# Patient Record
Sex: MALE | Race: WHITE | NOT HISPANIC OR LATINO | Employment: OTHER | URBAN - METROPOLITAN AREA
[De-identification: names, ages, dates, MRNs, and addresses within clinical notes are randomized per-mention and may not be internally consistent; named-entity substitution may affect disease eponyms.]

---

## 2017-01-16 DIAGNOSIS — E11.9 TYPE 2 DIABETES, HBA1C GOAL < 7% (H): Primary | ICD-10-CM

## 2017-01-16 NOTE — TELEPHONE ENCOUNTER
ULTICARE MINI PEN 31G X 6MM         Last Written Prescription Date: 09/01/15  Last Fill Quantity: 100,  # refills: 12   Last Office Visit with [facility], [facility] or [facility] prescribing provider: 08/04/16    Guillaume Mcgregor Pharmacy Technician  [facility]

## 2017-01-16 NOTE — TELEPHONE ENCOUNTER
Prescription approved per [facility] Refill Protocol.    Summer Low, PharmD  [facility]  On behalf of [facility]

## 2017-02-08 DIAGNOSIS — E78.5 HYPERLIPIDEMIA LDL GOAL <70: Primary | ICD-10-CM

## 2017-02-08 NOTE — TELEPHONE ENCOUNTER
Lipitor 20mg      Last Written Prescription Date:  8/5/16  Last Fill Quantity: 90,   # refills: 1  Last Office Visit with [facility], P or [facility] prescribing provider: 8/4/16  Future Office visit:    Next 5 appointments (look out 90 days)     Apr 24, 2017 10:15 AM   Return Visit with Ruth Martins MD   [facility] ([facility])    [facility]  [address]  [facility] [address]   [phone]                   Routing refill request to provider for review/approval because:  Medication is reported/historical    Olinda Corbett CPhT  [facility]    On behalf of [facility]

## 2017-02-09 RX ORDER — ATORVASTATIN CALCIUM 20 MG/1
20 TABLET, FILM COATED ORAL DAILY
Qty: 90 TABLET | Refills: 3 | Status: SHIPPED | OUTPATIENT
Start: 2017-02-09 | End: 2018-02-21

## 2017-04-16 DIAGNOSIS — N18.30 TYPE 2 DIABETES MELLITUS WITH STAGE 3 CHRONIC KIDNEY DISEASE (H): ICD-10-CM

## 2017-04-16 DIAGNOSIS — E11.22 TYPE 2 DIABETES MELLITUS WITH STAGE 3 CHRONIC KIDNEY DISEASE (H): ICD-10-CM

## 2017-04-17 NOTE — TELEPHONE ENCOUNTER
Ericka Blanco         Last Written Prescription Date: 08/04/2016  #15ml x 1  Last filled - not provided, e-refill request  Last Office Visit with [facility], [facility] or [facility] prescribing provider:  08/04/2016 JESENIA Beaulieu   Next 5 appointments (look out 90 days)     Apr 24, 2017 10:15 AM CDT   Return Visit with Ruth Martins MD   [facility] ([facility])    [facility]  [address]  [place] [address]   [phone]                   BP Readings from Last 3 Encounters:   12/05/16 126/76   10/31/16 139/74   08/11/16 154/69     Lab Results   Component Value Date    MICROL 67 08/04/2016     Lab Results   Component Value Date    UMALCR 88.90 08/04/2016     Creatinine   Date Value Ref Range Status   10/24/2016 1.33 (H) 0.66 - 1.25 mg/dL Final   ]  GFR Estimate   Date Value Ref Range Status   10/24/2016 52 (L) >60 mL/min/1.7m2 Final     Comment:     Non  GFR Calc   08/04/2016 48 (L) >60 mL/min/1.7m2 Final     Comment:     Non  GFR Calc   04/18/2016 47 (L) >60 mL/min/1.7m2 Final     Comment:     Non  GFR Calc     GFR Estimate If Black   Date Value Ref Range Status   10/24/2016 63 >60 mL/min/1.7m2 Final     Comment:      GFR Calc   08/04/2016 58 (L) >60 mL/min/1.7m2 Final     Comment:      GFR Calc   04/18/2016 57 (L) >60 mL/min/1.7m2 Final     Comment:      GFR Calc     Lab Results   Component Value Date    CHOL 124 07/16/2015     Lab Results   Component Value Date    HDL 45 07/16/2015     Lab Results   Component Value Date     08/04/2016    LDL 61 07/16/2015     Lab Results   Component Value Date    TRIG 91 07/16/2015     Lab Results   Component Value Date    CHOLHDLRATIO 2.8 07/16/2015     Lab Results   Component Value Date    AST 15 10/24/2016     Lab Results   Component Value Date    ALT 22 10/24/2016     Lab Results   Component Value Date    A1C 7.0 08/04/2016    A1C  6.8 01/25/2016    A1C 9.7 07/16/2015    A1C 7.8 12/04/2014    A1C 10.1 04/09/2014     Potassium   Date Value Ref Range Status   10/24/2016 4.5 3.4 - 5.3 mmol/L Final

## 2017-04-18 RX ORDER — INSULIN GLARGINE 100 [IU]/ML
INJECTION, SOLUTION SUBCUTANEOUS
Qty: 15 ML | Refills: 0 | Status: SHIPPED | OUTPATIENT
Start: 2017-04-18 | End: 2017-07-27

## 2017-04-18 NOTE — TELEPHONE ENCOUNTER
Prescription approved per [facility] Refill Protocol.  Patient has upcoming appointment. Estrellita Shaikh RN

## 2017-04-19 DIAGNOSIS — C20 RECTAL CANCER (H): ICD-10-CM

## 2017-04-19 LAB
ALBUMIN SERPL-MCNC: 3.6 G/DL (ref 3.4–5)
ALP SERPL-CCNC: 122 U/L (ref 40–150)
ALT SERPL W P-5'-P-CCNC: 18 U/L (ref 0–70)
ANION GAP SERPL CALCULATED.3IONS-SCNC: 8 MMOL/L (ref 3–14)
AST SERPL W P-5'-P-CCNC: 14 U/L (ref 0–45)
BASOPHILS # BLD AUTO: 0.1 10E9/L (ref 0–0.2)
BASOPHILS NFR BLD AUTO: 0.7 %
BILIRUB SERPL-MCNC: 0.9 MG/DL (ref 0.2–1.3)
BUN SERPL-MCNC: 23 MG/DL (ref 7–30)
CALCIUM SERPL-MCNC: 8.9 MG/DL (ref 8.5–10.1)
CEA SERPL-MCNC: 0.7 UG/L (ref 0–2.5)
CHLORIDE SERPL-SCNC: 104 MMOL/L (ref 94–109)
CO2 SERPL-SCNC: 25 MMOL/L (ref 20–32)
CREAT SERPL-MCNC: 1.37 MG/DL (ref 0.66–1.25)
DIFFERENTIAL METHOD BLD: ABNORMAL
EOSINOPHIL # BLD AUTO: 0.1 10E9/L (ref 0–0.7)
EOSINOPHIL NFR BLD AUTO: 1.4 %
ERYTHROCYTE [DISTWIDTH] IN BLOOD BY AUTOMATED COUNT: 13.6 % (ref 10–15)
GFR SERPL CREATININE-BSD FRML MDRD: 50 ML/MIN/1.7M2
GLUCOSE SERPL-MCNC: 233 MG/DL (ref 70–99)
HCT VFR BLD AUTO: 39.8 % (ref 40–53)
HGB BLD-MCNC: 12.8 G/DL (ref 13.3–17.7)
LYMPHOCYTES # BLD AUTO: 1.1 10E9/L (ref 0.8–5.3)
LYMPHOCYTES NFR BLD AUTO: 15.9 %
MCH RBC QN AUTO: 29.6 PG (ref 26.5–33)
MCHC RBC AUTO-ENTMCNC: 32.2 G/DL (ref 31.5–36.5)
MCV RBC AUTO: 92 FL (ref 78–100)
MONOCYTES # BLD AUTO: 0.6 10E9/L (ref 0–1.3)
MONOCYTES NFR BLD AUTO: 9.3 %
NEUTROPHILS # BLD AUTO: 5 10E9/L (ref 1.6–8.3)
NEUTROPHILS NFR BLD AUTO: 72.7 %
PLATELET # BLD AUTO: 164 10E9/L (ref 150–450)
POTASSIUM SERPL-SCNC: 4 MMOL/L (ref 3.4–5.3)
PROT SERPL-MCNC: 7.1 G/DL (ref 6.8–8.8)
RBC # BLD AUTO: 4.32 10E12/L (ref 4.4–5.9)
SODIUM SERPL-SCNC: 137 MMOL/L (ref 133–144)
WBC # BLD AUTO: 6.9 10E9/L (ref 4–11)

## 2017-04-19 PROCEDURE — 85025 COMPLETE CBC W/AUTO DIFF WBC: CPT | Performed by: INTERNAL MEDICINE

## 2017-04-19 PROCEDURE — 80053 COMPREHEN METABOLIC PANEL: CPT | Performed by: INTERNAL MEDICINE

## 2017-04-19 PROCEDURE — 82378 CARCINOEMBRYONIC ANTIGEN: CPT | Performed by: INTERNAL MEDICINE

## 2017-04-19 PROCEDURE — 36415 COLL VENOUS BLD VENIPUNCTURE: CPT | Performed by: INTERNAL MEDICINE

## 2017-04-24 ENCOUNTER — ONCOLOGY VISIT (OUTPATIENT)
Dept: ONCOLOGY | Facility: CLINIC | Age: 77
End: 2017-04-24
Attending: INTERNAL MEDICINE
Payer: COMMERCIAL

## 2017-04-24 VITALS
HEART RATE: 62 BPM | DIASTOLIC BLOOD PRESSURE: 76 MMHG | OXYGEN SATURATION: 98 % | RESPIRATION RATE: 18 BRPM | WEIGHT: 209.8 LBS | TEMPERATURE: 96.1 F | SYSTOLIC BLOOD PRESSURE: 155 MMHG | HEIGHT: 68 IN | BODY MASS INDEX: 31.8 KG/M2

## 2017-04-24 DIAGNOSIS — C20 RECTAL CANCER (H): ICD-10-CM

## 2017-04-24 DIAGNOSIS — N28.9 RENAL INSUFFICIENCY: ICD-10-CM

## 2017-04-24 DIAGNOSIS — I26.99 PULMONARY EMBOLISM, OTHER: Primary | ICD-10-CM

## 2017-04-24 DIAGNOSIS — T14.8XXA BRUISING: ICD-10-CM

## 2017-04-24 DIAGNOSIS — Z86.0100 HISTORY OF COLONIC POLYPS: ICD-10-CM

## 2017-04-24 PROCEDURE — 99211 OFF/OP EST MAY X REQ PHY/QHP: CPT

## 2017-04-24 PROCEDURE — 99214 OFFICE O/P EST MOD 30 MIN: CPT | Performed by: INTERNAL MEDICINE

## 2017-04-24 ASSESSMENT — PAIN SCALES - GENERAL: PAINLEVEL: NO PAIN (0)

## 2017-04-24 NOTE — PROGRESS NOTES
Hematology /oncology Visit      CHIEF COMPLAINT AND REASON FOR VISIT:    1. PE post op   2. Rectal cancer 2013     HISTORY OF PRESENT ILLNESS:  He presented with SOB found to have large right pulmonary embolism in 03/2014 when he was recovering from a laparoscopic-assisted low anterior resection for rectal cancer resection.  He had colorectal end-to-end anastomosis and loop ileostomy at that time.  He was on Lovenox 70-80 mg subcu b.i.d. for 7 months. It was stopped after repeat CT chest in 9/2014 indicated no PE.     Hypercoagulable work up through us in 9/2014 are negative. He remains to be off coumadin.  Advised 325 mg enteric coated ASA.      He was diagnosed with locally-advanced rectal adenocarcinoma in 07/2013 through colonoscopy due to bloody bowel movements and anemia.  Preop evaluation showed the lesion is about mid to upper rectum 7 cm from the anal verge, there are prominent 5-10 perirectal lymph nodes. He had minimal stage III disease on presentation. He received neoadjuvant concurrent chemoradiation with oral Xeloda, completed in 10/2013, then went on to have in 1/2014 laparoscopic-assisted LAR with splenic flexure mobilization and colonic J pouch with colorectal end-to-end anastomosis, loop ileostomy.  Final pathology indicating CR with no residual tumor in the rectum and in the lymph nodes.  He  was only able to proceed with 2 cycles of XELOX and then dropped due to his general poor condition.     PMH  Type 2 diabetes, peripheral neuropathy, BPH, hypertension, hyperlipidemia, history of urinary retention.      SOCIAL HISTORY:  He lives in [place].  He never used tobacco.  Denies alcohol abuse.  He quit smoking in 2002.  He had 30-pack-year smoking history prior.      FAMILY HISTORY:  Positive for maternal grandmother who had unknown type of cancer, 1 sister was treated will breast cancer.        REVIEW OF SYSTEMS:   He is eating nl, no pain, gaining weight, no blood in stool. He is living active  "life. He barely eats vegi.       PHYSICAL EXAMINATION:   VITAL SIGNS: Blood pressure 155/76, pulse 62, temperature 96.1  F (35.6  C), temperature source Tympanic, resp. rate 18, height 1.727 m (5' 8\"), weight 95.2 kg (209 lb 12.8 oz), SpO2 98 %.  GENERAL APPEARANCE:   Elderly gentleman, looks much younger than his stated age, not in acute distress.  He looks very comfortable.   HEENT: The patient is normocephalic, atraumatic. Pupils are equal react to light.  Sclerae are anicteric.  Moist oral mucosa.  Negative pharynx.  No oral thrush.   NECK:  Supple.  No jugular venous distention.  Thyroid is not palpable.   LYMPH NODES:  Superficial lymphadenopathy is not appreciable in the bilateral cervical, supraclavicular, axillary or inguinal adenopathy.    CARDIOVASCULAR:  S1, S2 regular with no murmurs or gallops.  No carotid or abdominal bruits.   PULMONARY:  Lungs are clear to auscultation and percussion bilaterally.  There is no wheezing or rhonchi.   GASTROINTESTINAL:  Abdomen is very soft, PEG feeding tube properly in place.   He has a right abdomen ileostomy bag, brownish liquid in the bag.   MUSCULOSKELETAL/EXTREMITIES:  No edema.  No cyanotic changes.  No signs of joint deformity.  No lymphedema.   NEUROLOGIC:  Cranial nerves II-XII are grossly intact.  Sensation intact.  Muscle strength and muscle tone symmetrical all through 5/5.   BACK:  No spinal or paraspinal tenderness.  No CVA tenderness.   SKIN:  No petechiae.  No rash.  No signs of cellulitis. Bruising on dorsal hands.       Current LAB Data  Cbc diff/CEA are fine, Cr 1.37 stable.         OLD DATA REVIEW IN SUMMARY:   12/2106: multiple tubulovillous adenoma removed.     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     CT body 10/2016: no mets.   CT 10/2015  1. Negative chest. No PE  2. Interval right lower quadrant ostomy takedown.  3. Postoperative changes in " the rectal and presacral region without any appreciable change since the prior exam.  4. Enlarged prostate gland.    CT 9/2014 - No evidence for pulmonary embolism.    CT enterography in 06/2014 with no evidence of bowel obstruction, no convincing focal small bowel abnormality, possible fistula track connecting this walled-off collection colonic anastomosis site in the pelvis.       Laparoscopic-assisted LAR in 01/2014 - no residual carcinoma identified in the sigmoid or rectum, 1 lymph node was negative.      Preop MRI in 08/2013:  sessile tumor in the posterior mid to upper rectum, between 5 and 10 prominent perirectal lymph nodes which are suspicious.      Original pathology from colonoscopy, rectal mass biopsy 07/2013 - invasive moderately differentiated adenocarcinoma with ulceration, all 4 DNA mismatch repair proteins are expressed in the tumor nuclei.      ASSESSMENT AND PLAN:   1.  Postop large right-sided pulmonary embolism in 03/2014, s/p 7 months of 70 to 80 mg subcu b.i.d.  after repeat CT chest 9/2014 indicated no PE.   Most likely his 03/2014 pulmonary embolism is due to postop debilitated condition. Hypercoagulable work up was negative. He was off coumadin since fall 2014.    Advise full dose enteric coated ASA. He is tolerating this fine.      2.  Renal insufficiency .  He is encouraged on oral fluid intake.     3.  Locally advanced rectal cancer, status post concurrent chemoradiation.  Surgical resection achieved complete CR.  He did not finish adjuvant treatment due to then poor condition.  He needs ongoing oncologic followup for the disease status.  He is due 6 months f/u with labs and CT prn.   Advise ASA and vit D.      4. 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 He needs on top of colonoscopy.    5. Bruising on hands. If it gets worse, can consider cut down ASA to 160 mg po QD.

## 2017-04-24 NOTE — MR AVS SNAPSHOT
After Visit Summary   4/24/2017    Storm Dutta    MRN: [id number]           Patient Information     Date Of Birth          1940        Visit Information        Provider Department      4/24/2017 10:15 AM Ruth Martins MD [facility]        Today's Diagnoses     Pulmonary embolism, other (H)    -  1    Rectal cancer (H)        History of colonic polyps        Renal insufficiency        Bruising          Care Instructions    We would like to see you back in 6 months with labs prior.    When you are in need of a refill, please call your pharmacy and they will send us a request.  Copy of appointments, and after visit summary (AVS) given to patient.  If you have any questions please call Lacy Garrido RN, BSN, OCN Oncology Hematology  [facility] [phone]. For questions after business hours, or on holidays/weekends, please call our after hours Nurse Triage line [phone]. Thank you.           Follow-ups after your visit        Your next 10 appointments already scheduled     Oct 17, 2017 10:00 AM CDT   LAB with  LAB   [facility]    [address] [address] [phone]           Patient must bring picture ID.  Patient should be prepared to give a urine specimen  Please do not eat 10-12 hours before your appointment if you are coming in fasting for labs on lipids, cholesterol, or glucose (sugar).  Pregnant women should follow their Care Team instructions. Water with medications is okay. Do not drink coffee or other fluids.   If you have concerns about taking  your medications, please ask at office or if scheduling via [email or web address] [email or web address], send a message by clicking on Secure Messaging, Message Your Care Team.            Oct 24, 2017 10:00 AM CDT   Return Visit with Ruth Martins MD   [facility] ([facility])    [facility]  [address] [place] [address]  [address]  [facility] [address]   [phone]              Future tests that were ordered for you today     Open Future Orders        Priority Expected Expires Ordered    CBC with platelets differential Routine 10/1/2017 11/30/2017 4/24/2017    CEA Routine 10/1/2017 11/30/2017 4/24/2017    Comprehensive metabolic panel Routine 10/1/2017 11/30/2017 4/24/2017            Who to contact     If you have questions or need follow up information about today's clinic visit or your schedule please contact [facility] directly at [phone].  Normal or non-critical lab and imaging results will be communicated to you by AllClear IDhart, letter or phone within 4 business days after the clinic has received the results. If you do not hear from us within 7 days, please contact the clinic through AllClear IDhart or phone. If you have a critical or abnormal lab result, we will notify you by phone as soon as possible.  Submit refill requests through [email or web address] or call your pharmacy and they will forward the refill request to us. Please allow 3 business days for your refill to be completed.          Additional Information About Your Visit        MyChart Information     [email or web address] gives you secure access to your electronic health record. If you see a primary care provider, you can also send messages to your care team and make appointments. If you have questions, please call your primary care clinic.  If you do not have a primary care provider, please call [phone] and they will assist you.        Care EveryWhere ID     This is your Care EveryWhere ID. This could be used by other organizations to access your [facility] medical records  [id number]        Your Vitals Were     Pulse Temperature Respirations Height Pulse Oximetry BMI (Body Mass Index)    62 96.1  F (35.6  C) (Tympanic) 18 1.727 m (5' 8\") 98% 31.9 kg/m2       Blood Pressure from Last 3 Encounters:   04/24/17 155/76   12/05/16 126/76   10/31/16 139/74    Weight from Last 3 " Encounters:   04/24/17 95.2 kg (209 lb 12.8 oz)   10/31/16 94.8 kg (209 lb)   08/04/16 93.7 kg (206 lb 8 oz)               Primary Care Provider Office Phone # Fax #    Jovana Beaulieu [phone]       [place] [address]   [place] [address]        Thank you!     Thank you for choosing [facility]  for your care. Our goal is always to provide you with excellent care. Hearing back from our patients is one way we can continue to improve our services. Please take a few minutes to complete the written survey that you may receive in the mail after your visit with us. Thank you!             Your Updated Medication List - Protect others around you: Learn how to safely use, store and throw away your medicines at www.disposemymeds.org.          This list is accurate as of: 4/24/17 10:54 AM.  Always use your most recent med list.                   Brand Name Dispense Instructions for use    aspirin 325 MG tablet      Take 325 mg by mouth daily       atorvastatin 20 MG tablet    LIPITOR    90 tablet    Take 1 tablet (20 mg) by mouth daily       blood glucose monitoring meter device kit     1 kit    Check blood sugars 1-2 times daily       blood glucose monitoring test strip    ACCU-CHEK REGIS    100 each    Use to test blood sugars 2-4  times daily or as directed.       diphenoxylate-atropine 2.5-0.025 MG per tablet    LOMOTIL    60 tablet    Take 1 tablet by mouth 4 times daily as needed for diarrhea       finasteride 5 MG tablet    PROSCAR    90 tablet    Take 1 tablet (5 mg) by mouth daily       insulin aspart 100 UNIT/ML injection    NovoLOG FLEXPEN    15 mL    Use 4 units plus low sliding scale before each meal 120-149 0 units 150-199 1 unit 200-249 2 units 250-299 3 units 300-349 4 units > 350 5 units  Max dose 50 units day       insulin pen needle 31G X 6 MM    ULTICARE MINI    100 each    Use 4-5 daily or as directed for sliding scale insulin.       LANTUS SOLOSTAR 100  UNIT/ML injection   Generic drug:  insulin glargine     15 mL    INJECT 13 UNITS UNDER THE SKIN AT BEDTIME       order for DME     1 Month    Equipment being ordered: ileostomy supplies       tamsulosin 0.4 MG capsule    FLOMAX    90 capsule    Take 1 capsule (0.4 mg) by mouth daily

## 2017-04-24 NOTE — NURSING NOTE
"Storm Dutta is a 76 year old male who presents for:  Chief Complaint   Patient presents with     Oncology Clinic Visit     6 month recheck Rectal CA, review lab, colonoscopy completed  12/2016        Initial Vitals:  /76 (BP Location: Right arm, Patient Position: Chair, Cuff Size: Adult Large)  Pulse 62  Temp 96.1  F (35.6  C) (Tympanic)  Resp 18  Ht 1.727 m (5' 8\")  Wt 95.2 kg (209 lb 12.8 oz)  SpO2 98%  BMI 31.9 kg/m2 Estimated body mass index is 31.9 kg/(m^2) as calculated from the following:    Height as of this encounter: 1.727 m (5' 8\").    Weight as of this encounter: 95.2 kg (209 lb 12.8 oz).. Body surface area is 2.14 meters squared. BP completed using cuff size: large  No Pain (0) No LMP for male patient. Allergies and medications reviewed.     Medications: Medication refills not needed today.  Pharmacy name entered into Neuropure: [facility] [address]    Comments:6 month recheck Rectal CA, review lab, colonoscopy completed  12/2016.     7  minutes for nursing intake (face to face time)   Afshan Keating CMA        "

## 2017-04-24 NOTE — PATIENT INSTRUCTIONS
We would like to see you back in 6 months with labs prior.    When you are in need of a refill, please call your pharmacy and they will send us a request.  Copy of appointments, and after visit summary (AVS) given to patient.  If you have any questions please call Lacy Garrido RN, BSN, OCN Oncology Hematology  [facility] [phone]. For questions after business hours, or on holidays/weekends, please call our after hours Nurse Triage line [phone]. Thank you.

## 2017-05-30 ENCOUNTER — TRANSFERRED RECORDS (OUTPATIENT)
Dept: HEALTH INFORMATION MANAGEMENT | Facility: CLINIC | Age: 77
End: 2017-05-30

## 2017-06-22 DIAGNOSIS — E11.65 TYPE 2 DIABETES MELLITUS WITH HYPERGLYCEMIA, WITH LONG-TERM CURRENT USE OF INSULIN (H): Primary | ICD-10-CM

## 2017-06-22 DIAGNOSIS — Z79.4 TYPE 2 DIABETES MELLITUS WITH HYPERGLYCEMIA, WITH LONG-TERM CURRENT USE OF INSULIN (H): Primary | ICD-10-CM

## 2017-06-22 NOTE — TELEPHONE ENCOUNTER
novolog flexpen         Last Written Prescription Date: 10/03/16  Last Fill Quantity: 15 ml, # refills: 3  Last Office Visit with [facility] or [facility] prescribing provider:  08/04/16        BP Readings from Last 3 Encounters:   04/24/17 155/76   12/05/16 126/76   10/31/16 139/74     Lab Results   Component Value Date    MICROL 67 08/04/2016     Lab Results   Component Value Date    UMALCR 88.90 08/04/2016     Creatinine   Date Value Ref Range Status   04/19/2017 1.37 (H) 0.66 - 1.25 mg/dL Final   ]  GFR Estimate   Date Value Ref Range Status   04/19/2017 50 (L) >60 mL/min/1.7m2 Final     Comment:     Non  GFR Calc   10/24/2016 52 (L) >60 mL/min/1.7m2 Final     Comment:     Non  GFR Calc   08/04/2016 48 (L) >60 mL/min/1.7m2 Final     Comment:     Non  GFR Calc     GFR Estimate If Black   Date Value Ref Range Status   04/19/2017 61 >60 mL/min/1.7m2 Final     Comment:      GFR Calc   10/24/2016 63 >60 mL/min/1.7m2 Final     Comment:      GFR Calc   08/04/2016 58 (L) >60 mL/min/1.7m2 Final     Comment:      GFR Calc     Lab Results   Component Value Date    CHOL 124 07/16/2015     Lab Results   Component Value Date    HDL 45 07/16/2015     Lab Results   Component Value Date     08/04/2016    LDL 61 07/16/2015     Lab Results   Component Value Date    TRIG 91 07/16/2015     Lab Results   Component Value Date    CHOLHDLRATIO 2.8 07/16/2015     Lab Results   Component Value Date    AST 14 04/19/2017     Lab Results   Component Value Date    ALT 18 04/19/2017     Lab Results   Component Value Date    A1C 7.0 08/04/2016    A1C 6.8 01/25/2016    A1C 9.7 07/16/2015    A1C 7.8 12/04/2014    A1C 10.1 04/09/2014     Potassium   Date Value Ref Range Status   04/19/2017 4.0 3.4 - 5.3 mmol/L Final     Thank you,  Henrietta Galaviz [facility]  On behalf of [facility]

## 2017-06-23 NOTE — TELEPHONE ENCOUNTER
Routing refill request to provider for review/approval because:  Due for diabetic labs. Estrellita Shaikh RN

## 2017-07-11 DIAGNOSIS — N40.1 HYPERTROPHY OF PROSTATE WITH URINARY OBSTRUCTION: ICD-10-CM

## 2017-07-11 DIAGNOSIS — E78.5 HYPERLIPIDEMIA LDL GOAL <70: ICD-10-CM

## 2017-07-11 DIAGNOSIS — N13.8 HYPERTROPHY OF PROSTATE WITH URINARY OBSTRUCTION: ICD-10-CM

## 2017-07-12 RX ORDER — TAMSULOSIN HYDROCHLORIDE 0.4 MG/1
CAPSULE ORAL
Qty: 90 CAPSULE | Refills: 0 | Status: SHIPPED | OUTPATIENT
Start: 2017-07-12 | End: 2017-10-08

## 2017-07-12 NOTE — TELEPHONE ENCOUNTER
tamsulosin (FLOMAX) 0.4 MG 24 hr capsule         Last Written Prescription Date: 10/14/16  Last Fill Quantity: 90, # refills: 2    Last Office Visit with [facility], [facility] or [facility] prescribing provider:  8/4/16   Future Office Visit:    Next 5 appointments (look out 90 days)     Jul 27, 2017  8:40 AM CDT   SHORT with Jovana Beaulieu DO   [facility] ([facility])    [address] [address] [phone]                  BP Readings from Last 3 Encounters:   04/24/17 155/76   12/05/16 126/76   10/31/16 139/74

## 2017-07-27 ENCOUNTER — OFFICE VISIT (OUTPATIENT)
Dept: FAMILY MEDICINE | Facility: CLINIC | Age: 77
End: 2017-07-27
Payer: COMMERCIAL

## 2017-07-27 VITALS
TEMPERATURE: 97 F | BODY MASS INDEX: 31.5 KG/M2 | SYSTOLIC BLOOD PRESSURE: 144 MMHG | HEART RATE: 70 BPM | DIASTOLIC BLOOD PRESSURE: 78 MMHG | WEIGHT: 207.2 LBS

## 2017-07-27 DIAGNOSIS — N18.30 TYPE 2 DIABETES MELLITUS WITH STAGE 3 CHRONIC KIDNEY DISEASE, WITH LONG-TERM CURRENT USE OF INSULIN (H): ICD-10-CM

## 2017-07-27 DIAGNOSIS — E78.5 HYPERLIPIDEMIA LDL GOAL <70: ICD-10-CM

## 2017-07-27 DIAGNOSIS — Z79.4 TYPE 2 DIABETES MELLITUS WITH HYPERGLYCEMIA, WITH LONG-TERM CURRENT USE OF INSULIN (H): Primary | ICD-10-CM

## 2017-07-27 DIAGNOSIS — E11.22 TYPE 2 DIABETES MELLITUS WITH STAGE 3 CHRONIC KIDNEY DISEASE, WITH LONG-TERM CURRENT USE OF INSULIN (H): ICD-10-CM

## 2017-07-27 DIAGNOSIS — Z79.4 TYPE 2 DIABETES MELLITUS WITH STAGE 3 CHRONIC KIDNEY DISEASE, WITH LONG-TERM CURRENT USE OF INSULIN (H): ICD-10-CM

## 2017-07-27 DIAGNOSIS — E11.65 TYPE 2 DIABETES MELLITUS WITH HYPERGLYCEMIA, WITH LONG-TERM CURRENT USE OF INSULIN (H): Primary | ICD-10-CM

## 2017-07-27 DIAGNOSIS — I10 HYPERTENSION GOAL BP (BLOOD PRESSURE) < 140/90: ICD-10-CM

## 2017-07-27 LAB
CHOLEST SERPL-MCNC: 130 MG/DL
CREAT UR-MCNC: 91 MG/DL
HBA1C MFR BLD: 8.5 % (ref 4.3–6)
HDLC SERPL-MCNC: 48 MG/DL
LDLC SERPL CALC-MCNC: 62 MG/DL
MICROALBUMIN UR-MCNC: 98 MG/L
MICROALBUMIN/CREAT UR: 107.33 MG/G CR (ref 0–17)
NONHDLC SERPL-MCNC: 82 MG/DL
TRIGL SERPL-MCNC: 101 MG/DL
TSH SERPL DL<=0.005 MIU/L-ACNC: 1.89 MU/L (ref 0.4–4)

## 2017-07-27 PROCEDURE — 84443 ASSAY THYROID STIM HORMONE: CPT | Performed by: FAMILY MEDICINE

## 2017-07-27 PROCEDURE — 36415 COLL VENOUS BLD VENIPUNCTURE: CPT | Performed by: FAMILY MEDICINE

## 2017-07-27 PROCEDURE — 80061 LIPID PANEL: CPT | Performed by: FAMILY MEDICINE

## 2017-07-27 PROCEDURE — 99214 OFFICE O/P EST MOD 30 MIN: CPT | Performed by: FAMILY MEDICINE

## 2017-07-27 PROCEDURE — 83036 HEMOGLOBIN GLYCOSYLATED A1C: CPT | Performed by: FAMILY MEDICINE

## 2017-07-27 PROCEDURE — 82043 UR ALBUMIN QUANTITATIVE: CPT | Performed by: FAMILY MEDICINE

## 2017-07-27 PROCEDURE — 99207 C FOOT EXAM  NO CHARGE: CPT | Performed by: FAMILY MEDICINE

## 2017-07-27 RX ORDER — LOSARTAN POTASSIUM 25 MG/1
12.5 TABLET ORAL DAILY
Qty: 15 TABLET | Refills: 0 | Status: SHIPPED | OUTPATIENT
Start: 2017-07-27 | End: 2018-02-27

## 2017-07-27 NOTE — LETTER
[address] [address]  [address] [place] [address]        July 31, 2017          Dear ,    We are writing to inform you of your test results.      Your thyroid testing was normal.  Your cholesterol is excellent.  Your urine protein has been stable.     I'll look forward to seeing your blood pressures readings in 2 weeks.       Resulted Orders   Hemoglobin A1c   Result Value Ref Range    Hemoglobin A1C 8.5 (H) 4.3 - 6.0 %   Lipid panel reflex to direct LDL   Result Value Ref Range    Cholesterol 130 <200 mg/dL    Triglycerides 101 <150 mg/dL    HDL Cholesterol 48 >39 mg/dL    LDL Cholesterol Calculated 62 <100 mg/dL      Comment:      Desirable:       <100 mg/dl    Non HDL Cholesterol 82 <130 mg/dL   TSH with free T4 reflex   Result Value Ref Range    TSH 1.89 0.40 - 4.00 mU/L   Albumin Random Urine Quantitative   Result Value Ref Range    Creatinine Urine 91 mg/dL    Albumin Urine mg/L 98 mg/L    Albumin Urine mg/g Cr 107.33 (H) 0 - 17 mg/g Cr       If you have any questions or concerns, please call the clinic at the number listed above.       Sincerely,        Jovana Beaulieu, DO/ag

## 2017-07-27 NOTE — MR AVS SNAPSHOT
After Visit Summary   7/27/2017    Storm Dutta    MRN: [id number]           Patient Information     Date Of Birth          1940        Visit Information        Provider Department      7/27/2017 8:40 AM Jovana Beaulieu DO [facility]        Today's Diagnoses     Type 2 diabetes mellitus with stage 3 chronic kidney disease, with long-term current use of insulin (H)    -  1    Hyperlipidemia LDL goal <70        Hypertension goal BP (blood pressure) < 140/90        Type 2 diabetes mellitus with hyperglycemia, with long-term current use of insulin (H)          Care Instructions    Looks like the blood sugars have slipped up a bit again.  I think we have some room to work with the lantus.  You can increase your dose by 1 unit every 3-4 days until your fasting blood sugars are between 90 and 130.    Just continue your mealtime insulin now without any changes.  We can think about adding back in your metformin after your recheck in 2 weeks.    We did restart your blood pressure medicine today too.  This is just once daily.  Please make sure to come back to clinic for a nurse visit for blood pressure check and lab visit in 2 weeks.  No need to fast.      We should follow up on your diabetes again in 3 months          Follow-ups after your visit        Your next 10 appointments already scheduled     Oct 17, 2017 10:00 AM CDT   LAB with LL LAB   [facility] ([facility])    [address] [id number] [phone]           Patient must bring picture ID. Patient should be prepared to give a urine specimen  Please do not eat 10-12 hours before your appointment if you are coming in fasting for labs on lipids, cholesterol, or glucose (sugar). Pregnant women should follow their Care Team instructions. Water with medications is okay. Do not drink coffee or other fluids. If you have concerns about taking  your medications, please ask at  office or if scheduling via Original, send a message by clicking on Secure Messaging, Message Your Care Team.            Oct 24, 2017 10:00 AM CDT   Return Visit with Ruth Martins MD   [facility] ([facility])    [facility]  [address]  [place] [facility] [place] [address]   [phone]              Future tests that were ordered for you today     Open Future Orders        Priority Expected Expires Ordered    Basic metabolic panel Routine 8/10/2017 7/27/2018 7/27/2017            Who to contact     Normal or non-critical lab and imaging results will be communicated to you by [employer], letter or phone within 4 business days after the clinic has received the results. If you do not hear from us within 7 days, please contact the clinic through [employer] or phone. If you have a critical or abnormal lab result, we will notify you by phone as soon as possible.  Submit refill requests through Original or call your pharmacy and they will forward the refill request to us. Please allow 3 business days for your refill to be completed.          If you need to speak with a  for additional information , please call: [phone]           Additional Information About Your Visit        Original Information     Original gives you secure access to your electronic health record. If you see a primary care provider, you can also send messages to your care team and make appointments. If you have questions, please call your primary care clinic.  If you do not have a primary care provider, please call [phone] and they will assist you.        Care EveryWhere ID     This is your Care EveryWhere ID. This could be used by other organizations to access your [place] medical records  [id number]        Your Vitals Were     Pulse Temperature BMI (Body Mass Index)             70 97  F (36.1  C) (Tympanic) 31.5 kg/m2          Blood Pressure from Last 3 Encounters:   07/27/17 144/78    04/24/17 155/76   12/05/16 126/76    Weight from Last 3 Encounters:   07/27/17 207 lb 3.2 oz (94 kg)   04/24/17 209 lb 12.8 oz (95.2 kg)   10/31/16 209 lb (94.8 kg)              We Performed the Following     Albumin Random Urine Quantitative     C FOOT EXAM  NO CHARGE     EYE EXAM (SIMPLE-NONBILLABLE)     Hemoglobin A1c     Lipid panel reflex to direct LDL     TSH with free T4 reflex          Today's Medication Changes          These changes are accurate as of: 7/27/17  9:25 AM.  If you have any questions, ask your nurse or doctor.               Start taking these medicines.        Dose/Directions    losartan 25 MG tablet   Commonly known as:  COZAAR   Used for:  Hypertension goal BP (blood pressure) < 140/90   Started by:  Jovana Beaulieu DO        Dose:  12.5 mg   Take 0.5 tablets (12.5 mg) by mouth daily   Quantity:  15 tablet   Refills:  0         These medicines have changed or have updated prescriptions.        Dose/Directions    insulin glargine 100 UNIT/ML injection   Commonly known as:  LANTUS SOLOSTAR   This may have changed:  See the new instructions.   Used for:  Type 2 diabetes mellitus with stage 3 chronic kidney disease, with long-term current use of insulin (H)   Changed by:  Jovana Beaulieu DO        INJECT 16 UNITS UNDER THE SKIN AT BEDTIME   Quantity:  15 mL   Refills:  0       NovoLOG FLEXPEN 100 UNIT/ML injection   This may have changed:  additional instructions   Used for:  Type 2 diabetes mellitus with hyperglycemia, with long-term current use of insulin (H)   Generic drug:  insulin aspart   Changed by:  Jovana Beaulieu DO        Use 8 units plus low sliding scale before each meal 120-149 0 units 150-199 1 unit 200-249 2 units 250-299 3 units 300-349 4 units > 350 5 units  Max dose 50 units day   Quantity:  15 mL   Refills:  3            Where to get your medicines      These medications were sent to [facility] [address]  [address], [place] [address]     Phone:  [phone]     insulin glargine 100 UNIT/ML injection    losartan 25 MG tablet                Primary Care Provider Office Phone # Fax #    Jovana Beaulieu [phone]       [facility] [address] [place] [address]        Equal Access to Services     JOSÉ MIGUEL NUNES : Hadii aad ku hadasho Soomaali, waaxda luqadaha, qaybta kaalmada adeegyada, waxay idiin hayaan adeeg kharash la'aan ah. So [facility] [phone].    ATENCIÓN: Si habla español, tiene a kennedy disposición servicios gratuitos de asistencia lingüística. Llame al [phone].    We comply with applicable federal civil rights laws and [place] laws. We do not discriminate on the basis of race, color, national origin, age, disability sex, sexual orientation or gender identity.            Thank you!     Thank you for choosing [facility]  for your care. Our goal is always to provide you with excellent care. Hearing back from our patients is one way we can continue to improve our services. Please take a few minutes to complete the written survey that you may receive in the mail after your visit with us. Thank you!             Your Updated Medication List - Protect others around you: Learn how to safely use, store and throw away your medicines at www.disposemymeds.org.          This list is accurate as of: 7/27/17  9:25 AM.  Always use your most recent med list.                   Brand Name Dispense Instructions for use Diagnosis    aspirin 325 MG tablet      Take 325 mg by mouth daily        atorvastatin 20 MG tablet    LIPITOR    90 tablet    Take 1 tablet (20 mg) by mouth daily    Hyperlipidemia LDL goal <70       blood glucose monitoring meter device kit     1 kit    Check blood sugars 1-2 times daily    Type 2 diabetes, HbA1c goal < 7% (H)       blood glucose monitoring test strip    ACCU-CHEK REGIS    100 each    Use to test blood sugars 2-4  times daily or as directed.     Type 2 diabetes mellitus with stage 3 chronic kidney disease, with long-term current use of insulin (H)       diphenoxylate-atropine 2.5-0.025 MG per tablet    LOMOTIL    60 tablet    Take 1 tablet by mouth 4 times daily as needed for diarrhea    Diarrhea       finasteride 5 MG tablet    PROSCAR    90 tablet    Take 1 tablet (5 mg) by mouth daily    Hypertrophy of prostate with urinary obstruction       insulin glargine 100 UNIT/ML injection    LANTUS SOLOSTAR    15 mL    INJECT 16 UNITS UNDER THE SKIN AT BEDTIME    Type 2 diabetes mellitus with stage 3 chronic kidney disease, with long-term current use of insulin (H)       insulin pen needle 31G X 6 MM    ULTICARE MINI    100 each    Use 4-5 daily or as directed for sliding scale insulin.    Type 2 diabetes, HbA1c goal < 7% (H)       losartan 25 MG tablet    COZAAR    15 tablet    Take 0.5 tablets (12.5 mg) by mouth daily    Hypertension goal BP (blood pressure) < 140/90       NovoLOG FLEXPEN 100 UNIT/ML injection   Generic drug:  insulin aspart     15 mL    Use 8 units plus low sliding scale before each meal 120-149 0 units 150-199 1 unit 200-249 2 units 250-299 3 units 300-349 4 units > 350 5 units  Max dose 50 units day    Type 2 diabetes mellitus with hyperglycemia, with long-term current use of insulin (H)       order for DME     1 Month    Equipment being ordered: ileostomy supplies    Rectal cancer (H)       tamsulosin 0.4 MG capsule    FLOMAX    90 capsule    TAKE ONE CAPSULE BY MOUTH EVERY DAY    Hyperlipidemia LDL goal <70, Hypertrophy of prostate with urinary obstruction

## 2017-07-27 NOTE — PATIENT INSTRUCTIONS
Looks like the blood sugars have slipped up a bit again.  I think we have some room to work with the lantus.  You can increase your dose by 1 unit every 3-4 days until your fasting blood sugars are between 90 and 130.    Just continue your mealtime insulin now without any changes.  We can think about adding back in your metformin after your recheck in 2 weeks.    We did restart your blood pressure medicine today too.  This is just once daily.  Please make sure to come back to clinic for a nurse visit for blood pressure check and lab visit in 2 weeks.  No need to fast.      We should follow up on your diabetes again in 3 months

## 2017-07-27 NOTE — NURSING NOTE
"Initial /78  Pulse 70  Temp 97  F (36.1  C) (Tympanic)  Wt 207 lb 3.2 oz (94 kg)  BMI 31.5 kg/m2 Estimated body mass index is 31.5 kg/(m^2) as calculated from the following:    Height as of 4/24/17: 5' 8\" (1.727 m).    Weight as of this encounter: 207 lb 3.2 oz (94 kg). .      "

## 2017-07-27 NOTE — PROGRESS NOTES
SUBJECTIVE:                                                    Storm Dutta is a 76 year old male who presents to clinic today for the following health issues:    Diabetes Follow-up    Patient is checking blood sugars: once daily.  Results are as follows:       am - 130-150        Diabetic concerns: None     Symptoms of hypoglycemia (low blood sugar): none     Paresthesias (numbness or burning in feet) or sores: No     Date of last diabetic eye exam: 5/30/17 [facility]    Hs not been checking mealtime blood sugars.  Taking fixe 8 units before meals with no sliding scale.  Has been eating ice cream more frequently, not exercising.      Had been on metformin in the past.  Was stopped during one of his hospitalizations for treatment of his rectal cancer and never restarted.  He does nto recall having any issues with it    Hyperlipidemia Follow-Up      Rate your low fat/cholesterol diet?: not monitoring fat    Taking statin?  Yes, no muscle aches from statin    Other lipid medications/supplements?:  none    Hypertension Follow-up      Outpatient blood pressures are not being checked.  Low Salt Diet: no added salt    Had been on multiple meds prior to his rectal cancer diagnosis.  Had significant weight loss and was able to stop all of them.  BP now gradually increasing.      Amount of exercise or physical activity: 4-5 days/week for an average of 30 minutes    Problems taking medications regularly: No    Medication side effects: none  Diet: regular (no restrictions)    Problem list and histories reviewed & adjusted, as indicated.  Additional history: as documented    Reviewed and updated as needed this visit by clinical staffTobacco  Allergies  Meds  Problems  Med Hx  Surg Hx  Fam Hx  Soc Hx        Reviewed and updated as needed this visit by Provider  Tobacco  Allergies  Meds  Problems  Med Hx  Surg Hx  Fam Hx  Soc Hx          ROS: Remainder of Constitutional, CV,  Respiratory, GI,  negative with exception of that mentioned above    PE:  VS as above   Gen:  WN/WD/WH male in NAD   Heart:  RRR without murmur, nl S1, S2, no rubs or gallops   Lungs CTA leydi without rales/ronchi/wheezes   Ext:  No pedal edema   Foot:  Thickened toenails leydi feet with some toe deformity leydi.  No sensation to monofilament exam R, minimal sensation to monofilament exam L    A/P:      ICD-10-CM    1. Type 2 diabetes mellitus with hyperglycemia, with long-term current use of insulin (H) E11.65 insulin aspart (NOVOLOG FLEXPEN) 100 UNIT/ML injection    Z79.4    2. Type 2 diabetes mellitus with stage 3 chronic kidney disease, with long-term current use of insulin (H) E11.22 Hemoglobin A1c    N18.3 TSH with free T4 reflex    Z79.4 Albumin Random Urine Quantitative     C FOOT EXAM  NO CHARGE     EYE EXAM (SIMPLE-NONBILLABLE)     insulin glargine (LANTUS SOLOSTAR) 100 UNIT/ML injection   3. Hypertension goal BP (blood pressure) < 140/90 I10 losartan (COZAAR) 25 MG tablet     Basic metabolic panel   4. Hyperlipidemia LDL goal <70 E78.5 Lipid panel reflex to direct LDL     Patient Instructions   Looks like the blood sugars have slipped up a bit again.  I think we have some room to work with the lantus.  You can increase your dose by 1 unit every 3-4 days until your fasting blood sugars are between 90 and 130.    Just continue your mealtime insulin now without any changes.  We can think about adding back in your metformin after your recheck in 2 weeks.    We did restart your blood pressure medicine today too.  This is just once daily.  Please make sure to come back to clinic for a nurse visit for blood pressure check and lab visit in 2 weeks.  No need to fast.      We should follow up on your diabetes again in 3 months

## 2017-08-10 ENCOUNTER — ALLIED HEALTH/NURSE VISIT (OUTPATIENT)
Dept: FAMILY MEDICINE | Facility: CLINIC | Age: 77
End: 2017-08-10
Payer: COMMERCIAL

## 2017-08-10 VITALS — DIASTOLIC BLOOD PRESSURE: 50 MMHG | SYSTOLIC BLOOD PRESSURE: 132 MMHG | OXYGEN SATURATION: 98 % | HEART RATE: 62 BPM

## 2017-08-10 DIAGNOSIS — I10 HYPERTENSION GOAL BP (BLOOD PRESSURE) < 140/90: ICD-10-CM

## 2017-08-10 DIAGNOSIS — Z01.30 BLOOD PRESSURE CHECK: Primary | ICD-10-CM

## 2017-08-10 LAB
ANION GAP SERPL CALCULATED.3IONS-SCNC: 6 MMOL/L (ref 3–14)
BUN SERPL-MCNC: 19 MG/DL (ref 7–30)
CALCIUM SERPL-MCNC: 9.1 MG/DL (ref 8.5–10.1)
CHLORIDE SERPL-SCNC: 104 MMOL/L (ref 94–109)
CO2 SERPL-SCNC: 27 MMOL/L (ref 20–32)
CREAT SERPL-MCNC: 1.33 MG/DL (ref 0.66–1.25)
GFR SERPL CREATININE-BSD FRML MDRD: 52 ML/MIN/1.7M2
GLUCOSE SERPL-MCNC: 151 MG/DL (ref 70–99)
POTASSIUM SERPL-SCNC: 4.1 MMOL/L (ref 3.4–5.3)
SODIUM SERPL-SCNC: 137 MMOL/L (ref 133–144)

## 2017-08-10 PROCEDURE — 80048 BASIC METABOLIC PNL TOTAL CA: CPT | Performed by: FAMILY MEDICINE

## 2017-08-10 PROCEDURE — 99207 ZZC NO CHARGE NURSE ONLY: CPT

## 2017-08-10 PROCEDURE — 36415 COLL VENOUS BLD VENIPUNCTURE: CPT | Performed by: FAMILY MEDICINE

## 2017-08-10 NOTE — PROGRESS NOTES
Patient was seen today for a blood pressure recheck. Patient said he has been taking his blood pressure medication as prescribed. Patient said he had not taken it today yet.  First blood pressure obtained was 158/52.  Patient sat for about ten minuets and blood pressure was 132/50.  Patient was told to do a nurse only blood pressure check again when he had taken his blood pressure medication per Dr. Beaulieu. Patient voiced understanding.   Bel Martino LPN

## 2017-08-10 NOTE — MR AVS SNAPSHOT
After Visit Summary   8/10/2017    Storm Dutta    MRN: [id number]           Patient Information     Date Of Birth          1940        Visit Information        Provider Department      8/10/2017 9:00 AM Yvonne/Ivette Rock [facility]        Today's Diagnoses     Blood pressure check    -  1       Follow-ups after your visit        Your next 10 appointments already scheduled     Oct 17, 2017 10:00 AM CDT   LAB with  LAB   [facility] ([facility])    [address] [id number] [phone]           Patient must bring picture ID. Patient should be prepared to give a urine specimen  Please do not eat 10-12 hours before your appointment if you are coming in fasting for labs on lipids, cholesterol, or glucose (sugar). Pregnant women should follow their Care Team instructions. Water with medications is okay. Do not drink coffee or other fluids. If you have concerns about taking  your medications, please ask at office or if scheduling via [employer], send a message by clicking on Secure Messaging, Message Your Care Team.            Oct 24, 2017 10:00 AM CDT   Return Visit with Ruth Martins MD   [facility] ([facility])    [facility]  [address]  [place] [address] [phone]              Who to contact     Normal or non-critical lab and imaging results will be communicated to you by MyChart, letter or phone within 4 business days after the clinic has received the results. If you do not hear from us within 7 days, please contact the clinic through MyChart or phone. If you have a critical or abnormal lab result, we will notify you by phone as soon as possible.  Submit refill requests through [employer] or call your pharmacy and they will forward the refill request to us. Please allow 3 business days for your refill to be completed.          If you need to speak with a   for additional information , please call: [phone]           Additional Information About Your Visit        MyChart Information     [employer] gives you secure access to your electronic health record. If you see a primary care provider, you can also send messages to your care team and make appointments. If you have questions, please call your primary care clinic.  If you do not have a primary care provider, please call [phone] and they will assist you.        Care EveryWhere ID     This is your Care EveryWhere ID. This could be used by other organizations to access your [place] medical records  [id number]        Your Vitals Were     Pulse Pulse Oximetry                62 98%           Blood Pressure from Last 3 Encounters:   08/10/17 132/50   07/27/17 144/78   04/24/17 155/76    Weight from Last 3 Encounters:   07/27/17 207 lb 3.2 oz (94 kg)   04/24/17 209 lb 12.8 oz (95.2 kg)   10/31/16 209 lb (94.8 kg)              Today, you had the following     No orders found for display       Primary Care Provider Office Phone # Fax #    Jovana Gandara DO [facility] [phone] [address] [place] [address]        Equal Access to Services     JOSÉ MIGUEL NUNES AH: Hadii trisha ku hadasho Soomaali, waaxda luqadaha, qaybta kaalmada adeegyada, lionel meas. So [facility] [phone].    ATENCIÓN: Si habla español, tiene a kennedy disposición servicios gratuitos de asistencia lingüística. Llame al [phone].    We comply with applicable federal civil rights laws and [place] laws. We do not discriminate on the basis of race, color, national origin, age, disability sex, sexual orientation or gender identity.            Thank you!     Thank you for choosing [facility] [place]  for your care. Our goal is always to provide you with excellent care. Hearing back from our patients is one way we can continue to improve our services. Please take a few minutes to complete  the written survey that you may receive in the mail after your visit with us. Thank you!             Your Updated Medication List - Protect others around you: Learn how to safely use, store and throw away your medicines at www.disposemymeds.org.          This list is accurate as of: 8/10/17  9:13 AM.  Always use your most recent med list.                   Brand Name Dispense Instructions for use Diagnosis    aspirin 325 MG tablet      Take 325 mg by mouth daily        atorvastatin 20 MG tablet    LIPITOR    90 tablet    Take 1 tablet (20 mg) by mouth daily    Hyperlipidemia LDL goal <70       blood glucose monitoring meter device kit     1 kit    Check blood sugars 1-2 times daily    Type 2 diabetes, HbA1c goal < 7% (H)       blood glucose monitoring test strip    ACCU-CHEK REGIS    100 each    Use to test blood sugars 2-4  times daily or as directed.    Type 2 diabetes mellitus with stage 3 chronic kidney disease, with long-term current use of insulin (H)       diphenoxylate-atropine 2.5-0.025 MG per tablet    LOMOTIL    60 tablet    Take 1 tablet by mouth 4 times daily as needed for diarrhea    Diarrhea       finasteride 5 MG tablet    PROSCAR    90 tablet    Take 1 tablet (5 mg) by mouth daily    Hypertrophy of prostate with urinary obstruction       insulin glargine 100 UNIT/ML injection    LANTUS SOLOSTAR    15 mL    INJECT 16 UNITS UNDER THE SKIN AT BEDTIME    Type 2 diabetes mellitus with stage 3 chronic kidney disease, with long-term current use of insulin (H)       insulin pen needle 31G X 6 MM    ULTICARE MINI    100 each    Use 4-5 daily or as directed for sliding scale insulin.    Type 2 diabetes, HbA1c goal < 7% (H)       losartan 25 MG tablet    COZAAR    15 tablet    Take 0.5 tablets (12.5 mg) by mouth daily    Hypertension goal BP (blood pressure) < 140/90       NovoLOG FLEXPEN 100 UNIT/ML injection   Generic drug:  insulin aspart     15 mL    Use 8 units plus low sliding scale before each meal  120-149 0 units 150-199 1 unit 200-249 2 units 250-299 3 units 300-349 4 units > 350 5 units  Max dose 50 units day    Type 2 diabetes mellitus with hyperglycemia, with long-term current use of insulin (H)       order for DME     1 Month    Equipment being ordered: ileostomy supplies    Rectal cancer (H)       tamsulosin 0.4 MG capsule    FLOMAX    90 capsule    TAKE ONE CAPSULE BY MOUTH EVERY DAY    Hyperlipidemia LDL goal <70, Hypertrophy of prostate with urinary obstruction

## 2017-08-15 DIAGNOSIS — N40.1 HYPERTROPHY OF PROSTATE WITH URINARY OBSTRUCTION: ICD-10-CM

## 2017-08-15 DIAGNOSIS — N13.8 HYPERTROPHY OF PROSTATE WITH URINARY OBSTRUCTION: ICD-10-CM

## 2017-08-15 NOTE — TELEPHONE ENCOUNTER
Finasteride 5mg      Last Written Prescription Date: 08/04/2016 #90 x 3  Last filled 05/15/2017  Last Office Visit with [facility], [facility] or [facility] prescribing provider: 07/27/2017 JESENIA Beaulieu                                         Next 5 appointments (look out 90 days)     Oct 24, 2017 10:00 AM CDT   Return Visit with Ruth Martins MD   [facility] ([facility])    [facility]  [address]  [facility] [place] [address]   [phone]

## 2017-08-16 RX ORDER — FINASTERIDE 5 MG/1
TABLET, FILM COATED ORAL
Qty: 90 TABLET | Refills: 3 | Status: SHIPPED | OUTPATIENT
Start: 2017-08-16 | End: 2018-08-23

## 2017-10-08 DIAGNOSIS — N13.8 HYPERTROPHY OF PROSTATE WITH URINARY OBSTRUCTION: ICD-10-CM

## 2017-10-08 DIAGNOSIS — E78.5 HYPERLIPIDEMIA LDL GOAL <70: ICD-10-CM

## 2017-10-08 DIAGNOSIS — N40.1 HYPERTROPHY OF PROSTATE WITH URINARY OBSTRUCTION: ICD-10-CM

## 2017-10-09 NOTE — TELEPHONE ENCOUNTER
Tamsulosin 0.4mg         Last Written Prescription Date: 07/12/2017  #90 X 0  Last filled 07/12/2017  Last Office Visit with [facility], [facility] or [facility] prescribing provider:  07/27/2017 JESENIA Beaulieu   Future Office Visit:    Next 5 appointments (look out 90 days)     Oct 24, 2017 10:00 AM CDT   Return Visit with Ruth Martins MD   [facility] ([facility])    [facility]  [address]  [place] [address]   [phone]                  BP Readings from Last 3 Encounters:   08/10/17 132/50   07/27/17 144/78   04/24/17 155/76

## 2017-10-11 RX ORDER — TAMSULOSIN HYDROCHLORIDE 0.4 MG/1
CAPSULE ORAL
Qty: 90 CAPSULE | Refills: 1 | Status: SHIPPED | OUTPATIENT
Start: 2017-10-11 | End: 2018-04-02

## 2017-10-11 NOTE — TELEPHONE ENCOUNTER
Prescription approved per [facility] Refill Protocol.  Estrellita Shaikh RN

## 2017-10-17 DIAGNOSIS — C20 RECTAL CANCER (H): ICD-10-CM

## 2017-10-17 LAB
ALBUMIN SERPL-MCNC: 3.9 G/DL (ref 3.4–5)
ALP SERPL-CCNC: 114 U/L (ref 40–150)
ALT SERPL W P-5'-P-CCNC: 27 U/L (ref 0–70)
ANION GAP SERPL CALCULATED.3IONS-SCNC: 6 MMOL/L (ref 3–14)
AST SERPL W P-5'-P-CCNC: 17 U/L (ref 0–45)
BASOPHILS # BLD AUTO: 0.1 10E9/L (ref 0–0.2)
BASOPHILS NFR BLD AUTO: 0.7 %
BILIRUB SERPL-MCNC: 0.8 MG/DL (ref 0.2–1.3)
BUN SERPL-MCNC: 19 MG/DL (ref 7–30)
CALCIUM SERPL-MCNC: 9.4 MG/DL (ref 8.5–10.1)
CEA SERPL-MCNC: <0.5 UG/L (ref 0–2.5)
CHLORIDE SERPL-SCNC: 104 MMOL/L (ref 94–109)
CO2 SERPL-SCNC: 27 MMOL/L (ref 20–32)
CREAT SERPL-MCNC: 1.26 MG/DL (ref 0.66–1.25)
DIFFERENTIAL METHOD BLD: ABNORMAL
EOSINOPHIL # BLD AUTO: 0.1 10E9/L (ref 0–0.7)
EOSINOPHIL NFR BLD AUTO: 1.2 %
ERYTHROCYTE [DISTWIDTH] IN BLOOD BY AUTOMATED COUNT: 13.8 % (ref 10–15)
GFR SERPL CREATININE-BSD FRML MDRD: 55 ML/MIN/1.7M2
GLUCOSE SERPL-MCNC: 159 MG/DL (ref 70–99)
HCT VFR BLD AUTO: 39 % (ref 40–53)
HGB BLD-MCNC: 13 G/DL (ref 13.3–17.7)
LYMPHOCYTES # BLD AUTO: 1.4 10E9/L (ref 0.8–5.3)
LYMPHOCYTES NFR BLD AUTO: 19.2 %
MCH RBC QN AUTO: 30.3 PG (ref 26.5–33)
MCHC RBC AUTO-ENTMCNC: 33.3 G/DL (ref 31.5–36.5)
MCV RBC AUTO: 91 FL (ref 78–100)
MONOCYTES # BLD AUTO: 0.8 10E9/L (ref 0–1.3)
MONOCYTES NFR BLD AUTO: 10.7 %
NEUTROPHILS # BLD AUTO: 5 10E9/L (ref 1.6–8.3)
NEUTROPHILS NFR BLD AUTO: 68.2 %
PLATELET # BLD AUTO: 182 10E9/L (ref 150–450)
POTASSIUM SERPL-SCNC: 4.1 MMOL/L (ref 3.4–5.3)
PROT SERPL-MCNC: 7.5 G/DL (ref 6.8–8.8)
RBC # BLD AUTO: 4.29 10E12/L (ref 4.4–5.9)
SODIUM SERPL-SCNC: 137 MMOL/L (ref 133–144)
WBC # BLD AUTO: 7.4 10E9/L (ref 4–11)

## 2017-10-17 PROCEDURE — 36415 COLL VENOUS BLD VENIPUNCTURE: CPT | Performed by: INTERNAL MEDICINE

## 2017-10-17 PROCEDURE — 85025 COMPLETE CBC W/AUTO DIFF WBC: CPT | Performed by: INTERNAL MEDICINE

## 2017-10-17 PROCEDURE — 82378 CARCINOEMBRYONIC ANTIGEN: CPT | Performed by: INTERNAL MEDICINE

## 2017-10-17 PROCEDURE — 80053 COMPREHEN METABOLIC PANEL: CPT | Performed by: INTERNAL MEDICINE

## 2017-10-18 DIAGNOSIS — E11.22 TYPE 2 DIABETES MELLITUS WITH STAGE 3 CHRONIC KIDNEY DISEASE, WITH LONG-TERM CURRENT USE OF INSULIN (H): ICD-10-CM

## 2017-10-18 DIAGNOSIS — Z79.4 TYPE 2 DIABETES MELLITUS WITH STAGE 3 CHRONIC KIDNEY DISEASE, WITH LONG-TERM CURRENT USE OF INSULIN (H): ICD-10-CM

## 2017-10-18 DIAGNOSIS — N18.30 TYPE 2 DIABETES MELLITUS WITH STAGE 3 CHRONIC KIDNEY DISEASE, WITH LONG-TERM CURRENT USE OF INSULIN (H): ICD-10-CM

## 2017-10-19 RX ORDER — INSULIN GLARGINE 100 [IU]/ML
INJECTION, SOLUTION SUBCUTANEOUS
Qty: 15 ML | Refills: 1 | Status: SHIPPED | OUTPATIENT
Start: 2017-10-19 | End: 2018-02-27

## 2017-10-19 NOTE — TELEPHONE ENCOUNTER
Prescription approved per [facility] Refill [facility].  Estrellita Shaikh RN

## 2017-10-24 ENCOUNTER — ONCOLOGY VISIT (OUTPATIENT)
Dept: ONCOLOGY | Facility: CLINIC | Age: 77
End: 2017-10-24
Attending: INTERNAL MEDICINE
Payer: COMMERCIAL

## 2017-10-24 VITALS
TEMPERATURE: 96.1 F | SYSTOLIC BLOOD PRESSURE: 160 MMHG | WEIGHT: 209.6 LBS | BODY MASS INDEX: 31.77 KG/M2 | HEIGHT: 68 IN | OXYGEN SATURATION: 97 % | HEART RATE: 60 BPM | DIASTOLIC BLOOD PRESSURE: 58 MMHG | RESPIRATION RATE: 16 BRPM

## 2017-10-24 DIAGNOSIS — Z86.0100 HISTORY OF COLONIC POLYPS: ICD-10-CM

## 2017-10-24 DIAGNOSIS — C20 RECTAL CANCER (H): Primary | ICD-10-CM

## 2017-10-24 DIAGNOSIS — Z86.711 HISTORY OF PULMONARY EMBOLISM: ICD-10-CM

## 2017-10-24 DIAGNOSIS — N28.9 RENAL INSUFFICIENCY: ICD-10-CM

## 2017-10-24 PROCEDURE — 99214 OFFICE O/P EST MOD 30 MIN: CPT | Performed by: INTERNAL MEDICINE

## 2017-10-24 PROCEDURE — 99211 OFF/OP EST MAY X REQ PHY/QHP: CPT

## 2017-10-24 ASSESSMENT — PAIN SCALES - GENERAL: PAINLEVEL: NO PAIN (0)

## 2017-10-24 NOTE — NURSING NOTE
"Oncology Rooming Note    October 24, 2017 10:17 AM   Storm Dutta is a 77 year old male who presents for:    Chief Complaint   Patient presents with     Oncology Clinic Visit     6 month recheck Rectal CA, review labs     Initial Vitals: /58 (BP Location: Right arm, Patient Position: Sitting, Cuff Size: Adult Large)  Pulse 60  Temp 96.1  F (35.6  C) (Tympanic)  Resp 16  Ht 1.727 m (5' 7.99\")  Wt 95.1 kg (209 lb 9.6 oz)  SpO2 97%  BMI 31.88 kg/m2 Estimated body mass index is 31.88 kg/(m^2) as calculated from the following:    Height as of this encounter: 1.727 m (5' 7.99\").    Weight as of this encounter: 95.1 kg (209 lb 9.6 oz). Body surface area is 2.14 meters squared.  No Pain (0) Comment: Data Unavailable   No LMP for male patient.  Allergies reviewed: Yes  Medications reviewed: Yes    Medications: Medication refills not needed today.  Pharmacy name entered into BigString: [facility] [address] [facility]    Clinical concerns:  6 month recheck Rectal CA, review labs.      7 minutes for nursing intake (face to face time)     Afshan Keating CMA              "

## 2017-10-24 NOTE — PROGRESS NOTES
Hematology /oncology Visit      CHIEF COMPLAINT AND REASON FOR VISIT:    1. PE post op   2. Rectal cancer 2013     HISTORY OF PRESENT ILLNESS:  He presented with SOB found to have large right pulmonary embolism in 03/2014 when he was recovering from a laparoscopic-assisted low anterior resection for rectal cancer resection.  He had colorectal end-to-end anastomosis and loop ileostomy at that time.    He was on Lovenox 70-80 mg subcu b.i.d. for 7 months. It was stopped after repeat CT chest in 9/2014 indicated no PE.   Hypercoagulable work up through us in 9/2014 are negative.      He was diagnosed with locally-advanced rectal adenocarcinoma in 07/2013 through colonoscopy due to bloody bowel movements and anemia.  Preop evaluation showed the lesion is about mid to upper rectum 7 cm from the anal verge, there are prominent 5-10 perirectal lymph nodes. He had minimal stage III disease on presentation.     He received neoadjuvant concurrent chemoradiation with oral Xeloda, completed in 10/2013, then went on to have in 1/2014 laparoscopic-assisted LAR with splenic flexure mobilization and colonic J pouch with colorectal end-to-end anastomosis, loop ileostomy.  Final pathology indicating CR with no residual tumor in the rectum and in the lymph nodes.  He was only able to proceed with 2 cycles of XELOX and then dropped due to his general poor condition.     PMH  Type 2 diabetes, peripheral neuropathy, BPH, hypertension, hyperlipidemia, history of urinary retention.      SOCIAL HISTORY:  He lives in [place].  He never used tobacco.  Denies alcohol abuse.  He quit smoking in 2002.  He had 30-pack-year smoking history prior.   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     FAMILY HISTORY:  Positive for maternal grandmother who had unknown type of cancer, 1 sister was treated will breast cancer.        REVIEW OF SYSTEMS:   He is eating nl, no pain, gaining weight, no blood in stool.  He is living active life. He barely eats vegi.      "  PHYSICAL EXAMINATION:   VITAL SIGNS: Blood pressure 160/58, pulse 60, temperature 96.1  F (35.6  C), temperature source Tympanic, resp. rate 16, height 1.727 m (5' 7.99\"), weight 95.1 kg (209 lb 9.6 oz), SpO2 97 %.  GENERAL APPEARANCE:   Elderly gentleman, looks much younger than his stated age, not in acute distress.  He looks very comfortable.   HEENT: The patient is normocephalic, atraumatic. Pupils are equal react to light.  Sclerae are anicteric.  Moist oral mucosa.  Negative pharynx.  No oral thrush.   NECK:  Supple.  No jugular venous distention.  Thyroid is not palpable.   LYMPH NODES:  Superficial lymphadenopathy is not appreciable in the bilateral cervical, supraclavicular, axillary or inguinal adenopathy.    CARDIOVASCULAR:  S1, S2 regular with no murmurs or gallops.  No carotid or abdominal bruits.   PULMONARY:  Lungs are clear to auscultation and percussion bilaterally.  There is no wheezing or rhonchi.   GASTROINTESTINAL:  Abdomen is very soft, PEG feeding tube properly in place.   He has a right abdomen ileostomy bag, brownish liquid in the bag.   MUSCULOSKELETAL/EXTREMITIES:  No edema.  No cyanotic changes.  No signs of joint deformity.  No lymphedema.   NEUROLOGIC:  Cranial nerves II-XII are grossly intact.  Sensation intact.  Muscle strength and muscle tone symmetrical all through 5/5.   BACK:  No spinal or paraspinal tenderness.  No CVA tenderness.   SKIN:  No petechiae.  No rash.  No signs of cellulitis. Bruising on dorsal hands.       Current LAB Data Reviewed  Cbc diff/CEA are fine,   Cr  1.26 from 1.37 stable.     OLD DATA REVIEW IN SUMMARY:   12/2016: multiple tubulovillous adenoma removed.     5/2015: Tubulovillous adenoma excised from transverse colon.     CT body 10/2016: no mets.     CT 10/2015  1. Negative chest. No PE  2. Interval right lower quadrant ostomy takedown.  3. Postoperative changes in the rectal and presacral region without any appreciable change since the prior exam.  4. " Enlarged prostate gland.    CT 9/2014 - No evidence for pulmonary embolism.    CT enterography in 06/2014 with no evidence of bowel obstruction, no convincing focal small bowel abnormality, possible fistula track connecting this walled-off collection colonic anastomosis site in the pelvis.       Laparoscopic-assisted LAR in 01/2014 - no residual carcinoma identified in the sigmoid or rectum, 1 lymph node was negative.      Preop MRI in 08/2013:  sessile tumor in the posterior mid to upper rectum, between 5 and 10 prominent perirectal lymph nodes which are suspicious.      Original pathology from colonoscopy, rectal mass biopsy 07/2013 - invasive moderately differentiated adenocarcinoma with ulceration, all 4 DNA mismatch repair proteins are expressed in the tumor nuclei.      ASSESSMENT AND PLAN:   1.  Postop large right-sided pulmonary embolism in 03/2014, s/p 7 months of 70 to 80 mg subcu b.i.d.  after repeat CT chest 9/2014 indicated no PE.   Most likely his 03/2014 pulmonary embolism was due to postop debilitated condition. Hypercoagulable work up was negative. He was off coumadin since fall 2014.    Advise full dose enteric coated ASA. He is tolerating this fine.      2.  Renal insufficiency .  He is encouraged on oral fluid intake.     3.  Locally advanced rectal cancer, status post concurrent chemoradiation.  Surgical resection achieved complete CR.   He did not finish adjuvant treatment due to then poor condition.  He needs ongoing oncologic followup for the disease status.  He is due 6 months f/u with labs and CT prn.   Advise ASA and vit D.      4. 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 He needs on top of colonoscopy.

## 2017-10-24 NOTE — MR AVS SNAPSHOT
After Visit Summary   10/24/2017    Storm Dutta    MRN: [id number]           Patient Information     Date Of Birth          1940        Visit Information        Provider Department      10/24/2017 10:00 AM Ruth Martins MD [facility]        Today's Diagnoses     Rectal cancer (H)    -  1    Renal insufficiency        History of pulmonary embolism        History of colonic polyps          Care Instructions    6 months f/u with labs          Follow-ups after your visit        Your next 10 appointments already scheduled     Apr 17, 2018 10:00 AM CDT   LAB with  LAB   [facility] ([facility]    [address] [id number] [phone]           Patient must bring picture ID. Patient should be prepared to give a urine specimen  Please do not eat 10-12 hours before your appointment if you are coming in fasting for labs on lipids, cholesterol, or glucose (sugar). Pregnant women should follow their Care Team instructions. Water with medications is okay. Do not drink coffee or other fluids. If you have concerns about taking  your medications, please ask at office or if scheduling via Voice Assist, send a message by clicking on Secure Messaging, Message Your Care Team.            Apr 24, 2018 11:00 AM CDT   Return Visit with Ruth Martins MD   [facility] ([facility])    [facility] [address] [address] [phone]              Future tests that were ordered for you today     Open Future Orders        Priority Expected Expires Ordered    CBC with platelets differential Routine 4/1/2018 5/31/2018 10/24/2017    Comprehensive metabolic panel Routine 4/1/2018 5/31/2018 10/24/2017    CEA Routine 4/1/2018 5/31/2018 10/24/2017            Who to contact     If you have questions or need follow up information about today's clinic visit or your schedule please contact [facility] directly at [phone].  Normal or non-critical lab and imaging results will be communicated to you by MyChart, letter or phone within 4 business days after the clinic has received the results. If you do not hear from us within 7 days, please contact the clinic through Hello Agenthart or phone. If you have a critical or abnormal lab result, we will notify you by phone as soon as possible.  Submit refill requests through Phone Warrior or call your pharmacy and they will forward the refill request to us. Please allow 3 business days for your refill to be completed.          Additional Information About Your Visit        Hello AgentharSheFinds Media Information     Phone Warrior gives you secure access to your electronic health record. If you see a primary care provider, you can also send messages to your care team and make appointments. If you have questions, please call your primary care clinic.  If you do not have a primary care provider, please call [phone] and they will assist you.        Care EveryWhere ID     This is your Care EveryWhere ID. This could be used by other organizations to access your [facility] medical records  [id number]        Your Vitals Were     Pulse Temperature Respirations Height Pulse Oximetry BMI (Body Mass Index)    60 96.1  F (35.6  C) (Tympanic) 16 1.727 m (5' 7.99\") 97% 31.88 kg/m2       Blood Pressure from Last 3 Encounters:   10/24/17 160/58   08/10/17 132/50   07/27/17 144/78    Weight from Last 3 Encounters:   10/24/17 95.1 kg (209 lb 9.6 oz)   07/27/17 94 kg (207 lb 3.2 oz)   04/24/17 95.2 kg (209 lb 12.8 oz)               Primary Care Provider Office Phone # Fax #    Jovana Nichol Beaulieu [phone] [address] [place] [address]        Equal Access to Services     [facility] MANJU : García Patel, waaxda luqadaha, qaybta kaalmada radha, lionel mesa. So [facility] [phone].    ATENCIÓN: Si miguel jose, tiene a kennedy disposición servicios " rose mary de asistencia lingüística. Jake snell [phone].    We comply with applicable federal civil rights laws and [place] laws. We do not discriminate on the basis of race, color, national origin, age, disability, sex, sexual orientation, or gender identity.            Thank you!     Thank you for choosing [facility]  for your care. Our goal is always to provide you with excellent care. Hearing back from our patients is one way we can continue to improve our services. Please take a few minutes to complete the written survey that you may receive in the mail after your visit with us. Thank you!             Your Updated Medication List - Protect others around you: Learn how to safely use, store and throw away your medicines at www.disposemymeds.org.          This list is accurate as of: 10/24/17 10:47 AM.  Always use your most recent med list.                   Brand Name Dispense Instructions for use Diagnosis    aspirin 325 MG tablet      Take 325 mg by mouth daily        atorvastatin 20 MG tablet    LIPITOR    90 tablet    Take 1 tablet (20 mg) by mouth daily    Hyperlipidemia LDL goal <70       blood glucose monitoring meter device kit     1 kit    Check blood sugars 1-2 times daily    Type 2 diabetes, HbA1c goal < 7% (H)       blood glucose monitoring test strip    ACCU-CHEK REGIS    100 each    Use to test blood sugars 2-4  times daily or as directed.    Type 2 diabetes mellitus with stage 3 chronic kidney disease, with long-term current use of insulin (H)       diphenoxylate-atropine 2.5-0.025 MG per tablet    LOMOTIL    60 tablet    Take 1 tablet by mouth 4 times daily as needed for diarrhea    Diarrhea       finasteride 5 MG tablet    PROSCAR    90 tablet    TAKE ONE TABLET BY MOUTH EVERY DAY    Hypertrophy of prostate with urinary obstruction       insulin pen needle 31G X 6 MM    ULTICARE MINI    100 each    Use 4-5 daily or as directed for sliding scale insulin.    Type 2 diabetes, HbA1c  goal < 7% (H)       LANTUS SOLOSTAR 100 UNIT/ML injection   Generic drug:  insulin glargine     15 mL    INJECT 16 UNITS UNDER THE SKIN AT BEDTIME    Type 2 diabetes mellitus with stage 3 chronic kidney disease, with long-term current use of insulin (H)       losartan 25 MG tablet    COZAAR    15 tablet    Take 0.5 tablets (12.5 mg) by mouth daily    Hypertension goal BP (blood pressure) < 140/90       NovoLOG FLEXPEN 100 UNIT/ML injection   Generic drug:  insulin aspart     15 mL    Use 8 units plus low sliding scale before each meal 120-149 0 units 150-199 1 unit 200-249 2 units 250-299 3 units 300-349 4 units > 350 5 units  Max dose 50 units day    Type 2 diabetes mellitus with hyperglycemia, with long-term current use of insulin (H)       order for DME     1 Month    Equipment being ordered: ileostomy supplies    Rectal cancer (H)       tamsulosin 0.4 MG capsule    FLOMAX    90 capsule    TAKE ONE CAPSULE BY MOUTH EVERY DAY    Hyperlipidemia LDL goal <70, Hypertrophy of prostate with urinary obstruction

## 2017-10-24 NOTE — PATIENT INSTRUCTIONS
We would like to see you back in 6 months for a follow up appointment with labs prior. When you are in need of a refill, please call your pharmacy and they will send us a request.  Copy of appointments, and after visit summary (AVS) given to patient.  If you have any questions please call Khadijah Hernandez RN, BSN Oncology Hematology  [facility] [phone]. For questions after business hours, or on holidays/weekends, please call our after hours Nurse Triage line [phone]. Thank you.             6 months f/u with labs

## 2017-12-19 DIAGNOSIS — N18.30 TYPE 2 DIABETES MELLITUS WITH STAGE 3 CHRONIC KIDNEY DISEASE, WITH LONG-TERM CURRENT USE OF INSULIN (H): ICD-10-CM

## 2017-12-19 DIAGNOSIS — E11.22 TYPE 2 DIABETES MELLITUS WITH STAGE 3 CHRONIC KIDNEY DISEASE, WITH LONG-TERM CURRENT USE OF INSULIN (H): ICD-10-CM

## 2017-12-19 DIAGNOSIS — Z79.4 TYPE 2 DIABETES MELLITUS WITH STAGE 3 CHRONIC KIDNEY DISEASE, WITH LONG-TERM CURRENT USE OF INSULIN (H): ICD-10-CM

## 2017-12-21 RX ORDER — BLOOD SUGAR DIAGNOSTIC
STRIP MISCELLANEOUS
Qty: 100 EACH | Refills: 11 | Status: SHIPPED | OUTPATIENT
Start: 2017-12-21 | End: 2019-11-19

## 2017-12-21 NOTE — TELEPHONE ENCOUNTER
Prescription approved per [facility] Refill Protocol.  Etsrellita Shaikh RN

## 2018-01-17 ENCOUNTER — TRANSFERRED RECORDS (OUTPATIENT)
Dept: HEALTH INFORMATION MANAGEMENT | Facility: CLINIC | Age: 78
End: 2018-01-17

## 2018-02-02 ENCOUNTER — TELEPHONE (OUTPATIENT)
Dept: FAMILY MEDICINE | Facility: CLINIC | Age: 78
End: 2018-02-02

## 2018-02-02 DIAGNOSIS — N18.30 TYPE 2 DIABETES MELLITUS WITH STAGE 3 CHRONIC KIDNEY DISEASE, WITH LONG-TERM CURRENT USE OF INSULIN (H): Primary | ICD-10-CM

## 2018-02-02 DIAGNOSIS — Z79.4 TYPE 2 DIABETES MELLITUS WITH STAGE 3 CHRONIC KIDNEY DISEASE, WITH LONG-TERM CURRENT USE OF INSULIN (H): Primary | ICD-10-CM

## 2018-02-02 DIAGNOSIS — E11.22 TYPE 2 DIABETES MELLITUS WITH STAGE 3 CHRONIC KIDNEY DISEASE, WITH LONG-TERM CURRENT USE OF INSULIN (H): Primary | ICD-10-CM

## 2018-02-02 NOTE — TELEPHONE ENCOUNTER
We received notification from insurance that Lantus would no longer be covered under the patients plan.    As a replacement they are looking for one of three options:    Hilary Alvarez    Please send a new rx over for one of the above medications for future treatment for the patient, thanks!  Khadijah Coronado CPhT  [id number])  [address] [place] [address]  Phone: [phone]  Fax: [phone]

## 2018-02-06 RX ORDER — INSULIN GLARGINE 100 [IU]/ML
INJECTION, SOLUTION SUBCUTANEOUS
Qty: 15 ML | Refills: 1 | Status: SHIPPED | OUTPATIENT
Start: 2018-02-06 | End: 2018-10-17

## 2018-02-20 DIAGNOSIS — E11.65 TYPE 2 DIABETES MELLITUS WITH HYPERGLYCEMIA, WITH LONG-TERM CURRENT USE OF INSULIN (H): ICD-10-CM

## 2018-02-20 DIAGNOSIS — Z79.4 TYPE 2 DIABETES MELLITUS WITH HYPERGLYCEMIA, WITH LONG-TERM CURRENT USE OF INSULIN (H): ICD-10-CM

## 2018-02-20 NOTE — TELEPHONE ENCOUNTER
Routing refill request to provider for review/approval because:  Labs out of range:  See below    Rukhsana Pittman RN

## 2018-02-20 NOTE — TELEPHONE ENCOUNTER
"Requested Prescriptions   Pending Prescriptions Disp Refills     insulin aspart (NOVOLOG FLEXPEN) 100 UNIT/ML injection 15 mL 3    Last Written Prescription Date:  07/27/2017 #15ml x 3  Last filled - not provided  Last office visit: 07/27/2017 JESENIA Beaulieu   Future Office Visit:   Next 5 appointments (look out 90 days)     Apr 24, 2018 11:00 AM CDT   Return Visit with Ruth Martins MD   [facility] ([facility])    [facility]  [address]  [facility] [address]   [phone]                  Sig: Use 8 units plus low sliding scale before each meal 120-149 0 units  150-199 1 unit  200-249 2 units  250-299 3 units  300-349 4 units  > 350 5 units   Max dose 50 units day    Short Acting Insulin Protocol Failed    2/20/2018 10:40 AM       Failed - HgbA1C in past 3 or 6 months    Recent Labs   Lab Test  07/27/17   0850   A1C  8.5*            Failed - Recent visit with authorizing provider's specialty in past 6 months    Patient had office visit in the last 6 months or has a visit in the next 30 days with authorizing provider.  See \"Patient Info\" tab in inbasket, or \"Choose Columns\" in Meds & Orders section of the refill encounter.           Passed - Blood pressure less than 140/90 in past 6 months    BP Readings from Last 3 Encounters:   10/24/17 160/58   08/10/17 132/50   07/27/17 144/78                Passed - LDL on file in past 12 months    Recent Labs   Lab Test  07/27/17   0850   LDL  62            Passed - Microalbumin on file in past 12 months    Recent Labs   Lab Test  07/27/17   0937   MICROL  98   UMALCR  107.33*            Passed - Serum creatinine on file in past 12 months    Recent Labs   Lab Test  10/17/17   1011   CR  1.26*            Passed - Patient is age 18 or older          "

## 2018-02-21 DIAGNOSIS — E78.5 HYPERLIPIDEMIA LDL GOAL <70: ICD-10-CM

## 2018-02-21 NOTE — TELEPHONE ENCOUNTER
Informed pt, scheduled appt for 2/27. Script walked to  pharmacy.      Jania Traore, [place]

## 2018-02-21 NOTE — TELEPHONE ENCOUNTER
"Requested Prescriptions   Pending Prescriptions Disp Refills     atorvastatin (LIPITOR) 20 MG tablet [Pharmacy Med Name: ATORVASTATIN CALCIUM 20MG TABS] 90 tablet 3    Last Written Prescription Date:  2/9/17  Last Fill Quantity: 90,  # refills: 3   Last office visit: 8/10/2017 with prescribing provider:  7/27/17   Future Office Visit:   Next 5 appointments (look out 90 days)     Feb 27, 2018  8:20 AM CST   SHORT with Jovana Beaulieu DO   [facility] ([facility]    [address] [address]   [phone]            Apr 24, 2018 11:00 AM CDT   Return Visit with Ruth Martins MD   [facility] ([facility])    [facility]  [address]  [facility] [address]   [phone]                  Sig: TAKE ONE TABLET BY MOUTH EVERY DAY    Statins Protocol Passed    2/21/2018  9:31 AM       Passed - LDL on file in past 12 months    Recent Labs   Lab Test  07/27/17   0850   LDL  62            Passed - No abnormal creatine kinase in past 12 months    No lab results found.         Passed - Recent or future visit with authorizing provider    Patient had office visit in the last year or has a visit in the next 30 days with authorizing provider.  See \"Patient Info\" tab in inbasket, or \"Choose Columns\" in Meds & Orders section of the refill encounter.            Passed - Patient is age 18 or older          "

## 2018-02-22 RX ORDER — ATORVASTATIN CALCIUM 20 MG/1
TABLET, FILM COATED ORAL
Qty: 30 TABLET | Refills: 0 | Status: SHIPPED | OUTPATIENT
Start: 2018-02-22 | End: 2018-02-27

## 2018-02-27 ENCOUNTER — OFFICE VISIT (OUTPATIENT)
Dept: FAMILY MEDICINE | Facility: CLINIC | Age: 78
End: 2018-02-27
Payer: COMMERCIAL

## 2018-02-27 VITALS
BODY MASS INDEX: 32.07 KG/M2 | TEMPERATURE: 97.2 F | HEART RATE: 96 BPM | DIASTOLIC BLOOD PRESSURE: 74 MMHG | HEIGHT: 68 IN | WEIGHT: 211.6 LBS | SYSTOLIC BLOOD PRESSURE: 152 MMHG

## 2018-02-27 DIAGNOSIS — E11.65 TYPE 2 DIABETES MELLITUS WITH HYPERGLYCEMIA, WITH LONG-TERM CURRENT USE OF INSULIN (H): Primary | ICD-10-CM

## 2018-02-27 DIAGNOSIS — I10 HYPERTENSION GOAL BP (BLOOD PRESSURE) < 140/90: ICD-10-CM

## 2018-02-27 DIAGNOSIS — Z79.4 TYPE 2 DIABETES MELLITUS WITH HYPERGLYCEMIA, WITH LONG-TERM CURRENT USE OF INSULIN (H): Primary | ICD-10-CM

## 2018-02-27 DIAGNOSIS — E78.5 HYPERLIPIDEMIA LDL GOAL <70: ICD-10-CM

## 2018-02-27 LAB
ANION GAP SERPL CALCULATED.3IONS-SCNC: 6 MMOL/L (ref 3–14)
BUN SERPL-MCNC: 18 MG/DL (ref 7–30)
CALCIUM SERPL-MCNC: 9.4 MG/DL (ref 8.5–10.1)
CHLORIDE SERPL-SCNC: 104 MMOL/L (ref 94–109)
CO2 SERPL-SCNC: 27 MMOL/L (ref 20–32)
CREAT SERPL-MCNC: 1.34 MG/DL (ref 0.66–1.25)
GFR SERPL CREATININE-BSD FRML MDRD: 52 ML/MIN/1.7M2
GLUCOSE SERPL-MCNC: 138 MG/DL (ref 70–99)
HBA1C MFR BLD: 8.3 % (ref 4.3–6)
POTASSIUM SERPL-SCNC: 4.1 MMOL/L (ref 3.4–5.3)
SODIUM SERPL-SCNC: 137 MMOL/L (ref 133–144)

## 2018-02-27 PROCEDURE — 80048 BASIC METABOLIC PNL TOTAL CA: CPT | Performed by: FAMILY MEDICINE

## 2018-02-27 PROCEDURE — 99214 OFFICE O/P EST MOD 30 MIN: CPT | Performed by: FAMILY MEDICINE

## 2018-02-27 PROCEDURE — 83036 HEMOGLOBIN GLYCOSYLATED A1C: CPT | Performed by: FAMILY MEDICINE

## 2018-02-27 PROCEDURE — 36415 COLL VENOUS BLD VENIPUNCTURE: CPT | Performed by: FAMILY MEDICINE

## 2018-02-27 RX ORDER — ATORVASTATIN CALCIUM 20 MG/1
20 TABLET, FILM COATED ORAL DAILY
Qty: 90 TABLET | Refills: 3 | Status: SHIPPED | OUTPATIENT
Start: 2018-02-27 | End: 2019-03-27

## 2018-02-27 RX ORDER — LOSARTAN POTASSIUM 25 MG/1
25 TABLET ORAL DAILY
Qty: 30 TABLET | Refills: 0 | Status: SHIPPED | OUTPATIENT
Start: 2018-02-27 | End: 2018-03-15 | Stop reason: DRUGHIGH

## 2018-02-27 NOTE — PROGRESS NOTES
SUBJECTIVE:   Storm Dutta is a 77 year old male who presents to clinic today for the following health issues:    Diabetes Follow-up    Patient is checking blood sugars: once daily.  Results are as follows:         am - 120's    Diabetic concerns: None     Symptoms of hypoglycemia (low blood sugar): none     Paresthesias (numbness or burning in feet) or sores: No     Date of last diabetic eye exam: 5/30/17    Takes his 8 units before meals but does not check his blood sugar and does not do any sliding scale.  Not sure why, just doesn't    Worried his meter might be too old, gives very high readings some times      Hyperlipidemia Follow-Up      Rate your low fat/cholesterol diet?: not monitoring fat    Taking statin?  Yes, no muscle aches from statin    Other lipid medications/supplements?:  none    Hypertension Follow-up      Outpatient blood pressures are not being checked.    Low Salt Diet: not monitoring salt    Pt has not been taking losartan.  Took only 1 month.  No side effects, just did not follow up or continue    BP Readings from Last 2 Encounters:   02/27/18 152/74   10/24/17 160/58     Hemoglobin A1C (%)   Date Value   02/27/2018 8.3 (H)   07/27/2017 8.5 (H)     LDL Cholesterol Calculated (mg/dL)   Date Value   07/27/2017 62   07/16/2015 61     LDL Cholesterol Direct (mg/dL)   Date Value   08/04/2016 129 (H)       Amount of exercise or physical activity: 6-7 days/week for an average of greater than 60 minutes    Problems taking medications regularly: No    Medication side effects: none    Diet: regular (no restrictions)      Problem list and histories reviewed & adjusted, as indicated.  Additional history: as documented      Reviewed and updated as needed this visit by clinical staff  Tobacco  Allergies  Meds  Problems  Med Hx  Surg Hx  Fam Hx  Soc Hx        Reviewed and updated as needed this visit by Provider  Tobacco  Allergies  Meds  Problems  Med Hx  Surg Hx  Fam Hx  Soc Hx           ROS: Remainder of Constitutional, CV, Respiratory, GI,  negative with exception of that mentioned above    PE:  VS as above   Gen:  WN/WD/WH male in NAD   Heart:  RRR without murmur, nl S1, S2, no rubs or gallops   Lungs CTA leydi without rales/ronchi/wheezes   Ext:  No pedal edema    Results for orders placed or performed in visit on 02/27/18   Hemoglobin A1c   Result Value Ref Range    Hemoglobin A1C 8.3 (H) 4.3 - 6.0 %     A/p:      ICD-10-CM    1. Type 2 diabetes mellitus with hyperglycemia, with long-term current use of insulin (H) E11.65 Hemoglobin A1c    Z79.4 Basic metabolic panel     blood glucose monitoring (NO BRAND SPECIFIED) meter device kit     blood glucose monitoring (NO BRAND SPECIFIED) test strip   2. Hypertension goal BP (blood pressure) < 140/90 I10 losartan (COZAAR) 25 MG tablet   3. Hyperlipidemia LDL goal <70 E78.5 atorvastatin (LIPITOR) 20 MG tablet     Patient Instructions   Looks like things are about where they were the last time we visited.    We'll have you restart the losartan for your high blood pressure.  Please plan on coming back to clinic in 2 weeks for a blood pressure check and non fasting blood work.  This should be a medicine that is continued long term.  I only filled 1 month because we need to make sure it is working    I did send a new meter to the pharmacy for you.  The A1c remains high, we need to get that down.  Please start checking your blood sugar before dinner as well and adding sliding scale to your base 8 units.  The sliding scale is on your medicine list.  Once you are in the habit of doing that we can add a lunch time check and sliding scale as well.    We need to visit again in clinic in 3 months.

## 2018-02-27 NOTE — MR AVS SNAPSHOT
After Visit Summary   2/27/2018    Storm Dutta    MRN: [id number]           Patient Information     Date Of Birth          1940        Visit Information        Provider Department      2/27/2018 8:20 AM Jovana Beaulieu DO [facility]        Today's Diagnoses     Type 2 diabetes mellitus with hyperglycemia, with long-term current use of insulin (H)    -  1    Hypertension goal BP (blood pressure) < 140/90        Hyperlipidemia LDL goal <70          Care Instructions    Looks like things are about where they were the last time we visited.    We'll have you restart the losartan for your high blood pressure.  Please plan on coming back to clinic in 2 weeks for a blood pressure check and non fasting blood work.  This should be a medicine that is continued long term.  I only filled 1 month because we need to make sure it is working    I did send a new meter to the pharmacy for you.  The A1c remains high, we need to get that down.  Please start checking your blood sugar before dinner as well and adding sliding scale to your base 8 units.  The sliding scale is on your medicine list.  Once you are in the habit of doing that we can add a lunch time check and sliding scale as well.    We need to visit again in clinic in 3 months.          Follow-ups after your visit        Your next 10 appointments already scheduled     Apr 17, 2018 10:00 AM CDT   LAB with LL LAB   [facility]    [address] [place] [address] [phone]           Please do not eat 10-12 hours before your appointment if you are coming in fasting for labs on lipids, cholesterol, or glucose (sugar). This does not apply to pregnant women. Water, hot tea and black coffee (with nothing added) are okay. Do not drink other fluids, diet soda or chew gum.            Apr 24, 2018 11:00 AM CDT   Return Visit with Ruth Martins MD   [facility] ([facility]  "[facility])    [facility]  [address] [place]  [facility] [address]   [phone]              Who to contact     Normal or non-critical lab and imaging results will be communicated to you by MyChart, letter or phone within 4 business days after the clinic has received the results. If you do not hear from us within 7 days, please contact the clinic through MyChart or phone. If you have a critical or abnormal lab result, we will notify you by phone as soon as possible.  Submit refill requests through [employer] or call your pharmacy and they will forward the refill request to us. Please allow 3 business days for your refill to be completed.          If you need to speak with a  for additional information , please call: [phone]           Additional Information About Your Visit        [employer] Information     [employer] gives you secure access to your electronic health record. If you see a primary care provider, you can also send messages to your care team and make appointments. If you have questions, please call your primary care clinic.  If you do not have a primary care provider, please call [phone] and they will assist you.        Care EveryWhere ID     This is your Care EveryWhere ID. This could be used by other organizations to access your [place] medical records  [id number]        Your Vitals Were     Pulse Temperature Height BMI (Body Mass Index)          96 97.2  F (36.2  C) (Tympanic) 5' 8\" (1.727 m) 32.17 kg/m2         Blood Pressure from Last 3 Encounters:   02/27/18 152/74   10/24/17 160/58   08/10/17 132/50    Weight from Last 3 Encounters:   02/27/18 211 lb 9.6 oz (96 kg)   10/24/17 209 lb 9.6 oz (95.1 kg)   07/27/17 207 lb 3.2 oz (94 kg)              We Performed the Following     Basic metabolic panel     Hemoglobin A1c          Today's Medication Changes          These changes are accurate as of 2/27/18  8:55 AM.  If you have any questions, ask " your nurse or doctor.               These medicines have changed or have updated prescriptions.        Dose/Directions    * ACCU-CHEK REGIS PLUS test strip   This may have changed:  Another medication with the same name was added. Make sure you understand how and when to take each.   Used for:  Type 2 diabetes mellitus with stage 3 chronic kidney disease, with long-term current use of insulin (H)   Generic drug:  blood glucose monitoring   Changed by:  Jovana Beaulieu DO        TEST BLOOD SUGARS 2-4 TIMES DAILY OR AS DIRECTED   Quantity:  100 each   Refills:  11       * blood glucose monitoring test strip   Commonly known as:  no brand specified   This may have changed:  You were already taking a medication with the same name, and this prescription was added. Make sure you understand how and when to take each.   Used for:  Type 2 diabetes mellitus with hyperglycemia, with long-term current use of insulin (H)   Changed by:  Jovana Beaulieu DO        Use to test blood sugars 3 times daily or as directed   Quantity:  100 strip   Refills:  11       atorvastatin 20 MG tablet   Commonly known as:  LIPITOR   This may have changed:  See the new instructions.   Used for:  Hyperlipidemia LDL goal <70   Changed by:  Jovana Beaulieu DO        Dose:  20 mg   Take 1 tablet (20 mg) by mouth daily   Quantity:  90 tablet   Refills:  3       BASAGLAR 100 UNIT/ML injection   This may have changed:  Another medication with the same name was removed. Continue taking this medication, and follow the directions you see here.   Used for:  Type 2 diabetes mellitus with stage 3 chronic kidney disease, with long-term current use of insulin (H)   Changed by:  Jovana Beaulieu DO        16 units at bedtime   Quantity:  15 mL   Refills:  1       * blood glucose monitoring meter device kit   This may have changed:  Another medication with the same name was added. Make sure you understand how and when to take each.   Used for:  Type 2  diabetes, HbA1c goal < 7% (H)   Changed by:  Jovana Beaulieu DO        Check blood sugars 1-2 times daily   Quantity:  1 kit   Refills:  0       * blood glucose monitoring meter device kit   Commonly known as:  no brand specified   This may have changed:  You were already taking a medication with the same name, and this prescription was added. Make sure you understand how and when to take each.   Used for:  Type 2 diabetes mellitus with hyperglycemia, with long-term current use of insulin (H)   Changed by:  Jovana Beaulieu DO        Use to test blood sugar 3 times daily or as directed.   Quantity:  1 kit   Refills:  0       losartan 25 MG tablet   Commonly known as:  COZAAR   This may have changed:  how much to take   Used for:  Hypertension goal BP (blood pressure) < 140/90   Changed by:  Jovana Beaulieu DO        Dose:  25 mg   Take 1 tablet (25 mg) by mouth daily   Quantity:  30 tablet   Refills:  0       * Notice:  This list has 4 medication(s) that are the same as other medications prescribed for you. Read the directions carefully, and ask your doctor or other care provider to review them with you.         Where to get your medicines      These medications were sent to [facility] [address] [facility]  [address] [place] [id number]     Phone:  [phone]     atorvastatin 20 MG tablet    blood glucose monitoring meter device kit    blood glucose monitoring test strip    losartan 25 MG tablet                Primary Care Provider Office Phone # Fax #    Jovana Beaulieu [phone] [address] [facility] [address]        Equal Access to Services     JOSÉ MIGUEL NUNES AH: Hadii aad ku hadasho Soomaali, waaxda luqadaha, qaybta kaalmada adeegyada, waxay gerda mesa. So [facility] [phone].    ATENCIÓN: Si habla español, tiene a kennedy disposición servicios gratuitos de asistencia lingüística. Llame al [phone].    We comply  with applicable federal civil rights laws and [place] laws. We do not discriminate on the basis of race, color, national origin, age, disability, sex, sexual orientation, or gender identity.            Thank you!     Thank you for choosing [facility]  for your care. Our goal is always to provide you with excellent care. Hearing back from our patients is one way we can continue to improve our services. Please take a few minutes to complete the written survey that you may receive in the mail after your visit with us. Thank you!             Your Updated Medication List - Protect others around you: Learn how to safely use, store and throw away your medicines at www.disposemymeds.org.          This list is accurate as of 2/27/18  8:55 AM.  Always use your most recent med list.                   Brand Name Dispense Instructions for use Diagnosis    * ACCU-CHEK REGIS PLUS test strip   Generic drug:  blood glucose monitoring     100 each    TEST BLOOD SUGARS 2-4 TIMES DAILY OR AS DIRECTED    Type 2 diabetes mellitus with stage 3 chronic kidney disease, with long-term current use of insulin (H)       * blood glucose monitoring test strip    no brand specified    100 strip    Use to test blood sugars 3 times daily or as directed    Type 2 diabetes mellitus with hyperglycemia, with long-term current use of insulin (H)       aspirin 325 MG tablet      Take 325 mg by mouth daily        atorvastatin 20 MG tablet    LIPITOR    90 tablet    Take 1 tablet (20 mg) by mouth daily    Hyperlipidemia LDL goal <70       BASAGLAR 100 UNIT/ML injection     15 mL    16 units at bedtime    Type 2 diabetes mellitus with stage 3 chronic kidney disease, with long-term current use of insulin (H)       * blood glucose monitoring meter device kit     1 kit    Check blood sugars 1-2 times daily    Type 2 diabetes, HbA1c goal < 7% (H)       * blood glucose monitoring meter device kit    no brand specified    1 kit    Use to test  blood sugar 3 times daily or as directed.    Type 2 diabetes mellitus with hyperglycemia, with long-term current use of insulin (H)       diphenoxylate-atropine 2.5-0.025 MG per tablet    LOMOTIL    60 tablet    Take 1 tablet by mouth 4 times daily as needed for diarrhea    Diarrhea       finasteride 5 MG tablet    PROSCAR    90 tablet    TAKE ONE TABLET BY MOUTH EVERY DAY    Hypertrophy of prostate with urinary obstruction       insulin aspart 100 UNIT/ML injection    NovoLOG FLEXPEN    15 mL    Use 8 units plus low sliding scale before each meal 120-149 0 units 150-199 1 unit 200-249 2 units 250-299 3 units 300-349 4 units > 350 5 units  Max dose 50 units day    Type 2 diabetes mellitus with hyperglycemia, with long-term current use of insulin (H)       losartan 25 MG tablet    COZAAR    30 tablet    Take 1 tablet (25 mg) by mouth daily    Hypertension goal BP (blood pressure) < 140/90       tamsulosin 0.4 MG capsule    FLOMAX    90 capsule    TAKE ONE CAPSULE BY MOUTH EVERY DAY    Hyperlipidemia LDL goal <70, Hypertrophy of prostate with urinary obstruction       ULTICARE MINI 31G X 6 MM   Generic drug:  insulin pen needle     100 each    USE 4 TO 5 TIMES DAILY OR AS DIRECTED FOR SLIDING SCALE INSULIN    Type 2 diabetes, HbA1c goal < 7% (H)       * Notice:  This list has 4 medication(s) that are the same as other medications prescribed for you. Read the directions carefully, and ask your doctor or other care provider to review them with you.

## 2018-02-27 NOTE — PATIENT INSTRUCTIONS
Looks like things are about where they were the last time we visited.    We'll have you restart the losartan for your high blood pressure.  Please plan on coming back to clinic in 2 weeks for a blood pressure check and non fasting blood work.  This should be a medicine that is continued long term.  I only filled 1 month because we need to make sure it is working    I did send a new meter to the pharmacy for you.  The A1c remains high, we need to get that down.  Please start checking your blood sugar before dinner as well and adding sliding scale to your base 8 units.  The sliding scale is on your medicine list.  Once you are in the habit of doing that we can add a lunch time check and sliding scale as well.    We need to visit again in clinic in 3 months.

## 2018-02-27 NOTE — NURSING NOTE
"Initial /74  Pulse 96  Temp 97.2  F (36.2  C) (Tympanic)  Ht 5' 8\" (1.727 m)  Wt 211 lb 9.6 oz (96 kg)  BMI 32.17 kg/m2 Estimated body mass index is 32.17 kg/(m^2) as calculated from the following:    Height as of this encounter: 5' 8\" (1.727 m).    Weight as of this encounter: 211 lb 9.6 oz (96 kg). .      "

## 2018-02-28 ENCOUNTER — TELEPHONE (OUTPATIENT)
Dept: LAB | Facility: CLINIC | Age: 78
End: 2018-02-28

## 2018-02-28 DIAGNOSIS — I10 HYPERTENSION GOAL BP (BLOOD PRESSURE) < 140/90: Primary | ICD-10-CM

## 2018-03-13 ENCOUNTER — ALLIED HEALTH/NURSE VISIT (OUTPATIENT)
Dept: FAMILY MEDICINE | Facility: CLINIC | Age: 78
End: 2018-03-13
Payer: COMMERCIAL

## 2018-03-13 VITALS — HEART RATE: 72 BPM | DIASTOLIC BLOOD PRESSURE: 68 MMHG | SYSTOLIC BLOOD PRESSURE: 144 MMHG

## 2018-03-13 DIAGNOSIS — I10 HYPERTENSION GOAL BP (BLOOD PRESSURE) < 140/90: ICD-10-CM

## 2018-03-13 DIAGNOSIS — I10 HYPERTENSION GOAL BP (BLOOD PRESSURE) < 140/90: Primary | ICD-10-CM

## 2018-03-13 LAB
ANION GAP SERPL CALCULATED.3IONS-SCNC: 6 MMOL/L (ref 3–14)
BUN SERPL-MCNC: 22 MG/DL (ref 7–30)
CALCIUM SERPL-MCNC: 8.9 MG/DL (ref 8.5–10.1)
CHLORIDE SERPL-SCNC: 104 MMOL/L (ref 94–109)
CO2 SERPL-SCNC: 29 MMOL/L (ref 20–32)
CREAT SERPL-MCNC: 1.25 MG/DL (ref 0.66–1.25)
GFR SERPL CREATININE-BSD FRML MDRD: 56 ML/MIN/1.7M2
GLUCOSE SERPL-MCNC: 205 MG/DL (ref 70–99)
POTASSIUM SERPL-SCNC: 4.1 MMOL/L (ref 3.4–5.3)
SODIUM SERPL-SCNC: 139 MMOL/L (ref 133–144)

## 2018-03-13 PROCEDURE — 80048 BASIC METABOLIC PNL TOTAL CA: CPT | Performed by: FAMILY MEDICINE

## 2018-03-13 PROCEDURE — 36415 COLL VENOUS BLD VENIPUNCTURE: CPT | Performed by: FAMILY MEDICINE

## 2018-03-13 PROCEDURE — 99207 ZZC NO CHARGE NURSE ONLY: CPT

## 2018-03-13 NOTE — PROGRESS NOTES
Storm Dutta is a 77 year old male who comes in today for a Blood Pressure check because of new medication.    BP: 160/62 P: 76 @ 0854  BP: 144/68 P: 72 @ 0908    Vitals as recorded, a regular cuff was used.    Patient is taking medication as prescribed  Patient is tolerating medications well.  Patient is not monitoring Blood Pressure at home.    Current complaints: none    Disposition: Results reported to RN.  Chart routed to PCP.         Jania Null CMA(AMAA)

## 2018-03-13 NOTE — MR AVS SNAPSHOT
After Visit Summary   3/13/2018    Storm Dutta    MRN: [id number]           Patient Information     Date Of Birth          1940        Visit Information        Provider Department      3/13/2018 8:45 AM Yvonne/Ivette Rock [facility]        Today's Diagnoses     Hypertension goal BP (blood pressure) < 140/90    -  1       Follow-ups after your visit        Your next 10 appointments already scheduled     Apr 17, 2018 10:00 AM CDT   LAB with  LAB   [facility]    [address] [facility] [address]   [phone]           Please do not eat 10-12 hours before your appointment if you are coming in fasting for labs on lipids, cholesterol, or glucose (sugar). This does not apply to pregnant women. Water, hot tea and black coffee (with nothing added) are okay. Do not drink other fluids, diet soda or chew gum.            Apr 24, 2018 11:00 AM CDT   Return Visit with Ruth Martins MD   [facility] ([facility])    [facility]  [address] [address]   [phone]              Who to contact     Normal or non-critical lab and imaging results will be communicated to you by MyChart, letter or phone within 4 business days after the clinic has received the results. If you do not hear from us within 7 days, please contact the clinic through MyChart or phone. If you have a critical or abnormal lab result, we will notify you by phone as soon as possible.  Submit refill requests through [employer] or call your pharmacy and they will forward the refill request to us. Please allow 3 business days for your refill to be completed.          If you need to speak with a  for additional information , please call: [phone]           Additional Information About Your Visit        [employer] Information     [employer] gives you secure access to your electronic health  record. If you see a primary care provider, you can also send messages to your care team and make appointments. If you have questions, please call your primary care clinic.  If you do not have a primary care provider, please call [phone] and they will assist you.        Care EveryWhere ID     This is your Care EveryWhere ID. This could be used by other organizations to access your [place] medical records  [id number]        Your Vitals Were     Pulse                   72            Blood Pressure from Last 3 Encounters:   03/13/18 144/68   02/27/18 152/74   10/24/17 160/58    Weight from Last 3 Encounters:   02/27/18 211 lb 9.6 oz (96 kg)   10/24/17 209 lb 9.6 oz (95.1 kg)   07/27/17 207 lb 3.2 oz (94 kg)              Today, you had the following     No orders found for display       Primary Care Provider Office Phone # Fax #    Jovana Romonydia Beaulieu [phone] [address] [place] [address]        Equal Access to Services     [facility] : Hadii aad ku hadasho Soomaali, waaxda luqadaha, qaybta kaalmada adeegyada, lionel lorenz hayriley martinez . So [facility] [phone].    ATENCIÓN: Si habla español, tiene a kennedy disposición servicios gratuitos de asistencia lingüística. Llame al [phone].    We comply with applicable federal civil rights laws and [place] laws. We do not discriminate on the basis of race, color, national origin, age, disability, sex, sexual orientation, or gender identity.            Thank you!     Thank you for choosing [facility] [place]  for your care. Our goal is always to provide you with excellent care. Hearing back from our patients is one way we can continue to improve our services. Please take a few minutes to complete the written survey that you may receive in the mail after your visit with us. Thank you!             Your Updated Medication List - Protect others around you: Learn how to safely use, store and throw away your medicines at  www.disposemymeds.org.          This list is accurate as of 3/13/18  9:20 AM.  Always use your most recent med list.                   Brand Name Dispense Instructions for use Diagnosis    * ACCU-CHEK REGIS PLUS test strip   Generic drug:  blood glucose monitoring     100 each    TEST BLOOD SUGARS 2-4 TIMES DAILY OR AS DIRECTED    Type 2 diabetes mellitus with stage 3 chronic kidney disease, with long-term current use of insulin (H)       * blood glucose monitoring test strip    no brand specified    100 strip    Use to test blood sugars 3 times daily or as directed    Type 2 diabetes mellitus with hyperglycemia, with long-term current use of insulin (H)       aspirin 325 MG tablet      Take 325 mg by mouth daily        atorvastatin 20 MG tablet    LIPITOR    90 tablet    Take 1 tablet (20 mg) by mouth daily    Hyperlipidemia LDL goal <70       BASAGLAR 100 UNIT/ML injection     15 mL    16 units at bedtime    Type 2 diabetes mellitus with stage 3 chronic kidney disease, with long-term current use of insulin (H)       * blood glucose monitoring meter device kit     1 kit    Check blood sugars 1-2 times daily    Type 2 diabetes, HbA1c goal < 7% (H)       * blood glucose monitoring meter device kit    no brand specified    1 kit    Use to test blood sugar 3 times daily or as directed.    Type 2 diabetes mellitus with hyperglycemia, with long-term current use of insulin (H)       diphenoxylate-atropine 2.5-0.025 MG per tablet    LOMOTIL    60 tablet    Take 1 tablet by mouth 4 times daily as needed for diarrhea    Diarrhea       finasteride 5 MG tablet    PROSCAR    90 tablet    TAKE ONE TABLET BY MOUTH EVERY DAY    Hypertrophy of prostate with urinary obstruction       insulin aspart 100 UNIT/ML injection    NovoLOG FLEXPEN    15 mL    Use 8 units plus low sliding scale before each meal 120-149 0 units 150-199 1 unit 200-249 2 units 250-299 3 units 300-349 4 units > 350 5 units  Max dose 50 units day    Type 2  diabetes mellitus with hyperglycemia, with long-term current use of insulin (H)       losartan 25 MG tablet    COZAAR    30 tablet    Take 1 tablet (25 mg) by mouth daily    Hypertension goal BP (blood pressure) < 140/90       tamsulosin 0.4 MG capsule    FLOMAX    90 capsule    TAKE ONE CAPSULE BY MOUTH EVERY DAY    Hyperlipidemia LDL goal <70, Hypertrophy of prostate with urinary obstruction       ULTICARE MINI 31G X 6 MM   Generic drug:  insulin pen needle     100 each    USE 4 TO 5 TIMES DAILY OR AS DIRECTED FOR SLIDING SCALE INSULIN    Type 2 diabetes, HbA1c goal < 7% (H)       * Notice:  This list has 4 medication(s) that are the same as other medications prescribed for you. Read the directions carefully, and ask your doctor or other care provider to review them with you.

## 2018-03-15 ENCOUNTER — TELEPHONE (OUTPATIENT)
Dept: FAMILY MEDICINE | Facility: CLINIC | Age: 78
End: 2018-03-15

## 2018-03-15 DIAGNOSIS — I10 HYPERTENSION GOAL BP (BLOOD PRESSURE) < 140/90: Primary | ICD-10-CM

## 2018-03-15 RX ORDER — LOSARTAN POTASSIUM 50 MG/1
50 TABLET ORAL DAILY
Qty: 30 TABLET | Refills: 0 | Status: SHIPPED | OUTPATIENT
Start: 2018-03-15 | End: 2018-04-17

## 2018-03-15 NOTE — TELEPHONE ENCOUNTER
Please call pt.  New script fpr 50mg losartan sent to clinic pharmacy.  When he picks up new script he will only take 1 tablet daily for a total of 50mg daily.    Jovana Beaulieu

## 2018-04-02 DIAGNOSIS — E78.5 HYPERLIPIDEMIA LDL GOAL <70: ICD-10-CM

## 2018-04-02 DIAGNOSIS — N40.1 HYPERTROPHY OF PROSTATE WITH URINARY OBSTRUCTION: ICD-10-CM

## 2018-04-02 DIAGNOSIS — N13.8 HYPERTROPHY OF PROSTATE WITH URINARY OBSTRUCTION: ICD-10-CM

## 2018-04-03 RX ORDER — TAMSULOSIN HYDROCHLORIDE 0.4 MG/1
CAPSULE ORAL
Qty: 90 CAPSULE | Refills: 1 | Status: SHIPPED | OUTPATIENT
Start: 2018-04-03 | End: 2018-10-01

## 2018-04-03 NOTE — TELEPHONE ENCOUNTER
Prescription approved per [facility] Refill Protocol.  Estrellita Shaikh RN

## 2018-04-03 NOTE — TELEPHONE ENCOUNTER
"Requested Prescriptions   Pending Prescriptions Disp Refills     tamsulosin (FLOMAX) 0.4 MG capsule [Pharmacy Med Name: TAMSULOSIN HCL 0.4MG CAPS] 90 capsule 1    Last Written Prescription Date:  10/11/2017 #90 x 1  Last filled 01/03/2018  Last office visit: 02/27/2018 JESENIA Beaulieu   Future Office Visit:   Next 5 appointments (look out 90 days)     Apr 24, 2018 11:00 AM CDT   Return Visit with Ruth Martins MD   [facility] ([facility])    [facility]  [address] [facility] [address]  [facility] [address]   [phone]                  Sig: TAKE ONE CAPSULE BY MOUTH EVERY DAY    Alpha Blockers Failed    4/2/2018 10:02 PM       Failed - Blood pressure under 140/90 in past 12 months    BP Readings from Last 3 Encounters:   03/13/18 144/68   02/27/18 152/74   10/24/17 160/58                Passed - Recent (12 mo) or future (30 days) visit within the authorizing provider's specialty    Patient had office visit in the last 12 months or has a visit in the next 30 days with authorizing provider or within the authorizing provider's specialty.  See \"Patient Info\" tab in inbasket, or \"Choose Columns\" in Meds & Orders section of the refill encounter.           Passed - Patient does not have Tadalafil, Vardenafil, or Sildenafil on their medication list       Passed - Patient is 18 years of age or older          "

## 2018-04-17 DIAGNOSIS — I10 HYPERTENSION GOAL BP (BLOOD PRESSURE) < 140/90: ICD-10-CM

## 2018-04-17 DIAGNOSIS — C20 RECTAL CANCER (H): ICD-10-CM

## 2018-04-17 LAB
ALBUMIN SERPL-MCNC: 3.7 G/DL (ref 3.4–5)
ALP SERPL-CCNC: 123 U/L (ref 40–150)
ALT SERPL W P-5'-P-CCNC: 20 U/L (ref 0–70)
ANION GAP SERPL CALCULATED.3IONS-SCNC: 5 MMOL/L (ref 3–14)
AST SERPL W P-5'-P-CCNC: 18 U/L (ref 0–45)
BASOPHILS # BLD AUTO: 0.1 10E9/L (ref 0–0.2)
BASOPHILS NFR BLD AUTO: 0.6 %
BILIRUB SERPL-MCNC: 0.8 MG/DL (ref 0.2–1.3)
BUN SERPL-MCNC: 22 MG/DL (ref 7–30)
CALCIUM SERPL-MCNC: 8.8 MG/DL (ref 8.5–10.1)
CEA SERPL-MCNC: 0.6 UG/L (ref 0–2.5)
CHLORIDE SERPL-SCNC: 106 MMOL/L (ref 94–109)
CO2 SERPL-SCNC: 28 MMOL/L (ref 20–32)
CREAT SERPL-MCNC: 1.22 MG/DL (ref 0.66–1.25)
DIFFERENTIAL METHOD BLD: ABNORMAL
EOSINOPHIL # BLD AUTO: 0.1 10E9/L (ref 0–0.7)
EOSINOPHIL NFR BLD AUTO: 1.7 %
ERYTHROCYTE [DISTWIDTH] IN BLOOD BY AUTOMATED COUNT: 13.4 % (ref 10–15)
GFR SERPL CREATININE-BSD FRML MDRD: 58 ML/MIN/1.7M2
GLUCOSE SERPL-MCNC: 174 MG/DL (ref 70–99)
HCT VFR BLD AUTO: 38 % (ref 40–53)
HGB BLD-MCNC: 12.5 G/DL (ref 13.3–17.7)
LYMPHOCYTES # BLD AUTO: 1.5 10E9/L (ref 0.8–5.3)
LYMPHOCYTES NFR BLD AUTO: 18.4 %
MCH RBC QN AUTO: 30.1 PG (ref 26.5–33)
MCHC RBC AUTO-ENTMCNC: 32.9 G/DL (ref 31.5–36.5)
MCV RBC AUTO: 92 FL (ref 78–100)
MONOCYTES # BLD AUTO: 0.8 10E9/L (ref 0–1.3)
MONOCYTES NFR BLD AUTO: 9.2 %
NEUTROPHILS # BLD AUTO: 5.7 10E9/L (ref 1.6–8.3)
NEUTROPHILS NFR BLD AUTO: 70.1 %
PLATELET # BLD AUTO: 186 10E9/L (ref 150–450)
POTASSIUM SERPL-SCNC: 4.3 MMOL/L (ref 3.4–5.3)
PROT SERPL-MCNC: 7 G/DL (ref 6.8–8.8)
RBC # BLD AUTO: 4.15 10E12/L (ref 4.4–5.9)
SODIUM SERPL-SCNC: 139 MMOL/L (ref 133–144)
WBC # BLD AUTO: 8.2 10E9/L (ref 4–11)

## 2018-04-17 PROCEDURE — 85025 COMPLETE CBC W/AUTO DIFF WBC: CPT | Performed by: INTERNAL MEDICINE

## 2018-04-17 PROCEDURE — 36415 COLL VENOUS BLD VENIPUNCTURE: CPT | Performed by: INTERNAL MEDICINE

## 2018-04-17 PROCEDURE — 80053 COMPREHEN METABOLIC PANEL: CPT | Performed by: INTERNAL MEDICINE

## 2018-04-17 PROCEDURE — 82378 CARCINOEMBRYONIC ANTIGEN: CPT | Performed by: INTERNAL MEDICINE

## 2018-04-17 NOTE — TELEPHONE ENCOUNTER
Routing refill request for losartan to provider for review/approval because:  See BP. Only #30 tablets with no refills dispensed to pt 3/15/18.    BP Readings from Last 3 Encounters:   03/13/18 144/68   02/27/18 152/74   10/24/17 160/58       Cheli HERNANDEZ RN

## 2018-04-18 RX ORDER — LOSARTAN POTASSIUM 50 MG/1
TABLET ORAL
Qty: 30 TABLET | Refills: 0 | Status: SHIPPED | OUTPATIENT
Start: 2018-04-18 | End: 2018-06-12 | Stop reason: DRUGHIGH

## 2018-04-18 NOTE — TELEPHONE ENCOUNTER
Med refilled.  Please call pt.  He needs a BP check okay in pharmacy or with MA.    Jovana Beaulieu

## 2018-04-24 ENCOUNTER — ONCOLOGY VISIT (OUTPATIENT)
Dept: ONCOLOGY | Facility: CLINIC | Age: 78
End: 2018-04-24
Attending: INTERNAL MEDICINE
Payer: COMMERCIAL

## 2018-04-24 VITALS
TEMPERATURE: 97.4 F | OXYGEN SATURATION: 98 % | RESPIRATION RATE: 16 BRPM | SYSTOLIC BLOOD PRESSURE: 144 MMHG | HEIGHT: 68 IN | HEART RATE: 68 BPM | DIASTOLIC BLOOD PRESSURE: 67 MMHG | BODY MASS INDEX: 32.37 KG/M2 | WEIGHT: 213.6 LBS

## 2018-04-24 DIAGNOSIS — Z86.0100 HISTORY OF COLONIC POLYPS: ICD-10-CM

## 2018-04-24 DIAGNOSIS — Z86.711 HISTORY OF PULMONARY EMBOLISM: ICD-10-CM

## 2018-04-24 DIAGNOSIS — N28.9 RENAL INSUFFICIENCY: ICD-10-CM

## 2018-04-24 DIAGNOSIS — C20 RECTAL CANCER (H): Primary | ICD-10-CM

## 2018-04-24 PROCEDURE — 99214 OFFICE O/P EST MOD 30 MIN: CPT | Performed by: INTERNAL MEDICINE

## 2018-04-24 PROCEDURE — G0463 HOSPITAL OUTPT CLINIC VISIT: HCPCS

## 2018-04-24 RX ORDER — CHOLECALCIFEROL (VITAMIN D3) 25 MCG
1 CAPSULE ORAL DAILY
COMMUNITY

## 2018-04-24 ASSESSMENT — PAIN SCALES - GENERAL: PAINLEVEL: NO PAIN (0)

## 2018-04-24 NOTE — PROGRESS NOTES
Hematology /oncology Visit      CHIEF COMPLAINT AND REASON FOR VISIT:    1. PE post op   2. Rectal cancer 2013     HISTORY OF PRESENT ILLNESS:  He presented with SOB found to have large right pulmonary embolism in 03/2014 when he was recovering from a laparoscopic-assisted low anterior resection for rectal cancer resection.  He had colorectal end-to-end anastomosis and loop ileostomy at that time.    He was on Lovenox 70-80 mg subcu b.i.d. for 7 months. It was stopped after repeat CT chest in 9/2014 indicated no PE.   Hypercoagulable work up through us in 9/2014 are negative.      He was diagnosed with locally-advanced rectal adenocarcinoma in 07/2013 through colonoscopy due to bloody bowel movements and anemia.  Preop evaluation showed the lesion is about mid to upper rectum 7 cm from the anal verge, there are prominent 5-10 perirectal lymph nodes. He had minimal stage III disease on presentation.     He received neoadjuvant concurrent chemoradiation with oral Xeloda, completed in 10/2013, then went on to have in 1/2014 laparoscopic-assisted LAR with splenic flexure mobilization and colonic J pouch with colorectal end-to-end anastomosis, loop ileostomy.    Final pathology indicating CR with no residual tumor in the rectum and in the lymph nodes.  He was only able to proceed with 2 cycles of XELOX and then dropped due to his general poor condition.     PMH  Type 2 diabetes, peripheral neuropathy, BPH, hypertension, hyperlipidemia, history of urinary retention.      SOCIAL HISTORY:  He lives in [place].  He never used tobacco.  Denies alcohol abuse.  He quit smoking in 2002.  He had 30-pack-year smoking history prior.   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     FAMILY HISTORY:  Positive for maternal grandmother who had unknown type of cancer, 1 sister was treated will breast cancer.        REVIEW OF SYSTEMS:   He is eating nl, no pain, gaining weight, no blood in stool.  He is living active life. He barely eats vegi.      "  PHYSICAL EXAMINATION:   VITAL SIGNS: Blood pressure 144/67, pulse 68, temperature 97.4  F (36.3  C), temperature source Tympanic, resp. rate 16, height 1.727 m (5' 7.99\"), weight 96.9 kg (213 lb 9.6 oz), SpO2 98 %.  GENERAL APPEARANCE:   Elderly gentleman, looks much younger than his stated age, not in acute distress.  He looks very comfortable.   HEENT: The patient is normocephalic, atraumatic. Pupils are equal react to light.  Sclerae are anicteric.  Moist oral mucosa.  Negative pharynx.  No oral thrush.   NECK:  Supple.  No jugular venous distention.  Thyroid is not palpable.   LYMPH NODES:  Superficial lymphadenopathy is not appreciable in the bilateral cervical, supraclavicular, axillary or inguinal adenopathy.    CARDIOVASCULAR:  S1, S2 regular with no murmurs or gallops.  No carotid or abdominal bruits.   PULMONARY:  Lungs are clear to auscultation and percussion bilaterally.  There is no wheezing or rhonchi.   GASTROINTESTINAL:  Abdomen is very soft, PEG feeding tube properly in place.   He has a right abdomen ileostomy bag, brownish liquid in the bag.   MUSCULOSKELETAL/EXTREMITIES:  No edema.  No cyanotic changes.  No signs of joint deformity.  No lymphedema.   NEUROLOGIC:  Cranial nerves II-XII are grossly intact.  Sensation intact.  Muscle strength and muscle tone symmetrical all through 5/5.   BACK:  No spinal or paraspinal tenderness.  No CVA tenderness.   SKIN:  No petechiae.  No rash.  No signs of cellulitis. Bruising on dorsal hands.       Current LAB Data Reviewed  Cbc diff/CEA are fine,   Cr  1.22 from 1.26 from 1.37 stable.     OLD DATA REVIEW IN SUMMARY:   12/2016 colonoscopy: multiple tubulovillous adenoma removed.     5/2015: Tubulovillous adenoma excised from transverse colon.     CT body 10/2016: no mets.     CT 10/2015  1. Negative chest. No PE  2. Interval right lower quadrant ostomy takedown.  3. Postoperative changes in the rectal and presacral region without any appreciable change " since the prior exam.  4. Enlarged prostate gland.    CT 9/2014 - No evidence for pulmonary embolism.    CT enterography in 06/2014 with no evidence of bowel obstruction, no convincing focal small bowel abnormality, possible fistula track connecting this walled-off collection colonic anastomosis site in the pelvis.       Laparoscopic-assisted LAR in 01/2014 - no residual carcinoma identified in the sigmoid or rectum, 1 lymph node was negative.      Preop MRI in 08/2013:  sessile tumor in the posterior mid to upper rectum, between 5 and 10 prominent perirectal lymph nodes which are suspicious.      Original pathology from colonoscopy, rectal mass biopsy 07/2013 - invasive moderately differentiated adenocarcinoma with ulceration, all 4 DNA mismatch repair proteins are expressed in the tumor nuclei.      ASSESSMENT AND PLAN:   1.  Postop large right-sided pulmonary embolism in 03/2014, s/p 7 months of 70 to 80 mg subcu b.i.d.  after repeat CT chest 9/2014 indicated no PE.   Most likely his 03/2014 pulmonary embolism was due to postop debilitated condition. Hypercoagulable work up was negative. He was off coumadin since fall 2014.    Advise full dose enteric coated ASA. He is tolerating this fine.      2.  Renal insufficiency .  He is encouraged on oral fluid intake.     3.  Locally advanced rectal cancer, status post concurrent chemoradiation.  Surgical resection achieved complete CR.   He did not finish adjuvant treatment due to then poor condition.  He needs ongoing oncologic followup for the disease status.  He is due 6 months f/u with labs and CT prn.   Advise ASA and vit D.      4. 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 He needs on top of colonoscopy.

## 2018-04-24 NOTE — PATIENT INSTRUCTIONS
We would like to see you back in 6 months for a follow up appointment with labs prior.     When you are in need of a refill, please call your pharmacy and they will send us a request.      Copy of appointments, and after visit summary (AVS) given to patient.      If you have any questions please call Khadijah Hernandez RN, BSN Oncology Hematology  [facility][note 3] [phone]. For questions after business hours, or on holidays/weekends, please call our after hours Nurse Triage line [phone]. Thank you.           6 months with labs.

## 2018-04-24 NOTE — LETTER
4/24/2018         RE: Storm Dutta  [address] [place] [address]        Dear Colleague,    Thank you for referring your patient, Storm Dutta, to the [facility]. Please see a copy of my visit note below.    Hematology /oncology Visit      CHIEF COMPLAINT AND REASON FOR VISIT:    1. PE post op   2. Rectal cancer 2013     HISTORY OF PRESENT ILLNESS:  He presented with SOB found to have large right pulmonary embolism in 03/2014 when he was recovering from a laparoscopic-assisted low anterior resection for rectal cancer resection.  He had colorectal end-to-end anastomosis and loop ileostomy at that time.    He was on Lovenox 70-80 mg subcu b.i.d. for 7 months. It was stopped after repeat CT chest in 9/2014 indicated no PE.   Hypercoagulable work up through us in 9/2014 are negative.      He was diagnosed with locally-advanced rectal adenocarcinoma in 07/2013 through colonoscopy due to bloody bowel movements and anemia.  Preop evaluation showed the lesion is about mid to upper rectum 7 cm from the anal verge, there are prominent 5-10 perirectal lymph nodes. He had minimal stage III disease on presentation.     He received neoadjuvant concurrent chemoradiation with oral Xeloda, completed in 10/2013, then went on to have in 1/2014 laparoscopic-assisted LAR with splenic flexure mobilization and colonic J pouch with colorectal end-to-end anastomosis, loop ileostomy.    Final pathology indicating CR with no residual tumor in the rectum and in the lymph nodes.  He was only able to proceed with 2 cycles of XELOX and then dropped due to his general poor condition.     PMH  Type 2 diabetes, peripheral neuropathy, BPH, hypertension, hyperlipidemia, history of urinary retention.      SOCIAL HISTORY:  He lives in [place].  He never used tobacco.  Denies alcohol abuse.  He quit smoking in 2002.  He had 30-pack-year smoking history prior.   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     FAMILY HISTORY:  Positive  "for maternal grandmother who had unknown type of cancer, 1 sister was treated will breast cancer.        REVIEW OF SYSTEMS:   He is eating nl, no pain, gaining weight, no blood in stool.  He is living active life. He barely eats vegi.       PHYSICAL EXAMINATION:   VITAL SIGNS: Blood pressure 144/67, pulse 68, temperature 97.4  F (36.3  C), temperature source Tympanic, resp. rate 16, height 1.727 m (5' 7.99\"), weight 96.9 kg (213 lb 9.6 oz), SpO2 98 %.  GENERAL APPEARANCE:   Elderly gentleman, looks much younger than his stated age, not in acute distress.  He looks very comfortable.   HEENT: The patient is normocephalic, atraumatic. Pupils are equal react to light.  Sclerae are anicteric.  Moist oral mucosa.  Negative pharynx.  No oral thrush.   NECK:  Supple.  No jugular venous distention.  Thyroid is not palpable.   LYMPH NODES:  Superficial lymphadenopathy is not appreciable in the bilateral cervical, supraclavicular, axillary or inguinal adenopathy.    CARDIOVASCULAR:  S1, S2 regular with no murmurs or gallops.  No carotid or abdominal bruits.   PULMONARY:  Lungs are clear to auscultation and percussion bilaterally.  There is no wheezing or rhonchi.   GASTROINTESTINAL:  Abdomen is very soft, PEG feeding tube properly in place.   He has a right abdomen ileostomy bag, brownish liquid in the bag.   MUSCULOSKELETAL/EXTREMITIES:  No edema.  No cyanotic changes.  No signs of joint deformity.  No lymphedema.   NEUROLOGIC:  Cranial nerves II-XII are grossly intact.  Sensation intact.  Muscle strength and muscle tone symmetrical all through 5/5.   BACK:  No spinal or paraspinal tenderness.  No CVA tenderness.   SKIN:  No petechiae.  No rash.  No signs of cellulitis. Bruising on dorsal hands.       Current LAB Data Reviewed  Cbc diff/CEA are fine,   Cr  1.22 from 1.26 from 1.37 stable.     OLD DATA REVIEW IN SUMMARY:   12/2016 colonoscopy: multiple tubulovillous adenoma removed.     5/2015: Tubulovillous adenoma excised " from transverse colon.     CT body 10/2016: no mets.     CT 10/2015  1. Negative chest. No PE  2. Interval right lower quadrant ostomy takedown.  3. Postoperative changes in the rectal and presacral region without any appreciable change since the prior exam.  4. Enlarged prostate gland.    CT 9/2014 - No evidence for pulmonary embolism.    CT enterography in 06/2014 with no evidence of bowel obstruction, no convincing focal small bowel abnormality, possible fistula track connecting this walled-off collection colonic anastomosis site in the pelvis.       Laparoscopic-assisted LAR in 01/2014 - no residual carcinoma identified in the sigmoid or rectum, 1 lymph node was negative.      Preop MRI in 08/2013:  sessile tumor in the posterior mid to upper rectum, between 5 and 10 prominent perirectal lymph nodes which are suspicious.      Original pathology from colonoscopy, rectal mass biopsy 07/2013 - invasive moderately differentiated adenocarcinoma with ulceration, all 4 DNA mismatch repair proteins are expressed in the tumor nuclei.      ASSESSMENT AND PLAN:   1.  Postop large right-sided pulmonary embolism in 03/2014, s/p 7 months of 70 to 80 mg subcu b.i.d.  after repeat CT chest 9/2014 indicated no PE.   Most likely his 03/2014 pulmonary embolism was due to postop debilitated condition. Hypercoagulable work up was negative. He was off coumadin since fall 2014.    Advise full dose enteric coated ASA. He is tolerating this fine.      2.  Renal insufficiency .  He is encouraged on oral fluid intake.     3.  Locally advanced rectal cancer, status post concurrent chemoradiation.  Surgical resection achieved complete CR.   He did not finish adjuvant treatment due to then poor condition.  He needs ongoing oncologic followup for the disease status.  He is due 6 months f/u with labs and CT prn.   Advise ASA and vit D.      4. 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 He needs on top of colonoscopy.                      Again, thank you for allowing me to participate in the care of your patient.        Sincerely,        Ruth Martins MD, MD

## 2018-04-24 NOTE — NURSING NOTE
"Oncology Rooming Note    April 24, 2018 11:09 AM   Storm Dutta is a 77 year old male who presents for:    Chief Complaint   Patient presents with     Oncology Clinic Visit     6 month recheck Rectal CA , review Labs      Initial Vitals: /67 (BP Location: Right arm, Patient Position: Sitting, Cuff Size: Adult Large)  Pulse 68  Temp 97.4  F (36.3  C) (Tympanic)  Resp 16  Ht 1.727 m (5' 7.99\")  Wt 96.9 kg (213 lb 9.6 oz)  SpO2 98%  BMI 32.49 kg/m2 Estimated body mass index is 32.49 kg/(m^2) as calculated from the following:    Height as of this encounter: 1.727 m (5' 7.99\").    Weight as of this encounter: 96.9 kg (213 lb 9.6 oz). Body surface area is 2.16 meters squared.  No Pain (0) Comment: Data Unavailable   No LMP for male patient.  Allergies reviewed: Yes  Medications reviewed: Yes    Medications: Medication refills not needed today.  Pharmacy name entered into [facility]: [facility] [address] [facility]    Clinical concerns: 6 month recheck Rectal CA , review Labs     7 minutes for nursing intake (face to face time)     Afshan Keating CMA              "

## 2018-04-24 NOTE — MR AVS SNAPSHOT
After Visit Summary   4/24/2018    Storm Dutta    MRN: [id number]           Patient Information     Date Of Birth          1940        Visit Information        Provider Department      4/24/2018 11:00 AM Ruth Martins MD [facility]        Today's Diagnoses     Rectal cancer (H)    -  1    History of pulmonary embolism        History of colonic polyps        Renal insufficiency          Care Instructions    We would like to see you back in 6 months for a follow up appointment with labs prior.     When you are in need of a refill, please call your pharmacy and they will send us a request.      Copy of appointments, and after visit summary (AVS) given to patient.      If you have any questions please call Khadijah Hernandez RN, BSN Oncology Hematology  [facility] [phone]. For questions after business hours, or on holidays/weekends, please call our after hours Nurse Triage line [phone]. Thank you.           6 months with labs.           Follow-ups after your visit        Your next 10 appointments already scheduled     Oct 16, 2018  9:00 AM CDT   LAB with  LAB   [facility]    [address] [place] [address] [phone]           Please do not eat 10-12 hours before your appointment if you are coming in fasting for labs on lipids, cholesterol, or glucose (sugar). This does not apply to pregnant women. Water, hot tea and black coffee (with nothing added) are okay. Do not drink other fluids, diet soda or chew gum.            Oct 23, 2018 11:15 AM CDT   Return Visit with Ruth Martins MD   [facility] ([facility])    [facility]  [address]  [facility] [address] [phone]              Future tests that were ordered for you today     Open Future Orders        Priority Expected Expires Ordered    CBC with platelets  "differential Routine 10/1/2018 10/31/2018 4/24/2018    Comprehensive metabolic panel Routine 10/1/2018 10/31/2018 4/24/2018    CEA Routine 10/1/2018 10/31/2018 4/24/2018            Who to contact     If you have questions or need follow up information about today's clinic visit or your schedule please contact [facility] directly at [phone].  Normal or non-critical lab and imaging results will be communicated to you by Getablehart, letter or phone within 4 business days after the clinic has received the results. If you do not hear from us within 7 days, please contact the clinic through Gatheredtablet or phone. If you have a critical or abnormal lab result, we will notify you by phone as soon as possible.  Submit refill requests through [facility] or call your pharmacy and they will forward the refill request to us. Please allow 3 business days for your refill to be completed.          Additional Information About Your Visit        [employer] Information     [facility] gives you secure access to your electronic health record. If you see a primary care provider, you can also send messages to your care team and make appointments. If you have questions, please call your primary care clinic.  If you do not have a primary care provider, please call [phone] and they will assist you.        Care EveryWhere ID     This is your Care EveryWhere ID. This could be used by other organizations to access your [place] medical records  [id number]        Your Vitals Were     Pulse Temperature Respirations Height Pulse Oximetry BMI (Body Mass Index)    68 97.4  F (36.3  C) (Tympanic) 16 1.727 m (5' 7.99\") 98% 32.49 kg/m2       Blood Pressure from Last 3 Encounters:   04/24/18 144/67   03/13/18 144/68   02/27/18 152/74    Weight from Last 3 Encounters:   04/24/18 96.9 kg (213 lb 9.6 oz)   02/27/18 96 kg (211 lb 9.6 oz)   10/24/17 95.1 kg (209 lb 9.6 oz)               Primary Care Provider Office Phone # Fax #    Jovana Beaulieu,  " [phone]       [address] [place] [address]        Equal Access to Services     JOSÉ MIGUEL NUNES : Hadii aad ku hadenidkahlil Sridaisha, waaxda luqadaha, qaybta kaalmada shayleejuliet, lionel jaiin hayaaraymond junerenny donovan michelle mesa. So [facility] [phone].    ATENCIÓN: Si habla español, tiene a kennedy disposición servicios gratuitos de asistencia lingüística. Llame al [phone].    We comply with applicable federal civil rights laws and [place] laws. We do not discriminate on the basis of race, color, national origin, age, disability, sex, sexual orientation, or gender identity.            Thank you!     Thank you for choosing [facility]  for your care. Our goal is always to provide you with excellent care. Hearing back from our patients is one way we can continue to improve our services. Please take a few minutes to complete the written survey that you may receive in the mail after your visit with us. Thank you!             Your Updated Medication List - Protect others around you: Learn how to safely use, store and throw away your medicines at www.disposemymeds.org.          This list is accurate as of 4/24/18 11:47 AM.  Always use your most recent med list.                   Brand Name Dispense Instructions for use Diagnosis    * ACCU-CHEK REGIS PLUS test strip   Generic drug:  blood glucose monitoring     100 each    TEST BLOOD SUGARS 2-4 TIMES DAILY OR AS DIRECTED    Type 2 diabetes mellitus with stage 3 chronic kidney disease, with long-term current use of insulin (H)       * blood glucose monitoring test strip    no brand specified    100 strip    Use to test blood sugars 3 times daily or as directed    Type 2 diabetes mellitus with hyperglycemia, with long-term current use of insulin (H)       aspirin 325 MG tablet      Take 325 mg by mouth daily        atorvastatin 20 MG tablet    LIPITOR    90 tablet    Take 1 tablet (20 mg) by mouth daily    Hyperlipidemia LDL goal <70       BASAGLAR 100  UNIT/ML injection     15 mL    16 units at bedtime    Type 2 diabetes mellitus with stage 3 chronic kidney disease, with long-term current use of insulin (H)       * blood glucose monitoring meter device kit     1 kit    Check blood sugars 1-2 times daily    Type 2 diabetes, HbA1c goal < 7% (H)       * blood glucose monitoring meter device kit    no brand specified    1 kit    Use to test blood sugar 3 times daily or as directed.    Type 2 diabetes mellitus with hyperglycemia, with long-term current use of insulin (H)       diphenoxylate-atropine 2.5-0.025 MG per tablet    LOMOTIL    60 tablet    Take 1 tablet by mouth 4 times daily as needed for diarrhea    Diarrhea       finasteride 5 MG tablet    PROSCAR    90 tablet    TAKE ONE TABLET BY MOUTH EVERY DAY    Hypertrophy of prostate with urinary obstruction       insulin aspart 100 UNIT/ML injection    NovoLOG FLEXPEN    15 mL    Use 8 units plus low sliding scale before each meal 120-149 0 units 150-199 1 unit 200-249 2 units 250-299 3 units 300-349 4 units > 350 5 units  Max dose 50 units day    Type 2 diabetes mellitus with hyperglycemia, with long-term current use of insulin (H)       losartan 50 MG tablet    COZAAR    30 tablet    TAKE ONE TABLET BY MOUTH EVERY DAY    Hypertension goal BP (blood pressure) < 140/90       tamsulosin 0.4 MG capsule    FLOMAX    90 capsule    TAKE ONE CAPSULE BY MOUTH EVERY DAY    Hyperlipidemia LDL goal <70, Hypertrophy of prostate with urinary obstruction       ULTICARE MINI 31G X 6 MM   Generic drug:  insulin pen needle     100 each    USE 4 TO 5 TIMES DAILY OR AS DIRECTED FOR SLIDING SCALE INSULIN    Type 2 diabetes, HbA1c goal < 7% (H)       Vitamin D-3 1000 units Caps      Take by mouth daily        * Notice:  This list has 4 medication(s) that are the same as other medications prescribed for you. Read the directions carefully, and ask your doctor or other care provider to review them with you.

## 2018-05-23 DIAGNOSIS — I10 HYPERTENSION GOAL BP (BLOOD PRESSURE) < 140/90: ICD-10-CM

## 2018-05-24 RX ORDER — LOSARTAN POTASSIUM 50 MG/1
TABLET ORAL
Qty: 30 TABLET | Refills: 0 | OUTPATIENT
Start: 2018-05-24

## 2018-05-24 RX ORDER — LOSARTAN POTASSIUM 100 MG/1
100 TABLET ORAL DAILY
Qty: 30 TABLET | Refills: 0 | Status: SHIPPED | OUTPATIENT
Start: 2018-05-24 | End: 2018-06-25

## 2018-05-24 NOTE — TELEPHONE ENCOUNTER
No, his BP is still high.  I sent over a higher dose.  BP check with MA and labs in 2 weeks.  Orders placed.  Please call pt.    Jovana Beaulieu

## 2018-06-10 ENCOUNTER — TRANSFERRED RECORDS (OUTPATIENT)
Dept: HEALTH INFORMATION MANAGEMENT | Facility: CLINIC | Age: 78
End: 2018-06-10

## 2018-06-12 ENCOUNTER — OFFICE VISIT (OUTPATIENT)
Dept: FAMILY MEDICINE | Facility: CLINIC | Age: 78
End: 2018-06-12
Payer: COMMERCIAL

## 2018-06-12 VITALS
SYSTOLIC BLOOD PRESSURE: 124 MMHG | WEIGHT: 210.4 LBS | HEIGHT: 68 IN | BODY MASS INDEX: 31.89 KG/M2 | HEART RATE: 72 BPM | DIASTOLIC BLOOD PRESSURE: 68 MMHG | TEMPERATURE: 97.7 F

## 2018-06-12 DIAGNOSIS — E11.42 TYPE 2 DIABETES MELLITUS WITH DIABETIC POLYNEUROPATHY, WITH LONG-TERM CURRENT USE OF INSULIN (H): Primary | ICD-10-CM

## 2018-06-12 DIAGNOSIS — Z79.4 TYPE 2 DIABETES MELLITUS WITH DIABETIC POLYNEUROPATHY, WITH LONG-TERM CURRENT USE OF INSULIN (H): Primary | ICD-10-CM

## 2018-06-12 DIAGNOSIS — I10 HYPERTENSION GOAL BP (BLOOD PRESSURE) < 140/90: ICD-10-CM

## 2018-06-12 DIAGNOSIS — M21.962 FOOT DEFORMITY, BILATERAL: ICD-10-CM

## 2018-06-12 DIAGNOSIS — E78.5 HYPERLIPIDEMIA LDL GOAL <70: ICD-10-CM

## 2018-06-12 DIAGNOSIS — H91.93 DECREASED HEARING OF BOTH EARS: ICD-10-CM

## 2018-06-12 DIAGNOSIS — M21.961 FOOT DEFORMITY, BILATERAL: ICD-10-CM

## 2018-06-12 LAB
ANION GAP SERPL CALCULATED.3IONS-SCNC: 6 MMOL/L (ref 3–14)
BUN SERPL-MCNC: 20 MG/DL (ref 7–30)
CALCIUM SERPL-MCNC: 9.2 MG/DL (ref 8.5–10.1)
CHLORIDE SERPL-SCNC: 106 MMOL/L (ref 94–109)
CO2 SERPL-SCNC: 25 MMOL/L (ref 20–32)
CREAT SERPL-MCNC: 1.22 MG/DL (ref 0.66–1.25)
CREAT UR-MCNC: 62 MG/DL
GFR SERPL CREATININE-BSD FRML MDRD: 57 ML/MIN/1.7M2
GLUCOSE SERPL-MCNC: 128 MG/DL (ref 70–99)
HBA1C MFR BLD: 8.1 % (ref 0–5.6)
LDLC SERPL DIRECT ASSAY-MCNC: 64 MG/DL
MICROALBUMIN UR-MCNC: 55 MG/L
MICROALBUMIN/CREAT UR: 87.68 MG/G CR (ref 0–17)
POTASSIUM SERPL-SCNC: 4.2 MMOL/L (ref 3.4–5.3)
SODIUM SERPL-SCNC: 137 MMOL/L (ref 133–144)
TSH SERPL DL<=0.005 MIU/L-ACNC: 1.79 MU/L (ref 0.4–4)

## 2018-06-12 PROCEDURE — 82043 UR ALBUMIN QUANTITATIVE: CPT | Performed by: FAMILY MEDICINE

## 2018-06-12 PROCEDURE — 83721 ASSAY OF BLOOD LIPOPROTEIN: CPT | Performed by: FAMILY MEDICINE

## 2018-06-12 PROCEDURE — 99207 C FOOT EXAM  NO CHARGE: CPT | Performed by: FAMILY MEDICINE

## 2018-06-12 PROCEDURE — 99214 OFFICE O/P EST MOD 30 MIN: CPT | Performed by: FAMILY MEDICINE

## 2018-06-12 PROCEDURE — 80048 BASIC METABOLIC PNL TOTAL CA: CPT | Performed by: FAMILY MEDICINE

## 2018-06-12 PROCEDURE — 84443 ASSAY THYROID STIM HORMONE: CPT | Performed by: FAMILY MEDICINE

## 2018-06-12 PROCEDURE — 36415 COLL VENOUS BLD VENIPUNCTURE: CPT | Performed by: FAMILY MEDICINE

## 2018-06-12 PROCEDURE — 83036 HEMOGLOBIN GLYCOSYLATED A1C: CPT | Performed by: FAMILY MEDICINE

## 2018-06-12 NOTE — LETTER
June 13, 2018      Storm BOURGEOIS Tra  [address] [place] [address]        Dear ,    We are writing to inform you of your test results.    Your urine protein has been pretty stable.    The thyroid remains normal.  LDL (bad) cholesterol is well controlled.      Resulted Orders   Hemoglobin A1c   Result Value Ref Range    Hemoglobin A1C 8.1 (H) 0 - 5.6 %      Comment:      Normal <5.7% Prediabetes 5.7-6.4%  Diabetes 6.5% or higher - adopted from ADA   consensus guidelines.     LDL cholesterol direct   Result Value Ref Range    LDL Cholesterol Direct 64 <100 mg/dL      Comment:      Desirable:       <100 mg/dl       If you have any questions or concerns, please call the clinic at the number listed above.       Sincerely,        Jovana Beaulieu, DO

## 2018-06-12 NOTE — PROGRESS NOTES
SUBJECTIVE:   Storm Dutta is a 77 year old male who presents to clinic today for the following health issues:    Diabetes Follow-up    Patient is checking blood sugars: once daily.  Results are as follows:         am - 140  Only checks fasting 106-150.  Checks are usually <140    Diabetic concerns: None     Symptoms of hypoglycemia (low blood sugar): none     Paresthesias (numbness or burning in feet) or sores: No     Date of last diabetic eye exam: 6/10/18 [facility]    Does take his insulin with every meal only takes 8 units.  Does not do sliding scale with lunch or dinner because he does not check his sugars.    Hyperlipidemia Follow-Up      Rate your low fat/cholesterol diet?: not monitoring fat    Taking statin?  Yes, no muscle aches from statin    Other lipid medications/supplements?:  none    Hypertension Follow-up      Outpatient blood pressures are not being checked.    Low Salt Diet: not monitoring salt    BP Readings from Last 2 Encounters:   06/12/18 124/68   04/24/18 144/67     Hemoglobin A1C (%)   Date Value   06/12/2018 8.1 (H)   02/27/2018 8.3 (H)     LDL Cholesterol Calculated (mg/dL)   Date Value   07/27/2017 62   07/16/2015 61     LDL Cholesterol Direct (mg/dL)   Date Value   08/04/2016 129 (H)       Amount of exercise or physical activity: 6-7 days/week for an average of greater than 60 minutes    Problems taking medications regularly: No    Medication side effects: none    Diet: regular (no restrictions)    Having trouble with his hearing.  Interested in seeing audiology    Problem list and histories reviewed & adjusted, as indicated.  Additional history: as documented      Reviewed and updated as needed this visit by clinical staff  Tobacco  Allergies  Meds  Problems  Med Hx  Surg Hx  Fam Hx  Soc Hx        Reviewed and updated as needed this visit by Provider  Tobacco  Allergies  Meds  Problems  Med Hx  Surg Hx  Fam Hx  Soc Hx          ROS: Remainder of  Constitutional, CV, Respiratory, GI,  negative with exception of that mentioned above    PE:  VS as above   Gen:  WN/WD/WH male in NAD   Heart:  RRR without murmur, nl S1, S2, no rubs or gallops   Lungs CTA leydi without rales/ronchi/wheezes   Ext:  1+ pedal edema, venous stasis skin changes   Foot:  Hammer toes leydi with thickened nails.  No wounds noted.  Decreased sensation to monofilament    A/P:      ICD-10-CM    1. Type 2 diabetes mellitus with diabetic polyneuropathy, with long-term current use of insulin (H) E11.42 Hemoglobin A1c    Z79.4 C FOOT EXAM  NO CHARGE     EYE EXAM (SIMPLE-NONBILLABLE)     LDL cholesterol direct     TSH with free T4 reflex     Albumin Random Urine Quantitative with Creat Ratio     ORTHO  REFERRAL   2. Foot deformity, bilateral M21.961 ORTHO  REFERRAL    M21.962    3. Decreased hearing of both ears H91.93 AUDIOLOGY ADULT REFERRAL   4. Hypertension goal BP (blood pressure) < 140/90 I10    5. Hyperlipidemia LDL goal <70 E78.5      Patient Instructions   Please start checking the blood sugar before either lunch or dinner as well so you can use your sliding scale along with your 8 units.  I think that will get you to goal!    You should get a call in 1-2 days to schedule with podiatry.  We should have you do hat to see if special shoes would help to prevent wounds related to the toe deformities and diabetes.    You can call and schedule with audiology when it works best for you    I'll let you know the rest of the lab results when available.    We should visit again in 3 months

## 2018-06-12 NOTE — PATIENT INSTRUCTIONS
Please start checking the blood sugar before either lunch or dinner as well so you can use your sliding scale along with your 8 units.  I think that will get you to goal!    You should get a call in 1-2 days to schedule with podiatry.  We should have you do hat to see if special shoes would help to prevent wounds related to the toe deformities and diabetes.    You can call and schedule with audiology when it works best for you    I'll let you know the rest of the lab results when available.    We should visit again in 3 months

## 2018-06-12 NOTE — NURSING NOTE
"Initial /68  Pulse 72  Temp 97.7  F (36.5  C) (Tympanic)  Ht 5' 8\" (1.727 m)  Wt 210 lb 6.4 oz (95.4 kg)  BMI 31.99 kg/m2 Estimated body mass index is 31.99 kg/(m^2) as calculated from the following:    Height as of this encounter: 5' 8\" (1.727 m).    Weight as of this encounter: 210 lb 6.4 oz (95.4 kg). .      "

## 2018-06-12 NOTE — MR AVS SNAPSHOT
After Visit Summary   6/12/2018    Storm Dutta    MRN: [id number]           Patient Information     Date Of Birth          1940        Visit Information        Provider Department      6/12/2018 10:20 AM Jovana Beaulieu DO [facility]        Today's Diagnoses     Type 2 diabetes mellitus with diabetic polyneuropathy, with long-term current use of insulin (H)    -  1    Foot deformity, bilateral        Decreased hearing of both ears        Hypertension goal BP (blood pressure) < 140/90        Hyperlipidemia LDL goal <70          Care Instructions    Please start checking the blood sugar before either lunch or dinner as well so you can use your sliding scale along with your 8 units.  I think that will get you to goal!    You should get a call in 1-2 days to schedule with podiatry.  We should have you do hat to see if special shoes would help to prevent wounds related to the toe deformities and diabetes.    You can call and schedule with audiology when it works best for you    I'll let you know the rest of the lab results when available.    We should visit again in 3 months              Follow-ups after your visit        Additional Services     AUDIOLOGY ADULT REFERRAL       Your provider has referred you to: [facility] [phone]   [email or web address]    Specialty Testing:  Audiogram w/Tymps and Reflexes (Comprehensive Audiology Evaluation)            ORTHO  REFERRAL       [facility] is referring you to the Orthopedic  Services at [facility].       The  Representative will assist you in the coordination of your Orthopedic and Musculoskeletal Care as prescribed by your physician.    The  Representative will call you within 1 business day to help schedule your appointment, or you may contact the  Representative at:    All areas ~  [phone]     Type of Referral : [facility] / Foot & Ankle Surgery       Timeframe requested: Routine    Coverage of these services is subject to the terms and limitations of your health insurance plan.  Please call member services at your health plan with any benefit or coverage questions.      If X-rays, CT or MRI's have been performed, please contact the facility where they were done to arrange for , prior to your scheduled appointment.  Please bring this referral request to your appointment and present it to your specialist.                  Your next 10 appointments already scheduled     Oct 16, 2018  9:00 AM CDT   LAB with  LAB   [facility]    [address] [address] [phone]           Please do not eat 10-12 hours before your appointment if you are coming in fasting for labs on lipids, cholesterol, or glucose (sugar). This does not apply to pregnant women. Water, hot tea and black coffee (with nothing added) are okay. Do not drink other fluids, diet soda or chew gum.            Oct 23, 2018 11:15 AM CDT   Return Visit with Ruth Martins MD   [facility] ([facility])    [facility]  [address]  [place] [facility] [place] [facility] [place] [address] [phone]              Who to contact     Normal or non-critical lab and imaging results will be communicated to you by MyChart, letter or phone within 4 business days after the clinic has received the results. If you do not hear from us within 7 days, please contact the clinic through MyChart or phone. If you have a critical or abnormal lab result, we will notify you by phone as soon as possible.  Submit refill requests through [employer] or call your pharmacy and they will forward the refill request to us. Please allow 3 business days for your refill to be completed.          If you need to speak with a  for additional  "information , please call: [phone]           Additional Information About Your Visit        AR LLChart Information     [employer] gives you secure access to your electronic health record. If you see a primary care provider, you can also send messages to your care team and make appointments. If you have questions, please call your primary care clinic.  If you do not have a primary care provider, please call [phone] and they will assist you.        Care EveryWhere ID     This is your Care EveryWhere ID. This could be used by other organizations to access your [place] medical records  [id number]        Your Vitals Were     Pulse Temperature Height BMI (Body Mass Index)          72 97.7  F (36.5  C) (Tympanic) 5' 8\" (1.727 m) 31.99 kg/m2         Blood Pressure from Last 3 Encounters:   06/12/18 124/68   04/24/18 144/67   03/13/18 144/68    Weight from Last 3 Encounters:   06/12/18 210 lb 6.4 oz (95.4 kg)   04/24/18 213 lb 9.6 oz (96.9 kg)   02/27/18 211 lb 9.6 oz (96 kg)              We Performed the Following     Albumin Random Urine Quantitative with Creat Ratio     AUDIOLOGY ADULT REFERRAL     C FOOT EXAM  NO CHARGE     EYE EXAM (SIMPLE-NONBILLABLE)     Hemoglobin A1c     LDL cholesterol direct     ORTHO  REFERRAL     TSH with free T4 reflex          Today's Medication Changes          These changes are accurate as of 6/12/18 10:51 AM.  If you have any questions, ask your nurse or doctor.               These medicines have changed or have updated prescriptions.        Dose/Directions    losartan 100 MG tablet   Commonly known as:  COZAAR   This may have changed:  Another medication with the same name was removed. Continue taking this medication, and follow the directions you see here.   Used for:  Hypertension goal BP (blood pressure) < 140/90   Changed by:  Jovana Beaulieu DO        Dose:  100 mg   Take 1 tablet (100 mg) by mouth daily   Quantity:  30 tablet   Refills:  0                Primary " Care Provider Office Phone # Fax #    Jovana Beaulieu,  [phone] [address] [place] [address]        Equal Access to Services     JOSÉ MIGUEL NUNES : Hadii aad ku hadenido Sofloydali, waaxda luqadaha, qaybta kaalmada aderennyyada, lionel donovan laTraceeriley mesa. So [facility] [phone].    ATENCIÓN: Si habla español, tiene a kennedy disposición servicios gratuitos de asistencia lingüística. Llame al [phone].    We comply with applicable federal civil rights laws and [place] laws. We do not discriminate on the basis of race, color, national origin, age, disability, sex, sexual orientation, or gender identity.            Thank you!     Thank you for choosing [facility] [place]  for your care. Our goal is always to provide you with excellent care. Hearing back from our patients is one way we can continue to improve our services. Please take a few minutes to complete the written survey that you may receive in the mail after your visit with us. Thank you!             Your Updated Medication List - Protect others around you: Learn how to safely use, store and throw away your medicines at www.disposemymeds.org.          This list is accurate as of 6/12/18 10:51 AM.  Always use your most recent med list.                   Brand Name Dispense Instructions for use Diagnosis    * ACCU-CHEK REGIS PLUS test strip   Generic drug:  blood glucose monitoring     100 each    TEST BLOOD SUGARS 2-4 TIMES DAILY OR AS DIRECTED    Type 2 diabetes mellitus with stage 3 chronic kidney disease, with long-term current use of insulin (H)       * blood glucose monitoring test strip    no brand specified    100 strip    Use to test blood sugars 3 times daily or as directed    Type 2 diabetes mellitus with hyperglycemia, with long-term current use of insulin (H)       aspirin 325 MG tablet      Take 325 mg by mouth daily        atorvastatin 20 MG tablet    LIPITOR    90 tablet    Take 1 tablet (20 mg) by  mouth daily    Hyperlipidemia LDL goal <70       BASAGLAR 100 UNIT/ML injection     15 mL    16 units at bedtime    Type 2 diabetes mellitus with stage 3 chronic kidney disease, with long-term current use of insulin (H)       * blood glucose monitoring meter device kit     1 kit    Check blood sugars 1-2 times daily    Type 2 diabetes, HbA1c goal < 7% (H)       * blood glucose monitoring meter device kit    no brand specified    1 kit    Use to test blood sugar 3 times daily or as directed.    Type 2 diabetes mellitus with hyperglycemia, with long-term current use of insulin (H)       diphenoxylate-atropine 2.5-0.025 MG per tablet    LOMOTIL    60 tablet    Take 1 tablet by mouth 4 times daily as needed for diarrhea    Diarrhea       finasteride 5 MG tablet    PROSCAR    90 tablet    TAKE ONE TABLET BY MOUTH EVERY DAY    Hypertrophy of prostate with urinary obstruction       insulin aspart 100 UNIT/ML injection    NovoLOG FLEXPEN    15 mL    Use 8 units plus low sliding scale before each meal 120-149 0 units 150-199 1 unit 200-249 2 units 250-299 3 units 300-349 4 units > 350 5 units  Max dose 50 units day    Type 2 diabetes mellitus with hyperglycemia, with long-term current use of insulin (H)       losartan 100 MG tablet    COZAAR    30 tablet    Take 1 tablet (100 mg) by mouth daily    Hypertension goal BP (blood pressure) < 140/90       tamsulosin 0.4 MG capsule    FLOMAX    90 capsule    TAKE ONE CAPSULE BY MOUTH EVERY DAY    Hyperlipidemia LDL goal <70, Hypertrophy of prostate with urinary obstruction       ULTICARE MINI 31G X 6 MM   Generic drug:  insulin pen needle     100 each    USE 4 TO 5 TIMES DAILY OR AS DIRECTED FOR SLIDING SCALE INSULIN    Type 2 diabetes, HbA1c goal < 7% (H)       Vitamin D-3 1000 units Caps      Take by mouth daily        * Notice:  This list has 4 medication(s) that are the same as other medications prescribed for you. Read the directions carefully, and ask your doctor or other  care provider to review them with you.

## 2018-06-25 DIAGNOSIS — I10 HYPERTENSION GOAL BP (BLOOD PRESSURE) < 140/90: ICD-10-CM

## 2018-06-25 NOTE — TELEPHONE ENCOUNTER
"Requested Prescriptions   Pending Prescriptions Disp Refills     losartan (COZAAR) 100 MG tablet [Pharmacy Med Name: LOSARTAN POTASSIUM 100MG TABS] 30 tablet 0    Last Written Prescription Date:  05/24/2018 #30 x 0  Last filled 05/24/2018  Last office visit: 6/12/2018 JESENIA Beaulieu   Future Office Visit: None    Sig: TAKE ONE TABLET BY MOUTH EVERY DAY    Angiotensin-II Receptors Passed    6/25/2018  9:07 AM       Passed - Blood pressure under 140/90 in past 12 months    BP Readings from Last 3 Encounters:   06/12/18 124/68   04/24/18 144/67   03/13/18 144/68                Passed - Recent (12 mo) or future (30 days) visit within the authorizing provider's specialty    Patient had office visit in the last 12 months or has a visit in the next 30 days with authorizing provider or within the authorizing provider's specialty.  See \"Patient Info\" tab in inbasket, or \"Choose Columns\" in Meds & Orders section of the refill encounter.           Passed - Patient is age 18 or older       Passed - Normal serum creatinine on file in past 12 months    Recent Labs   Lab Test  06/12/18   0956   CR  1.22            Passed - Normal serum potassium on file in past 12 months    Recent Labs   Lab Test  06/12/18   0956   POTASSIUM  4.2                      "

## 2018-06-27 RX ORDER — LOSARTAN POTASSIUM 100 MG/1
TABLET ORAL
Qty: 90 TABLET | Refills: 1 | Status: SHIPPED | OUTPATIENT
Start: 2018-06-27 | End: 2018-12-26

## 2018-06-27 NOTE — TELEPHONE ENCOUNTER
Prescription approved per [facility] Refill Protocol.  Estrellita Shaikh RN

## 2018-06-29 ENCOUNTER — OFFICE VISIT (OUTPATIENT)
Dept: PODIATRY | Facility: CLINIC | Age: 78
End: 2018-06-29
Payer: COMMERCIAL

## 2018-06-29 VITALS
WEIGHT: 210 LBS | DIASTOLIC BLOOD PRESSURE: 78 MMHG | HEIGHT: 68 IN | BODY MASS INDEX: 31.83 KG/M2 | HEART RATE: 83 BPM | SYSTOLIC BLOOD PRESSURE: 161 MMHG

## 2018-06-29 DIAGNOSIS — B35.1 ONYCHOMYCOSIS OF MULTIPLE TOENAILS WITH TYPE 2 DIABETES MELLITUS (H): ICD-10-CM

## 2018-06-29 DIAGNOSIS — E11.69 ONYCHOMYCOSIS OF MULTIPLE TOENAILS WITH TYPE 2 DIABETES MELLITUS (H): ICD-10-CM

## 2018-06-29 DIAGNOSIS — M20.11 HALLUX VALGUS, ACQUIRED, BILATERAL: Primary | ICD-10-CM

## 2018-06-29 DIAGNOSIS — M20.42 ACQUIRED BILATERAL HAMMER TOES: ICD-10-CM

## 2018-06-29 DIAGNOSIS — M20.41 ACQUIRED BILATERAL HAMMER TOES: ICD-10-CM

## 2018-06-29 DIAGNOSIS — M20.12 HALLUX VALGUS, ACQUIRED, BILATERAL: Primary | ICD-10-CM

## 2018-06-29 PROCEDURE — 11721 DEBRIDE NAIL 6 OR MORE: CPT | Performed by: PODIATRIST

## 2018-06-29 PROCEDURE — 99203 OFFICE O/P NEW LOW 30 MIN: CPT | Mod: 25 | Performed by: PODIATRIST

## 2018-06-29 NOTE — PROGRESS NOTES
PATIENT HISTORY:  Storm Dutta is a 77 year old male who presents to clinic for a diabetic foot evaluation.  The patient relates pain with ambulation in shoe gear.  The patient relates that the nails are difficult to trim at home.  The patient relates blood sugars are within normal limits.  The patient denies any redness, swelling or open sores on both feet.       REVIEW OF SYSTEMS:  Constitutional, HEENT, cardiovascular, pulmonary, GI, , musculoskeletal, neuro, skin, endocrine and psych systems are negative, except as otherwise noted.     PAST MEDICAL HISTORY:   Past Medical History:   Diagnosis Date     Acute urinary retention 11/2012    ER visit   / 1200 cc post-void residual     Acute urinary retention 11/1/2012    ER      Diabetes mellitus type II      Epididymitis 3/20/2014    Started on doxycyclline for UTI/orchitis. He was discharged on 03/22/14.     Former smoker     dc'd 2006     Hypertension goal BP (blood pressure) < 140/90 8/24/2012     PE (pulmonary embolism) 3/20/2014     Rectal cancer (H)      Squamous cell carcinoma      Thrombosis of leg     +PE        PAST SURGICAL HISTORY:   Past Surgical History:   Procedure Laterality Date     COLONOSCOPY  7/29/2013    Procedure: COMBINED COLONOSCOPY, SINGLE BIOPSY/POLYPECTOMY BY BIOPSY;;  Surgeon: Bari Burnett MD;  Location: [facility]     COLONOSCOPY N/A 6/4/2015    Procedure: COMBINED COLONOSCOPY, SINGLE OR MULTIPLE BIOPSY/POLYPECTOMY BY BIOPSY;  Surgeon: Tara Mtz MD;  Location:  GI     COLONOSCOPY N/A 12/5/2016    Procedure: COLONOSCOPY;  Surgeon: Breanna Kline MD;  Location:  GI     EYE SURGERY  5/2012    Right eye procedure     HC CIRCUMCISION SURGICAL NON-DEV >28 DAYS AGE  2/97    due to phimosis     HC REMOVAL GALLBLADDER  5/01     [facility] U[facility] ENDOSCOPY W PLACEMENT GASTROSTOMY TUBE PERCUT  3/13/2014    Procedure: COMBINED ESOPHAGOSCOPY, GASTROSCOPY, DUODENOSCOPY (EGD), PLACE PERCUTANEOUS ENDOSCOPIC GASTROSTOMY TUBE;   Surgeon: Loco Casillas MD;  Location: [facility]     LAPAROSCOPIC ASSISTED COLECTOMY LEFT (DESCENDING)  1/7/2014    Procedure: LAPAROSCOPIC ASSISTED COLECTOMY LEFT (DESCENDING);  Laparoscopic hand assisted Low Anterior Resection With colonic J pouch and end to end colorectal anastamosis. Laparoscopic splenic flexure mobilization.  Loop Ileostomy.  Rigid proctoscopy;  Surgeon: Margaret Gamble MD;  [facility]     PHACOEMULSIFICATION WITH STANDARD INTRAOCULAR LENS IMPLANT  4/11/2013    Procedure: PHACOEMULSIFICATION WITH STANDARD INTRAOCULAR LENS IMPLANT;  Right Kelman Phacoemulsification with Intraocular Lens Implant;  Surgeon: Caesar Turner MD;  Location: [facility]     REMOVE GASTROSTOMY TUBE ADULT  7/25/2014    Procedure: REMOVE GASTROSTOMY TUBE ADULT;  Surgeon: Margaret Gamble MD;  [facility]     SIGMOIDOSCOPY FLEXIBLE  6/23/2014    Procedure: SIGMOIDOSCOPY FLEXIBLE;  Surgeon: Margaret Gamble MD;  [facility]     SIGMOIDOSCOPY FLEXIBLE  7/22/2014    Procedure: SIGMOIDOSCOPY FLEXIBLE;  Surgeon: Margaret Gamble MD;  [facility]     SIGMOIDOSCOPY FLEXIBLE  7/25/2014    Procedure: SIGMOIDOSCOPY FLEXIBLE;  Surgeon: Mragaret Gamble MD;  [facility]     SIGMOIDOSCOPY FLEXIBLE  7/28/2014    Procedure: SIGMOIDOSCOPY FLEXIBLE;  Surgeon: Margaret Gamble MD;  [facility]     SIGMOIDOSCOPY FLEXIBLE  7/31/2014    Procedure: SIGMOIDOSCOPY FLEXIBLE;  Surgeon: Margaret Gamble MD;  [facility]     SIGMOIDOSCOPY FLEXIBLE  8/3/2014    Procedure: SIGMOIDOSCOPY FLEXIBLE;  Surgeon: Margaret Gamble MD;  [facility]     SIGMOIDOSCOPY FLEXIBLE  8/5/2014    Procedure: SIGMOIDOSCOPY FLEXIBLE;  Surgeon: Margaret Gamble MD;  [facility]     SIGMOIDOSCOPY FLEXIBLE  8/8/2014    Procedure: SIGMOIDOSCOPY FLEXIBLE;  Surgeon: Margaret Gamble MD;  [facility]     SIGMOIDOSCOPY FLEXIBLE  8/18/2014     Procedure: SIGMOIDOSCOPY FLEXIBLE;  Surgeon: Jose Roberto Cervantes MD;  [facility]     SIGMOIDOSCOPY FLEXIBLE N/A 8/15/2014    Procedure: SIGMOIDOSCOPY FLEXIBLE;  Surgeon: Margaret Gamble MD;  [facility]     SIGMOIDOSCOPY FLEXIBLE N/A 8/22/2014    Procedure: SIGMOIDOSCOPY FLEXIBLE;  Surgeon: Jose Roberto Cervantes MD;  [facility]     SIGMOIDOSCOPY FLEXIBLE N/A 8/11/2014    Procedure: SIGMOIDOSCOPY FLEXIBLE;  Surgeon: Margaret Gamble MD;  [facility]     SIGMOIDOSCOPY FLEXIBLE N/A 8/26/2014    Procedure: SIGMOIDOSCOPY FLEXIBLE;  Surgeon: Margaret Gamble MD;  [facility]     SIGMOIDOSCOPY FLEXIBLE N/A 8/29/2014    Procedure: SIGMOIDOSCOPY FLEXIBLE;  Surgeon: Margaret Gamble MD;  [facility]     SIGMOIDOSCOPY FLEXIBLE N/A 9/8/2014    Procedure: SIGMOIDOSCOPY FLEXIBLE;  Surgeon: Margaret Gamble MD;  [facility]     SIGMOIDOSCOPY FLEXIBLE N/A 9/2/2014    Procedure: SIGMOIDOSCOPY FLEXIBLE;  Surgeon: Breanna Kline MD;  [facility]     SIGMOIDOSCOPY FLEXIBLE N/A 9/11/2014    Procedure: SIGMOIDOSCOPY FLEXIBLE;  Surgeon: Margaret Gamble MD;  [facility]     SIGMOIDOSCOPY FLEXIBLE N/A 9/19/2014    Procedure: SIGMOIDOSCOPY FLEXIBLE;  Surgeon: Margaret Gamble MD;  [facility]     SIGMOIDOSCOPY FLEXIBLE N/A 9/15/2014    Procedure: SIGMOIDOSCOPY FLEXIBLE;  Surgeon: Margaret Gamble MD;  [facility]     SIGMOIDOSCOPY FLEXIBLE N/A 9/5/2014    Procedure: SIGMOIDOSCOPY FLEXIBLE;  Surgeon: Margaret Gamble MD;  [facility]     SIGMOIDOSCOPY FLEXIBLE N/A 9/23/2014    Procedure: SIGMOIDOSCOPY FLEXIBLE;  Surgeon: Margaret Gamble MD;  [facility]     SIGMOIDOSCOPY FLEXIBLE N/A 9/26/2014    Procedure: SIGMOIDOSCOPY FLEXIBLE;  Surgeon: Margaret Gamble MD;  [facility]     SIGMOIDOSCOPY FLEXIBLE N/A 9/30/2014    Procedure: SIGMOIDOSCOPY FLEXIBLE;  Surgeon: Tye  Magraret GONZALES MD;  Location:  GI     SIGMOIDOSCOPY FLEXIBLE N/A 10/3/2014    Procedure: SIGMOIDOSCOPY FLEXIBLE;  Surgeon: Margaret Gamble MD;  [facility]     SIGMOIDOSCOPY FLEXIBLE N/A 10/30/2014    Procedure: SIGMOIDOSCOPY FLEXIBLE;  Surgeon: Margaret Gamble MD;  Location:  GI     SIGMOIDOSCOPY FLEXIBLE, PLACEMENT OF DRAINAGE SPONGE  9/30/14     TAKEDOWN ILEOSTOMY N/A 1/6/2015    Procedure: TAKEDOWN ILEOSTOMY;  Surgeon: Margaret Gamble MD;  [facility]        MEDICATIONS:   Current Outpatient Prescriptions:      ACCU-CHEK REGIS PLUS test strip, TEST BLOOD SUGARS 2-4 TIMES DAILY OR AS DIRECTED, Disp: 100 each, Rfl: 11     aspirin 325 MG tablet, Take 325 mg by mouth daily, Disp: , Rfl:      atorvastatin (LIPITOR) 20 MG tablet, Take 1 tablet (20 mg) by mouth daily, Disp: 90 tablet, Rfl: 3     BASAGLAR 100 UNIT/ML injection, 16 units at bedtime, Disp: 15 mL, Rfl: 1     blood glucose monitoring (NO BRAND SPECIFIED) meter device kit, Use to test blood sugar 3 times daily or as directed., Disp: 1 kit, Rfl: 0     blood glucose monitoring (NO BRAND SPECIFIED) test strip, Use to test blood sugars 3 times daily or as directed, Disp: 100 strip, Rfl: 11     Blood Glucose Monitoring Suppl (ACCU-CHEK REGIS PLUS) W/DEVICE KIT, Check blood sugars 1-2 times daily, Disp: 1 kit, Rfl: 0     Cholecalciferol (VITAMIN D-3) 1000 units CAPS, Take by mouth daily, Disp: , Rfl:      diphenoxylate-atropine (LOMOTIL) 2.5-0.025 MG per tablet, Take 1 tablet by mouth 4 times daily as needed for diarrhea, Disp: 60 tablet, Rfl: 2     finasteride (PROSCAR) 5 MG tablet, TAKE ONE TABLET BY MOUTH EVERY DAY, Disp: 90 tablet, Rfl: 3     insulin aspart (NOVOLOG FLEXPEN) 100 UNIT/ML injection, Use 8 units plus low sliding scale before each meal 120-149 0 units 150-199 1 unit 200-249 2 units 250-299 3 units 300-349 4 units > 350 5 units  Max dose 50 units day, Disp: 15 mL, Rfl: 3     losartan (COZAAR) 100 MG  "tablet, TAKE ONE TABLET BY MOUTH EVERY DAY, Disp: 90 tablet, Rfl: 1     tamsulosin (FLOMAX) 0.4 MG capsule, TAKE ONE CAPSULE BY MOUTH EVERY DAY, Disp: 90 capsule, Rfl: 1     ULTICARE MINI 31G X 6 MM insulin pen needle, USE 4 TO 5 TIMES DAILY OR AS DIRECTED FOR SLIDING SCALE INSULIN, Disp: 100 each, Rfl: 7     ALLERGIES:    Allergies   Allergen Reactions     Nkda [No Known Drug Allergies]         SOCIAL HISTORY:   Social History     Social History     Marital status:      Spouse name: N/A     Number of children: N/A     Years of education: N/A     Occupational History      Retired     [employer]     Social History Main Topics     Smoking status: Former Smoker     Years: 30.00     Types: Cigars     Quit date: 9/23/2002     Smokeless tobacco: Never Used     Alcohol use No     Drug use: No     Sexual activity: Not Currently     Partners: Female     Other Topics Concern     Parent/Sibling W/ Cabg, Mi Or Angioplasty Before 65f 55m? No     Social History Narrative        FAMILY HISTORY:   Family History   Problem Relation Age of Onset     Diabetes Mother      Hypertension Mother      Cancer Father      Cancer Maternal Grandmother      Alzheimer Disease Maternal Grandmother      Cardiovascular Paternal Grandmother      Breast Cancer Sister      Colon Cancer No family hx of      Colon Polyps No family hx of      Crohn Disease No family hx of      Ulcerative Colitis No family hx of      Anesthesia Reaction No family hx of         EXAM:Vitals: /78 (BP Location: Left arm, Patient Position: Sitting, Cuff Size: Adult Regular)  Pulse 83  Ht 5' 8\" (1.727 m)  Wt 210 lb (95.3 kg)  BMI 31.93 kg/m2  BMI= Body mass index is 31.93 kg/(m^2).    Weight management plan: Patient was referred to their PCP to discuss a diet and exercise plan.    General appearance: Patient is alert and fully cooperative with history & exam.  No sign of distress is noted during the visit.     Psychiatric: Affect is " pleasant & appropriate.  Patient appears motivated to improve health.     Respiratory: Breathing is regular & unlabored while sitting.     HEENT: Hearing is intact to spoken word.  Speech is clear.  No gross evidence of visual impairment that would impact ambulation.     Dermatologic: Skin is intact to both lower extremities without significant lesions, rash or abrasion.  No paronychia or evidence of soft tissue infection is noted.  The nails are hypertrophic and discolored.     Vascular: DP & PT pulses are intact & regular bilaterally.  No significant edema or varicosities noted.  CFT and skin temperature is normal to both lower extremities.  There are no varicosities, no edema and no trophic changes noted.      Neurologic: Lower extremity sensation is intact to light touch.  No evidence of weakness or contracture in the lower extremities.  Noted evidence of neuropathy with diminished sensation bilaterally.       Musculoskeletal: Patient is ambulatory without assistive device or brace.  No gross ankle deformity noted.  No foot or ankle joint effusion is noted.  One notes a hammertoe contracture of the second digit bilaterally.  One notes a bunion deformity on the right.    Assessment:  1.  Diabetes Mellitus and Peripheral Neuropathy.    2.  Hammertoe deformity.    3.  Hallux valgus bilateral    4.  Onychomycosis toenails ×10      Plan:  I have explained to the patient the condition.  I have discussed with the patient the importance of diabetic foot care.  The nails were sharply debrided with a nail nipper.            JUANJO Cabello D.P.M., SHANNON.F.A.S.

## 2018-06-29 NOTE — PATIENT INSTRUCTIONS
"Dr. Nowak's silicone toe sleeve/cap  [facility]    DIABETES AND YOUR FEET  Diabetes can result in several problems in the feet including ulcers (open sores) and amputations. Two of the most important reasons why people develop foot problems when they have diabetes is : 1. Neuropathy (loss of feeling)  2. Vascular disease (loss or decrease of blood flow).    Neuropathy is a term used to describe a loss of nerve function.  Patients with diabetes are at risk of developing neuropathy if their sugars continue to run high and are above the normal value. One theory for neuropathy is that the \"extra\" sugar in the body enters the nerves and is broken down. These by-products build up in the nerve causing it to swell and impairing nerve function. Often times, this can be prevented by controlling your sugars, dieting and exercise.    When a person develops neuropathy, they usually begin to feel numbness or tingling in their feet and sometime in their legs.  Other symptoms may include painful burning or hot feet, tingling or feeling like insects or ants are crawling on your feet or legs.  If the diabetes is sever and the sugars run high for long periods of time, neuropathy can also occur in the hands.    Vascular disease  is a term used to describe a loss or decrease in circulation (blood flow). There is a problem in getting blood and oxygen to areas that need it. Similar to neuropathy, sugars can build up in the walls of the arteries (blood vessels) and cause them to become swollen, thickened and hardened. This decreases the amount of blood that can go to an area that needs it. Though this is common in the legs of diabetic patients, it can also affect other arteries (blood vessels) in the body such as in the heart and eyes.    In the legs, vascular disease usually results in cramping. Patients who develop leg cramps after walking the same distance every time (i.e. One block, half a mile, ect.) need to let their " doctors know so that their circulation may be checked. Cramps causing severe pain in the feet and/or legs while sleeping and the cramps go away when you stand or hang your legs off the side of the bed, may also be a sign of poor blood circulation.  Occasional cramping in cold weather or on rare occasions with activity may not be due to poor circulation, but you should inform your doctor.    PREVENTION OF THESE DISEASES  The key to prevention is good blood sugar control. Poor blood sugar control is a big reason many of these problems start. Physical activity (exercise) is a very good way to help decrease your blood sugars. Exercise can lower your blood sugar, blood pressure, and cholesterol. It also reduces your risk for heart disease and stroke, relieves stress, and strengthens your heart, muscles and bones.  In addition, regular activity helps insulin work better, improves your blood circulation, and keeps your joints flexible. If you're trying to lose weight, a combination of exercise and wise food choices can help you reach your target weight and maintain it.      PAIN MANAGEMENT  1.Blood Sugar Control - Most important  2. Medications such as:  Amytriptylline, duloxetine, gabapentin, lyrica, tramadol  3. Nutritional therapy:  Vitamin B6 (100mg daily), Vitamin B12 (75mcg daily), Vitamin D 2000 IU daily), Alpha-Lipoic Acid (600-1800mg daily), Acetyl-L-Carnitine (500-1000mg TID, L-methyl folate (1500mcg daily)    ** Metformin can block Vitamin B6 and B12 so it is important to supplement**    FOOT CARE RECOMMENDATIONS   1. Wash your feet with lukewarm water and a mild soap and then dry them thoroughly, especially between the toes.     2. Examine your feet daily looking for cuts, corns, blisters, cracks, ect, especially after wearing new shoes. Make sure to look between your toes. If you cannot see the bottom of your feet, set a mirror on the floor and hold your foot over it, or ask a spouse, friend or family member  to examine your feet for you. Contact your doctor immediately if new problems are noted or if sores are not healing.     3. Immediately apply moisturizer to the tops and bottoms of your feet, avoiding areas between the toes. Hand lotion (Intesive Care, Mee, Eucerin, Neutrogena, Curel, ect) is sufficient unless your doctor prescribes a medicated lotion. Apply sunscreen to your feet when going swimming outside.     4. Use clean comfortable shoes, wear white socks (if you have any bleeding or drainage, you will see it on white socks). Socks should not have thick seams or cut off the circulation around the leg. Break in new shoes slowly and rotate with older shoes until broken in. Check the inside of your shoes with your hand to look for areas of irritation or objects that may have fallen into your shoes.       5. Keep slippers by the side of your bed for use during the night.     6.  Shoes should be fitted by a professional and should not cause areas of irritation.  Check your feet regularly when wearing a new pair of shoes and replace them as needed.     7.  Talk to your doctor about proper exercise. Exercise and stretching stimulate blood flow to your feet and maintain proper glucose levels.     8.  Monitor your blood glucose level as instructed by your doctor. Notify your doctor immediately if your blood sugar is abnormally high or low.    9. Cut your nails straight across, but then gently round any sharp edges with a cardboard nail file. If you have neuropathy, peripheral vascular disease or cannot see that well to trim your own toenails contact [facility] ([phone]) or [facility] ([phone]).      THINGS TO AVOID DOING   1.  Do not soak your feet if you have an open sore. Use only lukewarm water and always check the temperature with your hand as hot water can easily burn your feet.       2.  Never use a hot water bottle or heating pad on your feet. Also do not apply cold compresses to your feet. With  decreased sensation, you could burn or freeze your feet.       3.  Do not apply any of these to your feet:    -  Over the counter medicine for corns or warts    -  Harsh chemicals like boric acid    -  Do not self-treat corns, cuts, blisters or infections. Always consult your doctor.       4.  Do not wear sandals, slippers or walk barefoot, especially on hot sand or concrete or other harsh surfaces.     5.  If you smoke, stop!!!

## 2018-06-29 NOTE — MR AVS SNAPSHOT
"              After Visit Summary   6/29/2018    Storm Dutta    MRN: [id number]           Patient Information     Date Of Birth          1940        Visit Information        Provider Department      6/29/2018 9:20 AM Adriel Cabello DPM [facility]        Today's Diagnoses     Hallux valgus, acquired, bilateral    -  1    Acquired bilateral hammer toes        Onychomycosis of multiple toenails with type 2 diabetes mellitus (H)          Care Instructions    Dr. Nowak's silicone toe sleeve/cap  [facility]    DIABETES AND YOUR FEET  Diabetes can result in several problems in the feet including ulcers (open sores) and amputations. Two of the most important reasons why people develop foot problems when they have diabetes is : 1. Neuropathy (loss of feeling)  2. Vascular disease (loss or decrease of blood flow).    Neuropathy is a term used to describe a loss of nerve function.  Patients with diabetes are at risk of developing neuropathy if their sugars continue to run high and are above the normal value. One theory for neuropathy is that the \"extra\" sugar in the body enters the nerves and is broken down. These by-products build up in the nerve causing it to swell and impairing nerve function. Often times, this can be prevented by controlling your sugars, dieting and exercise.    When a person develops neuropathy, they usually begin to feel numbness or tingling in their feet and sometime in their legs.  Other symptoms may include painful burning or hot feet, tingling or feeling like insects or ants are crawling on your feet or legs.  If the diabetes is sever and the sugars run high for long periods of time, neuropathy can also occur in the hands.    Vascular disease  is a term used to describe a loss or decrease in circulation (blood flow). There is a problem in getting blood and oxygen to areas that need it. Similar to neuropathy, sugars can build up in the walls of the " arteries (blood vessels) and cause them to become swollen, thickened and hardened. This decreases the amount of blood that can go to an area that needs it. Though this is common in the legs of diabetic patients, it can also affect other arteries (blood vessels) in the body such as in the heart and eyes.    In the legs, vascular disease usually results in cramping. Patients who develop leg cramps after walking the same distance every time (i.e. One block, half a mile, ect.) need to let their doctors know so that their circulation may be checked. Cramps causing severe pain in the feet and/or legs while sleeping and the cramps go away when you stand or hang your legs off the side of the bed, may also be a sign of poor blood circulation.  Occasional cramping in cold weather or on rare occasions with activity may not be due to poor circulation, but you should inform your doctor.    PREVENTION OF THESE DISEASES  The key to prevention is good blood sugar control. Poor blood sugar control is a big reason many of these problems start. Physical activity (exercise) is a very good way to help decrease your blood sugars. Exercise can lower your blood sugar, blood pressure, and cholesterol. It also reduces your risk for heart disease and stroke, relieves stress, and strengthens your heart, muscles and bones.  In addition, regular activity helps insulin work better, improves your blood circulation, and keeps your joints flexible. If you're trying to lose weight, a combination of exercise and wise food choices can help you reach your target weight and maintain it.      PAIN MANAGEMENT  1.Blood Sugar Control - Most important  2. Medications such as:  Amytriptylline, duloxetine, gabapentin, lyrica, tramadol  3. Nutritional therapy:  Vitamin B6 (100mg daily), Vitamin B12 (75mcg daily), Vitamin D 2000 IU daily), Alpha-Lipoic Acid (600-1800mg daily), Acetyl-L-Carnitine (500-1000mg TID, L-methyl folate (1500mcg daily)    ** Metformin can  block Vitamin B6 and B12 so it is important to supplement**    FOOT CARE RECOMMENDATIONS   1. Wash your feet with lukewarm water and a mild soap and then dry them thoroughly, especially between the toes.     2. Examine your feet daily looking for cuts, corns, blisters, cracks, ect, especially after wearing new shoes. Make sure to look between your toes. If you cannot see the bottom of your feet, set a mirror on the floor and hold your foot over it, or ask a spouse, friend or family member to examine your feet for you. Contact your doctor immediately if new problems are noted or if sores are not healing.     3. Immediately apply moisturizer to the tops and bottoms of your feet, avoiding areas between the toes. Hand lotion (Intesive Care, Mee, Eucerin, Neutrogena, Curel, ect) is sufficient unless your doctor prescribes a medicated lotion. Apply sunscreen to your feet when going swimming outside.     4. Use clean comfortable shoes, wear white socks (if you have any bleeding or drainage, you will see it on white socks). Socks should not have thick seams or cut off the circulation around the leg. Break in new shoes slowly and rotate with older shoes until broken in. Check the inside of your shoes with your hand to look for areas of irritation or objects that may have fallen into your shoes.       5. Keep slippers by the side of your bed for use during the night.     6.  Shoes should be fitted by a professional and should not cause areas of irritation.  Check your feet regularly when wearing a new pair of shoes and replace them as needed.     7.  Talk to your doctor about proper exercise. Exercise and stretching stimulate blood flow to your feet and maintain proper glucose levels.     8.  Monitor your blood glucose level as instructed by your doctor. Notify your doctor immediately if your blood sugar is abnormally high or low.    9. Cut your nails straight across, but then gently round any sharp edges with a cardboard  nail file. If you have neuropathy, peripheral vascular disease or cannot see that well to trim your own toenails contact [employer] ([phone]) or [employer] ([phone]).      THINGS TO AVOID DOING   1.  Do not soak your feet if you have an open sore. Use only lukewarm water and always check the temperature with your hand as hot water can easily burn your feet.       2.  Never use a hot water bottle or heating pad on your feet. Also do not apply cold compresses to your feet. With decreased sensation, you could burn or freeze your feet.       3.  Do not apply any of these to your feet:    -  Over the counter medicine for corns or warts    -  Harsh chemicals like boric acid    -  Do not self-treat corns, cuts, blisters or infections. Always consult your doctor.       4.  Do not wear sandals, slippers or walk barefoot, especially on hot sand or concrete or other harsh surfaces.     5.  If you smoke, stop!!!                  Follow-ups after your visit        Follow-up notes from your care team     Return in about 1 year (around 6/29/2019).      Your next 10 appointments already scheduled     Oct 16, 2018  9:00 AM CDT   LAB with  LAB   [facility]    [address] [address] [phone]           Please do not eat 10-12 hours before your appointment if you are coming in fasting for labs on lipids, cholesterol, or glucose (sugar). This does not apply to pregnant women. Water, hot tea and black coffee (with nothing added) are okay. Do not drink other fluids, diet soda or chew gum.            Oct 23, 2018 11:15 AM CDT   Return Visit with Ruth Martins MD   [facility] ([facility])    [facility]  [address] [address] [phone]              Who to contact     If you have questions or need follow up information about today's clinic visit or your schedule please contact  "[facility] [place] directly at [phone].  Normal or non-critical lab and imaging results will be communicated to you by MyChart, letter or phone within 4 business days after the clinic has received the results. If you do not hear from us within 7 days, please contact the clinic through MyChart or phone. If you have a critical or abnormal lab result, we will notify you by phone as soon as possible.  Submit refill requests through [employer] or call your pharmacy and they will forward the refill request to us. Please allow 3 business days for your refill to be completed.          Additional Information About Your Visit        [employer] Information     [employer] gives you secure access to your electronic health record. If you see a primary care provider, you can also send messages to your care team and make appointments. If you have questions, please call your primary care clinic.  If you do not have a primary care provider, please call [phone] and they will assist you.        Care EveryWhere ID     This is your Care EveryWhere ID. This could be used by other organizations to access your [place] medical records  [id number]        Your Vitals Were     Pulse Height BMI (Body Mass Index)             83 5' 8\" (1.727 m) 31.93 kg/m2          Blood Pressure from Last 3 Encounters:   06/29/18 161/78   06/12/18 124/68   04/24/18 144/67    Weight from Last 3 Encounters:   06/29/18 210 lb (95.3 kg)   06/12/18 210 lb 6.4 oz (95.4 kg)   04/24/18 213 lb 9.6 oz (96.9 kg)              We Performed the Following     DEBRIDEMENT OF NAILS, 6 OR MORE        Primary Care Provider Office Phone # Fax #    Jovana Nichol Beaulieu [phone] [address] [place] [address]        Equal Access to Services     JOSÉ MIGUEL NUNES : Hadii trisha Patel, waaxda luqadaha, qaybta kaalmada radha, lionel mesa. So [facility] [phone].    ATENCIÓN: Si girma garsia kennedy " disposición servicios gratuitos de asistencia lingüística. Jake snell [phone].    We comply with applicable federal civil rights laws and [place] laws. We do not discriminate on the basis of race, color, national origin, age, disability, sex, sexual orientation, or gender identity.            Thank you!     Thank you for choosing [facility]  for your care. Our goal is always to provide you with excellent care. Hearing back from our patients is one way we can continue to improve our services. Please take a few minutes to complete the written survey that you may receive in the mail after your visit with us. Thank you!             Your Updated Medication List - Protect others around you: Learn how to safely use, store and throw away your medicines at www.disposemymeds.org.          This list is accurate as of 6/29/18  9:38 AM.  Always use your most recent med list.                   Brand Name Dispense Instructions for use Diagnosis    * ACCU-CHEK REGIS PLUS test strip   Generic drug:  blood glucose monitoring     100 each    TEST BLOOD SUGARS 2-4 TIMES DAILY OR AS DIRECTED    Type 2 diabetes mellitus with stage 3 chronic kidney disease, with long-term current use of insulin (H)       * blood glucose monitoring test strip    no brand specified    100 strip    Use to test blood sugars 3 times daily or as directed    Type 2 diabetes mellitus with hyperglycemia, with long-term current use of insulin (H)       aspirin 325 MG tablet      Take 325 mg by mouth daily        atorvastatin 20 MG tablet    LIPITOR    90 tablet    Take 1 tablet (20 mg) by mouth daily    Hyperlipidemia LDL goal <70       BASAGLAR 100 UNIT/ML injection     15 mL    16 units at bedtime    Type 2 diabetes mellitus with stage 3 chronic kidney disease, with long-term current use of insulin (H)       * blood glucose monitoring meter device kit     1 kit    Check blood sugars 1-2 times daily    Type 2 diabetes, HbA1c goal < 7% (H)        * blood glucose monitoring meter device kit    no brand specified    1 kit    Use to test blood sugar 3 times daily or as directed.    Type 2 diabetes mellitus with hyperglycemia, with long-term current use of insulin (H)       diphenoxylate-atropine 2.5-0.025 MG per tablet    LOMOTIL    60 tablet    Take 1 tablet by mouth 4 times daily as needed for diarrhea    Diarrhea       finasteride 5 MG tablet    PROSCAR    90 tablet    TAKE ONE TABLET BY MOUTH EVERY DAY    Hypertrophy of prostate with urinary obstruction       insulin aspart 100 UNIT/ML injection    NovoLOG FLEXPEN    15 mL    Use 8 units plus low sliding scale before each meal 120-149 0 units 150-199 1 unit 200-249 2 units 250-299 3 units 300-349 4 units > 350 5 units  Max dose 50 units day    Type 2 diabetes mellitus with hyperglycemia, with long-term current use of insulin (H)       losartan 100 MG tablet    COZAAR    90 tablet    TAKE ONE TABLET BY MOUTH EVERY DAY    Hypertension goal BP (blood pressure) < 140/90       tamsulosin 0.4 MG capsule    FLOMAX    90 capsule    TAKE ONE CAPSULE BY MOUTH EVERY DAY    Hyperlipidemia LDL goal <70, Hypertrophy of prostate with urinary obstruction       ULTICARE MINI 31G X 6 MM   Generic drug:  insulin pen needle     100 each    USE 4 TO 5 TIMES DAILY OR AS DIRECTED FOR SLIDING SCALE INSULIN    Type 2 diabetes, HbA1c goal < 7% (H)       Vitamin D-3 1000 units Caps      Take by mouth daily        * Notice:  This list has 4 medication(s) that are the same as other medications prescribed for you. Read the directions carefully, and ask your doctor or other care provider to review them with you.

## 2018-06-29 NOTE — LETTER
6/29/2018         RE: Storm Dutta  [address] [place] [address]        Dear Colleague,    Thank you for referring your patient, Storm Dutta, to the [facility]. Please see a copy of my visit note below.    PATIENT HISTORY:  Storm Dutta is a 77 year old male who presents to clinic for a diabetic foot evaluation.  The patient relates pain with ambulation in shoe gear.  The patient relates that the nails are difficult to trim at home.  The patient relates blood sugars are within normal limits.  The patient denies any redness, swelling or open sores on both feet.       REVIEW OF SYSTEMS:  Constitutional, HEENT, cardiovascular, pulmonary, GI, , musculoskeletal, neuro, skin, endocrine and psych systems are negative, except as otherwise noted.     PAST MEDICAL HISTORY:   Past Medical History:   Diagnosis Date     Acute urinary retention 11/2012    ER visit   / 1200 cc post-void residual     Acute urinary retention 11/1/2012    ER      Diabetes mellitus type II      Epididymitis 3/20/2014    Started on doxycyclline for UTI/orchitis. He was discharged on 03/22/14.     Former smoker     dc'd 2006     Hypertension goal BP (blood pressure) < 140/90 8/24/2012     PE (pulmonary embolism) 3/20/2014     Rectal cancer (H)      Squamous cell carcinoma      Thrombosis of leg     +PE        PAST SURGICAL HISTORY:   Past Surgical History:   Procedure Laterality Date     COLONOSCOPY  7/29/2013    Procedure: COMBINED COLONOSCOPY, SINGLE BIOPSY/POLYPECTOMY BY BIOPSY;;  Surgeon: Bari Burnett MD;  Location: [facility]     COLONOSCOPY N/A 6/4/2015    Procedure: COMBINED COLONOSCOPY, SINGLE OR MULTIPLE BIOPSY/POLYPECTOMY BY BIOPSY;  Surgeon: Tara Mtz MD;  Location:  GI     COLONOSCOPY N/A 12/5/2016    Procedure: COLONOSCOPY;  Surgeon: Breanna Kline MD;  Location:  GI     EYE SURGERY  5/2012    Right eye procedure     HC CIRCUMCISION SURGICAL NON-DEV >28  DAYS AGE  2/97    due to phimosis     HC REMOVAL GALLBLADDER  5/01     [facility] UGI ENDOSCOPY W PLACEMENT GASTROSTOMY TUBE PERCUT  3/13/2014    Procedure: COMBINED ESOPHAGOSCOPY, GASTROSCOPY, DUODENOSCOPY (EGD), PLACE PERCUTANEOUS ENDOSCOPIC GASTROSTOMY TUBE;  Surgeon: Loco Casillas MD;  Location: [facility]     LAPAROSCOPIC ASSISTED COLECTOMY LEFT (DESCENDING)  1/7/2014    Procedure: LAPAROSCOPIC ASSISTED COLECTOMY LEFT (DESCENDING);  Laparoscopic hand assisted Low Anterior Resection With colonic J pouch and end to end colorectal anastamosis. Laparoscopic splenic flexure mobilization.  Loop Ileostomy.  Rigid proctoscopy;  Surgeon: Margaret Gamble MD;  [facility]     PHACOEMULSIFICATION WITH STANDARD INTRAOCULAR LENS IMPLANT  4/11/2013    Procedure: PHACOEMULSIFICATION WITH STANDARD INTRAOCULAR LENS IMPLANT;  Right Kelman Phacoemulsification with Intraocular Lens Implant;  Surgeon: Caesar Turner MD;  Location: [facility]     REMOVE GASTROSTOMY TUBE ADULT  7/25/2014    Procedure: REMOVE GASTROSTOMY TUBE ADULT;  Surgeon: Margaret Gamble MD;  [facility]     SIGMOIDOSCOPY FLEXIBLE  6/23/2014    Procedure: SIGMOIDOSCOPY FLEXIBLE;  Surgeon: Margaret Gamble MD;  [facility]     SIGMOIDOSCOPY FLEXIBLE  7/22/2014    Procedure: SIGMOIDOSCOPY FLEXIBLE;  Surgeon: Margaret Gamble MD;  [facility]     SIGMOIDOSCOPY FLEXIBLE  7/25/2014    Procedure: SIGMOIDOSCOPY FLEXIBLE;  Surgeon: Margaret Gamble MD;  [facility]     SIGMOIDOSCOPY FLEXIBLE  7/28/2014    Procedure: SIGMOIDOSCOPY FLEXIBLE;  Surgeon: Margaret Gamble MD;  [facility]     SIGMOIDOSCOPY FLEXIBLE  7/31/2014    Procedure: SIGMOIDOSCOPY FLEXIBLE;  Surgeon: Margaret Gamble MD;  [facility]     SIGMOIDOSCOPY FLEXIBLE  8/3/2014    Procedure: SIGMOIDOSCOPY FLEXIBLE;  Surgeon: Margaret Gamble MD;  [facility]     SIGMOIDOSCOPY FLEXIBLE  8/5/2014    Procedure:  SIGMOIDOSCOPY FLEXIBLE;  Surgeon: Margaret Gamble MD;  [facility]     SIGMOIDOSCOPY FLEXIBLE  8/8/2014    Procedure: SIGMOIDOSCOPY FLEXIBLE;  Surgeon: Margaret Gamble MD;  [facility]     SIGMOIDOSCOPY FLEXIBLE  8/18/2014    Procedure: SIGMOIDOSCOPY FLEXIBLE;  Surgeon: Jose Roberto Cervantes MD;  [facility]     SIGMOIDOSCOPY FLEXIBLE N/A 8/15/2014    Procedure: SIGMOIDOSCOPY FLEXIBLE;  Surgeon: Margaret Gamble MD;  [facility]     SIGMOIDOSCOPY FLEXIBLE N/A 8/22/2014    Procedure: SIGMOIDOSCOPY FLEXIBLE;  Surgeon: Jose Roberto Cervantes MD;  [facility]     SIGMOIDOSCOPY FLEXIBLE N/A 8/11/2014    Procedure: SIGMOIDOSCOPY FLEXIBLE;  Surgeon: Margaret Gamble MD;  [facility]     SIGMOIDOSCOPY FLEXIBLE N/A 8/26/2014    Procedure: SIGMOIDOSCOPY FLEXIBLE;  Surgeon: Margaret Gmable MD;  [facility]     SIGMOIDOSCOPY FLEXIBLE N/A 8/29/2014    Procedure: SIGMOIDOSCOPY FLEXIBLE;  Surgeon: Margaret Gamble MD;  [facility]     SIGMOIDOSCOPY FLEXIBLE N/A 9/8/2014    Procedure: SIGMOIDOSCOPY FLEXIBLE;  Surgeon: Margaret Gamble MD;  [facility]     SIGMOIDOSCOPY FLEXIBLE N/A 9/2/2014    Procedure: SIGMOIDOSCOPY FLEXIBLE;  Surgeon: Breanna Kline MD;  [facility]     SIGMOIDOSCOPY FLEXIBLE N/A 9/11/2014    Procedure: SIGMOIDOSCOPY FLEXIBLE;  Surgeon: Margaret Gamble MD;  [facility]     SIGMOIDOSCOPY FLEXIBLE N/A 9/19/2014    Procedure: SIGMOIDOSCOPY FLEXIBLE;  Surgeon: Margaret Gamble MD;  [facility]     SIGMOIDOSCOPY FLEXIBLE N/A 9/15/2014    Procedure: SIGMOIDOSCOPY FLEXIBLE;  Surgeon: Margaret Gamble MD;  [facility]     SIGMOIDOSCOPY FLEXIBLE N/A 9/5/2014    Procedure: SIGMOIDOSCOPY FLEXIBLE;  Surgeon: Margaret Gamble MD;  [facility]     SIGMOIDOSCOPY FLEXIBLE N/A 9/23/2014    Procedure: SIGMOIDOSCOPY FLEXIBLE;  Surgeon: Margaret Gamble MD;   [facility]     SIGMOIDOSCOPY FLEXIBLE N/A 9/26/2014    Procedure: SIGMOIDOSCOPY FLEXIBLE;  Surgeon: Margaret Gamble MD;  [facility]     SIGMOIDOSCOPY FLEXIBLE N/A 9/30/2014    Procedure: SIGMOIDOSCOPY FLEXIBLE;  Surgeon: Margaret Gamble MD;  [facility]     SIGMOIDOSCOPY FLEXIBLE N/A 10/3/2014    Procedure: SIGMOIDOSCOPY FLEXIBLE;  Surgeon: Margaret Gamble MD;  [facility]     SIGMOIDOSCOPY FLEXIBLE N/A 10/30/2014    Procedure: SIGMOIDOSCOPY FLEXIBLE;  Surgeon: Margaret Gamble MD;  [facility]     SIGMOIDOSCOPY FLEXIBLE, PLACEMENT OF DRAINAGE SPONGE  9/30/14     TAKEDOWN ILEOSTOMY N/A 1/6/2015    Procedure: TAKEDOWN ILEOSTOMY;  Surgeon: Margaret Gamble MD;  [facility]        MEDICATIONS:   Current Outpatient Prescriptions:      ACCU-CHEK REGIS PLUS test strip, TEST BLOOD SUGARS 2-4 TIMES DAILY OR AS DIRECTED, Disp: 100 each, Rfl: 11     aspirin 325 MG tablet, Take 325 mg by mouth daily, Disp: , Rfl:      atorvastatin (LIPITOR) 20 MG tablet, Take 1 tablet (20 mg) by mouth daily, Disp: 90 tablet, Rfl: 3     BASAGLAR 100 UNIT/ML injection, 16 units at bedtime, Disp: 15 mL, Rfl: 1     blood glucose monitoring (NO BRAND SPECIFIED) meter device kit, Use to test blood sugar 3 times daily or as directed., Disp: 1 kit, Rfl: 0     blood glucose monitoring (NO BRAND SPECIFIED) test strip, Use to test blood sugars 3 times daily or as directed, Disp: 100 strip, Rfl: 11     Blood Glucose Monitoring Suppl (ACCU-CHEK REGIS PLUS) W/DEVICE KIT, Check blood sugars 1-2 times daily, Disp: 1 kit, Rfl: 0     Cholecalciferol (VITAMIN D-3) 1000 units CAPS, Take by mouth daily, Disp: , Rfl:      diphenoxylate-atropine (LOMOTIL) 2.5-0.025 MG per tablet, Take 1 tablet by mouth 4 times daily as needed for diarrhea, Disp: 60 tablet, Rfl: 2     finasteride (PROSCAR) 5 MG tablet, TAKE ONE TABLET BY MOUTH EVERY DAY, Disp: 90 tablet, Rfl: 3     insulin aspart (NOVOLOG  "FLEXPEN) 100 UNIT/ML injection, Use 8 units plus low sliding scale before each meal 120-149 0 units 150-199 1 unit 200-249 2 units 250-299 3 units 300-349 4 units > 350 5 units  Max dose 50 units day, Disp: 15 mL, Rfl: 3     losartan (COZAAR) 100 MG tablet, TAKE ONE TABLET BY MOUTH EVERY DAY, Disp: 90 tablet, Rfl: 1     tamsulosin (FLOMAX) 0.4 MG capsule, TAKE ONE CAPSULE BY MOUTH EVERY DAY, Disp: 90 capsule, Rfl: 1     ULTICARE MINI 31G X 6 MM insulin pen needle, USE 4 TO 5 TIMES DAILY OR AS DIRECTED FOR SLIDING SCALE INSULIN, Disp: 100 each, Rfl: 7     ALLERGIES:    Allergies   Allergen Reactions     Nkda [No Known Drug Allergies]         SOCIAL HISTORY:   Social History     Social History     Marital status:      Spouse name: N/A     Number of children: N/A     Years of education: N/A     Occupational History      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 No     Social History Narrative        FAMILY HISTORY:   Family History   Problem Relation Age of Onset     Diabetes Mother      Hypertension Mother      Cancer Father      Cancer Maternal Grandmother      Alzheimer Disease Maternal Grandmother      Cardiovascular Paternal Grandmother      Breast Cancer Sister      Colon Cancer No family hx of      Colon Polyps No family hx of      Crohn Disease No family hx of      Ulcerative Colitis No family hx of      Anesthesia Reaction No family hx of         EXAM:Vitals: /78 (BP Location: Left arm, Patient Position: Sitting, Cuff Size: Adult Regular)  Pulse 83  Ht 5' 8\" (1.727 m)  Wt 210 lb (95.3 kg)  BMI 31.93 kg/m2  BMI= Body mass index is 31.93 kg/(m^2).    Weight " management plan: Patient was referred to their PCP to discuss a diet and exercise plan.    General appearance: Patient is alert and fully cooperative with history & exam.  No sign of distress is noted during the visit.     Psychiatric: Affect is pleasant & appropriate.  Patient appears motivated to improve health.     Respiratory: Breathing is regular & unlabored while sitting.     HEENT: Hearing is intact to spoken word.  Speech is clear.  No gross evidence of visual impairment that would impact ambulation.     Dermatologic: Skin is intact to both lower extremities without significant lesions, rash or abrasion.  No paronychia or evidence of soft tissue infection is noted.  The nails are hypertrophic and discolored.     Vascular: DP & PT pulses are intact & regular bilaterally.  No significant edema or varicosities noted.  CFT and skin temperature is normal to both lower extremities.  There are no varicosities, no edema and no trophic changes noted.      Neurologic: Lower extremity sensation is intact to light touch.  No evidence of weakness or contracture in the lower extremities.  Noted evidence of neuropathy with diminished sensation bilaterally.       Musculoskeletal: Patient is ambulatory without assistive device or brace.  No gross ankle deformity noted.  No foot or ankle joint effusion is noted.  One notes a hammertoe contracture of the second digit bilaterally.  One notes a bunion deformity on the right.    Assessment:  1.  Diabetes Mellitus and Peripheral Neuropathy.    2.  Hammertoe deformity.    3.  Hallux valgus bilateral    4.  Onychomycosis toenails ×10      Plan:  I have explained to the patient the condition.  I have discussed with the patient the importance of diabetic foot care.  The nails were sharply debrided with a nail nipper.            JO Adams.P.GOLDY., F.A.C.F.A.S.          Again, thank you for allowing me to participate in the care of your patient.        Sincerely,        Adriel  Dwaine Cabello DPM

## 2018-08-23 ENCOUNTER — OFFICE VISIT (OUTPATIENT)
Dept: DERMATOLOGY | Facility: CLINIC | Age: 78
End: 2018-08-23
Payer: COMMERCIAL

## 2018-08-23 VITALS — HEART RATE: 71 BPM | DIASTOLIC BLOOD PRESSURE: 68 MMHG | SYSTOLIC BLOOD PRESSURE: 154 MMHG | OXYGEN SATURATION: 98 %

## 2018-08-23 DIAGNOSIS — N13.8 HYPERTROPHY OF PROSTATE WITH URINARY OBSTRUCTION: ICD-10-CM

## 2018-08-23 DIAGNOSIS — Z85.828 HISTORY OF SQUAMOUS CELL CARCINOMA OF SKIN: ICD-10-CM

## 2018-08-23 DIAGNOSIS — D18.00 ANGIOMA: ICD-10-CM

## 2018-08-23 DIAGNOSIS — L82.1 SEBORRHEIC KERATOSIS: ICD-10-CM

## 2018-08-23 DIAGNOSIS — D22.9 NEVUS: ICD-10-CM

## 2018-08-23 DIAGNOSIS — L81.4 LENTIGO: ICD-10-CM

## 2018-08-23 DIAGNOSIS — N40.1 HYPERTROPHY OF PROSTATE WITH URINARY OBSTRUCTION: ICD-10-CM

## 2018-08-23 DIAGNOSIS — D48.5 NEOPLASM OF UNCERTAIN BEHAVIOR OF SKIN: Primary | ICD-10-CM

## 2018-08-23 PROCEDURE — 99214 OFFICE O/P EST MOD 30 MIN: CPT | Mod: 25 | Performed by: PHYSICIAN ASSISTANT

## 2018-08-23 PROCEDURE — 88305 TISSUE EXAM BY PATHOLOGIST: CPT | Mod: TC | Performed by: PHYSICIAN ASSISTANT

## 2018-08-23 PROCEDURE — 11101 HC BIOPSY SKIN/SUBQ/MUC MEM, EACH ADDTL LESION: CPT | Performed by: PHYSICIAN ASSISTANT

## 2018-08-23 PROCEDURE — 11100 HC BIOPSY SKIN/SUBQ/MUC MEM, SINGLE LESION: CPT | Performed by: PHYSICIAN ASSISTANT

## 2018-08-23 PROCEDURE — 88342 IMHCHEM/IMCYTCHM 1ST ANTB: CPT | Mod: TC | Performed by: PHYSICIAN ASSISTANT

## 2018-08-23 RX ORDER — FINASTERIDE 5 MG/1
TABLET, FILM COATED ORAL
Qty: 90 TABLET | Refills: 1 | Status: SHIPPED | OUTPATIENT
Start: 2018-08-23 | End: 2019-02-25

## 2018-08-23 NOTE — PROGRESS NOTES
HPI:   Storm Dutta is a 78 year old male who presents for Full skin cancer screening.  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  See patient intake sheet.  ROS of the following were done and are negative: Constitutional, Eyes, Ears, Nose,   Mouth, Throat, Cardiovascular, Respiratory, GI, Genitourinary, Musculoskeletal,   Psychiatric, Endocrine, Allergic/Immunologic.    PHYSICAL EXAM:   /68  Pulse 71  SpO2 98%  Skin exam performed as follows: Type 2 skin. Mood appropriate  Alert and Oriented X 3. Well developed, well nourished in no distress.  General appearance: Normal  Head including face: Normal  Eyes: conjunctiva and lids: Normal  Mouth: Lips, teeth, gums: Normal  Neck: Normal  Chest-breast/axillae: Normal  Back: Normal  Spleen and liver: Normal  Cardiovascular: Exam of peripheral vascular system by observation for swelling, varicosities, edema: Normal  Genitalia: groin, buttocks: Normal  Extremities: digits/nails (clubbing): Normal  Eccrine and Apocrine glands: Normal  Right upper extremity: Normal  Left upper extremity: Normal  Right lower extremity: Normal  Left lower extremity: Normal  Skin: Scalp and body hair: See below    Pt deferred exam of breasts, groin, buttocks: No    Other physical findings:  1. Multiple pigmented macules on extremities and trunk  2. Multiple pigmented macules on face, trunk and extremities  3. Multiple vascular papules on trunk, arms and legs  4. Multiple scattered keratotic plaques  5. 3 mm dark brown/black macule on the left lateral neck  6. 9 mm  pink scaly papule on the left preauricular cheek  7. Numerous gritty papules on the forehead, preauricular cheeks and lateral neck       Except as noted above, no other signs of skin cancer or melanoma.     ASSESSMENT/PLAN:   Benign Full skin cancer screening today. . Patient with history of SCC  Advised on monthly self exams and 1 year  Patient Education: Appropriate brochures given.    Multiple benign appearing nevi on arms, legs and trunk. Discussed ABCDEs of melanoma and sunscreen.   Multiple lentigos on arms, legs and trunk. Advised benign, no treatment needed.  Multiple scattered angiomas. Advised benign, no treatment needed.   Seborrheic keratosis on arms, legs and trunk. Advised benign, no treatment needed.  Atypical nevus on left lateral neck - advised. Anesthestized with lidocaine and area cleaned with betadine. Shave removal/biopsy performed and specimen placed in formalin. Patient will receive call with results.   R/o SCC on the left preauricular cheek. Shave bx in typical fashion .  Area cleaned with betadyne and anesthetized with 1% lidocaine with epi .  Dermablade used to remove the lesion and sent to pathology. Bleeding was cauterized. Pt tolerated procedure well.  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 Discussed Efudex vs PDT - he will likely proceed with PDT. Information given.   Patient to follow up with Primary Care provider regarding elevated blood pressure.              Follow-up: pending path/yearly FSE/PDT/PRN sooner    1.) Patient was asked about new and changing moles. YES  2.) Patient received a complete physical skin examination: YES  3.) Patient was counseled to perform a monthly self skin examination: YES  Scribed By: Ashlee Harris, MS, PA-C

## 2018-08-23 NOTE — PATIENT INSTRUCTIONS
Wound Care Instructions     FOR SUPERFICIAL WOUNDS     [facility] [phone]    [facility] [phone]      Neck & left face                 AFTER 24 HOURS YOU SHOULD REMOVE THE BANDAGE AND BEGIN DAILY DRESSING CHANGES AS FOLLOWS:     1) Remove Dressing.     2) Clean and dry the area with tap water using a Q-tip or sterile gauze pad.     3) Apply Vaseline, Aquaphor, Polysporin ointment or Bacitracin ointment over entire wound.  Do NOT use Neosporin ointment.     4) Cover the wound with a band-aid, or a sterile non-stick gauze pad and micropore paper tape      REPEAT THESE INSTRUCTIONS AT LEAST ONCE A DAY UNTIL THE WOUND HAS COMPLETELY HEALED.    It is an old wives tale that a wound heals better when it is exposed to air and allowed to dry out. The wound will heal faster with a better cosmetic result if it is kept moist with ointment and covered with a bandage.    **Do not let the wound dry out.**      Supplies Needed:      *Cotton tipped applicators (Q-tips)    *Polysporin Ointment or Bacitracin Ointment (NOT NEOSPORIN)    *Band-aids or non-stick gauze pads and micropore paper tape.      PATIENT INFORMATION:    During the healing process you will notice a number of changes. All wounds develop a small halo of redness surrounding the wound.  This means healing is occurring. Severe itching with extensive redness usually indicates sensitivity to the ointment or bandage tape used to dress the wound.  You should call our office if this develops.      Swelling  and/or discoloration around your surgical site is common, particularly when performed around the eye.    All wounds normally drain.  The larger the wound the more drainage there will be.  After 7-10 days, you will notice the wound beginning to shrink and new skin will begin to grow.  The wound is healed when you can see skin has formed over the entire area.  A healed wound has a healthy, shiny look to the surface and is red to  dark pink in color to normalize.  Wounds may take approximately 4-6 weeks to heal.  Larger wounds may take 6-8 weeks.  After the wound is healed you may discontinue dressing changes.    You may experience a sensation of tightness as your wound heals. This is normal and will gradually subside.    Your healed wound may be sensitive to temperature changes. This sensitivity improves with time, but if you re having a lot of discomfort, try to avoid temperature extremes.    Patients frequently experience itching after their wound appears to have healed because of the continue healing under the skin.  Plain Vaseline will help relieve the itching.        POSSIBLE COMPLICATIONS    BLEEDIN. Leave the bandage in place.  2. Use tightly rolled up gauze or a cloth to apply direct pressure over the bandage for 30  minutes.  3. Reapply pressure for an additional 30 minutes if necessary  4. Use additional gauze and tape to maintain pressure once the bleeding has stopped.

## 2018-08-23 NOTE — TELEPHONE ENCOUNTER
"Requested Prescriptions   Pending Prescriptions Disp Refills     finasteride (PROSCAR) 5 MG tablet [Pharmacy Med Name: FINASTERIDE 5MG TABS] 90 tablet 3    Last Written Prescription Date:  08/16/2017 #90 x 3  Last filled 05/23/2018  Last office visit: 6/12/2018 JESENIA Beaulieu   Future Office Visit:   Next 5 appointments (look out 90 days)     Oct 23, 2018 11:15 AM CDT   Return Visit with Ruth Martins MD   [facility] ([facility])    [facility]  [address]  [facility] [address]   [phone]                  Sig: TAKE ONE TABLET BY MOUTH EVERY DAY    Alpha Blockers Failed    8/23/2018  1:49 PM       Failed - Blood pressure under 140/90 in past 12 months    BP Readings from Last 3 Encounters:   08/23/18 154/68   06/29/18 161/78   06/12/18 124/68                Passed - Recent (12 mo) or future (30 days) visit within the authorizing provider's specialty    Patient had office visit in the last 12 months or has a visit in the next 30 days with authorizing provider or within the authorizing provider's specialty.  See \"Patient Info\" tab in inbasket, or \"Choose Columns\" in Meds & Orders section of the refill encounter.           Passed - Patient does not have Tadalafil, Vardenafil, or Sildenafil on their medication list       Passed - Patient is 18 years of age or older          "

## 2018-08-23 NOTE — MR AVS SNAPSHOT
After Visit Summary   8/23/2018    Storm Dutta    MRN: [id number]           Patient Information     Date Of Birth          1940        Visit Information        Provider Department      8/23/2018 9:15 AM Ashlee Harris PA-C [facility]        Today's Diagnoses     Neoplasm of uncertain behavior of skin    -  1      Care Instructions          Wound Care Instructions     FOR SUPERFICIAL WOUNDS     [facility] [phone]    [facility] [phone]      Neck & left face                 AFTER 24 HOURS YOU SHOULD REMOVE THE BANDAGE AND BEGIN DAILY DRESSING CHANGES AS FOLLOWS:     1) Remove Dressing.     2) Clean and dry the area with tap water using a Q-tip or sterile gauze pad.     3) Apply Vaseline, Aquaphor, Polysporin ointment or Bacitracin ointment over entire wound.  Do NOT use Neosporin ointment.     4) Cover the wound with a band-aid, or a sterile non-stick gauze pad and micropore paper tape      REPEAT THESE INSTRUCTIONS AT LEAST ONCE A DAY UNTIL THE WOUND HAS COMPLETELY HEALED.    It is an old wives tale that a wound heals better when it is exposed to air and allowed to dry out. The wound will heal faster with a better cosmetic result if it is kept moist with ointment and covered with a bandage.    **Do not let the wound dry out.**      Supplies Needed:      *Cotton tipped applicators (Q-tips)    *Polysporin Ointment or Bacitracin Ointment (NOT NEOSPORIN)    *Band-aids or non-stick gauze pads and micropore paper tape.      PATIENT INFORMATION:    During the healing process you will notice a number of changes. All wounds develop a small halo of redness surrounding the wound.  This means healing is occurring. Severe itching with extensive redness usually indicates sensitivity to the ointment or bandage tape used to dress the wound.  You should call our office if this develops.      Swelling  and/or discoloration around your surgical site is  common, particularly when performed around the eye.    All wounds normally drain.  The larger the wound the more drainage there will be.  After 7-10 days, you will notice the wound beginning to shrink and new skin will begin to grow.  The wound is healed when you can see skin has formed over the entire area.  A healed wound has a healthy, shiny look to the surface and is red to dark pink in color to normalize.  Wounds may take approximately 4-6 weeks to heal.  Larger wounds may take 6-8 weeks.  After the wound is healed you may discontinue dressing changes.    You may experience a sensation of tightness as your wound heals. This is normal and will gradually subside.    Your healed wound may be sensitive to temperature changes. This sensitivity improves with time, but if you re having a lot of discomfort, try to avoid temperature extremes.    Patients frequently experience itching after their wound appears to have healed because of the continue healing under the skin.  Plain Vaseline will help relieve the itching.        POSSIBLE COMPLICATIONS    BLEEDIN. Leave the bandage in place.  2. Use tightly rolled up gauze or a cloth to apply direct pressure over the bandage for 30  minutes.  3. Reapply pressure for an additional 30 minutes if necessary  4. Use additional gauze and tape to maintain pressure once the bleeding has stopped.            Follow-ups after your visit        Your next 10 appointments already scheduled     Oct 16, 2018  9:00 AM CDT   LAB with  LAB   [facility] ([facility])    [address] [address] [phone]           Please do not eat 10-12 hours before your appointment if you are coming in fasting for labs on lipids, cholesterol, or glucose (sugar). This does not apply to pregnant women. Water, hot tea and black coffee (with nothing added) are okay. Do not drink other fluids, diet soda or chew gum.            Oct 23, 2018 11:15 AM  CDT   Return Visit with Ruth Martins MD   [facility] ([facility])    [facility]  [address]  [facility] [address] [phone]              Who to contact     If you have questions or need follow up information about today's clinic visit or your schedule please contact [facility] directly at [phone].  Normal or non-critical lab and imaging results will be communicated to you by [employer], letter or phone within 4 business days after the clinic has received the results. If you do not hear from us within 7 days, please contact the clinic through [employer] or phone. If you have a critical or abnormal lab result, we will notify you by phone as soon as possible.  Submit refill requests through [employer] or call your pharmacy and they will forward the refill request to us. Please allow 3 business days for your refill to be completed.          Additional Information About Your Visit        [employer] Information     [employer] gives you secure access to your electronic health record. If you see a primary care provider, you can also send messages to your care team and make appointments. If you have questions, please call your primary care clinic.  If you do not have a primary care provider, please call [phone] and they will assist you.        Care EveryWhere ID     This is your Care EveryWhere ID. This could be used by other organizations to access your [place] medical records  [id number]        Your Vitals Were     Pulse Pulse Oximetry                71 98%           Blood Pressure from Last 3 Encounters:   08/23/18 154/68   06/29/18 161/78   06/12/18 124/68    Weight from Last 3 Encounters:   06/29/18 95.3 kg (210 lb)   06/12/18 95.4 kg (210 lb 6.4 oz)   04/24/18 96.9 kg (213 lb 9.6 oz)              We Performed the Following     BIOPSY SKIN/SUBQ/MUC MEM, EACH ADDTL LESION     BIOPSY SKIN/SUBQ/MUC MEM, SINGLE LESION     Dermatological path order  and indications        Primary Care Provider Office Phone # Fax #    Jovana Beaulieu,  [phone] [address] [place] [address]        Equal Access to Services     JOSÉ MIGUEL NUNES : Hadii aad ku hadenido Sofloydali, waaxda luqadaha, qaybta kaalmada adeegyada, lionel bonillariley mesa. So [facility] [phone].    ATENCIÓN: Si habla español, tiene a kennedy disposición servicios gratuitos de asistencia lingüística. Llame al [phone].    We comply with applicable federal civil rights laws and [place] laws. We do not discriminate on the basis of race, color, national origin, age, disability, sex, sexual orientation, or gender identity.            Thank you!     Thank you for choosing [facility]  for your care. Our goal is always to provide you with excellent care. Hearing back from our patients is one way we can continue to improve our services. Please take a few minutes to complete the written survey that you may receive in the mail after your visit with us. Thank you!             Your Updated Medication List - Protect others around you: Learn how to safely use, store and throw away your medicines at www.disposemymeds.org.          This list is accurate as of 8/23/18  9:35 AM.  Always use your most recent med list.                   Brand Name Dispense Instructions for use Diagnosis    * ACCU-CHEK REGIS PLUS test strip   Generic drug:  blood glucose monitoring     100 each    TEST BLOOD SUGARS 2-4 TIMES DAILY OR AS DIRECTED    Type 2 diabetes mellitus with stage 3 chronic kidney disease, with long-term current use of insulin (H)       * blood glucose monitoring test strip    no brand specified    100 strip    Use to test blood sugars 3 times daily or as directed    Type 2 diabetes mellitus with hyperglycemia, with long-term current use of insulin (H)       aspirin 325 MG tablet      Take 325 mg by mouth daily        atorvastatin 20 MG tablet    LIPITOR    90 tablet     Take 1 tablet (20 mg) by mouth daily    Hyperlipidemia LDL goal <70       BASAGLAR 100 UNIT/ML injection     15 mL    16 units at bedtime    Type 2 diabetes mellitus with stage 3 chronic kidney disease, with long-term current use of insulin (H)       * blood glucose monitoring meter device kit     1 kit    Check blood sugars 1-2 times daily    Type 2 diabetes, HbA1c goal < 7% (H)       * blood glucose monitoring meter device kit    no brand specified    1 kit    Use to test blood sugar 3 times daily or as directed.    Type 2 diabetes mellitus with hyperglycemia, with long-term current use of insulin (H)       diphenoxylate-atropine 2.5-0.025 MG per tablet    LOMOTIL    60 tablet    Take 1 tablet by mouth 4 times daily as needed for diarrhea    Diarrhea       finasteride 5 MG tablet    PROSCAR    90 tablet    TAKE ONE TABLET BY MOUTH EVERY DAY    Hypertrophy of prostate with urinary obstruction       insulin aspart 100 UNIT/ML injection    NovoLOG FLEXPEN    15 mL    Use 8 units plus low sliding scale before each meal 120-149 0 units 150-199 1 unit 200-249 2 units 250-299 3 units 300-349 4 units > 350 5 units  Max dose 50 units day    Type 2 diabetes mellitus with hyperglycemia, with long-term current use of insulin (H)       losartan 100 MG tablet    COZAAR    90 tablet    TAKE ONE TABLET BY MOUTH EVERY DAY    Hypertension goal BP (blood pressure) < 140/90       tamsulosin 0.4 MG capsule    FLOMAX    90 capsule    TAKE ONE CAPSULE BY MOUTH EVERY DAY    Hyperlipidemia LDL goal <70, Hypertrophy of prostate with urinary obstruction       ULTICARE MINI 31G X 6 MM   Generic drug:  insulin pen needle     100 each    USE 4 TO 5 TIMES DAILY OR AS DIRECTED FOR SLIDING SCALE INSULIN    Type 2 diabetes, HbA1c goal < 7% (H)       Vitamin D-3 1000 units Caps      Take by mouth daily        * Notice:  This list has 4 medication(s) that are the same as other medications prescribed for you. Read the directions carefully, and ask  your doctor or other care provider to review them with you.

## 2018-08-23 NOTE — LETTER
8/23/2018         RE: Storm Dutta  [address] [place] [address]        Dear Colleague,    Thank you for referring your patient, Storm Dutta, to the [facility]. Please see a copy of my visit note below.    HPI:   Storm Dutta is a 78 year old male who presents for Full skin cancer screening.  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  See patient intake sheet.  ROS of the following were done and are negative: Constitutional, Eyes, Ears, Nose,   Mouth, Throat, Cardiovascular, Respiratory, GI, Genitourinary, Musculoskeletal,   Psychiatric, Endocrine, Allergic/Immunologic.    PHYSICAL EXAM:   /68  Pulse 71  SpO2 98%  Skin exam performed as follows: Type 2 skin. Mood appropriate  Alert and Oriented X 3. Well developed, well nourished in no distress.  General appearance: Normal  Head including face: Normal  Eyes: conjunctiva and lids: Normal  Mouth: Lips, teeth, gums: Normal  Neck: Normal  Chest-breast/axillae: Normal  Back: Normal  Spleen and liver: Normal  Cardiovascular: Exam of peripheral vascular system by observation for swelling, varicosities, edema: Normal  Genitalia: groin, buttocks: Normal  Extremities: digits/nails (clubbing): Normal  Eccrine and Apocrine glands: Normal  Right upper extremity: Normal  Left upper extremity: Normal  Right lower extremity: Normal  Left lower extremity: Normal  Skin: Scalp and body hair: See below    Pt deferred exam of breasts, groin, buttocks: No    Other physical findings:  1. Multiple pigmented macules on  extremities and trunk  2. Multiple pigmented macules on face, trunk and extremities  3. Multiple vascular papules on trunk, arms and legs  4. Multiple scattered keratotic plaques  5. 3 mm dark brown/black macule on the left lateral neck  6. 9 mm pink scaly papule on the left preauricular cheek  7. Numerous gritty papules on the forehead, preauricular cheeks and lateral neck       Except as noted above, no other signs of skin cancer or melanoma.     ASSESSMENT/PLAN:   Benign Full skin cancer screening today. . Patient with history of SCC  Advised on monthly self exams and 1 year  Patient Education: Appropriate brochures given.    Multiple benign appearing nevi on arms, legs and trunk. Discussed ABCDEs of melanoma and sunscreen.   Multiple lentigos on arms, legs and trunk. Advised benign, no treatment needed.  Multiple scattered angiomas. Advised benign, no treatment needed.   Seborrheic keratosis on arms, legs and trunk. Advised benign, no treatment needed.  Atypical nevus on left lateral neck - advised. Anesthestized with lidocaine and area cleaned with betadine. Shave removal/biopsy performed and specimen placed in formalin. Patient will receive call with results.   R/o SCC on the left preauricular cheek. Shave bx in typical fashion .  Area cleaned with betadyne and anesthetized with 1% lidocaine with epi .  Dermablade used to remove the lesion and sent to pathology. Bleeding was cauterized. Pt tolerated procedure well.  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 Discussed Efudex vs PDT - he will likely proceed with PDT. Information given.   Patient to follow up with Primary Care provider regarding elevated blood pressure.              Follow-up: pending path/yearly FSE/PDT/PRN sooner    1.) Patient was asked about new and changing moles. YES  2.) Patient received a complete physical skin examination: YES  3.) Patient was counseled to perform a monthly self skin  examination: YES  Scribed By: Ashlee Harris, MS, PAJeremiahC      Again, thank you for allowing me to participate in the care of your patient.        Sincerely,        Ashlee Harris PA-C

## 2018-08-23 NOTE — NURSING NOTE
Chief Complaint   Patient presents with     Skin Check       Vitals:    08/23/18 0906   BP: 154/68   Pulse: 71   SpO2: 98%     Wt Readings from Last 1 Encounters:   06/29/18 95.3 kg (210 lb)       Cookie Croft LPN.................8/23/2018

## 2018-08-29 LAB — COPATH REPORT: NORMAL

## 2018-09-17 ENCOUNTER — OFFICE VISIT (OUTPATIENT)
Dept: DERMATOLOGY | Facility: CLINIC | Age: 78
End: 2018-09-17
Payer: COMMERCIAL

## 2018-09-17 VITALS — HEART RATE: 74 BPM | OXYGEN SATURATION: 98 % | SYSTOLIC BLOOD PRESSURE: 146 MMHG | DIASTOLIC BLOOD PRESSURE: 68 MMHG

## 2018-09-17 DIAGNOSIS — D03.4 MELANOMA IN SITU OF NECK (H): ICD-10-CM

## 2018-09-17 DIAGNOSIS — L57.0 AK (ACTINIC KERATOSIS): Primary | ICD-10-CM

## 2018-09-17 PROCEDURE — 13132 CMPLX RPR F/C/C/M/N/AX/G/H/F: CPT | Mod: 59 | Performed by: DERMATOLOGY

## 2018-09-17 PROCEDURE — 17311 MOHS 1 STAGE H/N/HF/G: CPT | Performed by: DERMATOLOGY

## 2018-09-17 PROCEDURE — 17000 DESTRUCT PREMALG LESION: CPT | Mod: 51 | Performed by: DERMATOLOGY

## 2018-09-17 NOTE — NURSING NOTE
Surgical Office Location :   [facility]  [address] [place] [address]

## 2018-09-17 NOTE — NURSING NOTE
Chief Complaint   Patient presents with     Derm Problem       Vitals:    09/17/18 0735   BP: 146/68   Pulse: 74   SpO2: 98%     Wt Readings from Last 1 Encounters:   06/29/18 95.3 kg (210 lb)     Cookie Croft LPN.................9/17/2018

## 2018-09-17 NOTE — PATIENT INSTRUCTIONS
Sutured Wound Care     [facility]: [phone]    [facility]: [phone]  Left side of neck      ? No strenuous activity for 48 hours. Resume moderate activity in 48 hours. No heavy exercising until you are seen for follow up in one week.     ? Take Tylenol as needed for discomfort.                         ? Do not drink alcoholic beverages for 48 hours.     ? Keep the pressure bandage in place for 24 hours. If the bandage becomes blood tinged or loose, reinforce it with gauze and tape.        (Refer to the reverse side of this page for management of bleeding).    ? Remove pressure bandage in 24 hours     ? Leave the flat bandage in place until your follow up appointment.    ? Keep the bandage dry. Wash around it carefully.    ? If the tape becomes soiled or starts to come off, reinforce it with additional paper tape.    ? Do not smoke for 3 weeks; smoking is detrimental to wound healing.    ? It is normal to have swelling and bruising around the surgical site. The bruising will fade in approximately 10-14 days. Elevate the area to reduce swelling.    ? Numbness, itchiness and sensitivity to temperature changes can occur after surgery and may take up to 18 months to normalize.      POSSIBLE COMPLICATIONS    BLEEDIN. Leave the bandage in place.  2. Use tightly rolled up gauze or a cloth to apply direct pressure over the bandage for 20   minutes.  3. Reapply pressure for an additional 20 minutes if necessary  4. Call the office or go to the nearest emergency room if pressure fails to stop the bleeding.  5. Use additional gauze and tape to maintain pressure once the bleeding has stopped.        PAIN:    1. Post operative pain should slowly get better, never worse.  2. A severe increase in pain may indicate a problem. Call the office if this occurs.    In case of emergency phone:Dr Neal [phone]      WOUND CARE INSTRUCTIONS   FOR CRYOSURGERY   This area treated with liquid nitrogen  should form a blister (areas treated may or may not blister-skin may just turn dark and slough off). You do not need to bandage the area unless a blister forms and breaks (which may be a few days). When the blister breaks, begin daily dressing changes as follows:  1) Clean and dry the area with tap water using clean Q-tip or sterile gauze pad.   2) Apply Polysporin ointment or Bacitracin ointment over entire wound. Do NOT use Neosporin ointment.   3) Cover the wound with a band-aid or sterile non-stick gauze pad and micropore paper tape.   REPEAT THESE INSTRUCTIONS AT LEAST ONCE A DAY UNTIL THE WOUND HAS COMPLETELY HEALED.   It is an old wives tale that a wound heals better when it is exposed to air and allowed to dry out. The wound will heal faster with a better cosmetic result if it is kept moist with ointment and covered with a bandage.   Do not let the wound dry out.   IMPORTANT INFORMATION ON REVERSE SIDE   Supplies Needed:   *Cotton tipped applicators (Q-tips)   *Polysporin ointment or Bacitracin ointment (NOT NEOSPORIN)   *Band-aids, or non stick gauze pads and micropore paper tape   PATIENT INFORMATION   During the healing process you will notice a number of changes. All wounds develop a small halo of redness surrounding the wound. This means healing is occurring. Severe itching with extensive redness usually indicates sensitivity to the ointment or bandage tape used to dress the wound. You should call our office if this develops.   Swelling and/or discoloration around your surgical site is common, particularly when performed around the eye.   All wounds normally drain. The larger the wound the more drainage there will be. After 7-10 days, you will notice the wound beginning to shrink and new skin will begin to grow. The wound is healed when you can see skin has formed over the entire area. A healed wound has a healthy, shiny look to the surface and is red to dark pink in color to normalize. Wounds may take  approximately 4-6 weeks to heal. Larger wounds may take 6-8 weeks. After the wound is healed you may discontinue dressing changes.   You may experience a sensation of tightness as your wound heals. This is normal and will gradually subside.   Your healed wound may be sensitive to temperature changes. This sensitivity improves with time, but if you re having a lot of discomfort, try to avoid temperature extremes.   Patients frequently experience itching after their wound appears to have healed because of the continue healing under the skin. Plain Vaseline will help relieve the itching.

## 2018-09-17 NOTE — PROGRESS NOTES
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  Patient has no other skin complaints today.  Remainder of the HPI, Meds, PMH, Allergies, FH, and SH was reviewed in chart.      Past Medical History:   Diagnosis Date     Acute urinary retention 11/2012    ER visit   / 1200 cc post-void residual     Acute urinary retention 11/1/2012    ER      Diabetes mellitus type II      Epididymitis 3/20/2014    Started on doxycyclline for UTI/orchitis. He was discharged on 03/22/14.     Former smoker     dc'd 2006     Hypertension goal BP (blood pressure) < 140/90 8/24/2012     PE (pulmonary embolism) 3/20/2014     Rectal cancer (H)      Skin cancer      Squamous cell carcinoma      Thrombosis of leg     +PE       Past Surgical History:   Procedure Laterality Date     COLONOSCOPY  7/29/2013    Procedure: COMBINED COLONOSCOPY, SINGLE BIOPSY/POLYPECTOMY BY BIOPSY;;  Surgeon: Bari Burnett MD;  Location: [facility]     COLONOSCOPY N/A 6/4/2015    Procedure: COMBINED COLONOSCOPY, SINGLE OR MULTIPLE BIOPSY/POLYPECTOMY BY BIOPSY;  Surgeon: Tara Mtz MD;  Location:  GI     COLONOSCOPY N/A 12/5/2016    Procedure: COLONOSCOPY;  Surgeon: Breanna Kline MD;  Location:  GI     EYE SURGERY  5/2012    Right eye procedure     HC CIRCUMCISION SURGICAL NON-DEV >28 DAYS AGE  2/97    due to phimosis     HC REMOVAL GALLBLADDER  5/01     [facility] UGI ENDOSCOPY W PLACEMENT GASTROSTOMY TUBE PERCUT  3/13/2014    Procedure: COMBINED ESOPHAGOSCOPY, GASTROSCOPY, DUODENOSCOPY (EGD), PLACE PERCUTANEOUS ENDOSCOPIC GASTROSTOMY TUBE;  Surgeon: Loco Casillas MD;  Location: [facility]     LAPAROSCOPIC ASSISTED COLECTOMY LEFT (DESCENDING)  1/7/2014    Procedure: LAPAROSCOPIC ASSISTED COLECTOMY LEFT (DESCENDING);  Laparoscopic hand assisted Low Anterior Resection With colonic J pouch and end to end colorectal anastamosis. Laparoscopic splenic  flexure mobilization.  Loop Ileostomy.  Rigid proctoscopy;  Surgeon: Margaret Gamble MD;  [facility]     PHACOEMULSIFICATION WITH STANDARD INTRAOCULAR LENS IMPLANT  4/11/2013    Procedure: PHACOEMULSIFICATION WITH STANDARD INTRAOCULAR LENS IMPLANT;  Right Kelman Phacoemulsification with Intraocular Lens Implant;  Surgeon: Caesar Turner MD;  Location: [facility]     REMOVE GASTROSTOMY TUBE ADULT  7/25/2014    Procedure: REMOVE GASTROSTOMY TUBE ADULT;  Surgeon: Margaret Gamble MD;  [facility]     SIGMOIDOSCOPY FLEXIBLE  6/23/2014    Procedure: SIGMOIDOSCOPY FLEXIBLE;  Surgeon: Margaret Gamble MD;  [facility]     SIGMOIDOSCOPY FLEXIBLE  7/22/2014    Procedure: SIGMOIDOSCOPY FLEXIBLE;  Surgeon: Margaret Gamble MD;  [facility]     SIGMOIDOSCOPY FLEXIBLE  7/25/2014    Procedure: SIGMOIDOSCOPY FLEXIBLE;  Surgeon: Margaret Gamble MD;  [facility]     SIGMOIDOSCOPY FLEXIBLE  7/28/2014    Procedure: SIGMOIDOSCOPY FLEXIBLE;  Surgeon: Margaret Gamble MD;  [facility]     SIGMOIDOSCOPY FLEXIBLE  7/31/2014    Procedure: SIGMOIDOSCOPY FLEXIBLE;  Surgeon: Margaret Gamble MD;  [facility]     SIGMOIDOSCOPY FLEXIBLE  8/3/2014    Procedure: SIGMOIDOSCOPY FLEXIBLE;  Surgeon: Margaret Gamble MD;  [facility]     SIGMOIDOSCOPY FLEXIBLE  8/5/2014    Procedure: SIGMOIDOSCOPY FLEXIBLE;  Surgeon: Margaret Gamble MD;  [facility]     SIGMOIDOSCOPY FLEXIBLE  8/8/2014    Procedure: SIGMOIDOSCOPY FLEXIBLE;  Surgeon: Margaret Gamble MD;  [facility]     SIGMOIDOSCOPY FLEXIBLE  8/18/2014    Procedure: SIGMOIDOSCOPY FLEXIBLE;  Surgeon: Jose Roberto Cervantes MD;  [facility]     SIGMOIDOSCOPY FLEXIBLE N/A 8/15/2014    Procedure: SIGMOIDOSCOPY FLEXIBLE;  Surgeon: Margaret Gamble MD;  [facility]     SIGMOIDOSCOPY FLEXIBLE N/A 8/22/2014    Procedure: SIGMOIDOSCOPY FLEXIBLE;  Surgeon:  Jose Roberto Cervantes MD;  [facility]     SIGMOIDOSCOPY FLEXIBLE N/A 8/11/2014    Procedure: SIGMOIDOSCOPY FLEXIBLE;  Surgeon: Margaret Gamble MD;  [facility]     SIGMOIDOSCOPY FLEXIBLE N/A 8/26/2014    Procedure: SIGMOIDOSCOPY FLEXIBLE;  Surgeon: Margaret Gamble MD;  [facility]     SIGMOIDOSCOPY FLEXIBLE N/A 8/29/2014    Procedure: SIGMOIDOSCOPY FLEXIBLE;  Surgeon: Margaret Gamble MD;  [facility]     SIGMOIDOSCOPY FLEXIBLE N/A 9/8/2014    Procedure: SIGMOIDOSCOPY FLEXIBLE;  Surgeon: Margaret Gamble MD;  [facility]     SIGMOIDOSCOPY FLEXIBLE N/A 9/2/2014    Procedure: SIGMOIDOSCOPY FLEXIBLE;  Surgeon: Breanna Kline MD;  [facility]     SIGMOIDOSCOPY FLEXIBLE N/A 9/11/2014    Procedure: SIGMOIDOSCOPY FLEXIBLE;  Surgeon: Margaret Gamble MD;  [facility]     SIGMOIDOSCOPY FLEXIBLE N/A 9/19/2014    Procedure: SIGMOIDOSCOPY FLEXIBLE;  Surgeon: Margaret Gamble MD;  [facility]     SIGMOIDOSCOPY FLEXIBLE N/A 9/15/2014    Procedure: SIGMOIDOSCOPY FLEXIBLE;  Surgeon: Margaret Gamble MD;  [facility]     SIGMOIDOSCOPY FLEXIBLE N/A 9/5/2014    Procedure: SIGMOIDOSCOPY FLEXIBLE;  Surgeon: Margaret Gamble MD;  [facility]     SIGMOIDOSCOPY FLEXIBLE N/A 9/23/2014    Procedure: SIGMOIDOSCOPY FLEXIBLE;  Surgeon: Margaret Gamble MD;  [facility]     SIGMOIDOSCOPY FLEXIBLE N/A 9/26/2014    Procedure: SIGMOIDOSCOPY FLEXIBLE;  Surgeon: Margaret Gamble MD;  [facility]     SIGMOIDOSCOPY FLEXIBLE N/A 9/30/2014    Procedure: SIGMOIDOSCOPY FLEXIBLE;  Surgeon: Margaret Gamble MD;  [facility]     SIGMOIDOSCOPY FLEXIBLE N/A 10/3/2014    Procedure: SIGMOIDOSCOPY FLEXIBLE;  Surgeon: Margaret Gamble MD;  [facility]     SIGMOIDOSCOPY FLEXIBLE N/A 10/30/2014    Procedure: SIGMOIDOSCOPY FLEXIBLE;  Surgeon: Margaret Gamble MD;  Location: [facility]      SIGMOIDOSCOPY FLEXIBLE, PLACEMENT OF DRAINAGE SPONGE  9/30/14     TAKEDOWN ILEOSTOMY N/A 1/6/2015    Procedure: TAKEDOWN ILEOSTOMY;  Surgeon: Margaret Gamble MD;  Location:  OR        Family History   Problem Relation Age of Onset     Diabetes Mother      Hypertension Mother      Cancer Father      Cancer Maternal Grandmother      Alzheimer Disease Maternal Grandmother      Cardiovascular Paternal Grandmother      Breast Cancer Sister      Colon Cancer No family hx of      Colon Polyps No family hx of      Crohn Disease No family hx of      Ulcerative Colitis No family hx of      Anesthesia Reaction No family hx of        Social History     Social History     Marital status:      Spouse name: N/A     Number of children: N/A     Years of education: N/A     Occupational History      Retired     [employer]     Social History Main Topics     Smoking status: Former Smoker     Years: 30.00     Types: Cigars     Quit date: 9/23/2002     Smokeless tobacco: Never Used     Alcohol use No     Drug use: No     Sexual activity: Not Currently     Partners: Female     Other Topics Concern     Parent/Sibling W/ Cabg, Mi Or Angioplasty Before 65f 55m? No     Social History Narrative       Outpatient Encounter Prescriptions as of 9/17/2018   Medication Sig Dispense Refill     ACCU-CHEK REGIS PLUS test strip TEST BLOOD SUGARS 2-4 TIMES DAILY OR AS DIRECTED 100 each 11     aspirin 325 MG tablet Take 325 mg by mouth daily       atorvastatin (LIPITOR) 20 MG tablet Take 1 tablet (20 mg) by mouth daily 90 tablet 3     BASAGLAR 100 UNIT/ML injection 16 units at bedtime 15 mL 1     blood glucose monitoring (NO BRAND SPECIFIED) meter device kit Use to test blood sugar 3 times daily or as directed. 1 kit 0     blood glucose monitoring (NO BRAND SPECIFIED) test strip Use to test blood sugars 3 times daily or as directed 100 strip 11     Blood Glucose Monitoring Suppl (ACCU-CHEK REGIS PLUS) W/DEVICE  KIT Check blood sugars 1-2 times daily 1 kit 0     Cholecalciferol (VITAMIN D-3) 1000 units CAPS Take by mouth daily       diphenoxylate-atropine (LOMOTIL) 2.5-0.025 MG per tablet Take 1 tablet by mouth 4 times daily as needed for diarrhea 60 tablet 2     finasteride (PROSCAR) 5 MG tablet TAKE ONE TABLET BY MOUTH EVERY DAY 90 tablet 1     insulin aspart (NOVOLOG FLEXPEN) 100 UNIT/ML injection Use 8 units plus low sliding scale before each meal 120-149 0 units  150-199 1 unit  200-249 2 units  250-299 3 units  300-349 4 units  > 350 5 units   Max dose 50 units day 15 mL 3     losartan (COZAAR) 100 MG tablet TAKE ONE TABLET BY MOUTH EVERY DAY 90 tablet 1     tamsulosin (FLOMAX) 0.4 MG capsule TAKE ONE CAPSULE BY MOUTH EVERY DAY 90 capsule 1     ULTICARE MINI 31G X 6 MM insulin pen needle USE 4 TO 5 TIMES DAILY OR AS DIRECTED FOR SLIDING SCALE INSULIN 100 each 7     No facility-administered encounter medications on file as of 9/17/2018.              Review Of Systems  Skin: As above  Eyes: negative  Ears/Nose/Throat: negative  Respiratory: No shortness of breath, dyspnea on exertion, cough, or hemoptysis  Cardiovascular: negative  Gastrointestinal: negative  Genitourinary: negative  Musculoskeletal: negative  Neurologic: negative  Psychiatric: negative  Hematologic/Lymphatic/Immunologic: negative  Endocrine: negative      O:   NAD, WDWN, Alert & Oriented, Mood & Affect wnl, Vitals stable   Here today alone   /68  Pulse 74  SpO2 98%   General appearance normal   Vitals stable   Alert, oriented and in no acute distress      Following lymph nodes palpated: Occipital, Cervical, Supraclavicular no lad   L neck 5mm red macule   L PA cheek gritty papule      Eyes: Conjunctivae/lids:Normal     ENT: Lips, buccal mucosa, tongue: normal    MSK:Normal    Cardiovascular: peripheral edema none    Pulm: Breathing Normal    Lymph Nodes: No Head and Neck Lymphadenopathy     Neuro/Psych: Orientation:Normal;  Mood/Affect:Normal      A/P:  1. L PA cheek actinic keratosis  LN2:  Treated with LN2 for 5s for 1-2 cycles. Warned risks of blistering, pain, pigment change, scarring, and incomplete resolution.  Advised patient to return if lesions do not completely resolve.  Wound care sheet given.  2. L neck melanoma in situ   MOHS MART-1:  I discussed the specifics of tumor, prognosis, metachronous melanoma, self exam, and genetics with the patient. I explained the need for monthly skin exams including palpating lymph nodes, and taught the patient how to do this. Patient was asked about new or changing moles . I reviewed treatment options, including a discussion of wide excision (the gold standard) versus Mohs surgery with MART-1 immunostains.     Note: MART-1 (Melanoma Antigen Recognized by T-cells) antibody immunostaining was used during Mohs surgery as per standard protocol, in addition to routine processing of all specimens with hematoxylin and eosin. The peripheral margins/edges were processed with the MART-1 stain (2 specimens total). The center was examined with hematoxylin & eosin and MART-1 immunostains. The patient was informed of the procedure and its risk/benefits during the consent for the procedure.    One or more of the reagents used in immunohistochemical testing in this case may not have been cleared or approved by the [place]. Food and Drug Administration (FDA). The FDA has determined that such clearance or approval is not necessary. These tests are used for clinical purposes. They should not be regarded as investigational or for research. These reagents  performance characteristics have been determined by [facility]. This laboratory is certified under the Clinical Laboratory Improvement Amendments of 1988 (CLIA-88) as qualified to perform high complexity clinical laboratory testing.    After PGACAC discussed with patient, decision for Mohs surgery was made. Indication for Mohs was aggressive  histology. Patient confirmed the site with Dr. Neal. After anesthesia with LEC, the tumor was excised using standard Mohs technique in 1 stages(s).  MART 1 stains were performed on 2 specimens. CLEAR MARGINS OBTAINED and Final defect size was 1.3 cm.     REPAIR COMPLEX: Because of the tightness of the surrounding skin and Because of the size and full thickness nature of the defect, a complex closure was planned. After LEC anesthesia and prep, Burow's triangles were excised in the relaxed skin tension lines. The wound edges were widely undermined by dissection in the subcutaneous plane until adequate tissue mobility was obtained. Hemostasis was obtained. The wound edges were closed in a layered fashion using Vicryl and Fast Absorbing Plain Gut sutures. Postoperative length was 5.3 cm.   EBL minimal; complications none; wound care routine.  The patient was discharged in good condition and will return in one week for wound evaluation.      BENIGN LESIONS DISCUSSED WITH PATIENT:  I discussed the specifics of tumor, prognosis, and genetics of benign lesions.  I explained that treatment of these lesions would be purely cosmetic and not medically neccessary.  I discussed with patient different removal options including excision, cautery and /or laser.      Nature and genetics of benign skin lesions dicussed with patient.  Signs and Symptoms of skin cancer discussed with patient.  ABCDEs of melanoma reviewed with patient.  Patient encouraged to perform monthly skin exams.  UV precautions reviewed with patient.  Patient to follow up with Primary Care provider regarding elevated blood pressure.  Skin care regimen reviewed with patient: Eliminate harsh soaps, i.e. Dial, zest, irsih spring; Mild soaps such as Cetaphil or Dove sensitive skin, avoid hot or cold showers, aggressive use of emollients including vanicream, cetaphil or cerave discussed with patient.    Risks of non-melanoma skin cancer discussed with patient    Return to clinic 6 months

## 2018-09-17 NOTE — MR AVS SNAPSHOT
After Visit Summary   2018    Storm Dutta    MRN: [id number]           Patient Information     Date Of Birth          1940        Visit Information        Provider Department      2018 7:30 AM Jesus Neal MD [facility]        Today's Diagnoses     AK (actinic keratosis)    -  1    Melanoma in situ of neck (H)          Care Instructions      Sutured Wound Care     [facility]: [phone]    [facility]: [phone]  Left side of neck      ? No strenuous activity for 48 hours. Resume moderate activity in 48 hours. No heavy exercising until you are seen for follow up in one week.     ? Take Tylenol as needed for discomfort.                         ? Do not drink alcoholic beverages for 48 hours.     ? Keep the pressure bandage in place for 24 hours. If the bandage becomes blood tinged or loose, reinforce it with gauze and tape.        (Refer to the reverse side of this page for management of bleeding).    ? Remove pressure bandage in 24 hours     ? Leave the flat bandage in place until your follow up appointment.    ? Keep the bandage dry. Wash around it carefully.    ? If the tape becomes soiled or starts to come off, reinforce it with additional paper tape.    ? Do not smoke for 3 weeks; smoking is detrimental to wound healing.    ? It is normal to have swelling and bruising around the surgical site. The bruising will fade in approximately 10-14 days. Elevate the area to reduce swelling.    ? Numbness, itchiness and sensitivity to temperature changes can occur after surgery and may take up to 18 months to normalize.      POSSIBLE COMPLICATIONS    BLEEDIN. Leave the bandage in place.  2. Use tightly rolled up gauze or a cloth to apply direct pressure over the bandage for 20   minutes.  3. Reapply pressure for an additional 20 minutes if necessary  4. Call the office or go to the nearest emergency room if pressure fails to stop  the bleeding.  5. Use additional gauze and tape to maintain pressure once the bleeding has stopped.        PAIN:    1. Post operative pain should slowly get better, never worse.  2. A severe increase in pain may indicate a problem. Call the office if this occurs.    In case of emergency phone:Dr Neal [phone]      WOUND CARE INSTRUCTIONS   FOR CRYOSURGERY   This area treated with liquid nitrogen should form a blister (areas treated may or may not blister-skin may just turn dark and slough off). You do not need to bandage the area unless a blister forms and breaks (which may be a few days). When the blister breaks, begin daily dressing changes as follows:  1) Clean and dry the area with tap water using clean Q-tip or sterile gauze pad.   2) Apply Polysporin ointment or Bacitracin ointment over entire wound. Do NOT use Neosporin ointment.   3) Cover the wound with a band-aid or sterile non-stick gauze pad and micropore paper tape.   REPEAT THESE INSTRUCTIONS AT LEAST ONCE A DAY UNTIL THE WOUND HAS COMPLETELY HEALED.   It is an old wives tale that a wound heals better when it is exposed to air and allowed to dry out. The wound will heal faster with a better cosmetic result if it is kept moist with ointment and covered with a bandage.   Do not let the wound dry out.   IMPORTANT INFORMATION ON REVERSE SIDE   Supplies Needed:   *Cotton tipped applicators (Q-tips)   *Polysporin ointment or Bacitracin ointment (NOT NEOSPORIN)   *Band-aids, or non stick gauze pads and micropore paper tape   PATIENT INFORMATION   During the healing process you will notice a number of changes. All wounds develop a small halo of redness surrounding the wound. This means healing is occurring. Severe itching with extensive redness usually indicates sensitivity to the ointment or bandage tape used to dress the wound. You should call our office if this develops.   Swelling and/or discoloration around your surgical site is common,  particularly when performed around the eye.   All wounds normally drain. The larger the wound the more drainage there will be. After 7-10 days, you will notice the wound beginning to shrink and new skin will begin to grow. The wound is healed when you can see skin has formed over the entire area. A healed wound has a healthy, shiny look to the surface and is red to dark pink in color to normalize. Wounds may take approximately 4-6 weeks to heal. Larger wounds may take 6-8 weeks. After the wound is healed you may discontinue dressing changes.   You may experience a sensation of tightness as your wound heals. This is normal and will gradually subside.   Your healed wound may be sensitive to temperature changes. This sensitivity improves with time, but if you re having a lot of discomfort, try to avoid temperature extremes.   Patients frequently experience itching after their wound appears to have healed because of the continue healing under the skin. Plain Vaseline will help relieve the itching.                 Follow-ups after your visit        Your next 10 appointments already scheduled     Oct 16, 2018  9:00 AM CDT   LAB with  LAB   [facility] ([address] [address]   [phone]           Please do not eat 10-12 hours before your appointment if you are coming in fasting for labs on lipids, cholesterol, or glucose (sugar). This does not apply to pregnant women. Water, hot tea and black coffee (with nothing added) are okay. Do not drink other fluids, diet soda or chew gum.            Oct 23, 2018 11:15 AM CDT   Return Visit with Ruth Martins MD   [facility] ([facility])    [facility]  [address]  [facility] [address]   [phone]              Who to contact     If you have questions or need follow up information about today's clinic visit or your schedule please contact [place]  [facility] directly at [phone].  Normal or non-critical lab and imaging results will be communicated to you by MyChart, letter or phone within 4 business days after the clinic has received the results. If you do not hear from us within 7 days, please contact the clinic through Compariohart or phone. If you have a critical or abnormal lab result, we will notify you by phone as soon as possible.  Submit refill requests through YouFetch or call your pharmacy and they will forward the refill request to us. Please allow 3 business days for your refill to be completed.          Additional Information About Your Visit        ComparioharPadlet Information     YouFetch gives you secure access to your electronic health record. If you see a primary care provider, you can also send messages to your care team and make appointments. If you have questions, please call your primary care clinic.  If you do not have a primary care provider, please call [phone] and they will assist you.        Care EveryWhere ID     This is your Care EveryWhere ID. This could be used by other organizations to access your [facility] medical records  [id number]        Your Vitals Were     Pulse Pulse Oximetry                74 98%           Blood Pressure from Last 3 Encounters:   09/17/18 146/68   08/23/18 154/68   06/29/18 161/78    Weight from Last 3 Encounters:   06/29/18 95.3 kg (210 lb)   06/12/18 95.4 kg (210 lb 6.4 oz)   04/24/18 96.9 kg (213 lb 9.6 oz)              We Performed the Following     CL IMMUNOHISTOCHEMICAL STAIN      DESTRUCT PREMALIGNANT LESION, FIRST     MOHS HEAD/NCK/HND/FT/GEN 1ST STAGE UP T0 5 BLOCKS     REPAIR COMPLEX, WOUND HEAD/AXIL/GEN/HND/FT 2.6-7.5 CM        Primary Care Provider Office Phone # Fax #    Jovana Beaulieu [phone] [address] [place] [address]        Equal Access to Services     JOSÉ MIGUEL NUNES AH: García Patel, florentin aquino, lionel banks  gerda junerenny mcgarry'aan ah. So [facility] [phone].    ATENCIÓN: Si justinola jose, tiene a kennedy disposición servicios gratuitos de asistencia lingüística. Jake al [phone].    We comply with applicable federal civil rights laws and [place] laws. We do not discriminate on the basis of race, color, national origin, age, disability, sex, sexual orientation, or gender identity.            Thank you!     Thank you for choosing [facility]  for your care. Our goal is always to provide you with excellent care. Hearing back from our patients is one way we can continue to improve our services. Please take a few minutes to complete the written survey that you may receive in the mail after your visit with us. Thank you!             Your Updated Medication List - Protect others around you: Learn how to safely use, store and throw away your medicines at www.disposemymeds.org.          This list is accurate as of 9/17/18 10:22 AM.  Always use your most recent med list.                   Brand Name Dispense Instructions for use Diagnosis    * ACCU-CHEK REGIS PLUS test strip   Generic drug:  blood glucose monitoring     100 each    TEST BLOOD SUGARS 2-4 TIMES DAILY OR AS DIRECTED    Type 2 diabetes mellitus with stage 3 chronic kidney disease, with long-term current use of insulin (H)       * blood glucose monitoring test strip    no brand specified    100 strip    Use to test blood sugars 3 times daily or as directed    Type 2 diabetes mellitus with hyperglycemia, with long-term current use of insulin (H)       aspirin 325 MG tablet      Take 325 mg by mouth daily        atorvastatin 20 MG tablet    LIPITOR    90 tablet    Take 1 tablet (20 mg) by mouth daily    Hyperlipidemia LDL goal <70       BASAGLAR 100 UNIT/ML injection     15 mL    16 units at bedtime    Type 2 diabetes mellitus with stage 3 chronic kidney disease, with long-term current use of insulin (H)       * blood glucose monitoring meter  device kit     1 kit    Check blood sugars 1-2 times daily    Type 2 diabetes, HbA1c goal < 7% (H)       * blood glucose monitoring meter device kit    no brand specified    1 kit    Use to test blood sugar 3 times daily or as directed.    Type 2 diabetes mellitus with hyperglycemia, with long-term current use of insulin (H)       diphenoxylate-atropine 2.5-0.025 MG per tablet    LOMOTIL    60 tablet    Take 1 tablet by mouth 4 times daily as needed for diarrhea    Diarrhea       finasteride 5 MG tablet    PROSCAR    90 tablet    TAKE ONE TABLET BY MOUTH EVERY DAY    Hypertrophy of prostate with urinary obstruction       insulin aspart 100 UNIT/ML injection    NovoLOG FLEXPEN    15 mL    Use 8 units plus low sliding scale before each meal 120-149 0 units 150-199 1 unit 200-249 2 units 250-299 3 units 300-349 4 units > 350 5 units  Max dose 50 units day    Type 2 diabetes mellitus with hyperglycemia, with long-term current use of insulin (H)       losartan 100 MG tablet    COZAAR    90 tablet    TAKE ONE TABLET BY MOUTH EVERY DAY    Hypertension goal BP (blood pressure) < 140/90       tamsulosin 0.4 MG capsule    FLOMAX    90 capsule    TAKE ONE CAPSULE BY MOUTH EVERY DAY    Hyperlipidemia LDL goal <70, Hypertrophy of prostate with urinary obstruction       ULTICARE MINI 31G X 6 MM   Generic drug:  insulin pen needle     100 each    USE 4 TO 5 TIMES DAILY OR AS DIRECTED FOR SLIDING SCALE INSULIN    Type 2 diabetes, HbA1c goal < 7% (H)       Vitamin D-3 1000 units Caps      Take by mouth daily        * Notice:  This list has 4 medication(s) that are the same as other medications prescribed for you. Read the directions carefully, and ask your doctor or other care provider to review them with you.

## 2018-09-24 ENCOUNTER — ALLIED HEALTH/NURSE VISIT (OUTPATIENT)
Dept: DERMATOLOGY | Facility: CLINIC | Age: 78
End: 2018-09-24
Payer: COMMERCIAL

## 2018-09-24 DIAGNOSIS — Z48.01 ENCOUNTER FOR CHANGE OR REMOVAL OF SURGICAL WOUND DRESSING: Primary | ICD-10-CM

## 2018-09-24 PROCEDURE — 99207 ZZC NO CHARGE NURSE ONLY: CPT

## 2018-09-24 NOTE — PATIENT INSTRUCTIONS

## 2018-09-24 NOTE — NURSING NOTE
Pt returned to clinic for post surgery 1 week follow up bandage change. Pt has no complaints, denies pain. Bandage removed from left neck, area cleansed with normal saline. Site is healing and wound edges approximating well. Reapplied new steri strips and paper tape.    Advised to watch for signs/sx of infection; spreading redness, drainage, odor, fever. Call or report promptly to clinic. Pt given written instructions and informed to rtc as needed. Patient verbalized understanding.

## 2018-09-24 NOTE — MR AVS SNAPSHOT
After Visit Summary   9/24/2018    Storm Dutta    MRN: [id number]           Patient Information     Date Of Birth          1940        Visit Information        Provider Department      9/24/2018 1:15 PM [facility]        Care Instructions    WOUND CARE INSTRUCTIONS  for  ONE WEEK AFTER SURGERY          1) Leave flat bandage on your skin for one week after today s bandage change.  2) In one week when you remove the bandage, you may resume your regular skin care routine, including washing with mild soap and water, applying moisturizer, make-up and sunscreen.    3) If there are any open or bleeding areas at the incision/graft site you should begin to cover the area with a bandage daily as follows:    1) Clean and dry the area with plain tap water using a Q-tip or sterile gauze pad.  2) Apply Polysporin or Bacitracin ointment to the open area.  3) Cover the wound with a band-aid or a sterile non-stick gauze pad and micropore paper tape.         SIGNS OF INFECTION  - If you notice any of these signs of infection, call your doctor right away: expanding redness around the wound.  - Yellow or greenish-colored pus or cloudy wound drainage.    - Red streaking spreading from the wound.  - Increased swelling, tenderness, or pain around the wound.   - Fever.    Please remember that yellow and clear drainage from a wound can be normal and related to normal wound healing.  Isolated drainage from a wound without a combination of the above features does not indicate infection.       *Once the bandages are removed, the scar will be red and firm (especially in the lip/chin area). This is normal and will fade in time. It might take 6-12 months for this to happen.     *Massaging the area will help the scar soften and fade quicker. Begin to massage the area one month after the bandages have been removed. To massage apply pressure directly and firmly over the scar with the fingertips  and move in a circular motion. Massage the area for a few minutes several times a day. Continue to massage the site for several months.    *Approximately 6-8 weeks after surgery it is not uncommon to see the formation of  tender pimple-like  bump along the scar. This is normal. As the scar continues to mature and the stitches underneath the skin begin to dissolve, this might occur. Do not pick or squeeze, this will resolve on it s own. Should one break open producing a small amount of drainage, apply Polysporin or Bacitracin ointment a few times a day until the wound is completely healed.    *Numbness in the surgical area is expected. It might take 12-18 months for the feeling to return to normal. During this time sensations of itchiness, tingling and occasional sharp pains might be noted. These feelings are normal and will subside once the nerves have completely healed.         IN CASE OF EMERGENCY: Dr Neal [phone]     If you were seen in [place] call: [phone]    If you were seen in [place] call: [phone]              Follow-ups after your visit        Your next 10 appointments already scheduled     Oct 16, 2018  9:00 AM CDT   LAB with  LAB   [facility] ([facility])    [address] [address] [phone]           Please do not eat 10-12 hours before your appointment if you are coming in fasting for labs on lipids, cholesterol, or glucose (sugar). This does not apply to pregnant women. Water, hot tea and black coffee (with nothing added) are okay. Do not drink other fluids, diet soda or chew gum.            Oct 23, 2018 11:15 AM CDT   Return Visit with Ruth Martins MD   [facility] ([facility])    [facility]  [address]  [facility] [address] [phone]              Who to contact     If you have questions or need follow up information about today's clinic visit or your  schedule please contact [facility] directly at [phone].  Normal or non-critical lab and imaging results will be communicated to you by MyChart, letter or phone within 4 business days after the clinic has received the results. If you do not hear from us within 7 days, please contact the clinic through MyChart or phone. If you have a critical or abnormal lab result, we will notify you by phone as soon as possible.  Submit refill requests through Beijingyicheng or call your pharmacy and they will forward the refill request to us. Please allow 3 business days for your refill to be completed.          Additional Information About Your Visit        Smash Technologieshart Information     Beijingyicheng gives you secure access to your electronic health record. If you see a primary care provider, you can also send messages to your care team and make appointments. If you have questions, please call your primary care clinic.  If you do not have a primary care provider, please call [phone] and they will assist you.        Care EveryWhere ID     This is your Care EveryWhere ID. This could be used by other organizations to access your [facility] medical records  [id number]         Blood Pressure from Last 3 Encounters:   09/17/18 146/68   08/23/18 154/68   06/29/18 161/78    Weight from Last 3 Encounters:   06/29/18 95.3 kg (210 lb)   06/12/18 95.4 kg (210 lb 6.4 oz)   04/24/18 96.9 kg (213 lb 9.6 oz)              Today, you had the following     No orders found for display       Primary Care Provider Office Phone # Fax #    Jovanaana Beaulieu [phone] [address] [place] [facility] [address]        Equal Access to Services     [facility] AH: Hadii trisha rubin Sodaisha, waaxda luqadaha, qaybta kaalmada radha, lionel mesa. So [facility] [phone].    ATENCIÓN: Si habla español, tiene a kennedy disposición servicios gratuitos de asistencia lingüística. Llame al [phone].    We comply  with applicable federal civil rights laws and [place] laws. We do not discriminate on the basis of race, color, national origin, age, disability, sex, sexual orientation, or gender identity.            Thank you!     Thank you for choosing [facility]  for your care. Our goal is always to provide you with excellent care. Hearing back from our patients is one way we can continue to improve our services. Please take a few minutes to complete the written survey that you may receive in the mail after your visit with us. Thank you!             Your Updated Medication List - Protect others around you: Learn how to safely use, store and throw away your medicines at www.disposemymeds.org.          This list is accurate as of 9/24/18  1:17 PM.  Always use your most recent med list.                   Brand Name Dispense Instructions for use Diagnosis    * ACCU-CHEK REGIS PLUS test strip   Generic drug:  blood glucose monitoring     100 each    TEST BLOOD SUGARS 2-4 TIMES DAILY OR AS DIRECTED    Type 2 diabetes mellitus with stage 3 chronic kidney disease, with long-term current use of insulin (H)       * blood glucose monitoring test strip    no brand specified    100 strip    Use to test blood sugars 3 times daily or as directed    Type 2 diabetes mellitus with hyperglycemia, with long-term current use of insulin (H)       aspirin 325 MG tablet      Take 325 mg by mouth daily        atorvastatin 20 MG tablet    LIPITOR    90 tablet    Take 1 tablet (20 mg) by mouth daily    Hyperlipidemia LDL goal <70       BASAGLAR 100 UNIT/ML injection     15 mL    16 units at bedtime    Type 2 diabetes mellitus with stage 3 chronic kidney disease, with long-term current use of insulin (H)       * blood glucose monitoring meter device kit     1 kit    Check blood sugars 1-2 times daily    Type 2 diabetes, HbA1c goal < 7% (H)       * blood glucose monitoring meter device kit    no brand specified    1 kit    Use to test blood  sugar 3 times daily or as directed.    Type 2 diabetes mellitus with hyperglycemia, with long-term current use of insulin (H)       diphenoxylate-atropine 2.5-0.025 MG per tablet    LOMOTIL    60 tablet    Take 1 tablet by mouth 4 times daily as needed for diarrhea    Diarrhea       finasteride 5 MG tablet    PROSCAR    90 tablet    TAKE ONE TABLET BY MOUTH EVERY DAY    Hypertrophy of prostate with urinary obstruction       insulin aspart 100 UNIT/ML injection    NovoLOG FLEXPEN    15 mL    Use 8 units plus low sliding scale before each meal 120-149 0 units 150-199 1 unit 200-249 2 units 250-299 3 units 300-349 4 units > 350 5 units  Max dose 50 units day    Type 2 diabetes mellitus with hyperglycemia, with long-term current use of insulin (H)       losartan 100 MG tablet    COZAAR    90 tablet    TAKE ONE TABLET BY MOUTH EVERY DAY    Hypertension goal BP (blood pressure) < 140/90       tamsulosin 0.4 MG capsule    FLOMAX    90 capsule    TAKE ONE CAPSULE BY MOUTH EVERY DAY    Hyperlipidemia LDL goal <70, Hypertrophy of prostate with urinary obstruction       ULTICARE MINI 31G X 6 MM   Generic drug:  insulin pen needle     100 each    USE 4 TO 5 TIMES DAILY OR AS DIRECTED FOR SLIDING SCALE INSULIN    Type 2 diabetes, HbA1c goal < 7% (H)       Vitamin D-3 1000 units Caps      Take by mouth daily        * Notice:  This list has 4 medication(s) that are the same as other medications prescribed for you. Read the directions carefully, and ask your doctor or other care provider to review them with you.

## 2018-10-01 DIAGNOSIS — E78.5 HYPERLIPIDEMIA LDL GOAL <70: ICD-10-CM

## 2018-10-01 DIAGNOSIS — N13.8 HYPERTROPHY OF PROSTATE WITH URINARY OBSTRUCTION: ICD-10-CM

## 2018-10-01 DIAGNOSIS — N40.1 HYPERTROPHY OF PROSTATE WITH URINARY OBSTRUCTION: ICD-10-CM

## 2018-10-02 RX ORDER — TAMSULOSIN HYDROCHLORIDE 0.4 MG/1
CAPSULE ORAL
Qty: 90 CAPSULE | Refills: 1 | Status: SHIPPED | OUTPATIENT
Start: 2018-10-02 | End: 2019-03-29

## 2018-10-02 NOTE — TELEPHONE ENCOUNTER
"Requested Prescriptions   Pending Prescriptions Disp Refills     tamsulosin (FLOMAX) 0.4 MG capsule [Pharmacy Med Name: TAMSULOSIN HCL 0.4MG CAPS] 90 capsule 1    Last Written Prescription Date:  04/03/2018 #90 x 1  Last filled 07/06/2018  Last office visit: 6/12/2018 JESENIA Beaulieu     Future Office Visit:   Next 5 appointments (look out 90 days)     Oct 23, 2018 11:15 AM CDT   Return Visit with Ruth Martins MD   [facility] ([facility])    [facility]  [address]  [facility] [address]   [phone]                  Sig: TAKE ONE CAPSULE BY MOUTH EVERY DAY    Alpha Blockers Failed    10/1/2018 10:22 PM       Failed - Blood pressure under 140/90 in past 12 months    BP Readings from Last 3 Encounters:   09/17/18 146/68   08/23/18 154/68   06/29/18 161/78                Passed - Recent (12 mo) or future (30 days) visit within the authorizing provider's specialty    Patient had office visit in the last 12 months or has a visit in the next 30 days with authorizing provider or within the authorizing provider's specialty.  See \"Patient Info\" tab in inbasket, or \"Choose Columns\" in Meds & Orders section of the refill encounter.           Passed - Patient does not have Tadalafil, Vardenafil, or Sildenafil on their medication list       Passed - Patient is 18 years of age or older          "

## 2018-10-02 NOTE — TELEPHONE ENCOUNTER
Prescription approved per [facility] Refill Protocol.  Estrellita Shaikh RN

## 2018-10-16 DIAGNOSIS — C20 RECTAL CANCER (H): ICD-10-CM

## 2018-10-16 LAB
ALBUMIN SERPL-MCNC: 3.7 G/DL (ref 3.4–5)
ALP SERPL-CCNC: 126 U/L (ref 40–150)
ALT SERPL W P-5'-P-CCNC: 26 U/L (ref 0–70)
ANION GAP SERPL CALCULATED.3IONS-SCNC: 6 MMOL/L (ref 3–14)
AST SERPL W P-5'-P-CCNC: 16 U/L (ref 0–45)
BASOPHILS # BLD AUTO: 0.1 10E9/L (ref 0–0.2)
BASOPHILS NFR BLD AUTO: 0.7 %
BILIRUB SERPL-MCNC: 0.8 MG/DL (ref 0.2–1.3)
BUN SERPL-MCNC: 19 MG/DL (ref 7–30)
CALCIUM SERPL-MCNC: 8.9 MG/DL (ref 8.5–10.1)
CEA SERPL-MCNC: 0.6 UG/L (ref 0–2.5)
CHLORIDE SERPL-SCNC: 104 MMOL/L (ref 94–109)
CO2 SERPL-SCNC: 28 MMOL/L (ref 20–32)
CREAT SERPL-MCNC: 1.18 MG/DL (ref 0.66–1.25)
DIFFERENTIAL METHOD BLD: ABNORMAL
EOSINOPHIL # BLD AUTO: 0.1 10E9/L (ref 0–0.7)
EOSINOPHIL NFR BLD AUTO: 1.9 %
ERYTHROCYTE [DISTWIDTH] IN BLOOD BY AUTOMATED COUNT: 13.5 % (ref 10–15)
GFR SERPL CREATININE-BSD FRML MDRD: 60 ML/MIN/1.7M2
GLUCOSE SERPL-MCNC: 163 MG/DL (ref 70–99)
HCT VFR BLD AUTO: 38.5 % (ref 40–53)
HGB BLD-MCNC: 12.6 G/DL (ref 13.3–17.7)
LYMPHOCYTES # BLD AUTO: 1.3 10E9/L (ref 0.8–5.3)
LYMPHOCYTES NFR BLD AUTO: 19.4 %
MCH RBC QN AUTO: 30.1 PG (ref 26.5–33)
MCHC RBC AUTO-ENTMCNC: 32.7 G/DL (ref 31.5–36.5)
MCV RBC AUTO: 92 FL (ref 78–100)
MONOCYTES # BLD AUTO: 0.7 10E9/L (ref 0–1.3)
MONOCYTES NFR BLD AUTO: 10.4 %
NEUTROPHILS # BLD AUTO: 4.6 10E9/L (ref 1.6–8.3)
NEUTROPHILS NFR BLD AUTO: 67.6 %
PLATELET # BLD AUTO: 178 10E9/L (ref 150–450)
POTASSIUM SERPL-SCNC: 4.3 MMOL/L (ref 3.4–5.3)
PROT SERPL-MCNC: 7.7 G/DL (ref 6.8–8.8)
RBC # BLD AUTO: 4.18 10E12/L (ref 4.4–5.9)
SODIUM SERPL-SCNC: 138 MMOL/L (ref 133–144)
WBC # BLD AUTO: 6.8 10E9/L (ref 4–11)

## 2018-10-16 PROCEDURE — 80053 COMPREHEN METABOLIC PANEL: CPT | Performed by: INTERNAL MEDICINE

## 2018-10-16 PROCEDURE — 36415 COLL VENOUS BLD VENIPUNCTURE: CPT | Performed by: INTERNAL MEDICINE

## 2018-10-16 PROCEDURE — 85025 COMPLETE CBC W/AUTO DIFF WBC: CPT | Performed by: INTERNAL MEDICINE

## 2018-10-16 PROCEDURE — 82378 CARCINOEMBRYONIC ANTIGEN: CPT | Performed by: INTERNAL MEDICINE

## 2018-10-17 DIAGNOSIS — E11.65 TYPE 2 DIABETES MELLITUS WITH HYPERGLYCEMIA, WITH LONG-TERM CURRENT USE OF INSULIN (H): ICD-10-CM

## 2018-10-17 DIAGNOSIS — Z79.4 TYPE 2 DIABETES MELLITUS WITH HYPERGLYCEMIA, WITH LONG-TERM CURRENT USE OF INSULIN (H): ICD-10-CM

## 2018-10-17 NOTE — TELEPHONE ENCOUNTER
Requested Prescriptions   Pending Prescriptions Disp Refills     insulin aspart (NOVOLOG FLEXPEN) 100 UNIT/ML injection 15 mL 3    Last Written Prescription Date:  2/21/18  Last Fill Quantity: 15ml,  # refills: 3   Last office visit: 6/12/2018 with prescribing provider:  gabriela   Future Office Visit:   Next 5 appointments (look out 90 days)     Oct 23, 2018 11:15 AM CDT   Return Visit with Ruth Martins MD   [facility] ([facility])    [facility]  [address]  [facility] [address]   [phone]                  Sig: Use 8 units plus low sliding scale before each meal 120-149 0 units  150-199 1 unit  200-249 2 units  250-299 3 units  300-349 4 units  > 350 5 units   Max dose 50 units day    Short Acting Insulin Protocol Failed    10/17/2018 10:34 AM       Failed - Blood pressure less than 140/90 in past 6 months    BP Readings from Last 3 Encounters:   09/17/18 146/68   08/23/18 154/68   06/29/18 161/78                Failed - HgbA1C in past 3 or 6 months    If HgbA1C is 8 or greater, it needs to be on file within the past 3 months.  If less than 8, must be on file within the past 6 months.     Recent Labs   Lab Test  06/12/18   0956   A1C  8.1*            Passed - LDL on file in past 12 months    Recent Labs   Lab Test  06/12/18   0956   LDL  64            Passed - Microalbumin on file in past 12 months    Recent Labs   Lab Test  06/12/18   1100   MICROL  55   UMALCR  87.68*            Passed - Serum creatinine on file in past 12 months    Recent Labs   Lab Test  10/16/18   0905   09/05/14   0745   CR  1.18   < >   --    CREAT   --    --   1.9*    < > = values in this interval not displayed.            Passed - Patient is age 18 or older       Passed - Recent (6 mo) or future (30 days) visit within the authorizing provider's specialty    Patient had office visit in the last 6 months or has a visit in the next 30 days with authorizing provider or within the  "authorizing provider's specialty.  See \"Patient Info\" tab in inbasket, or \"Choose Columns\" in Meds & Orders section of the refill encounter.              "

## 2018-10-17 NOTE — TELEPHONE ENCOUNTER
Routing refill request to provider for review/approval because:  Failed protocol . Does have appt with Dr Martins. Estrellita Shaikh RN

## 2018-10-23 ENCOUNTER — ONCOLOGY VISIT (OUTPATIENT)
Dept: ONCOLOGY | Facility: CLINIC | Age: 78
End: 2018-10-23
Attending: INTERNAL MEDICINE
Payer: COMMERCIAL

## 2018-10-23 VITALS
HEIGHT: 68 IN | DIASTOLIC BLOOD PRESSURE: 70 MMHG | BODY MASS INDEX: 32.28 KG/M2 | TEMPERATURE: 96.5 F | WEIGHT: 213 LBS | OXYGEN SATURATION: 97 % | HEART RATE: 69 BPM | SYSTOLIC BLOOD PRESSURE: 154 MMHG | RESPIRATION RATE: 18 BRPM

## 2018-10-23 DIAGNOSIS — C20 RECTAL CANCER (H): Primary | ICD-10-CM

## 2018-10-23 DIAGNOSIS — Z86.0100 HISTORY OF COLONIC POLYPS: ICD-10-CM

## 2018-10-23 DIAGNOSIS — Z86.711 HISTORY OF PULMONARY EMBOLISM: ICD-10-CM

## 2018-10-23 PROCEDURE — G0463 HOSPITAL OUTPT CLINIC VISIT: HCPCS

## 2018-10-23 PROCEDURE — 99214 OFFICE O/P EST MOD 30 MIN: CPT | Performed by: INTERNAL MEDICINE

## 2018-10-23 ASSESSMENT — PAIN SCALES - GENERAL: PAINLEVEL: NO PAIN (0)

## 2018-10-23 NOTE — MR AVS SNAPSHOT
After Visit Summary   10/23/2018    Storm Dutta    MRN: [id number]           Patient Information     Date Of Birth          1940        Visit Information        Provider Department      10/23/2018 11:15 AM Ruth Martins MD [facility]        Today's Diagnoses     Rectal cancer (H)    -  1    History of pulmonary embolism        History of colonic polyps          Care Instructions    Dr. Martins would like you to get a repeat colonoscopy.     We would like to see you back in 6 months for a follow up appointment with labs prior.     When you are in need of a refill, please call your pharmacy and they will send us a request.      Copy of appointments, and after visit summary (AVS) given to patient.      If you have any questions please call Suzanne Sultana RN, BSN Oncology Hematology  [facility] [phone]. For questions after business hours, or on holidays/weekends, please call our after hours Nurse Triage line [phone]. Thank you.   Due repeat colonoscopy. 6 months f/u with labs.           Follow-ups after your visit        Your next 10 appointments already scheduled     Dec 06, 2018   Procedure with Jared Walter MD   [facility] ([facility])    [address] [place] [address] [phone]           The medical center is located at [address] (between [address] in [place], [place] north of [place]).            Apr 16, 2019  9:00 AM CDT   LAB with LL LAB   [facility] ([facility])    [address] [address] [phone]           Please do not eat 10-12 hours before your appointment if you are coming in fasting for labs on lipids, cholesterol, or glucose (sugar). This does not apply to pregnant women. Water, hot tea and black coffee (with nothing added) are okay. Do not drink other fluids, diet soda or chew gum.            Apr  23, 2019 11:15 AM CDT   Return Visit with Ruth Martins MD   [facility] ([facility])    [facility]  [address]  [facility] [address]   [phone]              Future tests that were ordered for you today     Open Future Orders        Priority Expected Expires Ordered    Comprehensive metabolic panel Routine 4/1/2019 5/31/2019 10/23/2018    CEA Routine 4/1/2019 5/31/2019 10/23/2018    CBC with platelets differential Routine 4/1/2019 5/31/2019 10/23/2018            Who to contact     If you have questions or need follow up information about today's clinic visit or your schedule please contact [facility] directly at [phone].  Normal or non-critical lab and imaging results will be communicated to you by MyChart, letter or phone within 4 business days after the clinic has received the results. If you do not hear from us within 7 days, please contact the clinic through MyChart or phone. If you have a critical or abnormal lab result, we will notify you by phone as soon as possible.  Submit refill requests through [employer] or call your pharmacy and they will forward the refill request to us. Please allow 3 business days for your refill to be completed.          Additional Information About Your Visit        [employer] Information     [employer] gives you secure access to your electronic health record. If you see a primary care provider, you can also send messages to your care team and make appointments. If you have questions, please call your primary care clinic.  If you do not have a primary care provider, please call [phone] and they will assist you.        Care EveryWhere ID     This is your Care EveryWhere ID. This could be used by other organizations to access your [facility] medical records  [id number]        Your Vitals Were     Pulse Temperature Respirations Height Pulse Oximetry BMI (Body Mass Index)    69 96.5  F (35.8  C) (Tympanic) 18  "1.727 m (5' 7.99\") 97% 32.39 kg/m2       Blood Pressure from Last 3 Encounters:   10/23/18 154/70   09/17/18 146/68   08/23/18 154/68    Weight from Last 3 Encounters:   10/23/18 96.6 kg (213 lb)   06/29/18 95.3 kg (210 lb)   06/12/18 95.4 kg (210 lb 6.4 oz)               Primary Care Provider Office Phone # Fax #    Jovana Gandara DO Christ[facility] [phone] [address] [place] [address]        Equal Access to Services     [facility]: Hadii trisha Patel, waelizabethda kenia, qaybta kaalmada radha, lionel martinez . So [facility] [phone].    ATENCIÓN: Si habla español, tiene a kennedy disposición servicios gratuitos de asistencia lingüística. [facility] [phone].    We comply with applicable federal civil rights laws and [place] laws. We do not discriminate on the basis of race, color, national origin, age, disability, sex, sexual orientation, or gender identity.            Thank you!     Thank you for choosing [facility]  for your care. Our goal is always to provide you with excellent care. Hearing back from our patients is one way we can continue to improve our services. Please take a few minutes to complete the written survey that you may receive in the mail after your visit with us. Thank you!             Your Updated Medication List - Protect others around you: Learn how to safely use, store and throw away your medicines at www.disposemymeds.org.          This list is accurate as of 10/23/18 11:41 AM.  Always use your most recent med list.                   Brand Name Dispense Instructions for use Diagnosis    * ACCU-CHEK REGIS PLUS test strip   Generic drug:  blood glucose monitoring     100 each    TEST BLOOD SUGARS 2-4 TIMES DAILY OR AS DIRECTED    Type 2 diabetes mellitus with stage 3 chronic kidney disease, with long-term current use of insulin (H)       * blood glucose monitoring test strip    no brand specified    100 strip    Use to test " blood sugars 3 times daily or as directed    Type 2 diabetes mellitus with hyperglycemia, with long-term current use of insulin (H)       aspirin 325 MG tablet      Take 325 mg by mouth daily        atorvastatin 20 MG tablet    LIPITOR    90 tablet    Take 1 tablet (20 mg) by mouth daily    Hyperlipidemia LDL goal <70       BASAGLAR 100 UNIT/ML injection     15 mL    INJECT 16 UNITS AT BEDTIME    Type 2 diabetes mellitus with stage 3 chronic kidney disease, with long-term current use of insulin (H)       * blood glucose monitoring meter device kit     1 kit    Check blood sugars 1-2 times daily    Type 2 diabetes, HbA1c goal < 7% (H)       * blood glucose monitoring meter device kit    no brand specified    1 kit    Use to test blood sugar 3 times daily or as directed.    Type 2 diabetes mellitus with hyperglycemia, with long-term current use of insulin (H)       diphenoxylate-atropine 2.5-0.025 MG per tablet    LOMOTIL    60 tablet    Take 1 tablet by mouth 4 times daily as needed for diarrhea    Diarrhea       finasteride 5 MG tablet    PROSCAR    90 tablet    TAKE ONE TABLET BY MOUTH EVERY DAY    Hypertrophy of prostate with urinary obstruction       insulin aspart 100 UNIT/ML injection    NovoLOG FLEXPEN    15 mL    Use 8 units plus low sliding scale before each meal 120-149 0 units 150-199 1 unit 200-249 2 units 250-299 3 units 300-349 4 units > 350 5 units  Max dose 50 units day    Type 2 diabetes mellitus with hyperglycemia, with long-term current use of insulin (H)       losartan 100 MG tablet    COZAAR    90 tablet    TAKE ONE TABLET BY MOUTH EVERY DAY    Hypertension goal BP (blood pressure) < 140/90       tamsulosin 0.4 MG capsule    FLOMAX    90 capsule    TAKE ONE CAPSULE BY MOUTH EVERY DAY    Hyperlipidemia LDL goal <70, Hypertrophy of prostate with urinary obstruction       ULTICARE MINI 31G X 6 MM   Generic drug:  insulin pen needle     100 each    USE 4 TO 5 TIMES DAILY OR AS DIRECTED FOR SLIDING  SCALE INSULIN    Type 2 diabetes, HbA1c goal < 7% (H)       Vitamin D-3 1000 units Caps      Take by mouth daily        * Notice:  This list has 4 medication(s) that are the same as other medications prescribed for you. Read the directions carefully, and ask your doctor or other care provider to review them with you.

## 2018-10-23 NOTE — PROGRESS NOTES
Hematology /oncology Visit      CHIEF COMPLAINT AND REASON FOR VISIT:    1. PE post op   2. Rectal cancer 2013     HISTORY OF PRESENT ILLNESS:  He presented with SOB found to have large right pulmonary embolism in 03/2014 when he was recovering from a laparoscopic-assisted low anterior resection for rectal cancer resection.  He had colorectal end-to-end anastomosis and loop ileostomy at that time.    He was on Lovenox 70-80 mg subcu b.i.d. for 7 months. It was stopped after repeat CT chest in 9/2014 indicated no PE.   Hypercoagulable work up through us in 9/2014 are negative.      He was diagnosed with locally-advanced rectal adenocarcinoma in 07/2013 through colonoscopy due to bloody bowel movements and anemia.  Preop evaluation showed the lesion is about mid to upper rectum 7 cm from the anal verge, there are prominent 5-10 perirectal lymph nodes. He had minimal stage III disease on presentation.     He received neoadjuvant concurrent chemoradiation with oral Xeloda, completed in 10/2013, then went on to have in 1/2014 laparoscopic-assisted LAR with splenic flexure mobilization and colonic J pouch with colorectal end-to-end anastomosis, loop ileostomy.    Final pathology indicating CR with no residual tumor in the rectum and in the lymph nodes.  He was only able to proceed with 2 cycles of XELOX and then dropped due to his general poor condition.     PMH  Type 2 diabetes, peripheral neuropathy, BPH, hypertension, hyperlipidemia, history of urinary retention.      SOCIAL HISTORY:  He lives in [place].  He never used tobacco.  Denies alcohol abuse.  He quit smoking in 2002.  He had 30-pack-year smoking history prior.   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     FAMILY HISTORY:  Positive for maternal grandmother who had unknown type of cancer, 1 sister was treated will breast cancer.        REVIEW OF SYSTEMS:   He is eating nl, no pain, gaining weight, no blood in stool.  He is living active life. He barely eats vegi.      "  PHYSICAL EXAMINATION:   VITAL SIGNS: Blood pressure 154/70, pulse 69, temperature 96.5  F (35.8  C), temperature source Tympanic, resp. rate 18, height 1.727 m (5' 7.99\"), weight 96.6 kg (213 lb), SpO2 97 %.     ECOG 0    GENERAL APPEARANCE:   Elderly gentleman, looks much younger than his stated age, not in acute distress.  He looks very comfortable.   HEENT: The patient is normocephalic, atraumatic. Pupils are equal react to light.  Sclerae are anicteric.  Moist oral mucosa.  Negative pharynx.  No oral thrush.   NECK:  Supple.  No jugular venous distention.  Thyroid is not palpable.   LYMPH NODES:  Superficial lymphadenopathy is not appreciable in the bilateral cervical, supraclavicular, axillary or inguinal adenopathy.    CARDIOVASCULAR:  S1, S2 regular with no murmurs or gallops.  No carotid or abdominal bruits.   PULMONARY:  Lungs are clear to auscultation and percussion bilaterally.  There is no wheezing or rhonchi.   GASTROINTESTINAL:  Abdomen is very soft, PEG feeding tube properly in place.   He has a right abdomen ileostomy bag, brownish liquid in the bag.   MUSCULOSKELETAL/EXTREMITIES:  No edema.  No cyanotic changes.  No signs of joint deformity.  No lymphedema.   NEUROLOGIC:  Cranial nerves II-XII are grossly intact.  Sensation intact.  Muscle strength and muscle tone symmetrical all through 5/5.   BACK:  No spinal or paraspinal tenderness.  No CVA tenderness.   SKIN:  No petechiae.  No rash.  No signs of cellulitis. Bruising on dorsal hands.       Current LAB Data Reviewed  Cbc diff/CEA are fine,   Cr  1.18 from 1.22 from 1.26 from 1.37 stable.       OLD DATA REVIEW IN SUMMARY:   12/2016 colonoscopy: multiple tubulovillous adenoma removed.     5/2015: Tubulovillous adenoma excised from transverse colon.     CT body 10/2016: no mets.     CT 10/2015  1. Negative chest. No PE  2. Interval right lower quadrant ostomy takedown.  3. Postoperative changes in the rectal and presacral region without any " appreciable change since the prior exam.  4. Enlarged prostate gland.    CT 9/2014 - No evidence for pulmonary embolism.    CT enterography in 06/2014 with no evidence of bowel obstruction, no convincing focal small bowel abnormality, possible fistula track connecting this walled-off collection colonic anastomosis site in the pelvis.       Laparoscopic-assisted LAR in 01/2014 - no residual carcinoma identified in the sigmoid or rectum, 1 lymph node was negative.      Preop MRI in 08/2013:  sessile tumor in the posterior mid to upper rectum, between 5 and 10 prominent perirectal lymph nodes which are suspicious.      Original pathology from colonoscopy, rectal mass biopsy 07/2013 - invasive moderately differentiated adenocarcinoma with ulceration, all 4 DNA mismatch repair proteins are expressed in the tumor nuclei.      ASSESSMENT AND PLAN:   1.  Postop large right-sided pulmonary embolism in 03/2014, s/p 7 months of 70 to 80 mg subcu b.i.d.  after repeat CT chest 9/2014 indicated no PE.   Most likely his 03/2014 pulmonary embolism was due to postop debilitated condition. Hypercoagulable work up was negative. He was off coumadin since fall 2014.    Advise full dose enteric coated ASA. He is tolerating this fine.      2.  Renal insufficiency has been improving .  He is encouraged on oral fluid intake.     3.  Locally advanced rectal cancer, status post concurrent chemoradiation.  Surgical resection achieved complete CR.   He did not finish adjuvant treatment due to then poor condition.     He is due 6 months f/u with labs and CT prn.   Advise ASA and vit D.      4. 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   He needs on top of colonoscopy. He has not have one done since, will refer him to local MDs.

## 2018-10-23 NOTE — PATIENT INSTRUCTIONS
Dr. Martins would like you to get a repeat colonoscopy.     We would like to see you back in 6 months for a follow up appointment with labs prior.     When you are in need of a refill, please call your pharmacy and they will send us a request.      Copy of appointments, and after visit summary (AVS) given to patient.      If you have any questions please call Suzanne Sultana RN, BSN Oncology Hematology  [facility] [phone]. For questions after business hours, or on holidays/weekends, please call our after hours Nurse Triage line [phone]. Thank you.   Due repeat colonoscopy. 6 months f/u with labs.

## 2018-10-23 NOTE — NURSING NOTE
"Oncology Rooming Note    October 23, 2018 11:16 AM   Storm Dutta is a 78 year old male who presents for:    Chief Complaint   Patient presents with     Oncology Clinic Visit     6 month follow up Rectal CA. Review lab results.      Initial Vitals: /70 (BP Location: Right arm, Patient Position: Sitting, Cuff Size: Adult Large)  Pulse 69  Temp 96.5  F (35.8  C) (Tympanic)  Resp 18  Ht 1.727 m (5' 7.99\")  Wt 96.6 kg (213 lb)  SpO2 97%  BMI 32.39 kg/m2 Estimated body mass index is 32.39 kg/(m^2) as calculated from the following:    Height as of this encounter: 1.727 m (5' 7.99\").    Weight as of this encounter: 96.6 kg (213 lb). Body surface area is 2.15 meters squared.  No Pain (0) Comment: Data Unavailable   No LMP for male patient.  Allergies reviewed: Yes  Medications reviewed: Yes    Medications: Medication refills not needed today.  Pharmacy name entered into [facility]: [facility] [address]    Clinical concerns: 6 month follow up rectal CA. Review lab results.     8 minutes for nursing intake (face to face time)     Radha Shipley CMA            "

## 2018-10-23 NOTE — LETTER
10/23/2018         RE: Storm Dutta  [address] [place] [address]        Dear Colleague,    Thank you for referring your patient, Storm Dutta, to the [facility]. Please see a copy of my visit note below.    Hematology /oncology Visit      CHIEF COMPLAINT AND REASON FOR VISIT:    1. PE post op   2. Rectal cancer 2013     HISTORY OF PRESENT ILLNESS:  He presented with SOB found to have large right pulmonary embolism in 03/2014 when he was recovering from a laparoscopic-assisted low anterior resection for rectal cancer resection.  He had colorectal end-to-end anastomosis and loop ileostomy at that time.    He was on Lovenox 70-80 mg subcu b.i.d. for 7 months. It was stopped after repeat CT chest in 9/2014 indicated no PE.   Hypercoagulable work up through us in 9/2014 are negative.      He was diagnosed with locally-advanced rectal adenocarcinoma in 07/2013 through colonoscopy due to bloody bowel movements and anemia.  Preop evaluation showed the lesion is about mid to upper rectum 7 cm from the anal verge, there are prominent 5-10 perirectal lymph nodes. He had minimal stage III disease on presentation.     He received neoadjuvant concurrent chemoradiation with oral Xeloda, completed in 10/2013, then went on to have in 1/2014 laparoscopic-assisted LAR with splenic flexure mobilization and colonic J pouch with colorectal end-to-end anastomosis, loop ileostomy.    Final pathology indicating CR with no residual tumor in the rectum and in the lymph nodes.  He was only able to proceed with 2 cycles of XELOX and then dropped due to his general poor condition.     PMH  Type 2 diabetes, peripheral neuropathy, BPH, hypertension, hyperlipidemia, history of urinary retention.      SOCIAL HISTORY:  He lives in [place].  He never used tobacco.  Denies alcohol abuse.  He quit smoking in 2002.  He had 30-pack-year smoking history prior.   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     FAMILY HISTORY:   "Positive for maternal grandmother who had unknown type of cancer, 1 sister was treated will breast cancer.        REVIEW OF SYSTEMS:   He is eating nl, no pain, gaining weight, no blood in stool.  He is living active life. He barely eats vegi.       PHYSICAL EXAMINATION:   VITAL SIGNS: Blood pressure 154/70, pulse 69, temperature 96.5  F (35.8  C), temperature source Tympanic, resp. rate 18, height 1.727 m (5' 7.99\"), weight 96.6 kg (213 lb), SpO2 97 %.     ECOG 0    GENERAL APPEARANCE:   Elderly gentleman, looks much younger than his stated age, not in acute distress.  He looks very comfortable.   HEENT: The patient is normocephalic, atraumatic. Pupils are equal react to light.  Sclerae are anicteric.  Moist oral mucosa.  Negative pharynx.  No oral thrush.   NECK:  Supple.  No jugular venous distention.  Thyroid is not palpable.   LYMPH NODES:  Superficial lymphadenopathy is not appreciable in the bilateral cervical, supraclavicular, axillary or inguinal adenopathy.    CARDIOVASCULAR:  S1, S2 regular with no murmurs or gallops.  No carotid or abdominal bruits.   PULMONARY:  Lungs are clear to auscultation and percussion bilaterally.  There is no wheezing or rhonchi.   GASTROINTESTINAL:  Abdomen is very soft, PEG feeding tube properly in place.   He has a right abdomen ileostomy bag, brownish liquid in the bag.   MUSCULOSKELETAL/EXTREMITIES:  No edema.  No cyanotic changes.  No signs of joint deformity.  No lymphedema.   NEUROLOGIC:  Cranial nerves II-XII are grossly intact.  Sensation intact.  Muscle strength and muscle tone symmetrical all through 5/5.   BACK:  No spinal or paraspinal tenderness.  No CVA tenderness.   SKIN:  No petechiae.  No rash.  No signs of cellulitis. Bruising on dorsal hands.       Current LAB Data Reviewed  Cbc diff/CEA are fine,   Cr  1.18 from 1.22 from 1.26 from 1.37 stable.       OLD DATA REVIEW IN SUMMARY:   12/2016 colonoscopy: multiple tubulovillous adenoma removed.     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     CT body 10/2016: no mets.     CT 10/2015  1. Negative chest. No PE  2. Interval right lower quadrant ostomy takedown.  3. Postoperative changes in the rectal and presacral region without any appreciable change since the prior exam.  4. Enlarged prostate gland.    CT 9/2014 - No evidence for pulmonary embolism.    CT enterography in 06/2014 with no evidence of bowel obstruction, no convincing focal small bowel abnormality, possible fistula track connecting this walled-off collection colonic anastomosis site in the pelvis.       Laparoscopic-assisted LAR in 01/2014 - no residual carcinoma identified in the sigmoid or rectum, 1 lymph node was negative.      Preop MRI in 08/2013:  sessile tumor in the posterior mid to upper rectum, between 5 and 10 prominent perirectal lymph nodes which are suspicious.      Original pathology from colonoscopy, rectal mass biopsy 07/2013 - invasive moderately differentiated adenocarcinoma with ulceration, all 4 DNA mismatch repair proteins are expressed in the tumor nuclei.      ASSESSMENT AND PLAN:   1.  Postop large right-sided pulmonary embolism in 03/2014, s/p 7 months of 70 to 80 mg subcu b.i.d.  after repeat CT chest 9/2014 indicated no PE.   Most likely his 03/2014 pulmonary embolism was due to postop debilitated condition. Hypercoagulable work up was negative. He was off coumadin since fall 2014.    Advise full dose enteric coated ASA. He is tolerating this fine.      2.  Renal insufficiency has been improving .  He is encouraged on oral fluid intake.     3.  Locally advanced rectal cancer, status post concurrent chemoradiation.  Surgical resection achieved complete CR.   He did not finish adjuvant treatment due to then poor condition.     He is due 6 months f/u with labs and CT prn.   Advise ASA and vit D.      4. 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   He  needs on top of colonoscopy. He has not have one done since, will refer him to local MDs.                       Again, thank you for allowing me to participate in the care of your patient.        Sincerely,        Ruth Martins MD, MD

## 2018-10-29 ENCOUNTER — DOCUMENTATION ONLY (OUTPATIENT)
Dept: OTHER | Facility: CLINIC | Age: 78
End: 2018-10-29

## 2018-12-05 ENCOUNTER — ANESTHESIA EVENT (OUTPATIENT)
Dept: GASTROENTEROLOGY | Facility: CLINIC | Age: 78
End: 2018-12-05
Payer: MEDICARE

## 2018-12-05 ASSESSMENT — LIFESTYLE VARIABLES: TOBACCO_USE: 1

## 2018-12-05 NOTE — ANESTHESIA PREPROCEDURE EVALUATION
Anesthesia Evaluation     . Pt has had prior anesthetic. Type: General and MAC    No history of anesthetic complications          ROS/MED HX    ENT/Pulmonary:     (+)tobacco use, Past use , . Other pulmonary disease Hx PE.    Neurologic:  - neg neurologic ROS     Cardiovascular: Comment: Orthostatic hypotension    (+) Dyslipidemia, hypertension----. : . . . :. . Previous cardiac testing Echodate:5-2014results:Interpretation Summary   1.  Mild mitral valve prolapse  2.  Normal LV size and systolic function.  3.    Normal RV size and function  4.  No vegetation visualized on VERNA   PatientHeight: 68 in   PatientWeight: 155 lbs   SystolicPressure: 118 mmHg   DiastolicPressure: 71 mmHg   HeartRate: 93 bpm   BSA 1.8 m^2       Procedure   Transesophageal  Echocardiogram with color and spectral Doppler performed.   Procedure location Echo Lab.   The procedure was performed  in the Echo Lab.   Patient was sedated using Fentanyl 50 mcg.   Patient was sedated using Versed2 mg.   The patient's rhythm is normal sinus.   Good quality two-dimensional was performed and interpreted.       Left Ventricle   The Ejection Fraction is estimated at 60-65%.       Right Ventricle   The right ventricle is normal size.   Global right ventricular function is normal.       Atria   Both atria appear normal.   The left atrial appendage is normal. It is free of spontaneous echo contrast   and thrombus.   Lipomatous infiltration of the interatrial septm is present.   The atrial septum is intact as assessed by color Doppler and agitated   dextrose bubble study .   A catheter is noted in the right atrium.   Normal appearance of catheter with no echodensities noted.       Mitral Valve   Myxomatous mitral valve leaflets without stenosis. Mild anterior prolapse.   Mild mitral insufficiency is present.       Aortic Valve   The aortic valve leaflets are thickened. There is no definite vegetation   visualized.   The aortic valve is tricuspid.   Trace  aortic insufficiency is present.       Tricuspid Valve   Structurally normal valve without stenosis.   Mild tricuspid insufficiency is present.       Pulmonic Valve   Structurally normal valve without stenosis.   Trace pulmonic insufficiency is present.       Vessels   The aorta root is normal.   The thoracic aorta is normal.   Catheter in superior vena cava.       Pericardium   No pericardial effusion is present.       Compared to Previous Study   This study was compared with the study from 4/28/2014 .   There has been no change.date: results:ECG reviewed date:1-2015 results:NSR date: results:          METS/Exercise Tolerance:  >4 METS   Hematologic:     (+) History of blood clots pt is not anticoagulated, -      Musculoskeletal:  - neg musculoskeletal ROS       GI/Hepatic:     (+) Other GI/Hepatic hx of SBO      Renal/Genitourinary:     (+) chronic renal disease (CKD), BPH, Other Renal/ Genitourinary, Urinary retention      Endo:     (+) type II DM Last HgA1c: 8.1 date: 6/12/18 Using insulin - not using insulin pump Obesity, .      Psychiatric:  - neg psychiatric ROS       Infectious Disease:  - neg infectious disease ROS       Malignancy:   (+) Malignancy History of Other and Skin  Skin CA status post Surgery, Other CA Rectal status post Surgery         Other:    - neg other ROS                 Physical Exam  Normal systems: cardiovascular and pulmonary    Airway   Mallampati: I  TM distance: >3 FB  Neck ROM: full    Dental   (+) upper dentures and lower dentures    Cardiovascular       Pulmonary                     Anesthesia Plan      History & Physical Review  History and physical reviewed and following examination; no interval change.    ASA Status:  3 .    NPO Status:  > 4 hours    Plan for MAC Maintenance will be Balanced.  Reason for MAC:  Deep or markedly invasive procedure (G8)         Postoperative Care      Consents  Anesthetic plan, risks, benefits and alternatives discussed with:  Patient, Spouse and  Daughter/Son..                          .

## 2018-12-06 ENCOUNTER — SURGERY (OUTPATIENT)
Age: 78
End: 2018-12-06

## 2018-12-06 ENCOUNTER — HOSPITAL ENCOUNTER (OUTPATIENT)
Facility: CLINIC | Age: 78
Discharge: HOME | End: 2018-12-06
Attending: SURGERY | Admitting: SURGERY
Payer: MEDICARE

## 2018-12-06 ENCOUNTER — ANESTHESIA (OUTPATIENT)
Dept: GASTROENTEROLOGY | Facility: CLINIC | Age: 78
End: 2018-12-06
Payer: MEDICARE

## 2018-12-06 VITALS
SYSTOLIC BLOOD PRESSURE: 120 MMHG | RESPIRATION RATE: 16 BRPM | DIASTOLIC BLOOD PRESSURE: 68 MMHG | OXYGEN SATURATION: 96 %

## 2018-12-06 LAB
COLONOSCOPY: NORMAL
GLUCOSE BLDC GLUCOMTR-MCNC: 176 MG/DL (ref 70–99)

## 2018-12-06 PROCEDURE — 88305 TISSUE EXAM BY PATHOLOGIST: CPT | Performed by: SURGERY

## 2018-12-06 PROCEDURE — 82962 GLUCOSE BLOOD TEST: CPT

## 2018-12-06 PROCEDURE — 45384 COLONOSCOPY W/LESION REMOVAL: CPT | Performed by: SURGERY

## 2018-12-06 PROCEDURE — 37000008 ZZH ANESTHESIA TECHNICAL FEE, 1ST 30 MIN: Performed by: SURGERY

## 2018-12-06 PROCEDURE — 45384 COLONOSCOPY W/LESION REMOVAL: CPT | Mod: 59 | Performed by: SURGERY

## 2018-12-06 PROCEDURE — 45385 COLONOSCOPY W/LESION REMOVAL: CPT | Mod: PT | Performed by: SURGERY

## 2018-12-06 PROCEDURE — 25000125 ZZHC RX 250: Performed by: NURSE ANESTHETIST, CERTIFIED REGISTERED

## 2018-12-06 PROCEDURE — 25000128 H RX IP 250 OP 636: Performed by: SURGERY

## 2018-12-06 PROCEDURE — 88305 TISSUE EXAM BY PATHOLOGIST: CPT | Mod: 26 | Performed by: SURGERY

## 2018-12-06 PROCEDURE — 25000128 H RX IP 250 OP 636: Performed by: NURSE ANESTHETIST, CERTIFIED REGISTERED

## 2018-12-06 RX ORDER — LIDOCAINE 40 MG/G
CREAM TOPICAL
Status: DISCONTINUED | OUTPATIENT
Start: 2018-12-06 | End: 2018-12-06 | Stop reason: HOSPADM

## 2018-12-06 RX ORDER — PROPOFOL 10 MG/ML
INJECTION, EMULSION INTRAVENOUS CONTINUOUS PRN
Status: DISCONTINUED | OUTPATIENT
Start: 2018-12-06 | End: 2018-12-06

## 2018-12-06 RX ORDER — LIDOCAINE HYDROCHLORIDE 10 MG/ML
INJECTION, SOLUTION INFILTRATION; PERINEURAL PRN
Status: DISCONTINUED | OUTPATIENT
Start: 2018-12-06 | End: 2018-12-06

## 2018-12-06 RX ORDER — PROPOFOL 10 MG/ML
INJECTION, EMULSION INTRAVENOUS PRN
Status: DISCONTINUED | OUTPATIENT
Start: 2018-12-06 | End: 2018-12-06

## 2018-12-06 RX ORDER — GLYCOPYRROLATE 0.2 MG/ML
INJECTION, SOLUTION INTRAMUSCULAR; INTRAVENOUS PRN
Status: DISCONTINUED | OUTPATIENT
Start: 2018-12-06 | End: 2018-12-06

## 2018-12-06 RX ORDER — ONDANSETRON 2 MG/ML
4 INJECTION INTRAMUSCULAR; INTRAVENOUS
Status: DISCONTINUED | OUTPATIENT
Start: 2018-12-06 | End: 2018-12-06 | Stop reason: HOSPADM

## 2018-12-06 RX ORDER — SODIUM CHLORIDE, SODIUM LACTATE, POTASSIUM CHLORIDE, CALCIUM CHLORIDE 600; 310; 30; 20 MG/100ML; MG/100ML; MG/100ML; MG/100ML
INJECTION, SOLUTION INTRAVENOUS CONTINUOUS
Status: DISCONTINUED | OUTPATIENT
Start: 2018-12-06 | End: 2018-12-06 | Stop reason: HOSPADM

## 2018-12-06 RX ADMIN — SODIUM CHLORIDE, POTASSIUM CHLORIDE, SODIUM LACTATE AND CALCIUM CHLORIDE: 600; 310; 30; 20 INJECTION, SOLUTION INTRAVENOUS at 11:44

## 2018-12-06 RX ADMIN — PROPOFOL 40 MG: 10 INJECTION, EMULSION INTRAVENOUS at 11:47

## 2018-12-06 RX ADMIN — PROPOFOL 200 MCG/KG/MIN: 10 INJECTION, EMULSION INTRAVENOUS at 11:47

## 2018-12-06 RX ADMIN — LIDOCAINE HYDROCHLORIDE 50 MG: 10 INJECTION, SOLUTION INFILTRATION; PERINEURAL at 11:47

## 2018-12-06 RX ADMIN — GLYCOPYRROLATE 0.2 MG: 0.2 INJECTION, SOLUTION INTRAMUSCULAR; INTRAVENOUS at 11:47

## 2018-12-06 NOTE — ANESTHESIA POSTPROCEDURE EVALUATION
Patient: Storm Dutta    Procedure(s):  COLONOSCOPY    Diagnosis:history of rectal cancer    screening  Diagnosis Additional Information: No value filed.    Anesthesia Type:  No value filed.    Note:  Anesthesia Post Evaluation    Patient location during evaluation: Phase 2  Patient participation: Able to fully participate in evaluation  Level of consciousness: awake  Pain management: adequate  Airway patency: patent  Cardiovascular status: acceptable and hemodynamically stable  Respiratory status: acceptable, room air and spontaneous ventilation  Hydration status: acceptable  PONV: none     Anesthetic complications: None          Last vitals:  There were no vitals filed for this visit.      Electronically Signed By: NASRA Neil CRNA  December 6, 2018  11:52 AM

## 2018-12-06 NOTE — ANESTHESIA CARE TRANSFER NOTE
Patient: Storm Dutta    Procedure(s):  COLONOSCOPY    Diagnosis: history of rectal cancer    screening  Diagnosis Additional Information: No value filed.    Anesthesia Type:   No value filed.     Note:  Airway :Room Air  Patient transferred to:Phase II        Vitals: (Last set prior to Anesthesia Care Transfer)    CRNA VITALS  12/6/2018 1122 - 12/6/2018 1152      12/6/2018             Pulse: 78    Ht Rate: 75    SpO2: 97 %                Electronically Signed By: NASRA Neil CRNA  December 6, 2018  11:52 AM

## 2018-12-06 NOTE — H&P
78 year old year old male here for colonoscopy for personal history of colon cancer.    Patient Active Problem List   Diagnosis     Hyperlipidemia LDL goal <70     Type 2 diabetes mellitus with diabetic polyneuropathy (H)     Hypertension goal BP (blood pressure) < 140/90     Elevated prostate specific antigen (PSA)     Hypertrophy of prostate with urinary obstruction     Senile nuclear sclerosis     Orthostatic hypotension     [facility]     Rectal cancer (H)     PE (pulmonary embolism)     Abnormal CT of the abdomen     Endocarditis, bacterial, acute/subacute     Small bowel obstruction, recurrent     Type 2 diabetes mellitus with diabetic chronic kidney disease (H)     Type 2 diabetes mellitus (H)       Past Medical History:   Diagnosis Date     Acute urinary retention 11/2012    ER visit   / 1200 cc post-void residual     Acute urinary retention 11/1/2012    ER      Diabetes mellitus type II      Epididymitis 3/20/2014    Started on doxycyclline for UTI/orchitis. He was discharged on 03/22/14.     Former smoker     dc'd 2006     Hypertension goal BP (blood pressure) < 140/90 8/24/2012     PE (pulmonary embolism) 3/20/2014     Rectal cancer (H)      Skin cancer      Squamous cell carcinoma      Thrombosis of leg     +PE       Past Surgical History:   Procedure Laterality Date     COLONOSCOPY  7/29/2013    Procedure: COMBINED COLONOSCOPY, SINGLE BIOPSY/POLYPECTOMY BY BIOPSY;;  Surgeon: Bari Burnett MD;  Location: [facility]     COLONOSCOPY N/A 6/4/2015    Procedure: COMBINED COLONOSCOPY, SINGLE OR MULTIPLE BIOPSY/POLYPECTOMY BY BIOPSY;  Surgeon: Tara Mtz MD;  Location:  GI     COLONOSCOPY N/A 12/5/2016    Procedure: COLONOSCOPY;  Surgeon: Breanna Kline MD;  Location:  GI     EYE SURGERY  5/2012    Right eye procedure     HC CIRCUMCISION SURGICAL NON-DEV >28 DAYS AGE  2/97    due to phimosis     HC REMOVAL GALLBLADDER  5/01     [facility] UGI ENDOSCOPY W PLACEMENT GASTROSTOMY TUBE  PERCUT  3/13/2014    Procedure: COMBINED ESOPHAGOSCOPY, GASTROSCOPY, DUODENOSCOPY (EGD), PLACE PERCUTANEOUS ENDOSCOPIC GASTROSTOMY TUBE;  Surgeon: Loco Casillas MD;  Location: [facility]     LAPAROSCOPIC ASSISTED COLECTOMY LEFT (DESCENDING)  1/7/2014    Procedure: LAPAROSCOPIC ASSISTED COLECTOMY LEFT (DESCENDING);  Laparoscopic hand assisted Low Anterior Resection With colonic J pouch and end to end colorectal anastamosis. Laparoscopic splenic flexure mobilization.  Loop Ileostomy.  Rigid proctoscopy;  Surgeon: Margaret Gamble MD;  [facility]     PHACOEMULSIFICATION WITH STANDARD INTRAOCULAR LENS IMPLANT  4/11/2013    Procedure: PHACOEMULSIFICATION WITH STANDARD INTRAOCULAR LENS IMPLANT;  Right Kelman Phacoemulsification with Intraocular Lens Implant;  Surgeon: Caesar Turner MD;  Location: [facility]     REMOVE GASTROSTOMY TUBE ADULT  7/25/2014    Procedure: REMOVE GASTROSTOMY TUBE ADULT;  Surgeon: Margaret Gamble MD;  [facility]     SIGMOIDOSCOPY FLEXIBLE  6/23/2014    Procedure: SIGMOIDOSCOPY FLEXIBLE;  Surgeon: Margaret Gamble MD;  [facility]     SIGMOIDOSCOPY FLEXIBLE  7/22/2014    Procedure: SIGMOIDOSCOPY FLEXIBLE;  Surgeon: Margaret Gamble MD;  [facility]     SIGMOIDOSCOPY FLEXIBLE  7/25/2014    Procedure: SIGMOIDOSCOPY FLEXIBLE;  Surgeon: Margaret Gamble MD;  [facility]     SIGMOIDOSCOPY FLEXIBLE  7/28/2014    Procedure: SIGMOIDOSCOPY FLEXIBLE;  Surgeon: Margaret Gamble MD;  [facility]     SIGMOIDOSCOPY FLEXIBLE  7/31/2014    Procedure: SIGMOIDOSCOPY FLEXIBLE;  Surgeon: Margaret Gamble MD;  [facility]     SIGMOIDOSCOPY FLEXIBLE  8/3/2014    Procedure: SIGMOIDOSCOPY FLEXIBLE;  Surgeon: Margaret Gamble MD;  [facility]     SIGMOIDOSCOPY FLEXIBLE  8/5/2014    Procedure: SIGMOIDOSCOPY FLEXIBLE;  Surgeon: Margaret Gamble MD;  [facility]     SIGMOIDOSCOPY FLEXIBLE   8/8/2014    Procedure: SIGMOIDOSCOPY FLEXIBLE;  Surgeon: Margaret Gamble MD;  [facility]     SIGMOIDOSCOPY FLEXIBLE  8/18/2014    Procedure: SIGMOIDOSCOPY FLEXIBLE;  Surgeon: Jose Roberto Cervantes MD;  [facility]     SIGMOIDOSCOPY FLEXIBLE N/A 8/15/2014    Procedure: SIGMOIDOSCOPY FLEXIBLE;  Surgeon: Margaret Gamble MD;  [facility]     SIGMOIDOSCOPY FLEXIBLE N/A 8/22/2014    Procedure: SIGMOIDOSCOPY FLEXIBLE;  Surgeon: Jose Roberto Cervantes MD;  [facility]     SIGMOIDOSCOPY FLEXIBLE N/A 8/11/2014    Procedure: SIGMOIDOSCOPY FLEXIBLE;  Surgeon: Margaret Gamble MD;  [facility]     SIGMOIDOSCOPY FLEXIBLE N/A 8/26/2014    Procedure: SIGMOIDOSCOPY FLEXIBLE;  Surgeon: Margaret Gamble MD;  [facility]     SIGMOIDOSCOPY FLEXIBLE N/A 8/29/2014    Procedure: SIGMOIDOSCOPY FLEXIBLE;  Surgeon: Margaret Gamble MD;  [facility]     SIGMOIDOSCOPY FLEXIBLE N/A 9/8/2014    Procedure: SIGMOIDOSCOPY FLEXIBLE;  Surgeon: Margaret Gamble MD;  [facility]     SIGMOIDOSCOPY FLEXIBLE N/A 9/2/2014    Procedure: SIGMOIDOSCOPY FLEXIBLE;  Surgeon: Breanna Kline MD;  [facility]     SIGMOIDOSCOPY FLEXIBLE N/A 9/11/2014    Procedure: SIGMOIDOSCOPY FLEXIBLE;  Surgeon: Margaret Gamble MD;  [facility]     SIGMOIDOSCOPY FLEXIBLE N/A 9/19/2014    Procedure: SIGMOIDOSCOPY FLEXIBLE;  Surgeon: Margaret Gamble MD;  [facility]     SIGMOIDOSCOPY FLEXIBLE N/A 9/15/2014    Procedure: SIGMOIDOSCOPY FLEXIBLE;  Surgeon: Margaret Gamble MD;  [facility]     SIGMOIDOSCOPY FLEXIBLE N/A 9/5/2014    Procedure: SIGMOIDOSCOPY FLEXIBLE;  Surgeon: Margaret Gamble MD;  [facility]     SIGMOIDOSCOPY FLEXIBLE N/A 9/23/2014    Procedure: SIGMOIDOSCOPY FLEXIBLE;  Surgeon: Margaret Gamble MD;  [facility]     SIGMOIDOSCOPY FLEXIBLE N/A 9/26/2014    Procedure: SIGMOIDOSCOPY FLEXIBLE;  Surgeon:  Margaret Gamble MD;  Location:  GI     SIGMOIDOSCOPY FLEXIBLE N/A 9/30/2014    Procedure: SIGMOIDOSCOPY FLEXIBLE;  Surgeon: Margaret Gamble MD;  Location:  GI     SIGMOIDOSCOPY FLEXIBLE N/A 10/3/2014    Procedure: SIGMOIDOSCOPY FLEXIBLE;  Surgeon: Margaret Gamble MD;  Location:  GI     SIGMOIDOSCOPY FLEXIBLE N/A 10/30/2014    Procedure: SIGMOIDOSCOPY FLEXIBLE;  Surgeon: Margaret Gamble MD;  Location:  GI     SIGMOIDOSCOPY FLEXIBLE, PLACEMENT OF DRAINAGE SPONGE  9/30/14     TAKEDOWN ILEOSTOMY N/A 1/6/2015    Procedure: TAKEDOWN ILEOSTOMY;  Surgeon: Margaret Gamble MD;  Location:  OR       @[facility]@    No current outpatient prescriptions on file.       Allergies   Allergen Reactions     Nkda [No Known Drug Allergies]        Pt reports that he quit smoking about 16 years ago. His smoking use included Cigars. He quit after 30.00 years of use. He has never used smokeless tobacco. He reports that he does not drink alcohol or use illicit drugs.    Exam:  There were no vitals taken for this visit.    Awake, Alert OX3  Lungs - CTA bilaterally  CV - RRR, no murmurs, distal pulses intact  Abd - soft, non-distended, non-tender, +BS  Extr - No cyanosis or edema    A/P 78 year old year old male in need of colonoscopy for  personal history of colon cancer. Risks, benefits, alternatives, and complications were discussed including the possibility of perforation and the patient agreed to proceed.    Jared Walter MD

## 2018-12-12 LAB — COPATH REPORT: NORMAL

## 2018-12-14 DIAGNOSIS — E11.65 TYPE 2 DIABETES MELLITUS WITH HYPERGLYCEMIA, WITH LONG-TERM CURRENT USE OF INSULIN (H): ICD-10-CM

## 2018-12-14 DIAGNOSIS — Z79.4 TYPE 2 DIABETES MELLITUS WITH HYPERGLYCEMIA, WITH LONG-TERM CURRENT USE OF INSULIN (H): ICD-10-CM

## 2018-12-17 NOTE — TELEPHONE ENCOUNTER
"Requested Prescriptions   Pending Prescriptions Disp Refills     insulin aspart (NOVOLOG FLEXPEN) 100 UNIT/ML pen 15 mL 0    Last Written Prescription Date:  10/17/2018 #15ml x 0  Last filled - not provided  Last office visit: 6/12/2018 JESENIA Beaulieu  Future Office Visit: None    Sig: Use 8 units plus low sliding scale before each meal 120-149 0 units  150-199 1 unit  200-249 2 units  250-299 3 units  300-349 4 units  > 350 5 units   Max dose 50 units day    Short Acting Insulin Protocol Failed - 12/14/2018  5:56 PM       Failed - HgbA1C in past 3 or 6 months    If HgbA1C is 8 or greater, it needs to be on file within the past 3 months.  If less than 8, must be on file within the past 6 months.     Recent Labs   Lab Test 06/12/18  0956   A1C 8.1*            Failed - Recent (6 mo) or future (30 days) visit within the authorizing provider's specialty    Patient had office visit in the last 6 months or has a visit in the next 30 days with authorizing provider or within the authorizing provider's specialty.  See \"Patient Info\" tab in inbasket, or \"Choose Columns\" in Meds & Orders section of the refill encounter.           Passed - Blood pressure less than 140/90 in past 6 months    BP Readings from Last 3 Encounters:   12/06/18 120/68   10/23/18 154/70   09/17/18 146/68                Passed - LDL on file in past 12 months    Recent Labs   Lab Test 06/12/18  0956   LDL 64            Passed - Microalbumin on file in past 12 months    Recent Labs   Lab Test 06/12/18  1100   MICROL 55   UMALCR 87.68*            Passed - Serum creatinine on file in past 12 months    Recent Labs   Lab Test 10/16/18  0905  09/05/14  0745   CR 1.18   < >  --    CREAT  --   --  1.9*    < > = values in this interval not displayed.            Passed - Patient is age 18 or older          "

## 2018-12-26 DIAGNOSIS — I10 HYPERTENSION GOAL BP (BLOOD PRESSURE) < 140/90: ICD-10-CM

## 2018-12-26 NOTE — TELEPHONE ENCOUNTER
"Requested Prescriptions   Pending Prescriptions Disp Refills     losartan (COZAAR) 100 MG tablet [Pharmacy Med Name: LOSARTAN POTASSIUM 100MG TABS] 90 tablet 1    Last Written Prescription Date:  6/27/18  Last Fill Quantity: 90,  # refills: 1   Last office visit: 6/12/2018 with prescribing provider:  gabriela   Future Office Visit:     Sig: TAKE ONE TABLET BY MOUTH EVERY DAY    Angiotensin-II Receptors Passed - 12/26/2018  9:21 AM       Passed - Blood pressure under 140/90 in past 12 months    BP Readings from Last 3 Encounters:   12/06/18 120/68   10/23/18 154/70   09/17/18 146/68                Passed - Recent (12 mo) or future (30 days) visit within the authorizing provider's specialty    Patient had office visit in the last 12 months or has a visit in the next 30 days with authorizing provider or within the authorizing provider's specialty.  See \"Patient Info\" tab in inbasket, or \"Choose Columns\" in Meds & Orders section of the refill encounter.             Passed - Patient is age 18 or older       Passed - Normal serum creatinine on file in past 12 months    Recent Labs   Lab Test 10/16/18  0905  09/05/14  0745   CR 1.18   < >  --    CREAT  --   --  1.9*    < > = values in this interval not displayed.            Passed - Normal serum potassium on file in past 12 months    Recent Labs   Lab Test 10/16/18  0905   POTASSIUM 4.3                      "

## 2018-12-27 RX ORDER — LOSARTAN POTASSIUM 100 MG/1
TABLET ORAL
Qty: 90 TABLET | Refills: 0 | Status: SHIPPED | OUTPATIENT
Start: 2018-12-27 | End: 2019-03-27

## 2018-12-27 NOTE — TELEPHONE ENCOUNTER
Medication is being filled for 1 time refill only due to:  Patient overdue for diabetes visit. .me2 Estrellita Shaikh RN

## 2019-01-15 ENCOUNTER — OFFICE VISIT (OUTPATIENT)
Dept: FAMILY MEDICINE | Facility: CLINIC | Age: 79
End: 2019-01-15
Payer: COMMERCIAL

## 2019-01-15 VITALS
TEMPERATURE: 97 F | DIASTOLIC BLOOD PRESSURE: 68 MMHG | BODY MASS INDEX: 31.78 KG/M2 | WEIGHT: 214.6 LBS | RESPIRATION RATE: 16 BRPM | SYSTOLIC BLOOD PRESSURE: 138 MMHG | HEART RATE: 80 BPM | HEIGHT: 69 IN

## 2019-01-15 DIAGNOSIS — E11.42 TYPE 2 DIABETES MELLITUS WITH DIABETIC POLYNEUROPATHY, WITH LONG-TERM CURRENT USE OF INSULIN (H): Primary | ICD-10-CM

## 2019-01-15 DIAGNOSIS — I10 HYPERTENSION GOAL BP (BLOOD PRESSURE) < 140/90: ICD-10-CM

## 2019-01-15 DIAGNOSIS — C20 RECTAL CANCER (H): ICD-10-CM

## 2019-01-15 DIAGNOSIS — E78.5 HYPERLIPIDEMIA LDL GOAL <70: ICD-10-CM

## 2019-01-15 DIAGNOSIS — Z79.4 TYPE 2 DIABETES MELLITUS WITH DIABETIC POLYNEUROPATHY, WITH LONG-TERM CURRENT USE OF INSULIN (H): Primary | ICD-10-CM

## 2019-01-15 LAB
ANION GAP SERPL CALCULATED.3IONS-SCNC: 4 MMOL/L (ref 3–14)
BUN SERPL-MCNC: 22 MG/DL (ref 7–30)
CALCIUM SERPL-MCNC: 8.5 MG/DL (ref 8.5–10.1)
CHLORIDE SERPL-SCNC: 105 MMOL/L (ref 94–109)
CO2 SERPL-SCNC: 30 MMOL/L (ref 20–32)
CREAT SERPL-MCNC: 1.3 MG/DL (ref 0.66–1.25)
GFR SERPL CREATININE-BSD FRML MDRD: 52 ML/MIN/{1.73_M2}
GLUCOSE SERPL-MCNC: 186 MG/DL (ref 70–99)
HBA1C MFR BLD: 8.2 % (ref 0–5.6)
POTASSIUM SERPL-SCNC: 4.6 MMOL/L (ref 3.4–5.3)
SODIUM SERPL-SCNC: 139 MMOL/L (ref 133–144)

## 2019-01-15 PROCEDURE — 80048 BASIC METABOLIC PNL TOTAL CA: CPT | Performed by: FAMILY MEDICINE

## 2019-01-15 PROCEDURE — 36415 COLL VENOUS BLD VENIPUNCTURE: CPT | Performed by: FAMILY MEDICINE

## 2019-01-15 PROCEDURE — 83036 HEMOGLOBIN GLYCOSYLATED A1C: CPT | Performed by: FAMILY MEDICINE

## 2019-01-15 PROCEDURE — 99214 OFFICE O/P EST MOD 30 MIN: CPT | Performed by: FAMILY MEDICINE

## 2019-01-15 ASSESSMENT — PAIN SCALES - GENERAL: PAINLEVEL: NO PAIN (0)

## 2019-01-15 ASSESSMENT — MIFFLIN-ST. JEOR: SCORE: 1687.76

## 2019-01-15 NOTE — PROGRESS NOTES
SUBJECTIVE:   Storm Dutta is a 78 year old male who presents to clinic today for the following health issues:      Diabetes Follow-up      Patient is checking blood sugars: once daily in AM    Diabetic concerns: None     Symptoms of hypoglycemia (low blood sugar): none     Paresthesias (numbness or burning in feet) or sores: Yes-feet feel tender     Date of last diabetic eye exam: 1/9/19    Fasting blood sugars generally in the 120's, can be in the 90's.  Does not check premeal sugars, just takes 8 units each meal.      States he has all of his supplies sitting there, just does not check.  Feels it would be easy for him to start checking premeal.        Diabetes Management Resources    Hyperlipidemia Follow-Up      Rate your low fat/cholesterol diet?: not monitoring fat    Taking statin?  Yes, no muscle aches from statin    Other lipid medications/supplements?:  none    Hypertension Follow-up      Outpatient blood pressures are not being checked.    Low Salt Diet: not monitoring salt    BP Readings from Last 2 Encounters:   01/15/19 138/68   12/06/18 120/68     Hemoglobin A1C (%)   Date Value   01/15/2019 8.2 (H)   06/12/2018 8.1 (H)     LDL Cholesterol Calculated (mg/dL)   Date Value   07/27/2017 62   07/16/2015 61     LDL Cholesterol Direct (mg/dL)   Date Value   06/12/2018 64   08/04/2016 129 (H)       Amount of exercise or physical activity: 4-5 days/week for an average of greater than 60 minutes    Problems taking medications regularly: No    Medication side effects: none    Diet: regular (no restrictions)        Problem list and histories reviewed & adjusted, as indicated.  Additional history: as documented      Reviewed and updated as needed this visit by clinical staff  Tobacco  Allergies  Meds  Problems  Med Hx  Surg Hx  Fam Hx  Soc Hx        Reviewed and updated as needed this visit by Provider  Tobacco  Allergies  Meds  Problems  Med Hx  Surg Hx  Fam Hx         ROS: Remainder of  Constitutional, CV, Respiratory, GI,  negative with exception of that mentioned above    PE:  VS as above   Gen:  WN/WD/WH male in NAD   Heart:  RRR without murmur, nl S1, S2, no rubs or gallops   Lungs CTA leydi without rales/ronchi/wheezes   Ext:  trace pedal edema    A/p:      ICD-10-CM    1. Type 2 diabetes mellitus with diabetic polyneuropathy, with long-term current use of insulin (H) E11.42 Hemoglobin A1c    Z79.4 Basic metabolic panel   2. Hypertension goal BP (blood pressure) < 140/90 I10    3. Hyperlipidemia LDL goal <70 E78.5    4. Rectal cancer (H) C20      Patient Instructions   Your blood sugars remain a bit higher then we would like.  We're looking for an A1c <8.  Your fasting blood sugars are mostly at goal, we need to address the mealtime insulin.  The way to start will be to check blood sugars before lunch and dinner and add the sliding scale to the 8 units you take with each meal.    Let's set this as our gaol.  We'll call to check in in 2 weeks.  You and I should visit again in clinic in 3 months to recheck the A1c.    BP:  At goal, continue meds, on ARB  Lipids:  At goal, continue statin  DM:  Not at goal.  Set goal as above    Rectal cancer - in remission, followed by Dr Martins.  Up to date on colonoscopy

## 2019-01-15 NOTE — PATIENT INSTRUCTIONS
Your blood sugars remain a bit higher then we would like.  We're looking for an A1c <8.  Your fasting blood sugars are mostly at goal, we need to address the mealtime insulin.  The way to start will be to check blood sugars before lunch and dinner and add the sliding scale to the 8 units you take with each meal.    Let's set this as our gaol.  We'll call to check in in 2 weeks.  You and I should visit again in clinic in 3 months to recheck the A1c.

## 2019-02-10 DIAGNOSIS — N18.30 TYPE 2 DIABETES MELLITUS WITH STAGE 3 CHRONIC KIDNEY DISEASE, WITH LONG-TERM CURRENT USE OF INSULIN (H): ICD-10-CM

## 2019-02-10 DIAGNOSIS — E11.22 TYPE 2 DIABETES MELLITUS WITH STAGE 3 CHRONIC KIDNEY DISEASE, WITH LONG-TERM CURRENT USE OF INSULIN (H): ICD-10-CM

## 2019-02-10 DIAGNOSIS — Z79.4 TYPE 2 DIABETES MELLITUS WITH STAGE 3 CHRONIC KIDNEY DISEASE, WITH LONG-TERM CURRENT USE OF INSULIN (H): ICD-10-CM

## 2019-02-11 DIAGNOSIS — Z79.4 TYPE 2 DIABETES MELLITUS WITH HYPERGLYCEMIA, WITH LONG-TERM CURRENT USE OF INSULIN (H): ICD-10-CM

## 2019-02-11 DIAGNOSIS — E11.65 TYPE 2 DIABETES MELLITUS WITH HYPERGLYCEMIA, WITH LONG-TERM CURRENT USE OF INSULIN (H): ICD-10-CM

## 2019-02-11 RX ORDER — INSULIN GLARGINE 100 [IU]/ML
INJECTION, SOLUTION SUBCUTANEOUS
Qty: 30 ML | Refills: 1 | Status: SHIPPED | OUTPATIENT
Start: 2019-02-11 | End: 2019-11-15

## 2019-02-11 NOTE — TELEPHONE ENCOUNTER
"Lab Results   Component Value Date    A1C 8.2 01/15/2019    A1C 8.1 06/12/2018    A1C 8.3 02/27/2018    A1C 8.5 07/27/2017    A1C 7.0 08/04/2016     Called and spoke with patient  , he is using sliding scale  States BG are 80-90\"s in AM   This AM was 89  Before lunch 98 today   Discussed 3 month f/u with Christofer in beginning of June 2019 sooner if need  RX sent   SUKHWINDER Turcios  RN/Shukri Moreland    "

## 2019-02-25 DIAGNOSIS — N40.1 HYPERTROPHY OF PROSTATE WITH URINARY OBSTRUCTION: ICD-10-CM

## 2019-02-25 DIAGNOSIS — N13.8 HYPERTROPHY OF PROSTATE WITH URINARY OBSTRUCTION: ICD-10-CM

## 2019-02-26 RX ORDER — FINASTERIDE 5 MG/1
TABLET, FILM COATED ORAL
Qty: 90 TABLET | Refills: 1 | Status: SHIPPED | OUTPATIENT
Start: 2019-02-26 | End: 2019-08-26

## 2019-02-26 NOTE — TELEPHONE ENCOUNTER
"Requested Prescriptions   Pending Prescriptions Disp Refills     finasteride (PROSCAR) 5 MG tablet [Pharmacy Med Name: FINASTERIDE 5MG TABS] 90 tablet 1    Last Written Prescription Date:  08/23/2018 #90 x 1  Last filled 11/26/2018  Last office visit: 1/15/2019 JESENIA Beaulieu     Future Office Visit:   Next 5 appointments (look out 90 days)    Apr 23, 2019 11:15 AM CDT  Return Visit with Ruth Martins MD  [facility] ([facility]) [facility]  [address]  [facility] [address]  [phone]          Sig: TAKE ONE TABLET BY MOUTH EVERY DAY    Alpha Blockers Passed - 2/25/2019 11:22 AM       Passed - Blood pressure under 140/90 in past 12 months    BP Readings from Last 3 Encounters:   01/15/19 138/68   12/06/18 120/68   10/23/18 154/70                Passed - Recent (12 mo) or future (30 days) visit within the authorizing provider's specialty    Patient had office visit in the last 12 months or has a visit in the next 30 days with authorizing provider or within the authorizing provider's specialty.  See \"Patient Info\" tab in inbasket, or \"Choose Columns\" in Meds & Orders section of the refill encounter.             Passed - Patient does not have Tadalafil, Vardenafil, or Sildenafil on their medication list       Passed - Medication is active on med list       Passed - Patient is 18 years of age or older          "

## 2019-03-27 DIAGNOSIS — I10 HYPERTENSION GOAL BP (BLOOD PRESSURE) < 140/90: ICD-10-CM

## 2019-03-27 DIAGNOSIS — E78.5 HYPERLIPIDEMIA LDL GOAL <70: ICD-10-CM

## 2019-03-27 DIAGNOSIS — E11.9 TYPE 2 DIABETES, HBA1C GOAL < 7% (H): ICD-10-CM

## 2019-03-27 RX ORDER — ATORVASTATIN CALCIUM 20 MG/1
TABLET, FILM COATED ORAL
Qty: 90 TABLET | Refills: 3 | Status: SHIPPED | OUTPATIENT
Start: 2019-03-27 | End: 2020-03-24

## 2019-03-27 RX ORDER — FLURBIPROFEN SODIUM 0.3 MG/ML
SOLUTION/ DROPS OPHTHALMIC
Qty: 100 EACH | Refills: 7 | Status: SHIPPED | OUTPATIENT
Start: 2019-03-27 | End: 2020-05-26

## 2019-03-27 NOTE — TELEPHONE ENCOUNTER
"Requested Prescriptions   Pending Prescriptions Disp Refills     ULTICARE MINI 31G X 6 MM insulin pen needle [Pharmacy Med Name: ULTICARE MINI PEN N 31G X 6 MM MISC] 100 each 7     Sig: USE 4 TO 5 TIMES DAILY OR AS DIRECTED FOR SLIDING SCALE INSULIN    Diabetic Supplies Protocol Passed - 3/27/2019  8:35 AM       Passed - Medication is active on med list       Passed - Patient is 18 years of age or older       Passed - Recent (6 mo) or future (30 days) visit within the authorizing provider's specialty    Patient had office visit in the last 6 months or has a visit in the next 30 days with authorizing provider.  See \"Patient Info\" tab in inbasket, or \"Choose Columns\" in Meds & Orders section of the refill encounter.            atorvastatin (LIPITOR) 20 MG tablet [Pharmacy Med Name: ATORVASTATIN CALCIUM 20MG TABS] 90 tablet 3     Sig: TAKE ONE TABLET BY MOUTH EVERY DAY    Statins Protocol Passed - 3/27/2019  8:35 AM       Passed - LDL on file in past 12 months    Recent Labs   Lab Test 06/12/18  0956   LDL 64            Passed - No abnormal creatine kinase in past 12 months    No lab results found.            Passed - Recent (12 mo) or future (30 days) visit within the authorizing provider's specialty    Patient had office visit in the last 12 months or has a visit in the next 30 days with authorizing provider or within the authorizing provider's specialty.  See \"Patient Info\" tab in inbasket, or \"Choose Columns\" in Meds & Orders section of the refill encounter.             Passed - Medication is active on med list       Passed - Patient is age 18 or older        losartan (COZAAR) 100 MG tablet [Pharmacy Med Name: LOSARTAN POTASSIUM 100MG TABS] 90 tablet 0     Sig: TAKE ONE TABLET BY MOUTH ONCE DAILY (OVERDUE FOR DIABETES VISIT WITH DR. BO. PLEASE SCHEDULE APPOINTMENT)    Angiotensin-II Receptors Failed - 3/27/2019  8:35 AM       Failed - Normal serum creatinine on file in past 12 months    Recent Labs   Lab Test " "01/15/19  0903  09/05/14  0745   CR 1.30*   < >  --    CREAT  --   --  1.9*    < > = values in this interval not displayed.            Passed - Blood pressure under 140/90 in past 12 months    BP Readings from Last 3 Encounters:   01/15/19 138/68   12/06/18 120/68   10/23/18 154/70                Passed - Recent (12 mo) or future (30 days) visit within the authorizing provider's specialty    Patient had office visit in the last 12 months or has a visit in the next 30 days with authorizing provider or within the authorizing provider's specialty.  See \"Patient Info\" tab in inbasket, or \"Choose Columns\" in Meds & Orders section of the refill encounter.             Passed - Medication is active on med list       Passed - Patient is age 18 or older       Passed - Normal serum potassium on file in past 12 months    Recent Labs   Lab Test 01/15/19  0903   POTASSIUM 4.6                    Last Written Prescription Date:  1/31/18  Last Fill Quantity: 100,  # refills: 7   Last office visit: 1/15/2019 with prescribing provider:  Jovana Beaulieu     Future Office Visit:   Next 5 appointments (look out 90 days)    Apr 23, 2019 11:15 AM CDT  Return Visit with Ruth Martins MD  [facility] ([facility]) [facility]  [address]  [facility] [place] [facility] [place] [address]  [phone]           "

## 2019-03-27 NOTE — TELEPHONE ENCOUNTER
Routing refill request to provider for review/approval because:  Decreased kidney function. He is having repeat labs with Dr Martins on 4/16. Estrellita Shaikh RN

## 2019-03-28 RX ORDER — LOSARTAN POTASSIUM 100 MG/1
100 TABLET ORAL DAILY
Qty: 90 TABLET | Refills: 0 | Status: SHIPPED | OUTPATIENT
Start: 2019-03-28 | End: 2019-06-26

## 2019-03-29 DIAGNOSIS — E78.5 HYPERLIPIDEMIA LDL GOAL <70: ICD-10-CM

## 2019-03-29 DIAGNOSIS — N40.1 HYPERTROPHY OF PROSTATE WITH URINARY OBSTRUCTION: ICD-10-CM

## 2019-03-29 DIAGNOSIS — N13.8 HYPERTROPHY OF PROSTATE WITH URINARY OBSTRUCTION: ICD-10-CM

## 2019-03-29 RX ORDER — TAMSULOSIN HYDROCHLORIDE 0.4 MG/1
CAPSULE ORAL
Qty: 90 CAPSULE | Refills: 2 | Status: SHIPPED | OUTPATIENT
Start: 2019-03-29 | End: 2019-11-15

## 2019-03-29 NOTE — TELEPHONE ENCOUNTER
"Requested Prescriptions   Pending Prescriptions Disp Refills     tamsulosin (FLOMAX) 0.4 MG capsule [Pharmacy Med Name: TAMSULOSIN HCL 0.4MG CAPS] 90 capsule 1    Last Written Prescription Date:  10/02/2018 #90 x 1  Last filled 01/03/2019  Last office visit: 1/15/2019 JESENIA Beaulieu     Future Office Visit:   Next 5 appointments (look out 90 days)    Apr 23, 2019 11:15 AM CDT  Return Visit with Ruth Martins MD  [facility] ([facility]) [facility]  [address]  [facility] [address]  [phone]          Sig: TAKE ONE CAPSULE BY MOUTH EVERY DAY    Alpha Blockers Passed - 3/29/2019  6:38 AM       Passed - Blood pressure under 140/90 in past 12 months    BP Readings from Last 3 Encounters:   01/15/19 138/68   12/06/18 120/68   10/23/18 154/70                Passed - Recent (12 mo) or future (30 days) visit within the authorizing provider's specialty    Patient had office visit in the last 12 months or has a visit in the next 30 days with authorizing provider or within the authorizing provider's specialty.  See \"Patient Info\" tab in inbasket, or \"Choose Columns\" in Meds & Orders section of the refill encounter.             Passed - Patient does not have Tadalafil, Vardenafil, or Sildenafil on their medication list       Passed - Medication is active on med list       Passed - Patient is 18 years of age or older          "

## 2019-04-10 DIAGNOSIS — E11.65 TYPE 2 DIABETES MELLITUS WITH HYPERGLYCEMIA, WITH LONG-TERM CURRENT USE OF INSULIN (H): ICD-10-CM

## 2019-04-10 DIAGNOSIS — Z79.4 TYPE 2 DIABETES MELLITUS WITH HYPERGLYCEMIA, WITH LONG-TERM CURRENT USE OF INSULIN (H): ICD-10-CM

## 2019-04-10 NOTE — TELEPHONE ENCOUNTER
Requested Prescriptions   Pending Prescriptions Disp Refills     insulin aspart (NOVOLOG FLEXPEN) 100 UNIT/ML pen  Last Written Prescription Date:  2/12/19  Last Fill Quantity: 15ml,  # refills: 0   Last office visit: 1/15/2019 with prescribing provider:  gabriela   Future Office Visit:   Next 5 appointments (look out 90 days)    Apr 23, 2019 11:15 AM CDT  Return Visit with Ruth Martins MD  [facility] ([facility]) [facility]  [address]  [facility] [place] [address]  [phone]          15 mL 0     Sig: Use 8 units plus low sliding scale before each meal 120-149 0 units  150-199 1 unit  200-249 2 units  250-299 3 units  300-349 4 units  > 350 5 units   Max dose 50 units day       Short Acting Insulin Protocol Passed - 4/10/2019 10:34 AM        Passed - Blood pressure less than 140/90 in past 6 months     BP Readings from Last 3 Encounters:   01/15/19 138/68   12/06/18 120/68   10/23/18 154/70                 Passed - LDL on file in past 12 months     Recent Labs   Lab Test 06/12/18  0956   LDL 64             Passed - Microalbumin on file in past 12 months     Recent Labs   Lab Test 06/12/18  1100   MICROL 55   UMALCR 87.68*             Passed - Serum creatinine on file in past 12 months     Recent Labs   Lab Test 01/15/19  0903  09/05/14  0745   CR 1.30*   < >  --    CREAT  --   --  1.9*    < > = values in this interval not displayed.             Passed - HgbA1C in past 3 or 6 months     If HgbA1C is 8 or greater, it needs to be on file within the past 3 months.  If less than 8, must be on file within the past 6 months.     Recent Labs   Lab Test 01/15/19  0903   A1C 8.2*             Passed - Medication is active on med list        Passed - Patient is age 18 or older        Passed - Recent (6 mo) or future (30 days) visit within the authorizing provider's specialty     Patient had office visit in the last 6 months or has a visit in the next 30 days with authorizing  "provider or within the authorizing provider's specialty.  See \"Patient Info\" tab in inbasket, or \"Choose Columns\" in Meds & Orders section of the refill encounter.              "

## 2019-04-10 NOTE — TELEPHONE ENCOUNTER
Routing refill request to provider for review/approval because:  Patient having repeat lab work done on 4/16 but I dont see a visit scheduled. Estrellita Shaikh RN

## 2019-04-15 DIAGNOSIS — E11.65 TYPE 2 DIABETES MELLITUS WITH HYPERGLYCEMIA, WITH LONG-TERM CURRENT USE OF INSULIN (H): ICD-10-CM

## 2019-04-15 DIAGNOSIS — Z79.4 TYPE 2 DIABETES MELLITUS WITH HYPERGLYCEMIA, WITH LONG-TERM CURRENT USE OF INSULIN (H): ICD-10-CM

## 2019-04-15 NOTE — TELEPHONE ENCOUNTER
Last filled 02-   Last qty 15  Last office visit 01-      Jazzy Ambrocio [facility]  Certified Pharmacy Technician

## 2019-04-15 NOTE — TELEPHONE ENCOUNTER
Requested Prescriptions   Pending Prescriptions Disp Refills     insulin aspart (NOVOLOG FLEXPEN) 100 UNIT/ML pen  Last Written Prescription Date:  04/11/2019 #15ml x 0  Last filled - not provided  Last office visit: 1/15/2019 JESENIA Beaulieu     Future Office Visit:   Next 5 appointments (look out 90 days)    Apr 23, 2019 11:15 AM CDT  Return Visit with Ruth Martins MD  [facility] ([facility]) [facility]  [address]  [place] [facility] [address] [phone]          15 mL 0     Sig: Use 8 units plus low sliding scale before each meal 120-149 0 units  150-199 1 unit  200-249 2 units  250-299 3 units  300-349 4 units  > 350 5 units   Max dose 50 units day       Short Acting Insulin Protocol Passed - 4/15/2019 12:39 PM        Passed - Blood pressure less than 140/90 in past 6 months     BP Readings from Last 3 Encounters:   01/15/19 138/68   12/06/18 120/68   10/23/18 154/70                 Passed - LDL on file in past 12 months     Recent Labs   Lab Test 06/12/18  0956   LDL 64             Passed - Microalbumin on file in past 12 months     Recent Labs   Lab Test 06/12/18  1100   MICROL 55   UMALCR 87.68*             Passed - Serum creatinine on file in past 12 months     Recent Labs   Lab Test 01/15/19  0903  09/05/14  0745   CR 1.30*   < >  --    CREAT  --   --  1.9*    < > = values in this interval not displayed.             Passed - HgbA1C in past 3 or 6 months     If HgbA1C is 8 or greater, it needs to be on file within the past 3 months.  If less than 8, must be on file within the past 6 months.     Recent Labs   Lab Test 01/15/19  0903   A1C 8.2*             Passed - Medication is active on med list        Passed - Patient is age 18 or older        Passed - Recent (6 mo) or future (30 days) visit within the authorizing provider's specialty     Patient had office visit in the last 6 months or has a visit in the next 30 days with authorizing provider or within the  "authorizing provider's specialty.  See \"Patient Info\" tab in inbasket, or \"Choose Columns\" in Meds & Orders section of the refill encounter.              "

## 2019-04-16 DIAGNOSIS — C20 RECTAL CANCER (H): ICD-10-CM

## 2019-04-16 LAB
ALBUMIN SERPL-MCNC: 3.8 G/DL (ref 3.4–5)
ALP SERPL-CCNC: 114 U/L (ref 40–150)
ALT SERPL W P-5'-P-CCNC: 21 U/L (ref 0–70)
ANION GAP SERPL CALCULATED.3IONS-SCNC: 5 MMOL/L (ref 3–14)
AST SERPL W P-5'-P-CCNC: 13 U/L (ref 0–45)
BASOPHILS # BLD AUTO: 0 10E9/L (ref 0–0.2)
BASOPHILS NFR BLD AUTO: 0.4 %
BILIRUB SERPL-MCNC: 0.7 MG/DL (ref 0.2–1.3)
BUN SERPL-MCNC: 24 MG/DL (ref 7–30)
CALCIUM SERPL-MCNC: 9.3 MG/DL (ref 8.5–10.1)
CEA SERPL-MCNC: 1 UG/L (ref 0–2.5)
CHLORIDE SERPL-SCNC: 106 MMOL/L (ref 94–109)
CO2 SERPL-SCNC: 28 MMOL/L (ref 20–32)
CREAT SERPL-MCNC: 1.32 MG/DL (ref 0.66–1.25)
DIFFERENTIAL METHOD BLD: ABNORMAL
EOSINOPHIL # BLD AUTO: 0.1 10E9/L (ref 0–0.7)
EOSINOPHIL NFR BLD AUTO: 1.9 %
ERYTHROCYTE [DISTWIDTH] IN BLOOD BY AUTOMATED COUNT: 13.5 % (ref 10–15)
GFR SERPL CREATININE-BSD FRML MDRD: 51 ML/MIN/{1.73_M2}
GLUCOSE SERPL-MCNC: 104 MG/DL (ref 70–99)
HCT VFR BLD AUTO: 38.6 % (ref 40–53)
HGB BLD-MCNC: 12.6 G/DL (ref 13.3–17.7)
LYMPHOCYTES # BLD AUTO: 1.4 10E9/L (ref 0.8–5.3)
LYMPHOCYTES NFR BLD AUTO: 19.9 %
MCH RBC QN AUTO: 30.1 PG (ref 26.5–33)
MCHC RBC AUTO-ENTMCNC: 32.6 G/DL (ref 31.5–36.5)
MCV RBC AUTO: 92 FL (ref 78–100)
MONOCYTES # BLD AUTO: 0.6 10E9/L (ref 0–1.3)
MONOCYTES NFR BLD AUTO: 9.3 %
NEUTROPHILS # BLD AUTO: 4.7 10E9/L (ref 1.6–8.3)
NEUTROPHILS NFR BLD AUTO: 68.5 %
PLATELET # BLD AUTO: 176 10E9/L (ref 150–450)
POTASSIUM SERPL-SCNC: 4.1 MMOL/L (ref 3.4–5.3)
PROT SERPL-MCNC: 7.2 G/DL (ref 6.8–8.8)
RBC # BLD AUTO: 4.19 10E12/L (ref 4.4–5.9)
SODIUM SERPL-SCNC: 139 MMOL/L (ref 133–144)
WBC # BLD AUTO: 6.9 10E9/L (ref 4–11)

## 2019-04-16 PROCEDURE — 82378 CARCINOEMBRYONIC ANTIGEN: CPT | Performed by: INTERNAL MEDICINE

## 2019-04-16 PROCEDURE — 85025 COMPLETE CBC W/AUTO DIFF WBC: CPT | Performed by: INTERNAL MEDICINE

## 2019-04-16 PROCEDURE — 36415 COLL VENOUS BLD VENIPUNCTURE: CPT | Performed by: INTERNAL MEDICINE

## 2019-04-16 PROCEDURE — 80053 COMPREHEN METABOLIC PANEL: CPT | Performed by: INTERNAL MEDICINE

## 2019-04-23 ENCOUNTER — ONCOLOGY VISIT (OUTPATIENT)
Dept: ONCOLOGY | Facility: CLINIC | Age: 79
End: 2019-04-23
Attending: INTERNAL MEDICINE
Payer: COMMERCIAL

## 2019-04-23 VITALS
SYSTOLIC BLOOD PRESSURE: 159 MMHG | DIASTOLIC BLOOD PRESSURE: 70 MMHG | BODY MASS INDEX: 32.02 KG/M2 | TEMPERATURE: 96.7 F | WEIGHT: 211.3 LBS | HEIGHT: 68 IN | RESPIRATION RATE: 16 BRPM | OXYGEN SATURATION: 99 % | HEART RATE: 58 BPM

## 2019-04-23 DIAGNOSIS — C20 RECTAL CANCER (H): Primary | ICD-10-CM

## 2019-04-23 DIAGNOSIS — K63.5 POLYP OF COLON, UNSPECIFIED PART OF COLON, UNSPECIFIED TYPE: ICD-10-CM

## 2019-04-23 DIAGNOSIS — Z86.711 HISTORY OF PULMONARY EMBOLISM: ICD-10-CM

## 2019-04-23 PROCEDURE — G0463 HOSPITAL OUTPT CLINIC VISIT: HCPCS

## 2019-04-23 PROCEDURE — 99214 OFFICE O/P EST MOD 30 MIN: CPT | Performed by: INTERNAL MEDICINE

## 2019-04-23 ASSESSMENT — PAIN SCALES - GENERAL: PAINLEVEL: NO PAIN (0)

## 2019-04-23 ASSESSMENT — MIFFLIN-ST. JEOR: SCORE: 1648.98

## 2019-04-23 NOTE — PROGRESS NOTES
"Oncology Rooming Note    April 23, 2019 11:22 AM   Storm Dutta is a 78 year old male who presents for:    Chief Complaint   Patient presents with     Oncology Clinic Visit     6 month recheck Rectal cancer, review Labs      Initial Vitals: /70 (BP Location: Right arm, Patient Position: Sitting, Cuff Size: Adult Large)   Pulse 58   Temp 96.7  F (35.9  C) (Tympanic)   Resp 16   Ht 1.721 m (5' 7.75\")   Wt 95.8 kg (211 lb 4.8 oz)   SpO2 99%   BMI 32.37 kg/m   Estimated body mass index is 32.37 kg/m  as calculated from the following:    Height as of this encounter: 1.721 m (5' 7.75\").    Weight as of this encounter: 95.8 kg (211 lb 4.8 oz). Body surface area is 2.14 meters squared.  No Pain (0) Comment: Data Unavailable   No LMP for male patient.  Allergies reviewed: Yes  Medications reviewed: Yes    Medications: Medication refills not needed today.  Pharmacy name entered into [employer]: [facility] [place], [place] - [address]    Clinical concerns: 6 month recheck Rectal cancer, review Labs.       Afshan Keating CMA              "

## 2019-04-23 NOTE — PROGRESS NOTES
Hematology /oncology Visit      CHIEF COMPLAINT AND REASON FOR VISIT:    1. PE post op   2. Rectal cancer 2013     HISTORY OF PRESENT ILLNESS:  He presented with SOB found to have large right pulmonary embolism in 03/2014 when he was recovering from a laparoscopic-assisted low anterior resection for rectal cancer resection.  He had colorectal end-to-end anastomosis and loop ileostomy at that time.    He was on Lovenox 70-80 mg subcu b.i.d. for 7 months. It was stopped after repeat CT chest in 9/2014 indicated no PE.   Hypercoagulable work up through us in 9/2014 are negative.      He was diagnosed with locally-advanced rectal adenocarcinoma in 07/2013 through colonoscopy due to bloody bowel movements and anemia.  Preop evaluation showed the lesion is about mid to upper rectum 7 cm from the anal verge, there are prominent 5-10 perirectal lymph nodes. He had minimal stage III disease on presentation.     He received neoadjuvant concurrent chemoradiation with oral Xeloda, completed in 10/2013, then went on to have in 1/2014 laparoscopic-assisted LAR with splenic flexure mobilization and colonic J pouch with colorectal end-to-end anastomosis, loop ileostomy.    Final pathology indicating CR with no residual tumor in the rectum and in the lymph nodes.  He was only able to proceed with 2 cycles of XELOX and then dropped due to his general poor condition.     PMH  Type 2 diabetes, peripheral neuropathy, BPH, hypertension, hyperlipidemia, history of urinary retention.      SOCIAL HISTORY:  He lives in [place].  He never used tobacco.  Denies alcohol abuse.  He quit smoking in 2002.  He had 30-pack-year smoking history prior.   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     FAMILY HISTORY:  Positive for maternal grandmother who had unknown type of cancer, 1 sister was treated will breast cancer.        REVIEW OF SYSTEMS:   He is eating nl, no pain, gaining weight, no blood in stool.  He is living active life. He barely eats vegi.      "  PHYSICAL EXAMINATION:   VITAL SIGNS: Blood pressure 159/70, pulse 58, temperature 96.7  F (35.9  C), temperature source Tympanic, resp. rate 16, height 1.721 m (5' 7.75\"), weight 95.8 kg (211 lb 4.8 oz), SpO2 99 %.     ECOG 0    GENERAL APPEARANCE:   Elderly gentleman, looks much younger than his stated age, not in acute distress.  He looks very comfortable.   HEENT: The patient is normocephalic, atraumatic. Pupils are equal react to light.  Sclerae are anicteric.  Moist oral mucosa.  Negative pharynx.  No oral thrush.   NECK:  Supple.  No jugular venous distention.  Thyroid is not palpable.   LYMPH NODES:  Superficial lymphadenopathy is not appreciable in the bilateral cervical, supraclavicular, axillary or inguinal adenopathy.    CARDIOVASCULAR:  S1, S2 regular with no murmurs or gallops.  No carotid or abdominal bruits.   PULMONARY:  Lungs are clear to auscultation and percussion bilaterally.  There is no wheezing or rhonchi.   GASTROINTESTINAL:  Abdomen is very soft, PEG feeding tube properly in place.   He has a right abdomen ileostomy bag, brownish liquid in the bag.   MUSCULOSKELETAL/EXTREMITIES:  No edema.  No cyanotic changes.  No signs of joint deformity.  No lymphedema.   NEUROLOGIC:  Cranial nerves II-XII are grossly intact.  Sensation intact.  Muscle strength and muscle tone symmetrical all through 5/5.   BACK:  No spinal or paraspinal tenderness.  No CVA tenderness.   SKIN:  No petechiae.  No rash.  No signs of cellulitis. Bruising on dorsal hands.       Current LAB Data Reviewed  Cbc diff/CEA are fine,   Cr  1.32 from 1.18 from 1.22 from 1.26 from 1.37 stable.       OLD DATA REVIEW IN SUMMARY:   12/2016 colonoscopy: multiple tubulovillous adenoma removed.     5/2015: Tubulovillous adenoma excised from transverse colon.     CT body 10/2016: no mets.     CT 10/2015  1. Negative chest. No PE  2. Interval right lower quadrant ostomy takedown.  3. Postoperative changes in the rectal and presacral region " without any appreciable change since the prior exam.  4. Enlarged prostate gland.    CT 9/2014 - No evidence for pulmonary embolism.    CT enterography in 06/2014 with no evidence of bowel obstruction, no convincing focal small bowel abnormality, possible fistula track connecting this walled-off collection colonic anastomosis site in the pelvis.       Laparoscopic-assisted LAR in 01/2014 - no residual carcinoma identified in the sigmoid or rectum, 1 lymph node was negative.      Preop MRI in 08/2013:  sessile tumor in the posterior mid to upper rectum, between 5 and 10 prominent perirectal lymph nodes which are suspicious.      Original pathology from colonoscopy, rectal mass biopsy 07/2013 - invasive moderately differentiated adenocarcinoma with ulceration, all 4 DNA mismatch repair proteins are expressed in the tumor nuclei.        ASSESSMENT AND PLAN:   1.  Postop large right-sided pulmonary embolism in 03/2014, s/p 7 months of 70 to 80 mg subcu b.i.d.  after repeat CT chest 9/2014 indicated no PE.   Most likely his 03/2014 pulmonary embolism was due to postop debilitated condition. Hypercoagulable work up was negative. He was off coumadin since fall 2014.    Advise full dose enteric coated ASA. He is tolerating this fine.      2.  Renal insufficiency has been improving .  He is encouraged on oral fluid intake.     3.  Locally advanced rectal cancer, status post concurrent chemoradiation.  Surgical resection achieved complete CR.   He did not finish adjuvant treatment due to then poor condition.     Advise ASA and vit D.      4. Colonic polyps multiple times with hx of rectal ca 2014.   He needs on top of colonoscopy.

## 2019-04-23 NOTE — PATIENT INSTRUCTIONS
Dr. Martins would like you to follow up with primary care.         When you are in need of a refill, please call your pharmacy and they will send us a request.      Copy of appointments, and after visit summary (AVS) given to patient.      If you have any questions please call Khadijah Hernandez RN, BSN Oncology Hematology  [facility] [phone]. For questions after business hours, or on holidays/weekends, please call our after hours Nurse Triage line [phone]. Thank you.         F/y with PMD

## 2019-04-25 ENCOUNTER — OFFICE VISIT (OUTPATIENT)
Dept: FAMILY MEDICINE | Facility: CLINIC | Age: 79
End: 2019-04-25
Payer: COMMERCIAL

## 2019-04-25 VITALS
TEMPERATURE: 97 F | HEART RATE: 60 BPM | RESPIRATION RATE: 16 BRPM | SYSTOLIC BLOOD PRESSURE: 138 MMHG | DIASTOLIC BLOOD PRESSURE: 68 MMHG | BODY MASS INDEX: 32.32 KG/M2 | WEIGHT: 211 LBS

## 2019-04-25 DIAGNOSIS — Z79.4 TYPE 2 DIABETES MELLITUS WITH STAGE 3 CHRONIC KIDNEY DISEASE, WITH LONG-TERM CURRENT USE OF INSULIN (H): ICD-10-CM

## 2019-04-25 DIAGNOSIS — E11.22 TYPE 2 DIABETES MELLITUS WITH STAGE 3 CHRONIC KIDNEY DISEASE, WITH LONG-TERM CURRENT USE OF INSULIN (H): ICD-10-CM

## 2019-04-25 DIAGNOSIS — N18.30 TYPE 2 DIABETES MELLITUS WITH STAGE 3 CHRONIC KIDNEY DISEASE, WITH LONG-TERM CURRENT USE OF INSULIN (H): ICD-10-CM

## 2019-04-25 DIAGNOSIS — M54.50 ACUTE BILATERAL LOW BACK PAIN WITHOUT SCIATICA: Primary | ICD-10-CM

## 2019-04-25 LAB — HBA1C MFR BLD: 7.4 % (ref 0–5.6)

## 2019-04-25 PROCEDURE — 99213 OFFICE O/P EST LOW 20 MIN: CPT | Performed by: FAMILY MEDICINE

## 2019-04-25 PROCEDURE — 36415 COLL VENOUS BLD VENIPUNCTURE: CPT | Performed by: FAMILY MEDICINE

## 2019-04-25 PROCEDURE — 83036 HEMOGLOBIN GLYCOSYLATED A1C: CPT | Performed by: FAMILY MEDICINE

## 2019-04-25 NOTE — NURSING NOTE
"Initial /68   Pulse 60   Temp 97  F (36.1  C) (Tympanic)   Resp 16   Wt 95.7 kg (211 lb)   BMI 32.32 kg/m   Estimated body mass index is 32.32 kg/m  as calculated from the following:    Height as of 4/23/19: 1.721 m (5' 7.75\").    Weight as of this encounter: 95.7 kg (211 lb). .      "

## 2019-04-25 NOTE — PROGRESS NOTES
SUBJECTIVE:   Storm Dutta is a 78 year old male who presents to clinic today for the following   health issues:    Back Pain       Duration: 2 weeks         Specific cause: none    Description:   Location of pain: low back   Character of pain: dull ache and constant  Pain radiation:none  New numbness or weakness in legs, not attributed to pain:  no     Intensity: Currently 2/10, At its worst 8/10    History:   Pain interferes with job: YES  History of back problems: no prior back problems  Any previous MRI or X-rays: None  Sees a specialist for back pain:  No  Therapies tried without relief: acetaminophen (Tylenol) and NSAIDs helps some    Alleviating factors:   Improved by: acetaminophen (Tylenol) and NSAIDs helps some      Precipitating factors:  Worsened by: Lifting          Accompanying Signs & Symptoms:  Risk of Fracture:  Age >64  Risk of Cauda Equina:  None  Risk of Infection:  None  Risk of Cancer:  History of cancer  Risk of Ankylosing Spondylitis:  Onset at age <35, male, AND morning back stiffness. no      Notices pain on both sides of the low back in the muscle.  No pain in the middle of the back.  Able to be comfortable lying down, no trouble with sleep.  No radiation of pain into the buttocks or legs.  No numbness/tingling/weakness noted.  No change in bowel or bladder function.    Worse with stairs and liiting.  Started out of the blue.  No injury at onset.    Notices when he is walking a lot or lifting the pain is worse.    No pain when sleeping.      Additional history: as documented    Reviewed  and updated as needed this visit by clinical staff  Tobacco  Allergies  Meds  Med Hx  Surg Hx  Fam Hx  Soc Hx        Reviewed and updated as needed this visit by Provider  Tobacco  Meds  Med Hx  Surg Hx  Fam Hx  Soc Hx        ROS: Remainder of Constitutional, CV, Respiratory, GI,  negative with exception of that mentioned above    PE:  VS as above   Gen:  WN/WD/WH male in NAD   Heart:   RRR without murmur, nl S1, S2, no rubs or gallops   Lungs CTA leydi without rales/ronchi/wheezes   MSK:  ROM of low back intact in flexion, limited in extension, intact in rotation.  No midline tenderness.  Tender leydi lower back to palpations.  Muscle strength intact in LE leydi at 5/5, sensation intact to light touch.    A/P;      ICD-10-CM    1. Acute bilateral low back pain without sciatica M54.5 [facility], AND CHIROPRACTIC REFERRAL   2. Type 2 diabetes mellitus with stage 3 chronic kidney disease, with long-term current use of insulin (H) E11.22 Hemoglobin A1c    N18.3     Z79.4      Relatively acute onset of what appears to be muscular low back pain.  Pt with some risk factors but pain is lateral in the paraspinals leydi with no midline tenderness or night pain.  Plan short course of therapy and close follow up if not improving.    Patient Instructions   I'm pretty sure this is a muscular problem in your back.  Let's start with physical therapy.  Let me kmow if you are not seeing improvement in a couple of weeks or if anything is getting worse.    I'll let you know the A1c when I see it.

## 2019-04-25 NOTE — PATIENT INSTRUCTIONS
I'm pretty sure this is a muscular problem in your back.  Let's start with physical therapy.  Let me kmow if you are not seeing improvement in a couple of weeks or if anything is getting worse.    I'll let you know the A1c when I see it.

## 2019-04-26 ENCOUNTER — THERAPY VISIT (OUTPATIENT)
Dept: PHYSICAL THERAPY | Facility: CLINIC | Age: 79
End: 2019-04-26
Payer: COMMERCIAL

## 2019-04-26 DIAGNOSIS — M25.552 BILATERAL HIP PAIN: ICD-10-CM

## 2019-04-26 DIAGNOSIS — M25.551 BILATERAL HIP PAIN: ICD-10-CM

## 2019-04-26 PROCEDURE — 97161 PT EVAL LOW COMPLEX 20 MIN: CPT | Mod: GP | Performed by: PHYSICAL THERAPIST

## 2019-04-26 PROCEDURE — 97110 THERAPEUTIC EXERCISES: CPT | Mod: GP | Performed by: PHYSICAL THERAPIST

## 2019-04-26 NOTE — PROGRESS NOTES
[facility] Initial Evaluation  Subjective:  The history is provided by the patient.   Storm Dutta is a 78 year old male with a lumbar condition.  Condition occurred with:  Insidious onset.  Condition occurred: at home.  This is a new condition  Pt reports a 2 week history of bilat low back pain. Locates pain in lateral aspects of the low back/hips/glutes, denies any central low back pain. Describes pain as dull and achy. Pain worse with going up and down stairs (leading with R LE primarily), lifting/bending, handling heavy objects, standing (worse after 30 min). Pain better with medication, stretching. Pt works as a hobby shop owner..    Patient reports pain:  Lumbar spine left and lumbar spine right.    Pain is described as aching and is intermittent and reported as 2/10.      and relieved by NSAID's.  Since onset symptoms are gradually improving.        General health as reported by patient is good.  Pertinent medical history includes:  Cancer, diabetes and high blood pressure.  Medical allergies: no.  Other surgeries include:  Cancer surgery (Rectal surgery for cancer).  Current medications:  High blood pressure medication.  Current occupation is Hobby shop owner.                                    Objective:        Flexibility/Screens:       Lower Extremity:  Decreased left lower extremity flexibility:Hip IR's; Hip ER's; Adductors; Piriformis; Hip Flexors; Quadriceps and Hamstrings    Decreased right lower extremity flexibility:  Hip IR's; Hip ER's; Adductors; Piriformis; Hip Flexors; Quadriceps and Hamstrings               Lumbar/SI Evaluation  ROM:    AROM Lumbar:   Flexion:          To floor, no change in sxs, 2/10 baseline pain  Ext:                    100% ROM, no change in baseline pain   Side Bend:        Left:  To knee, no change in baseline pain    Right:  To knee, no change in baseline pain  Rotation:           Left:     Right:   Side Glide:        Left:     Right:            Lumbar Myotomes:  normal                Lumbar Dermtomes:  normal                Neural Tension/Mobility:  Lumbar:  Normal        Lumbar Palpation:    Tenderness present at Left:    Greater Trochanter  Tenderness not present at Left:    Quadratus Lumborum; Erector Spinae; Piriformis; PSIS or Gluteus Medius  Tenderness present at Right: Greater Trochanter  Tenderness not present at Right:  Quadratus Lumborum; Erector Spinae; Piriformis; PSIS or Gluteus Medius    Lumbar Provocation:  normal                                          Hip Evaluation  HIP AROM:    Flexion: Left: 120    Right:  120      Abduction: Left:  30    Right:  30      Internal Rotation: Left: 25    Right: 25  External Rotation: Left: 40    Right: 40      Hip PROM:                    Pain: Pain at end range IR          Hip Special Testing:      Left hip negative for the following special tests:  Gilda or Fadir/Labrum  Right hip negative for the following special tests:  Gilda or Fadir/Labrum    Hip Palpation:    Left hip tenderness present at:   Greater Trachanter  Right hip tenderness present at:  Greater Trachanter             General     ROS    Assessment/Plan:    Patient is a 78 year old male with bilat hip complaints.    Patient has the following significant findings with corresponding treatment plan.                Diagnosis 1:  Bilat hip pain  Pain -  hot/cold therapy, US, electric stimulation, mechanical traction, manual therapy, splint/taping/bracing/orthotics, self management, education, directional preference exercise and home program  Decreased ROM/flexibility - manual therapy, therapeutic exercise, therapeutic activity and home program  Decreased joint mobility - manual therapy, therapeutic exercise, therapeutic activity and home program  Decreased strength - therapeutic exercise, therapeutic activities and home program  Impaired muscle performance - neuro re-education and home program  Decreased function - therapeutic activities and  home program    Therapy Evaluation Codes:   1) History comprised of:   Personal factors that impact the plan of care:      None.    Comorbidity factors that impact the plan of care are:      Cancer, Diabetes and High blood pressure.     Medications impacting care: High blood pressure.  2) Examination of Body Systems comprised of:   Body structures and functions that impact the plan of care:      Hip.   Activity limitations that impact the plan of care are:      Bending, Dressing, Lifting, Squatting/kneeling, Stairs, Standing and Walking.  3) Clinical presentation characteristics are:   Stable/Uncomplicated.  4) Decision-Making    Low complexity using standardized patient assessment instrument and/or measureable assessment of functional outcome.  Cumulative Therapy Evaluation is: Low complexity.    Previous and current functional limitations:  (See Goal Flow Sheet for this information)    Short term and Long term goals: (See Goal Flow Sheet for this information)     Communication ability:  Patient appears to be able to clearly communicate and understand verbal and written communication and follow directions correctly.  Treatment Explanation - The following has been discussed with the patient:   RX ordered/plan of care  Anticipated outcomes  Possible risks and side effects  This patient would benefit from PT intervention to resume normal activities.   Rehab potential is good.    Frequency:  1 X week, once daily  Duration:  for 6 weeks  Discharge Plan:  Achieve all LTG.  Independent in home treatment program.  Reach maximal therapeutic benefit.    Please refer to the daily flowsheet for treatment today, total treatment time and time spent performing 1:1 timed codes.

## 2019-04-28 DIAGNOSIS — Z79.4 TYPE 2 DIABETES MELLITUS WITH HYPERGLYCEMIA, WITH LONG-TERM CURRENT USE OF INSULIN (H): ICD-10-CM

## 2019-04-28 DIAGNOSIS — E11.65 TYPE 2 DIABETES MELLITUS WITH HYPERGLYCEMIA, WITH LONG-TERM CURRENT USE OF INSULIN (H): ICD-10-CM

## 2019-04-29 NOTE — TELEPHONE ENCOUNTER
"Requested Prescriptions   Pending Prescriptions Disp Refills     blood glucose (ACCU-CHEK REGIS PLUS) test strip [Pharmacy Med Name:ACCU-CHEK REGIS PLUS  STRP]  Last Written Prescription Date:  02/27/18 #100 x 11  Last filled 02/11/2019  Last office visit: 4/25/2019 JESENIA Christofer    100 each 11     Sig: USE TO TEST BLOOD SUGAR THREE TIMES A DAY OR AS DIRECTED       Diabetic Supplies Protocol Passed - 4/28/2019  5:33 PM        Passed - Medication is active on med list        Passed - Patient is 18 years of age or older        Passed - Recent (6 mo) or future (30 days) visit within the authorizing provider's specialty     Patient had office visit in the last 6 months or has a visit in the next 30 days with authorizing provider.  See \"Patient Info\" tab in inbasket, or \"Choose Columns\" in Meds & Orders section of the refill encounter.              "

## 2019-05-03 ENCOUNTER — THERAPY VISIT (OUTPATIENT)
Dept: PHYSICAL THERAPY | Facility: CLINIC | Age: 79
End: 2019-05-03
Payer: COMMERCIAL

## 2019-05-03 DIAGNOSIS — M25.551 BILATERAL HIP PAIN: ICD-10-CM

## 2019-05-03 DIAGNOSIS — M25.552 BILATERAL HIP PAIN: ICD-10-CM

## 2019-05-03 PROCEDURE — 97530 THERAPEUTIC ACTIVITIES: CPT | Mod: GP | Performed by: PHYSICAL THERAPY ASSISTANT

## 2019-05-03 PROCEDURE — 97110 THERAPEUTIC EXERCISES: CPT | Mod: GP | Performed by: PHYSICAL THERAPY ASSISTANT

## 2019-05-31 ENCOUNTER — ANCILLARY PROCEDURE (OUTPATIENT)
Dept: GENERAL RADIOLOGY | Facility: CLINIC | Age: 79
End: 2019-05-31
Attending: PODIATRIST
Payer: COMMERCIAL

## 2019-05-31 ENCOUNTER — ANESTHESIA EVENT (OUTPATIENT)
Dept: SURGERY | Facility: CLINIC | Age: 79
End: 2019-05-31
Payer: COMMERCIAL

## 2019-05-31 ENCOUNTER — TELEPHONE (OUTPATIENT)
Dept: PODIATRY | Facility: CLINIC | Age: 79
End: 2019-05-31

## 2019-05-31 ENCOUNTER — OFFICE VISIT (OUTPATIENT)
Dept: PODIATRY | Facility: CLINIC | Age: 79
End: 2019-05-31
Payer: COMMERCIAL

## 2019-05-31 VITALS
HEART RATE: 81 BPM | SYSTOLIC BLOOD PRESSURE: 157 MMHG | DIASTOLIC BLOOD PRESSURE: 75 MMHG | HEIGHT: 68 IN | BODY MASS INDEX: 31.98 KG/M2 | WEIGHT: 211 LBS

## 2019-05-31 DIAGNOSIS — I73.9 PVD (PERIPHERAL VASCULAR DISEASE) (H): ICD-10-CM

## 2019-05-31 DIAGNOSIS — R09.89 DIMINISHED PULSES IN LOWER EXTREMITY: ICD-10-CM

## 2019-05-31 DIAGNOSIS — M86.171 ACUTE OSTEOMYELITIS OF TOE, RIGHT (H): Primary | ICD-10-CM

## 2019-05-31 PROCEDURE — 73630 X-RAY EXAM OF FOOT: CPT | Mod: RT

## 2019-05-31 PROCEDURE — 99213 OFFICE O/P EST LOW 20 MIN: CPT | Performed by: PODIATRIST

## 2019-05-31 RX ORDER — ACETAMINOPHEN 325 MG/1
975 TABLET ORAL ONCE
Status: CANCELLED | OUTPATIENT
Start: 2019-05-31 | End: 2019-05-31

## 2019-05-31 RX ORDER — CLINDAMYCIN HCL 300 MG
300 CAPSULE ORAL 3 TIMES DAILY
Qty: 30 CAPSULE | Refills: 0 | Status: SHIPPED | OUTPATIENT
Start: 2019-05-31 | End: 2019-06-17

## 2019-05-31 RX ORDER — GABAPENTIN 300 MG/1
300 CAPSULE ORAL ONCE
Status: CANCELLED | OUTPATIENT
Start: 2019-05-31 | End: 2019-05-31

## 2019-05-31 RX ORDER — AMLODIPINE BESYLATE 10 MG/1
10 TABLET ORAL
COMMUNITY
End: 2019-11-15

## 2019-05-31 ASSESSMENT — MIFFLIN-ST. JEOR: SCORE: 1647.62

## 2019-05-31 NOTE — PROGRESS NOTES
PATIENT HISTORY:  Storm Dutta is a 78 year old male who presents to clinic for a sore on the second toe of the right foot.  The patient relates developing a blister with shoes that turned into an open sore on the top of the second toe of the right foot.  The patient relates swelling and redness around the second toe.  Patient denies any fevers or chills.    REVIEW OF SYSTEMS:  Constitutional, HEENT, cardiovascular, pulmonary, GI, , musculoskeletal, neuro, skin, endocrine and psych systems are negative, except as otherwise noted.     PAST MEDICAL HISTORY:   Past Medical History:   Diagnosis Date     Acute urinary retention 11/2012    ER visit   / 1200 cc post-void residual     Acute urinary retention 11/1/2012    ER      Diabetes mellitus type II      Epididymitis 3/20/2014    Started on doxycyclline for UTI/orchitis. He was discharged on 03/22/14.     Former smoker     dc'd 2006     Hypertension goal BP (blood pressure) < 140/90 8/24/2012     PE (pulmonary embolism) 3/20/2014     Rectal cancer (H)      Skin cancer      Squamous cell carcinoma      Thrombosis of leg     +PE        PAST SURGICAL HISTORY:   Past Surgical History:   Procedure Laterality Date     COLONOSCOPY  7/29/2013    Procedure: COMBINED COLONOSCOPY, SINGLE BIOPSY/POLYPECTOMY BY BIOPSY;;  Surgeon: Bari Burnett MD;  Location: [facility]     COLONOSCOPY N/A 6/4/2015    Procedure: COMBINED COLONOSCOPY, SINGLE OR MULTIPLE BIOPSY/POLYPECTOMY BY BIOPSY;  Surgeon: Tara Mtz MD;  Location:  GI     COLONOSCOPY N/A 12/5/2016    Procedure: COLONOSCOPY;  Surgeon: Breanna Kline MD;  Location:  GI     EYE SURGERY  5/2012    Right eye procedure     HC CIRCUMCISION SURGICAL NON-DEV >28 DAYS AGE  2/97    due to phimosis     HC REMOVAL GALLBLADDER  5/01     [facility] UGI ENDOSCOPY W PLACEMENT GASTROSTOMY TUBE PERCUT  3/13/2014    Procedure: COMBINED ESOPHAGOSCOPY, GASTROSCOPY, DUODENOSCOPY (EGD), PLACE PERCUTANEOUS ENDOSCOPIC  GASTROSTOMY TUBE;  Surgeon: Loco Casillas MD;  Location: [facility]     LAPAROSCOPIC ASSISTED COLECTOMY LEFT (DESCENDING)  1/7/2014    Procedure: LAPAROSCOPIC ASSISTED COLECTOMY LEFT (DESCENDING);  Laparoscopic hand assisted Low Anterior Resection With colonic J pouch and end to end colorectal anastamosis. Laparoscopic splenic flexure mobilization.  Loop Ileostomy.  Rigid proctoscopy;  Surgeon: Margaret Gamble MD;  [facility]     PHACOEMULSIFICATION WITH STANDARD INTRAOCULAR LENS IMPLANT  4/11/2013    Procedure: PHACOEMULSIFICATION WITH STANDARD INTRAOCULAR LENS IMPLANT;  Right Kelman Phacoemulsification with Intraocular Lens Implant;  Surgeon: Caesar Turner MD;  Location: [facility]     REMOVE GASTROSTOMY TUBE ADULT  7/25/2014    Procedure: REMOVE GASTROSTOMY TUBE ADULT;  Surgeon: Margaert Gamble MD;  [facility]     SIGMOIDOSCOPY FLEXIBLE  6/23/2014    Procedure: SIGMOIDOSCOPY FLEXIBLE;  Surgeon: Margaret Gamble MD;  [facility]     SIGMOIDOSCOPY FLEXIBLE  7/22/2014    Procedure: SIGMOIDOSCOPY FLEXIBLE;  Surgeon: Margaret Gamble MD;  [facility]     SIGMOIDOSCOPY FLEXIBLE  7/25/2014    Procedure: SIGMOIDOSCOPY FLEXIBLE;  Surgeon: Margaret Gamble MD;  [facility]     SIGMOIDOSCOPY FLEXIBLE  7/28/2014    Procedure: SIGMOIDOSCOPY FLEXIBLE;  Surgeon: Margaret Gamble MD;  [facility]     SIGMOIDOSCOPY FLEXIBLE  7/31/2014    Procedure: SIGMOIDOSCOPY FLEXIBLE;  Surgeon: Margaret Gamble MD;  [facility]     SIGMOIDOSCOPY FLEXIBLE  8/3/2014    Procedure: SIGMOIDOSCOPY FLEXIBLE;  Surgeon: Margaret Gamble MD;  [facility]     SIGMOIDOSCOPY FLEXIBLE  8/5/2014    Procedure: SIGMOIDOSCOPY FLEXIBLE;  Surgeon: Margaret Gamble MD;  [facility]     SIGMOIDOSCOPY FLEXIBLE  8/8/2014    Procedure: SIGMOIDOSCOPY FLEXIBLE;  Surgeon: Margaret Gamble MD;  [facility]     SIGMOIDOSCOPY  FLEXIBLE  8/18/2014    Procedure: SIGMOIDOSCOPY FLEXIBLE;  Surgeon: Jose Roberto Cervantes MD;  [facility]     SIGMOIDOSCOPY FLEXIBLE N/A 8/15/2014    Procedure: SIGMOIDOSCOPY FLEXIBLE;  Surgeon: Margaret Gamble MD;  [facility]     SIGMOIDOSCOPY FLEXIBLE N/A 8/22/2014    Procedure: SIGMOIDOSCOPY FLEXIBLE;  Surgeon: Jose Roberto Cervantes MD;  [facility]     SIGMOIDOSCOPY FLEXIBLE N/A 8/11/2014    Procedure: SIGMOIDOSCOPY FLEXIBLE;  Surgeon: Margaret Gamble MD;  [facility]     SIGMOIDOSCOPY FLEXIBLE N/A 8/26/2014    Procedure: SIGMOIDOSCOPY FLEXIBLE;  Surgeon: Margaret Gamble MD;  [facility]     SIGMOIDOSCOPY FLEXIBLE N/A 8/29/2014    Procedure: SIGMOIDOSCOPY FLEXIBLE;  Surgeon: Margaret Gamble MD;  [facility]     SIGMOIDOSCOPY FLEXIBLE N/A 9/8/2014    Procedure: SIGMOIDOSCOPY FLEXIBLE;  Surgeon: Margaret Gamble MD;  [facility]     SIGMOIDOSCOPY FLEXIBLE N/A 9/2/2014    Procedure: SIGMOIDOSCOPY FLEXIBLE;  Surgeon: Breanna Kline MD;  [facility]     SIGMOIDOSCOPY FLEXIBLE N/A 9/11/2014    Procedure: SIGMOIDOSCOPY FLEXIBLE;  Surgeon: Margaret Gamble MD;  [facility]     SIGMOIDOSCOPY FLEXIBLE N/A 9/19/2014    Procedure: SIGMOIDOSCOPY FLEXIBLE;  Surgeon: Margaret Gamble MD;  [facility]     SIGMOIDOSCOPY FLEXIBLE N/A 9/15/2014    Procedure: SIGMOIDOSCOPY FLEXIBLE;  Surgeon: Margaret Gamble MD;  [facility]     SIGMOIDOSCOPY FLEXIBLE N/A 9/5/2014    Procedure: SIGMOIDOSCOPY FLEXIBLE;  Surgeon: Margaret Gamble MD;  [facility]     SIGMOIDOSCOPY FLEXIBLE N/A 9/23/2014    Procedure: SIGMOIDOSCOPY FLEXIBLE;  Surgeon: Margaret Gamble MD;  [facility]     SIGMOIDOSCOPY FLEXIBLE N/A 9/26/2014    Procedure: SIGMOIDOSCOPY FLEXIBLE;  Surgeon: Margaret Gamble MD;  [facility]     SIGMOIDOSCOPY FLEXIBLE N/A 9/30/2014    Procedure: SIGMOIDOSCOPY FLEXIBLE;   Surgeon: Margaret Gamble MD;  [facility]     SIGMOIDOSCOPY FLEXIBLE N/A 10/3/2014    Procedure: SIGMOIDOSCOPY FLEXIBLE;  Surgeon: Margaret Gamble MD;  [facility]     SIGMOIDOSCOPY FLEXIBLE N/A 10/30/2014    Procedure: SIGMOIDOSCOPY FLEXIBLE;  Surgeon: Margaret Gamble MD;  Location:  GI     SIGMOIDOSCOPY FLEXIBLE, PLACEMENT OF DRAINAGE SPONGE  9/30/14     TAKEDOWN ILEOSTOMY N/A 1/6/2015    Procedure: TAKEDOWN ILEOSTOMY;  Surgeon: Margaret Gamble MD;  [facility]        MEDICATIONS:   Current Outpatient Medications:      ACCU-CHEK REGIS PLUS test strip, TEST BLOOD SUGARS 2-4 TIMES DAILY OR AS DIRECTED, Disp: 100 each, Rfl: 11     amLODIPine (NORVASC) 10 MG tablet, Take 10 mg by mouth, Disp: , Rfl:      aspirin 325 MG tablet, Take 325 mg by mouth daily, Disp: , Rfl:      atorvastatin (LIPITOR) 20 MG tablet, TAKE ONE TABLET BY MOUTH EVERY DAY, Disp: 90 tablet, Rfl: 3     blood glucose (ACCU-CHEK REGIS PLUS) test strip, USE TO TEST BLOOD SUGAR THREE TIMES A DAY OR AS DIRECTED, Disp: 300 each, Rfl: 1     blood glucose monitoring (NO BRAND SPECIFIED) meter device kit, Use to test blood sugar 3 times daily or as directed., Disp: 1 kit, Rfl: 0     Blood Glucose Monitoring Suppl (ACCU-CHEK REGIS PLUS) W/DEVICE KIT, Check blood sugars 1-2 times daily, Disp: 1 kit, Rfl: 0     Cholecalciferol (VITAMIN D-3) 1000 units CAPS, Take by mouth daily, Disp: , Rfl:      diphenoxylate-atropine (LOMOTIL) 2.5-0.025 MG per tablet, Take 1 tablet by mouth 4 times daily as needed for diarrhea, Disp: 60 tablet, Rfl: 2     finasteride (PROSCAR) 5 MG tablet, TAKE ONE TABLET BY MOUTH EVERY DAY, Disp: 90 tablet, Rfl: 1     insulin aspart (NOVOLOG FLEXPEN) 100 UNIT/ML pen, Use 8 units plus low sliding scale before each meal 120-149 0 units 150-199 1 unit 200-249 2 units 250-299 3 units 300-349 4 units > 350 5 units  Max dose 50 units day, Disp: 15 mL, Rfl: 1     insulin glargine (BASAGLAR  KWIKPEN) 100 UNIT/ML pen, INJECT 16 UNITS AT BEDTIME, Disp: 30 mL, Rfl: 1     losartan (COZAAR) 100 MG tablet, Take 1 tablet (100 mg) by mouth daily, Disp: 90 tablet, Rfl: 0     tamsulosin (FLOMAX) 0.4 MG capsule, TAKE ONE CAPSULE BY MOUTH EVERY DAY, Disp: 90 capsule, Rfl: 2     ULTICARE MINI 31G X 6 MM insulin pen needle, USE 4 TO 5 TIMES DAILY OR AS DIRECTED FOR SLIDING SCALE INSULIN, Disp: 100 each, Rfl: 7     ALLERGIES:    Allergies   Allergen Reactions     Nkda [No Known Drug Allergies]         SOCIAL HISTORY:   Social History     Socioeconomic History     Marital status:      Spouse name: Not on file     Number of children: Not on file     Years of education: Not on file     Highest education level: Not on file   Occupational History     Employer: RETIRED     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   Social Needs     Financial resource strain: Not on file     Food insecurity:     Worry: Not on file     Inability: Not on file     Transportation needs:     Medical: Not on file     Non-medical: Not on file   Tobacco Use     Smoking status: Former Smoker     Years: 30.00     Types: Cigars     Last attempt to quit: 2002     Years since quittin.6     Smokeless tobacco: Never Used   Substance and Sexual Activity     Alcohol use: No     Drug use: No     Sexual activity: Not Currently     Partners: Female   Lifestyle     Physical activity:     Days per week: Not on file     Minutes per session: Not on file     Stress: Not on file   Relationships     Social connections:     Talks on phone: Not on file     Gets together: Not on file     Attends [employer] service: Not on file     Active member of club or organization: Not on file     Attends meetings of clubs or organizations: Not on file     Relationship status: Not on file     Intimate partner violence:     Fear of current or ex partner: Not on file     Emotionally abused: Not on file     Physically abused: Not on file     Forced sexual activity:  "Not on file   Other Topics Concern     Parent/sibling w/ CABG, MI or angioplasty before 65F 55M? No   Social History Narrative     Not on file        FAMILY HISTORY:   Family History   Problem Relation Age of Onset     Diabetes Mother      Hypertension Mother      Cancer Father      Cancer Maternal Grandmother      Alzheimer Disease Maternal Grandmother      Cardiovascular Paternal Grandmother      Breast Cancer Sister      Colon Cancer No family hx of      Colon Polyps No family hx of      Crohn's Disease No family hx of      Ulcerative Colitis No family hx of      Anesthesia Reaction No family hx of         EXAM:Vitals: /75   Pulse 81   Ht 1.721 m (5' 7.75\")   Wt 95.7 kg (211 lb)   BMI 32.32 kg/m    BMI= Body mass index is 32.32 kg/m .    Weight management plan: Patient was referred to their PCP to discuss a diet and exercise plan.    General appearance: Patient is alert and fully cooperative with history & exam.  No sign of distress is noted during the visit.     Psychiatric: Affect is pleasant & appropriate.  Patient appears motivated to improve health.     Respiratory: Breathing is regular & unlabored while sitting.     HEENT: Hearing is intact to spoken word.  Speech is clear.  No gross evidence of visual impairment that would impact ambulation.     Dermatologic: Skin is intact to both lower extremities without significant lesions, rash or abrasion.  No paronychia or evidence of soft tissue infection is noted.     Vascular: DP & PT pulses are intact & regular bilaterally.  No significant edema or varicosities noted.  CFT and skin temperature is normal to both lower extremities.     Neurologic: Lower extremity sensation is intact to light touch.  No evidence of weakness or contracture in the lower extremities.  No evidence of neuropathy.     Musculoskeletal: Patient is ambulatory without assistive device or brace.  No gross ankle deformity noted.  No foot or ankle joint effusion is noted.  On notes a " full-thickness ulceration over the dorsal aspect proximal interphalangeal joint of the second toe on the right foot.  One notes significant edema of the second toe on the right foot.  One notes positive drainage  with positive probe to bone.    Radiographs were evaluated including AP, lateral and medial oblique views of the right foot reveals cortical destruction of the middle phalanx of the second toe.  Noted global hammertoe and bunion deformity.    ASSESSMENT / PLAN:     ICD-10-CM    1. Acute osteomyelitis of toe, right (H) M86.171 XR Foot Right G/E 3 Views    Second toe       I have explained to Storm  about the conditions.  We discussed the nature of the condition as well as the treatment plan and expected length of recovery.  At this time, I discussed with the patient and his wife the significance of the infection of the second toe on the right foot.  At this point, I am recommending surgical treatment of the condition involving amputation of the second toe on the right foot.  I informed the patient in risks and benefits of the procedure including but not limited to infection, wound complications, swelling, pain, diminished range of motion and function, DVT and reoccurrence of condition.  The procedure will be performed under local with monitored anesthesia care.  The patient will obtain a preoperative history and physical by the primary care provider.  Consents will be reviewed and signed on the day of surgery.  The patient will obtain noninvasive vascular testing JOCELYNE before surgery on Tuesday.  Patient was placed on a 10-day course of oral clindamycin 300 mg 3 times daily.  The patient will keep the wound clean and dry.        Disclaimer: This note consists of symbols derived from keyboarding, dictation and/or voice recognition software. As a result, there may be errors in the script that have gone undetected. Please consider this when interpreting information found in this chart.       JUANJO Cabello,  NICKY., SHANNON.[email or web address].S.

## 2019-05-31 NOTE — PATIENT INSTRUCTIONS
You have elected to proceed with Surgery for amputation second toe right foot  Surgeries are performed on Tuesdays at [facility].   To schedule your surgery date please call [phone].  Please leave a message with a good time for our staff to call you back.    - Please have a date in mind for your surgery, you can feel free to leave that date on the message, and we will schedule and call back to confirm.     You can expect receive a call back the same day or on the next business day from Dr. Cabello s team to assist in the scheduling.   - We will schedule the date of your surgery.  The time will be determined a few days ahead of time.  You can expect a call from Same Day Surgery 2-3 days ahead of time with specific instructions for what time to arrive at the hospital as well as any other preparations you should take prior to surgery.    - You may need to obtain a pre-operative physical from your primary medical provider. This must be done within 30 days of your surgery date.    - We will also schedule your first post-operative appointment for a bandage and wound check for the Monday following your surgery at the [facility].    - You may be non-weight bearing for a period of up to 6 weeks.  Options for this include: (Please indicate which you would prefer so we can provide you with an order and instructions)  o Crutches  o Walker  o Roll-a-bout knee walker.    - If you will need paperwork filled out for your employer you may drop those off at the clinic directly or you may have those faxed to us at [phone].  Please indicate on the form the date you would like the LA to begin if it will not be your surgery date.    The forms are typically filled out for up to 12 weeks, however you may be cleared to return prior to that time depending on your individual healing and job requirements.

## 2019-05-31 NOTE — LETTER
5/31/2019         RE: Storm Dutta  [address] [place] [address]        Dear Colleague,    Thank you for referring your patient, Storm Dutta, to the [facility]. Please see a copy of my visit note below.    PATIENT HISTORY:  Storm Dutta is a 78 year old male who presents to clinic for a sore on the second toe of the right foot.  The patient relates developing a blister with shoes that turned into an open sore on the top of the second toe of the right foot.  The patient relates swelling and redness around the second toe.  Patient denies any fevers or chills.    REVIEW OF SYSTEMS:  Constitutional, HEENT, cardiovascular, pulmonary, GI, , musculoskeletal, neuro, skin, endocrine and psych systems are negative, except as otherwise noted.     PAST MEDICAL HISTORY:   Past Medical History:   Diagnosis Date     Acute urinary retention 11/2012    ER visit   / 1200 cc post-void residual     Acute urinary retention 11/1/2012    ER      Diabetes mellitus type II      Epididymitis 3/20/2014    Started on doxycyclline for UTI/orchitis. He was discharged on 03/22/14.     Former smoker     dc'd 2006     Hypertension goal BP (blood pressure) < 140/90 8/24/2012     PE (pulmonary embolism) 3/20/2014     Rectal cancer (H)      Skin cancer      Squamous cell carcinoma      Thrombosis of leg     +PE        PAST SURGICAL HISTORY:   Past Surgical History:   Procedure Laterality Date     COLONOSCOPY  7/29/2013    Procedure: COMBINED COLONOSCOPY, SINGLE BIOPSY/POLYPECTOMY BY BIOPSY;;  Surgeon: Bari Burnett MD;  Location: [facility]     COLONOSCOPY N/A 6/4/2015    Procedure: COMBINED COLONOSCOPY, SINGLE OR MULTIPLE BIOPSY/POLYPECTOMY BY BIOPSY;  Surgeon: Tara Mtz MD;  Location:  GI     COLONOSCOPY N/A 12/5/2016    Procedure: COLONOSCOPY;  Surgeon: Breanna Kline MD;  Location:  GI     EYE SURGERY  5/2012    Right eye procedure     HC CIRCUMCISION SURGICAL  NON-DEV >28 DAYS AGE  2/97    due to phimosis     HC REMOVAL GALLBLADDER  5/01     [facility] UGI ENDOSCOPY W PLACEMENT GASTROSTOMY TUBE PERCUT  3/13/2014    Procedure: COMBINED ESOPHAGOSCOPY, GASTROSCOPY, DUODENOSCOPY (EGD), PLACE PERCUTANEOUS ENDOSCOPIC GASTROSTOMY TUBE;  Surgeon: Loco Casillas MD;  Location: [facility]     LAPAROSCOPIC ASSISTED COLECTOMY LEFT (DESCENDING)  1/7/2014    Procedure: LAPAROSCOPIC ASSISTED COLECTOMY LEFT (DESCENDING);  Laparoscopic hand assisted Low Anterior Resection With colonic J pouch and end to end colorectal anastamosis. Laparoscopic splenic flexure mobilization.  Loop Ileostomy.  Rigid proctoscopy;  Surgeon: Margaret Gamble MD;  [facility]     PHACOEMULSIFICATION WITH STANDARD INTRAOCULAR LENS IMPLANT  4/11/2013    Procedure: PHACOEMULSIFICATION WITH STANDARD INTRAOCULAR LENS IMPLANT;  Right Kelman Phacoemulsification with Intraocular Lens Implant;  Surgeon: Caesar Turner MD;  Location: [facility]     REMOVE GASTROSTOMY TUBE ADULT  7/25/2014    Procedure: REMOVE GASTROSTOMY TUBE ADULT;  Surgeon: Margaret Gamble MD;  [facility]     SIGMOIDOSCOPY FLEXIBLE  6/23/2014    Procedure: SIGMOIDOSCOPY FLEXIBLE;  Surgeon: Margaret Gamble MD;  [facility]     SIGMOIDOSCOPY FLEXIBLE  7/22/2014    Procedure: SIGMOIDOSCOPY FLEXIBLE;  Surgeon: Margaret Gamble MD;  [facility]     SIGMOIDOSCOPY FLEXIBLE  7/25/2014    Procedure: SIGMOIDOSCOPY FLEXIBLE;  Surgeon: Margaret Gamble MD;  [facility]     SIGMOIDOSCOPY FLEXIBLE  7/28/2014    Procedure: SIGMOIDOSCOPY FLEXIBLE;  Surgeon: Margaret Gamble MD;  [facility]     SIGMOIDOSCOPY FLEXIBLE  7/31/2014    Procedure: SIGMOIDOSCOPY FLEXIBLE;  Surgeon: Margaret Gamble MD;  [facility]     SIGMOIDOSCOPY FLEXIBLE  8/3/2014    Procedure: SIGMOIDOSCOPY FLEXIBLE;  Surgeon: Margaret Gamble MD;  [facility]     SIGMOIDOSCOPY FLEXIBLE  8/5/2014     Procedure: SIGMOIDOSCOPY FLEXIBLE;  Surgeon: Margaret Gamble MD;  [facility]     SIGMOIDOSCOPY FLEXIBLE  8/8/2014    Procedure: SIGMOIDOSCOPY FLEXIBLE;  Surgeon: Margaret Gamble MD;  [facility]     SIGMOIDOSCOPY FLEXIBLE  8/18/2014    Procedure: SIGMOIDOSCOPY FLEXIBLE;  Surgeon: Jose Roberto Cervantes MD;  [facility]     SIGMOIDOSCOPY FLEXIBLE N/A 8/15/2014    Procedure: SIGMOIDOSCOPY FLEXIBLE;  Surgeon: Margaret Gamble MD;  [facility]     SIGMOIDOSCOPY FLEXIBLE N/A 8/22/2014    Procedure: SIGMOIDOSCOPY FLEXIBLE;  Surgeon: Jose Roberto Cervantes MD;  [facility]     SIGMOIDOSCOPY FLEXIBLE N/A 8/11/2014    Procedure: SIGMOIDOSCOPY FLEXIBLE;  Surgeon: Margaret Gamble MD;  [facility]     SIGMOIDOSCOPY FLEXIBLE N/A 8/26/2014    Procedure: SIGMOIDOSCOPY FLEXIBLE;  Surgeon: Margaret Gamble MD;  [facility]     SIGMOIDOSCOPY FLEXIBLE N/A 8/29/2014    Procedure: SIGMOIDOSCOPY FLEXIBLE;  Surgeon: Margaret Gamble MD;  [facility]     SIGMOIDOSCOPY FLEXIBLE N/A 9/8/2014    Procedure: SIGMOIDOSCOPY FLEXIBLE;  Surgeon: Margaret Gamble MD;  [facility]     SIGMOIDOSCOPY FLEXIBLE N/A 9/2/2014    Procedure: SIGMOIDOSCOPY FLEXIBLE;  Surgeon: Breanna Kline MD;  [facility]     SIGMOIDOSCOPY FLEXIBLE N/A 9/11/2014    Procedure: SIGMOIDOSCOPY FLEXIBLE;  Surgeon: Margaret Gamble MD;  [facility]     SIGMOIDOSCOPY FLEXIBLE N/A 9/19/2014    Procedure: SIGMOIDOSCOPY FLEXIBLE;  Surgeon: Margaret Gamble MD;  [facility]     SIGMOIDOSCOPY FLEXIBLE N/A 9/15/2014    Procedure: SIGMOIDOSCOPY FLEXIBLE;  Surgeon: Margaret Gamble MD;  [facility]     SIGMOIDOSCOPY FLEXIBLE N/A 9/5/2014    Procedure: SIGMOIDOSCOPY FLEXIBLE;  Surgeon: Margaret Gamble MD;  [facility]     SIGMOIDOSCOPY FLEXIBLE N/A 9/23/2014    Procedure: SIGMOIDOSCOPY FLEXIBLE;  Surgeon: Margaret Gamble  MD;  [facility]     SIGMOIDOSCOPY FLEXIBLE N/A 9/26/2014    Procedure: SIGMOIDOSCOPY FLEXIBLE;  Surgeon: Margaret Gamble MD;  [facility]     SIGMOIDOSCOPY FLEXIBLE N/A 9/30/2014    Procedure: SIGMOIDOSCOPY FLEXIBLE;  Surgeon: Margaret Gamble MD;  [facility]     SIGMOIDOSCOPY FLEXIBLE N/A 10/3/2014    Procedure: SIGMOIDOSCOPY FLEXIBLE;  Surgeon: Margaret Gamble MD;  [facility]     SIGMOIDOSCOPY FLEXIBLE N/A 10/30/2014    Procedure: SIGMOIDOSCOPY FLEXIBLE;  Surgeon: Margaret Gamble MD;  [facility]     SIGMOIDOSCOPY FLEXIBLE, PLACEMENT OF DRAINAGE SPONGE  9/30/14     TAKEDOWN ILEOSTOMY N/A 1/6/2015    Procedure: TAKEDOWN ILEOSTOMY;  Surgeon: Margaret Gamble MD;  [facility]        MEDICATIONS:   Current Outpatient Medications:      ACCU-CHEK REGIS PLUS test strip, TEST BLOOD SUGARS 2-4 TIMES DAILY OR AS DIRECTED, Disp: 100 each, Rfl: 11     amLODIPine (NORVASC) 10 MG tablet, Take 10 mg by mouth, Disp: , Rfl:      aspirin 325 MG tablet, Take 325 mg by mouth daily, Disp: , Rfl:      atorvastatin (LIPITOR) 20 MG tablet, TAKE ONE TABLET BY MOUTH EVERY DAY, Disp: 90 tablet, Rfl: 3     blood glucose (ACCU-CHEK REGIS PLUS) test strip, USE TO TEST BLOOD SUGAR THREE TIMES A DAY OR AS DIRECTED, Disp: 300 each, Rfl: 1     blood glucose monitoring (NO BRAND SPECIFIED) meter device kit, Use to test blood sugar 3 times daily or as directed., Disp: 1 kit, Rfl: 0     Blood Glucose Monitoring Suppl (ACCU-CHEK REGIS PLUS) W/DEVICE KIT, Check blood sugars 1-2 times daily, Disp: 1 kit, Rfl: 0     Cholecalciferol (VITAMIN D-3) 1000 units CAPS, Take by mouth daily, Disp: , Rfl:      diphenoxylate-atropine (LOMOTIL) 2.5-0.025 MG per tablet, Take 1 tablet by mouth 4 times daily as needed for diarrhea, Disp: 60 tablet, Rfl: 2     finasteride (PROSCAR) 5 MG tablet, TAKE ONE TABLET BY MOUTH EVERY DAY, Disp: 90 tablet, Rfl: 1     insulin aspart (NOVOLOG FLEXPEN)  100 UNIT/ML pen, Use 8 units plus low sliding scale before each meal 120-149 0 units 150-199 1 unit 200-249 2 units 250-299 3 units 300-349 4 units > 350 5 units  Max dose 50 units day, Disp: 15 mL, Rfl: 1     insulin glargine (BASAGLAR KWIKPEN) 100 UNIT/ML pen, INJECT 16 UNITS AT BEDTIME, Disp: 30 mL, Rfl: 1     losartan (COZAAR) 100 MG tablet, Take 1 tablet (100 mg) by mouth daily, Disp: 90 tablet, Rfl: 0     tamsulosin (FLOMAX) 0.4 MG capsule, TAKE ONE CAPSULE BY MOUTH EVERY DAY, Disp: 90 capsule, Rfl: 2     ULTICARE MINI 31G X 6 MM insulin pen needle, USE 4 TO 5 TIMES DAILY OR AS DIRECTED FOR SLIDING SCALE INSULIN, Disp: 100 each, Rfl: 7     ALLERGIES:    Allergies   Allergen Reactions     Nkda [No Known Drug Allergies]         SOCIAL HISTORY:   Social History     Socioeconomic History     Marital status:      Spouse name: Not on file     Number of children: Not on file     Years of education: Not on file     Highest education level: Not on file   Occupational History     Employer: RETIRED     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   Social Needs     Financial resource strain: Not on file     Food insecurity:     Worry: Not on file     Inability: Not on file     Transportation needs:     Medical: Not on file     Non-medical: Not on file   Tobacco Use     Smoking status: Former Smoker     Years: 30.00     Types: Cigars     Last attempt to quit: 2002     Years since quittin.6     Smokeless tobacco: Never Used   Substance and Sexual Activity     Alcohol use: No     Drug use: No     Sexual activity: Not Currently     Partners: Female   Lifestyle     Physical activity:     Days per week: Not on file     Minutes per session: Not on file     Stress: Not on file   Relationships     Social connections:     Talks on phone: Not on file     Gets together: Not on file     Attends Alevism service: Not on file     Active member of club or organization: Not on file     Attends meetings of clubs or  "organizations: Not on file     Relationship status: Not on file     Intimate partner violence:     Fear of current or ex partner: Not on file     Emotionally abused: Not on file     Physically abused: Not on file     Forced sexual activity: Not on file   Other Topics Concern     Parent/sibling w/ CABG, MI or angioplasty before 65F 55M? No   Social History Narrative     Not on file        FAMILY HISTORY:   Family History   Problem Relation Age of Onset     Diabetes Mother      Hypertension Mother      Cancer Father      Cancer Maternal Grandmother      Alzheimer Disease Maternal Grandmother      Cardiovascular Paternal Grandmother      Breast Cancer Sister      Colon Cancer No family hx of      Colon Polyps No family hx of      Crohn's Disease No family hx of      Ulcerative Colitis No family hx of      Anesthesia Reaction No family hx of         EXAM:Vitals: /75   Pulse 81   Ht 1.721 m (5' 7.75\")   Wt 95.7 kg (211 lb)   BMI 32.32 kg/m     BMI= Body mass index is 32.32 kg/m .    Weight management plan: Patient was referred to their PCP to discuss a diet and exercise plan.    General appearance: Patient is alert and fully cooperative with history & exam.  No sign of distress is noted during the visit.     Psychiatric: Affect is pleasant & appropriate.  Patient appears motivated to improve health.     Respiratory: Breathing is regular & unlabored while sitting.     HEENT: Hearing is intact to spoken word.  Speech is clear.  No gross evidence of visual impairment that would impact ambulation.     Dermatologic: Skin is intact to both lower extremities without significant lesions, rash or abrasion.  No paronychia or evidence of soft tissue infection is noted.     Vascular: DP & PT pulses are intact & regular bilaterally.  No significant edema or varicosities noted.  CFT and skin temperature is normal to both lower extremities.     Neurologic: Lower extremity sensation is intact to light touch.  No evidence of " weakness or contracture in the lower extremities.  No evidence of neuropathy.     Musculoskeletal: Patient is ambulatory without assistive device or brace.  No gross ankle deformity noted.  No foot or ankle joint effusion is noted.  On notes a full-thickness ulceration over the dorsal aspect proximal interphalangeal joint of the second toe on the right foot.  One notes significant edema of the second toe on the right foot.  One notes positive drainage  with positive probe to bone.    Radiographs were evaluated including AP, lateral and medial oblique views of the right foot reveals cortical destruction of the middle phalanx of the second toe.  Noted global hammertoe and bunion deformity.    ASSESSMENT / PLAN:     ICD-10-CM    1. Acute osteomyelitis of toe, right (H) M86.171 XR Foot Right G/E 3 Views    Second toe       I have explained to Storm  about the conditions.  We discussed the nature of the condition as well as the treatment plan and expected length of recovery.  At this time, I discussed with the patient and his wife the significance of the infection of the second toe on the right foot.  At this point, I am recommending surgical treatment of the condition involving amputation of the second toe on the right foot.  I informed the patient in risks and benefits of the procedure including but not limited to infection, wound complications, swelling, pain, diminished range of motion and function, DVT and reoccurrence of condition.  The procedure will be performed under local with monitored anesthesia care.  The patient will obtain a preoperative history and physical by the primary care provider.  Consents will be reviewed and signed on the day of surgery.  The patient will obtain noninvasive vascular testing JOCELYNE before surgery on Tuesday.  Patient was placed on a 10-day course of oral clindamycin 300 mg 3 times daily.  The patient will keep the wound clean and dry.        Disclaimer: This note consists of symbols  derived from keyboarding, dictation and/or voice recognition software. As a result, there may be errors in the script that have gone undetected. Please consider this when interpreting information found in this chart.       JUANJO Cabello D.P.M., F.A.C.F.A.S.        Again, thank you for allowing me to participate in the care of your patient.        Sincerely,        Adriel Cabello DPM

## 2019-05-31 NOTE — NURSING NOTE
"Chief Complaint   Patient presents with     Consult     Sore 2cd toe on right foot, new shoes caused aggrevasion, swelling in right ankle       Initial /75   Pulse 81   Ht 1.721 m (5' 7.75\")   Wt 95.7 kg (211 lb)   BMI 32.32 kg/m   Estimated body mass index is 32.32 kg/m  as calculated from the following:    Height as of this encounter: 1.721 m (5' 7.75\").    Weight as of this encounter: 95.7 kg (211 lb).  Medications and allergies reviewed.      Rosio OSORIO MA    "

## 2019-05-31 NOTE — TELEPHONE ENCOUNTER
Type of surgery: amputation of second toe right foot  Location of surgery: [facility]  Date and time of surgery: 6-4-9  Surgeon: Dr Cabello  Pre-Op Appt Date: 6-3-19 @1;40 pm  Post-Op Appt Date: 6-10-19 @ 9:40 am   Packet sent out: Not Applicable  Pre-cert/Authorization completed:  Not Applicable  Date: 5-31-19

## 2019-06-03 ENCOUNTER — HOSPITAL ENCOUNTER (OUTPATIENT)
Dept: ULTRASOUND IMAGING | Facility: CLINIC | Age: 79
Discharge: HOME | End: 2019-06-03
Attending: PODIATRIST | Admitting: PODIATRIST
Payer: COMMERCIAL

## 2019-06-03 ENCOUNTER — OFFICE VISIT (OUTPATIENT)
Dept: FAMILY MEDICINE | Facility: CLINIC | Age: 79
End: 2019-06-03
Payer: COMMERCIAL

## 2019-06-03 VITALS
HEIGHT: 68 IN | TEMPERATURE: 98 F | RESPIRATION RATE: 12 BRPM | DIASTOLIC BLOOD PRESSURE: 79 MMHG | BODY MASS INDEX: 31.37 KG/M2 | SYSTOLIC BLOOD PRESSURE: 142 MMHG | WEIGHT: 207 LBS | OXYGEN SATURATION: 96 % | HEART RATE: 88 BPM

## 2019-06-03 DIAGNOSIS — E78.5 HYPERLIPIDEMIA LDL GOAL <70: ICD-10-CM

## 2019-06-03 DIAGNOSIS — Z01.818 PRE-OPERATIVE EXAMINATION: Primary | ICD-10-CM

## 2019-06-03 DIAGNOSIS — I10 HYPERTENSION GOAL BP (BLOOD PRESSURE) < 140/90: ICD-10-CM

## 2019-06-03 DIAGNOSIS — R09.89 DIMINISHED PULSES IN LOWER EXTREMITY: ICD-10-CM

## 2019-06-03 DIAGNOSIS — Z79.4 TYPE 2 DIABETES MELLITUS WITH DIABETIC POLYNEUROPATHY, WITH LONG-TERM CURRENT USE OF INSULIN (H): ICD-10-CM

## 2019-06-03 DIAGNOSIS — M86.171 ACUTE OSTEOMYELITIS OF TOE, RIGHT (H): ICD-10-CM

## 2019-06-03 DIAGNOSIS — E11.42 TYPE 2 DIABETES MELLITUS WITH DIABETIC POLYNEUROPATHY, WITH LONG-TERM CURRENT USE OF INSULIN (H): ICD-10-CM

## 2019-06-03 DIAGNOSIS — M86.9 OSTEOMYELITIS OF OTHER SITE, UNSPECIFIED TYPE (H): ICD-10-CM

## 2019-06-03 LAB
ANION GAP SERPL CALCULATED.3IONS-SCNC: 4 MMOL/L (ref 3–14)
BUN SERPL-MCNC: 24 MG/DL (ref 7–30)
CALCIUM SERPL-MCNC: 9.4 MG/DL (ref 8.5–10.1)
CHLORIDE SERPL-SCNC: 104 MMOL/L (ref 94–109)
CO2 SERPL-SCNC: 27 MMOL/L (ref 20–32)
CREAT SERPL-MCNC: 1.28 MG/DL (ref 0.66–1.25)
ERYTHROCYTE [DISTWIDTH] IN BLOOD BY AUTOMATED COUNT: 13 % (ref 10–15)
GFR SERPL CREATININE-BSD FRML MDRD: 53 ML/MIN/{1.73_M2}
GLUCOSE SERPL-MCNC: 178 MG/DL (ref 70–99)
HCT VFR BLD AUTO: 38 % (ref 40–53)
HGB BLD-MCNC: 12.1 G/DL (ref 13.3–17.7)
MCH RBC QN AUTO: 29.4 PG (ref 26.5–33)
MCHC RBC AUTO-ENTMCNC: 31.8 G/DL (ref 31.5–36.5)
MCV RBC AUTO: 93 FL (ref 78–100)
PLATELET # BLD AUTO: 227 10E9/L (ref 150–450)
POTASSIUM SERPL-SCNC: 4 MMOL/L (ref 3.4–5.3)
RBC # BLD AUTO: 4.11 10E12/L (ref 4.4–5.9)
SODIUM SERPL-SCNC: 135 MMOL/L (ref 133–144)
WBC # BLD AUTO: 7.4 10E9/L (ref 4–11)

## 2019-06-03 PROCEDURE — 85027 COMPLETE CBC AUTOMATED: CPT | Performed by: FAMILY MEDICINE

## 2019-06-03 PROCEDURE — 36415 COLL VENOUS BLD VENIPUNCTURE: CPT | Performed by: FAMILY MEDICINE

## 2019-06-03 PROCEDURE — 99215 OFFICE O/P EST HI 40 MIN: CPT | Performed by: FAMILY MEDICINE

## 2019-06-03 PROCEDURE — 93924 LWR XTR VASC STDY BILAT: CPT

## 2019-06-03 PROCEDURE — 93000 ELECTROCARDIOGRAM COMPLETE: CPT | Performed by: FAMILY MEDICINE

## 2019-06-03 PROCEDURE — 80048 BASIC METABOLIC PNL TOTAL CA: CPT | Performed by: FAMILY MEDICINE

## 2019-06-03 ASSESSMENT — PAIN SCALES - GENERAL: PAINLEVEL: NO PAIN (0)

## 2019-06-03 ASSESSMENT — LIFESTYLE VARIABLES: TOBACCO_USE: 1

## 2019-06-03 ASSESSMENT — MIFFLIN-ST. JEOR: SCORE: 1629.48

## 2019-06-03 NOTE — ANESTHESIA PREPROCEDURE EVALUATION
Anesthesia Pre-Procedure Evaluation    Patient: Storm Dutta   MRN: [id number] : 1940          Preoperative Diagnosis: osteomyelitis    Procedure(s):  AMPUTATION 2nd Toe    Past Medical History:   Diagnosis Date     Acute urinary retention 2012    ER visit   / 1200 cc post-void residual     Acute urinary retention 2012    ER      Diabetes mellitus type II      Epididymitis 3/20/2014    Started on doxycyclline for UTI/orchitis. He was discharged on 14.     Former smoker     dc'd      Hypertension goal BP (blood pressure) < 140/90 2012     PE (pulmonary embolism) 3/20/2014     Rectal cancer (H)      Skin cancer      Squamous cell carcinoma      Thrombosis of leg     +PE     Past Surgical History:   Procedure Laterality Date     COLONOSCOPY  2013    Procedure: COMBINED COLONOSCOPY, SINGLE BIOPSY/POLYPECTOMY BY BIOPSY;;  Surgeon: Bari Burnett MD;  Location: [facility]     COLONOSCOPY N/A 2015    Procedure: COMBINED COLONOSCOPY, SINGLE OR MULTIPLE BIOPSY/POLYPECTOMY BY BIOPSY;  Surgeon: Tara Mtz MD;  Location:  GI     COLONOSCOPY N/A 2016    Procedure: COLONOSCOPY;  Surgeon: Breanna Kline MD;  Location:  GI     EYE SURGERY  2012    Right eye procedure     HC CIRCUMCISION SURGICAL NON-DEV >28 DAYS AGE      due to phimosis     HC REMOVAL GALLBLADDER       [facility] UGI ENDOSCOPY W PLACEMENT GASTROSTOMY TUBE PERCUT  3/13/2014    Procedure: COMBINED ESOPHAGOSCOPY, GASTROSCOPY, DUODENOSCOPY (EGD), PLACE PERCUTANEOUS ENDOSCOPIC GASTROSTOMY TUBE;  Surgeon: Loco Casillas MD;  Location: [facility]     LAPAROSCOPIC ASSISTED COLECTOMY LEFT (DESCENDING)  2014    Procedure: LAPAROSCOPIC ASSISTED COLECTOMY LEFT (DESCENDING);  Laparoscopic hand assisted Low Anterior Resection With colonic J pouch and end to end colorectal anastamosis. Laparoscopic splenic flexure mobilization.  Loop Ileostomy.  Rigid proctoscopy;  Surgeon: Tye  Margaret GONZALES MD;  [facility]     PHACOEMULSIFICATION WITH STANDARD INTRAOCULAR LENS IMPLANT  4/11/2013    Procedure: PHACOEMULSIFICATION WITH STANDARD INTRAOCULAR LENS IMPLANT;  Right Kelman Phacoemulsification with Intraocular Lens Implant;  Surgeon: Caesar Turner MD;  Location: [facility]     REMOVE GASTROSTOMY TUBE ADULT  7/25/2014    Procedure: REMOVE GASTROSTOMY TUBE ADULT;  Surgeon: Margaret Gamble MD;  [facility]     SIGMOIDOSCOPY FLEXIBLE  6/23/2014    Procedure: SIGMOIDOSCOPY FLEXIBLE;  Surgeon: Margaret Gambel MD;  [facility]     SIGMOIDOSCOPY FLEXIBLE  7/22/2014    Procedure: SIGMOIDOSCOPY FLEXIBLE;  Surgeon: Margaret Gamble MD;  [facility]     SIGMOIDOSCOPY FLEXIBLE  7/25/2014    Procedure: SIGMOIDOSCOPY FLEXIBLE;  Surgeon: Margaret Gamble MD;  [facility]     SIGMOIDOSCOPY FLEXIBLE  7/28/2014    Procedure: SIGMOIDOSCOPY FLEXIBLE;  Surgeon: Margaret Gamble MD;  [facility]     SIGMOIDOSCOPY FLEXIBLE  7/31/2014    Procedure: SIGMOIDOSCOPY FLEXIBLE;  Surgeon: Margaret Gamble MD;  [facility]     SIGMOIDOSCOPY FLEXIBLE  8/3/2014    Procedure: SIGMOIDOSCOPY FLEXIBLE;  Surgeon: Margaret Gamble MD;  [facility]     SIGMOIDOSCOPY FLEXIBLE  8/5/2014    Procedure: SIGMOIDOSCOPY FLEXIBLE;  Surgeon: Margaret Gamble MD;  [facility]     SIGMOIDOSCOPY FLEXIBLE  8/8/2014    Procedure: SIGMOIDOSCOPY FLEXIBLE;  Surgeon: Margaret Gamble MD;  [facility]     SIGMOIDOSCOPY FLEXIBLE  8/18/2014    Procedure: SIGMOIDOSCOPY FLEXIBLE;  Surgeon: Jose Roberto Cervantes MD;  [facility]     SIGMOIDOSCOPY FLEXIBLE N/A 8/15/2014    Procedure: SIGMOIDOSCOPY FLEXIBLE;  Surgeon: Margaret Gamble MD;  [facility]     SIGMOIDOSCOPY FLEXIBLE N/A 8/22/2014    Procedure: SIGMOIDOSCOPY FLEXIBLE;  Surgeon: Jose Roberto Cervantes MD;  [facility]     SIGMOIDOSCOPY FLEXIBLE N/A 8/11/2014     Procedure: SIGMOIDOSCOPY FLEXIBLE;  Surgeon: Margaret Gamble MD;  [facility]     SIGMOIDOSCOPY FLEXIBLE N/A 8/26/2014    Procedure: SIGMOIDOSCOPY FLEXIBLE;  Surgeon: Margaret Gamble MD;  [facility]     SIGMOIDOSCOPY FLEXIBLE N/A 8/29/2014    Procedure: SIGMOIDOSCOPY FLEXIBLE;  Surgeon: Margaret Gamble MD;  [facility]     SIGMOIDOSCOPY FLEXIBLE N/A 9/8/2014    Procedure: SIGMOIDOSCOPY FLEXIBLE;  Surgeon: Margaret Gamble MD;  [facility]     SIGMOIDOSCOPY FLEXIBLE N/A 9/2/2014    Procedure: SIGMOIDOSCOPY FLEXIBLE;  Surgeon: Breanna Kline MD;  [facility]     SIGMOIDOSCOPY FLEXIBLE N/A 9/11/2014    Procedure: SIGMOIDOSCOPY FLEXIBLE;  Surgeon: Margaret Gamble MD;  [facility]     SIGMOIDOSCOPY FLEXIBLE N/A 9/19/2014    Procedure: SIGMOIDOSCOPY FLEXIBLE;  Surgeon: Margaret Gamble MD;  [facility]     SIGMOIDOSCOPY FLEXIBLE N/A 9/15/2014    Procedure: SIGMOIDOSCOPY FLEXIBLE;  Surgeon: Margaret Gamble MD;  [facility]     SIGMOIDOSCOPY FLEXIBLE N/A 9/5/2014    Procedure: SIGMOIDOSCOPY FLEXIBLE;  Surgeon: Margaret Gamble MD;  [facility]     SIGMOIDOSCOPY FLEXIBLE N/A 9/23/2014    Procedure: SIGMOIDOSCOPY FLEXIBLE;  Surgeon: Margaret Gamble MD;  [facility]     SIGMOIDOSCOPY FLEXIBLE N/A 9/26/2014    Procedure: SIGMOIDOSCOPY FLEXIBLE;  Surgeon: Margaret Gamble MD;  [facility]     SIGMOIDOSCOPY FLEXIBLE N/A 9/30/2014    Procedure: SIGMOIDOSCOPY FLEXIBLE;  Surgeon: Margaret Gamble MD;  [facility]     SIGMOIDOSCOPY FLEXIBLE N/A 10/3/2014    Procedure: SIGMOIDOSCOPY FLEXIBLE;  Surgeon: Margaret Gamble MD;  [facility]     SIGMOIDOSCOPY FLEXIBLE N/A 10/30/2014    Procedure: SIGMOIDOSCOPY FLEXIBLE;  Surgeon: Margaret Gamble MD;  [facility]     SIGMOIDOSCOPY FLEXIBLE, PLACEMENT OF DRAINAGE SPONGE  9/30/14     TAKEDOWN  ILEOSTOMY N/A 1/6/2015    Procedure: TAKEDOWN ILEOSTOMY;  Surgeon: Margaret Gamble MD;  [facility]       Anesthesia Evaluation     . Pt has had prior anesthetic. Type: General and MAC           ROS/MED HX    ENT/Pulmonary: Comment: PE    (+)tobacco use, Past use , . .    Neurologic:  - neg neurologic ROS     Cardiovascular: Comment: EKG: appears normal, NSR, normal axis, normal intervals, no acute ST/T changes c/w ischemia, no LVH by voltage criteria, unchanged from previous tracings (per H and P)    Leg thrombosis    (+) Dyslipidemia, hypertension----. : . . . :. . Previous cardiac testing Echodate:[id number]:       Interpretation Summary   1.  Mild mitral valve prolapse  2.  Normal LV size and systolic function.  3.    Normal RV size and function  4.  No vegetation visualized on VERNA       EF = 60 - 65%date: results:ECG reviewed date:6-3-19 results:Sinus Rhythm   WITHIN NORMAL LIMITS date: results:          METS/Exercise Tolerance:  >4 METS   Hematologic:     (+) History of blood clots -      Musculoskeletal: Comment: Osteomyelitis right second toe  Bilateral hip pain        GI/Hepatic: Comment: Rectal cancer  SBO       (-) liver disease   Renal/Genitourinary: Comment: CKD  Epididymitis  Prostate hypertrophy with obstruction    (+) chronic renal disease,       Endo:     (+) type II DM Diabetic complications: neuropathy, .      Psychiatric:  - neg psychiatric ROS       Infectious Disease:  - neg infectious disease ROS       Malignancy:   (+) Malignancy History of Skin  SCC        Other:    - neg other ROS                      Physical Exam  Normal systems: cardiovascular and pulmonary    Airway   Mallampati: II    Dental   (+) upper dentures and lower dentures    Cardiovascular       Pulmonary             Lab Results   Component Value Date    WBC 7.4 06/03/2019    HGB 12.1 (L) 06/03/2019    HCT 38.0 (L) 06/03/2019     06/03/2019    SED 11 09/10/2012     04/16/2019    POTASSIUM 4.1  "04/16/2019    CHLORIDE 106 04/16/2019    CO2 28 04/16/2019    BUN 24 04/16/2019    CR 1.32 (H) 04/16/2019     (H) 04/16/2019    PILY 9.3 04/16/2019    PHOS 3.7 05/05/2014    MAG 1.5 (L) 05/05/2014    ALBUMIN 3.8 04/16/2019    PROTTOTAL 7.2 04/16/2019    ALT 21 04/16/2019    AST 13 04/16/2019    ALKPHOS 114 04/16/2019    BILITOTAL 0.7 04/16/2019    LIPASE 319 (H) 01/15/2014    PTT 37 04/26/2014    INR 1.01 04/26/2014    TSH 1.79 06/12/2018       Preop Vitals  BP Readings from Last 3 Encounters:   06/03/19 142/79   05/31/19 157/75   04/25/19 138/68    Pulse Readings from Last 3 Encounters:   06/03/19 88   05/31/19 81   04/25/19 60      Resp Readings from Last 3 Encounters:   06/03/19 12   04/25/19 16   04/23/19 16    SpO2 Readings from Last 3 Encounters:   06/03/19 96%   04/23/19 99%   12/06/18 96%      Temp Readings from Last 1 Encounters:   06/03/19 36.7  C (98  F) (Tympanic)    Ht Readings from Last 1 Encounters:   06/03/19 1.721 m (5' 7.75\")      Wt Readings from Last 1 Encounters:   06/03/19 93.9 kg (207 lb)    Estimated body mass index is 31.71 kg/m  as calculated from the following:    Height as of 6/3/19: 1.721 m (5' 7.75\").    Weight as of 6/3/19: 93.9 kg (207 lb).       Anesthesia Plan      History & Physical Review  History and physical reviewed and following examination; no interval change.    ASA Status:  3 .    NPO Status:  > 6 hours    Plan for MAC Reason for MAC:  Deep or markedly invasive procedure (G8)  PONV prophylaxis:  Ondansetron (or other 5HT-3) and Dexamethasone or Solumedrol  Agrees to MAC, OK with general if needed.      Postoperative Care  Postoperative pain management:  IV analgesics.      Consents  Anesthetic plan, risks, benefits and alternatives discussed with:  Patient..                 Jesus Gutierrez CRNA, APRN CRNA  "

## 2019-06-03 NOTE — PATIENT INSTRUCTIONS
We'll let you know the lab results when available.    Tonight, we'll have you take a bit less of your Lantus insulin.  Take just 9 units instead of your usual 16.  In the morning before surgery do not take any of your short acting insulin.  You can resume your normal doses after surgery.    You can continue the rest of your medications as prescribed.    You should be able to restart your aspirin the day after surgery but please confirm with Dr Cabello.    Good luck!      Before Your Surgery      Call your surgeon if there is any change in your health. This includes signs of a cold or flu (such as a sore throat, runny nose, cough, rash or fever).    Do not smoke, drink alcohol or take over the counter medicine (unless your surgeon or primary care doctor tells you to) for the 24 hours before and after surgery.    If you take prescribed drugs: Follow your doctor s orders about which medicines to take and which to stop until after surgery.    Eating and drinking prior to surgery: follow the instructions from your surgeon    Take a shower or bath the night before surgery. Use the soap your surgeon gave you to gently clean your skin. If you do not have soap from your surgeon, use your regular soap. Do not shave or scrub the surgery site.  Wear clean pajamas and have clean sheets on your bed.

## 2019-06-03 NOTE — H&P (VIEW-ONLY)
[facility]  [address] [address]  [phone]  Dept: [phone]    PRE-OP EVALUATION:  Today's date: 6/3/2019    Storm Dutta (: 1940) presents for pre-operative evaluation assessment as requested by Dr. Cabello.  He requires evaluation and anesthesia risk assessment prior to undergoing surgery/procedure for treatment of Right Second toe .    Proposed Surgery/ Procedure: Amputation of right second toe  Date of Surgery/ Procedure: 19  Time of Surgery/ Procedure: 9:20 AM  Hospital/Surgical Facility: [facility]    Primary Physician: Jovana Beaulieu  Type of Anesthesia Anticipated: Local with MAC    Patient has a Health Care Directive or Living Will:  YES     1. NO - Do you have a history of heart attack, stroke, stent, bypass or surgery on an artery in the head, neck, heart or legs?  2. NO - Do you ever have any pain or discomfort in your chest?  3. NO - Do you have a history of  Heart Failure?  4. NO - Are you troubled by shortness of breath when: walking on the level, up a slight hill or at night?  5. NO - Do you currently have a cold, bronchitis or other respiratory infection?  6. NO - Do you have a cough, shortness of breath or wheezing?  7. NO - Do you sometimes get pains in the calves of your legs when you walk?  8. YES- Do you or anyone in your family have previous history of blood clots?  personal h/o large PE during post-op recover from rectal cancer resection, negative hypercoagulable eval.  On full dose aspirin long term  9. NO - Do you or does anyone in your family have a serious bleeding problem such as prolonged bleeding following surgeries or cuts?  10. NO - Have you ever had problems with anemia or been told to take iron pills?  11. NO - Have you had any abnormal blood loss such as black, tarry or bloody stools, or abnormal vaginal bleeding?  12. NO - Have you ever had a blood transfusion?  13. NO - Have you or any of your  relatives ever had problems with anesthesia?  14. NO - Do you have sleep apnea, excessive snoring or daytime drowsiness?  15. NO - Do you have any prosthetic heart valves?  16. NO - Do you have prosthetic joints?  17. NO - Is there any chance that you may be pregnant?      HPI:     HPI related to upcoming procedure: pt with long standing diabetes with neuropathy.  Noticed a wound on his L second toe.  Podiatry eval reveals osteomyelitis.  Planned 2nd toe amputation.  Vascular study completed this AM.      DIABETES - Patient has a longstanding history of DiabetesType Type II . Patient is being treated with insulin injections and ASA and denies significant side effects. Control has been good. Complicating factors include but are not limited to: hypertension, hyperlipidemia, neuropathy, foot ulcer and chronic kidney disease.                                                                                                                            .  HYPERLIPIDEMIA - Patient has a long history of significant Hyperlipidemia requiring medication for treatment with recent good control. Patient reports no problems or side effects with the medication.                                                                                                                                                       .  HYPERTENSION - Patient has longstanding history of HTN , currently denies any symptoms referable to elevated blood pressure. Specifically denies chest pain, palpitations, dyspnea, orthopnea, PND or peripheral edema. Blood pressure readings have been in normal range. Current medication regimen is as listed below. Patient denies any side effects of medication.                                                                                                                                                                                          .    MEDICAL HISTORY:     Patient Active Problem List    Diagnosis Date Noted      Bilateral hip pain 04/26/2019     Priority: Medium     Type 2 diabetes mellitus (H) 05/24/2016     Priority: Medium     [facility].  Mild retinopathy of both eyes.  Monitor only.  Sees retinal specialist.  F/u 3 months.         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 CT 08/09/13 low rectal cancer extending 7 cm from anal verge, >5 lymph nodes in the mesorectal fat. Bilateral iliac nodes. No definite liver lesions.. Markedly enlarged prostate 8.8 cm, lesion 2.7 x 3.2 x 2.8 cm in left ilium with narrow zone of transition suggesting benign lesion. Sclerotic lesion left seventh rib. S/p concurrent chemoRT with Xeloda 1500 mg po BID M-F started 09/04/13, completed 28/28 fractions of RT 10/10/13. S/p Laparoscopic-assisted low anterior resection with laparoscopic splenic flexure mobilization and colonic J pouch with colorectal end-to-end anastomosis, loop ileostomy, rigid proctoscopy 01/07/2014. Adjuvant XELOX (oxaliplatin 130 mg/m2 on day 1 plus capecitabine 1,000 mg/m2 twice daily on days 1 to 14 every 3 weeks) Cycle 1 started on 02/20/13; Cycle 2 has been on HOLD due to decline in patient's health status...       Senile nuclear sclerosis 04/10/2013     Priority: Medium     Hypertrophy of prostate with urinary obstruction 12/07/2012     Priority: Medium     Problem list name updated by automated process. Provider to review       Hypertension goal BP (blood pressure) < 140/90 08/24/2012     Priority: Medium     Type 2 diabetes mellitus with diabetic polyneuropathy (H)      Priority: Medium     Hyperlipidemia LDL goal <70 09/23/2009     Priority: Medium     [facility] 08/09/2013     Priority: Low     EMERGENCY CARE PLAN  August 9, 2013: No current Care Coordination follow  up planned. Please refer if Care Coordination services are needed.    Presenting Problem Signs and Symptoms Treatment Plan   Questions or concerns   during clinic hours   I will call my clinic directly:  [address], [place] [id number]  [phone].   Questions or concerns outside clinic hours   I will call the 24 hour nurse line at   [phone] or [phone].   Need to schedule an appointment   I will call the 24 hour scheduling team at [phone] or my clinic directly at [phone].    Same day treatment     I will call my clinic first, nurse line if after hours, urgent care and express care if needed.   Clinic care coordination services (regular clinic hours)     I will call a clinic care coordinator directly:     Miguel Ángel Smith RN  Mon, Tues, Fri - [phone]  Wed, Thurs - [phone]    Missy Penny, :    [phone]    Or call my clinic at [phone] and ask to speak with care coordination.   Crisis Services: Behavioral or Mental Health  [facility] 24 Hour Phone Line  [phone]    [facility] 24 Hour Crisis Services  [phone]    [facility] (Behavioral Health Providers) Network [phone]    [facility]   [phone]       Emergency treatment -- Immediately    CAll 571             Past Medical History:   Diagnosis Date     Acute urinary retention 11/2012    ER visit   / 1200 cc post-void residual     Acute urinary retention 11/1/2012    ER      Diabetes mellitus type II      Epididymitis 3/20/2014    Started on doxycyclline for UTI/orchitis. He was discharged on 03/22/14.     Former smoker     dc'd 2006     Hypertension goal BP (blood pressure) < 140/90 8/24/2012     PE (pulmonary embolism) 3/20/2014     Rectal cancer (H)      Skin cancer      Squamous cell carcinoma      Thrombosis of leg     +PE     Past Surgical History:   Procedure Laterality Date     COLONOSCOPY  7/29/2013    Procedure: COMBINED COLONOSCOPY, SINGLE  BIOPSY/POLYPECTOMY BY BIOPSY;;  Surgeon: Bari Burnett MD;  Location: [facility]     COLONOSCOPY N/A 6/4/2015    Procedure: COMBINED COLONOSCOPY, SINGLE OR MULTIPLE BIOPSY/POLYPECTOMY BY BIOPSY;  Surgeon: Tara Mtz MD;  Location:  GI     COLONOSCOPY N/A 12/5/2016    Procedure: COLONOSCOPY;  Surgeon: Breanna Kline MD;  Location:  GI     EYE SURGERY  5/2012    Right eye procedure     HC CIRCUMCISION SURGICAL NON-DEV >28 DAYS AGE  2/97    due to phimosis     HC REMOVAL GALLBLADDER  5/01     HC UGI ENDOSCOPY W PLACEMENT GASTROSTOMY TUBE PERCUT  3/13/2014    Procedure: COMBINED ESOPHAGOSCOPY, GASTROSCOPY, DUODENOSCOPY (EGD), PLACE PERCUTANEOUS ENDOSCOPIC GASTROSTOMY TUBE;  Surgeon: Loco Casillas MD;  Location: [facility]     LAPAROSCOPIC ASSISTED COLECTOMY LEFT (DESCENDING)  1/7/2014    Procedure: LAPAROSCOPIC ASSISTED COLECTOMY LEFT (DESCENDING);  Laparoscopic hand assisted Low Anterior Resection With colonic J pouch and end to end colorectal anastamosis. Laparoscopic splenic flexure mobilization.  Loop Ileostomy.  Rigid proctoscopy;  Surgeon: Margaret Gamble MD;  Location:  OR     PHACOEMULSIFICATION WITH STANDARD INTRAOCULAR LENS IMPLANT  4/11/2013    Procedure: PHACOEMULSIFICATION WITH STANDARD INTRAOCULAR LENS IMPLANT;  Right Kelman Phacoemulsification with Intraocular Lens Implant;  Surgeon: Caesar Turner MD;  Location: [facility]     REMOVE GASTROSTOMY TUBE ADULT  7/25/2014    Procedure: REMOVE GASTROSTOMY TUBE ADULT;  Surgeon: Margaret Gamble MD;  Location:  OR     SIGMOIDOSCOPY FLEXIBLE  6/23/2014    Procedure: SIGMOIDOSCOPY FLEXIBLE;  Surgeon: Margaret Gamble MD;  Location:  GI     SIGMOIDOSCOPY FLEXIBLE  7/22/2014    Procedure: SIGMOIDOSCOPY FLEXIBLE;  Surgeon: Margaret Gamble MD;  Location:  OR     SIGMOIDOSCOPY FLEXIBLE  7/25/2014    Procedure: SIGMOIDOSCOPY FLEXIBLE;  Surgeon: Margaret Gamble MD;  Location:  OR      SIGMOIDOSCOPY FLEXIBLE  7/28/2014    Procedure: SIGMOIDOSCOPY FLEXIBLE;  Surgeon: Margaret Gamble MD;  [facility]     SIGMOIDOSCOPY FLEXIBLE  7/31/2014    Procedure: SIGMOIDOSCOPY FLEXIBLE;  Surgeon: Margaret Gamble MD;  [facility]     SIGMOIDOSCOPY FLEXIBLE  8/3/2014    Procedure: SIGMOIDOSCOPY FLEXIBLE;  Surgeon: Margaret Gamble MD;  [facility]     SIGMOIDOSCOPY FLEXIBLE  8/5/2014    Procedure: SIGMOIDOSCOPY FLEXIBLE;  Surgeon: Margaret Gamble MD;  [facility]     SIGMOIDOSCOPY FLEXIBLE  8/8/2014    Procedure: SIGMOIDOSCOPY FLEXIBLE;  Surgeon: Margaret Gamble MD;  [facility]     SIGMOIDOSCOPY FLEXIBLE  8/18/2014    Procedure: SIGMOIDOSCOPY FLEXIBLE;  Surgeon: Jose Roberto Cervantes MD;  [facility]     SIGMOIDOSCOPY FLEXIBLE N/A 8/15/2014    Procedure: SIGMOIDOSCOPY FLEXIBLE;  Surgeon: Margaret Gamble MD;  [facility]     SIGMOIDOSCOPY FLEXIBLE N/A 8/22/2014    Procedure: SIGMOIDOSCOPY FLEXIBLE;  Surgeon: Jose Roberto Cervantes MD;  [facility]     SIGMOIDOSCOPY FLEXIBLE N/A 8/11/2014    Procedure: SIGMOIDOSCOPY FLEXIBLE;  Surgeon: Margaret Gamble MD;  [facility]     SIGMOIDOSCOPY FLEXIBLE N/A 8/26/2014    Procedure: SIGMOIDOSCOPY FLEXIBLE;  Surgeon: Margaret Gamble MD;  [facility]     SIGMOIDOSCOPY FLEXIBLE N/A 8/29/2014    Procedure: SIGMOIDOSCOPY FLEXIBLE;  Surgeon: Margaret Gamble MD;  [facility]     SIGMOIDOSCOPY FLEXIBLE N/A 9/8/2014    Procedure: SIGMOIDOSCOPY FLEXIBLE;  Surgeon: Margaret Gamble MD;  [facility]     SIGMOIDOSCOPY FLEXIBLE N/A 9/2/2014    Procedure: SIGMOIDOSCOPY FLEXIBLE;  Surgeon: Breanna Kline MD;  [facility]     SIGMOIDOSCOPY FLEXIBLE N/A 9/11/2014    Procedure: SIGMOIDOSCOPY FLEXIBLE;  Surgeon: Margaret Gamble MD;  [facility]     SIGMOIDOSCOPY FLEXIBLE N/A 9/19/2014    Procedure: SIGMOIDOSCOPY FLEXIBLE;   Surgeon: Margaret Gamble MD;  [facility]     SIGMOIDOSCOPY FLEXIBLE N/A 9/15/2014    Procedure: SIGMOIDOSCOPY FLEXIBLE;  Surgeon: Margaret Gamble MD;  [facility]     SIGMOIDOSCOPY FLEXIBLE N/A 9/5/2014    Procedure: SIGMOIDOSCOPY FLEXIBLE;  Surgeon: Margaret Gamble MD;  [facility]     SIGMOIDOSCOPY FLEXIBLE N/A 9/23/2014    Procedure: SIGMOIDOSCOPY FLEXIBLE;  Surgeon: Margaret Gamble MD;  [facility]     SIGMOIDOSCOPY FLEXIBLE N/A 9/26/2014    Procedure: SIGMOIDOSCOPY FLEXIBLE;  Surgeon: Margaret Gamble MD;  [facility]     SIGMOIDOSCOPY FLEXIBLE N/A 9/30/2014    Procedure: SIGMOIDOSCOPY FLEXIBLE;  Surgeon: Margaret Gamble MD;  [facility]     SIGMOIDOSCOPY FLEXIBLE N/A 10/3/2014    Procedure: SIGMOIDOSCOPY FLEXIBLE;  Surgeon: Margaret Gamble MD;  [facility]     SIGMOIDOSCOPY FLEXIBLE N/A 10/30/2014    Procedure: SIGMOIDOSCOPY FLEXIBLE;  Surgeon: Margaret Gamble MD;  [facility]     SIGMOIDOSCOPY FLEXIBLE, PLACEMENT OF DRAINAGE SPONGE  9/30/14     TAKEDOWN ILEOSTOMY N/A 1/6/2015    Procedure: TAKEDOWN ILEOSTOMY;  Surgeon: Margaret Gamble MD;  [facility]     Current Outpatient Medications   Medication Sig Dispense Refill     ACCU-CHEK REGIS PLUS test strip TEST BLOOD SUGARS 2-4 TIMES DAILY OR AS DIRECTED 100 each 11     amLODIPine (NORVASC) 10 MG tablet Take 10 mg by mouth       aspirin 325 MG tablet Take 325 mg by mouth daily       atorvastatin (LIPITOR) 20 MG tablet TAKE ONE TABLET BY MOUTH EVERY DAY 90 tablet 3     blood glucose (ACCU-CHEK REGIS PLUS) test strip USE TO TEST BLOOD SUGAR THREE TIMES A DAY OR AS DIRECTED 300 each 1     blood glucose monitoring (NO BRAND SPECIFIED) meter device kit Use to test blood sugar 3 times daily or as directed. 1 kit 0     Blood Glucose Monitoring Suppl (ACCU-CHEK REGIS PLUS) W/DEVICE KIT Check blood sugars 1-2 times daily 1 kit 0      "Cholecalciferol (VITAMIN D-3) 1000 units CAPS Take by mouth daily       clindamycin (CLEOCIN) 300 MG capsule Take 1 capsule (300 mg) by mouth 3 times daily for 10 days 30 capsule 0     diphenoxylate-atropine (LOMOTIL) 2.5-0.025 MG per tablet Take 1 tablet by mouth 4 times daily as needed for diarrhea 60 tablet 2     finasteride (PROSCAR) 5 MG tablet TAKE ONE TABLET BY MOUTH EVERY DAY 90 tablet 1     insulin aspart (NOVOLOG FLEXPEN) 100 UNIT/ML pen Use 8 units plus low sliding scale before each meal 120-149 0 units  150-199 1 unit  200-249 2 units  250-299 3 units  300-349 4 units  > 350 5 units   Max dose 50 units day 15 mL 1     insulin glargine (BASAGLAR KWIKPEN) 100 UNIT/ML pen INJECT 16 UNITS AT BEDTIME 30 mL 1     losartan (COZAAR) 100 MG tablet Take 1 tablet (100 mg) by mouth daily 90 tablet 0     tamsulosin (FLOMAX) 0.4 MG capsule TAKE ONE CAPSULE BY MOUTH EVERY DAY 90 capsule 2     ULTICARE MINI 31G X 6 MM insulin pen needle USE 4 TO 5 TIMES DAILY OR AS DIRECTED FOR SLIDING SCALE INSULIN 100 each 7     OTC products: None, except as noted above    Allergies   Allergen Reactions     Nkda [No Known Drug Allergies]       Latex Allergy: NO    Social History     Tobacco Use     Smoking status: Former Smoker     Years: 30.00     Types: Cigars     Last attempt to quit: 2002     Years since quittin.7     Smokeless tobacco: Never Used   Substance Use Topics     Alcohol use: No     History   Drug Use No       REVIEW OF SYSTEMS:   Constitutional, HEENT, cardiovascular, pulmonary, gi and gu systems are negative, except as otherwise noted.    EXAM:   /79 (BP Location: Right arm, Patient Position: Chair, Cuff Size: Adult Large)   Pulse 88   Temp 98  F (36.7  C) (Tympanic)   Resp 12   Ht 1.721 m (5' 7.75\")   Wt 93.9 kg (207 lb)   SpO2 96%   BMI 31.71 kg/m      GENERAL APPEARANCE: healthy, alert and no distress     EYES: EOMI,  PERRL     HENT: ear canals and TM's normal and nose and mouth without " ulcers or lesions     NECK: no adenopathy, no asymmetry, masses, or scars and thyroid normal to palpation     RESP: lungs clear to auscultation - no rales, rhonchi or wheezes     CV: regular rates and rhythm, normal S1 S2, no S3 or S4 and no murmur, click or rub     ABDOMEN:  soft, nontender, no HSM or masses and bowel sounds normal     MS: extremities normal- no gross deformities noted, no evidence of inflammation in joints, FROM in all extremities.     NEURO: Normal strength and tone, sensory exam grossly normal, mentation intact and speech normal     PSYCH: mentation appears normal. and affect normal/bright     LYMPHATICS: No cervical adenopathy    DIAGNOSTICS:     EKG: appears normal, NSR, normal axis, normal intervals, no acute ST/T changes c/w ischemia, no LVH by voltage criteria, unchanged from previous tracings  Hemoglobin (indicated for history of anemia or procedure with significant blood loss such as tonsillectomy, major intraperitoneal surgery, vascular surgery, major spine surgery, total joint replacement)  Serum Potassium  Serum Creatinine  Labs Resulted Today:   Results for orders placed or performed in visit on 06/03/19   CBC with platelets   Result Value Ref Range    WBC 7.4 4.0 - 11.0 10e9/L    RBC Count 4.11 (L) 4.4 - 5.9 10e12/L    Hemoglobin 12.1 (L) 13.3 - 17.7 g/dL    Hematocrit 38.0 (L) 40.0 - 53.0 %    MCV 93 78 - 100 fl    MCH 29.4 26.5 - 33.0 pg    MCHC 31.8 31.5 - 36.5 g/dL    RDW 13.0 10.0 - 15.0 %    Platelet Count 227 150 - 450 10e9/L       Recent Labs   Lab Test 04/25/19  1648 04/16/19  0854 01/15/19  0903 10/16/18  0905  04/26/14  1235  04/15/14  1853   HGB  --  12.6*  --  12.6*   < > 11.1*   < > 8.1*   PLT  --  176  --  178   < > 279   < > 294   INR  --   --   --   --   --  1.01  --  0.95   NA  --  139 139 138   < > 127*   < > 133   POTASSIUM  --  4.1 4.6 4.3   < > 5.6*   < > 4.6   CR  --  1.32* 1.30* 1.18   < > 1.09   < > 0.74   A1C 7.4*  --  8.2*  --    < >  --   --   --     < > =  values in this interval not displayed.        IMPRESSION:   Reason for surgery/procedure: osteomyelitis L second toe, planned amputation      The proposed surgical procedure is considered INTERMEDIATE risk.    REVISED CARDIAC RISK INDEX  The patient has the following serious cardiovascular risks for perioperative complications such as (MI, PE, VFib and 3  AV Block):  No serious cardiac risks  INTERPRETATION: 0 risks: Class I (very low risk - 0.4% complication rate)    The patient has the following additional risks for perioperative complications:  No identified additional risks      ICD-10-CM    1. Pre-operative examination Z01.818 EKG 12-lead complete w/read - Clinics     CBC with platelets     Basic metabolic panel   2. Osteomyelitis of other site, unspecified type (H) M86.9    3. Type 2 diabetes mellitus with diabetic polyneuropathy, with long-term current use of insulin (H) E11.42 EKG 12-lead complete w/read - Clinics    Z79.4 Basic metabolic panel   4. Hypertension goal BP (blood pressure) < 140/90 I10 EKG 12-lead complete w/read - Clinics     Basic metabolic panel   5. Hyperlipidemia LDL goal <70 E78.5        RECOMMENDATIONS:       Cardiovascular Risk  Performs 4 METs exercise without symptoms (Light housework (dusting, washing dishes) and Climb a flight of stairs) .       Anemia  Anemia and does not require treatment prior to surgery.  Monitor Hemoglobin postoperatively.      --Patient is to take all scheduled medications on the day of surgery EXCEPT for modifications listed below.    ASA on hold since 6/1/19  Decrease lantus dose tonight to 9 units  Hold short acting insulin the AM of procedure  Resume usual insulin dosing following surgery    Plan to continue ARB as surgery should not result in large volume shifts and MAC sedation rather then general anesthesia is planned.      APPROVAL GIVEN to proceed with proposed procedure, without further diagnostic evaluation       Signed Electronically by: Jovana Gandara  DO Christ[place]    Copy of this evaluation report is provided to requesting physician.    [place] Preop Guidelines    Revised Cardiac Risk Index

## 2019-06-03 NOTE — PROGRESS NOTES
[facility]  [address] [address]  [phone]  Dept: [phone]    PRE-OP EVALUATION:  Today's date: 6/3/2019    Storm Dutta (: 1940) presents for pre-operative evaluation assessment as requested by Dr. Cabello.  He requires evaluation and anesthesia risk assessment prior to undergoing surgery/procedure for treatment of Right Second toe .    Proposed Surgery/ Procedure: Amputation of right second toe  Date of Surgery/ Procedure: 19  Time of Surgery/ Procedure: 9:20 AM  Hospital/Surgical Facility: [facility]    Primary Physician: Jovana Beaulieu  Type of Anesthesia Anticipated: Local with MAC    Patient has a Health Care Directive or Living Will:  YES     1. NO - Do you have a history of heart attack, stroke, stent, bypass or surgery on an artery in the head, neck, heart or legs?  2. NO - Do you ever have any pain or discomfort in your chest?  3. NO - Do you have a history of  Heart Failure?  4. NO - Are you troubled by shortness of breath when: walking on the level, up a slight hill or at night?  5. NO - Do you currently have a cold, bronchitis or other respiratory infection?  6. NO - Do you have a cough, shortness of breath or wheezing?  7. NO - Do you sometimes get pains in the calves of your legs when you walk?  8. YES- Do you or anyone in your family have previous history of blood clots?  personal h/o large PE during post-op recover from rectal cancer resection, negative hypercoagulable eval.  On full dose aspirin long term  9. NO - Do you or does anyone in your family have a serious bleeding problem such as prolonged bleeding following surgeries or cuts?  10. NO - Have you ever had problems with anemia or been told to take iron pills?  11. NO - Have you had any abnormal blood loss such as black, tarry or bloody stools, or abnormal vaginal bleeding?  12. NO - Have you ever had a blood transfusion?  13. NO - Have you or any of your  relatives ever had problems with anesthesia?  14. NO - Do you have sleep apnea, excessive snoring or daytime drowsiness?  15. NO - Do you have any prosthetic heart valves?  16. NO - Do you have prosthetic joints?  17. NO - Is there any chance that you may be pregnant?      HPI:     HPI related to upcoming procedure: pt with long standing diabetes with neuropathy.  Noticed a wound on his L second toe.  Podiatry eval reveals osteomyelitis.  Planned 2nd toe amputation.  Vascular study completed this AM.      DIABETES - Patient has a longstanding history of DiabetesType Type II . Patient is being treated with insulin injections and ASA and denies significant side effects. Control has been good. Complicating factors include but are not limited to: hypertension, hyperlipidemia, neuropathy, foot ulcer and chronic kidney disease.                                                                                                                            .  HYPERLIPIDEMIA - Patient has a long history of significant Hyperlipidemia requiring medication for treatment with recent good control. Patient reports no problems or side effects with the medication.                                                                                                                                                       .  HYPERTENSION - Patient has longstanding history of HTN , currently denies any symptoms referable to elevated blood pressure. Specifically denies chest pain, palpitations, dyspnea, orthopnea, PND or peripheral edema. Blood pressure readings have been in normal range. Current medication regimen is as listed below. Patient denies any side effects of medication.                                                                                                                                                                                          .    MEDICAL HISTORY:     Patient Active Problem List    Diagnosis Date Noted      Bilateral hip pain 04/26/2019     Priority: Medium     Type 2 diabetes mellitus (H) 05/24/2016     Priority: Medium     [facility].  Mild retinopathy of both eyes.  Monitor only.  Sees retinal specialist.  F/u 3 months.         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 CT 08/09/13 low rectal cancer extending 7 cm from anal verge, >5 lymph nodes in the mesorectal fat. Bilateral iliac nodes. No definite liver lesions.. Markedly enlarged prostate 8.8 cm, lesion 2.7 x 3.2 x 2.8 cm in left ilium with narrow zone of transition suggesting benign lesion. Sclerotic lesion left seventh rib. S/p concurrent chemoRT with Xeloda 1500 mg po BID M-F started 09/04/13, completed 28/28 fractions of RT 10/10/13. S/p Laparoscopic-assisted low anterior resection with laparoscopic splenic flexure mobilization and colonic J pouch with colorectal end-to-end anastomosis, loop ileostomy, rigid proctoscopy 01/07/2014. Adjuvant XELOX (oxaliplatin 130 mg/m2 on day 1 plus capecitabine 1,000 mg/m2 twice daily on days 1 to 14 every 3 weeks) Cycle 1 started on 02/20/13; Cycle 2 has been on HOLD due to decline in patient's health status...       Senile nuclear sclerosis 04/10/2013     Priority: Medium     Hypertrophy of prostate with urinary obstruction 12/07/2012     Priority: Medium     Problem list name updated by automated process. Provider to review       Hypertension goal BP (blood pressure) < 140/90 08/24/2012     Priority: Medium     Type 2 diabetes mellitus with diabetic polyneuropathy (H)      Priority: Medium     Hyperlipidemia LDL goal <70 09/23/2009     Priority: Medium     [facility] 08/09/2013     Priority: Low     EMERGENCY CARE PLAN  August 9, 2013: No current Care Coordination follow  up planned. Please refer if Care Coordination services are needed.    Presenting Problem Signs and Symptoms Treatment Plan   Questions or concerns   during clinic hours   I will call my clinic directly:  [address], [place] [id number]  [phone].   Questions or concerns outside clinic hours   I will call the 24 hour nurse line at   [phone] or [phone].   Need to schedule an appointment   I will call the 24 hour scheduling team at [phone] or my clinic directly at [phone].    Same day treatment     I will call my clinic first, nurse line if after hours, urgent care and express care if needed.   Clinic care coordination services (regular clinic hours)     I will call a clinic care coordinator directly:     Miguel Ángel Smith RN  Mon, Tues, Fri - [phone]  Wed, Thurs - [phone]    Missy Penny, :    [phone]    Or call my clinic at [phone] and ask to speak with care coordination.   Crisis Services: Behavioral or Mental Health  [facility] 24 Hour Phone Line  [phone]    [facility] 24 Hour Crisis Services  [phone]    [facility] (Behavioral Health Providers) Network [phone]    [facility]   [phone]       Emergency treatment -- Immediately    CAll 004             Past Medical History:   Diagnosis Date     Acute urinary retention 11/2012    ER visit   / 1200 cc post-void residual     Acute urinary retention 11/1/2012    ER      Diabetes mellitus type II      Epididymitis 3/20/2014    Started on doxycyclline for UTI/orchitis. He was discharged on 03/22/14.     Former smoker     dc'd 2006     Hypertension goal BP (blood pressure) < 140/90 8/24/2012     PE (pulmonary embolism) 3/20/2014     Rectal cancer (H)      Skin cancer      Squamous cell carcinoma      Thrombosis of leg     +PE     Past Surgical History:   Procedure Laterality Date     COLONOSCOPY  7/29/2013    Procedure: COMBINED COLONOSCOPY, SINGLE  BIOPSY/POLYPECTOMY BY BIOPSY;;  Surgeon: Bari Burnett MD;  Location: [facility]     COLONOSCOPY N/A 6/4/2015    Procedure: COMBINED COLONOSCOPY, SINGLE OR MULTIPLE BIOPSY/POLYPECTOMY BY BIOPSY;  Surgeon: Tara Mtz MD;  Location:  GI     COLONOSCOPY N/A 12/5/2016    Procedure: COLONOSCOPY;  Surgeon: Breanna Kline MD;  Location:  GI     EYE SURGERY  5/2012    Right eye procedure     HC CIRCUMCISION SURGICAL NON-DEV >28 DAYS AGE  2/97    due to phimosis     HC REMOVAL GALLBLADDER  5/01     HC UGI ENDOSCOPY W PLACEMENT GASTROSTOMY TUBE PERCUT  3/13/2014    Procedure: COMBINED ESOPHAGOSCOPY, GASTROSCOPY, DUODENOSCOPY (EGD), PLACE PERCUTANEOUS ENDOSCOPIC GASTROSTOMY TUBE;  Surgeon: Loco Casillas MD;  Location: [facility]     LAPAROSCOPIC ASSISTED COLECTOMY LEFT (DESCENDING)  1/7/2014    Procedure: LAPAROSCOPIC ASSISTED COLECTOMY LEFT (DESCENDING);  Laparoscopic hand assisted Low Anterior Resection With colonic J pouch and end to end colorectal anastamosis. Laparoscopic splenic flexure mobilization.  Loop Ileostomy.  Rigid proctoscopy;  Surgeon: Margaret Gamble MD;  Location:  OR     PHACOEMULSIFICATION WITH STANDARD INTRAOCULAR LENS IMPLANT  4/11/2013    Procedure: PHACOEMULSIFICATION WITH STANDARD INTRAOCULAR LENS IMPLANT;  Right Kelman Phacoemulsification with Intraocular Lens Implant;  Surgeon: Caesar Turner MD;  Location: [facility]     REMOVE GASTROSTOMY TUBE ADULT  7/25/2014    Procedure: REMOVE GASTROSTOMY TUBE ADULT;  Surgeon: Margaret Gamble MD;  Location:  OR     SIGMOIDOSCOPY FLEXIBLE  6/23/2014    Procedure: SIGMOIDOSCOPY FLEXIBLE;  Surgeon: Margaret Gamble MD;  Location:  GI     SIGMOIDOSCOPY FLEXIBLE  7/22/2014    Procedure: SIGMOIDOSCOPY FLEXIBLE;  Surgeon: Margaret Gamble MD;  Location:  OR     SIGMOIDOSCOPY FLEXIBLE  7/25/2014    Procedure: SIGMOIDOSCOPY FLEXIBLE;  Surgeon: Margaret Gamble MD;  Location:  OR      SIGMOIDOSCOPY FLEXIBLE  7/28/2014    Procedure: SIGMOIDOSCOPY FLEXIBLE;  Surgeon: Margaret Gamble MD;  [facility]     SIGMOIDOSCOPY FLEXIBLE  7/31/2014    Procedure: SIGMOIDOSCOPY FLEXIBLE;  Surgeon: Margaret Gamble MD;  [facility]     SIGMOIDOSCOPY FLEXIBLE  8/3/2014    Procedure: SIGMOIDOSCOPY FLEXIBLE;  Surgeon: Margaret Gamble MD;  [facility]     SIGMOIDOSCOPY FLEXIBLE  8/5/2014    Procedure: SIGMOIDOSCOPY FLEXIBLE;  Surgeon: Margaret Gamble MD;  [facility]     SIGMOIDOSCOPY FLEXIBLE  8/8/2014    Procedure: SIGMOIDOSCOPY FLEXIBLE;  Surgeon: Margaret Gamble MD;  [facility]     SIGMOIDOSCOPY FLEXIBLE  8/18/2014    Procedure: SIGMOIDOSCOPY FLEXIBLE;  Surgeon: Jose Roberto Cervantes MD;  [facility]     SIGMOIDOSCOPY FLEXIBLE N/A 8/15/2014    Procedure: SIGMOIDOSCOPY FLEXIBLE;  Surgeon: Margaret Gamble MD;  [facility]     SIGMOIDOSCOPY FLEXIBLE N/A 8/22/2014    Procedure: SIGMOIDOSCOPY FLEXIBLE;  Surgeon: Jose Roberto Cervantes MD;  [facility]     SIGMOIDOSCOPY FLEXIBLE N/A 8/11/2014    Procedure: SIGMOIDOSCOPY FLEXIBLE;  Surgeon: Margaret Gamble MD;  [facility]     SIGMOIDOSCOPY FLEXIBLE N/A 8/26/2014    Procedure: SIGMOIDOSCOPY FLEXIBLE;  Surgeon: Margaret Gamble MD;  [facility]     SIGMOIDOSCOPY FLEXIBLE N/A 8/29/2014    Procedure: SIGMOIDOSCOPY FLEXIBLE;  Surgeon: Margaret Gamble MD;  [facility]     SIGMOIDOSCOPY FLEXIBLE N/A 9/8/2014    Procedure: SIGMOIDOSCOPY FLEXIBLE;  Surgeon: Margaret Gamble MD;  [facility]     SIGMOIDOSCOPY FLEXIBLE N/A 9/2/2014    Procedure: SIGMOIDOSCOPY FLEXIBLE;  Surgeon: Breanna Kline MD;  [facility]     SIGMOIDOSCOPY FLEXIBLE N/A 9/11/2014    Procedure: SIGMOIDOSCOPY FLEXIBLE;  Surgeon: Margaret Gamble MD;  [facility]     SIGMOIDOSCOPY FLEXIBLE N/A 9/19/2014    Procedure: SIGMOIDOSCOPY FLEXIBLE;   Surgeon: Margaret Gamble MD;  [facility]     SIGMOIDOSCOPY FLEXIBLE N/A 9/15/2014    Procedure: SIGMOIDOSCOPY FLEXIBLE;  Surgeon: Margaret Gamble MD;  [facility]     SIGMOIDOSCOPY FLEXIBLE N/A 9/5/2014    Procedure: SIGMOIDOSCOPY FLEXIBLE;  Surgeon: Margaret Gamble MD;  [facility]     SIGMOIDOSCOPY FLEXIBLE N/A 9/23/2014    Procedure: SIGMOIDOSCOPY FLEXIBLE;  Surgeon: Margaret Gamble MD;  [facility]     SIGMOIDOSCOPY FLEXIBLE N/A 9/26/2014    Procedure: SIGMOIDOSCOPY FLEXIBLE;  Surgeon: Margaret Gamble MD;  [facility]     SIGMOIDOSCOPY FLEXIBLE N/A 9/30/2014    Procedure: SIGMOIDOSCOPY FLEXIBLE;  Surgeon: Margaret Gamble MD;  [facility]     SIGMOIDOSCOPY FLEXIBLE N/A 10/3/2014    Procedure: SIGMOIDOSCOPY FLEXIBLE;  Surgeon: Margaret Gamble MD;  [facility]     SIGMOIDOSCOPY FLEXIBLE N/A 10/30/2014    Procedure: SIGMOIDOSCOPY FLEXIBLE;  Surgeon: Margaret Gamble MD;  [facility]     SIGMOIDOSCOPY FLEXIBLE, PLACEMENT OF DRAINAGE SPONGE  9/30/14     TAKEDOWN ILEOSTOMY N/A 1/6/2015    Procedure: TAKEDOWN ILEOSTOMY;  Surgeon: Margaret Gamble MD;  [facility]     Current Outpatient Medications   Medication Sig Dispense Refill     ACCU-CHEK REGIS PLUS test strip TEST BLOOD SUGARS 2-4 TIMES DAILY OR AS DIRECTED 100 each 11     amLODIPine (NORVASC) 10 MG tablet Take 10 mg by mouth       aspirin 325 MG tablet Take 325 mg by mouth daily       atorvastatin (LIPITOR) 20 MG tablet TAKE ONE TABLET BY MOUTH EVERY DAY 90 tablet 3     blood glucose (ACCU-CHEK REGIS PLUS) test strip USE TO TEST BLOOD SUGAR THREE TIMES A DAY OR AS DIRECTED 300 each 1     blood glucose monitoring (NO BRAND SPECIFIED) meter device kit Use to test blood sugar 3 times daily or as directed. 1 kit 0     Blood Glucose Monitoring Suppl (ACCU-CHEK REGIS PLUS) W/DEVICE KIT Check blood sugars 1-2 times daily 1 kit 0      "Cholecalciferol (VITAMIN D-3) 1000 units CAPS Take by mouth daily       clindamycin (CLEOCIN) 300 MG capsule Take 1 capsule (300 mg) by mouth 3 times daily for 10 days 30 capsule 0     diphenoxylate-atropine (LOMOTIL) 2.5-0.025 MG per tablet Take 1 tablet by mouth 4 times daily as needed for diarrhea 60 tablet 2     finasteride (PROSCAR) 5 MG tablet TAKE ONE TABLET BY MOUTH EVERY DAY 90 tablet 1     insulin aspart (NOVOLOG FLEXPEN) 100 UNIT/ML pen Use 8 units plus low sliding scale before each meal 120-149 0 units  150-199 1 unit  200-249 2 units  250-299 3 units  300-349 4 units  > 350 5 units   Max dose 50 units day 15 mL 1     insulin glargine (BASAGLAR KWIKPEN) 100 UNIT/ML pen INJECT 16 UNITS AT BEDTIME 30 mL 1     losartan (COZAAR) 100 MG tablet Take 1 tablet (100 mg) by mouth daily 90 tablet 0     tamsulosin (FLOMAX) 0.4 MG capsule TAKE ONE CAPSULE BY MOUTH EVERY DAY 90 capsule 2     ULTICARE MINI 31G X 6 MM insulin pen needle USE 4 TO 5 TIMES DAILY OR AS DIRECTED FOR SLIDING SCALE INSULIN 100 each 7     OTC products: None, except as noted above    Allergies   Allergen Reactions     Nkda [No Known Drug Allergies]       Latex Allergy: NO    Social History     Tobacco Use     Smoking status: Former Smoker     Years: 30.00     Types: Cigars     Last attempt to quit: 2002     Years since quittin.7     Smokeless tobacco: Never Used   Substance Use Topics     Alcohol use: No     History   Drug Use No       REVIEW OF SYSTEMS:   Constitutional, HEENT, cardiovascular, pulmonary, gi and gu systems are negative, except as otherwise noted.    EXAM:   /79 (BP Location: Right arm, Patient Position: Chair, Cuff Size: Adult Large)   Pulse 88   Temp 98  F (36.7  C) (Tympanic)   Resp 12   Ht 1.721 m (5' 7.75\")   Wt 93.9 kg (207 lb)   SpO2 96%   BMI 31.71 kg/m      GENERAL APPEARANCE: healthy, alert and no distress     EYES: EOMI,  PERRL     HENT: ear canals and TM's normal and nose and mouth without " ulcers or lesions     NECK: no adenopathy, no asymmetry, masses, or scars and thyroid normal to palpation     RESP: lungs clear to auscultation - no rales, rhonchi or wheezes     CV: regular rates and rhythm, normal S1 S2, no S3 or S4 and no murmur, click or rub     ABDOMEN:  soft, nontender, no HSM or masses and bowel sounds normal     MS: extremities normal- no gross deformities noted, no evidence of inflammation in joints, FROM in all extremities.     NEURO: Normal strength and tone, sensory exam grossly normal, mentation intact and speech normal     PSYCH: mentation appears normal. and affect normal/bright     LYMPHATICS: No cervical adenopathy    DIAGNOSTICS:     EKG: appears normal, NSR, normal axis, normal intervals, no acute ST/T changes c/w ischemia, no LVH by voltage criteria, unchanged from previous tracings  Hemoglobin (indicated for history of anemia or procedure with significant blood loss such as tonsillectomy, major intraperitoneal surgery, vascular surgery, major spine surgery, total joint replacement)  Serum Potassium  Serum Creatinine  Labs Resulted Today:   Results for orders placed or performed in visit on 06/03/19   CBC with platelets   Result Value Ref Range    WBC 7.4 4.0 - 11.0 10e9/L    RBC Count 4.11 (L) 4.4 - 5.9 10e12/L    Hemoglobin 12.1 (L) 13.3 - 17.7 g/dL    Hematocrit 38.0 (L) 40.0 - 53.0 %    MCV 93 78 - 100 fl    MCH 29.4 26.5 - 33.0 pg    MCHC 31.8 31.5 - 36.5 g/dL    RDW 13.0 10.0 - 15.0 %    Platelet Count 227 150 - 450 10e9/L       Recent Labs   Lab Test 04/25/19  1648 04/16/19  0854 01/15/19  0903 10/16/18  0905  04/26/14  1235  04/15/14  1853   HGB  --  12.6*  --  12.6*   < > 11.1*   < > 8.1*   PLT  --  176  --  178   < > 279   < > 294   INR  --   --   --   --   --  1.01  --  0.95   NA  --  139 139 138   < > 127*   < > 133   POTASSIUM  --  4.1 4.6 4.3   < > 5.6*   < > 4.6   CR  --  1.32* 1.30* 1.18   < > 1.09   < > 0.74   A1C 7.4*  --  8.2*  --    < >  --   --   --     < > =  values in this interval not displayed.        IMPRESSION:   Reason for surgery/procedure: osteomyelitis L second toe, planned amputation      The proposed surgical procedure is considered INTERMEDIATE risk.    REVISED CARDIAC RISK INDEX  The patient has the following serious cardiovascular risks for perioperative complications such as (MI, PE, VFib and 3  AV Block):  No serious cardiac risks  INTERPRETATION: 0 risks: Class I (very low risk - 0.4% complication rate)    The patient has the following additional risks for perioperative complications:  No identified additional risks      ICD-10-CM    1. Pre-operative examination Z01.818 EKG 12-lead complete w/read - Clinics     CBC with platelets     Basic metabolic panel   2. Osteomyelitis of other site, unspecified type (H) M86.9    3. Type 2 diabetes mellitus with diabetic polyneuropathy, with long-term current use of insulin (H) E11.42 EKG 12-lead complete w/read - Clinics    Z79.4 Basic metabolic panel   4. Hypertension goal BP (blood pressure) < 140/90 I10 EKG 12-lead complete w/read - Clinics     Basic metabolic panel   5. Hyperlipidemia LDL goal <70 E78.5        RECOMMENDATIONS:       Cardiovascular Risk  Performs 4 METs exercise without symptoms (Light housework (dusting, washing dishes) and Climb a flight of stairs) .       Anemia  Anemia and does not require treatment prior to surgery.  Monitor Hemoglobin postoperatively.      --Patient is to take all scheduled medications on the day of surgery EXCEPT for modifications listed below.    ASA on hold since 6/1/19  Decrease lantus dose tonight to 9 units  Hold short acting insulin the AM of procedure  Resume usual insulin dosing following surgery    Plan to continue ARB as surgery should not result in large volume shifts and MAC sedation rather then general anesthesia is planned.      APPROVAL GIVEN to proceed with proposed procedure, without further diagnostic evaluation       Signed Electronically by: Jovana Gandara  DO Christofer    Copy of this evaluation report is provided to requesting physician.    [facility] Preop Guidelines    Revised Cardiac Risk Index

## 2019-06-04 ENCOUNTER — HOSPITAL ENCOUNTER (OUTPATIENT)
Facility: CLINIC | Age: 79
Discharge: HOME | End: 2019-06-04
Attending: PODIATRIST | Admitting: PODIATRIST
Payer: COMMERCIAL

## 2019-06-04 ENCOUNTER — TELEPHONE (OUTPATIENT)
Dept: OTHER | Facility: CLINIC | Age: 79
End: 2019-06-04

## 2019-06-04 ENCOUNTER — ANESTHESIA (OUTPATIENT)
Dept: SURGERY | Facility: CLINIC | Age: 79
End: 2019-06-04
Payer: COMMERCIAL

## 2019-06-04 VITALS
TEMPERATURE: 98.7 F | HEIGHT: 68 IN | BODY MASS INDEX: 31.37 KG/M2 | OXYGEN SATURATION: 97 % | HEART RATE: 51 BPM | RESPIRATION RATE: 15 BRPM | WEIGHT: 207 LBS | DIASTOLIC BLOOD PRESSURE: 53 MMHG | SYSTOLIC BLOOD PRESSURE: 137 MMHG

## 2019-06-04 DIAGNOSIS — M86.171 ACUTE OSTEOMYELITIS OF TOE OF RIGHT FOOT (H): Primary | ICD-10-CM

## 2019-06-04 LAB — GLUCOSE BLDC GLUCOMTR-MCNC: 140 MG/DL (ref 70–99)

## 2019-06-04 PROCEDURE — 87070 CULTURE OTHR SPECIMN AEROBIC: CPT | Performed by: PODIATRIST

## 2019-06-04 PROCEDURE — 36000050 ZZH SURGERY LEVEL 2 1ST 30 MIN: Performed by: PODIATRIST

## 2019-06-04 PROCEDURE — 36000052 ZZH SURGERY LEVEL 2 EA 15 ADDTL MIN: Performed by: PODIATRIST

## 2019-06-04 PROCEDURE — 25000128 H RX IP 250 OP 636: Performed by: NURSE ANESTHETIST, CERTIFIED REGISTERED

## 2019-06-04 PROCEDURE — 25000128 H RX IP 250 OP 636: Performed by: PODIATRIST

## 2019-06-04 PROCEDURE — 37000009 ZZH ANESTHESIA TECHNICAL FEE, EACH ADDTL 15 MIN: Performed by: PODIATRIST

## 2019-06-04 PROCEDURE — 25800030 ZZH RX IP 258 OP 636: Performed by: PODIATRIST

## 2019-06-04 PROCEDURE — 88311 DECALCIFY TISSUE: CPT | Mod: 26 | Performed by: PODIATRIST

## 2019-06-04 PROCEDURE — 28820 AMPUTATION OF TOE: CPT | Mod: T6 | Performed by: PODIATRIST

## 2019-06-04 PROCEDURE — 87077 CULTURE AEROBIC IDENTIFY: CPT | Performed by: PODIATRIST

## 2019-06-04 PROCEDURE — 37000008 ZZH ANESTHESIA TECHNICAL FEE, 1ST 30 MIN: Performed by: PODIATRIST

## 2019-06-04 PROCEDURE — 82962 GLUCOSE BLOOD TEST: CPT

## 2019-06-04 PROCEDURE — 87075 CULTR BACTERIA EXCEPT BLOOD: CPT | Performed by: PODIATRIST

## 2019-06-04 PROCEDURE — 71000027 ZZH RECOVERY PHASE 2 EACH 15 MINS: Performed by: PODIATRIST

## 2019-06-04 PROCEDURE — 88307 TISSUE EXAM BY PATHOLOGIST: CPT | Mod: 26 | Performed by: PODIATRIST

## 2019-06-04 PROCEDURE — 87186 SC STD MICRODIL/AGAR DIL: CPT | Performed by: PODIATRIST

## 2019-06-04 PROCEDURE — 25000125 ZZHC RX 250: Performed by: PODIATRIST

## 2019-06-04 PROCEDURE — 25000125 ZZHC RX 250: Performed by: NURSE ANESTHETIST, CERTIFIED REGISTERED

## 2019-06-04 PROCEDURE — 27210794 ZZH OR GENERAL SUPPLY STERILE: Performed by: PODIATRIST

## 2019-06-04 PROCEDURE — 40000306 ZZH STATISTIC PRE PROC ASSESS II: Performed by: PODIATRIST

## 2019-06-04 PROCEDURE — 88311 DECALCIFY TISSUE: CPT | Performed by: PODIATRIST

## 2019-06-04 PROCEDURE — 87076 CULTURE ANAEROBE IDENT EACH: CPT | Performed by: PODIATRIST

## 2019-06-04 PROCEDURE — 88307 TISSUE EXAM BY PATHOLOGIST: CPT | Performed by: PODIATRIST

## 2019-06-04 PROCEDURE — 25800030 ZZH RX IP 258 OP 636: Performed by: NURSE ANESTHETIST, CERTIFIED REGISTERED

## 2019-06-04 RX ORDER — LABETALOL 20 MG/4 ML (5 MG/ML) INTRAVENOUS SYRINGE
10
Status: CANCELLED | OUTPATIENT
Start: 2019-06-04

## 2019-06-04 RX ORDER — DEXAMETHASONE SODIUM PHOSPHATE 4 MG/ML
INJECTION, SOLUTION INTRA-ARTICULAR; INTRALESIONAL; INTRAMUSCULAR; INTRAVENOUS; SOFT TISSUE PRN
Status: DISCONTINUED | OUTPATIENT
Start: 2019-06-04 | End: 2019-06-04

## 2019-06-04 RX ORDER — FENTANYL CITRATE 50 UG/ML
25-50 INJECTION, SOLUTION INTRAMUSCULAR; INTRAVENOUS
Status: CANCELLED | OUTPATIENT
Start: 2019-06-04

## 2019-06-04 RX ORDER — ONDANSETRON 2 MG/ML
4 INJECTION INTRAMUSCULAR; INTRAVENOUS EVERY 30 MIN PRN
Status: CANCELLED | OUTPATIENT
Start: 2019-06-04

## 2019-06-04 RX ORDER — PROPOFOL 10 MG/ML
INJECTION, EMULSION INTRAVENOUS CONTINUOUS PRN
Status: DISCONTINUED | OUTPATIENT
Start: 2019-06-04 | End: 2019-06-04

## 2019-06-04 RX ORDER — FENTANYL CITRATE 50 UG/ML
INJECTION, SOLUTION INTRAMUSCULAR; INTRAVENOUS PRN
Status: DISCONTINUED | OUTPATIENT
Start: 2019-06-04 | End: 2019-06-04

## 2019-06-04 RX ORDER — DEXAMETHASONE SODIUM PHOSPHATE 4 MG/ML
4 INJECTION, SOLUTION INTRA-ARTICULAR; INTRALESIONAL; INTRAMUSCULAR; INTRAVENOUS; SOFT TISSUE EVERY 10 MIN PRN
Status: CANCELLED | OUTPATIENT
Start: 2019-06-04

## 2019-06-04 RX ORDER — ONDANSETRON 4 MG/1
4 TABLET, ORALLY DISINTEGRATING ORAL EVERY 30 MIN PRN
Status: CANCELLED | OUTPATIENT
Start: 2019-06-04

## 2019-06-04 RX ORDER — GLYCOPYRROLATE 0.2 MG/ML
INJECTION, SOLUTION INTRAMUSCULAR; INTRAVENOUS PRN
Status: DISCONTINUED | OUTPATIENT
Start: 2019-06-04 | End: 2019-06-04

## 2019-06-04 RX ORDER — MEPERIDINE HYDROCHLORIDE 25 MG/ML
12.5 INJECTION INTRAMUSCULAR; INTRAVENOUS; SUBCUTANEOUS
Status: CANCELLED | OUTPATIENT
Start: 2019-06-04

## 2019-06-04 RX ORDER — BACITRACIN ZINC 500 [USP'U]/G
OINTMENT TOPICAL PRN
Status: DISCONTINUED | OUTPATIENT
Start: 2019-06-04 | End: 2019-06-04 | Stop reason: HOSPADM

## 2019-06-04 RX ORDER — NALOXONE HYDROCHLORIDE 0.4 MG/ML
.1-.4 INJECTION, SOLUTION INTRAMUSCULAR; INTRAVENOUS; SUBCUTANEOUS
Status: CANCELLED | OUTPATIENT
Start: 2019-06-04 | End: 2019-06-05

## 2019-06-04 RX ORDER — SODIUM CHLORIDE, SODIUM LACTATE, POTASSIUM CHLORIDE, CALCIUM CHLORIDE 600; 310; 30; 20 MG/100ML; MG/100ML; MG/100ML; MG/100ML
INJECTION, SOLUTION INTRAVENOUS CONTINUOUS
Status: CANCELLED | OUTPATIENT
Start: 2019-06-04

## 2019-06-04 RX ORDER — LIDOCAINE 40 MG/G
CREAM TOPICAL
Status: DISCONTINUED | OUTPATIENT
Start: 2019-06-04 | End: 2019-06-04 | Stop reason: HOSPADM

## 2019-06-04 RX ORDER — LIDOCAINE HYDROCHLORIDE 10 MG/ML
INJECTION, SOLUTION INFILTRATION; PERINEURAL PRN
Status: DISCONTINUED | OUTPATIENT
Start: 2019-06-04 | End: 2019-06-04 | Stop reason: HOSPADM

## 2019-06-04 RX ORDER — OXYCODONE HYDROCHLORIDE 5 MG/1
5 TABLET ORAL
Status: CANCELLED | OUTPATIENT
Start: 2019-06-04

## 2019-06-04 RX ORDER — AMOXICILLIN 250 MG
1-2 CAPSULE ORAL 2 TIMES DAILY
Qty: 30 TABLET | Refills: 0 | Status: SHIPPED | OUTPATIENT
Start: 2019-06-04 | End: 2019-10-31

## 2019-06-04 RX ORDER — CEFAZOLIN SODIUM 2 G/100ML
2 INJECTION, SOLUTION INTRAVENOUS
Status: COMPLETED | OUTPATIENT
Start: 2019-06-04 | End: 2019-06-04

## 2019-06-04 RX ORDER — HYDROCODONE BITARTRATE AND ACETAMINOPHEN 5; 325 MG/1; MG/1
1-2 TABLET ORAL EVERY 4 HOURS PRN
Qty: 25 TABLET | Refills: 0 | Status: SHIPPED | OUTPATIENT
Start: 2019-06-04 | End: 2019-11-15

## 2019-06-04 RX ORDER — HYDROMORPHONE HYDROCHLORIDE 1 MG/ML
.3-.5 INJECTION, SOLUTION INTRAMUSCULAR; INTRAVENOUS; SUBCUTANEOUS EVERY 10 MIN PRN
Status: CANCELLED | OUTPATIENT
Start: 2019-06-04

## 2019-06-04 RX ORDER — SODIUM CHLORIDE, SODIUM LACTATE, POTASSIUM CHLORIDE, CALCIUM CHLORIDE 600; 310; 30; 20 MG/100ML; MG/100ML; MG/100ML; MG/100ML
INJECTION, SOLUTION INTRAVENOUS CONTINUOUS
Status: DISCONTINUED | OUTPATIENT
Start: 2019-06-04 | End: 2019-06-04 | Stop reason: HOSPADM

## 2019-06-04 RX ORDER — ALBUTEROL SULFATE 0.83 MG/ML
2.5 SOLUTION RESPIRATORY (INHALATION) EVERY 4 HOURS PRN
Status: CANCELLED | OUTPATIENT
Start: 2019-06-04

## 2019-06-04 RX ORDER — CEFAZOLIN SODIUM 1 G/3ML
1 INJECTION, POWDER, FOR SOLUTION INTRAMUSCULAR; INTRAVENOUS SEE ADMIN INSTRUCTIONS
Status: DISCONTINUED | OUTPATIENT
Start: 2019-06-04 | End: 2019-06-04 | Stop reason: HOSPADM

## 2019-06-04 RX ORDER — ONDANSETRON 2 MG/ML
INJECTION INTRAMUSCULAR; INTRAVENOUS PRN
Status: DISCONTINUED | OUTPATIENT
Start: 2019-06-04 | End: 2019-06-04

## 2019-06-04 RX ADMIN — MIDAZOLAM 1 MG: 1 INJECTION INTRAMUSCULAR; INTRAVENOUS at 10:25

## 2019-06-04 RX ADMIN — MIDAZOLAM 1 MG: 1 INJECTION INTRAMUSCULAR; INTRAVENOUS at 10:22

## 2019-06-04 RX ADMIN — DEXAMETHASONE SODIUM PHOSPHATE 4 MG: 4 INJECTION, SOLUTION INTRA-ARTICULAR; INTRALESIONAL; INTRAMUSCULAR; INTRAVENOUS; SOFT TISSUE at 10:25

## 2019-06-04 RX ADMIN — CEFAZOLIN SODIUM 2 G: 2 INJECTION, SOLUTION INTRAVENOUS at 10:22

## 2019-06-04 RX ADMIN — FENTANYL CITRATE 50 MCG: 50 INJECTION, SOLUTION INTRAMUSCULAR; INTRAVENOUS at 10:26

## 2019-06-04 RX ADMIN — GLYCOPYRROLATE 0.2 MG: 0.2 INJECTION, SOLUTION INTRAMUSCULAR; INTRAVENOUS at 10:35

## 2019-06-04 RX ADMIN — SODIUM CHLORIDE, POTASSIUM CHLORIDE, SODIUM LACTATE AND CALCIUM CHLORIDE: 600; 310; 30; 20 INJECTION, SOLUTION INTRAVENOUS at 09:46

## 2019-06-04 RX ADMIN — FENTANYL CITRATE 50 MCG: 50 INJECTION, SOLUTION INTRAMUSCULAR; INTRAVENOUS at 10:22

## 2019-06-04 RX ADMIN — PROPOFOL 100 MCG/KG/MIN: 10 INJECTION, EMULSION INTRAVENOUS at 10:30

## 2019-06-04 RX ADMIN — ONDANSETRON 4 MG: 2 INJECTION INTRAMUSCULAR; INTRAVENOUS at 10:26

## 2019-06-04 RX ADMIN — LIDOCAINE HYDROCHLORIDE 1 ML: 10 INJECTION, SOLUTION EPIDURAL; INFILTRATION; INTRACAUDAL; PERINEURAL at 09:46

## 2019-06-04 ASSESSMENT — MIFFLIN-ST. JEOR: SCORE: 1629.48

## 2019-06-04 NOTE — ANESTHESIA POSTPROCEDURE EVALUATION
Patient: Storm BOURGEOIS Rosengren    Procedure(s):  AMPUTATION 2nd Toe    Diagnosis:osteomyelitis  Diagnosis Additional Information: No value filed.    Anesthesia Type:  MAC    Note:  Anesthesia Post Evaluation    Patient location during evaluation: Phase 2 and Bedside  Patient participation: Able to fully participate in evaluation  Level of consciousness: awake  Pain management: adequate  Airway patency: patent  Cardiovascular status: acceptable  Respiratory status: acceptable  Hydration status: acceptable  PONV: none     Anesthetic complications: None          Last vitals:  Vitals:    06/04/19 1130 06/04/19 1145 06/04/19 1200   BP: 127/63 138/59 137/53   Pulse: 62 55 51   Resp:  14 15   Temp:      SpO2: 92% 95% 97%         Electronically Signed By: Jesus Gutierrez CRNA, APRN CRNA  June 4, 2019  1:13 PM

## 2019-06-04 NOTE — BRIEF OP NOTE
[facility] Brief Operative Note    Pre-operative diagnosis: osteomyelitis   Post-operative diagnosis Osteomyelitis second toe right foot   Procedure: Procedure(s):  AMPUTATION 2nd Toe   Surgeon(s): Surgeon(s) and Role:     * Adriel Cabello DPM - Primary   Estimated blood loss: * No values recorded between 6/4/2019 10:32 AM and 6/4/2019 10:56 AM *    Specimens: ID Type Source Tests Collected by Time Destination   1 : right foot 2nd toe bone culture Bone Toe BONE CULTURE AEROBIC BACTERIAL, LABORATORY MISCELLANEOUS ORDER Adriel Cabello DPM 6/4/2019 10:46 AM    A : Right foot 2nd toe Tissue Toe SURGICAL PATHOLOGY EXAM Adriel Cabello DPM 6/4/2019 10:44 AM

## 2019-06-04 NOTE — OP NOTE
PREOPERATIVE DIAGNOSIS: 1.  Osteomyelitis second toe, right foot.   POSTOPERATIVE DIAGNOSIS: 1. Osteomyelitis second toe, right foot.   PROCEDURE: 1. Amputation second toe, right foot.   PATHOLOGY: bone second toe.   ANESTHESIA: Local MAC   ESTIMATED BLOOD LOSS: Less than 10 mL   INDICATIONS FOR PROCEDURE: Storm Dutta is a 78 year old-year-old male with osteomyelitis of the second toe of the right foot. The patient was consented for amputation of second toe, right foot.   PROCEDURE IN DETAIL: Under mild sedation, the patient in the operating room and placed on the operating table in the supine position.  After adequate sedation, approximately 10 cc of 1% buffered lidocaine was injected around the second toe of the right foot. The foot was then scrubbed, prepped and draped in the usual aseptic manner. Esmarch bandage was utilized to exsanguinate the patient's right lower extremity, and the pneumatic ankle tourniquet was inflated to 250 PSI.   Following this, an operative time out was performed according to our institution's policy which confirmed the laterality of the procedure. Preoperative antibiotics were administered to the patient prior to arrival to the OR.     The procedure began with a linear longitudinal incision over the base of the second toe on the right.  The incision was made full thickness down to subcutaneous tissue with care taken to avoid all vital neurovascular structures.  Any active bleeders were cauterized and ligated as needed.  Further dissection was carried down to the second metatarsophalangeal joint on the right foot.  The second toe was sharply resected at the second metatarsophalangeal joint.  The head of the second metatarsal was resected with a bone cutter and removed in toto.  Bone samples were sent off to micro and pathology.    The wound was irrigated with copious amounts of normal sterile saline. Tourniquet was deflated and prompt hyperemic response was noted to all five  digits of the right foot. The skin was reapproximated utilizing 4-0 nylon suture in a continuous running interlocking fashion.  The wound was dressed with sterile bacitracin, Xeroform, 4 x 4s, cast padding and an Ace wrap.   The patient tolerated these procedures well and was transferred from the operative table to the transport cart and taken from the OR to the PACU.  In PACU, patient and staff given orders and instructions for post-operative care in the following areas: post op pain management, weight-bearing status, dressing/splint care, weight-bearing assistive devices, elevation of the extremity, ice/cold therapy, DVT/blood clot prevention, nutrition and post-operative concerns to be aware.  Case and post-operative care measures were reviewed with the patient and family members present.  A post operative instruction sheet was provided.  If concerns or questions arise they will contact our clinic.  If acute concerns develop they will present emergently to a nearby medical center.      SANDIP BARRERA DPM, FACFAS

## 2019-06-04 NOTE — ANESTHESIA CARE TRANSFER NOTE
Patient: Storm BOURGEOIS Rosengren    Procedure(s):  AMPUTATION 2nd Toe    Diagnosis: osteomyelitis  Diagnosis Additional Information: No value filed.    Anesthesia Type:   MAC     Note:  Airway :Room Air  Patient transferred to:Phase II  Handoff Report: Identifed the Patient, Identified the Reponsible Provider, Reviewed the pertinent medical history, Discussed the surgical course, Reviewed Intra-OP anesthesia mangement and issues during anesthesia, Set expectations for post-procedure period and Allowed opportunity for questions and acknowledgement of understanding      Vitals: (Last set prior to Anesthesia Care Transfer)    CRNA VITALS  6/4/2019 1025 - 6/4/2019 1125      6/4/2019             Pulse:  55    SpO2:  98 %                Electronically Signed By: Jesus Gutierrez CRNA, APRN CRNA  June 4, 2019  1:12 PM

## 2019-06-04 NOTE — DISCHARGE INSTRUCTIONS
Same Day Surgery Discharge Instructions  Special Precautions After Surgery - Adult    1. It is not unusual to feel lightheaded or faint, up to 24 hours after surgery or while taking pain medication.  If you have these symptoms; sit for a few minutes before standing and have someone assist you when getting up.  2. You should rest and relax for the next 24 hours and must have someone stay with you for at least 24 hours after your discharge.  3. DO NOT DRIVE any vehicle or operate mechanical equipment for 24 hours following the end of your surgery.  DO NOT DRIVE while taking narcotic pain medications that have been prescribed by your physician.  If you had a limb operated on, you must be able to use it fully to drive.  4. DO NOT drink alcoholic beverages for 24 hours following surgery or while taking prescription pain medication.  5. Drink clear liquids (apple juice, ginger ale, broth, 7-Up, etc.).  Progress to your regular diet as you feel able.  6. Any questions call your physician and do not make important decisions for 24 hours.    ACTIVITY  ? Rest today.  No activity or diet restrictions.  ? Resume activity as tolerated.  ? Restrictions: as per Dr. Cabello  ? See printed discharge sheet.     INCISIONAL CARE  ? Keep extremity elevated above the level of the heart if possible.  .  ? Apply ice 1/2 hour on and 1/2 hour off while awake.  ? Be alert for signs of infection:  redness, swelling, heat, drainage of pus, and/or elevated temperature.  Contact your doctor if these occur.        Call for an appointment to return to the clinic in 5-7 days.    Medications:  ? Hydrocodone (Norco, Vicodin):  Next dose: as needed.  ? Follow the instructions on the bottle.     Additional discharge instructions: wear flat foot post op shoe with walking  __________________________________________________________________________________________________________________________________  IMPORTANT NUMBERS:    [facility]  Number:  [phone], [phone]  Pharmacy:  [phone]  Same Day Surgery:  [phone], Monday - Friday until 8:30 p.m.  Urgent Care:  [phone]  Emergency Room:  [phone]                                                                             [facility]:  [phone] option 1

## 2019-06-04 NOTE — INTERVAL H&P NOTE
The History and Physical has been reviewed, the patient has been examined and no changes have occurred in the patient's condition since the H & P was completed.        normal...

## 2019-06-04 NOTE — TELEPHONE ENCOUNTER
Pt referred to [facility] by Adriel Cabello DPM for moderate PVD right lower extremity, osteomyelitis second to right foot s/p amputation on 6/4/19.     JOCELYNE with PPG 6/3/19 in Epic.     Pt needs to be scheduled for consult with vascular surgery.  Will route to scheduling to coordinate an appointment sooner rather than later.     KOREY MatuteN, RN

## 2019-06-10 ENCOUNTER — ANCILLARY PROCEDURE (OUTPATIENT)
Dept: GENERAL RADIOLOGY | Facility: CLINIC | Age: 79
End: 2019-06-10
Attending: PODIATRIST
Payer: COMMERCIAL

## 2019-06-10 ENCOUNTER — OFFICE VISIT (OUTPATIENT)
Dept: PODIATRY | Facility: CLINIC | Age: 79
End: 2019-06-10
Payer: COMMERCIAL

## 2019-06-10 VITALS
SYSTOLIC BLOOD PRESSURE: 159 MMHG | HEART RATE: 85 BPM | BODY MASS INDEX: 31.37 KG/M2 | HEIGHT: 68 IN | WEIGHT: 207 LBS | DIASTOLIC BLOOD PRESSURE: 83 MMHG

## 2019-06-10 DIAGNOSIS — Z98.890 S/P FOOT SURGERY, RIGHT: Primary | ICD-10-CM

## 2019-06-10 DIAGNOSIS — Z98.890 S/P FOOT SURGERY, RIGHT: ICD-10-CM

## 2019-06-10 LAB
BACTERIA SPEC CULT: ABNORMAL
Lab: ABNORMAL
SPECIMEN SOURCE: ABNORMAL

## 2019-06-10 PROCEDURE — 99024 POSTOP FOLLOW-UP VISIT: CPT | Performed by: PODIATRIST

## 2019-06-10 PROCEDURE — 73630 X-RAY EXAM OF FOOT: CPT | Mod: RT

## 2019-06-10 ASSESSMENT — MIFFLIN-ST. JEOR: SCORE: 1629.48

## 2019-06-10 NOTE — PROGRESS NOTES
"Storm presents to the office s/p one week amputation of the second toe of the left foot .  The patient relates keeping the bandages clean, dry and intact.  The patient relates good compliance with postoperative instructions.  The patient denies any severe pain, fevers, chills, nausea or vomiting.      /83   Pulse 85   Ht 1.721 m (5' 7.75\")   Wt 93.9 kg (207 lb)   BMI 31.71 kg/m    Weight management plan: Patient was referred to their PCP to discuss a diet and exercise plan.    Physical Exam:    General: The patient appears to be in no distress and in good spirits.  The bandages appear clean, dry and intact with no strikethrough noted. Vascular exam: Neurovascular status unchanged with < 3 sec capillary refill to all digits.  Positive pedal pulses and epicritic sensations intact with no evidence of allodynia.  One notes moderate edema to the forefoot on the right.  Sutures are intact and the skin is well coapted with no erythema noted.      Radiograph:  AP, lateral and medial oblique evaluation of right foot reveals surgical amputation of the second toe.    Assessment: Osteomyelitis status post one week amputation of the second toe of the right foot.    Plan:  Sutures remain intact.  A sterile dressing was reapplied.  The patient was instructed to continue non-weightbearing to the right foot.  The patient is to keep the dressings clean, dry and intact and to continue with elevation of the right foot.  The patient will return to the office in one week for suture removal.    Disclaimer: This note consists of symbols derived from keyboarding, dictation and/or voice recognition software. As a result, there may be errors in the script that have gone undetected. Please consider this when interpreting information found in this chart.       JUANJO Cabello D.P.M., FENEIDA.F.A.S.    "

## 2019-06-10 NOTE — LETTER
"    6/10/2019         RE: Storm Dutta  [address] [place] [address]        Dear Colleague,    Thank you for referring your patient, Storm Dutta, to the [facility]. Please see a copy of my visit note below.    Storm presents to the office s/p one week amputation of the second toe of the left foot .  The patient relates keeping the bandages clean, dry and intact.  The patient relates good compliance with postoperative instructions.  The patient denies any severe pain, fevers, chills, nausea or vomiting.      /83   Pulse 85   Ht 1.721 m (5' 7.75\")   Wt 93.9 kg (207 lb)   BMI 31.71 kg/m     Weight management plan: Patient was referred to their PCP to discuss a diet and exercise plan.    Physical Exam:    General: The patient appears to be in no distress and in good spirits.  The bandages appear clean, dry and intact with no strikethrough noted. Vascular exam: Neurovascular status unchanged with < 3 sec capillary refill to all digits.  Positive pedal pulses and epicritic sensations intact with no evidence of allodynia.  One notes moderate edema to the forefoot on the right.  Sutures are intact and the skin is well coapted with no erythema noted.      Radiograph:  AP, lateral and medial oblique evaluation of right foot reveals surgical amputation of the second toe.    Assessment: Osteomyelitis status post one week amputation of the second toe of the right foot.    Plan:  Sutures remain intact.  A sterile dressing was reapplied.  The patient was instructed to continue non-weightbearing to the right foot.  The patient is to keep the dressings clean, dry and intact and to continue with elevation of the right foot.  The patient will return to the office in one week for suture removal.    Disclaimer: This note consists of symbols derived from keyboarding, dictation and/or voice recognition software. As a result, there may be errors in the script that have " gone undetected. Please consider this when interpreting information found in this chart.       JUANJO Cabello D.P.M., F.[place]LEY.C.F.A.S.      Again, thank you for allowing me to participate in the care of your patient.        Sincerely,        Adriel Cabello DPM

## 2019-06-10 NOTE — NURSING NOTE
"Chief Complaint   Patient presents with     Surgical Followup     DOS 6/04/19, Amputation second toe, right foot.XR today       Initial /83   Pulse 85   Ht 1.721 m (5' 7.75\")   Wt 93.9 kg (207 lb)   BMI 31.71 kg/m   Estimated body mass index is 31.71 kg/m  as calculated from the following:    Height as of this encounter: 1.721 m (5' 7.75\").    Weight as of this encounter: 93.9 kg (207 lb).  Medications and allergies reviewed.      Rosio OSORIO MA    "

## 2019-06-11 LAB
BACTERIA SPEC CULT: ABNORMAL
Lab: ABNORMAL
SPECIMEN SOURCE: ABNORMAL

## 2019-06-13 LAB — COPATH REPORT: NORMAL

## 2019-06-17 ENCOUNTER — OFFICE VISIT (OUTPATIENT)
Dept: PODIATRY | Facility: CLINIC | Age: 79
End: 2019-06-17
Payer: COMMERCIAL

## 2019-06-17 VITALS
HEART RATE: 82 BPM | WEIGHT: 207 LBS | BODY MASS INDEX: 31.37 KG/M2 | HEIGHT: 68 IN | DIASTOLIC BLOOD PRESSURE: 77 MMHG | SYSTOLIC BLOOD PRESSURE: 133 MMHG

## 2019-06-17 DIAGNOSIS — Z98.890 S/P FOOT SURGERY, RIGHT: Primary | ICD-10-CM

## 2019-06-17 PROCEDURE — 99024 POSTOP FOLLOW-UP VISIT: CPT | Performed by: PODIATRIST

## 2019-06-17 ASSESSMENT — MIFFLIN-ST. JEOR: SCORE: 1629.48

## 2019-06-17 NOTE — NURSING NOTE
"Chief Complaint   Patient presents with     Suture Removal     DOS 6/04/19, Amputation second toe, right foot       Initial /80   Pulse 82   Ht 1.721 m (5' 7.75\")   Wt 93.9 kg (207 lb)   BMI 31.71 kg/m   Estimated body mass index is 31.71 kg/m  as calculated from the following:    Height as of this encounter: 1.721 m (5' 7.75\").    Weight as of this encounter: 93.9 kg (207 lb).  Medications and allergies reviewed.      Rosio OSORIO MA    "

## 2019-06-17 NOTE — LETTER
"    6/17/2019         RE: Storm Dutta  [address] [place] [address]        Dear Colleague,    Thank you for referring your patient, Storm Dutta, to the [facility]. Please see a copy of my visit note below.    Storm presents to the office s/p 2 weeks amputation of the second toe of the left foot .  The patient relates keeping the bandages clean, dry and intact.  The patient relates good compliance with postoperative instructions.  The patient denies any severe pain, fevers, chills, nausea or vomiting.      /77   Pulse 82   Ht 1.721 m (5' 7.75\")   Wt 93.9 kg (207 lb)   BMI 31.71 kg/m     Weight management plan: Patient was referred to their PCP to discuss a diet and exercise plan.    Physical Exam:    General: The patient appears to be in no distress and in good spirits.  The bandages appear clean, dry and intact with no strikethrough noted. Vascular exam: Neurovascular status unchanged with < 3 sec capillary refill to all digits.  Positive pedal pulses and epicritic sensations intact with no evidence of allodynia.  One notes moderate edema to the forefoot on the right.  Sutures are intact and the skin is well coapted with no erythema noted.         Assessment: Osteomyelitis status post 2 weeks amputation of the second toe of the right foot.    Plan:  Sutures were removed.  A sterile dressing was reapplied.  The patient was instructed to continue protected-weightbearing to the right foot.  The patient is to keep the dressings clean, dry and intact for 3 days.  Patient return in 1 month for reevaluation.      Disclaimer: This note consists of symbols derived from keyboarding, dictation and/or voice recognition software. As a result, there may be errors in the script that have gone undetected. Please consider this when interpreting information found in this chart.       JUANJO Cabello D.P.M., F.A.C.F.A.S.    Again, thank you for allowing me to " participate in the care of your patient.        Sincerely,        Adriel Cabello DPM

## 2019-06-17 NOTE — NURSING NOTE
"Chief Complaint   Patient presents with     Suture Removal     DOS 6/04/19, Amputation second toe, right foot       Initial /77   Pulse 82   Ht 1.721 m (5' 7.75\")   Wt 93.9 kg (207 lb)   BMI 31.71 kg/m   Estimated body mass index is 31.71 kg/m  as calculated from the following:    Height as of this encounter: 1.721 m (5' 7.75\").    Weight as of this encounter: 93.9 kg (207 lb).  Medications and allergies reviewed.      Rosio OSORIO MA    "

## 2019-06-18 ENCOUNTER — OFFICE VISIT (OUTPATIENT)
Dept: VASCULAR SURGERY | Facility: CLINIC | Age: 79
End: 2019-06-18
Payer: COMMERCIAL

## 2019-06-18 VITALS — DIASTOLIC BLOOD PRESSURE: 81 MMHG | RESPIRATION RATE: 16 BRPM | SYSTOLIC BLOOD PRESSURE: 166 MMHG | HEART RATE: 79 BPM

## 2019-06-18 DIAGNOSIS — M86.171 ACUTE OSTEOMYELITIS OF TOE, RIGHT (H): ICD-10-CM

## 2019-06-18 DIAGNOSIS — I73.9 PVD (PERIPHERAL VASCULAR DISEASE) (H): ICD-10-CM

## 2019-06-18 PROCEDURE — 99202 OFFICE O/P NEW SF 15 MIN: CPT | Performed by: SURGERY

## 2019-06-18 NOTE — NURSING NOTE
"Initial /81 (BP Location: Right arm, Patient Position: Chair, Cuff Size: Adult Regular)   Pulse 79   Resp 16  Estimated body mass index is 31.71 kg/m  as calculated from the following:    Height as of 6/17/19: 1.721 m (5' 7.75\").    Weight as of 6/17/19: 93.9 kg (207 lb). .    Patient is here for a consult for pad.  tripp chavez LPN    "

## 2019-06-18 NOTE — PROGRESS NOTES
Mr. Dutta is a 78 year old male who we are seeing in consultation for artery disease.  Patient had developed osteomyelitis of the right second toe.  For that he underwent amputation on 4 June 2019.    He has healed quite well from that surgery.  He denies any intermittent arterial claudication.    On examination: He has palpable bilateral femoral pulses.  Right and left dorsalis pedis and posterior tibial are biphasic by Doppler.    Noninvasive studies are noted below:    US JOCELYNE WITH PPG WITH EXERCISE   6/3/2019 9:48 AM      HISTORY: Acute osteomyelitis of toe, right (H); Diminished pulses in  lower extremity     COMPARISON: None.     FINDINGS:  Right JOCELYNE: 0.81.  Left JOCELYNE: 0.73.  Digital pressures are non compressible bilaterally.      Waveforms: Biphasic in the femoral, popliteal and posterior tibials  bilaterally. Monophasic in the dorsalis pedis bilaterally.      Right exercise JOCELYNE: 0.86.  Left exercise JOCELYNE: 1.34                                                                      IMPRESSION:   1. Right resting and exercise JOCELYNE's show moderate arterial  insufficiency.   2. Left resting JOCELYNE shows moderate arterial insufficiency which  normalizes with exercise.   3. Bilateral digital pressures are non compressible.      JOCELYNE CRITERIA:  >0.95 Normal  0.90 - 0.94 Mild  0.5 - 0.89 Moderate  0.2 - 0.49 Severe  <0.2 Critical     ELENI SWENSON DO    Diagnosis: Asymptomatic bilateral lower extremity peripheral arterial disease.      Plan I explained that the finding of peripheral arterial disease is in light of his: Diabetes.  Fortunately for him the surgical site in the right second toe has healed very nicely.  He also does not have any claudication.  No further intervention is advised.  No further imaging is necessary.    Patient can follow-up on a as needed basis.

## 2019-06-20 NOTE — PROGRESS NOTES
"Storm presents to the office s/p 2 weeks amputation of the second toe of the left foot .  The patient relates keeping the bandages clean, dry and intact.  The patient relates good compliance with postoperative instructions.  The patient denies any severe pain, fevers, chills, nausea or vomiting.      /77   Pulse 82   Ht 1.721 m (5' 7.75\")   Wt 93.9 kg (207 lb)   BMI 31.71 kg/m    Weight management plan: Patient was referred to their PCP to discuss a diet and exercise plan.    Physical Exam:    General: The patient appears to be in no distress and in good spirits.  The bandages appear clean, dry and intact with no strikethrough noted. Vascular exam: Neurovascular status unchanged with < 3 sec capillary refill to all digits.  Positive pedal pulses and epicritic sensations intact with no evidence of allodynia.  One notes moderate edema to the forefoot on the right.  Sutures are intact and the skin is well coapted with no erythema noted.         Assessment: Osteomyelitis status post 2 weeks amputation of the second toe of the right foot.    Plan:  Sutures were removed.  A sterile dressing was reapplied.  The patient was instructed to continue protected-weightbearing to the right foot.  The patient is to keep the dressings clean, dry and intact for 3 days.  Patient return in 1 month for reevaluation.      Disclaimer: This note consists of symbols derived from keyboarding, dictation and/or voice recognition software. As a result, there may be errors in the script that have gone undetected. Please consider this when interpreting information found in this chart.       JUANJO Cabello D.P.M., F.[place].F.A.S.  "

## 2019-06-24 ENCOUNTER — TRANSFERRED RECORDS (OUTPATIENT)
Dept: HEALTH INFORMATION MANAGEMENT | Facility: CLINIC | Age: 79
End: 2019-06-24

## 2019-06-26 DIAGNOSIS — I10 HYPERTENSION GOAL BP (BLOOD PRESSURE) < 140/90: ICD-10-CM

## 2019-06-26 NOTE — TELEPHONE ENCOUNTER
Routing refill request to provider for review/approval because:  Failed protocol. Estrellita Shaikh RN

## 2019-06-26 NOTE — TELEPHONE ENCOUNTER
"Requested Prescriptions   Pending Prescriptions Disp Refills     losartan (COZAAR) 100 MG tablet [Pharmacy Med Name: LOSARTAN POTASSIUM 100MG TABS]  Last Written Prescription Date:  3/28/19  Last Fill Quantity: 90,  # refills: 0   Last office visit: 6/3/2019 with prescribing provider:  gabriela   Future Office Visit:     90 tablet 0     Sig: TAKE ONE TABLET BY MOUTH ONCE DAILY       Angiotensin-II Receptors Failed - 6/26/2019  9:02 AM        Failed - Blood pressure under 140/90 in past 12 months     BP Readings from Last 3 Encounters:   06/18/19 166/81   06/17/19 133/77   06/10/19 159/83                 Failed - Normal serum creatinine on file in past 12 months     Recent Labs   Lab Test 06/03/19  1423  09/05/14  0745   CR 1.28*   < >  --    CREAT  --   --  1.9*    < > = values in this interval not displayed.             Passed - Recent (12 mo) or future (30 days) visit within the authorizing provider's specialty     Patient had office visit in the last 12 months or has a visit in the next 30 days with authorizing provider or within the authorizing provider's specialty.  See \"Patient Info\" tab in inbasket, or \"Choose Columns\" in Meds & Orders section of the refill encounter.              Passed - Medication is active on med list        Passed - Patient is age 18 or older        Passed - Normal serum potassium on file in past 12 months     Recent Labs   Lab Test 06/03/19  1423   POTASSIUM 4.0                      "

## 2019-06-27 RX ORDER — LOSARTAN POTASSIUM 100 MG/1
TABLET ORAL
Qty: 90 TABLET | Refills: 0 | Status: SHIPPED | OUTPATIENT
Start: 2019-06-27 | End: 2019-09-25

## 2019-07-08 ENCOUNTER — MYC MEDICAL ADVICE (OUTPATIENT)
Dept: FAMILY MEDICINE | Facility: CLINIC | Age: 79
End: 2019-07-08

## 2019-07-08 DIAGNOSIS — H91.93 DECREASED HEARING OF BOTH EARS: Primary | ICD-10-CM

## 2019-07-17 ENCOUNTER — OFFICE VISIT (OUTPATIENT)
Dept: AUDIOLOGY | Facility: CLINIC | Age: 79
End: 2019-07-17
Payer: COMMERCIAL

## 2019-07-17 DIAGNOSIS — H90.3 SENSORY HEARING LOSS, BILATERAL: Primary | ICD-10-CM

## 2019-07-17 PROCEDURE — 92550 TYMPANOMETRY & REFLEX THRESH: CPT | Performed by: AUDIOLOGIST

## 2019-07-17 PROCEDURE — 99207 ZZC NO CHARGE LOS: CPT | Performed by: AUDIOLOGIST

## 2019-07-17 PROCEDURE — 92557 COMPREHENSIVE HEARING TEST: CPT | Performed by: AUDIOLOGIST

## 2019-07-17 NOTE — PROGRESS NOTES
AUDIOLOGY REPORT    SUBJECTIVE:  Storm Dutta is a 78 year old male who was seen in the Audiology Clinic at [facility] for an audiologic evaluation, referred by Dr. Jovana Beaulieu.  No previous audiograms are available at today's appointment.  The patient reports his spouse does not feel he is hearing well. He does note he likes the TV and radio louder. The patient denies bilateral tinnitus, bilateral otalgia and history of noise exposure.     OBJECTIVE:  Abuse Screening:  Do you feel unsafe at home or work/school? No   Do you feel threatened by someone? No   Does anyone try to keep you from having contact with others, or doing things outside of your home? No   Physical signs of abuse present? No      Otoscopic exam indicates ears are clear of cerumen bilaterally       Pure Tone Thresholds assessed using conventional audiometry with good  reliability from 250-8000 Hz bilaterally using circumaural headphones     RIGHT:  normal, mild-moderate and severe sensorineural hearing loss    LEFT:    normal, mild-moderate and severe sensorineural hearing loss    Tympanogram:    RIGHT: normal eardrum mobility ,1000 Hz ipsi/contra reflexes present     LEFT:   normal eardrum mobility ,1000 Hz ipsi/contra reflexes present     Speech Reception Threshold:    RIGHT: 25 dB HL    LEFT:   25 dB HL  Word Recognition Score:     RIGHT: 92% at 60 dB HL using NU-6 recorded word list.    LEFT:   92% at 65 dB HL using NU-6 recorded word list.      ASSESSMENT:   Normal sloping to severe sensorineural hearing loss bilaterally.     Today s results were discussed with the patient in detail.     PLAN:  Patient was counseled regarding hearing loss and impact on communication. Patient is a good candidate for amplification at this time.A [facility] information packet was given to the patient. Please call this clinic with questions regarding these results or recommendations.        Sara Rodríguez M.A. KATIE-DHAVAL  Clinical  audiologist [place] [id number]  7/17/2019

## 2019-08-26 DIAGNOSIS — N40.1 HYPERTROPHY OF PROSTATE WITH URINARY OBSTRUCTION: ICD-10-CM

## 2019-08-26 DIAGNOSIS — N13.8 HYPERTROPHY OF PROSTATE WITH URINARY OBSTRUCTION: ICD-10-CM

## 2019-08-27 RX ORDER — FINASTERIDE 5 MG/1
TABLET, FILM COATED ORAL
Qty: 90 TABLET | Refills: 1 | Status: SHIPPED | OUTPATIENT
Start: 2019-08-27 | End: 2020-02-27

## 2019-08-27 NOTE — TELEPHONE ENCOUNTER
Prescription approved per [facility] Refill Protocol.  Jessica Goldman RN

## 2019-08-27 NOTE — TELEPHONE ENCOUNTER
"Requested Prescriptions   Pending Prescriptions Disp Refills     finasteride (PROSCAR) 5 MG tablet [Pharmacy Med Name: FINASTERIDE 5MG TABS] 90 tablet 1     Sig: TAKE ONE TABLET BY MOUTH EVERY DAY  Last Written Prescription Date:  02/26/2019 #90 x 1  Last filled 05/30/2019  Last office visit: 6/3/2019 JESENIA Beaulieu   Future Office Visit:  None         Alpha Blockers Failed - 8/26/2019 10:21 PM        Failed - Blood pressure under 140/90 in past 12 months     BP Readings from Last 3 Encounters:   06/18/19 166/81   06/17/19 133/77   06/10/19 159/83                 Passed - Recent (12 mo) or future (30 days) visit within the authorizing provider's specialty     Patient had office visit in the last 12 months or has a visit in the next 30 days with authorizing provider or within the authorizing provider's specialty.  See \"Patient Info\" tab in inbasket, or \"Choose Columns\" in Meds & Orders section of the refill encounter.              Passed - Patient does not have Tadalafil, Vardenafil, or Sildenafil on their medication list        Passed - Medication is active on med list        Passed - Patient is 18 years of age or older          "

## 2019-09-05 ENCOUNTER — OFFICE VISIT (OUTPATIENT)
Dept: OTOLARYNGOLOGY | Facility: CLINIC | Age: 79
End: 2019-09-05
Payer: COMMERCIAL

## 2019-09-05 VITALS
OXYGEN SATURATION: 97 % | BODY MASS INDEX: 31.37 KG/M2 | RESPIRATION RATE: 12 BRPM | HEIGHT: 68 IN | DIASTOLIC BLOOD PRESSURE: 79 MMHG | WEIGHT: 207 LBS | SYSTOLIC BLOOD PRESSURE: 151 MMHG | HEART RATE: 84 BPM

## 2019-09-05 DIAGNOSIS — H90.3 SENSORINEURAL HEARING LOSS (SNHL) OF BOTH EARS: Primary | ICD-10-CM

## 2019-09-05 PROBLEM — M25.552 BILATERAL HIP PAIN: Status: RESOLVED | Noted: 2019-04-26 | Resolved: 2019-09-05

## 2019-09-05 PROBLEM — M25.551 BILATERAL HIP PAIN: Status: RESOLVED | Noted: 2019-04-26 | Resolved: 2019-09-05

## 2019-09-05 PROCEDURE — 99203 OFFICE O/P NEW LOW 30 MIN: CPT | Performed by: OTOLARYNGOLOGY

## 2019-09-05 ASSESSMENT — MIFFLIN-ST. JEOR: SCORE: 1620.51

## 2019-09-05 NOTE — PATIENT INSTRUCTIONS
Scheduling Information  To schedule your CT/MRI scan, please contact [facility] at [phone] OR [facility] at [phone]    To schedule your Surgery, please contact our Specialty Schedulers at [phone]      ENT Clinic Locations Clinic Hours Telephone Number     [facility]  [address] [place] [address]   Monday:           1:00pm -- 5:00pm    Friday:              8:00am - 12:00pm   To schedule/reschedule an appointment with   Dr. Montes,   please contact our   Specialty Scheduling Department at:     [phone]       [facility]  [address]  [place] [address] Tuesday:          8:00am -- 2:00pm         Urgent Care Locations Clinic Hours Telephone Numbers     Natasha Rader  [address] [place] [address]     Monday-Friday:     11:00am - 9:00pm    Saturday-Sunday:  9:00am - 5:00pm   [phone]     [facility]  [address] [place] [address]     Monday-Friday:      5:00pm - 9:00pm     Saturday-Sunday:  9:00am - 5:00pm   [phone]

## 2019-09-05 NOTE — PROGRESS NOTES
History of Present Illness - Storm Dutta is a 79 year old male here to see me for hearing aid medical clearance.    No history of chronic ear disease, no previous ear surgery.  He denies any chronic drainage from the ears.  No Previous chemo or radiation therapy, no major head trauma.      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    Audiologic Studies - An audiogram and tympanogram were performed on 7/12/2019 as part of the evaluation and personally reviewed. The tympanogram shows a normal Type A curve, with normal canal volume and middle ear pressure.  There is no sign of eustachian tube dysfunction or middle ear effusion.  The audiogram was also normal.  The sensorineural hearing shows a mild to severe symmetric down sloping loss starting at 500Hz.  No conductive hearing loss.    Past Medical History -   Patient Active Problem List   Diagnosis     Hyperlipidemia LDL goal <70     Type 2 diabetes mellitus with diabetic polyneuropathy (H)     Hypertension goal BP (blood pressure) < 140/90     Hypertrophy of prostate with urinary obstruction     Senile nuclear [facility]     Rectal cancer (H)     Small bowel obstruction, recurrent     Type 2 diabetes mellitus with diabetic chronic kidney disease (H)     Type 2 diabetes mellitus (H)     Bilateral hip pain       Current Medications -   Current Outpatient Medications:      ACCU-CHEK REGIS PLUS test strip, TEST BLOOD SUGARS 2-4 TIMES DAILY OR AS DIRECTED, Disp: 100 each, Rfl: 11     amLODIPine (NORVASC) 10 MG tablet, Take 10 mg by mouth, Disp: , Rfl:      aspirin 325 MG tablet, Take 325 mg by mouth daily, Disp: , Rfl:      atorvastatin (LIPITOR) 20 MG tablet, TAKE ONE TABLET BY MOUTH EVERY DAY, Disp: 90 tablet, Rfl: 3     blood glucose (ACCU-CHEK REGIS PLUS) test strip, USE TO TEST BLOOD SUGAR THREE TIMES A DAY OR AS DIRECTED, Disp: 300 each, Rfl: 1     blood glucose monitoring  (NO BRAND SPECIFIED) meter device kit, Use to test blood sugar 3 times daily or as directed., Disp: 1 kit, Rfl: 0     Blood Glucose Monitoring Suppl (ACCU-CHEK REGIS PLUS) W/DEVICE KIT, Check blood sugars 1-2 times daily, Disp: 1 kit, Rfl: 0     Cholecalciferol (VITAMIN D-3) 1000 units CAPS, Take by mouth daily, Disp: , Rfl:      diphenoxylate-atropine (LOMOTIL) 2.5-0.025 MG per tablet, Take 1 tablet by mouth 4 times daily as needed for diarrhea, Disp: 60 tablet, Rfl: 2     finasteride (PROSCAR) 5 MG tablet, TAKE ONE TABLET BY MOUTH EVERY DAY, Disp: 90 tablet, Rfl: 1     HYDROcodone-acetaminophen (NORCO) 5-325 MG tablet, Take 1-2 tablets by mouth every 4 hours as needed for moderate to severe pain (Patient not taking: Reported on 6/18/2019), Disp: 25 tablet, Rfl: 0     insulin aspart (NOVOLOG FLEXPEN) 100 UNIT/ML pen, Use 8 units plus low sliding scale before each meal 120-149 0 units 150-199 1 unit 200-249 2 units 250-299 3 units 300-349 4 units > 350 5 units  Max dose 50 units day, Disp: 15 mL, Rfl: 1     insulin glargine (BASAGLAR KWIKPEN) 100 UNIT/ML pen, INJECT 16 UNITS AT BEDTIME, Disp: 30 mL, Rfl: 1     losartan (COZAAR) 100 MG tablet, TAKE ONE TABLET BY MOUTH ONCE DAILY, Disp: 90 tablet, Rfl: 0     senna-docusate (SENOKOT-S/PERICOLACE) 8.6-50 MG tablet, Take 1-2 tablets by mouth 2 times daily, Disp: 30 tablet, Rfl: 0     tamsulosin (FLOMAX) 0.4 MG capsule, TAKE ONE CAPSULE BY MOUTH EVERY DAY, Disp: 90 capsule, Rfl: 2     ULTICARE MINI 31G X 6 MM insulin pen needle, USE 4 TO 5 TIMES DAILY OR AS DIRECTED FOR SLIDING SCALE INSULIN, Disp: 100 each, Rfl: 7    Allergies -   Allergies   Allergen Reactions     Nkda [No Known Drug Allergies]        Social History -   Social History     Socioeconomic History     Marital status:      Spouse name: Not on file     Number of children: Not on file     Years of education: Not on file     Highest education level: Not on file   Occupational History     Employer:  RETIRED     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   Social Needs     Financial resource strain: Not on file     Food insecurity:     Worry: Not on file     Inability: Not on file     Transportation needs:     Medical: Not on file     Non-medical: Not on file   Tobacco Use     Smoking status: Former Smoker     Years: 30.00     Types: Cigars     Last attempt to quit: 2002     Years since quittin.9     Smokeless tobacco: Never Used   Substance and Sexual Activity     Alcohol use: No     Drug use: No     Sexual activity: Not Currently     Partners: Female   Lifestyle     Physical activity:     Days per week: Not on file     Minutes per session: Not on file     Stress: Not on file   Relationships     Social connections:     Talks on phone: Not on file     Gets together: Not on file     Attends Voodoo service: Not on file     Active member of club or organization: Not on file     Attends meetings of clubs or organizations: Not on file     Relationship status: Not on file     Intimate partner violence:     Fear of current or ex partner: Not on file     Emotionally abused: Not on file     Physically abused: Not on file     Forced sexual activity: Not on file   Other Topics Concern     Parent/sibling w/ CABG, MI or angioplasty before 65F 55M? No   Social History Narrative     Not on file       Family History -   Family History   Problem Relation Age of Onset     Diabetes Mother      Hypertension Mother      Cancer Father      Cancer Maternal Grandmother      Alzheimer Disease Maternal Grandmother      Cardiovascular Paternal Grandmother      Breast Cancer Sister      Colon Cancer No family hx of      Colon Polyps No family hx of      Crohn's Disease No family hx of      Ulcerative Colitis No family hx of      Anesthesia Reaction No family hx of        Review of Systems - As per HPI and PMHx, otherwise 10+ system review of the head and neck, and general constitution is negative.    Physical Exam  BP (!)  "151/79   Pulse 84   Resp 12   Ht 1.715 m (5' 7.5\")   Wt 93.9 kg (207 lb)   SpO2 97%   BMI 31.94 kg/m      General - The patient is well nourished and well developed, and appears to have good nutritional status.  Alert and oriented to person and place, answers questions and cooperates with examination appropriately.   Head and Face - Normocephalic and atraumatic, with no gross asymmetry noted of the contour of the facial features.  The facial nerve is intact, with strong symmetric movements.  Voice and Breathing - The patient was breathing comfortably without the use of accessory muscles. There was no wheezing, stridor, or stertor.  The patients voice was clear and strong, and had appropriate pitch and quality.  Ears - The tympanic membranes are normal in appearance, bony landmarks are intact.  No retraction, perforation, or masses.  No fluid or purulence was seen in the external canal or the middle ear. No evidence of infection of the middle ear or external canal, cerumen was normal in appearance.  Eyes - Extraocular movements intact, and the pupils were reactive to light.  Sclera were not icteric or injected, conjunctiva were pink and moist.  Mouth - Examination of the oral cavity showed pink, healthy oral mucosa. No lesions or ulcerations noted.  The tongue was mobile and midline, and the dentition were in good condition.    Throat - The walls of the oropharynx were smooth, pink, moist, symmetric, and had no lesions or ulcerations.  The tonsillar pillars and soft palate were symmetric.  The uvula was midline on elevation.    Neck - Normal midline excursion of the laryngotracheal complex during swallowing.  Full range of motion on passive movement.  Palpation of the occipital, submental, submandibular, internal jugular chain, and supraclavicular nodes did not demonstrate any abnormal lymph nodes or masses.  The carotid pulse was palpable bilaterally.  Palpation of the thyroid was soft and smooth, with no " nodules or goiter appreciated.  The trachea was mobile and midline.  Nose - External contour is symmetric, no gross deflection or scars.  Nasal mucosa is pink and moist with no abnormal mucus.  The septum was midline and non-obstructive, turbinates of normal size and position.  No polyps, masses, or purulence noted on examination.      A/P - Storm Dutta is a 79 year old male  (H90.3) Sensorineural hearing loss (SNHL) of both ears  (primary encounter diagnosis)    Based on the history, audiogram, and ear exam, I don't find any evidence of medical or surgical disease that would have caused the hearing loss.  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    We discussed the natural history of the steady loss of human hearing, and things to help.  I certainly recommend considering hearing aids, and they have my medical clearance to look into being fitted, and information has been provided.    Finally, safety considerations such as loud smoke detectors, alarm clocks, and special aids for the hearing impaired using phones and television were also discussed.

## 2019-09-05 NOTE — LETTER
9/5/2019         RE: Storm Dutta  [address] [place] [address]        Dear Colleague,    Thank you for referring your patient, Storm Dutta, to the [facility]. Please see a copy of my visit note below.    History of Present Illness - Storm Dutta is a 79 year old male here to see me for hearing aid medical clearance.    No history of chronic ear disease, no previous ear surgery.  He denies any chronic drainage from the ears.  No Previous chemo or radiation therapy, no major head trauma.      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    Audiologic Studies - An audiogram and tympanogram were performed on 7/12/2019 as part of the evaluation and personally reviewed. The tympanogram shows a normal Type A curve, with normal canal volume and middle ear pressure.  There is no sign of eustachian tube dysfunction or middle ear effusion.  The audiogram was also normal.  The sensorineural hearing shows a mild to severe symmetric down sloping loss starting at 500Hz.  No conductive hearing loss.    Past Medical History -   Patient Active Problem List   Diagnosis     Hyperlipidemia LDL goal <70     Type 2 diabetes mellitus with diabetic polyneuropathy (H)     Hypertension goal BP (blood pressure) < 140/90     Hypertrophy of prostate with urinary obstruction     Senile nuclear sclerosis     [facility]     Rectal cancer (H)     Small bowel obstruction, recurrent     Type 2 diabetes mellitus with diabetic chronic kidney disease (H)     Type 2 diabetes mellitus (H)     Bilateral hip pain       Current Medications -   Current Outpatient Medications:      ACCU-CHEK REGIS PLUS test strip, TEST BLOOD SUGARS 2-4 TIMES DAILY OR AS DIRECTED, Disp: 100 each, Rfl: 11     amLODIPine (NORVASC) 10 MG tablet, Take 10 mg by mouth, Disp: , Rfl:      aspirin 325 MG tablet, Take 325 mg by mouth daily, Disp: , Rfl:      atorvastatin  (LIPITOR) 20 MG tablet, TAKE ONE TABLET BY MOUTH EVERY DAY, Disp: 90 tablet, Rfl: 3     blood glucose (ACCU-CHEK REGIS PLUS) test strip, USE TO TEST BLOOD SUGAR THREE TIMES A DAY OR AS DIRECTED, Disp: 300 each, Rfl: 1     blood glucose monitoring (NO BRAND SPECIFIED) meter device kit, Use to test blood sugar 3 times daily or as directed., Disp: 1 kit, Rfl: 0     Blood Glucose Monitoring Suppl (ACCU-CHEK REGIS PLUS) W/DEVICE KIT, Check blood sugars 1-2 times daily, Disp: 1 kit, Rfl: 0     Cholecalciferol (VITAMIN D-3) 1000 units CAPS, Take by mouth daily, Disp: , Rfl:      diphenoxylate-atropine (LOMOTIL) 2.5-0.025 MG per tablet, Take 1 tablet by mouth 4 times daily as needed for diarrhea, Disp: 60 tablet, Rfl: 2     finasteride (PROSCAR) 5 MG tablet, TAKE ONE TABLET BY MOUTH EVERY DAY, Disp: 90 tablet, Rfl: 1     HYDROcodone-acetaminophen (NORCO) 5-325 MG tablet, Take 1-2 tablets by mouth every 4 hours as needed for moderate to severe pain (Patient not taking: Reported on 6/18/2019), Disp: 25 tablet, Rfl: 0     insulin aspart (NOVOLOG FLEXPEN) 100 UNIT/ML pen, Use 8 units plus low sliding scale before each meal 120-149 0 units 150-199 1 unit 200-249 2 units 250-299 3 units 300-349 4 units > 350 5 units  Max dose 50 units day, Disp: 15 mL, Rfl: 1     insulin glargine (BASAGLAR KWIKPEN) 100 UNIT/ML pen, INJECT 16 UNITS AT BEDTIME, Disp: 30 mL, Rfl: 1     losartan (COZAAR) 100 MG tablet, TAKE ONE TABLET BY MOUTH ONCE DAILY, Disp: 90 tablet, Rfl: 0     senna-docusate (SENOKOT-S/PERICOLACE) 8.6-50 MG tablet, Take 1-2 tablets by mouth 2 times daily, Disp: 30 tablet, Rfl: 0     tamsulosin (FLOMAX) 0.4 MG capsule, TAKE ONE CAPSULE BY MOUTH EVERY DAY, Disp: 90 capsule, Rfl: 2     ULTICARE MINI 31G X 6 MM insulin pen needle, USE 4 TO 5 TIMES DAILY OR AS DIRECTED FOR SLIDING SCALE INSULIN, Disp: 100 each, Rfl: 7    Allergies -   Allergies   Allergen Reactions     Nkda [No Known Drug Allergies]        Social History -   Social  History     Socioeconomic History     Marital status:      Spouse name: Not on file     Number of children: Not on file     Years of education: Not on file     Highest education level: Not on file   Occupational History     Employer: RETIRED     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   Social Needs     Financial resource strain: Not on file     Food insecurity:     Worry: Not on file     Inability: Not on file     Transportation needs:     Medical: Not on file     Non-medical: Not on file   Tobacco Use     Smoking status: Former Smoker     Years: 30.00     Types: Cigars     Last attempt to quit: 2002     Years since quittin.9     Smokeless tobacco: Never Used   Substance and Sexual Activity     Alcohol use: No     Drug use: No     Sexual activity: Not Currently     Partners: Female   Lifestyle     Physical activity:     Days per week: Not on file     Minutes per session: Not on file     Stress: Not on file   Relationships     Social connections:     Talks on phone: Not on file     Gets together: Not on file     Attends Sikhism service: Not on file     Active member of club or organization: Not on file     Attends meetings of clubs or organizations: Not on file     Relationship status: Not on file     Intimate partner violence:     Fear of current or ex partner: Not on file     Emotionally abused: Not on file     Physically abused: Not on file     Forced sexual activity: Not on file   Other Topics Concern     Parent/sibling w/ CABG, MI or angioplasty before 65F 55M? No   Social History Narrative     Not on file       Family History -   Family History   Problem Relation Age of Onset     Diabetes Mother      Hypertension Mother      Cancer Father      Cancer Maternal Grandmother      Alzheimer Disease Maternal Grandmother      Cardiovascular Paternal Grandmother      Breast Cancer Sister      Colon Cancer No family hx of      Colon Polyps No family hx of      Crohn's Disease No family hx of   "    Ulcerative Colitis No family hx of      Anesthesia Reaction No family hx of        Review of Systems - As per HPI and PMHx, otherwise 10+ system review of the head and neck, and general constitution is negative.    Physical Exam  BP (!) 151/79   Pulse 84   Resp 12   Ht 1.715 m (5' 7.5\")   Wt 93.9 kg (207 lb)   SpO2 97%   BMI 31.94 kg/m       General - The patient is well nourished and well developed, and appears to have good nutritional status.  Alert and oriented to person and place, answers questions and cooperates with examination appropriately.   Head and Face - Normocephalic and atraumatic, with no gross asymmetry noted of the contour of the facial features.  The facial nerve is intact, with strong symmetric movements.  Voice and Breathing - The patient was breathing comfortably without the use of accessory muscles. There was no wheezing, stridor, or stertor.  The patients voice was clear and strong, and had appropriate pitch and quality.  Ears - The tympanic membranes are normal in appearance, bony landmarks are intact.  No retraction, perforation, or masses.  No fluid or purulence was seen in the external canal or the middle ear. No evidence of infection of the middle ear or external canal, cerumen was normal in appearance.  Eyes - Extraocular movements intact, and the pupils were reactive to light.  Sclera were not icteric or injected, conjunctiva were pink and moist.  Mouth - Examination of the oral cavity showed pink, healthy oral mucosa. No lesions or ulcerations noted.  The tongue was mobile and midline, and the dentition were in good condition.    Throat - The walls of the oropharynx were smooth, pink, moist, symmetric, and had no lesions or ulcerations.  The tonsillar pillars and soft palate were symmetric.  The uvula was midline on elevation.    Neck - Normal midline excursion of the laryngotracheal complex during swallowing.  Full range of motion on passive movement.  Palpation of the " occipital, submental, submandibular, internal jugular chain, and supraclavicular nodes did not demonstrate any abnormal lymph nodes or masses.  The carotid pulse was palpable bilaterally.  Palpation of the thyroid was soft and smooth, with no nodules or goiter appreciated.  The trachea was mobile and midline.  Nose - External contour is symmetric, no gross deflection or scars.  Nasal mucosa is pink and moist with no abnormal mucus.  The septum was midline and non-obstructive, turbinates of normal size and position.  No polyps, masses, or purulence noted on examination.      A/P - Storm Dutta is a 79 year old male  (H90.3) Sensorineural hearing loss (SNHL) of both ears  (primary encounter diagnosis)    Based on the history, audiogram, and ear exam, I don't find any evidence of medical or surgical disease that would have caused the hearing loss.  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    We discussed the natural history of the steady loss of human hearing, and things to help.  I certainly recommend considering hearing aids, and they have my medical clearance to look into being fitted, and information has been provided.    Finally, safety considerations such as loud smoke detectors, alarm clocks, and special aids for the hearing impaired using phones and television were also discussed.      Again, thank you for allowing me to participate in the care of your patient.        Sincerely,        Anjel Montes MD

## 2019-09-17 ENCOUNTER — OFFICE VISIT (OUTPATIENT)
Dept: AUDIOLOGY | Facility: CLINIC | Age: 79
End: 2019-09-17
Payer: COMMERCIAL

## 2019-09-17 DIAGNOSIS — H90.3 SENSORY HEARING LOSS, BILATERAL: Primary | ICD-10-CM

## 2019-09-17 PROCEDURE — 99207 ZZC NO CHARGE LOS: CPT | Performed by: AUDIOLOGIST

## 2019-09-17 PROCEDURE — 92591 HC HEARING AID EXAM BINAURAL: CPT | Performed by: AUDIOLOGIST

## 2019-09-25 DIAGNOSIS — I10 HYPERTENSION GOAL BP (BLOOD PRESSURE) < 140/90: ICD-10-CM

## 2019-09-25 NOTE — TELEPHONE ENCOUNTER
Routing refill request to provider for review/approval because:  Labs out of range:  See below  Blood pressure not in range.    Rukhsana Pittman RN

## 2019-09-25 NOTE — TELEPHONE ENCOUNTER
"Requested Prescriptions   Pending Prescriptions Disp Refills     losartan (COZAAR) 100 MG tablet [Pharmacy Med Name: LOSARTAN POTASSIUM 100MG TABS]  Last Written Prescription Date:  6/27/19  Last Fill Quantity: 90,  # refills: 0   Last office visit: 6/3/2019 with prescribing provider:  gabriela   Future Office Visit:     90 tablet 0     Sig: TAKE ONE TABLET BY MOUTH ONCE DAILY       Angiotensin-II Receptors Failed - 9/25/2019  8:44 AM        Failed - Last blood pressure under 140/90 in past 12 months     BP Readings from Last 3 Encounters:   09/05/19 (!) 151/79   06/18/19 166/81   06/17/19 133/77                 Failed - Normal serum creatinine on file in past 12 months     Recent Labs   Lab Test 06/03/19  1423  09/05/14  0745   CR 1.28*   < >  --    CREAT  --   --  1.9*    < > = values in this interval not displayed.             Passed - Recent (12 mo) or future (30 days) visit within the authorizing provider's specialty     Patient had office visit in the last 12 months or has a visit in the next 30 days with authorizing provider or within the authorizing provider's specialty.  See \"Patient Info\" tab in inbasket, or \"Choose Columns\" in Meds & Orders section of the refill encounter.              Passed - Medication is active on med list        Passed - Patient is age 18 or older        Passed - Normal serum potassium on file in past 12 months     Recent Labs   Lab Test 06/03/19  1423   POTASSIUM 4.0                      "

## 2019-09-27 RX ORDER — LOSARTAN POTASSIUM 100 MG/1
TABLET ORAL
Qty: 90 TABLET | Refills: 0 | Status: SHIPPED | OUTPATIENT
Start: 2019-09-27 | End: 2019-11-15

## 2019-10-01 ENCOUNTER — OFFICE VISIT (OUTPATIENT)
Dept: AUDIOLOGY | Facility: CLINIC | Age: 79
End: 2019-10-01

## 2019-10-01 DIAGNOSIS — H90.3 SENSORY HEARING LOSS, BILATERAL: Primary | ICD-10-CM

## 2019-10-01 PROCEDURE — V5011 HEARING AID FITTING/CHECKING: HCPCS | Mod: RT | Performed by: AUDIOLOGIST

## 2019-10-01 PROCEDURE — 99207 ZZC NO CHARGE LOS: CPT | Performed by: AUDIOLOGIST

## 2019-10-01 PROCEDURE — V5261 HEARING AID, DIGIT, BIN, BTE: HCPCS | Performed by: AUDIOLOGIST

## 2019-10-01 PROCEDURE — V5160 DISPENSING FEE BINAURAL: HCPCS | Performed by: AUDIOLOGIST

## 2019-10-01 PROCEDURE — V5267 HEARING AID SUP/ACCESS/DEV: HCPCS | Performed by: AUDIOLOGIST

## 2019-10-01 PROCEDURE — V5020 CONFORMITY EVALUATION: HCPCS | Mod: RT | Performed by: AUDIOLOGIST

## 2019-10-01 PROCEDURE — 92593 HC HEARING AID CHECK, BINAURAL: CPT | Performed by: AUDIOLOGIST

## 2019-10-01 NOTE — PROGRESS NOTES
AUDIOLOGY REPORT    SUBJECTIVE: Storm Dutta, a 79 year old male, was seen in the Audiology Clinic at [facility] today for a Binaural hearing aid fitting. Previous results have revealed a bilateral normal sloping to severe sensorineural hearing loss. The patient was given medical clearance to pursue amplification by  Soren Montes MD..      OBJECTIVE:  Prior to fitting, a hearing aid check was performed to ensure device functionality. The hearing aid conformity evaluation was completed.The hearing aids were placed and they provided a good fit. Real-ear-probe-microphone measurements were completed on the "[employer] system and were a good match to NAL-NL2 target with soft sounds audible, moderate sounds comfortable, and loud sounds below discomfort. UCLs are verified through maximum power output measures and demonstrate appropriate limiting of loud inputs. Mr. Dutta was oriented to proper hearing aid use, care, cleaning (no water, dry brush), batteries (rechargable, low-battery signal), aid insertion/removal, user booklet, warranty information, storage cases, and other hearing aid details. The patient confirmed understanding of hearing aid use and care, and showed proper insertion of hearing aid and batteries while in the office today. Mr. Dutta reported good volume and sound quality today.    EAR(S) FIT:    MA HEARING AID MAKE: Right: Phonak; Left: Phonak  MA HEARING AID MODEL #: Right: Audeo M90-R; Left: Audeo M90-R  HEARING AID STYLE: Right: BTE; Left: BTE  DOME SIZE: Right:  medium open; Left::  medium open   LENGTH: Right:  #1 S; Left:  #1 S  SERIAL NUMBERS: Right: [id number]; Left: [id number]  WARRANTY END DATE: Right: 12/17/2022; Left:: 12/17/2022      CHARGES:   Hearing Aid Check: Binaural, 49805, $81.00  Dispensing Fee: Binaural, , $500.00, RT, LT  Fit/Orientation: , $185.00  Hearing Aid Conformity Evaluation: , Qty:2RT, LT  Hearing Aid Digital: Binaural,  BTE, ,NU (Binaural)   Hearing Aid Accessory: $200.00  Total: $6800.00       ASSESSMENT: Binaural  hearing aid fitting completed today. Verification measures were performed. The 45 day trial period was explained to patient, and they expressed understanding. Mr. Dutta signed the Hearing Aid Purchase Agreement and was given a copy, as well as details on his hearing aids. Patient was counseled that exact out of pocket amounts cannot be determined for hearing aid claims being sent to insurance. Any insurance coverage information presented to the patient is an estimate only, and is not a guarantee of payment. Patient has been advised to check with their own insurance.    PLAN: Mr. Dutta will return for follow-up in 2-3 weeks for a hearing aid review appointment. Please call this clinic with questions regarding today s appointment.    Sara DELCID, #[id number]

## 2019-10-07 ENCOUNTER — HOSPITAL ENCOUNTER (EMERGENCY)
Facility: CLINIC | Age: 79
Discharge: HOME | End: 2019-10-07
Attending: EMERGENCY MEDICINE | Admitting: EMERGENCY MEDICINE
Payer: COMMERCIAL

## 2019-10-07 VITALS
SYSTOLIC BLOOD PRESSURE: 143 MMHG | WEIGHT: 195 LBS | OXYGEN SATURATION: 97 % | DIASTOLIC BLOOD PRESSURE: 68 MMHG | BODY MASS INDEX: 30.09 KG/M2 | TEMPERATURE: 98.8 F | HEART RATE: 76 BPM | RESPIRATION RATE: 14 BRPM

## 2019-10-07 DIAGNOSIS — R19.7 DIARRHEA, UNSPECIFIED TYPE: ICD-10-CM

## 2019-10-07 LAB
ALBUMIN SERPL-MCNC: 3.6 G/DL (ref 3.4–5)
ALP SERPL-CCNC: 115 U/L (ref 40–150)
ALT SERPL W P-5'-P-CCNC: 24 U/L (ref 0–70)
ANION GAP SERPL CALCULATED.3IONS-SCNC: 8 MMOL/L (ref 3–14)
AST SERPL W P-5'-P-CCNC: 17 U/L (ref 0–45)
BASOPHILS # BLD AUTO: 0.1 10E9/L (ref 0–0.2)
BASOPHILS NFR BLD AUTO: 0.4 %
BILIRUB SERPL-MCNC: 0.7 MG/DL (ref 0.2–1.3)
BUN SERPL-MCNC: 20 MG/DL (ref 7–30)
CALCIUM SERPL-MCNC: 8.7 MG/DL (ref 8.5–10.1)
CHLORIDE SERPL-SCNC: 108 MMOL/L (ref 94–109)
CO2 SERPL-SCNC: 23 MMOL/L (ref 20–32)
CREAT SERPL-MCNC: 1.42 MG/DL (ref 0.66–1.25)
DIFFERENTIAL METHOD BLD: ABNORMAL
EOSINOPHIL # BLD AUTO: 0 10E9/L (ref 0–0.7)
EOSINOPHIL NFR BLD AUTO: 0.3 %
ERYTHROCYTE [DISTWIDTH] IN BLOOD BY AUTOMATED COUNT: 13.3 % (ref 10–15)
GFR SERPL CREATININE-BSD FRML MDRD: 47 ML/MIN/{1.73_M2}
GLUCOSE SERPL-MCNC: 152 MG/DL (ref 70–99)
HCT VFR BLD AUTO: 38.8 % (ref 40–53)
HGB BLD-MCNC: 12.2 G/DL (ref 13.3–17.7)
IMM GRANULOCYTES # BLD: 0.1 10E9/L (ref 0–0.4)
IMM GRANULOCYTES NFR BLD: 0.5 %
LYMPHOCYTES # BLD AUTO: 0.8 10E9/L (ref 0.8–5.3)
LYMPHOCYTES NFR BLD AUTO: 5.4 %
MCH RBC QN AUTO: 29.2 PG (ref 26.5–33)
MCHC RBC AUTO-ENTMCNC: 31.4 G/DL (ref 31.5–36.5)
MCV RBC AUTO: 93 FL (ref 78–100)
MONOCYTES # BLD AUTO: 1 10E9/L (ref 0–1.3)
MONOCYTES NFR BLD AUTO: 6.9 %
NEUTROPHILS # BLD AUTO: 12.7 10E9/L (ref 1.6–8.3)
NEUTROPHILS NFR BLD AUTO: 86.5 %
NRBC # BLD AUTO: 0 10*3/UL
NRBC BLD AUTO-RTO: 0 /100
PLATELET # BLD AUTO: 220 10E9/L (ref 150–450)
POTASSIUM SERPL-SCNC: 4.2 MMOL/L (ref 3.4–5.3)
PROT SERPL-MCNC: 7.1 G/DL (ref 6.8–8.8)
RBC # BLD AUTO: 4.18 10E12/L (ref 4.4–5.9)
SODIUM SERPL-SCNC: 139 MMOL/L (ref 133–144)
WBC # BLD AUTO: 14.7 10E9/L (ref 4–11)

## 2019-10-07 PROCEDURE — 99284 EMERGENCY DEPT VISIT MOD MDM: CPT | Mod: Z6 | Performed by: EMERGENCY MEDICINE

## 2019-10-07 PROCEDURE — 99284 EMERGENCY DEPT VISIT MOD MDM: CPT

## 2019-10-07 PROCEDURE — 87506 IADNA-DNA/RNA PROBE TQ 6-11: CPT | Performed by: EMERGENCY MEDICINE

## 2019-10-07 PROCEDURE — 85025 COMPLETE CBC W/AUTO DIFF WBC: CPT | Performed by: EMERGENCY MEDICINE

## 2019-10-07 PROCEDURE — 25000132 ZZH RX MED GY IP 250 OP 250 PS 637: Performed by: EMERGENCY MEDICINE

## 2019-10-07 PROCEDURE — 80053 COMPREHEN METABOLIC PANEL: CPT | Performed by: EMERGENCY MEDICINE

## 2019-10-07 PROCEDURE — 87493 C DIFF AMPLIFIED PROBE: CPT | Mod: 91 | Performed by: EMERGENCY MEDICINE

## 2019-10-07 RX ORDER — LOPERAMIDE HCL 2 MG
4 CAPSULE ORAL ONCE
Status: COMPLETED | OUTPATIENT
Start: 2019-10-07 | End: 2019-10-07

## 2019-10-07 RX ADMIN — LOPERAMIDE HYDROCHLORIDE 4 MG: 2 CAPSULE ORAL at 22:27

## 2019-10-07 NOTE — ED AVS SNAPSHOT
[facility] Emergency Department  [address] [address]  Phone:  [phone]  Fax:  [phone]                                    Storm Dutta   MRN: [id number]    Department:  [facility] Emergency Department   Date of Visit:  10/7/2019           After Visit Summary Signature Page    I have received my discharge instructions, and my questions have been answered. I have discussed any challenges I see with this plan with the nurse or doctor.    ..........................................................................................................................................  Patient/Patient Representative Signature      ..........................................................................................................................................  Patient Representative Print Name and Relationship to Patient    ..................................................               ................................................  Date                                   Time    ..........................................................................................................................................  Reviewed by Signature/Title    ...................................................              ..............................................  Date                                               Time          22EPIC Rev 08/18

## 2019-10-08 ENCOUNTER — TELEPHONE (OUTPATIENT)
Dept: EMERGENCY MEDICINE | Facility: CLINIC | Age: 79
End: 2019-10-08

## 2019-10-08 DIAGNOSIS — A04.72 C. DIFFICILE COLITIS: ICD-10-CM

## 2019-10-08 LAB
C COLI+JEJUNI+LARI FUSA STL QL NAA+PROBE: NOT DETECTED
C DIFF TOX B STL QL: POSITIVE
EC STX1 GENE STL QL NAA+PROBE: NOT DETECTED
EC STX2 GENE STL QL NAA+PROBE: NOT DETECTED
ENTERIC PATHOGEN COMMENT: NORMAL
NOROV GI+II ORF1-ORF2 JNC STL QL NAA+PR: NOT DETECTED
RVA NSP5 STL QL NAA+PROBE: NOT DETECTED
SALMONELLA SP RPOD STL QL NAA+PROBE: NOT DETECTED
SHIGELLA SP+EIEC IPAH STL QL NAA+PROBE: NOT DETECTED
SPECIMEN SOURCE: ABNORMAL
V CHOL+PARA RFBL+TRKH+TNAA STL QL NAA+PR: NOT DETECTED
Y ENTERO RECN STL QL NAA+PROBE: NOT DETECTED

## 2019-10-08 RX ORDER — VANCOMYCIN HYDROCHLORIDE 125 MG/1
125 CAPSULE ORAL 4 TIMES DAILY
Qty: 56 CAPSULE | Refills: 0 | Status: SHIPPED | OUTPATIENT
Start: 2019-10-08 | End: 2019-10-31

## 2019-10-08 NOTE — DISCHARGE INSTRUCTIONS
Try taking up to 8 tablets of Imodium daily to help with the diarrhea.  Return to the emergency department for fevers, abdominal pain, repeated vomiting, bloody stools, lightheadedness, or other concerns.  Get rechecked in clinic if not better in the next 3 to 4 days.

## 2019-10-08 NOTE — ED NOTES
"Pt cleaned of moderate amount of stool - states, \"I just can't hold it\" - stool is loose and brownish - samples sent. Rectal area is redenned and some amount of breakdown is noted - cream barrier placed.  "

## 2019-10-08 NOTE — RESULT ENCOUNTER NOTE
Final Enteric Bacteria and Virus Panel by ARMIDA Stool is NEGATIVE for Campylobacter group, Salmonella species, Shigella species, Shiga toxin 1 gene, Shiga toxin 2 gene, Vibrio group,  Yersinia enterocolitica,  Rotavirus A,  and Norovirus I and II.    No treatment or change in treatment per [facility] Lab Result protocol.

## 2019-10-08 NOTE — ED PROVIDER NOTES
History     Chief Complaint   Patient presents with     Diarrhea     HPI  Storm Dutta is a 79 year old male who presents for diarrhea.  The patient has had ongoing issues with loose stools for quite some time ever since he had surgery for removal of rectal cancer several years ago.  However over the last past 2 weeks he has had a lot of difficulty with loose stools, several episodes per day.  This is been much worse over the past several days and especially today.  He has been incontinent of stool and has tenesmus.  He denies any fever or abdominal pain.  Stool is nonbloody.  No nausea or vomiting.  No recent travel or antibiotics.  He denies chest pain or difficulty breathing.    Allergies:  Allergies   Allergen Reactions     Nkda [No Known Drug Allergies]        Problem List:    Patient Active Problem List    Diagnosis Date Noted     Type 2 diabetes mellitus (H) 05/24/2016     Priority: Medium     [facility].  Mild retinopathy of both eyes.  Monitor only.  Sees retinal specialist.  F/u 3 months.         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 CT 08/09/13 low rectal cancer extending 7 cm from anal verge, >5 lymph nodes in the mesorectal fat. Bilateral iliac nodes. No definite liver lesions.. Markedly enlarged prostate 8.8 cm, lesion 2.7 x 3.2 x 2.8 cm in left ilium with narrow zone of transition suggesting benign lesion. Sclerotic lesion left seventh rib. S/p concurrent chemoRT with Xeloda 1500 mg po BID M-F started 09/04/13, completed 28/28 fractions of RT 10/10/13. S/p Laparoscopic-assisted low anterior resection with laparoscopic splenic flexure mobilization and colonic J pouch with colorectal end-to-end anastomosis, loop ileostomy, rigid proctoscopy  01/07/2014. Adjuvant XELOX (oxaliplatin 130 mg/m2 on day 1 plus capecitabine 1,000 mg/m2 twice daily on days 1 to 14 every 3 weeks) Cycle 1 started on 02/20/13; Cycle 2 has been on HOLD due to decline in patient's health status...       Senile nuclear sclerosis 04/10/2013     Priority: Medium     Hypertrophy of prostate with urinary obstruction 12/07/2012     Priority: Medium     Problem list name updated by automated process. Provider to review       Hypertension goal BP (blood pressure) < 140/90 08/24/2012     Priority: Medium     Type 2 diabetes mellitus with diabetic polyneuropathy (H)      Priority: Medium     Hyperlipidemia LDL goal <70 09/23/2009     Priority: Medium     [facility] 08/09/2013     Priority: Low     EMERGENCY CARE PLAN  August 9, 2013: No current Care Coordination follow up planned. Please refer if Care Coordination services are needed.    Presenting Problem Signs and Symptoms Treatment Plan   Questions or concerns   during clinic hours   I will call my clinic directly:  [address] [address]  [phone].   Questions or concerns outside clinic hours   I will call the 24 hour nurse line at   [phone] or [facility].   Need to schedule an appointment   I will call the 24 hour scheduling team at [phone] or my clinic directly at [phone].    Same day treatment     I will call my clinic first, nurse line if after hours, urgent care and express care if needed.   Clinic care coordination services (regular clinic hours)     I will call a clinic care coordinator directly:     Miguel Ángel Smith RN  Mon, Tues, Fri - [phone]  Wed, Thurs - [phone]    Missy Penny :    [phone]    Or call my clinic at [phone] and ask to speak with care coordination.   Crisis Services: Behavioral or Mental Health  [facility] 24 Hour Phone Line  [phone]    [facility] 24 Hour Crisis Services  [phone]    [facility]  (Behavioral Health Providers) Network [phone]    [facility]   [phone]       Emergency treatment -- Immediately    CAll 911               Past Medical History:    Past Medical History:   Diagnosis Date     Acute urinary retention 11/2012     Acute urinary retention 11/1/2012     Diabetes mellitus type II      Epididymitis 3/20/2014     Former smoker      Hypertension goal BP (blood pressure) < 140/90 8/24/2012     PE (pulmonary embolism) 3/20/2014     Rectal cancer (H)      Skin cancer      Squamous cell carcinoma      Thrombosis of leg        Past Surgical History:    Past Surgical History:   Procedure Laterality Date     AMPUTATE TOE(S) Right 6/4/2019    Procedure: AMPUTATION 2nd Toe;  Surgeon: Adriel Cabello DPM;  Location: [facility] OR     COLONOSCOPY  7/29/2013    Procedure: COMBINED COLONOSCOPY, SINGLE BIOPSY/POLYPECTOMY BY BIOPSY;;  Surgeon: Bari Burnett MD;  Location: [facility] GI     COLONOSCOPY N/A 6/4/2015    Procedure: COMBINED COLONOSCOPY, SINGLE OR MULTIPLE BIOPSY/POLYPECTOMY BY BIOPSY;  Surgeon: Tara Mtz MD;  Location:  GI     COLONOSCOPY N/A 12/5/2016    Procedure: COLONOSCOPY;  Surgeon: Breanna Kline MD;  Location:  GI     EYE SURGERY  5/2012    Right eye procedure     HC CIRCUMCISION SURGICAL NON-DEV >28 DAYS AGE  2/97    due to phimosis     HC REMOVAL GALLBLADDER  5/01     HC UGI ENDOSCOPY W PLACEMENT GASTROSTOMY TUBE PERCUT  3/13/2014    Procedure: COMBINED ESOPHAGOSCOPY, GASTROSCOPY, DUODENOSCOPY (EGD), PLACE PERCUTANEOUS ENDOSCOPIC GASTROSTOMY TUBE;  Surgeon: Loco Casillas MD;  Location: [facility] GI     LAPAROSCOPIC ASSISTED COLECTOMY LEFT (DESCENDING)  1/7/2014    Procedure: LAPAROSCOPIC ASSISTED COLECTOMY LEFT (DESCENDING);  Laparoscopic hand assisted Low Anterior Resection With colonic J pouch and end to end colorectal anastamosis. Laparoscopic splenic flexure mobilization.  Loop Ileostomy.  Rigid proctoscopy;  Surgeon: Tye  Margaret GONZALES MD;  [facility]     PHACOEMULSIFICATION WITH STANDARD INTRAOCULAR LENS IMPLANT  4/11/2013    Procedure: PHACOEMULSIFICATION WITH STANDARD INTRAOCULAR LENS IMPLANT;  Right Kelman Phacoemulsification with Intraocular Lens Implant;  Surgeon: Caesar Turner MD;  Location: [facility]     REMOVE GASTROSTOMY TUBE ADULT  7/25/2014    Procedure: REMOVE GASTROSTOMY TUBE ADULT;  Surgeon: Margaret Gamble MD;  [facility]     SIGMOIDOSCOPY FLEXIBLE  6/23/2014    Procedure: SIGMOIDOSCOPY FLEXIBLE;  Surgeon: Margaret Gamble MD;  [facility]     SIGMOIDOSCOPY FLEXIBLE  7/22/2014    Procedure: SIGMOIDOSCOPY FLEXIBLE;  Surgeon: Margaret Gamble MD;  [facility]     SIGMOIDOSCOPY FLEXIBLE  7/25/2014    Procedure: SIGMOIDOSCOPY FLEXIBLE;  Surgeon: Margaret Gamble MD;  [facility]     SIGMOIDOSCOPY FLEXIBLE  7/28/2014    Procedure: SIGMOIDOSCOPY FLEXIBLE;  Surgeon: Margaret Gamble MD;  [facility]     SIGMOIDOSCOPY FLEXIBLE  7/31/2014    Procedure: SIGMOIDOSCOPY FLEXIBLE;  Surgeon: Margaret Gamble MD;  [facility]     SIGMOIDOSCOPY FLEXIBLE  8/3/2014    Procedure: SIGMOIDOSCOPY FLEXIBLE;  Surgeon: Margaret Gamble MD;  [facility]     SIGMOIDOSCOPY FLEXIBLE  8/5/2014    Procedure: SIGMOIDOSCOPY FLEXIBLE;  Surgeon: Margaret Gamble MD;  [facility]     SIGMOIDOSCOPY FLEXIBLE  8/8/2014    Procedure: SIGMOIDOSCOPY FLEXIBLE;  Surgeon: Margaret Gamble MD;  [facility]     SIGMOIDOSCOPY FLEXIBLE  8/18/2014    Procedure: SIGMOIDOSCOPY FLEXIBLE;  Surgeon: Jose Roberto Cervantes MD;  [facility]     SIGMOIDOSCOPY FLEXIBLE N/A 8/15/2014    Procedure: SIGMOIDOSCOPY FLEXIBLE;  Surgeon: Margaret Gamble MD;  [facility]     SIGMOIDOSCOPY FLEXIBLE N/A 8/22/2014    Procedure: SIGMOIDOSCOPY FLEXIBLE;  Surgeon: Jose Roberto Cervantes MD;  [facility]     SIGMOIDOSCOPY FLEXIBLE N/A 8/11/2014     Procedure: SIGMOIDOSCOPY FLEXIBLE;  Surgeon: Margaret Gamble MD;  [facility]     SIGMOIDOSCOPY FLEXIBLE N/A 8/26/2014    Procedure: SIGMOIDOSCOPY FLEXIBLE;  Surgeon: Maragret Gamble MD;  [facility]     SIGMOIDOSCOPY FLEXIBLE N/A 8/29/2014    Procedure: SIGMOIDOSCOPY FLEXIBLE;  Surgeon: Margaret Gamble MD;  [facility]     SIGMOIDOSCOPY FLEXIBLE N/A 9/8/2014    Procedure: SIGMOIDOSCOPY FLEXIBLE;  Surgeon: Margaret Gamble MD;  [facility]     SIGMOIDOSCOPY FLEXIBLE N/A 9/2/2014    Procedure: SIGMOIDOSCOPY FLEXIBLE;  Surgeon: Breanna Kline MD;  [facility]     SIGMOIDOSCOPY FLEXIBLE N/A 9/11/2014    Procedure: SIGMOIDOSCOPY FLEXIBLE;  Surgeon: Margaret Gamble MD;  [facility]     SIGMOIDOSCOPY FLEXIBLE N/A 9/19/2014    Procedure: SIGMOIDOSCOPY FLEXIBLE;  Surgeon: Margaret Gamble MD;  [facility]     SIGMOIDOSCOPY FLEXIBLE N/A 9/15/2014    Procedure: SIGMOIDOSCOPY FLEXIBLE;  Surgeon: Margaret Gamble MD;  [facility]     SIGMOIDOSCOPY FLEXIBLE N/A 9/5/2014    Procedure: SIGMOIDOSCOPY FLEXIBLE;  Surgeon: Margaret Gamble MD;  [facility]     SIGMOIDOSCOPY FLEXIBLE N/A 9/23/2014    Procedure: SIGMOIDOSCOPY FLEXIBLE;  Surgeon: Margaret Gamble MD;  [facility]     SIGMOIDOSCOPY FLEXIBLE N/A 9/26/2014    Procedure: SIGMOIDOSCOPY FLEXIBLE;  Surgeon: Margaret Gamble MD;  [facility]     SIGMOIDOSCOPY FLEXIBLE N/A 9/30/2014    Procedure: SIGMOIDOSCOPY FLEXIBLE;  Surgeon: Margaret Gamble MD;  [facility]     SIGMOIDOSCOPY FLEXIBLE N/A 10/3/2014    Procedure: SIGMOIDOSCOPY FLEXIBLE;  Surgeon: Margaret Gamble MD;  [facility]     SIGMOIDOSCOPY FLEXIBLE N/A 10/30/2014    Procedure: SIGMOIDOSCOPY FLEXIBLE;  Surgeon: Margaret Gamble MD;  [facility]     SIGMOIDOSCOPY FLEXIBLE, PLACEMENT OF DRAINAGE SPONGE  9/30/14     TAKEDOWN  ILEOSTOMY N/A 2015    Procedure: TAKEDOWN ILEOSTOMY;  Surgeon: Margaret Gamble MD;  Location:  OR       Family History:    Family History   Problem Relation Age of Onset     Diabetes Mother      Hypertension Mother      Cancer Father      Cancer Maternal Grandmother      Alzheimer Disease Maternal Grandmother      Cardiovascular Paternal Grandmother      Breast Cancer Sister      Colon Cancer No family hx of      Colon Polyps No family hx of      Crohn's Disease No family hx of      Ulcerative Colitis No family hx of      Anesthesia Reaction No family hx of        Social History:  Marital Status:   [2]  Social History     Tobacco Use     Smoking status: Former Smoker     Years: 30.00     Types: Cigars     Last attempt to quit: 2002     Years since quittin.0     Smokeless tobacco: Never Used   Substance Use Topics     Alcohol use: No     Drug use: No        Medications:    ACCU-CHEK REGIS PLUS test strip  amLODIPine (NORVASC) 10 MG tablet  aspirin 325 MG tablet  atorvastatin (LIPITOR) 20 MG tablet  blood glucose (ACCU-CHEK REGIS PLUS) test strip  blood glucose monitoring (NO BRAND SPECIFIED) meter device kit  Blood Glucose Monitoring Suppl (ACCU-CHEK REGIS PLUS) W/DEVICE KIT  Cholecalciferol (VITAMIN D-3) 1000 units CAPS  diphenoxylate-atropine (LOMOTIL) 2.5-0.025 MG per tablet  finasteride (PROSCAR) 5 MG tablet  HYDROcodone-acetaminophen (NORCO) 5-325 MG tablet  insulin aspart (NOVOLOG FLEXPEN) 100 UNIT/ML pen  insulin glargine (BASAGLAR KWIKPEN) 100 UNIT/ML pen  losartan (COZAAR) 100 MG tablet  senna-docusate (SENOKOT-S/PERICOLACE) 8.6-50 MG tablet  tamsulosin (FLOMAX) 0.4 MG capsule  ULTICARE MINI 31G X 6 MM insulin pen needle          Review of Systems  Pertinent positives and negatives listed in the HPI, all other systems reviewed and are negative.    Physical Exam   BP: (!) 143/74  Pulse: 94  Temp: 98.8  F (37.1  C)  Resp: 14  Weight: 88.5 kg (195 lb)  SpO2: 96 %      Physical  Exam  Vitals signs and nursing note reviewed.   Constitutional:       General: He is in acute distress.      Appearance: He is well-developed. He is not diaphoretic.   HENT:      Head: Normocephalic and atraumatic.      Right Ear: External ear normal.      Left Ear: External ear normal.      Nose: Nose normal.   Eyes:      General: No scleral icterus.     Conjunctiva/sclera: Conjunctivae normal.   Neck:      Musculoskeletal: Normal range of motion.   Cardiovascular:      Rate and Rhythm: Normal rate and regular rhythm.   Pulmonary:      Effort: Pulmonary effort is normal. No respiratory distress.      Breath sounds: No stridor.   Abdominal:      General: There is no distension.      Palpations: Abdomen is soft.      Tenderness: There is no tenderness. There is no guarding or rebound.   Genitourinary:     Rectum: External hemorrhoid present. No mass, tenderness or anal fissure.   Skin:     General: Skin is warm and dry.   Neurological:      Mental Status: He is alert and oriented to person, place, and time.   Psychiatric:         Behavior: Behavior normal.         ED Course        Procedures               Critical Care time:  none               Results for orders placed or performed during the hospital encounter of 10/07/19 (from the past 24 hour(s))   CBC with platelets differential   Result Value Ref Range    WBC 14.7 (H) 4.0 - 11.0 10e9/L    RBC Count 4.18 (L) 4.4 - 5.9 10e12/L    Hemoglobin 12.2 (L) 13.3 - 17.7 g/dL    Hematocrit 38.8 (L) 40.0 - 53.0 %    MCV 93 78 - 100 fl    MCH 29.2 26.5 - 33.0 pg    MCHC 31.4 (L) 31.5 - 36.5 g/dL    RDW 13.3 10.0 - 15.0 %    Platelet Count 220 150 - 450 10e9/L    Diff Method Automated Method     % Neutrophils 86.5 %    % Lymphocytes 5.4 %    % Monocytes 6.9 %    % Eosinophils 0.3 %    % Basophils 0.4 %    % Immature Granulocytes 0.5 %    Nucleated RBCs 0 0 /100    Absolute Neutrophil 12.7 (H) 1.6 - 8.3 10e9/L    Absolute Lymphocytes 0.8 0.8 - 5.3 10e9/L    Absolute Monocytes  1.0 0.0 - 1.3 10e9/L    Absolute Eosinophils 0.0 0.0 - 0.7 10e9/L    Absolute Basophils 0.1 0.0 - 0.2 10e9/L    Abs Immature Granulocytes 0.1 0 - 0.4 10e9/L    Absolute Nucleated RBC 0.0    Comprehensive metabolic panel   Result Value Ref Range    Sodium 139 133 - 144 mmol/L    Potassium 4.2 3.4 - 5.3 mmol/L    Chloride 108 94 - 109 mmol/L    Carbon Dioxide 23 20 - 32 mmol/L    Anion Gap 8 3 - 14 mmol/L    Glucose 152 (H) 70 - 99 mg/dL    Urea Nitrogen 20 7 - 30 mg/dL    Creatinine 1.42 (H) 0.66 - 1.25 mg/dL    GFR Estimate 47 (L) >60 mL/min/[1.73_m2]    GFR Estimate If Black 54 (L) >60 mL/min/[1.73_m2]    Calcium 8.7 8.5 - 10.1 mg/dL    Bilirubin Total 0.7 0.2 - 1.3 mg/dL    Albumin 3.6 3.4 - 5.0 g/dL    Protein Total 7.1 6.8 - 8.8 g/dL    Alkaline Phosphatase 115 40 - 150 U/L    ALT 24 0 - 70 U/L    AST 17 0 - 45 U/L       Medications   loperamide (IMODIUM) capsule 4 mg (4 mg Oral Given 10/7/19 2227)       Assessments & Plan (with Medical Decision Making)   79-year-old male presents with diarrhea.  Temperature is 98.8  F, heart rate 76, SPO2 is 97% on room air.  He has had frequent loose stools here but he is drinking okay and maintaining hydration.  No indication for IV fluids at this time.  White blood cell count is mildly elevated at 14.7.  Globin is 12.2.  Electrolytes are within normal limits.  Stool cultures and C. difficile testing are pending and the patient is safe to discharge with instructions to return if he has worsening symptoms or other concerns, otherwise follow-up in clinic.  Patient is in agreement with this plan.    I have reviewed the nursing notes.    I have reviewed the findings, diagnosis, plan and need for follow up with the patient.       Discharge Medication List as of 10/7/2019 10:30 PM          Final diagnoses:   Diarrhea, unspecified type       10/7/2019   [facility] EMERGENCY DEPARTMENT     Tevin Myles MD  10/08/19 3031

## 2019-10-08 NOTE — ED NOTES
Hx of rectal cancer and jpouch.   Pt reports diarrhea with incontinence of stool since 9/27/19 sometimes up to 6-7 times a day.   Pt denies abdominal pain, n/v.  Pt has increased weakness due to diarrhea.   Denies bloody stools.

## 2019-10-08 NOTE — ED NOTES
Pt had 4 incontinent loose stools while in ER.    Pt helped with incontinence of stool and new attends and cream applied to periarea each time.   Discharge instructions reviewed with pt and pt gave verbal and written understanding.

## 2019-10-08 NOTE — TELEPHONE ENCOUNTER
[facility] Emergency Department Lab result notification [Adult-Male]    [facility] ED lab result protocol used  C diff protocol    Reason for call  Notify of lab results, assess symptoms,  review ED providers recommendations/discharge instructions (if necessary) and advise per ED lab result f/u protocol    Lab Result (including Rx patient on, if applicable)  Final Clostridium difficile toxin B PCR is POSITIVE.  Toxin producing Clostridium difficile target DNA sequences detected, presumed positive for Clostridium difficile toxin B.   Rx (Flagyl or Vancomycin) prescribed upon discharge from the [facility] ED/UC:  None.  If No Rx, advise and/or treat per [facility] ED Lab Result protocol.    Information table from ED Provider visit on 10/7/19  Symptoms reported at ED visit (Chief complaint, HPI) Storm Dutta is a 79 year old male who presents for diarrhea.  The patient has had ongoing issues with loose stools for quite some time ever since he had surgery for removal of rectal cancer several years ago.  However over the last past 2 weeks he has had a lot of difficulty with loose stools, several episodes per day.  This is been much worse over the past several days and especially today.  He has been incontinent of stool and has tenesmus.  He denies any fever or abdominal pain.  Stool is nonbloody.  No nausea or vomiting.  No recent travel or antibiotics.  He denies chest pain or difficulty breathing   Significant Medical hx, if applicable (i.e. CKD, diabetes) DM, rectal ca   Allergies Allergies   Allergen Reactions     Nkda [No Known Drug Allergies]       Weight, if applicable Wt Readings from Last 2 Encounters:   10/07/19 88.5 kg (195 lb)   09/05/19 93.9 kg (207 lb)      Coumadin/Warfarin [Yes /No] No   Creatinine Level (mg/dl) Creatinine   Date Value Ref Range Status   10/07/2019 1.42 (H) 0.66 - 1.25 mg/dL Final      Creatinine clearance (ml/min), if applicable Serum creatinine: 1.42 mg/dL (H) 10/07/19  1955  Estimated creatinine clearance: 45.2 mL/min (A)   ED providers Impression and Plan (applicable information) 79-year-old male presents with diarrhea.  Temperature is 98.8  F, heart rate 76, SPO2 is 97% on room air.  He has had frequent loose stools here but he is drinking okay and maintaining hydration.  No indication for IV fluids at this time.  White blood cell count is mildly elevated at 14.7.  Globin is 12.2.  Electrolytes are within normal limits.  Stool cultures and C. difficile testing are pending and the patient is safe to discharge with instructions to return if he has worsening symptoms or other concerns, otherwise follow-up in clinic.  Patient is in agreement with this plan.      ED diagnosis Diarrhea   ED provider    Tevin Myles MD      RN Assessment (Patient s current Symptoms), include time called.  [Insert Left message here if message left]  No vomiting, no bloody stool, no fever.   Reviewed general information including infection control related to C diff.     RN Recommendations/Instructions per [place] ED lab result protocol  Patient notified of lab result and treatment recommendations.  Rx for Vanoc sent to [Pharmacy - [facility]].  RN reviewed information about avoiding anti diarrheals.     Please Contact your PCP clinic or return to the Emergency department if your:    Symptoms return.    Symptoms do not improve after 3 days on antibiotic.    Symptoms do not resolve after completing antibiotic.    Symptoms worsen or other concerning symptom's.    PCP follow-up Questions asked: YES       Kristina Wolf RN    [facility]   Lung Nodule and ED Lab Results F/U RN  Epic pool (ED late result f/u RN) : P [id number]   # [phone]    Copy of Lab result  Order   Clostridium difficile toxin B PCR [[id number]] (Order [id number])   Order Requisition     Clostridium difficile toxin B PCR (Order #[id number]) on 10/7/19   Exam Information     Exam Date Exam Time Accession # Results     10/7/19  9:22 PM [id number]    Specimen Information: Feces        Component Value Flag Ref Range Units Status Collected Lab   Specimen Description Feces     Final 10/07/2019  9:22 PM 59   C Diff Toxin B PCR Positive  Abnormal   NEG^Negative  Final 10/07/2019  9:22 PM 51   Comment:   Positive: Toxin producing C. difficile target DNA sequences detected, presumed    positive for C. difficile toxin B. C. difficile (Requires Enteric Isolation).       Clostridium difficile (Requires Enteric Isolation)   FDA approved assay performed using [employer] GeneXpert real-time PCR.

## 2019-10-23 ENCOUNTER — OFFICE VISIT (OUTPATIENT)
Dept: AUDIOLOGY | Facility: CLINIC | Age: 79
End: 2019-10-23
Payer: COMMERCIAL

## 2019-10-23 DIAGNOSIS — H90.3 SENSORY HEARING LOSS, BILATERAL: Primary | ICD-10-CM

## 2019-10-23 PROCEDURE — 99207 ZZC NO CHARGE LOS: CPT | Performed by: AUDIOLOGIST

## 2019-10-23 PROCEDURE — V5299 HEARING SERVICE: HCPCS | Performed by: AUDIOLOGIST

## 2019-10-23 NOTE — PROGRESS NOTES
AUDIOLOGY REPORT    SUBJECTIVE:Storm Dutta is a 79 year old male who was seen in the Audiology Clinic at the [facility] on 10/23/2019  for a follow-up check regarding the fitting of new hearing aids. Previous results have revealed normal sloping to severe sensorineural hearing loss bilaterally.  The patient has been seen previously in this clinic and was fit with Phonak Audeo M90-R hearing aids on 10/1/2019.  Storm reports excellent sound quality. He reports he is using the hearing aids full time. Patient's wife reports he is hearing her much better.    OBJECTIVE:   The International Outcome Inventory-Hearing Aids (IOI-HA) was administered today.The patient s responses to the 7 questions can be compared to normative data relative to how others are performing with their hearing aids, as well as focusing audiologic care and counseling.This patient s Quality of Life score (Question 7) was 5, which is above normative average.     Based on patient report, the following changes were made;none.    Reviewed 45 day trial period, care, cleaning (no water, dry brush), batteries (rechargable) insertion/removal, toxicity, low-battery signal), aid insertion/removal, volume adjustment (if applicable), user booklet, warranty information, storage cases, and other hearing aid details.     No charge visit today (in warranty hearing aid check).     ASSESSMENT: A follow-up appointment for hearing aid fitting was completed today. IOI-HA administered today. Changes to hearing aid was completed as outlined above.     PLAN:Storm will return for follow-up as needed, or at least every 9-12 months for cleaning and assessment of hearing aid.  . Please call this clinic with any questions regarding today s appointment.    Sara WILSON-[facility], #[phone]

## 2019-10-31 ENCOUNTER — OFFICE VISIT (OUTPATIENT)
Dept: DERMATOLOGY | Facility: CLINIC | Age: 79
End: 2019-10-31
Payer: COMMERCIAL

## 2019-10-31 VITALS — HEART RATE: 99 BPM | SYSTOLIC BLOOD PRESSURE: 138 MMHG | DIASTOLIC BLOOD PRESSURE: 79 MMHG | OXYGEN SATURATION: 98 %

## 2019-10-31 DIAGNOSIS — L81.4 LENTIGO: ICD-10-CM

## 2019-10-31 DIAGNOSIS — D22.9 NEVUS: Primary | ICD-10-CM

## 2019-10-31 DIAGNOSIS — Z86.007 HISTORY OF SQUAMOUS CELL CARCINOMA IN SITU (SCCIS) OF SKIN: ICD-10-CM

## 2019-10-31 DIAGNOSIS — L82.1 SEBORRHEIC KERATOSIS: ICD-10-CM

## 2019-10-31 DIAGNOSIS — D18.01 ANGIOMA OF SKIN: ICD-10-CM

## 2019-10-31 DIAGNOSIS — L57.0 ACTINIC KERATOSIS: ICD-10-CM

## 2019-10-31 DIAGNOSIS — Z86.006 HISTORY OF MELANOMA IN SITU: ICD-10-CM

## 2019-10-31 PROCEDURE — 99214 OFFICE O/P EST MOD 30 MIN: CPT | Performed by: PHYSICIAN ASSISTANT

## 2019-10-31 NOTE — PROGRESS NOTES
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  See patient intake sheet.  ROS of the following were done and are negative: Constitutional, Eyes, Ears, Nose,   Mouth, Throat, Cardiovascular, Respiratory, GI, Genitourinary, Musculoskeletal,   Psychiatric, Endocrine, Allergic/Immunologic.    HPI, past medical history, social history, allergies, medications reviewed as of October 31st, 2019        PHYSICAL EXAM:   /79   Pulse 99   SpO2 98%   Skin exam performed as follows: Type 2 skin. Mood appropriate  Alert and Oriented X 3. Well developed, well nourished in no distress.  General appearance: Normal  Head including face: Normal  Eyes: conjunctiva and lids: Normal  Mouth: Lips, teeth, gums: Normal  Neck: Normal  Chest-breast/axillae: Normal  Back: Normal  Spleen and liver: Normal  Cardiovascular: Exam of peripheral vascular system by observation for swelling, varicosities, edema: Normal  Genitalia: groin, buttocks: Normal  Extremities: digits/nails (clubbing): Normal  Eccrine and Apocrine glands: Normal  Right upper extremity: Normal  Left upper extremity: Normal  Right lower extremity: Normal  Left lower extremity: Normal  Skin: Scalp and body hair: See below    Pt deferred exam of breasts, groin, buttocks: No    Other physical findings:  1. Multiple pigmented macules on extremities and trunk  2. Multiple pigmented macules on face, trunk and extremities  3. Multiple vascular  papules on trunk, arms and legs  4. Multiple scattered keratotic plaques  5. Numerous gritty papules on the forehead, preauricular cheeks and lateral neck       Except as noted above, no other signs of skin cancer or melanoma.     ASSESSMENT/PLAN:   Benign Full skin cancer screening today. . Patient with history of SCC and melanoma  Advised on monthly self exams and 1 year  Patient Education: Appropriate brochures given.    Multiple benign appearing nevi on arms, legs and trunk. Discussed ABCDEs of melanoma and sunscreen.   Multiple lentigos on arms, legs and trunk. Advised benign, no treatment needed.  Multiple scattered angiomas. Advised benign, no treatment needed.   Seborrheic keratosis on arms, legs and trunk. Advised benign, no treatment needed.  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 Discussed Efudex vs PDT - he will likely proceed with PDT. Information given.   Patient to follow up with Primary Care provider regarding elevated blood pressure.              Follow-up: 6 month FSE/PDT/PRN sooner    1.) Patient was asked about new and changing moles. YES  2.) Patient received a complete physical skin examination: YES  3.) Patient was counseled to perform a monthly self skin examination: YES  Scribed By: Ashlee Harris MS, PAJODI

## 2019-10-31 NOTE — LETTER
10/31/2019         RE: Storm Dutta  [address] [place] [address]        Dear Colleague,    Thank you for referring your patient, Storm Dutta, to the [facility]. Please see a copy of my visit note below.    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  See patient intake sheet.  ROS of the following were done and are negative: Constitutional, Eyes, Ears, Nose,   Mouth, Throat, Cardiovascular, Respiratory, GI, Genitourinary, Musculoskeletal,   Psychiatric, Endocrine, Allergic/Immunologic.    HPI, past medical history, social history, allergies, medications reviewed as of October 31st, 2019        PHYSICAL EXAM:   /79   Pulse 99   SpO2 98%   Skin exam performed as follows: Type 2 skin. Mood appropriate  Alert and Oriented X 3. Well developed, well nourished in no distress.  General appearance: Normal  Head including face: Normal  Eyes: conjunctiva and lids: Normal  Mouth: Lips, teeth, gums: Normal  Neck: Normal  Chest-breast/axillae: Normal  Back: Normal  Spleen and liver: Normal  Cardiovascular: Exam of peripheral vascular system by observation for swelling, varicosities, edema: Normal  Genitalia: groin, buttocks: Normal  Extremities: digits/nails (clubbing): Normal  Eccrine and Apocrine glands: Normal  Right upper extremity: Normal  Left upper extremity: Normal  Right lower extremity: Normal  Left lower extremity: Normal  Skin:  Scalp and body hair: See below    Pt deferred exam of breasts, groin, buttocks: No    Other physical findings:  1. Multiple pigmented macules on extremities and trunk  2. Multiple pigmented macules on face, trunk and extremities  3. Multiple vascular papules on trunk, arms and legs  4. Multiple scattered keratotic plaques  5. Numerous gritty papules on the forehead, preauricular cheeks and lateral neck       Except as noted above, no other signs of skin cancer or melanoma.     ASSESSMENT/PLAN:   Benign Full skin cancer screening today. . Patient with history of SCC and melanoma  Advised on monthly self exams and 1 year  Patient Education: Appropriate brochures given.    Multiple benign appearing nevi on arms, legs and trunk. Discussed ABCDEs of melanoma and sunscreen.   Multiple lentigos on arms, legs and trunk. Advised benign, no treatment needed.  Multiple scattered angiomas. Advised benign, no treatment needed.   Seborrheic keratosis on arms, legs and trunk. Advised benign, no treatment needed.  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 Discussed Efudex vs PDT - he will likely proceed with PDT. Information given.   Patient to follow up with Primary Care provider regarding elevated blood pressure.              Follow-up: 6 month FSE/PDT/PRN sooner    1.) Patient was asked about new and changing moles. YES  2.) Patient received a complete physical skin examination: YES  3.) Patient was counseled to perform a monthly self skin examination: YES  Scribed By: Ashlee Harris MS, PAJODI      Again, thank you for allowing me to participate in the care of your patient.        Sincerely,        Ashlee Harris PA-C

## 2019-10-31 NOTE — NURSING NOTE
"Initial /79   Pulse 99   SpO2 98%  Estimated body mass index is 30.09 kg/m  as calculated from the following:    Height as of 9/5/19: 1.715 m (5' 7.5\").    Weight as of 10/7/19: 88.5 kg (195 lb). .    Jessica Bai LPN    "

## 2019-11-08 ENCOUNTER — HEALTH MAINTENANCE LETTER (OUTPATIENT)
Age: 79
End: 2019-11-08

## 2019-11-15 ENCOUNTER — OFFICE VISIT (OUTPATIENT)
Dept: FAMILY MEDICINE | Facility: CLINIC | Age: 79
End: 2019-11-15
Payer: COMMERCIAL

## 2019-11-15 VITALS
HEART RATE: 80 BPM | BODY MASS INDEX: 30.92 KG/M2 | HEIGHT: 68 IN | RESPIRATION RATE: 12 BRPM | TEMPERATURE: 97.4 F | SYSTOLIC BLOOD PRESSURE: 130 MMHG | WEIGHT: 204 LBS | DIASTOLIC BLOOD PRESSURE: 60 MMHG

## 2019-11-15 DIAGNOSIS — E11.42 TYPE 2 DIABETES MELLITUS WITH DIABETIC POLYNEUROPATHY, WITH LONG-TERM CURRENT USE OF INSULIN (H): Primary | ICD-10-CM

## 2019-11-15 DIAGNOSIS — N18.30 CKD (CHRONIC KIDNEY DISEASE) STAGE 3, GFR 30-59 ML/MIN (H): ICD-10-CM

## 2019-11-15 DIAGNOSIS — E78.5 HYPERLIPIDEMIA LDL GOAL <70: ICD-10-CM

## 2019-11-15 DIAGNOSIS — N18.30 TYPE 2 DIABETES MELLITUS WITH STAGE 3 CHRONIC KIDNEY DISEASE, WITH LONG-TERM CURRENT USE OF INSULIN (H): ICD-10-CM

## 2019-11-15 DIAGNOSIS — E11.22 TYPE 2 DIABETES MELLITUS WITH STAGE 3 CHRONIC KIDNEY DISEASE, WITH LONG-TERM CURRENT USE OF INSULIN (H): ICD-10-CM

## 2019-11-15 DIAGNOSIS — R19.7 DIARRHEA OF PRESUMED INFECTIOUS ORIGIN: ICD-10-CM

## 2019-11-15 DIAGNOSIS — N40.1 HYPERTROPHY OF PROSTATE WITH URINARY OBSTRUCTION: ICD-10-CM

## 2019-11-15 DIAGNOSIS — I10 HYPERTENSION GOAL BP (BLOOD PRESSURE) < 140/90: ICD-10-CM

## 2019-11-15 DIAGNOSIS — N13.8 HYPERTROPHY OF PROSTATE WITH URINARY OBSTRUCTION: ICD-10-CM

## 2019-11-15 DIAGNOSIS — Z79.4 TYPE 2 DIABETES MELLITUS WITH DIABETIC POLYNEUROPATHY, WITH LONG-TERM CURRENT USE OF INSULIN (H): Primary | ICD-10-CM

## 2019-11-15 DIAGNOSIS — Z79.4 TYPE 2 DIABETES MELLITUS WITH STAGE 3 CHRONIC KIDNEY DISEASE, WITH LONG-TERM CURRENT USE OF INSULIN (H): ICD-10-CM

## 2019-11-15 LAB
ANION GAP SERPL CALCULATED.3IONS-SCNC: 4 MMOL/L (ref 3–14)
BUN SERPL-MCNC: 21 MG/DL (ref 7–30)
CALCIUM SERPL-MCNC: 9 MG/DL (ref 8.5–10.1)
CHLORIDE SERPL-SCNC: 105 MMOL/L (ref 94–109)
CO2 SERPL-SCNC: 27 MMOL/L (ref 20–32)
CREAT SERPL-MCNC: 1.3 MG/DL (ref 0.66–1.25)
CREAT UR-MCNC: 47 MG/DL
GFR SERPL CREATININE-BSD FRML MDRD: 52 ML/MIN/{1.73_M2}
GLUCOSE SERPL-MCNC: 250 MG/DL (ref 70–99)
HBA1C MFR BLD: 7.2 % (ref 0–5.6)
LDLC SERPL DIRECT ASSAY-MCNC: 61 MG/DL
MICROALBUMIN UR-MCNC: 32 MG/L
MICROALBUMIN/CREAT UR: 67.59 MG/G CR (ref 0–17)
POTASSIUM SERPL-SCNC: 4.2 MMOL/L (ref 3.4–5.3)
SODIUM SERPL-SCNC: 136 MMOL/L (ref 133–144)

## 2019-11-15 PROCEDURE — 82043 UR ALBUMIN QUANTITATIVE: CPT | Performed by: FAMILY MEDICINE

## 2019-11-15 PROCEDURE — 36415 COLL VENOUS BLD VENIPUNCTURE: CPT | Performed by: FAMILY MEDICINE

## 2019-11-15 PROCEDURE — 83721 ASSAY OF BLOOD LIPOPROTEIN: CPT | Performed by: FAMILY MEDICINE

## 2019-11-15 PROCEDURE — 99214 OFFICE O/P EST MOD 30 MIN: CPT | Performed by: FAMILY MEDICINE

## 2019-11-15 PROCEDURE — 80048 BASIC METABOLIC PNL TOTAL CA: CPT | Performed by: FAMILY MEDICINE

## 2019-11-15 PROCEDURE — 83036 HEMOGLOBIN GLYCOSYLATED A1C: CPT | Performed by: FAMILY MEDICINE

## 2019-11-15 PROCEDURE — 99207 C FOOT EXAM  NO CHARGE: CPT | Performed by: FAMILY MEDICINE

## 2019-11-15 RX ORDER — LOSARTAN POTASSIUM 100 MG/1
100 TABLET ORAL DAILY
Qty: 90 TABLET | Refills: 1 | Status: SHIPPED | OUTPATIENT
Start: 2019-11-15 | End: 2020-06-17

## 2019-11-15 RX ORDER — INSULIN GLARGINE 100 [IU]/ML
INJECTION, SOLUTION SUBCUTANEOUS
Qty: 30 ML | Refills: 1 | Status: SHIPPED | OUTPATIENT
Start: 2019-11-15 | End: 2020-12-31

## 2019-11-15 RX ORDER — TAMSULOSIN HYDROCHLORIDE 0.4 MG/1
0.4 CAPSULE ORAL DAILY
Qty: 90 CAPSULE | Refills: 3 | Status: SHIPPED | OUTPATIENT
Start: 2019-11-15 | End: 2020-12-11

## 2019-11-15 RX ORDER — AMLODIPINE BESYLATE 10 MG/1
10 TABLET ORAL DAILY
Qty: 90 TABLET | Refills: 3 | Status: SHIPPED | OUTPATIENT
Start: 2019-11-15 | End: 2020-11-09

## 2019-11-15 ASSESSMENT — MIFFLIN-ST. JEOR: SCORE: 1606.9

## 2019-11-15 NOTE — PROGRESS NOTES
Subjective     Storm Dutta is a 79 year old male who presents to clinic today for the following health issues:    HPI   Diabetes Follow-up    How often are you checking your blood sugar? One time daily  What time of day are you checking your blood sugars (select all that apply)?  Before meals  Have you had any blood sugars above 200?  No  Have you had any blood sugars below 70?  No    What symptoms do you notice when your blood sugar is low?  None    What concerns do you have today about your diabetes? None     Do you have any of these symptoms? (Select all that apply)  No numbness or tingling in feet.  No redness, sores or blisters on feet.  No complaints of excessive thirst.  No reports of blurry vision.  No significant changes to weight.     Have you had a diabetic eye exam in the last 12 months? Yes- Date of last eye exam: 6/24/19    Diabetes Management Resources    Hyperlipidemia Follow-Up      Are you having any of the following symptoms? (Select all that apply)  No complaints of shortness of breath, chest pain or pressure.  No increased sweating or nausea with activity.  No left-sided neck or arm pain.  No complaints of pain in calves when walking 1-2 blocks.    Are you regularly taking any medication or supplement to lower your cholesterol?   Yes- atorvastatin    Are you having muscle aches or other side effects that you think could be caused by your cholesterol lowering medication?  No    Hypertension Follow-up      Do you check your blood pressure regularly outside of the clinic? No     Are you following a low salt diet? No    Are your blood pressures ever more than 140 on the top number (systolic) OR more   than 90 on the bottom number (diastolic), for example 140/90? Not checking    BP Readings from Last 2 Encounters:   11/15/19 130/60   10/31/19 138/79     Hemoglobin A1C (%)   Date Value   11/15/2019 7.2 (H)   04/25/2019 7.4 (H)     LDL Cholesterol Calculated (mg/dL)   Date Value   07/27/2017 62  "  07/16/2015 61     LDL Cholesterol Direct (mg/dL)   Date Value   06/12/2018 64   08/04/2016 129 (H)         How many servings of fruits and vegetables do you eat daily?  0-1    On average, how many sweetened beverages do you drink each day (soda, juice, sweet tea, etc)?   0    How many days per week do you miss taking your medication? 0      *  Was seen in ED 10/7 after 2 weeks of diarrhea.  Positive C diff treated with vancomycin.  Took the entire course of mediciation and everything cleared completely.  Went back to having 1-2 soft normal BM per day.    About 1 week ago began stooling more frequently again.  Now stooling very frequently and having some leakage as well.   Feels like he will have some leakage in between trips to the bathroom that he notes in his underwear  Having a normal BM about once daily which is generally formed.  Having diarrhea about 2 times per day which can be large volume and sudden.  Can have incontinence with this..    *  Due for colonoscopy 12/21    Reviewed and updated as needed this visit by Provider  Tobacco  Allergies  Meds  Med Hx  Surg Hx  Fam Hx  Soc Hx        Review of Systems   ROS COMP: Constitutional, HEENT, cardiovascular, pulmonary, gi and gu systems are negative, except as otherwise noted.      Objective    /60   Pulse 80   Temp 97.4  F (36.3  C) (Tympanic)   Resp 12   Ht 1.715 m (5' 7.5\")   Wt 92.5 kg (204 lb)   BMI 31.48 kg/m    Body mass index is 31.48 kg/m .  Physical Exam   PE:  VS as above   Gen:  WN/WD/WH male in NAD   Heart:  RRR without murmur, nl S1, S2, no rubs or gallops   Lungs CTA leydi without rales/ronchi/wheezes   Ext:  No pedal edema  Diabetic foot exam: normal DP and PT pulses and sensation decreased to monofilament.  Toe deformities noted      Diagnostic Test Results:  Labs reviewed in Epic        A/P:      ICD-10-CM    1. Type 2 diabetes mellitus with diabetic polyneuropathy, with long-term current use of insulin (H) E11.42 Hemoglobin " A1c    Z79.4 ORTHO [facility] REFERRAL   2. Type 2 diabetes mellitus with stage 3 chronic kidney disease, with long-term current use of insulin (H) E11.22 Albumin Random Urine Quantitative with Creat Ratio    N18.3 insulin aspart (NOVOLOG FLEXPEN) 100 UNIT/ML pen    Z79.4 insulin glargine (BASAGLAR KWIKPEN) 100 UNIT/ML pen     C FOOT EXAM  NO CHARGE     Basic metabolic panel  (Ca, Cl, CO2, Creat, Gluc, K, Na, BUN)   3. Hypertension goal BP (blood pressure) < 140/90 I10 losartan (COZAAR) 100 MG tablet     amLODIPine (NORVASC) 10 MG tablet     Basic metabolic panel  (Ca, Cl, CO2, Creat, Gluc, K, Na, BUN)   4. Hyperlipidemia LDL goal <70 E78.5 tamsulosin (FLOMAX) 0.4 MG capsule     LDL cholesterol direct   5. Hypertrophy of prostate with urinary obstruction N40.1 tamsulosin (FLOMAX) 0.4 MG capsule    N13.8    6. CKD (chronic kidney disease) stage 3, GFR 30-59 ml/min (H) N18.3 Basic metabolic panel  (Ca, Cl, CO2, Creat, Gluc, K, Na, BUN)   7. Diarrhea of presumed infectious origin R19.7 Clostridium difficile Toxin B PCR      Patient Instructions   Please collect a sample of diarrhea stool to test again for C diff.  I am concerned that you have a recurrent infection    I would recommend you visit with podiatry to see if special shoes might be helpful for your feet    Looks like your next colonoscopy should be due 12/2021    Diabetes control looks great, please continue your current plan    We should visit again in 6 months         HTN:  controlled, continued current medications.    Lipids:  controlled, continue current meds  DM:  Controlled, continue current plan    Diarrhea:  c diff pending, anticipate taper regimen of vancomycin

## 2019-11-15 NOTE — NURSING NOTE
"Initial /60   Pulse 80   Temp 97.4  F (36.3  C) (Tympanic)   Resp 12   Ht 1.715 m (5' 7.5\")   Wt 92.5 kg (204 lb)   BMI 31.48 kg/m   Estimated body mass index is 31.48 kg/m  as calculated from the following:    Height as of this encounter: 1.715 m (5' 7.5\").    Weight as of this encounter: 92.5 kg (204 lb). .      "

## 2019-11-15 NOTE — PATIENT INSTRUCTIONS
Please collect a sample of diarrhea stool to test again for C diff.  I am concerned that you have a recurrent infection    I would recommend you visit with podiatry to see if special shoes might be helpful for your feet    Looks like your next colonoscopy should be due 12/2021    Diabetes control looks great, please continue your current plan    We should visit again in 6 months

## 2019-11-19 ENCOUNTER — HOSPITAL ENCOUNTER (EMERGENCY)
Facility: CLINIC | Age: 79
Discharge: HOME | End: 2019-11-20
Attending: EMERGENCY MEDICINE | Admitting: EMERGENCY MEDICINE
Payer: COMMERCIAL

## 2019-11-19 ENCOUNTER — APPOINTMENT (OUTPATIENT)
Dept: GENERAL RADIOLOGY | Facility: CLINIC | Age: 79
End: 2019-11-19
Attending: EMERGENCY MEDICINE
Payer: COMMERCIAL

## 2019-11-19 ENCOUNTER — APPOINTMENT (OUTPATIENT)
Dept: CT IMAGING | Facility: CLINIC | Age: 79
End: 2019-11-19
Attending: EMERGENCY MEDICINE
Payer: COMMERCIAL

## 2019-11-19 VITALS
HEART RATE: 99 BPM | SYSTOLIC BLOOD PRESSURE: 127 MMHG | WEIGHT: 203 LBS | HEIGHT: 68 IN | TEMPERATURE: 99 F | RESPIRATION RATE: 16 BRPM | BODY MASS INDEX: 30.77 KG/M2 | DIASTOLIC BLOOD PRESSURE: 69 MMHG | OXYGEN SATURATION: 96 %

## 2019-11-19 DIAGNOSIS — A04.72 C. DIFFICILE ENTERITIS: ICD-10-CM

## 2019-11-19 DIAGNOSIS — R53.81 MALAISE: ICD-10-CM

## 2019-11-19 DIAGNOSIS — D72.828 OTHER ELEVATED WHITE BLOOD CELL (WBC) COUNT: ICD-10-CM

## 2019-11-19 LAB
ALBUMIN SERPL-MCNC: 3.2 G/DL (ref 3.4–5)
ALBUMIN UR-MCNC: NEGATIVE MG/DL
ALP SERPL-CCNC: 112 U/L (ref 40–150)
ALT SERPL W P-5'-P-CCNC: 17 U/L (ref 0–70)
ANION GAP SERPL CALCULATED.3IONS-SCNC: 6 MMOL/L (ref 3–14)
APPEARANCE UR: CLEAR
AST SERPL W P-5'-P-CCNC: 11 U/L (ref 0–45)
BASOPHILS # BLD AUTO: 0.1 10E9/L (ref 0–0.2)
BASOPHILS NFR BLD AUTO: 0.5 %
BILIRUB SERPL-MCNC: 1 MG/DL (ref 0.2–1.3)
BILIRUB UR QL STRIP: NEGATIVE
BUN SERPL-MCNC: 19 MG/DL (ref 7–30)
CALCIUM SERPL-MCNC: 8.7 MG/DL (ref 8.5–10.1)
CHLORIDE SERPL-SCNC: 105 MMOL/L (ref 94–109)
CO2 SERPL-SCNC: 26 MMOL/L (ref 20–32)
COLOR UR AUTO: YELLOW
CREAT SERPL-MCNC: 1.27 MG/DL (ref 0.66–1.25)
DIFFERENTIAL METHOD BLD: ABNORMAL
EOSINOPHIL # BLD AUTO: 0 10E9/L (ref 0–0.7)
EOSINOPHIL NFR BLD AUTO: 0.2 %
ERYTHROCYTE [DISTWIDTH] IN BLOOD BY AUTOMATED COUNT: 13.2 % (ref 10–15)
GFR SERPL CREATININE-BSD FRML MDRD: 53 ML/MIN/{1.73_M2}
GLUCOSE SERPL-MCNC: 121 MG/DL (ref 70–99)
GLUCOSE UR STRIP-MCNC: NEGATIVE MG/DL
HCT VFR BLD AUTO: 38.7 % (ref 40–53)
HGB BLD-MCNC: 12.3 G/DL (ref 13.3–17.7)
HGB UR QL STRIP: ABNORMAL
IMM GRANULOCYTES # BLD: 0.1 10E9/L (ref 0–0.4)
IMM GRANULOCYTES NFR BLD: 0.5 %
KETONES UR STRIP-MCNC: 20 MG/DL
LEUKOCYTE ESTERASE UR QL STRIP: NEGATIVE
LYMPHOCYTES # BLD AUTO: 1 10E9/L (ref 0.8–5.3)
LYMPHOCYTES NFR BLD AUTO: 5.7 %
MCH RBC QN AUTO: 29.4 PG (ref 26.5–33)
MCHC RBC AUTO-ENTMCNC: 31.8 G/DL (ref 31.5–36.5)
MCV RBC AUTO: 92 FL (ref 78–100)
MONOCYTES # BLD AUTO: 1.7 10E9/L (ref 0–1.3)
MONOCYTES NFR BLD AUTO: 9.2 %
MUCOUS THREADS #/AREA URNS LPF: PRESENT /LPF
NEUTROPHILS # BLD AUTO: 15.1 10E9/L (ref 1.6–8.3)
NEUTROPHILS NFR BLD AUTO: 83.9 %
NITRATE UR QL: NEGATIVE
NRBC # BLD AUTO: 0 10*3/UL
NRBC BLD AUTO-RTO: 0 /100
PH UR STRIP: 5 PH (ref 5–7)
PLATELET # BLD AUTO: 226 10E9/L (ref 150–450)
POTASSIUM SERPL-SCNC: 4.3 MMOL/L (ref 3.4–5.3)
PROT SERPL-MCNC: 6.9 G/DL (ref 6.8–8.8)
RBC # BLD AUTO: 4.19 10E12/L (ref 4.4–5.9)
RBC #/AREA URNS AUTO: 3 /HPF (ref 0–2)
SODIUM SERPL-SCNC: 137 MMOL/L (ref 133–144)
SOURCE: ABNORMAL
SP GR UR STRIP: 1.02 (ref 1–1.03)
UROBILINOGEN UR STRIP-MCNC: 0 MG/DL (ref 0–2)
WBC # BLD AUTO: 18 10E9/L (ref 4–11)
WBC #/AREA URNS AUTO: <1 /HPF (ref 0–5)

## 2019-11-19 PROCEDURE — 87040 BLOOD CULTURE FOR BACTERIA: CPT | Mod: XS | Performed by: EMERGENCY MEDICINE

## 2019-11-19 PROCEDURE — 81001 URINALYSIS AUTO W/SCOPE: CPT | Performed by: EMERGENCY MEDICINE

## 2019-11-19 PROCEDURE — 80053 COMPREHEN METABOLIC PANEL: CPT | Performed by: EMERGENCY MEDICINE

## 2019-11-19 PROCEDURE — 74176 CT ABD & PELVIS W/O CONTRAST: CPT

## 2019-11-19 PROCEDURE — 99284 EMERGENCY DEPT VISIT MOD MDM: CPT | Mod: 25

## 2019-11-19 PROCEDURE — 96361 HYDRATE IV INFUSION ADD-ON: CPT

## 2019-11-19 PROCEDURE — 87086 URINE CULTURE/COLONY COUNT: CPT | Performed by: EMERGENCY MEDICINE

## 2019-11-19 PROCEDURE — 71046 X-RAY EXAM CHEST 2 VIEWS: CPT

## 2019-11-19 PROCEDURE — 99284 EMERGENCY DEPT VISIT MOD MDM: CPT | Mod: Z6 | Performed by: EMERGENCY MEDICINE

## 2019-11-19 PROCEDURE — 85025 COMPLETE CBC W/AUTO DIFF WBC: CPT | Performed by: EMERGENCY MEDICINE

## 2019-11-19 PROCEDURE — 25800030 ZZH RX IP 258 OP 636: Performed by: EMERGENCY MEDICINE

## 2019-11-19 PROCEDURE — 96360 HYDRATION IV INFUSION INIT: CPT

## 2019-11-19 RX ORDER — SODIUM CHLORIDE, SODIUM LACTATE, POTASSIUM CHLORIDE, CALCIUM CHLORIDE 600; 310; 30; 20 MG/100ML; MG/100ML; MG/100ML; MG/100ML
1000 INJECTION, SOLUTION INTRAVENOUS CONTINUOUS
Status: DISCONTINUED | OUTPATIENT
Start: 2019-11-19 | End: 2019-11-20 | Stop reason: HOSPADM

## 2019-11-19 RX ADMIN — SODIUM CHLORIDE, POTASSIUM CHLORIDE, SODIUM LACTATE AND CALCIUM CHLORIDE 1000 ML: 600; 310; 30; 20 INJECTION, SOLUTION INTRAVENOUS at 20:15

## 2019-11-19 ASSESSMENT — MIFFLIN-ST. JEOR: SCORE: 1610.3

## 2019-11-19 NOTE — ED AVS SNAPSHOT
[facility]  [address] [address]  Phone:  [phone]  Fax:  [phone]                                    Storm Dutta   MRN: [id number]    Department:  [facility]   Date of Visit:  11/19/2019           After Visit Summary Signature Page    I have received my discharge instructions, and my questions have been answered. I have discussed any challenges I see with this plan with the nurse or doctor.    ..........................................................................................................................................  Patient/Patient Representative Signature      ..........................................................................................................................................  Patient Representative Print Name and Relationship to Patient    ..................................................               ................................................  Date                                   Time    ..........................................................................................................................................  Reviewed by Signature/Title    ...................................................              ..............................................  Date                                               Time          22EPIC Rev 08/18

## 2019-11-20 ENCOUNTER — HOSPITAL ENCOUNTER (OUTPATIENT)
Facility: CLINIC | Age: 79
Discharge: HOME | End: 2019-11-20
Attending: FAMILY MEDICINE | Admitting: FAMILY MEDICINE
Payer: COMMERCIAL

## 2019-11-20 DIAGNOSIS — R19.7 DIARRHEA OF PRESUMED INFECTIOUS ORIGIN: ICD-10-CM

## 2019-11-20 LAB
C DIFF TOX B STL QL: POSITIVE
SPECIMEN SOURCE: ABNORMAL

## 2019-11-20 PROCEDURE — 87493 C DIFF AMPLIFIED PROBE: CPT | Performed by: FAMILY MEDICINE

## 2019-11-20 PROCEDURE — 25000132 ZZH RX MED GY IP 250 OP 250 PS 637: Performed by: EMERGENCY MEDICINE

## 2019-11-20 RX ORDER — VANCOMYCIN HYDROCHLORIDE 50 MG/ML
125 KIT ORAL ONCE
Status: COMPLETED | OUTPATIENT
Start: 2019-11-20 | End: 2019-11-20

## 2019-11-20 RX ORDER — VANCOMYCIN HYDROCHLORIDE 125 MG/1
125 CAPSULE ORAL 4 TIMES DAILY
Qty: 56 CAPSULE | Refills: 0 | Status: SHIPPED | OUTPATIENT
Start: 2019-11-20 | End: 2020-03-16

## 2019-11-20 RX ADMIN — VANCOMYCIN HYDROCHLORIDE 125 MG: KIT at 01:18

## 2019-11-20 NOTE — ED PROVIDER NOTES
History     Chief Complaint   Patient presents with     Diarrhea     here several times for this - today has been weak and just sleeping     HPI  Storm Dutta is a 79 year old male with recently treated C.diff enteritis with recurrent diarrhea ~ 1 week ago lasting a couple days and then resolving, effectively treated with Imodium. No diarrhea x 5 days. Now in the past 2 days he is having frequent formed BMs without diarrhea, every several hours. His last BM at ~ 5 PM today. He is using Imodium bid chronically due to chronic loose stools as a consequence of of prior care for rectal cancer. Normally takes 2  tabs of Imodium daily since colorectal cancer and surgery in 2014. No abdominal pain, F/C/V. No s/sx of GI bleeding.  Today he feels fatigued and is sleeping more. No other infectious s/sx.    Previous Records Reviewed:  Was seen in ED 10/7 after 2 weeks of diarrhea.  Positive C diff treated with vancomycin.  Took the entire course of mediciation and everything cleared completely.  Went back to having 1-2 soft normal BM per day.  Vanco 125 mg qid x 14 days.      Allergies:  Allergies   Allergen Reactions     Nkda [No Known Drug Allergies]        Problem List:    Patient Active Problem List    Diagnosis Date Noted     CKD (chronic kidney disease) stage 3, GFR 30-59 ml/min (H) 11/15/2019     Priority: Medium     Type 2 diabetes mellitus (H) 05/24/2016     Priority: Medium     [facility].  Mild retinopathy of both eyes.  Monitor only.  Sees retinal specialist.  F/u 3 months.         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 CT 08/09/13 low rectal cancer extending 7 cm from anal verge, >5 lymph nodes in the mesorectal fat. Bilateral iliac nodes. No definite liver  lesions.. Markedly enlarged prostate 8.8 cm, lesion 2.7 x 3.2 x 2.8 cm in left ilium with narrow zone of transition suggesting benign lesion. Sclerotic lesion left seventh rib. S/p concurrent chemoRT with Xeloda 1500 mg po BID M-F started 09/04/13, completed 28/28 fractions of RT 10/10/13. S/p Laparoscopic-assisted low anterior resection with laparoscopic splenic flexure mobilization and colonic J pouch with colorectal end-to-end anastomosis, loop ileostomy, rigid proctoscopy 01/07/2014. Adjuvant XELOX (oxaliplatin 130 mg/m2 on day 1 plus capecitabine 1,000 mg/m2 twice daily on days 1 to 14 every 3 weeks) Cycle 1 started on 02/20/13; Cycle 2 has been on HOLD due to decline in patient's health status...       Senile nuclear sclerosis 04/10/2013     Priority: Medium     Hypertrophy of prostate with urinary obstruction 12/07/2012     Priority: Medium     Problem list name updated by automated process. Provider to review       Hypertension goal BP (blood pressure) < 140/90 08/24/2012     Priority: Medium     Type 2 diabetes mellitus with diabetic polyneuropathy (H)      Priority: Medium     Hyperlipidemia LDL goal <70 09/23/2009     Priority: Medium     [facility] 08/09[facility]     Priority: Low     EMERGENCY CARE PLAN  August 9, 2013: No current Care Coordination follow up planned. Please refer if Care Coordination services are needed.    Presenting Problem Signs and Symptoms Treatment Plan   Questions or concerns   during clinic hours   I will call my clinic directly:  [place] [id number] [phone].   Questions or concerns outside clinic hours   I will call the 24 hour nurse line at   [phone] or [facility].   Need to schedule an appointment   I will call the 24 hour scheduling team at [phone] or my clinic directly at [phone].    Same day treatment     I will call my clinic first, nurse line if after hours, urgent care and express care if  needed.   Clinic care coordination services (regular clinic hours)     I will call a clinic care coordinator directly:     Miguel Ángel Smith RN  Bladimir Wills, Fri - [phone]  Wed, Thurs - [phone]    Missy Hemalatha, SW:    [phone]    Or call my clinic at [phone] and ask to speak with care coordination.   Crisis Services: Behavioral or Mental Health  [facility] 24 Hour Phone Line  [phone]    [facility] 24 Hour Crisis Services  [phone]    [facility] [phone]    [facility]   [phone]       Emergency treatment -- Immediately    [facility]All 911               Past Medical History:    Past Medical History:   Diagnosis Date     Acute urinary retention 11/2012     Acute urinary retention 11/1/2012     Diabetes mellitus type II      Epididymitis 3/20/2014     Former smoker      Hypertension goal BP (blood pressure) < 140/90 8/24/2012     Malignant melanoma (H)      PE (pulmonary embolism) 3/20/2014     Rectal cancer (H)      Skin cancer      Squamous cell carcinoma      Thrombosis of leg        Past Surgical History:    Past Surgical History:   Procedure Laterality Date     AMPUTATE TOE(S) Right 6/4/2019    Procedure: AMPUTATION 2nd Toe;  Surgeon: Adriel Cabello DPM;  Location: [facility]     COLONOSCOPY  7/29/2013    Procedure: COMBINED COLONOSCOPY, SINGLE BIOPSY/POLYPECTOMY BY BIOPSY;;  Surgeon: Bari Burnett MD;  Location: [facility] GI     COLONOSCOPY N/A 6/4/2015    Procedure: COMBINED COLONOSCOPY, SINGLE OR MULTIPLE BIOPSY/POLYPECTOMY BY BIOPSY;  Surgeon: Tara Mtz MD;  Location:  GI     COLONOSCOPY N/A 12/5/2016    Procedure: COLONOSCOPY;  Surgeon: Breanna Kline MD;  Location:  GI     EYE SURGERY  5/2012    Right eye procedure     HC CIRCUMCISION SURGICAL NON-DEV >28 DAYS AGE  2/97    due to phimosis     HC REMOVAL GALLBLADDER  5/01     HC UGI ENDOSCOPY W PLACEMENT GASTROSTOMY TUBE PERCUT  3/13/2014     Procedure: COMBINED ESOPHAGOSCOPY, GASTROSCOPY, DUODENOSCOPY (EGD), PLACE PERCUTANEOUS ENDOSCOPIC GASTROSTOMY TUBE;  Surgeon: Loco Casillas MD;  Location: [facility] GI     LAPAROSCOPIC ASSISTED COLECTOMY LEFT (DESCENDING)  1/7/2014    Procedure: LAPAROSCOPIC ASSISTED COLECTOMY LEFT (DESCENDING);  Laparoscopic hand assisted Low Anterior Resection With colonic J pouch and end to end colorectal anastamosis. Laparoscopic splenic flexure mobilization.  Loop Ileostomy.  Rigid proctoscopy;  Surgeon: Margaret Gamble MD;  [facility]     PHACOEMULSIFICATION WITH STANDARD INTRAOCULAR LENS IMPLANT  4/11/2013    Procedure: PHACOEMULSIFICATION WITH STANDARD INTRAOCULAR LENS IMPLANT;  Right Kelman Phacoemulsification with Intraocular Lens Implant;  Surgeon: Caesar Turner MD;  Location: [facility]     REMOVE GASTROSTOMY TUBE ADULT  7/25/2014    Procedure: REMOVE GASTROSTOMY TUBE ADULT;  Surgeon: Margaret Gamble MD;  [facility]     SIGMOIDOSCOPY FLEXIBLE  6/23/2014    Procedure: SIGMOIDOSCOPY FLEXIBLE;  Surgeon: Margaret Gamble MD;  [facility]     SIGMOIDOSCOPY FLEXIBLE  7/22/2014    Procedure: SIGMOIDOSCOPY FLEXIBLE;  Surgeon: Margaret Gamble MD;  [facility]     SIGMOIDOSCOPY FLEXIBLE  7/25/2014    Procedure: SIGMOIDOSCOPY FLEXIBLE;  Surgeon: Margaret Gamble MD;  [facility]     SIGMOIDOSCOPY FLEXIBLE  7/28/2014    Procedure: SIGMOIDOSCOPY FLEXIBLE;  Surgeon: Margaret Gamble MD;  [facility]     SIGMOIDOSCOPY FLEXIBLE  7/31/2014    Procedure: SIGMOIDOSCOPY FLEXIBLE;  Surgeon: Margaret Gamble MD;  [facility]     SIGMOIDOSCOPY FLEXIBLE  8/3/2014    Procedure: SIGMOIDOSCOPY FLEXIBLE;  Surgeon: Margaret Gamble MD;  [facility]     SIGMOIDOSCOPY FLEXIBLE  8/5/2014    Procedure: SIGMOIDOSCOPY FLEXIBLE;  Surgeon: Margaret Gamble MD;  [facility]     SIGMOIDOSCOPY FLEXIBLE  8/8/2014    Procedure:  SIGMOIDOSCOPY FLEXIBLE;  Surgeon: Margaret Gamble MD;  [facility]     SIGMOIDOSCOPY FLEXIBLE  8/18/2014    Procedure: SIGMOIDOSCOPY FLEXIBLE;  Surgeon: Jose Roberto Cervantes MD;  [facility]     SIGMOIDOSCOPY FLEXIBLE N/A 8/15/2014    Procedure: SIGMOIDOSCOPY FLEXIBLE;  Surgeon: Margaret Gamble MD;  [facility]     SIGMOIDOSCOPY FLEXIBLE N/A 8/22/2014    Procedure: SIGMOIDOSCOPY FLEXIBLE;  Surgeon: Jose Roberto Cervantes MD;  [facility]     SIGMOIDOSCOPY FLEXIBLE N/A 8/11/2014    Procedure: SIGMOIDOSCOPY FLEXIBLE;  Surgeon: Margaret Gamble MD;  [facility]     SIGMOIDOSCOPY FLEXIBLE N/A 8/26/2014    Procedure: SIGMOIDOSCOPY FLEXIBLE;  Surgeon: Margaret Gamble MD;  [facility]     SIGMOIDOSCOPY FLEXIBLE N/A 8/29/2014    Procedure: SIGMOIDOSCOPY FLEXIBLE;  Surgeon: Margaret Gamble MD;  [facility]     SIGMOIDOSCOPY FLEXIBLE N/A 9/8/2014    Procedure: SIGMOIDOSCOPY FLEXIBLE;  Surgeon: Margaret Gamble MD;  [facility]     SIGMOIDOSCOPY FLEXIBLE N/A 9/2/2014    Procedure: SIGMOIDOSCOPY FLEXIBLE;  Surgeon: Breanna Kline MD;  [facility]     SIGMOIDOSCOPY FLEXIBLE N/A 9/11/2014    Procedure: SIGMOIDOSCOPY FLEXIBLE;  Surgeon: Margaret Gamble MD;  [facility]     SIGMOIDOSCOPY FLEXIBLE N/A 9/19/2014    Procedure: SIGMOIDOSCOPY FLEXIBLE;  Surgeon: Margaret Gamble MD;  [facility]     SIGMOIDOSCOPY FLEXIBLE N/A 9/15/2014    Procedure: SIGMOIDOSCOPY FLEXIBLE;  Surgeon: Margaret Gamble MD;  [facility]     SIGMOIDOSCOPY FLEXIBLE N/A 9/5/2014    Procedure: SIGMOIDOSCOPY FLEXIBLE;  Surgeon: Margaret Gamble MD;  [facility]     SIGMOIDOSCOPY FLEXIBLE N/A 9/23/2014    Procedure: SIGMOIDOSCOPY FLEXIBLE;  Surgeon: Margaret Gamble MD;  [facility]     SIGMOIDOSCOPY FLEXIBLE N/A 9/26/2014    Procedure: SIGMOIDOSCOPY FLEXIBLE;  Surgeon: Margaret Gamble MD;   [facility]     SIGMOIDOSCOPY FLEXIBLE N/A 2014    Procedure: SIGMOIDOSCOPY FLEXIBLE;  Surgeon: Margaret Gamble MD;  [facility]     SIGMOIDOSCOPY FLEXIBLE N/A 10/3/2014    Procedure: SIGMOIDOSCOPY FLEXIBLE;  Surgeon: Margaret Gamble MD;  [facility]     SIGMOIDOSCOPY FLEXIBLE N/A 10/30/2014    Procedure: SIGMOIDOSCOPY FLEXIBLE;  Surgeon: Margaret Gamble MD;  Location:  GI     SIGMOIDOSCOPY FLEXIBLE, PLACEMENT OF DRAINAGE SPONGE  14     TAKEDOWN ILEOSTOMY N/A 2015    Procedure: TAKEDOWN ILEOSTOMY;  Surgeon: Margaret Gamble MD;  [facility]       Family History:    Family History   Problem Relation Age of Onset     Diabetes Mother      Hypertension Mother      Cancer Father      Cancer Maternal Grandmother      Alzheimer Disease Maternal Grandmother      Cardiovascular Paternal Grandmother      Breast Cancer Sister      Colon Cancer No family hx of      Colon Polyps No family hx of      Crohn's Disease No family hx of      Ulcerative Colitis No family hx of      Anesthesia Reaction No family hx of      Melanoma No family hx of        Social History:  Marital Status:   [2]  Social History     Tobacco Use     Smoking status: Former Smoker     Packs/day: 0.00     Years: 30.00     Pack years: 0.00     Types: Cigars     Last attempt to quit: 2002     Years since quittin.1     Smokeless tobacco: Never Used   Substance Use Topics     Alcohol use: No     Drug use: No        Medications:    amLODIPine (NORVASC) 10 MG tablet  aspirin 325 MG tablet  atorvastatin (LIPITOR) 20 MG tablet  blood glucose (ACCU-CHEK REGIS PLUS) test strip  blood glucose monitoring (NO BRAND SPECIFIED) meter device kit  Cholecalciferol (VITAMIN D-3) 1000 units CAPS  finasteride (PROSCAR) 5 MG tablet  insulin aspart (NOVOLOG FLEXPEN) 100 UNIT/ML pen  insulin glargine (BASAGLAR KWIKPEN) 100 UNIT/ML pen  losartan (COZAAR) 100 MG tablet  tamsulosin (FLOMAX) 0.4 MG  "capsule  vancomycin (VANCOCIN) 125 MG capsule  ULTICARE MINI 31G X 6 MM insulin pen needle      Review of Systems  As mentioned above in the history present illness.  All other systems were reviewed and are negative.    Physical Exam   BP: 127/69  Pulse: 99  Temp: 99  F (37.2  C)  Resp: 16  Height: 172.7 cm (5' 8\")  Weight: 92.1 kg (203 lb)  SpO2: 96 %      Physical Exam  Vitals signs and nursing note reviewed.   Constitutional:       General: He is not in acute distress.     Appearance: Normal appearance. He is well-developed. He is not ill-appearing, toxic-appearing or diaphoretic.   HENT:      Head: Normocephalic and atraumatic.      Right Ear: External ear normal.      Left Ear: External ear normal.      Nose: Nose normal.      Mouth/Throat:      Mouth: Mucous membranes are moist.   Eyes:      General: No scleral icterus.     Extraocular Movements: Extraocular movements intact.      Conjunctiva/sclera: Conjunctivae normal.   Neck:      Musculoskeletal: Normal range of motion and neck supple.      Trachea: No tracheal deviation.   Cardiovascular:      Rate and Rhythm: Normal rate and regular rhythm.      Pulses: Normal pulses.      Heart sounds: Normal heart sounds. No murmur. No friction rub. No gallop.    Pulmonary:      Effort: Pulmonary effort is normal. No respiratory distress.      Breath sounds: Normal breath sounds. No wheezing, rhonchi or rales.   Abdominal:      General: Bowel sounds are normal. There is no distension or abdominal bruit.      Palpations: Abdomen is soft. There is no mass or pulsatile mass.      Tenderness: There is no abdominal tenderness. There is no right CVA tenderness, left CVA tenderness, guarding or rebound.      Hernia: No hernia is present.   Musculoskeletal: Normal range of motion.         General: No tenderness.      Right lower leg: No edema.      Left lower leg: No edema.   Skin:     General: Skin is warm and dry.      Coloration: Skin is not pale.      Findings: No erythema " or rash.   Neurological:      General: No focal deficit present.      Mental Status: He is alert and oriented to person, place, and time.      Coordination: Coordination normal.   Psychiatric:         Mood and Affect: Mood normal.         Behavior: Behavior normal.         ED Course        Procedures          Results for orders placed or performed during the hospital encounter of 11/19/19   CBC with platelets differential     Status: Abnormal   Result Value Ref Range    WBC 18.0 (H) 4.0 - 11.0 10e9/L    RBC Count 4.19 (L) 4.4 - 5.9 10e12/L    Hemoglobin 12.3 (L) 13.3 - 17.7 g/dL    Hematocrit 38.7 (L) 40.0 - 53.0 %    MCV 92 78 - 100 fl    MCH 29.4 26.5 - 33.0 pg    MCHC 31.8 31.5 - 36.5 g/dL    RDW 13.2 10.0 - 15.0 %    Platelet Count 226 150 - 450 10e9/L    Diff Method Automated Method     % Neutrophils 83.9 %    % Lymphocytes 5.7 %    % Monocytes 9.2 %    % Eosinophils 0.2 %    % Basophils 0.5 %    % Immature Granulocytes 0.5 %    Nucleated RBCs 0 0 /100    Absolute Neutrophil 15.1 (H) 1.6 - 8.3 10e9/L    Absolute Lymphocytes 1.0 0.8 - 5.3 10e9/L    Absolute Monocytes 1.7 (H) 0.0 - 1.3 10e9/L    Absolute Eosinophils 0.0 0.0 - 0.7 10e9/L    Absolute Basophils 0.1 0.0 - 0.2 10e9/L    Abs Immature Granulocytes 0.1 0 - 0.4 10e9/L    Absolute Nucleated RBC 0.0    Comprehensive metabolic panel     Status: Abnormal   Result Value Ref Range    Sodium 137 133 - 144 mmol/L    Potassium 4.3 3.4 - 5.3 mmol/L    Chloride 105 94 - 109 mmol/L    Carbon Dioxide 26 20 - 32 mmol/L    Anion Gap 6 3 - 14 mmol/L    Glucose 121 (H) 70 - 99 mg/dL    Urea Nitrogen 19 7 - 30 mg/dL    Creatinine 1.27 (H) 0.66 - 1.25 mg/dL    GFR Estimate 53 (L) >60 mL/min/[1.73_m2]    GFR Estimate If Black 62 >60 mL/min/[1.73_m2]    Calcium 8.7 8.5 - 10.1 mg/dL    Bilirubin Total 1.0 0.2 - 1.3 mg/dL    Albumin 3.2 (L) 3.4 - 5.0 g/dL    Protein Total 6.9 6.8 - 8.8 g/dL    Alkaline Phosphatase 112 40 - 150 U/L    ALT 17 0 - 70 U/L    AST 11 0 - 45 U/L   UA  with Microscopic     Status: Abnormal   Result Value Ref Range    Color Urine Yellow     Appearance Urine Clear     Glucose Urine Negative NEG^Negative mg/dL    Bilirubin Urine Negative NEG^Negative    Ketones Urine 20 (A) NEG^Negative mg/dL    Specific Gravity Urine 1.016 1.003 - 1.035    Blood Urine Small (A) NEG^Negative    pH Urine 5.0 5.0 - 7.0 pH    Protein Albumin Urine Negative NEG^Negative mg/dL    Urobilinogen mg/dL 0.0 0.0 - 2.0 mg/dL    Nitrite Urine Negative NEG^Negative    Leukocyte Esterase Urine Negative NEG^Negative    Source Unspecified Urine     WBC Urine <1 0 - 5 /HPF    RBC Urine 3 (H) 0 - 2 /HPF    Mucous Urine Present (A) NEG^Negative /LPF   Urine Culture     Status: None   Result Value Ref Range    Specimen Description Midstream Urine     Special Requests Specimen received in preservative     Culture Micro No growth    Blood culture     Status: None (Preliminary result)   Result Value Ref Range    Specimen Description Blood Right Arm     Special Requests Aerobic and anaerobic bottles received     Culture Micro No growth after 1 day    Blood culture     Status: None (Preliminary result)   Result Value Ref Range    Specimen Description Blood Left Arm     Special Requests Aerobic and anaerobic bottles received     Culture Micro No growth after 1 day    Results for orders placed or performed in visit on 11/20/19   Clostridium difficile Toxin B PCR     Status: Abnormal   Result Value Ref Range    Specimen Description Feces     C Diff Toxin B PCR Positive (A) NEG^Negative        CHEST TWO VIEWS   11/19/2019 10:25 PM      HISTORY: Malaise. Elevated white blood cell count.     COMPARISON: 5/21/2014.     FINDINGS: The lungs are clear. Normal-sized cardiac silhouette.  Surgical clips in the upper right hemiabdomen likely relate to prior  cholecystectomy.  IMPRESSION: No evidence of active cardiopulmonary disease.         I independently reviewed the X-rays: Agree with the Radiologist's  interpretation.      Medications   lactated ringers BOLUS 1,000 mL (0 mLs Intravenous Stopped 11/20/19 0124)   vancomycin (FIRVANQ) oral solution 125 mg (125 mg Oral Given 11/20/19 0118)     WBC   Date Value Ref Range Status   11/19/2019 18.0 (H) 4.0 - 11.0 10e9/L Final   10/07/2019 14.7 (H) 4.0 - 11.0 10e9/L Final   06/03/2019 7.4 4.0 - 11.0 10e9/L Final   04/16/2019 6.9 4.0 - 11.0 10e9/L Final   10/16/2018 6.8 4.0 - 11.0 10e9/L Final     Feels improved after IV fluid bolus.  We discussed his laboratory evaluation results.  Abdomen re-examined, he has no abdominal pain whatsoever.  Discussed abdominal imaging evaluation and we elected to defer CT imaging initially. Will look for other source of infection, obtain cultures and CXR.    CXR negative. Will CT the abd/pelvis due to significantly increased WBC. No other evidence of significant illness or infection or toxic megacolon.    Reviewed case and consulted with Dr. Walter, GI at [facility].  After lengthy discussion and of the nuances of the case, treatment and disposition plan. We  will restart Vancomycin 125 mg p.o. 4 times daily for 14 days, get a stool specimen for reassessment of C. difficile enteritis/colitis as soon as possible and I will have him recheck here in the ED later today (it is after midnight) in approximately 12 hours. No beds available here and they declined transfer for admission.    Assessments & Plan (with Medical Decision Making)   Seen in ED 10/7/19 after 2 weeks of diarrhea.  Positive C diff treated with vancomycin 125 mg po qid x 14 days with sx resolution. Now with recurrent diarrhea ~ 1 week ago lasting a couple days and then resolvied, effectively treated with Imodium (?). No diarrhea x 5 days. Now in the past 2 days he is having frequent formed BMs without diarrhea, and malaise and fatigue. He uses Imodium.bid chronically due to chronic loose stools as a consequence of of prior care for rectal cancer. Normally takes 2   tabs of Imodium daily since colorectal cancer and surgery in 2014. No abd pain, F/C/V. No sx of GI bleeding. He has elevated WBC 18 and CT with evidence of enterocolitis. GI was consulted, will recheck stool, restart Vancomycin 125 mg po qid x 14 days and recheck here tomorrow, and in [facility] later this week. He and his wife were provided instructions for supportive care and will return as needed for worsened condition or worsening symptoms, or new problems or concerns.      I have reviewed the nursing notes.    I have reviewed the findings, diagnosis, plan and need for follow up with the patient.    Discharge Medication List as of 11/20/2019  1:24 AM      START taking these medications    Details   vancomycin (VANCOCIN) 125 MG capsule Take 1 capsule (125 mg) by mouth 4 times daily for 14 days, Disp-56 capsule, R-0, E-Prescribe             Final diagnoses:   Malaise   Other elevated white blood cell (WBC) count - WBC 18.0   C. difficile enteritis - relapse of clostridium difficile enterocolitis       11/19/2019   [facility] EMERGENCY DEPARTMENT     Zackary Thomas MD  11/21/19 [id number]

## 2019-11-20 NOTE — ED NOTES
Writer into room for discharge at 2326. Approximately 15 minutes after pt was marked up for discharge by provider. 2327 MD into room to inform pt and spouse they are staying for CT abdomen. MD aware IV was removed. CT will be non con.

## 2019-11-20 NOTE — ED NOTES
Pt presents to ED for complaint of diarrhea. Reports he has been going frequently and had one episode of diarrhea today. Pt's family member states that the pateint was seen for diarrhea one moths ago and was placed on abx which clears it up. Over the past week the patient has been feeling increasingly unwell and was seen by primary who was concerned for recurring infection.

## 2019-11-21 LAB
BACTERIA SPEC CULT: NO GROWTH
Lab: NORMAL
SPECIMEN SOURCE: NORMAL

## 2019-11-22 ENCOUNTER — OFFICE VISIT (OUTPATIENT)
Dept: FAMILY MEDICINE | Facility: CLINIC | Age: 79
End: 2019-11-22
Payer: COMMERCIAL

## 2019-11-22 VITALS
RESPIRATION RATE: 12 BRPM | HEART RATE: 72 BPM | WEIGHT: 200 LBS | TEMPERATURE: 97.6 F | BODY MASS INDEX: 30.41 KG/M2 | SYSTOLIC BLOOD PRESSURE: 144 MMHG | DIASTOLIC BLOOD PRESSURE: 54 MMHG

## 2019-11-22 DIAGNOSIS — A04.72 C. DIFFICILE COLITIS: Primary | ICD-10-CM

## 2019-11-22 DIAGNOSIS — D72.829 LEUKOCYTOSIS, UNSPECIFIED TYPE: ICD-10-CM

## 2019-11-22 LAB
BASOPHILS # BLD AUTO: 0 10E9/L (ref 0–0.2)
BASOPHILS NFR BLD AUTO: 0.5 %
DIFFERENTIAL METHOD BLD: ABNORMAL
EOSINOPHIL # BLD AUTO: 0.1 10E9/L (ref 0–0.7)
EOSINOPHIL NFR BLD AUTO: 1.4 %
ERYTHROCYTE [DISTWIDTH] IN BLOOD BY AUTOMATED COUNT: 13.3 % (ref 10–15)
HCT VFR BLD AUTO: 36.7 % (ref 40–53)
HGB BLD-MCNC: 11.9 G/DL (ref 13.3–17.7)
LYMPHOCYTES # BLD AUTO: 1.2 10E9/L (ref 0.8–5.3)
LYMPHOCYTES NFR BLD AUTO: 15.2 %
MCH RBC QN AUTO: 29.5 PG (ref 26.5–33)
MCHC RBC AUTO-ENTMCNC: 32.4 G/DL (ref 31.5–36.5)
MCV RBC AUTO: 91 FL (ref 78–100)
MONOCYTES # BLD AUTO: 0.9 10E9/L (ref 0–1.3)
MONOCYTES NFR BLD AUTO: 11.4 %
NEUTROPHILS # BLD AUTO: 5.6 10E9/L (ref 1.6–8.3)
NEUTROPHILS NFR BLD AUTO: 71.5 %
PLATELET # BLD AUTO: 221 10E9/L (ref 150–450)
RBC # BLD AUTO: 4.04 10E12/L (ref 4.4–5.9)
WBC # BLD AUTO: 7.9 10E9/L (ref 4–11)

## 2019-11-22 PROCEDURE — 99213 OFFICE O/P EST LOW 20 MIN: CPT | Performed by: FAMILY MEDICINE

## 2019-11-22 PROCEDURE — 85025 COMPLETE CBC W/AUTO DIFF WBC: CPT | Performed by: FAMILY MEDICINE

## 2019-11-22 PROCEDURE — 36415 COLL VENOUS BLD VENIPUNCTURE: CPT | Performed by: FAMILY MEDICINE

## 2019-11-22 RX ORDER — VANCOMYCIN HYDROCHLORIDE 125 MG/1
CAPSULE ORAL
Qty: 49 CAPSULE | Refills: 0 | Status: SHIPPED | OUTPATIENT
Start: 2019-11-22 | End: 2020-03-16

## 2019-11-22 NOTE — PROGRESS NOTES
Subjective     Storm Dutta is a 79 year old male who presents to clinic today for the following health issues:    HPI   ED/UC Followup:    Facility:  [facility] ED  Date of visit: 11/19/19  Reason for visit: malaise, C-diff  Current Status: feels better, still tired       Has been feeling much better.  Diarrhea resolved.  No abd pain.  Appetite is decent.  No fever  Reviewed and updated as needed this visit by Provider  Tobacco  Allergies  Meds  Med Hx  Surg Hx  Fam Hx  Soc Hx        Review of Systems   ROS COMP: Constitutional, HEENT, cardiovascular, pulmonary, gi and gu systems are negative, except as otherwise noted.      Objective    BP (!) 144/54   Pulse 72   Temp 97.6  F (36.4  C) (Tympanic)   Resp 12   Wt 90.7 kg (200 lb)   BMI 30.41 kg/m    Body mass index is 30.41 kg/m .  Physical Exam   PE:  VS as above   Gen:  WN/WD/WH male in NAD   Heart:  RRR without murmur, nl S1, S2, no rubs or gallops   Lungs CTA leydi without rales/ronchi/wheezes   Abd: soft, postive bowel sounds, NT/ND, no HSM, no rebounding/guarding/ridigity   Ext:  No pedal edema      Diagnostic Test Results:  Labs reviewed in Epic        A/p:      ICD-10-CM    1. C. difficile colitis A04.72 vancomycin (VANCOCIN) 125 MG capsule   2. Leukocytosis, unspecified type D72.829 CBC with platelets and differential     Patient Instructions   Glad you are feeling better!  The white blood cell count is back to normal too.    I will send over a new prescription for the tapering dose of vancomycin.  Please pick that up next week.  This will begin after your current 14 day course is complete    If you have any return of diarrhea, abdominal pain or fever please let us know right away.    Let's plan another visit when you are done with the antibiotics.

## 2019-11-22 NOTE — PATIENT INSTRUCTIONS
Glad you are feeling better!  The white blood cell count is back to normal too.    I will send over a new prescription for the tapering dose of vancomycin.  Please pick that up next week.  This will begin after your current 14 day course is complete    If you have any return of diarrhea, abdominal pain or fever please let us know right away.    Let's plan another visit when you are done with the antibiotics.

## 2019-11-22 NOTE — NURSING NOTE
"Initial BP (!) 144/54   Pulse 72   Temp 97.6  F (36.4  C) (Tympanic)   Resp 12   Wt 90.7 kg (200 lb)   BMI 30.41 kg/m   Estimated body mass index is 30.41 kg/m  as calculated from the following:    Height as of 11/19/19: 1.727 m (5' 8\").    Weight as of this encounter: 90.7 kg (200 lb). .      "

## 2019-11-26 LAB
BACTERIA SPEC CULT: NO GROWTH
BACTERIA SPEC CULT: NO GROWTH
Lab: NORMAL
Lab: NORMAL
SPECIMEN SOURCE: NORMAL
SPECIMEN SOURCE: NORMAL

## 2019-12-06 ENCOUNTER — OFFICE VISIT (OUTPATIENT)
Dept: PODIATRY | Facility: CLINIC | Age: 79
End: 2019-12-06
Payer: COMMERCIAL

## 2019-12-06 VITALS
BODY MASS INDEX: 30.31 KG/M2 | HEIGHT: 68 IN | DIASTOLIC BLOOD PRESSURE: 76 MMHG | HEART RATE: 89 BPM | SYSTOLIC BLOOD PRESSURE: 135 MMHG | WEIGHT: 200 LBS

## 2019-12-06 DIAGNOSIS — Z79.4 TYPE 2 DIABETES MELLITUS WITH DIABETIC POLYNEUROPATHY, WITH LONG-TERM CURRENT USE OF INSULIN (H): Primary | ICD-10-CM

## 2019-12-06 DIAGNOSIS — E11.42 TYPE 2 DIABETES MELLITUS WITH DIABETIC POLYNEUROPATHY, WITH LONG-TERM CURRENT USE OF INSULIN (H): Primary | ICD-10-CM

## 2019-12-06 DIAGNOSIS — Z89.421 HISTORY OF AMPUTATION OF LESSER TOE OF RIGHT FOOT (H): ICD-10-CM

## 2019-12-06 DIAGNOSIS — M20.42 HAMMERTOE OF SECOND TOE OF LEFT FOOT: ICD-10-CM

## 2019-12-06 DIAGNOSIS — E11.69 ONYCHOMYCOSIS OF MULTIPLE TOENAILS WITH TYPE 2 DIABETES MELLITUS (H): ICD-10-CM

## 2019-12-06 DIAGNOSIS — B35.1 ONYCHOMYCOSIS OF MULTIPLE TOENAILS WITH TYPE 2 DIABETES MELLITUS (H): ICD-10-CM

## 2019-12-06 PROCEDURE — 99213 OFFICE O/P EST LOW 20 MIN: CPT | Mod: 25 | Performed by: PODIATRIST

## 2019-12-06 PROCEDURE — 11721 DEBRIDE NAIL 6 OR MORE: CPT | Performed by: PODIATRIST

## 2019-12-06 ASSESSMENT — MIFFLIN-ST. JEOR: SCORE: 1596.69

## 2019-12-06 NOTE — LETTER
12/6/2019         RE: Storm Dutta  [address] [place] [address]        Dear Colleague,    Thank you for referring your patient, Storm Dutta, to the [facility]. Please see a copy of my visit note below.    Storm returns to the office for reevaluation of the {RIGHT /LEFT:007213} foot.  The patient relates following the instructions given at the last visit with noted {LESS/MORE:102016} pain.  The patient relates overall {LESS/MORE:541261}  improvement in pain and function of the {RIGHT /LEFT:332488} foot.  The patient relates no other problems.    PAST MEDICAL HISTORY:   Past Medical History:   Diagnosis Date     Acute urinary retention 11/2012    ER visit   / 1200 cc post-void residual     Acute urinary retention 11/1/2012    ER      Diabetes mellitus type II      Epididymitis 3/20/2014    Started on doxycyclline for UTI/orchitis. He was discharged on 03/22/14.     Former smoker     dc'd 2006     Hypertension goal BP (blood pressure) < 140/90 8/24/2012     Malignant melanoma (H)      PE (pulmonary embolism) 3/20/2014     Rectal cancer (H)      Skin cancer      Squamous cell carcinoma      Thrombosis of leg     +PE       BMI= Body mass index is 30.41 kg/m .    {Weight Management Plan -- Delete if patient has a normal BMI:536786}    Physical Exam:    General: The patient appears to have a pleasant mental affect.    Lower extremity physical exam:  Neurovascular status is intact with palpable pedal pulses and intact epicritic sensations.  Muscular exam is within normal limits to major muscle groups.  Integument is intact.      One notes decreased edema.  One notes***    Radiograph evaluation including weightbearing AP, lateral and medial oblique views of the {RIGHT /LEFT:278810} foot reveals interval healing with increased trabeculation of the***    Assessment:      ICD-10-CM    1. Type 2 diabetes mellitus with diabetic polyneuropathy, with long-term current use of insulin  (H) E11.42     Z79.4    2. 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  At this time,***    Disclaimer: This note consists of symbols derived from keyboarding, dictation and/or voice recognition software. As a result, there may be errors in the script that have gone undetected. Please consider this when interpreting information found in this chart.       JO Adams.[email or web address]., F.A.C.F.A.S.      Again, thank you for allowing me to participate in the care of your patient.        Sincerely,        Adriel Cabello DPM

## 2019-12-06 NOTE — PATIENT INSTRUCTIONS
"    DIABETES AND YOUR FEET  Diabetes can result in several problems in the feet including ulcers (open sores) and amputations. Two of the most important reasons why people develop foot problems when they have diabetes is : 1. Neuropathy (loss of feeling)  2. Vascular disease (loss or decrease of blood flow).    Neuropathy is a term used to describe a loss of nerve function.  Patients with diabetes are at risk of developing neuropathy if their sugars continue to run high and are above the normal value. One theory for neuropathy is that the \"extra\" sugar in the body enters the nerves and is broken down. These by-products build up in the nerve causing it to swell and impairing nerve function. Often times, this can be prevented by controlling your sugars, dieting and exercise.    When a person develops neuropathy, they usually begin to feel numbness or tingling in their feet and sometime in their legs.  Other symptoms may include painful burning or hot feet, tingling or feeling like insects or ants are crawling on your feet or legs.  If the diabetes is sever and the sugars run high for long periods of time, neuropathy can also occur in the hands.    Vascular disease  is a term used to describe a loss or decrease in circulation (blood flow). There is a problem in getting blood and oxygen to areas that need it. Similar to neuropathy, sugars can build up in the walls of the arteries (blood vessels) and cause them to become swollen, thickened and hardened. This decreases the amount of blood that can go to an area that needs it. Though this is common in the legs of diabetic patients, it can also affect other arteries (blood vessels) in the body such as in the heart and eyes.    In the legs, vascular disease usually results in cramping. Patients who develop leg cramps after walking the same distance every time (i.e. One block, half a mile, ect.) need to let their doctors know so that their circulation may be checked. Cramps " causing severe pain in the feet and/or legs while sleeping and the cramps go away when you stand or hang your legs off the side of the bed, may also be a sign of poor blood circulation.  Occasional cramping in cold weather or on rare occasions with activity may not be due to poor circulation, but you should inform your doctor.    PREVENTION OF THESE DISEASES  The key to prevention is good blood sugar control. Poor blood sugar control is a big reason many of these problems start. Physical activity (exercise) is a very good way to help decrease your blood sugars. Exercise can lower your blood sugar, blood pressure, and cholesterol. It also reduces your risk for heart disease and stroke, relieves stress, and strengthens your heart, muscles and bones.  In addition, regular activity helps insulin work better, improves your blood circulation, and keeps your joints flexible. If you're trying to lose weight, a combination of exercise and wise food choices can help you reach your target weight and maintain it.      PAIN MANAGEMENT  1.Blood Sugar Control - Most important  2. Medications such as:  Amytriptylline, duloxetine, gabapentin, lyrica, tramadol  3. Nutritional therapy:  Vitamin B6 (100mg daily), Vitamin B12 (75mcg daily), Vitamin D 2000 IU daily), Alpha-Lipoic Acid (600-1800mg daily), Acetyl-L-Carnitine (500-1000mg TID, L-methyl folate (1500mcg daily)    ** Metformin can block Vitamin B6 and B12 so it is important to supplement**    FOOT CARE RECOMMENDATIONS   1. Wash your feet with lukewarm water and a mild soap and then dry them thoroughly, especially between the toes.     2. Examine your feet daily looking for cuts, corns, blisters, cracks, ect, especially after wearing new shoes. Make sure to look between your toes. If you cannot see the bottom of your feet, set a mirror on the floor and hold your foot over it, or ask a spouse, friend or family member to examine your feet for you. Contact your doctor immediately  if new problems are noted or if sores are not healing.     3. Immediately apply moisturizer to the tops and bottoms of your feet, avoiding areas between the toes. Hand lotion (Intesive Care, Mee, Eucerin, Neutrogena, Curel, ect) is sufficient unless your doctor prescribes a medicated lotion. Apply sunscreen to your feet when going swimming outside.     4. Use clean comfortable shoes, wear white socks (if you have any bleeding or drainage, you will see it on white socks). Socks should not have thick seams or cut off the circulation around the leg. Break in new shoes slowly and rotate with older shoes until broken in. Check the inside of your shoes with your hand to look for areas of irritation or objects that may have fallen into your shoes.       5. Keep slippers by the side of your bed for use during the night.     6.  Shoes should be fitted by a professional and should not cause areas of irritation.  Check your feet regularly when wearing a new pair of shoes and replace them as needed.     7.  Talk to your doctor about proper exercise. Exercise and stretching stimulate blood flow to your feet and maintain proper glucose levels.     8.  Monitor your blood glucose level as instructed by your doctor. Notify your doctor immediately if your blood sugar is abnormally high or low.    9. Cut your nails straight across, but then gently round any sharp edges with a cardboard nail file. If you have neuropathy, peripheral vascular disease or cannot see that well to trim your own toenails contact [facility] ([phone]) or [facility] ([phone]).      THINGS TO AVOID DOING   1.  Do not soak your feet if you have an open sore. Use only lukewarm water and always check the temperature with your hand as hot water can easily burn your feet.       2.  Never use a hot water bottle or heating pad on your feet. Also do not apply cold compresses to your feet. With decreased sensation, you could burn or freeze your feet.        3.  Do not apply any of these to your feet:    -  Over the counter medicine for corns or warts    -  Harsh chemicals like boric acid    -  Do not self-treat corns, cuts, blisters or infections. Always consult your doctor.       4.  Do not wear sandals, slippers or walk barefoot, especially on hot sand or concrete or other harsh surfaces.     5.  If you smoke, stop!!!        [facility]                    Larisa Curran RN                                [phone]                                                Serving [place] and [facility]  In Home Foot Care    [facility] Shanthi Verde -[phone] [email or web address]  [place]/ [place]/ [place] [facility]     [facility] Yeni Rice RN, [facility], [facility] [phone] [email or web address] [place]:  [place], [facility], [place], [place]     [facility], [facility] Mine Walker RN, CWOCN, [facility] [phone] [email or web address]  Clinic: [facility]     Home visits: [facility]     [facility] Erum MARKHAM, RN, [facility] [phone] [email or web address] Clinic:  [facility]  Home visits:  [facility] Emma Correa RN, [facility] [phone] [email or web address] [facility]     [facility], [facility] Linette Rogers RN ,BSN,[facility] [phone]    [email or web address] [place], [facility], [place], [place], [place], [place], [place]     Manuelamariusz Zayas   [phone]  [email or web address]  Clinic: [facility]  Home visits: [place] / [facility]

## 2019-12-06 NOTE — NURSING NOTE
"Chief Complaint   Patient presents with     RECHECK     left foot       Initial BP (!) 156/74   Pulse 97   Ht 1.727 m (5' 8\")   Wt 90.7 kg (200 lb)   BMI 30.41 kg/m   Estimated body mass index is 30.41 kg/m  as calculated from the following:    Height as of this encounter: 1.727 m (5' 8\").    Weight as of this encounter: 90.7 kg (200 lb).  Medications and allergies reviewed.      Rosio OSORIO MA    "

## 2019-12-06 NOTE — PROGRESS NOTES
PATIENT HISTORY:  Storm Dutta is a 79 year old male who presents to clinic for a diabetic foot evaluation.  The patient relates pain with ambulation in shoe gear.  The patient relates that the nails are difficult to trim at home.  The patient relates blood sugars are within normal limits.  The patient denies any redness, swelling or open sores on both feet.  The patient was sent by Dr. Beaulieu for consultation on the left foot.       REVIEW OF SYSTEMS:  Constitutional, HEENT, cardiovascular, pulmonary, GI, , musculoskeletal, neuro, skin, endocrine and psych systems are negative, except as otherwise noted.     PAST MEDICAL HISTORY:   Past Medical History:   Diagnosis Date     Acute urinary retention 11/2012    ER visit   / 1200 cc post-void residual     Acute urinary retention 11/1/2012    ER      Diabetes mellitus type II      Epididymitis 3/20/2014    Started on doxycyclline for UTI/orchitis. He was discharged on 03/22/14.     Former smoker     dc'd 2006     Hypertension goal BP (blood pressure) < 140/90 8/24/2012     Malignant melanoma (H)      PE (pulmonary embolism) 3/20/2014     Rectal cancer (H)      Skin cancer      Squamous cell carcinoma      Thrombosis of leg     +PE        PAST SURGICAL HISTORY:   Past Surgical History:   Procedure Laterality Date     AMPUTATE TOE(S) Right 6/4/2019    Procedure: AMPUTATION 2nd Toe;  Surgeon: Adriel Cabello DPM;  Location: [facility]     COLONOSCOPY  7/29/2013    Procedure: COMBINED COLONOSCOPY, SINGLE BIOPSY/POLYPECTOMY BY BIOPSY;;  Surgeon: Bari Burnett MD;  Location: [facility]     COLONOSCOPY N/A 6/4/2015    Procedure: COMBINED COLONOSCOPY, SINGLE OR MULTIPLE BIOPSY/POLYPECTOMY BY BIOPSY;  Surgeon: Tara Mtz MD;  Location:  GI     COLONOSCOPY N/A 12/5/2016    Procedure: COLONOSCOPY;  Surgeon: Breanna Kline MD;  Location:  GI     EYE SURGERY  5/2012    Right eye procedure     HC CIRCUMCISION SURGICAL NON-DEV >28 DAYS AGE  2/97     due to phimosis     HC REMOVAL GALLBLADDER  5/01     HC UGI ENDOSCOPY W PLACEMENT GASTROSTOMY TUBE PERCUT  3/13/2014    Procedure: COMBINED ESOPHAGOSCOPY, GASTROSCOPY, DUODENOSCOPY (EGD), PLACE PERCUTANEOUS ENDOSCOPIC GASTROSTOMY TUBE;  Surgeon: Loco Casillas MD;  Location: [facility]     LAPAROSCOPIC ASSISTED COLECTOMY LEFT (DESCENDING)  1/7/2014    Procedure: LAPAROSCOPIC ASSISTED COLECTOMY LEFT (DESCENDING);  Laparoscopic hand assisted Low Anterior Resection With colonic J pouch and end to end colorectal anastamosis. Laparoscopic splenic flexure mobilization.  Loop Ileostomy.  Rigid proctoscopy;  Surgeon: Margaret Gamble MD;  [facility]     PHACOEMULSIFICATION WITH STANDARD INTRAOCULAR LENS IMPLANT  4/11/2013    Procedure: PHACOEMULSIFICATION WITH STANDARD INTRAOCULAR LENS IMPLANT;  Right Kelman Phacoemulsification with Intraocular Lens Implant;  Surgeon: Caesar Turner MD;  Location: [facility]     REMOVE GASTROSTOMY TUBE ADULT  7/25/2014    Procedure: REMOVE GASTROSTOMY TUBE ADULT;  Surgeon: Margaret Gamble MD;  [facility]     SIGMOIDOSCOPY FLEXIBLE  6/23/2014    Procedure: SIGMOIDOSCOPY FLEXIBLE;  Surgeon: Margaret Gamble MD;  [facility]     SIGMOIDOSCOPY FLEXIBLE  7/22/2014    Procedure: SIGMOIDOSCOPY FLEXIBLE;  Surgeon: Margaret Gamble MD;  [facility]     SIGMOIDOSCOPY FLEXIBLE  7/25/2014    Procedure: SIGMOIDOSCOPY FLEXIBLE;  Surgeon: Margaret Gamble MD;  [facility]     SIGMOIDOSCOPY FLEXIBLE  7/28/2014    Procedure: SIGMOIDOSCOPY FLEXIBLE;  Surgeon: Margaret Gamble MD;  [facility]     SIGMOIDOSCOPY FLEXIBLE  7/31/2014    Procedure: SIGMOIDOSCOPY FLEXIBLE;  Surgeon: Margaret Gamble MD;  [facility]     SIGMOIDOSCOPY FLEXIBLE  8/3/2014    Procedure: SIGMOIDOSCOPY FLEXIBLE;  Surgeon: Margaret Gamble MD;  [facility]     SIGMOIDOSCOPY FLEXIBLE  8/5/2014    Procedure: SIGMOIDOSCOPY  FLEXIBLE;  Surgeon: Margaret Gamble MD;  [facility]     SIGMOIDOSCOPY FLEXIBLE  8/8/2014    Procedure: SIGMOIDOSCOPY FLEXIBLE;  Surgeon: Margaret Gamble MD;  [facility]     SIGMOIDOSCOPY FLEXIBLE  8/18/2014    Procedure: SIGMOIDOSCOPY FLEXIBLE;  Surgeon: Jose Roberto Cervantes MD;  [facility]     SIGMOIDOSCOPY FLEXIBLE N/A 8/15/2014    Procedure: SIGMOIDOSCOPY FLEXIBLE;  Surgeon: Margaret Gamble MD;  [facility]     SIGMOIDOSCOPY FLEXIBLE N/A 8/22/2014    Procedure: SIGMOIDOSCOPY FLEXIBLE;  Surgeon: Jose Roberto Cervantes MD;  [facility]     SIGMOIDOSCOPY FLEXIBLE N/A 8/11/2014    Procedure: SIGMOIDOSCOPY FLEXIBLE;  Surgeon: Margaret Gamble MD;  [facility]     SIGMOIDOSCOPY FLEXIBLE N/A 8/26/2014    Procedure: SIGMOIDOSCOPY FLEXIBLE;  Surgeon: Margaret Gamble MD;  [facility]     SIGMOIDOSCOPY FLEXIBLE N/A 8/29/2014    Procedure: SIGMOIDOSCOPY FLEXIBLE;  Surgeon: Margaret Gamble MD;  [facility]     SIGMOIDOSCOPY FLEXIBLE N/A 9/8/2014    Procedure: SIGMOIDOSCOPY FLEXIBLE;  Surgeon: Margaret Gamble MD;  [facility]     SIGMOIDOSCOPY FLEXIBLE N/A 9/2/2014    Procedure: SIGMOIDOSCOPY FLEXIBLE;  Surgeon: Breanna Kline MD;  [facility]     SIGMOIDOSCOPY FLEXIBLE N/A 9/11/2014    Procedure: SIGMOIDOSCOPY FLEXIBLE;  Surgeon: Margaret Gamble MD;  [facility]     SIGMOIDOSCOPY FLEXIBLE N/A 9/19/2014    Procedure: SIGMOIDOSCOPY FLEXIBLE;  Surgeon: Margaret Gamble MD;  [facility]     SIGMOIDOSCOPY FLEXIBLE N/A 9/15/2014    Procedure: SIGMOIDOSCOPY FLEXIBLE;  Surgeon: Margaret Gamble MD;  [facility]     SIGMOIDOSCOPY FLEXIBLE N/A 9/5/2014    Procedure: SIGMOIDOSCOPY FLEXIBLE;  Surgeon: Margaret Gamble MD;  [facility]     SIGMOIDOSCOPY FLEXIBLE N/A 9/23/2014    Procedure: SIGMOIDOSCOPY FLEXIBLE;  Surgeon: Margaret Gamble MD;  Location: [facility]      SIGMOIDOSCOPY FLEXIBLE N/A 9/26/2014    Procedure: SIGMOIDOSCOPY FLEXIBLE;  Surgeon: Margaret Gamble MD;  [facility]     SIGMOIDOSCOPY FLEXIBLE N/A 9/30/2014    Procedure: SIGMOIDOSCOPY FLEXIBLE;  Surgeon: Margaret Gamble MD;  [facility]     SIGMOIDOSCOPY FLEXIBLE N/A 10/3/2014    Procedure: SIGMOIDOSCOPY FLEXIBLE;  Surgeon: Margaret Gamble MD;  [facility]     SIGMOIDOSCOPY FLEXIBLE N/A 10/30/2014    Procedure: SIGMOIDOSCOPY FLEXIBLE;  Surgeon: Margaret Gamble MD;  [facility]     SIGMOIDOSCOPY FLEXIBLE, PLACEMENT OF DRAINAGE SPONGE  9/30/14     TAKEDOWN ILEOSTOMY N/A 1/6/2015    Procedure: TAKEDOWN ILEOSTOMY;  Surgeon: Margaret Gamble MD;  [facility]        MEDICATIONS:   Current Outpatient Medications:      amLODIPine (NORVASC) 10 MG tablet, Take 1 tablet (10 mg) by mouth daily, Disp: 90 tablet, Rfl: 3     aspirin 325 MG tablet, Take 325 mg by mouth daily, Disp: , Rfl:      atorvastatin (LIPITOR) 20 MG tablet, TAKE ONE TABLET BY MOUTH EVERY DAY (Patient taking differently: Take 20 mg by mouth daily ), Disp: 90 tablet, Rfl: 3     blood glucose (ACCU-CHEK REGIS PLUS) test strip, USE TO TEST BLOOD SUGAR THREE TIMES A DAY OR AS DIRECTED, Disp: 300 each, Rfl: 1     blood glucose monitoring (NO BRAND SPECIFIED) meter device kit, Use to test blood sugar 3 times daily or as directed., Disp: 1 kit, Rfl: 0     Cholecalciferol (VITAMIN D-3) 1000 units CAPS, Take 1 capsule by mouth daily , Disp: , Rfl:      finasteride (PROSCAR) 5 MG tablet, TAKE ONE TABLET BY MOUTH EVERY DAY (Patient taking differently: Take 5 mg by mouth daily ), Disp: 90 tablet, Rfl: 1     insulin aspart (NOVOLOG FLEXPEN) 100 UNIT/ML pen, Use 8 units plus low sliding scale before each meal 120-149 0 units 150-199 1 unit 200-249 2 units 250-299 3 units 300-349 4 units > 350 5 units  Max dose 50 units day, Disp: 15 mL, Rfl: 1     insulin glargine (BASAGLAR KWIKPEN) 100 UNIT/ML  pen, INJECT 16 UNITS AT BEDTIME (Patient taking differently: Inject 16 Units Subcutaneous At Bedtime INJECT 16 UNITS AT BEDTIME), Disp: 30 mL, Rfl: 1     losartan (COZAAR) 100 MG tablet, Take 1 tablet (100 mg) by mouth daily, Disp: 90 tablet, Rfl: 1     tamsulosin (FLOMAX) 0.4 MG capsule, Take 1 capsule (0.4 mg) by mouth daily, Disp: 90 capsule, Rfl: 3     ULTICARE MINI 31G X 6 MM insulin pen needle, USE 4 TO 5 TIMES DAILY OR AS DIRECTED FOR SLIDING SCALE INSULIN, Disp: 100 each, Rfl: 7     vancomycin (VANCOCIN) 125 MG capsule, Take 1 capsule (125 mg) by mouth 2 times daily for 14 days, THEN 1 capsule (125 mg) daily for 14 days, THEN 1 capsule (125 mg) every other day for 14 days. 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   Social Needs     Financial resource strain: Not on file     Food insecurity:     Worry: Not on file     Inability: Not on file     Transportation needs:     Medical: Not on file     Non-medical: Not on file   Tobacco Use     Smoking status: Former Smoker     Packs/day: 0.00     Years: 30.00     Pack years: 0.00     Types: Cigars     Last attempt to quit: 2002     Years since quittin.2     Smokeless tobacco: Never Used   Substance and Sexual Activity     Alcohol use: No     Drug use: No     Sexual activity: Not Currently     Partners: Female   Lifestyle     Physical activity:     Days per week: Not on file     Minutes per session: Not on file     Stress: Not on file   Relationships     Social connections:     Talks on phone: Not on file     Gets together: Not  "on file     Attends [employer] service: Not on file     Active member of club or organization: Not on file     Attends meetings of clubs or organizations: Not on file     Relationship status: Not on file     Intimate partner violence:     Fear of current or ex partner: Not on file     Emotionally abused: Not on file     Physically abused: Not on file     Forced sexual activity: Not on file   Other Topics Concern     Parent/sibling w/ CABG, MI or angioplasty before 65F 55M? No   Social History Narrative     Not on file        FAMILY HISTORY:   Family History   Problem Relation Age of Onset     Diabetes Mother      Hypertension Mother      Cancer Father      Cancer Maternal Grandmother      Alzheimer Disease Maternal Grandmother      Cardiovascular Paternal Grandmother      Breast Cancer Sister      Colon Cancer No family hx of      Colon Polyps No family hx of      Crohn's Disease No family hx of      Ulcerative Colitis No family hx of      Anesthesia Reaction No family hx of      Melanoma No family hx of         EXAM:Vitals: /76   Pulse 89   Ht 1.727 m (5' 8\")   Wt 90.7 kg (200 lb)   BMI 30.41 kg/m    BMI= Body mass index is 30.41 kg/m .        General appearance: Patient is alert and fully cooperative with history & exam.  No sign of distress is noted during the visit.     Psychiatric: Affect is pleasant & appropriate.  Patient appears motivated to improve health.     Respiratory: Breathing is regular & unlabored while sitting.     HEENT: Hearing is intact to spoken word.  Speech is clear.  No gross evidence of visual impairment that would impact ambulation.     Dermatologic: Skin is intact to both lower extremities without significant lesions, rash or abrasion.  No paronychia or evidence of soft tissue infection is noted.  The nails are hypertrophic and discolored bilateral feet.     Vascular: DP & PT pulses are intact & regular bilaterally.  No significant edema or varicosities noted.  CFT and skin " temperature is normal to both lower extremities.  There are no varicosities, no edema and no trophic changes noted.      Neurologic: Lower extremity sensation is intact to light touch.  No evidence of weakness or contracture in the lower extremities.  Noted evidence of neuropathy with diminished sensation bilaterally.       Musculoskeletal: Patient is ambulatory without assistive device or brace.  No gross ankle deformity noted.  No foot or ankle joint effusion is noted.  One notes a hammertoe contracture of the second toe on the left.       Assessment:    1.  Diabetes Mellitus with Peripheral Neuropathy.  2.  Hammertoe deformity.  3.  Onychomycosis toenails bilateral feet.      Plan:  I have explained to the patient the condition.  I have discussed with the patient the importance of diabetic foot care.  The nails were sharply debrided within the nail debrider down to normal length and thickness.  No bleeding noted.  Patient was referred to [facility] for custom diabetic extra-depth shoes with accommodative inserts.         JUANJO Cabello D.P.M., F.ANSLEY.C.F.A.S.

## 2020-02-23 ENCOUNTER — HEALTH MAINTENANCE LETTER (OUTPATIENT)
Age: 80
End: 2020-02-23

## 2020-02-27 DIAGNOSIS — N13.8 HYPERTROPHY OF PROSTATE WITH URINARY OBSTRUCTION: ICD-10-CM

## 2020-02-27 DIAGNOSIS — N40.1 HYPERTROPHY OF PROSTATE WITH URINARY OBSTRUCTION: ICD-10-CM

## 2020-02-27 RX ORDER — FINASTERIDE 5 MG/1
TABLET, FILM COATED ORAL
Qty: 90 TABLET | Refills: 1 | Status: SHIPPED | OUTPATIENT
Start: 2020-02-27 | End: 2020-08-25

## 2020-02-27 NOTE — TELEPHONE ENCOUNTER
"Prescription approved per [facility] Refill Protocol.    Requested Prescriptions   Pending Prescriptions Disp Refills     finasteride (PROSCAR) 5 MG tablet [Pharmacy Med Name: FINASTERIDE 5MG TABS] 90 tablet 1     Sig: TAKE ONE TABLET BY MOUTH EVERY DAY       BPH Agents Passed - 2/27/2020  5:02 AM        Passed - Recent (12 mo) or future (30 days) visit within the authorizing provider's department     Patient has had an office visit with the authorizing provider or a provider within the authorizing providers department within the previous 12 mos or has a future within next 30 days. See \"Patient Info\" tab in inbasket, or \"Choose Columns\" in Meds & Orders section of the refill encounter.              Passed - Medication is active on med list        Passed - Patient is 18 years of age or older        Last Written Prescription Date:  8/27/19  Last Fill Quantity: 90,  # refills: 1   Last office visit: 11/22/2019 with prescribing provider:  DO Christofer   Future Office Visit:      Cheli HERNANDEZ RN      "

## 2020-03-09 DIAGNOSIS — E11.22 TYPE 2 DIABETES MELLITUS WITH STAGE 3 CHRONIC KIDNEY DISEASE, WITH LONG-TERM CURRENT USE OF INSULIN (H): ICD-10-CM

## 2020-03-09 DIAGNOSIS — N18.30 TYPE 2 DIABETES MELLITUS WITH STAGE 3 CHRONIC KIDNEY DISEASE, WITH LONG-TERM CURRENT USE OF INSULIN (H): ICD-10-CM

## 2020-03-09 DIAGNOSIS — Z79.4 TYPE 2 DIABETES MELLITUS WITH STAGE 3 CHRONIC KIDNEY DISEASE, WITH LONG-TERM CURRENT USE OF INSULIN (H): ICD-10-CM

## 2020-03-10 NOTE — TELEPHONE ENCOUNTER
"Requested Prescriptions   Pending Prescriptions Disp Refills     insulin aspart (NOVOLOG FLEXPEN) 100 UNIT/ML pen  Last Written Prescription Date:  11/15/2019 #15ml x 1  Last filled - not provided  Last office visit: 11/22/2019 JESENIA Beaulieu   Future Office Visit:  None   15 mL 1     Sig: Use 8 units plus low sliding scale before each meal 120-149 0 units  150-199 1 unit  200-249 2 units  250-299 3 units  300-349 4 units  > 350 5 units   Max dose 50 units day       Short Acting Insulin Protocol Passed - 3/9/2020  9:11 AM        Passed - Blood pressure less than 140/90 in past 6 months     BP Readings from Last 3 Encounters:   12/06/19 135/76   11/22/19 (!) 144/54   11/19/19 127/69                 Passed - LDL on file in past 12 months     Recent Labs   Lab Test 11/15/19  0956   LDL 61             Passed - Microalbumin on file in past 12 months     Recent Labs   Lab Test 11/15/19  1100   MICROL 32   UMALCR 67.59*             Passed - Serum creatinine on file in past 12 months     Recent Labs   Lab Test 11/19/19  1852  09/05/14  0745   CR 1.27*   < >  --    CREAT  --   --  1.9*    < > = values in this interval not displayed.             Passed - HgbA1C in past 3 or 6 months     If HgbA1C is 8 or greater, it needs to be on file within the past 3 months.  If less than 8, must be on file within the past 6 months.     Recent Labs   Lab Test 11/15/19  0956   A1C 7.2*             Passed - Medication is active on med list        Passed - Patient is age 18 or older        Passed - Recent (6 mo) or future (30 days) visit within the authorizing provider's specialty     Patient had office visit in the last 6 months or has a visit in the next 30 days with authorizing provider or within the authorizing provider's specialty.  See \"Patient Info\" tab in inbasket, or \"Choose Columns\" in Meds & Orders section of the refill encounter.                 "

## 2020-03-14 RX ORDER — INSULIN ASPART 100 [IU]/ML
INJECTION, SOLUTION INTRAVENOUS; SUBCUTANEOUS
Qty: 15 ML | Refills: 1 | Status: SHIPPED | OUTPATIENT
Start: 2020-03-14 | End: 2020-05-20

## 2020-03-14 NOTE — TELEPHONE ENCOUNTER
Prescription approved per [facility] Refill Protocol.  Kristina Young RN

## 2020-03-16 ENCOUNTER — OFFICE VISIT (OUTPATIENT)
Dept: FAMILY MEDICINE | Facility: CLINIC | Age: 80
End: 2020-03-16
Payer: COMMERCIAL

## 2020-03-16 VITALS
TEMPERATURE: 97.5 F | DIASTOLIC BLOOD PRESSURE: 66 MMHG | BODY MASS INDEX: 32.89 KG/M2 | HEIGHT: 68 IN | HEART RATE: 76 BPM | SYSTOLIC BLOOD PRESSURE: 132 MMHG | WEIGHT: 217 LBS

## 2020-03-16 DIAGNOSIS — Z79.4 TYPE 2 DIABETES MELLITUS WITH STAGE 3 CHRONIC KIDNEY DISEASE, WITH LONG-TERM CURRENT USE OF INSULIN (H): ICD-10-CM

## 2020-03-16 DIAGNOSIS — I89.0 LYMPHEDEMA OF BOTH LOWER EXTREMITIES: ICD-10-CM

## 2020-03-16 DIAGNOSIS — N18.30 CKD (CHRONIC KIDNEY DISEASE) STAGE 3, GFR 30-59 ML/MIN (H): Primary | ICD-10-CM

## 2020-03-16 DIAGNOSIS — R60.0 LOCALIZED EDEMA: ICD-10-CM

## 2020-03-16 DIAGNOSIS — E11.22 TYPE 2 DIABETES MELLITUS WITH STAGE 3 CHRONIC KIDNEY DISEASE, WITH LONG-TERM CURRENT USE OF INSULIN (H): ICD-10-CM

## 2020-03-16 DIAGNOSIS — N18.30 TYPE 2 DIABETES MELLITUS WITH STAGE 3 CHRONIC KIDNEY DISEASE, WITH LONG-TERM CURRENT USE OF INSULIN (H): ICD-10-CM

## 2020-03-16 LAB
ANION GAP SERPL CALCULATED.3IONS-SCNC: 3 MMOL/L (ref 3–14)
BUN SERPL-MCNC: 28 MG/DL (ref 7–30)
CALCIUM SERPL-MCNC: 8.9 MG/DL (ref 8.5–10.1)
CHLORIDE SERPL-SCNC: 110 MMOL/L (ref 94–109)
CO2 SERPL-SCNC: 26 MMOL/L (ref 20–32)
CREAT SERPL-MCNC: 1.38 MG/DL (ref 0.66–1.25)
GFR SERPL CREATININE-BSD FRML MDRD: 48 ML/MIN/{1.73_M2}
GLUCOSE SERPL-MCNC: 132 MG/DL (ref 70–99)
HBA1C MFR BLD: 7 % (ref 0–5.6)
POTASSIUM SERPL-SCNC: 4.4 MMOL/L (ref 3.4–5.3)
SODIUM SERPL-SCNC: 139 MMOL/L (ref 133–144)

## 2020-03-16 PROCEDURE — 80048 BASIC METABOLIC PNL TOTAL CA: CPT | Performed by: FAMILY MEDICINE

## 2020-03-16 PROCEDURE — 83036 HEMOGLOBIN GLYCOSYLATED A1C: CPT | Performed by: FAMILY MEDICINE

## 2020-03-16 PROCEDURE — 99214 OFFICE O/P EST MOD 30 MIN: CPT | Performed by: FAMILY MEDICINE

## 2020-03-16 PROCEDURE — 36415 COLL VENOUS BLD VENIPUNCTURE: CPT | Performed by: FAMILY MEDICINE

## 2020-03-16 ASSESSMENT — MIFFLIN-ST. JEOR: SCORE: 1673.81

## 2020-03-16 NOTE — PROGRESS NOTES
"Subjective     Storm Dutta is a 79 year old male who presents to clinic today for the following health issues:    Chief Complaint   Patient presents with     Derm Problem     Bruising/sore on the left lower let, started on saturday(3days), no painful. Had new Diabetic shoes made that did work, had to send back - unsure if related.        Noticed some discoloration on the anterior L lower leg a few days ago.  Feels a bit achy when touched but otherwise no pain.  No known injury.  Has not been worsening.    Otherwise feels well.  Feels he is in his normal state of health    Pt is unclear how long his legs have been swollen to this degree.  Denies any recent appreciable changes.  No CP, SOB, PND or orthopnea.    Reviewed and updated as needed this visit by Provider  Tobacco  Allergies  Meds  Med Hx  Surg Hx  Fam Hx  Soc Hx        Review of Systems   ROS COMP: Constitutional, HEENT, cardiovascular, pulmonary, gi and gu systems are negative, except as otherwise noted.      Objective    /66   Pulse 76   Temp 97.5  F (36.4  C) (Tympanic)   Ht 1.727 m (5' 8\")   Wt 98.4 kg (217 lb)   BMI 32.99 kg/m    Body mass index is 32.99 kg/m .  Physical Exam   PE:  VS as above   Gen:  WN/WD/WH male in NAD   Heart:  RRR without murmur, nl S1, S2, no rubs or gallops   Lungs CTA leydi without rales/ronchi/wheezes   Ext:  2+ pitting edema to leydi LE to knee.  Early stasis skin changes noted leydi including areas of erythema and darker discoloration c/w chronic edema.  No evidence of infection.  Edema relatively symmetric, no calf tenderness  No open wounds      Diagnostic Test Results:  Labs reviewed in Epic        A/P:      ICD-10-CM    1. CKD (chronic kidney disease) stage 3, GFR 30-59 ml/min (H)  N18.3 Basic metabolic panel  (Ca, Cl, CO2, Creat, Gluc, K, Na, BUN)   2. Localized edema  R60.0 Basic metabolic panel  (Ca, Cl, CO2, Creat, Gluc, K, Na, BUN)   3. Type 2 diabetes mellitus with stage 3 chronic kidney disease, " with long-term current use of insulin (H)  E11.22 Basic metabolic panel  (Ca, Cl, CO2, Creat, Gluc, K, Na, BUN)    N18.3 Hemoglobin A1c    Z79.4    4. Lymphedema of both lower extremities  I89.0 Compression Sleeve/Stocking Order for DME - ONLY FOR DME     LYMPHEDEMA THERAPY REFERRAL     Patient Instructions   I think the best management for the swelling is the edema wrappings we discussed but not sure if we are able to get those going right now.    Let's start with baseline labs.  I will give you a call this afternoon with the plan moving forward, either wrappings or medicine or both.    You can also check into compression stockings.  Sizing is based on calf measurements.      Pt with h/o CKD Stage 3.  Chronic lymphedema noted with stasis dermatitis changes, no evidence of infection or open wounds.  Preferred management would be edema wrappings to minimize impact on renal function, especially given compromised baseline function.  Reviewed that in the current setting I am unclear if we are able to schedule.    Labs now.  Will explore if wrappings are an option.  Reviewed consideratoon of over the counter compression as well and script given.

## 2020-03-16 NOTE — PATIENT INSTRUCTIONS
I think the best management for the swelling is the edema wrappings we discussed but not sure if we are able to get those going right now.    Let's start with baseline labs.  I will give you a call this afternoon with the plan moving forward, either wrappings or medicine or both.    You can also check into compression stockings.  Sizing is based on calf measurements.

## 2020-03-24 DIAGNOSIS — E78.5 HYPERLIPIDEMIA LDL GOAL <70: ICD-10-CM

## 2020-03-24 RX ORDER — ATORVASTATIN CALCIUM 20 MG/1
TABLET, FILM COATED ORAL
Qty: 90 TABLET | Refills: 3 | Status: SHIPPED | OUTPATIENT
Start: 2020-03-24 | End: 2021-03-10

## 2020-03-24 NOTE — TELEPHONE ENCOUNTER
"Requested Prescriptions   Pending Prescriptions Disp Refills     atorvastatin (LIPITOR) 20 MG tablet [Pharmacy Med Name: ATORVASTATIN CALCIUM 20MG TABS] 90 tablet 3     Sig: TAKE ONE TABLET BY MOUTH EVERY DAY  Last Written Prescription Date:  03/27/2019 #90 x 3  Last filled 12/30/2019  Last office visit: 3/16/2020 JESENIA Beaulieu   Future Office Visit:  None         Statins Protocol Passed - 3/24/2020  5:02 AM        Passed - LDL on file in past 12 months     Recent Labs   Lab Test 11/15/19  0956   LDL 61             Passed - No abnormal creatine kinase in past 12 months     No lab results found.             Passed - Recent (12 mo) or future (30 days) visit within the authorizing provider's specialty     Patient has had an office visit with the authorizing provider or a provider within the authorizing providers department within the previous 12 mos or has a future within next 30 days. See \"Patient Info\" tab in inbasket, or \"Choose Columns\" in Meds & Orders section of the refill encounter.              Passed - Medication is active on med list        Passed - Patient is age 18 or older             "

## 2020-05-05 DIAGNOSIS — Z79.4 TYPE 2 DIABETES MELLITUS WITH HYPERGLYCEMIA, WITH LONG-TERM CURRENT USE OF INSULIN (H): ICD-10-CM

## 2020-05-05 DIAGNOSIS — E11.65 TYPE 2 DIABETES MELLITUS WITH HYPERGLYCEMIA, WITH LONG-TERM CURRENT USE OF INSULIN (H): ICD-10-CM

## 2020-05-06 RX ORDER — BLOOD SUGAR DIAGNOSTIC
STRIP MISCELLANEOUS
Qty: 300 EACH | Refills: 1 | Status: SHIPPED | OUTPATIENT
Start: 2020-05-06 | End: 2021-04-29

## 2020-05-20 ENCOUNTER — TELEPHONE (OUTPATIENT)
Dept: FAMILY MEDICINE | Facility: CLINIC | Age: 80
End: 2020-05-20

## 2020-05-20 DIAGNOSIS — Z79.4 TYPE 2 DIABETES MELLITUS WITH DIABETIC POLYNEUROPATHY, WITH LONG-TERM CURRENT USE OF INSULIN (H): Primary | ICD-10-CM

## 2020-05-20 DIAGNOSIS — E11.42 TYPE 2 DIABETES MELLITUS WITH DIABETIC POLYNEUROPATHY, WITH LONG-TERM CURRENT USE OF INSULIN (H): Primary | ICD-10-CM

## 2020-05-20 RX ORDER — INSULIN LISPRO 100 [IU]/ML
INJECTION, SOLUTION INTRAVENOUS; SUBCUTANEOUS
Qty: 15 ML | Refills: 0 | Status: SHIPPED | OUTPATIENT
Start: 2020-05-20 | End: 2020-06-26

## 2020-05-20 NOTE — TELEPHONE ENCOUNTER
Pt's insurance switched to covering Humalog Kwikpens instead.    Can we please get a new rx for the patient. Request was set up to fax to clinic and fax was being shredded instead of entered in Epic. Pt originally requested on 5/7

## 2020-05-26 DIAGNOSIS — E11.9 TYPE 2 DIABETES, HBA1C GOAL < 7% (H): ICD-10-CM

## 2020-05-26 RX ORDER — FLURBIPROFEN SODIUM 0.3 MG/ML
SOLUTION/ DROPS OPHTHALMIC
Qty: 500 EACH | Refills: 1 | Status: SHIPPED | OUTPATIENT
Start: 2020-05-26 | End: 2021-05-12

## 2020-06-26 DIAGNOSIS — E11.42 TYPE 2 DIABETES MELLITUS WITH DIABETIC POLYNEUROPATHY, WITH LONG-TERM CURRENT USE OF INSULIN (H): ICD-10-CM

## 2020-06-26 DIAGNOSIS — Z79.4 TYPE 2 DIABETES MELLITUS WITH DIABETIC POLYNEUROPATHY, WITH LONG-TERM CURRENT USE OF INSULIN (H): ICD-10-CM

## 2020-06-26 RX ORDER — INSULIN LISPRO 100 [IU]/ML
INJECTION, SOLUTION INTRAVENOUS; SUBCUTANEOUS
Qty: 15 ML | Refills: 0 | Status: SHIPPED | OUTPATIENT
Start: 2020-06-26 | End: 2020-11-02

## 2020-08-23 DIAGNOSIS — N13.8 HYPERTROPHY OF PROSTATE WITH URINARY OBSTRUCTION: ICD-10-CM

## 2020-08-23 DIAGNOSIS — N40.1 HYPERTROPHY OF PROSTATE WITH URINARY OBSTRUCTION: ICD-10-CM

## 2020-08-25 RX ORDER — FINASTERIDE 5 MG/1
TABLET, FILM COATED ORAL
Qty: 90 TABLET | Refills: 0 | Status: SHIPPED | OUTPATIENT
Start: 2020-08-25 | End: 2020-11-20

## 2020-08-25 NOTE — TELEPHONE ENCOUNTER
Prescription approved per [facility] Refill Protocol.  Jessica Goldman RN

## 2020-09-10 DIAGNOSIS — I10 HYPERTENSION GOAL BP (BLOOD PRESSURE) < 140/90: ICD-10-CM

## 2020-09-14 RX ORDER — LOSARTAN POTASSIUM 100 MG/1
TABLET ORAL
Qty: 30 TABLET | Refills: 0 | Status: SHIPPED | OUTPATIENT
Start: 2020-09-14 | End: 2020-10-13

## 2020-09-14 NOTE — TELEPHONE ENCOUNTER
Please call Lamonte and let him know he is due to see me for DM visit with fasting labs.    Jovana Beaulieu, DO

## 2020-09-14 NOTE — TELEPHONE ENCOUNTER
Routing refill request to provider for review/approval because:  Labs not current:    Jessica Goldman RN

## 2020-09-17 ENCOUNTER — DOCUMENTATION ONLY (OUTPATIENT)
Dept: FAMILY MEDICINE | Facility: CLINIC | Age: 80
End: 2020-09-17

## 2020-09-17 DIAGNOSIS — E11.22 TYPE 2 DIABETES MELLITUS WITH STAGE 3 CHRONIC KIDNEY DISEASE, WITH LONG-TERM CURRENT USE OF INSULIN (H): Primary | ICD-10-CM

## 2020-09-17 DIAGNOSIS — E78.5 HYPERLIPIDEMIA LDL GOAL <70: ICD-10-CM

## 2020-09-17 DIAGNOSIS — N18.30 CKD (CHRONIC KIDNEY DISEASE) STAGE 3, GFR 30-59 ML/MIN (H): ICD-10-CM

## 2020-09-17 DIAGNOSIS — I10 HYPERTENSION GOAL BP (BLOOD PRESSURE) < 140/90: ICD-10-CM

## 2020-09-17 DIAGNOSIS — N18.30 TYPE 2 DIABETES MELLITUS WITH STAGE 3 CHRONIC KIDNEY DISEASE, WITH LONG-TERM CURRENT USE OF INSULIN (H): Primary | ICD-10-CM

## 2020-09-17 DIAGNOSIS — Z79.4 TYPE 2 DIABETES MELLITUS WITH STAGE 3 CHRONIC KIDNEY DISEASE, WITH LONG-TERM CURRENT USE OF INSULIN (H): Primary | ICD-10-CM

## 2020-09-21 DIAGNOSIS — I10 HYPERTENSION GOAL BP (BLOOD PRESSURE) < 140/90: ICD-10-CM

## 2020-09-21 DIAGNOSIS — E78.5 HYPERLIPIDEMIA LDL GOAL <70: ICD-10-CM

## 2020-09-21 DIAGNOSIS — Z79.4 TYPE 2 DIABETES MELLITUS WITH STAGE 3 CHRONIC KIDNEY DISEASE, WITH LONG-TERM CURRENT USE OF INSULIN (H): ICD-10-CM

## 2020-09-21 DIAGNOSIS — E11.22 TYPE 2 DIABETES MELLITUS WITH STAGE 3 CHRONIC KIDNEY DISEASE, WITH LONG-TERM CURRENT USE OF INSULIN (H): ICD-10-CM

## 2020-09-21 DIAGNOSIS — N18.30 CKD (CHRONIC KIDNEY DISEASE) STAGE 3, GFR 30-59 ML/MIN (H): ICD-10-CM

## 2020-09-21 DIAGNOSIS — N18.30 TYPE 2 DIABETES MELLITUS WITH STAGE 3 CHRONIC KIDNEY DISEASE, WITH LONG-TERM CURRENT USE OF INSULIN (H): ICD-10-CM

## 2020-09-21 LAB
ALBUMIN SERPL-MCNC: 4 G/DL (ref 3.4–5)
ALP SERPL-CCNC: 101 U/L (ref 40–150)
ALT SERPL W P-5'-P-CCNC: 23 U/L (ref 0–70)
ANION GAP SERPL CALCULATED.3IONS-SCNC: 5 MMOL/L (ref 3–14)
AST SERPL W P-5'-P-CCNC: 14 U/L (ref 0–45)
BILIRUB SERPL-MCNC: 0.9 MG/DL (ref 0.2–1.3)
BUN SERPL-MCNC: 27 MG/DL (ref 7–30)
CALCIUM SERPL-MCNC: 9.6 MG/DL (ref 8.5–10.1)
CHLORIDE SERPL-SCNC: 109 MMOL/L (ref 94–109)
CHOLEST SERPL-MCNC: 148 MG/DL
CO2 SERPL-SCNC: 27 MMOL/L (ref 20–32)
CREAT SERPL-MCNC: 1.21 MG/DL (ref 0.66–1.25)
GFR SERPL CREATININE-BSD FRML MDRD: 56 ML/MIN/{1.73_M2}
GLUCOSE SERPL-MCNC: 131 MG/DL (ref 70–99)
HBA1C MFR BLD: 6.7 % (ref 0–5.6)
HDLC SERPL-MCNC: 70 MG/DL
LDLC SERPL CALC-MCNC: 57 MG/DL
NONHDLC SERPL-MCNC: 78 MG/DL
POTASSIUM SERPL-SCNC: 4.2 MMOL/L (ref 3.4–5.3)
PROT SERPL-MCNC: 7.8 G/DL (ref 6.8–8.8)
SODIUM SERPL-SCNC: 141 MMOL/L (ref 133–144)
TRIGL SERPL-MCNC: 104 MG/DL

## 2020-09-21 PROCEDURE — 82043 UR ALBUMIN QUANTITATIVE: CPT | Performed by: FAMILY MEDICINE

## 2020-09-21 PROCEDURE — 80061 LIPID PANEL: CPT | Performed by: FAMILY MEDICINE

## 2020-09-21 PROCEDURE — 80053 COMPREHEN METABOLIC PANEL: CPT | Performed by: FAMILY MEDICINE

## 2020-09-21 PROCEDURE — 83036 HEMOGLOBIN GLYCOSYLATED A1C: CPT | Performed by: FAMILY MEDICINE

## 2020-09-21 PROCEDURE — 36415 COLL VENOUS BLD VENIPUNCTURE: CPT | Performed by: FAMILY MEDICINE

## 2020-10-10 DIAGNOSIS — I10 HYPERTENSION GOAL BP (BLOOD PRESSURE) < 140/90: ICD-10-CM

## 2020-10-13 RX ORDER — LOSARTAN POTASSIUM 100 MG/1
TABLET ORAL
Qty: 90 TABLET | Refills: 1 | Status: SHIPPED | OUTPATIENT
Start: 2020-10-13 | End: 2021-01-12

## 2020-10-13 NOTE — TELEPHONE ENCOUNTER
Routing refill request to provider for review/approval because:  Patient needs to be seen because:  DM check fasting labs due-patient was advised to schedule visit and did not.  Jessica Goldman RN

## 2020-10-31 DIAGNOSIS — E11.42 TYPE 2 DIABETES MELLITUS WITH DIABETIC POLYNEUROPATHY, WITH LONG-TERM CURRENT USE OF INSULIN (H): ICD-10-CM

## 2020-10-31 DIAGNOSIS — Z79.4 TYPE 2 DIABETES MELLITUS WITH DIABETIC POLYNEUROPATHY, WITH LONG-TERM CURRENT USE OF INSULIN (H): ICD-10-CM

## 2020-11-02 RX ORDER — INSULIN LISPRO 100 [IU]/ML
INJECTION, SOLUTION INTRAVENOUS; SUBCUTANEOUS
Qty: 15 ML | Refills: 1 | Status: SHIPPED | OUTPATIENT
Start: 2020-11-02 | End: 2020-12-28

## 2020-11-02 NOTE — TELEPHONE ENCOUNTER
Prescription approved per [facility] Refill Protocol or patient Primary care provider (PCP) Chart and last OV reviewed   SUKHWINDER Turcios RN/Shukri Moreland

## 2020-11-05 DIAGNOSIS — I10 HYPERTENSION GOAL BP (BLOOD PRESSURE) < 140/90: ICD-10-CM

## 2020-11-09 RX ORDER — AMLODIPINE BESYLATE 10 MG/1
TABLET ORAL
Qty: 90 TABLET | Refills: 1 | Status: SHIPPED | OUTPATIENT
Start: 2020-11-09 | End: 2021-05-10

## 2020-11-18 DIAGNOSIS — N13.8 HYPERTROPHY OF PROSTATE WITH URINARY OBSTRUCTION: ICD-10-CM

## 2020-11-18 DIAGNOSIS — N40.1 HYPERTROPHY OF PROSTATE WITH URINARY OBSTRUCTION: ICD-10-CM

## 2020-11-20 RX ORDER — FINASTERIDE 5 MG/1
TABLET, FILM COATED ORAL
Qty: 90 TABLET | Refills: 0 | Status: SHIPPED | OUTPATIENT
Start: 2020-11-20 | End: 2021-01-12

## 2020-12-01 ENCOUNTER — TELEPHONE (OUTPATIENT)
Dept: AUDIOLOGY | Facility: CLINIC | Age: 80
End: 2020-12-01

## 2020-12-01 NOTE — TELEPHONE ENCOUNTER
Pt states his rt hearing aid has quit working - will not turn on at all (says it is charged).     Please contact pt @:  [phone] (ok to leave message)    Denise Behrendt  [facility] CSS

## 2020-12-01 NOTE — TELEPHONE ENCOUNTER
Patient will drop off HERNÁNDEZ and have Sara look at it. It is not coming on even though it is charged.  Disha Barrios CMA

## 2020-12-02 ENCOUNTER — ALLIED HEALTH/NURSE VISIT (OUTPATIENT)
Dept: AUDIOLOGY | Facility: CLINIC | Age: 80
End: 2020-12-02
Payer: COMMERCIAL

## 2020-12-02 DIAGNOSIS — H90.3 SENSORY HEARING LOSS, BILATERAL: Primary | ICD-10-CM

## 2020-12-02 PROCEDURE — V5010 ASSESSMENT FOR HEARING AID: HCPCS | Performed by: AUDIOLOGIST

## 2020-12-02 PROCEDURE — 99207 PR NO CHARGE LOS: CPT | Performed by: AUDIOLOGIST

## 2020-12-02 NOTE — PROGRESS NOTES
[facility]        SUBJECTIVE:  Storm Dutta, 80 year old male requests in office repair of his 2019 Phonak Audeo M90-R hearing aid. He reports the hearing aid will charge but he is unable to hear any amplification.     OBJECTIVE:  Examination reveals excess cerumen on the dome and in the . Replaced the cerushield wax guard and medium open dome. Verified hearing aid functionality.        Discussed in office repair with patient. He reports he knows how to change the wax guard and clean the hearing aid.     ASSESSMENT/PLAN:    Patient will  the hearing aid at the specialty clinic .      Sara WILSON-DHAVAL, #[id number]

## 2020-12-06 ENCOUNTER — HEALTH MAINTENANCE LETTER (OUTPATIENT)
Age: 80
End: 2020-12-06

## 2020-12-09 DIAGNOSIS — N13.8 HYPERTROPHY OF PROSTATE WITH URINARY OBSTRUCTION: ICD-10-CM

## 2020-12-09 DIAGNOSIS — N40.1 HYPERTROPHY OF PROSTATE WITH URINARY OBSTRUCTION: ICD-10-CM

## 2020-12-09 DIAGNOSIS — E78.5 HYPERLIPIDEMIA LDL GOAL <70: ICD-10-CM

## 2020-12-11 RX ORDER — TAMSULOSIN HYDROCHLORIDE 0.4 MG/1
CAPSULE ORAL
Qty: 90 CAPSULE | Refills: 1 | Status: SHIPPED | OUTPATIENT
Start: 2020-12-11 | End: 2021-01-12

## 2020-12-11 NOTE — TELEPHONE ENCOUNTER
Prescription approved per [facility] Refill Protocol.  Bandar San RN

## 2020-12-28 DIAGNOSIS — Z79.4 TYPE 2 DIABETES MELLITUS WITH DIABETIC POLYNEUROPATHY, WITH LONG-TERM CURRENT USE OF INSULIN (H): ICD-10-CM

## 2020-12-28 DIAGNOSIS — E11.42 TYPE 2 DIABETES MELLITUS WITH DIABETIC POLYNEUROPATHY, WITH LONG-TERM CURRENT USE OF INSULIN (H): ICD-10-CM

## 2020-12-28 RX ORDER — INSULIN LISPRO 100 [IU]/ML
INJECTION, SOLUTION INTRAVENOUS; SUBCUTANEOUS
Qty: 60 ML | Refills: 0 | Status: SHIPPED | OUTPATIENT
Start: 2020-12-28 | End: 2021-01-12

## 2020-12-28 NOTE — TELEPHONE ENCOUNTER
Prescription approved per [facility] Refill Protocol.  Bandar San RN

## 2020-12-30 DIAGNOSIS — Z79.4 TYPE 2 DIABETES MELLITUS WITH STAGE 3 CHRONIC KIDNEY DISEASE, WITH LONG-TERM CURRENT USE OF INSULIN (H): ICD-10-CM

## 2020-12-30 DIAGNOSIS — Z79.4 TYPE 2 DIABETES MELLITUS WITH DIABETIC POLYNEUROPATHY, WITH LONG-TERM CURRENT USE OF INSULIN (H): Primary | ICD-10-CM

## 2020-12-30 DIAGNOSIS — E11.42 TYPE 2 DIABETES MELLITUS WITH DIABETIC POLYNEUROPATHY, WITH LONG-TERM CURRENT USE OF INSULIN (H): Primary | ICD-10-CM

## 2020-12-30 DIAGNOSIS — N18.30 TYPE 2 DIABETES MELLITUS WITH STAGE 3 CHRONIC KIDNEY DISEASE, WITH LONG-TERM CURRENT USE OF INSULIN (H): ICD-10-CM

## 2020-12-30 DIAGNOSIS — E11.22 TYPE 2 DIABETES MELLITUS WITH STAGE 3 CHRONIC KIDNEY DISEASE, WITH LONG-TERM CURRENT USE OF INSULIN (H): ICD-10-CM

## 2020-12-31 NOTE — TELEPHONE ENCOUNTER
"Routing refill request to provider for review/approval because:  Patient needs to be seen because:  Last OV March 2020     Requested Prescriptions   Pending Prescriptions Disp Refills     insulin glargine (LANTUS SOLOSTAR) 100 UNIT/ML pen 30 mL 1     Sig: Inject 16 Units Subcutaneous At Bedtime INJECT 16 UNITS UNDER THE SKIN EVERY NIGHT AT BEDTIME.       Long Acting Insulin Protocol Failed - 12/30/2020  4:43 PM        Failed - Recent (6 mo) or future (30 days) visit within the authorizing provider's specialty     Patient had office visit in the last 6 months or has a visit in the next 30 days with authorizing provider or within the authorizing provider's specialty.  See \"Patient Info\" tab in inbasket, or \"Choose Columns\" in Meds & Orders section of the refill encounter.            Passed - Serum creatinine on file in past 12 months     Recent Labs   Lab Test 09/21/20  0820 09/05/14  0745 09/05/14  0745   CR 1.21   < >  --    CREAT  --   --  1.9*    < > = values in this interval not displayed.       Ok to refill medication if creatinine is low          Passed - HgbA1C in past 3 or 6 months     If HgbA1C is 8 or greater, it needs to be on file within the past 3 months.  If less than 8, must be on file within the past 6 months.     Recent Labs   Lab Test 09/21/20  0820   A1C 6.7*             Passed - Medication is active on med list        Passed - Patient is age 18 or older             "

## 2021-01-12 ENCOUNTER — VIRTUAL VISIT (OUTPATIENT)
Dept: FAMILY MEDICINE | Facility: CLINIC | Age: 81
End: 2021-01-12
Payer: COMMERCIAL

## 2021-01-12 DIAGNOSIS — Z85.048 H/O MALIGNANT NEOPLASM OF RECTUM: ICD-10-CM

## 2021-01-12 DIAGNOSIS — N13.8 HYPERTROPHY OF PROSTATE WITH URINARY OBSTRUCTION: ICD-10-CM

## 2021-01-12 DIAGNOSIS — E11.22 TYPE 2 DIABETES MELLITUS WITH STAGE 3A CHRONIC KIDNEY DISEASE, WITH LONG-TERM CURRENT USE OF INSULIN (H): Primary | ICD-10-CM

## 2021-01-12 DIAGNOSIS — I10 HYPERTENSION GOAL BP (BLOOD PRESSURE) < 140/90: ICD-10-CM

## 2021-01-12 DIAGNOSIS — N40.1 HYPERTROPHY OF PROSTATE WITH URINARY OBSTRUCTION: ICD-10-CM

## 2021-01-12 DIAGNOSIS — Z79.4 TYPE 2 DIABETES MELLITUS WITH DIABETIC POLYNEUROPATHY, WITH LONG-TERM CURRENT USE OF INSULIN (H): ICD-10-CM

## 2021-01-12 DIAGNOSIS — N18.31 TYPE 2 DIABETES MELLITUS WITH STAGE 3A CHRONIC KIDNEY DISEASE, WITH LONG-TERM CURRENT USE OF INSULIN (H): Primary | ICD-10-CM

## 2021-01-12 DIAGNOSIS — E78.5 HYPERLIPIDEMIA LDL GOAL <70: ICD-10-CM

## 2021-01-12 DIAGNOSIS — I73.9 PVD (PERIPHERAL VASCULAR DISEASE) (H): ICD-10-CM

## 2021-01-12 DIAGNOSIS — Z89.421 HISTORY OF AMPUTATION OF LESSER TOE OF RIGHT FOOT (H): ICD-10-CM

## 2021-01-12 DIAGNOSIS — E11.42 TYPE 2 DIABETES MELLITUS WITH DIABETIC POLYNEUROPATHY, WITH LONG-TERM CURRENT USE OF INSULIN (H): ICD-10-CM

## 2021-01-12 DIAGNOSIS — N18.31 STAGE 3A CHRONIC KIDNEY DISEASE (H): ICD-10-CM

## 2021-01-12 DIAGNOSIS — Z79.4 TYPE 2 DIABETES MELLITUS WITH STAGE 3A CHRONIC KIDNEY DISEASE, WITH LONG-TERM CURRENT USE OF INSULIN (H): Primary | ICD-10-CM

## 2021-01-12 PROCEDURE — 99214 OFFICE O/P EST MOD 30 MIN: CPT | Mod: TEL | Performed by: FAMILY MEDICINE

## 2021-01-12 RX ORDER — FINASTERIDE 5 MG/1
1 TABLET, FILM COATED ORAL DAILY
Qty: 90 TABLET | Refills: 3 | Status: SHIPPED | OUTPATIENT
Start: 2021-01-12 | End: 2022-03-11

## 2021-01-12 RX ORDER — INSULIN LISPRO 100 [IU]/ML
INJECTION, SOLUTION INTRAVENOUS; SUBCUTANEOUS
Qty: 60 ML | Refills: 0 | Status: SHIPPED | OUTPATIENT
Start: 2021-01-12 | End: 2022-04-29

## 2021-01-12 RX ORDER — LOSARTAN POTASSIUM 100 MG/1
100 TABLET ORAL DAILY
Qty: 90 TABLET | Refills: 3 | Status: SHIPPED | OUTPATIENT
Start: 2021-01-12 | End: 2022-05-11

## 2021-01-12 RX ORDER — TAMSULOSIN HYDROCHLORIDE 0.4 MG/1
CAPSULE ORAL
Qty: 90 CAPSULE | Refills: 3 | Status: SHIPPED | OUTPATIENT
Start: 2021-01-12 | End: 2022-03-18

## 2021-01-12 NOTE — PROGRESS NOTES
Lamonte is a 80 year old who is being evaluated via a billable telephone visit.      What phone number would you like to be contacted at?   How would you like to obtain your AVS? [place]     10:21 AM    A/P:      ICD-10-CM    1. Type 2 diabetes mellitus with stage 3a chronic kidney disease, with long-term current use of insulin (H)  E11.21 **A1C FUTURE anytime    N18.31 **Basic metabolic panel FUTURE anytime    Z79.4    2. Hypertension goal BP (blood pressure) < 140/90  I10 **Basic metabolic panel FUTURE anytime     losartan (COZAAR) 100 MG tablet   3. Hyperlipidemia LDL goal <70  E78.5 tamsulosin (FLOMAX) 0.4 MG capsule   4. Stage 3a chronic kidney disease  N18.31 **Basic metabolic panel FUTURE anytime     **CBC with platelets FUTURE anytime   5. 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 PVD (peripheral vascular disease) (H)  I73.9    7. H/O malignant neoplasm of rectum  Z85.048 CT Abdomen Pelvis w/o Contrast   8. Hypertrophy of prostate with urinary obstruction  N40.1 finasteride (PROSCAR) 5 MG tablet    N13.8 tamsulosin (FLOMAX) 0.4 MG capsule   9. Type 2 diabetes mellitus with diabetic polyneuropathy, with long-term current use of insulin (H)  E11.42 insulin glargine (LANTUS SOLOSTAR) 100 UNIT/ML pen    Z79.4 insulin lispro (HUMALOG KWIKPEN) 100 UNIT/ML (1 unit dial) KWIKPEN     DM:  controlled on last check./ Pt without concerns.  Plan to continue current plan, labs pending  HTN:  Controlled at last visit.  Not checking at home, continue current meds  Lipids:  Well controlled, continue meds  CKD:  Stable, avoid nephrotoxins.  recheck   PVD:  No symptoms, on statin, BP and DM controlled, monitor  H/o diabetic polyneuropathy, s/p great toe amputation due to osteomyelitis:  Due for podiatry follow up.  Encouraged to schedule  BPH:  Symptoms stable on current regimen    Borderline lymphadenopathy noted on CT abd/pelvioc during colitis episode in 11/2019.  Recommend follow up  Colonoscopy  due 12/21 due to h/o rectal cancer    Elsa Aburto is a 80 year old who presents to clinic today for the following health issues     HPI       Diabetes Follow-up    How often are you checking your blood sugar? One time daily  What time of day are you checking your blood sugars (select all that apply)?  Before meals  Have you had any blood sugars above 200?  Yes once in the last month  Have you had any blood sugars below 70?  No    What symptoms do you notice when your blood sugar is low?  None    What concerns do you have today about your diabetes? None     Do you have any of these symptoms? (Select all that apply)  No numbness or tingling in feet.  No redness, sores or blisters on feet.  No complaints of excessive thirst.  No reports of blurry vision.  No significant changes to weight.    Have you had a diabetic eye exam in the last 12 months? No      Blood sugars are about 100-125.  Had a reading>200 one in the morning after snacking late at night.    Using just fixed dosing with meals, no sliding scale.    Struggling with shoes.  Was trying to get orthotics sorted out but has not been comfortable.  No sores of wounds.  Due for podiatry visit.        Hyperlipidemia Follow-Up      Are you regularly taking any medication or supplement to lower your cholesterol?   Yes- atorvastatin     Are you having muscle aches or other side effects that you think could be caused by your cholesterol lowering medication?  No    Hypertension Follow-up      Do you check your blood pressure regularly outside of the clinic? No     Are you following a low salt diet? No    Are your blood pressures ever more than 140 on the top number (systolic) OR more   than 90 on the bottom number (diastolic), for example 140/90? Not checking    Not checking.    BP Readings from Last 2 Encounters:   03/16/20 132/66   12/06/19 135/76     Hemoglobin A1C (%)   Date Value   09/21/2020 6.7 (H)   03/16/2020 7.0 (H)     LDL Cholesterol Calculated  (mg/dL)   Date Value   09/21/2020 57   07/27/2017 62     LDL Cholesterol Direct (mg/dL)   Date Value   11/15/2019 61   06/12/2018 64         How many servings of fruits and vegetables do you eat daily?  2-3    On average, how many sweetened beverages do you drink each day (Examples: soda, juice, sweet tea, etc.  Do NOT count diet or artificially sweetened beverages)?   0    How many days per week do you exercise enough to make your heart beat faster? 3 or less    How many minutes a day do you exercise enough to make your heart beat faster? 9 or less    How many days per week do you miss taking your medication? 0        Review of Systems   Constitutional, HEENT, cardiovascular, pulmonary, gi and gu systems are negative, except as otherwise noted.    Bowels normal, no further diarrhea or blood in the stool.   Urine symptoms well controlled       Objective           Vitals:  No vitals were obtained today due to virtual visit.    Physical Exam   healthy, alert and no distress  PSYCH: Alert and oriented times 3; coherent speech, normal   rate and volume, able to articulate logical thoughts, able   to abstract reason, no tangential thoughts, no hallucinations   or delusions  His affect is normal  RESP: No cough, no audible wheezing, able to talk in full sentences  Remainder of exam unable to be completed due to telephone visits    Epic chart reviewed            10:34 AM    Phone call duration: 13 minutes

## 2021-01-14 ENCOUNTER — HOSPITAL ENCOUNTER (OUTPATIENT)
Dept: CT IMAGING | Facility: CLINIC | Age: 81
Discharge: HOME | End: 2021-01-14
Attending: FAMILY MEDICINE | Admitting: FAMILY MEDICINE
Payer: COMMERCIAL

## 2021-01-14 DIAGNOSIS — Z85.048 H/O MALIGNANT NEOPLASM OF RECTUM: ICD-10-CM

## 2021-01-14 LAB
CREAT BLD-MCNC: 1.4 MG/DL (ref 0.66–1.25)
GFR SERPL CREATININE-BSD FRML MDRD: 49 ML/MIN/{1.73_M2}

## 2021-01-14 PROCEDURE — 82565 ASSAY OF CREATININE: CPT

## 2021-01-14 PROCEDURE — 74177 CT ABD & PELVIS W/CONTRAST: CPT

## 2021-01-14 PROCEDURE — 250N000009 HC RX 250: Performed by: RADIOLOGY

## 2021-01-14 PROCEDURE — 250N000011 HC RX IP 250 OP 636: Performed by: RADIOLOGY

## 2021-01-14 RX ORDER — IOPAMIDOL 755 MG/ML
100 INJECTION, SOLUTION INTRAVASCULAR ONCE
Status: COMPLETED | OUTPATIENT
Start: 2021-01-14 | End: 2021-01-14

## 2021-01-14 RX ADMIN — SODIUM CHLORIDE 66 ML: 9 INJECTION, SOLUTION INTRAVENOUS at 10:25

## 2021-01-14 RX ADMIN — IOPAMIDOL 100 ML: 755 INJECTION, SOLUTION INTRAVENOUS at 10:25

## 2021-01-22 DIAGNOSIS — Z79.4 TYPE 2 DIABETES MELLITUS WITH STAGE 3A CHRONIC KIDNEY DISEASE, WITH LONG-TERM CURRENT USE OF INSULIN (H): ICD-10-CM

## 2021-01-22 DIAGNOSIS — I10 HYPERTENSION GOAL BP (BLOOD PRESSURE) < 140/90: ICD-10-CM

## 2021-01-22 DIAGNOSIS — E11.22 TYPE 2 DIABETES MELLITUS WITH STAGE 3 CHRONIC KIDNEY DISEASE, WITH LONG-TERM CURRENT USE OF INSULIN (H): ICD-10-CM

## 2021-01-22 DIAGNOSIS — N18.31 TYPE 2 DIABETES MELLITUS WITH STAGE 3A CHRONIC KIDNEY DISEASE, WITH LONG-TERM CURRENT USE OF INSULIN (H): ICD-10-CM

## 2021-01-22 DIAGNOSIS — N18.30 TYPE 2 DIABETES MELLITUS WITH STAGE 3 CHRONIC KIDNEY DISEASE, WITH LONG-TERM CURRENT USE OF INSULIN (H): ICD-10-CM

## 2021-01-22 DIAGNOSIS — E11.22 TYPE 2 DIABETES MELLITUS WITH STAGE 3A CHRONIC KIDNEY DISEASE, WITH LONG-TERM CURRENT USE OF INSULIN (H): ICD-10-CM

## 2021-01-22 DIAGNOSIS — N18.31 STAGE 3A CHRONIC KIDNEY DISEASE (H): ICD-10-CM

## 2021-01-22 DIAGNOSIS — Z79.4 TYPE 2 DIABETES MELLITUS WITH STAGE 3 CHRONIC KIDNEY DISEASE, WITH LONG-TERM CURRENT USE OF INSULIN (H): ICD-10-CM

## 2021-01-22 LAB
ANION GAP SERPL CALCULATED.3IONS-SCNC: 4 MMOL/L (ref 3–14)
BUN SERPL-MCNC: 29 MG/DL (ref 7–30)
CALCIUM SERPL-MCNC: 9.5 MG/DL (ref 8.5–10.1)
CHLORIDE SERPL-SCNC: 106 MMOL/L (ref 94–109)
CO2 SERPL-SCNC: 27 MMOL/L (ref 20–32)
CREAT SERPL-MCNC: 1.34 MG/DL (ref 0.66–1.25)
CREAT UR-MCNC: 78 MG/DL
ERYTHROCYTE [DISTWIDTH] IN BLOOD BY AUTOMATED COUNT: 13.4 % (ref 10–15)
GFR SERPL CREATININE-BSD FRML MDRD: 50 ML/MIN/{1.73_M2}
GLUCOSE SERPL-MCNC: 246 MG/DL (ref 70–99)
HBA1C MFR BLD: 7.6 % (ref 0–5.6)
HCT VFR BLD AUTO: 39.5 % (ref 40–53)
HGB BLD-MCNC: 12.7 G/DL (ref 13.3–17.7)
MCH RBC QN AUTO: 30.2 PG (ref 26.5–33)
MCHC RBC AUTO-ENTMCNC: 32.2 G/DL (ref 31.5–36.5)
MCV RBC AUTO: 94 FL (ref 78–100)
MICROALBUMIN UR-MCNC: 42 MG/L
MICROALBUMIN/CREAT UR: 54.84 MG/G CR (ref 0–17)
PLATELET # BLD AUTO: 169 10E9/L (ref 150–450)
POTASSIUM SERPL-SCNC: 4.3 MMOL/L (ref 3.4–5.3)
RBC # BLD AUTO: 4.2 10E12/L (ref 4.4–5.9)
SODIUM SERPL-SCNC: 137 MMOL/L (ref 133–144)
WBC # BLD AUTO: 6.2 10E9/L (ref 4–11)

## 2021-01-22 PROCEDURE — 83036 HEMOGLOBIN GLYCOSYLATED A1C: CPT | Performed by: FAMILY MEDICINE

## 2021-01-22 PROCEDURE — 80048 BASIC METABOLIC PNL TOTAL CA: CPT | Performed by: FAMILY MEDICINE

## 2021-01-22 PROCEDURE — 85027 COMPLETE CBC AUTOMATED: CPT | Performed by: FAMILY MEDICINE

## 2021-01-22 PROCEDURE — 36415 COLL VENOUS BLD VENIPUNCTURE: CPT | Performed by: FAMILY MEDICINE

## 2021-01-22 PROCEDURE — 82043 UR ALBUMIN QUANTITATIVE: CPT | Performed by: FAMILY MEDICINE

## 2021-03-10 DIAGNOSIS — E78.5 HYPERLIPIDEMIA LDL GOAL <70: ICD-10-CM

## 2021-03-11 RX ORDER — ATORVASTATIN CALCIUM 20 MG/1
20 TABLET, FILM COATED ORAL DAILY
Qty: 90 TABLET | Refills: 1 | Status: SHIPPED | OUTPATIENT
Start: 2021-03-11 | End: 2021-10-13

## 2021-03-11 NOTE — TELEPHONE ENCOUNTER
"Prescription approved per [facility] Refill Protocol.    Requested Prescriptions   Pending Prescriptions Disp Refills     atorvastatin (LIPITOR) 20 MG tablet 90 tablet 3     Sig: Take 1 tablet (20 mg) by mouth daily       Statins Protocol Passed - 3/10/2021  5:43 PM        Passed - LDL on file in past 12 months     Recent Labs   Lab Test 09/21/20  0820   LDL 57             Passed - No abnormal creatine kinase in past 12 months     No lab results found.             Passed - Recent (12 mo) or future (30 days) visit within the authorizing provider's specialty     Patient has had an office visit with the authorizing provider or a provider within the authorizing providers department within the previous 12 mos or has a future within next 30 days. See \"Patient Info\" tab in inbasket, or \"Choose Columns\" in Meds & Orders section of the refill encounter.              Passed - Medication is active on med list        Passed - Patient is age 18 or older           Cheli HERNANDEZ RN, BSN      "

## 2021-03-19 ENCOUNTER — IMMUNIZATION (OUTPATIENT)
Dept: FAMILY MEDICINE | Facility: CLINIC | Age: 81
End: 2021-03-19
Payer: COMMERCIAL

## 2021-03-19 PROCEDURE — 91301 PR COVID VAC MODERNA 100 MCG/0.5 ML IM: CPT

## 2021-03-19 PROCEDURE — 0011A PR COVID VAC MODERNA 100 MCG/0.5 ML IM: CPT

## 2021-04-11 ENCOUNTER — HEALTH MAINTENANCE LETTER (OUTPATIENT)
Age: 81
End: 2021-04-11

## 2021-04-16 ENCOUNTER — IMMUNIZATION (OUTPATIENT)
Dept: FAMILY MEDICINE | Facility: CLINIC | Age: 81
End: 2021-04-16
Attending: FAMILY MEDICINE
Payer: COMMERCIAL

## 2021-04-16 PROCEDURE — 0012A PR COVID VAC MODERNA 100 MCG/0.5 ML IM: CPT

## 2021-04-16 PROCEDURE — 91301 PR COVID VAC MODERNA 100 MCG/0.5 ML IM: CPT

## 2021-04-29 DIAGNOSIS — E11.65 TYPE 2 DIABETES MELLITUS WITH HYPERGLYCEMIA, WITH LONG-TERM CURRENT USE OF INSULIN (H): ICD-10-CM

## 2021-04-29 DIAGNOSIS — Z79.4 TYPE 2 DIABETES MELLITUS WITH HYPERGLYCEMIA, WITH LONG-TERM CURRENT USE OF INSULIN (H): ICD-10-CM

## 2021-04-29 RX ORDER — BLOOD SUGAR DIAGNOSTIC
STRIP MISCELLANEOUS
Qty: 300 STRIP | Refills: 3 | Status: SHIPPED | OUTPATIENT
Start: 2021-04-29 | End: 2023-02-24

## 2021-05-10 ENCOUNTER — TRANSFERRED RECORDS (OUTPATIENT)
Dept: HEALTH INFORMATION MANAGEMENT | Facility: CLINIC | Age: 81
End: 2021-05-10

## 2021-05-10 DIAGNOSIS — I10 HYPERTENSION GOAL BP (BLOOD PRESSURE) < 140/90: ICD-10-CM

## 2021-05-10 LAB — RETINOPATHY: NEGATIVE

## 2021-05-10 RX ORDER — AMLODIPINE BESYLATE 10 MG/1
10 TABLET ORAL DAILY
Qty: 90 TABLET | Refills: 0 | Status: SHIPPED | OUTPATIENT
Start: 2021-05-10 | End: 2021-08-26

## 2021-05-10 NOTE — TELEPHONE ENCOUNTER
Routing refill request to provider for review/approval because:  Patient needs to be seen because it has been more than 1 year since last office visit.  Last recorded BP > year    Sam Benítez RN

## 2021-05-12 ENCOUNTER — TELEPHONE (OUTPATIENT)
Dept: FAMILY MEDICINE | Facility: CLINIC | Age: 81
End: 2021-05-12

## 2021-05-12 DIAGNOSIS — E11.9 TYPE 2 DIABETES, HBA1C GOAL < 7% (H): ICD-10-CM

## 2021-05-12 RX ORDER — FLURBIPROFEN SODIUM 0.3 MG/ML
SOLUTION/ DROPS OPHTHALMIC
Qty: 500 EACH | Refills: 1 | Status: SHIPPED | OUTPATIENT
Start: 2021-05-12 | End: 2023-02-24

## 2021-05-12 NOTE — TELEPHONE ENCOUNTER
Reason for Call:  Medication refill:    Do you use a  [facility]?  Name of the pharmacy and phone number for the current request:  [facility]    Name of the medication requested: Insulin pen needles    Other request: [facility]    Can we leave a detailed message on this number? YES    Phone number patient can be reached at: Home number on file [phone] (home)    Best Time: Any    Call taken on 5/12/2021 at 2:35 PM by Kristina John

## 2021-07-31 ENCOUNTER — HEALTH MAINTENANCE LETTER (OUTPATIENT)
Age: 81
End: 2021-07-31

## 2021-08-05 ENCOUNTER — DOCUMENTATION ONLY (OUTPATIENT)
Dept: FAMILY MEDICINE | Facility: CLINIC | Age: 81
End: 2021-08-05

## 2021-08-05 DIAGNOSIS — E11.42 TYPE 2 DIABETES MELLITUS WITH DIABETIC POLYNEUROPATHY, WITH LONG-TERM CURRENT USE OF INSULIN (H): Primary | ICD-10-CM

## 2021-08-05 DIAGNOSIS — Z79.4 TYPE 2 DIABETES MELLITUS WITH DIABETIC POLYNEUROPATHY, WITH LONG-TERM CURRENT USE OF INSULIN (H): Primary | ICD-10-CM

## 2021-08-05 DIAGNOSIS — I10 HYPERTENSION GOAL BP (BLOOD PRESSURE) < 140/90: ICD-10-CM

## 2021-08-05 DIAGNOSIS — E78.5 HYPERLIPIDEMIA LDL GOAL <70: ICD-10-CM

## 2021-08-05 DIAGNOSIS — N18.31 STAGE 3A CHRONIC KIDNEY DISEASE (H): ICD-10-CM

## 2021-08-05 NOTE — PROGRESS NOTES
Lamonte is coming for an A1C per appt notes on 8/9/21. PLease place future orders.     Thank you  Geo Hung

## 2021-08-09 ENCOUNTER — LAB (OUTPATIENT)
Dept: LAB | Facility: CLINIC | Age: 81
End: 2021-08-09
Payer: COMMERCIAL

## 2021-08-09 DIAGNOSIS — Z79.4 TYPE 2 DIABETES MELLITUS WITH DIABETIC POLYNEUROPATHY, WITH LONG-TERM CURRENT USE OF INSULIN (H): ICD-10-CM

## 2021-08-09 DIAGNOSIS — E11.42 TYPE 2 DIABETES MELLITUS WITH DIABETIC POLYNEUROPATHY, WITH LONG-TERM CURRENT USE OF INSULIN (H): ICD-10-CM

## 2021-08-09 DIAGNOSIS — E78.5 HYPERLIPIDEMIA LDL GOAL <70: ICD-10-CM

## 2021-08-09 DIAGNOSIS — N18.31 STAGE 3A CHRONIC KIDNEY DISEASE (H): ICD-10-CM

## 2021-08-09 DIAGNOSIS — I10 HYPERTENSION GOAL BP (BLOOD PRESSURE) < 140/90: ICD-10-CM

## 2021-08-09 LAB
ALBUMIN SERPL-MCNC: 3.5 G/DL (ref 3.4–5)
ALP SERPL-CCNC: 106 U/L (ref 40–150)
ALT SERPL W P-5'-P-CCNC: 19 U/L (ref 0–70)
ANION GAP SERPL CALCULATED.3IONS-SCNC: 9 MMOL/L (ref 3–14)
AST SERPL W P-5'-P-CCNC: 16 U/L (ref 0–45)
BILIRUB SERPL-MCNC: 0.7 MG/DL (ref 0.2–1.3)
BUN SERPL-MCNC: 24 MG/DL (ref 7–30)
CALCIUM SERPL-MCNC: 9 MG/DL (ref 8.5–10.1)
CHLORIDE BLD-SCNC: 106 MMOL/L (ref 94–109)
CO2 SERPL-SCNC: 24 MMOL/L (ref 20–32)
CREAT SERPL-MCNC: 1.42 MG/DL (ref 0.66–1.25)
GFR SERPL CREATININE-BSD FRML MDRD: 46 ML/MIN/1.73M2
GLUCOSE BLD-MCNC: 214 MG/DL (ref 70–99)
HBA1C MFR BLD: 7.1 % (ref 0–5.6)
LDLC SERPL CALC-MCNC: 59 MG/DL
POTASSIUM BLD-SCNC: 4.2 MMOL/L (ref 3.4–5.3)
PROT SERPL-MCNC: 7.1 G/DL (ref 6.8–8.8)
SODIUM SERPL-SCNC: 139 MMOL/L (ref 133–144)

## 2021-08-09 PROCEDURE — 83721 ASSAY OF BLOOD LIPOPROTEIN: CPT

## 2021-08-09 PROCEDURE — 36415 COLL VENOUS BLD VENIPUNCTURE: CPT

## 2021-08-09 PROCEDURE — 80053 COMPREHEN METABOLIC PANEL: CPT

## 2021-08-09 PROCEDURE — 83036 HEMOGLOBIN GLYCOSYLATED A1C: CPT

## 2021-08-17 ENCOUNTER — OFFICE VISIT (OUTPATIENT)
Dept: FAMILY MEDICINE | Facility: CLINIC | Age: 81
End: 2021-08-17
Payer: COMMERCIAL

## 2021-08-17 VITALS
SYSTOLIC BLOOD PRESSURE: 139 MMHG | HEART RATE: 84 BPM | BODY MASS INDEX: 31.37 KG/M2 | OXYGEN SATURATION: 98 % | WEIGHT: 207 LBS | HEIGHT: 68 IN | TEMPERATURE: 98.2 F | DIASTOLIC BLOOD PRESSURE: 68 MMHG

## 2021-08-17 DIAGNOSIS — D03.4 MELANOMA IN SITU OF NECK (H): ICD-10-CM

## 2021-08-17 DIAGNOSIS — R53.83 OTHER FATIGUE: Primary | ICD-10-CM

## 2021-08-17 DIAGNOSIS — R06.02 SHORTNESS OF BREATH: ICD-10-CM

## 2021-08-17 DIAGNOSIS — Z86.79 HISTORY OF MITRAL VALVE INSUFFICIENCY: ICD-10-CM

## 2021-08-17 LAB
ERYTHROCYTE [DISTWIDTH] IN BLOOD BY AUTOMATED COUNT: 13.3 % (ref 10–15)
HCT VFR BLD AUTO: 37.1 % (ref 40–53)
HGB BLD-MCNC: 12.4 G/DL (ref 13.3–17.7)
MCH RBC QN AUTO: 30.9 PG (ref 26.5–33)
MCHC RBC AUTO-ENTMCNC: 33.4 G/DL (ref 31.5–36.5)
MCV RBC AUTO: 93 FL (ref 78–100)
PLATELET # BLD AUTO: 171 10E3/UL (ref 150–450)
RBC # BLD AUTO: 4.01 10E6/UL (ref 4.4–5.9)
TSH SERPL DL<=0.005 MIU/L-ACNC: 1.81 MU/L (ref 0.4–4)
WBC # BLD AUTO: 5.8 10E3/UL (ref 4–11)

## 2021-08-17 PROCEDURE — 36415 COLL VENOUS BLD VENIPUNCTURE: CPT | Performed by: FAMILY MEDICINE

## 2021-08-17 PROCEDURE — 85027 COMPLETE CBC AUTOMATED: CPT | Performed by: FAMILY MEDICINE

## 2021-08-17 PROCEDURE — 84443 ASSAY THYROID STIM HORMONE: CPT | Performed by: FAMILY MEDICINE

## 2021-08-17 PROCEDURE — 99214 OFFICE O/P EST MOD 30 MIN: CPT | Performed by: FAMILY MEDICINE

## 2021-08-17 ASSESSMENT — MIFFLIN-ST. JEOR: SCORE: 1623.45

## 2021-08-17 NOTE — PROGRESS NOTES
"    Assessment & Plan     Other fatigue  Patient has been complaing of fatigue and shortness of breath   Concerned that there is something going on this has been increasing over the last year.   - CBC with platelets; Future  - TSH with free T4 reflex; Future  - Echocardiogram Complete; Future  - CBC with platelets  - TSH with free T4 reflex    Melanoma in situ of neck (H)  Needs to follow up with derm     Shortness of breath    - Echocardiogram Complete; Future    History of mitral valve insufficiency  Review of echo from 2014 showed some mitral valve insufficiency. Will recheck echo.consider cardiology referral.   - Echocardiogram Complete; Future       BMI:   Estimated body mass index is 31.47 kg/m  as calculated from the following:    Height as of this encounter: 1.727 m (5' 8\").    Weight as of this encounter: 93.9 kg (207 lb).       Patient Instructions   Please see the dermatologist     Schedule the echocardiogram and the labs will be back tonight       Return in about 4 weeks (around 9/14/2021) for if not improving.    Dhara Lowe MD  [facility] JEANE Aburto is a 80 year old who presents for the following health issues       HPI     Chief Complaint   Patient presents with     Fatigue     Slowly coming on for about the last year, increasing in the last month.      Shortness of Breath         He is here with early tired ness   More short of breath  Has to sit   Has history of  pvd that is no claudication  Has a hard time standing for along   No chest pain    Is diabetic   Has nocturia gets up 2-3 times some nights   Also has echo from 2014 showing some  Component 7 yr ago   XCELERA     Interpretation Summary   1.  Mild mitral valve prolapse  2.  Normal LV size and systolic function.  3.    Normal RV size and function  4.  No vegetation visualized on VERNA   PatientHeight: 68 in   PatientWeight: 155 lbs   SystolicPressure: 118 mmHg   DiastolicPressure: 71 mmHg   HeartRate: 93 bpm "   BSA 1.8 m^2       Procedure   Transesophageal  Echocardiogram with color and spectral Doppler performed.   Procedure location Echo Lab.   The procedure was performed  in the Echo Lab.   Patient was sedated using Fentanyl 50 mcg.   Patient was sedated using Versed2 mg.   The patient's rhythm is normal sinus.   Good quality two-dimensional was performed and interpreted.       Left Ventricle   The Ejection Fraction is estimated at 60-65%.       Right Ventricle   The right ventricle is normal size.   Global right ventricular function is normal.       Atria   Both atria appear normal.   The left atrial appendage is normal. It is free of spontaneous echo contrast   and thrombus.   Lipomatous infiltration of the interatrial septm is present.   The atrial septum is intact as assessed by color Doppler and agitated   dextrose bubble study .   A catheter is noted in the right atrium.   Normal appearance of catheter with no echodensities noted.       Mitral Valve   Myxomatous mitral valve leaflets without stenosis. Mild anterior prolapse.   Mild mitral insufficiency is present.       Aortic Valve   The aortic valve leaflets are thickened. There is no definite vegetation   visualized.   The aortic valve is tricuspid.   Trace aortic insufficiency is present.       Tricuspid Valve   Structurally normal valve without stenosis.   Mild tricuspid insufficiency is present.       Pulmonic Valve   Structurally normal valve without stenosis.   Trace pulmonic insufficiency is present.       Vessels   The aorta root is normal.   The thoracic aorta is normal.   Catheter in superior vena cava.       Pericardium   No pericardial effusion is present.       Compared to Previous Study   This study was compared with the study from 4/28/2014 .   There has been no change.       MMode 2D Measurements & Calculations   Ao root diam: 3.4 cm   asc Aorta: 3.4 cm            Has not had one since   Takes a nap for 1 hour during the week   Wakes up  "refreshed but does not change his shortness of breath   stil working part time which he enjoys   Review of Systems   Constitutional, HEENT, cardiovascular, pulmonary, GI, , musculoskeletal, neuro, skin, endocrine and psych systems are negative, except as otherwise noted.      Objective    /68   Pulse 84   Temp 98.2  F (36.8  C) (Tympanic)   Ht 1.727 m (5' 8\")   Wt 93.9 kg (207 lb)   SpO2 98%   BMI 31.47 kg/m    Body mass index is 31.47 kg/m .  Physical Exam   GENERAL: healthy, alert and no distress  EYES: Eyes grossly normal to inspection, PERRL and conjunctivae and sclerae normal  NECK: no adenopathy, no asymmetry, masses, or scars and thyroid normal to palpation  RESP: lungs clear to auscultation - no rales, rhonchi or wheezes  CV: regular rates and rhythm, normal S1 S2, no S3 or S4, grade 2/6 sys murmur heard best over the lsb, peripheral pulses strong and no peripheral edema  ABDOMEN: soft, nontender, no hepatosplenomegaly, no masses and bowel sounds normal  MS: no gross musculoskeletal defects noted, no edema  SKIN: no suspicious lesions or rashes and hyperpigmentation - lower legs    Lab on 08/09/2021   Component Date Value Ref Range Status     Sodium 08/09/2021 139  133 - 144 mmol/L Final     Potassium 08/09/2021 4.2  3.4 - 5.3 mmol/L Final     Chloride 08/09/2021 106  94 - 109 mmol/L Final     Carbon Dioxide (CO2) 08/09/2021 24  20 - 32 mmol/L Final     Anion Gap 08/09/2021 9  3 - 14 mmol/L Final     Urea Nitrogen 08/09/2021 24  7 - 30 mg/dL Final     Creatinine 08/09/2021 1.42* 0.66 - 1.25 mg/dL Final     Calcium 08/09/2021 9.0  8.5 - 10.1 mg/dL Final     Glucose 08/09/2021 214* 70 - 99 mg/dL Final     Alkaline Phosphatase 08/09/2021 106  40 - 150 U/L Final     AST 08/09/2021 16  0 - 45 U/L Final     ALT 08/09/2021 19  0 - 70 U/L Final     Protein Total 08/09/2021 7.1  6.8 - 8.8 g/dL Final     Albumin 08/09/2021 3.5  3.4 - 5.0 g/dL Final     Bilirubin Total 08/09/2021 0.7  0.2 - 1.3 mg/dL " Final     GFR Estimate 08/09/2021 46* >60 mL/min/1.73m2 Final    As of July 11, 2021, eGFR is calculated by the CKD-EPI creatinine equation, without race adjustment. eGFR can be influenced by muscle mass, exercise, and diet. The reported eGFR is an estimation only and is only applicable if the renal function is stable.     Hemoglobin A1C 08/09/2021 7.1* 0.0 - 5.6 % Final    Normal <5.7%   Prediabetes 5.7-6.4%    Diabetes 6.5% or higher     Note: Adopted from ADA consensus guidelines.     LDL Cholesterol Direct 08/09/2021 59  <100 mg/dL Final    Age 0-19 years:  Desirable: 0-110 mg/dL   Borderline high: 110-129 mg/dL   High: >= 130 mg/dL    Age 20 years and older:  Desirable: <100mg/dL  Above desirable: 100-129 mg/dL   Borderline high: 130-159 mg/dL   High: 160-189 mg/dL   Very high: >= 190 mg/dL       Dhara Lowe M.D.

## 2021-08-18 NOTE — RESULT ENCOUNTER NOTE
Lamonte, your labs were ok. Let's see what the echocardiogram shows and then we can make a plan after those results are back.Dhara Lowe M.D.

## 2021-08-26 DIAGNOSIS — I10 HYPERTENSION GOAL BP (BLOOD PRESSURE) < 140/90: ICD-10-CM

## 2021-08-26 RX ORDER — AMLODIPINE BESYLATE 10 MG/1
TABLET ORAL
Qty: 90 TABLET | Refills: 3 | Status: SHIPPED | OUTPATIENT
Start: 2021-08-26 | End: 2022-06-03

## 2021-08-26 NOTE — TELEPHONE ENCOUNTER
Routing refill request to provider for review/approval because:  Labs out of range:  Creatinine: 1/42 on 8/2021    Sam Benítez RN

## 2021-08-26 NOTE — TELEPHONE ENCOUNTER
BP Readings from Last 6 Encounters:   08/17/21 139/68   03/16/20 132/66   12/06/19 135/76   11/22/19 (!) 144/54   11/19/19 127/69   11/15/19 130/60

## 2021-09-09 ENCOUNTER — ANCILLARY PROCEDURE (OUTPATIENT)
Dept: GENERAL RADIOLOGY | Facility: CLINIC | Age: 81
End: 2021-09-09
Attending: FAMILY MEDICINE
Payer: COMMERCIAL

## 2021-09-09 ENCOUNTER — OFFICE VISIT (OUTPATIENT)
Dept: FAMILY MEDICINE | Facility: CLINIC | Age: 81
End: 2021-09-09
Payer: COMMERCIAL

## 2021-09-09 VITALS
TEMPERATURE: 97.7 F | BODY MASS INDEX: 31.17 KG/M2 | WEIGHT: 205 LBS | SYSTOLIC BLOOD PRESSURE: 128 MMHG | DIASTOLIC BLOOD PRESSURE: 60 MMHG | HEART RATE: 84 BPM

## 2021-09-09 DIAGNOSIS — E11.42 TYPE 2 DIABETES MELLITUS WITH DIABETIC POLYNEUROPATHY, WITH LONG-TERM CURRENT USE OF INSULIN (H): ICD-10-CM

## 2021-09-09 DIAGNOSIS — R06.02 SOB (SHORTNESS OF BREATH): ICD-10-CM

## 2021-09-09 DIAGNOSIS — Z79.4 TYPE 2 DIABETES MELLITUS WITH DIABETIC POLYNEUROPATHY, WITH LONG-TERM CURRENT USE OF INSULIN (H): ICD-10-CM

## 2021-09-09 DIAGNOSIS — R06.02 SOB (SHORTNESS OF BREATH): Primary | ICD-10-CM

## 2021-09-09 DIAGNOSIS — R53.83 FATIGUE, UNSPECIFIED TYPE: ICD-10-CM

## 2021-09-09 DIAGNOSIS — I10 HYPERTENSION GOAL BP (BLOOD PRESSURE) < 140/90: ICD-10-CM

## 2021-09-09 PROCEDURE — 93000 ELECTROCARDIOGRAM COMPLETE: CPT | Performed by: FAMILY MEDICINE

## 2021-09-09 PROCEDURE — 99214 OFFICE O/P EST MOD 30 MIN: CPT | Performed by: FAMILY MEDICINE

## 2021-09-09 PROCEDURE — 71046 X-RAY EXAM CHEST 2 VIEWS: CPT | Mod: FY | Performed by: RADIOLOGY

## 2021-09-09 NOTE — PROGRESS NOTES
A/P:      ICD-10-CM    1. SOB (shortness of breath)  R06.02 EKG 12-lead complete w/read - Clinics     XR Chest 2 Views   2. Fatigue, unspecified type  R53.83    3. Type 2 diabetes mellitus with diabetic polyneuropathy, with long-term current use of insulin (H)  E11.42     Z79.4    4. Hypertension goal BP (blood pressure) < 140/90  I10      Unclear etiology of symptoms.  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  Denies any CP.  EKG normal today.  Will plan stress following Echo to r/o ischemia.  If negative consider PFT's given smoking history.    HTN and diabetes both well controlled.  Will continue current regimen    Patient Instructions   Your EKG and chest x-ray looked good to me today.  The radiologist will take a look at the chest x-ray as well.      Glad you have the Echocardiogram scheduled for tomorrow.  We'll let you know results when we see them.    If all looks good next step would be to consider a heart stress test.    Your diabetes looks good, keep up the good work there!        Elsa Aburto is a 81 year old who presents for the following health issues  accompanied by his son:    HPI     Diabetes Follow-up    How often are you checking your blood sugar? One time daily  What time of day are you checking your blood sugars (select all that apply)?  Before meals  Have you had any blood sugars above 200?  No  Have you had any blood sugars below 70?  No    What symptoms do you notice when your blood sugar is low?  None    What concerns do you have today about your diabetes? None     Do you have any of these symptoms? (Select all that apply)  No numbness or tingling in feet.  No redness, sores or blisters on feet.  No complaints of excessive thirst.  No reports of blurry vision.  No significant changes to weight.      BP Readings from Last 2 Encounters:   09/09/21 128/60   08/17/21 139/68     Hemoglobin A1C (%)   Date Value   08/09/2021 7.1 (H)  "  01/22/2021 7.6 (H)   09/21/2020 6.7 (H)     LDL Cholesterol Calculated (mg/dL)   Date Value   09/21/2020 57   07/27/2017 62     LDL Cholesterol Direct (mg/dL)   Date Value   08/09/2021 59   11/15/2019 61   06/12/2018 64       How many servings of fruits and vegetables do you eat daily?  2-3    On average, how many sweetened beverages do you drink each day (Examples: soda, juice, sweet tea, etc.  Do NOT count diet or artificially sweetened beverages)?   2    How many days per week do you exercise enough to make your heart beat faster? 3 or less    How many minutes a day do you exercise enough to make your heart beat faster? 30 - 60    How many days per week do you miss taking your medication? 0    Concern - Fatigue  Onset: For the past couple of months  Description: Climbing stairs, getting tired easier then normal  Intensity: mild  Progression of Symptoms:  same    Has noted that he gets tired or a bit \"short of breath\" faster with walking and climbing steps.      No real change in activity level.  Has not gotten any worse, has been pretty stable since he first noticed it a few months ago.  Does nto limit his activity, just feel he gets more tired then he should and it is concerning for him and his wife.      No PND or orthopnea.  No swelling noted.  No cough.  No chest pain ever.      Occasionally will notice some pain in the back if standing for a long time or bending over in a strange way.      Bowels have been pretty normal for hm.  Taking 1 imodium daily.  Still somewhat loose.      No urinary changes, no blood in the urine.  Pretty stable.    Review of Systems   Constitutional, HEENT, cardiovascular, pulmonary, gi and gu systems are negative, except as otherwise noted.      Objective    /60   Pulse 84   Temp 97.7  F (36.5  C) (Tympanic)   Wt 93 kg (205 lb)   BMI 31.17 kg/m    Body mass index is 31.17 kg/m .  Physical Exam   PE:  VS as above   Gen:  WN/WD/WH male in NAD   Heart:  RRR without murmur, " nl S1, S2, no rubs or gallops   Lungs CTA leydi without rales/ronchi/wheezes   Ext:  No pedal edema      EKG - Reviewed and interpreted by me appears normal, NSR, normal axis, normal intervals, no acute ST/T changes c/w ischemia, no LVH by voltage criteria, unchanged from previous tracings  Epic reviewed    CXR:  No infiltrates of fluid overload per my visualization.

## 2021-09-09 NOTE — PATIENT INSTRUCTIONS
Your EKG and chest x-ray looked good to me today.  The radiologist will take a look at the chest x-ray as well.      Glad you have the Echocardiogram scheduled for tomorrow.  We'll let you know results when we see them.    If all looks good next step would be to consider a heart stress test.    Your diabetes looks good, keep up the good work there!

## 2021-09-10 ENCOUNTER — HOSPITAL ENCOUNTER (OUTPATIENT)
Dept: CARDIOLOGY | Facility: CLINIC | Age: 81
Discharge: HOME | End: 2021-09-10
Attending: FAMILY MEDICINE | Admitting: FAMILY MEDICINE
Payer: COMMERCIAL

## 2021-09-10 DIAGNOSIS — Z86.79 HISTORY OF MITRAL VALVE INSUFFICIENCY: ICD-10-CM

## 2021-09-10 DIAGNOSIS — R53.83 OTHER FATIGUE: ICD-10-CM

## 2021-09-10 DIAGNOSIS — R06.02 SHORTNESS OF BREATH: ICD-10-CM

## 2021-09-10 LAB — LVEF ECHO: NORMAL

## 2021-09-10 PROCEDURE — 93306 TTE W/DOPPLER COMPLETE: CPT | Mod: 26 | Performed by: INTERNAL MEDICINE

## 2021-09-10 PROCEDURE — 93306 TTE W/DOPPLER COMPLETE: CPT

## 2021-09-15 ENCOUNTER — TELEPHONE (OUTPATIENT)
Dept: FAMILY MEDICINE | Facility: CLINIC | Age: 81
End: 2021-09-15

## 2021-09-15 DIAGNOSIS — R06.09 DOE (DYSPNEA ON EXERTION): Primary | ICD-10-CM

## 2021-09-15 NOTE — TELEPHONE ENCOUNTER
Call placed to patient   Relayed Dr. Beaulieu's message    Patient verbalized understanding  No further questions/concerns    Sam Benítez RN

## 2021-09-15 NOTE — TELEPHONE ENCOUNTER
Please give Lamonte a call.  I saw his Echo results were unchanged which is great news.  As we discussed at his visit, our next step would be to do a stress test.    I ordered a lexiscan for him.  He can call [phone] to get that scheduled.  I will let him know results when I see them    Jovana Beaulieu, DO

## 2021-09-25 ENCOUNTER — HEALTH MAINTENANCE LETTER (OUTPATIENT)
Age: 81
End: 2021-09-25

## 2021-09-25 DIAGNOSIS — Z79.4 TYPE 2 DIABETES MELLITUS WITH DIABETIC POLYNEUROPATHY, WITH LONG-TERM CURRENT USE OF INSULIN (H): ICD-10-CM

## 2021-09-25 DIAGNOSIS — E11.42 TYPE 2 DIABETES MELLITUS WITH DIABETIC POLYNEUROPATHY, WITH LONG-TERM CURRENT USE OF INSULIN (H): ICD-10-CM

## 2021-09-27 RX ORDER — INSULIN GLARGINE 100 [IU]/ML
INJECTION, SOLUTION SUBCUTANEOUS
Qty: 30 ML | Refills: 2 | Status: SHIPPED | OUTPATIENT
Start: 2021-09-27 | End: 2022-06-03

## 2021-09-29 ENCOUNTER — HOSPITAL ENCOUNTER (OUTPATIENT)
Dept: NUCLEAR MEDICINE | Facility: CLINIC | Age: 81
Setting detail: NUCLEAR MEDICINE
End: 2021-09-29
Attending: FAMILY MEDICINE
Payer: COMMERCIAL

## 2021-09-29 ENCOUNTER — TELEPHONE (OUTPATIENT)
Dept: FAMILY MEDICINE | Facility: CLINIC | Age: 81
End: 2021-09-29

## 2021-09-29 ENCOUNTER — HOSPITAL ENCOUNTER (OUTPATIENT)
Dept: CARDIOLOGY | Facility: CLINIC | Age: 81
Setting detail: OBSERVATION
Discharge: HOME | End: 2021-09-29
Attending: FAMILY MEDICINE | Admitting: FAMILY MEDICINE
Payer: COMMERCIAL

## 2021-09-29 VITALS — WEIGHT: 205 LBS | HEIGHT: 68 IN | BODY MASS INDEX: 31.07 KG/M2

## 2021-09-29 DIAGNOSIS — R06.09 DOE (DYSPNEA ON EXERTION): ICD-10-CM

## 2021-09-29 DIAGNOSIS — R94.39 ABNORMAL CARDIOVASCULAR STRESS TEST: Primary | ICD-10-CM

## 2021-09-29 LAB
CV STRESS MAX HR HE: 100
RATE PRESSURE PRODUCT: NORMAL
STRESS ECHO BASELINE DIASTOLIC HE: 60
STRESS ECHO BASELINE HR: 90
STRESS ECHO BASELINE SYSTOLIC BP: 130
STRESS ECHO CALCULATED PERCENT HR: 72 %
STRESS ECHO LAST STRESS DIASTOLIC BP: 58
STRESS ECHO LAST STRESS SYSTOLIC BP: 114
STRESS ECHO TARGET HR: 139

## 2021-09-29 PROCEDURE — 93018 CV STRESS TEST I&R ONLY: CPT | Performed by: INTERNAL MEDICINE

## 2021-09-29 PROCEDURE — 78452 HT MUSCLE IMAGE SPECT MULT: CPT | Mod: 26 | Performed by: INTERNAL MEDICINE

## 2021-09-29 PROCEDURE — A9502 TC99M TETROFOSMIN: HCPCS | Performed by: FAMILY MEDICINE

## 2021-09-29 PROCEDURE — 78452 HT MUSCLE IMAGE SPECT MULT: CPT

## 2021-09-29 PROCEDURE — 93017 CV STRESS TEST TRACING ONLY: CPT

## 2021-09-29 PROCEDURE — 250N000011 HC RX IP 250 OP 636: Performed by: FAMILY MEDICINE

## 2021-09-29 PROCEDURE — 343N000001 HC RX 343: Performed by: FAMILY MEDICINE

## 2021-09-29 PROCEDURE — 93016 CV STRESS TEST SUPVJ ONLY: CPT

## 2021-09-29 RX ORDER — REGADENOSON 0.08 MG/ML
0.4 INJECTION, SOLUTION INTRAVENOUS ONCE
Status: COMPLETED | OUTPATIENT
Start: 2021-09-29 | End: 2021-09-29

## 2021-09-29 RX ADMIN — TETROFOSMIN 31.8 MCI.: 1.38 INJECTION, POWDER, LYOPHILIZED, FOR SOLUTION INTRAVENOUS at 10:21

## 2021-09-29 RX ADMIN — TETROFOSMIN 9.5 MCI.: 1.38 INJECTION, POWDER, LYOPHILIZED, FOR SOLUTION INTRAVENOUS at 08:40

## 2021-09-29 RX ADMIN — REGADENOSON 0.4 MG: 0.08 INJECTION, SOLUTION INTRAVENOUS at 10:44

## 2021-09-29 ASSESSMENT — MIFFLIN-ST. JEOR: SCORE: 1609.37

## 2021-09-29 NOTE — TELEPHONE ENCOUNTER
"Please call pt.  His stress test results showed a small area of decreased blood flow during exertion.  This could be what is causing his symptoms.  It needs to be further evaluated by cardiology.  I placed a referral for him, ideally he would be seen in the next week.  Referral was marked as \"urgent\".  Please give him scheduling phone number and have him let us know if he cannot get in in a reasonable time frame.    Jovana Beaulieu, DO    "

## 2021-09-29 NOTE — TELEPHONE ENCOUNTER
Call placed to patient  No answer; no voicemail option  Will need to re-call to relay Dr. Beaulieu's message    Sam Benítez RN

## 2021-10-04 NOTE — TELEPHONE ENCOUNTER
Call placed to patient  Relayed Dr. Beaulieu's message    Patient verbalized understanding  Provided patient with Cardiology scheduling number [phone]  No further questions/concerns    Sam Benítez RN

## 2021-10-11 DIAGNOSIS — E78.5 HYPERLIPIDEMIA LDL GOAL <70: ICD-10-CM

## 2021-10-13 RX ORDER — ATORVASTATIN CALCIUM 20 MG/1
TABLET, FILM COATED ORAL
Qty: 90 TABLET | Refills: 2 | Status: SHIPPED | OUTPATIENT
Start: 2021-10-13 | End: 2022-06-03

## 2021-11-02 NOTE — PROGRESS NOTES
CARDIOLOGY CONSULT    REASON FOR CONSULT: Dyspnea, abnormal stress test    PRIMARY CARE PHYSICIAN:  Jovana Beaulieu    HISTORY OF PRESENT ILLNESS:  81-year-old male seen for abnormal stress test.  He has peripheral vascular disease, hypertension, dyslipidemia, history of colorectal cancer, CKD, diabetes type 2, small bowel obstruction.    He does some light to moderate activity and is fairly active for his age.  In the past 3 to 4 months he has had some generalized fatigue and exertional dyspnea.  He can still go walking in stores and other activities, but feels more dyspneic.  He denies any chest pain or anginal symptoms.  He has no lightheadedness, dizziness, or edema.    Echo September 2021 showed EF 60%, normal RV, no valve disease.  Nuclear stress showed small area of ischemia of the apical segment.    PAST MEDICAL HISTORY:  Past Medical History:   Diagnosis Date     Acute urinary retention 11/2012    ER visit   / 1200 cc post-void residual     Acute urinary retention 11/1/2012    ER      Diabetes mellitus type II      Epididymitis 3/20/2014    Started on doxycyclline for UTI/orchitis. He was discharged on 03/22/14.     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     PE (pulmonary embolism) 3/20/2014     Rectal cancer (H)      Skin cancer      Squamous cell carcinoma      Thrombosis of leg     +PE       MEDICATIONS:  Current Outpatient Medications   Medication     amLODIPine (NORVASC) 10 MG tablet     aspirin 325 MG tablet     atorvastatin (LIPITOR) 20 MG tablet     blood glucose (ACCU-CHEK REGIS PLUS) test strip     blood glucose monitoring (NO BRAND SPECIFIED) meter device kit     Cholecalciferol (VITAMIN D-3) 1000 units CAPS     finasteride (PROSCAR) 5 MG tablet     insulin lispro (HUMALOG KWIKPEN) 100 UNIT/ML (1 unit dial) KWIKPEN     insulin pen needle (ULTICARE MINI) 31G X 6 MM miscellaneous      LANTUS SOLOSTAR 100 UNIT/ML soln     losartan (COZAAR) 100 MG tablet     tamsulosin (FLOMAX) 0.4 MG capsule     No current facility-administered medications for this visit.       ALLERGIES:  Allergies   Allergen Reactions     Nkda [No Known Drug Allergies]        SOCIAL HISTORY:  I have reviewed this patient's social history and updated it with pertinent information if needed. Storm Dutta  reports that he quit smoking about 19 years ago. His smoking use included cigars. He smoked 0.00 packs per day for 30.00 years. He has never used smokeless tobacco. He reports that he does not drink alcohol and does not use drugs.    FAMILY HISTORY:  I have reviewed this patient's family history and updated it with pertinent information if needed.   Family History   Problem Relation Age of Onset     Diabetes Mother      Hypertension Mother      Cancer Father      Cancer Maternal Grandmother      Alzheimer Disease Maternal Grandmother      Cardiovascular Paternal Grandmother      Breast Cancer Sister      Colon Cancer No family hx of      Colon Polyps No family hx of      Crohn's Disease No family hx of      Ulcerative Colitis No family hx of      Anesthesia Reaction No family hx of      Melanoma No family hx of        REVIEW OF SYSTEMS:  Constitutional:  No weight loss, fever, chills, weakness or fatigue.  HEENT:  Eyes:  No visual loss, blurred vision, double vision or yellow sclerae. No hearing loss, sneezing, congestion, runny nose or sore throat.  Skin:  No rash or itching.  Cardiovascular: per HPI  Respiratory: per HPI  GI:  No anorexia, nausea, vomiting or diarrhea. No abdominal pain or blood.  :  No dysurea, hematuria  Neurologic:  No headache, dizziness, syncope, paralysis, ataxia, numbness or tingling in the extremities. No change in bowel or bladder control.  Musculoskeletal:  No muscle, back pain, joint pain or stiffness.  Hematologic:  No anemia, bleeding or bruising.  Lymphatics:  No enlarged nodes. No  history of splenectomy.  Psychiatric:  No history of depression or anxiety.  Endocrine:  No reports of sweating, cold or heat intolerance. No polyuria or polydipsia.  Allergies:  No history of asthma, hives, eczema or rhinitis.    PHYSICAL EXAM:  BP (!) 150/74   Pulse 77   Wt 93.1 kg (205 lb 3.2 oz)   BMI 31.20 kg/m      Constitutional: awake, alert, no distress  Eyes: PERRL, sclera nonicteric  ENT: trachea midline  Respiratory: Lungs clear  Cardiovascular: Regular rate and rhythm, no murmurs  GI: nondistended, nontender, bowel sounds present  Lymph/Hematologic: no lymphadenopathy  Skin: dry, no rash  Musculoskeletal: good muscle tone, strength 5/5 in upper and lower extremities  Neurologic: no focal deficits  Neuropsychiatric: appropriate affact    DATA:  Labs:   August 2021: Potassium 4.2, creatinine 1.4  Recent Labs   Lab Test 08/09/21  1015 09/21/20  0820 06/12/18  0956 07/27/17  0850 08/04/16  1259 07/16/15  1027   CHOL  --  148  --  130  --  124   HDL  --  70  --  48  --  45   LDL 59 57   < > 62   < > 61   TRIG  --  104  --  101  --  91   CHOLHDLRATIO  --   --   --   --   --  2.8    < > = values in this interval not displayed.     ASSESSMENT:  81-year-old male seen for dyspnea and abnormal stress test.  Stress test had a small apical defect, partially reversible.  It is hard to say if this is true ischemia or apical thinning.  He has no classic angina, but dyspnea could be an anginal equivalent.  Chest CT from 2016 showed at least moderate LAD calcification, therefore coronary CTA  may not be interpretable.  We discussed a coronary angiogram.  Patient would like to go forward with this, but understands it is not emergent.  The risk and benefit of the procedure was explained in detail, he understands and wishes to proceed.    He has some mild CKD, therefore will give fluids and use limited contrast per protocol.    RECOMMENDATIONS:  1.  Dyspnea with mildly abnormal stress test, intermediate probability of  obstructive CAD  -Coronary angiogram  -Continue aspirin and other medications    Follow-up with SANJANA a few weeks after angiogram.    Dwaine Reilly MD  Cardiology - [facility]  Pager:  [phone]  Text Page  November 4, 2021

## 2021-11-04 ENCOUNTER — OFFICE VISIT (OUTPATIENT)
Dept: CARDIOLOGY | Facility: CLINIC | Age: 81
End: 2021-11-04
Attending: FAMILY MEDICINE
Payer: COMMERCIAL

## 2021-11-04 VITALS
BODY MASS INDEX: 31.2 KG/M2 | WEIGHT: 205.2 LBS | SYSTOLIC BLOOD PRESSURE: 150 MMHG | DIASTOLIC BLOOD PRESSURE: 74 MMHG | HEART RATE: 77 BPM

## 2021-11-04 DIAGNOSIS — R06.09 DOE (DYSPNEA ON EXERTION): ICD-10-CM

## 2021-11-04 DIAGNOSIS — R94.39 ABNORMAL CARDIOVASCULAR STRESS TEST: ICD-10-CM

## 2021-11-04 DIAGNOSIS — Z11.59 ENCOUNTER FOR SCREENING FOR OTHER VIRAL DISEASES: ICD-10-CM

## 2021-11-04 PROCEDURE — 99204 OFFICE O/P NEW MOD 45 MIN: CPT | Performed by: INTERNAL MEDICINE

## 2021-11-04 NOTE — PATIENT INSTRUCTIONS
"ANGIOGRAM  [facility]   [place], [place]   Contact [facility] scheduling if needed at [phone].      1. In preparation for your procedure, we require that you do the following:  a. Nothing to eat or drink after midnight if your procedure is before 12 noon.  b. Take your usual morning medications with a small sip of water immediately upon arising on the morning of your procedure unless outlined below:    Aspirin:  o If you currently take Aspirin, continue taking your same dose.  o If you do not take Aspirin, take 325mg of Aspirin the day before and the morning of the procedure.      Diabetic:  o If you take Insulin contact your Primary Care MD for the recommended dosage to take the evening prior/morning of the procedure.  c. If you have an allergy to contrast dye, please follow the instructions given to you today regarding pre-treatment.   2. You will be unable to drive after your procedure; please arrange to have someone drive you home. Make sure that there is a responsible adult with you for 24 hours after your procedure. Your procedure will be cancelled if you do not have transportation home or someone staying with you for 24 hrs.   3.  Your procedure will be done at [facility]. Please park in the  [address]amp  on the west side of [facility] on [address]. Take the skyway over [facility] to the hospital. Please check in on the first floor, \"Skyway Welcome Desk\" which is one floor down from the skyway level.   4. If you have any questions about your upcoming procedure, please contact nursing at [facility] at [phone] or at [facility] at [phone].  02/20/2013 S17  If your procedure is scheduled after 12 noon, you may have a clear liquid breakfast before 8:00am, then nothing else to eat or drink. See list below:   CLEAR LIQUID DIET   BEVERAGES:    Clear juice (apple/cranberry/white grape)    Water/Ice chips/Mineral or " sparkling water    Mild tea, without cream or milk    Coffee, without cream or milk    Clear soft drinks (ginger ale/lemon-lime soda/club soda) No cola or root beer    Electrolyte replacement drinks  SOUP:    Clear broth or bouillion  DESSERT:    Plain popsicles. Avoid popsicles with pureed fruit or fiber in them.    Hard candy    Flavored gelatin without fruit. (Jell-O) You may also drink gelatin as a warm beverage before it sets  OTHER:    Honey/Sugar/Sugar substitute/Salt  Avoid:    Any beverage you cannot see through    Beverages containing alcohol    Dairy products (milk/hot cocoa/buttermilk/cream)    Fruit smoothies/nectars/fruit juices with pulp/prune juice    Tomato or vegetable juice    Metamucil

## 2021-11-04 NOTE — LETTER
11/4/2021    Jovana Beaulieu, DO  [address] [place] [address]    RE: Storm Dutta       Dear Colleague,    I had the pleasure of seeing Storm Dutta in the [facility].  CARDIOLOGY CONSULT    REASON FOR CONSULT: Dyspnea, abnormal stress test    PRIMARY CARE PHYSICIAN:  Jovana Beaulieu    HISTORY OF PRESENT ILLNESS:  81-year-old male seen for abnormal stress test.  He has peripheral vascular disease, hypertension, dyslipidemia, history of colorectal cancer, CKD, diabetes type 2, small bowel obstruction.    He does some light to moderate activity and is fairly active for his age.  In the past 3 to 4 months he has had some generalized fatigue and exertional dyspnea.  He can still go walking in stores and other activities, but feels more dyspneic.  He denies any chest pain or anginal symptoms.  He has no lightheadedness, dizziness, or edema.    Echo September 2021 showed EF 60%, normal RV, no valve disease.  Nuclear stress showed small area of ischemia of the apical segment.    PAST MEDICAL HISTORY:  Past Medical History:   Diagnosis Date     Acute urinary retention 11/2012    ER visit   / 1200 cc post-void residual     Acute urinary retention 11/1/2012    ER      Diabetes mellitus type II      Epididymitis 3/20/2014    Started on doxycyclline for UTI/orchitis. He was discharged on 03/22/14.     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     PE (pulmonary embolism) 3/20/2014     Rectal cancer (H)      Skin cancer      Squamous cell carcinoma      Thrombosis of leg     +PE       MEDICATIONS:  Current Outpatient Medications   Medication     amLODIPine (NORVASC) 10 MG tablet     aspirin 325 MG tablet     atorvastatin (LIPITOR) 20 MG tablet     blood glucose (ACCU-CHEK REGIS PLUS) test strip     blood glucose monitoring (NO BRAND  SPECIFIED) meter device kit     Cholecalciferol (VITAMIN D-3) 1000 units CAPS     finasteride (PROSCAR) 5 MG tablet     insulin lispro (HUMALOG KWIKPEN) 100 UNIT/ML (1 unit dial) KWIKPEN     insulin pen needle (ULTICARE MINI) 31G X 6 MM miscellaneous     LANTUS SOLOSTAR 100 UNIT/ML soln     losartan (COZAAR) 100 MG tablet     tamsulosin (FLOMAX) 0.4 MG capsule     No current facility-administered medications for this visit.       ALLERGIES:  Allergies   Allergen Reactions     Nkda [No Known Drug Allergies]        SOCIAL HISTORY:  I have reviewed this patient's social history and updated it with pertinent information if needed. Storm Dutta  reports that he quit smoking about 19 years ago. His smoking use included cigars. He smoked 0.00 packs per day for 30.00 years. He has never used smokeless tobacco. He reports that he does not drink alcohol and does not use drugs.    FAMILY HISTORY:  I have reviewed this patient's family history and updated it with pertinent information if needed.   Family History   Problem Relation Age of Onset     Diabetes Mother      Hypertension Mother      Cancer Father      Cancer Maternal Grandmother      Alzheimer Disease Maternal Grandmother      Cardiovascular Paternal Grandmother      Breast Cancer Sister      Colon Cancer No family hx of      Colon Polyps No family hx of      Crohn's Disease No family hx of      Ulcerative Colitis No family hx of      Anesthesia Reaction No family hx of      Melanoma No family hx of        REVIEW OF SYSTEMS:  Constitutional:  No weight loss, fever, chills, weakness or fatigue.  HEENT:  Eyes:  No visual loss, blurred vision, double vision or yellow sclerae. No hearing loss, sneezing, congestion, runny nose or sore throat.  Skin:  No rash or itching.  Cardiovascular: per HPI  Respiratory: per HPI  GI:  No anorexia, nausea, vomiting or diarrhea. No abdominal pain or blood.  :  No dysurea, hematuria  Neurologic:  No headache, dizziness, syncope,  paralysis, ataxia, numbness or tingling in the extremities. No change in bowel or bladder control.  Musculoskeletal:  No muscle, back pain, joint pain or stiffness.  Hematologic:  No anemia, bleeding or bruising.  Lymphatics:  No enlarged nodes. No history of splenectomy.  Psychiatric:  No history of depression or anxiety.  Endocrine:  No reports of sweating, cold or heat intolerance. No polyuria or polydipsia.  Allergies:  No history of asthma, hives, eczema or rhinitis.    PHYSICAL EXAM:  BP (!) 150/74   Pulse 77   Wt 93.1 kg (205 lb 3.2 oz)   BMI 31.20 kg/m      Constitutional: awake, alert, no distress  Eyes: PERRL, sclera nonicteric  ENT: trachea midline  Respiratory: Lungs clear  Cardiovascular: Regular rate and rhythm, no murmurs  GI: nondistended, nontender, bowel sounds present  Lymph/Hematologic: no lymphadenopathy  Skin: dry, no rash  Musculoskeletal: good muscle tone, strength 5/5 in upper and lower extremities  Neurologic: no focal deficits  Neuropsychiatric: appropriate affact    DATA:  Labs:   August 2021: Potassium 4.2, creatinine 1.4  Recent Labs   Lab Test 08/09/21  1015 09/21/20  0820 06/12/18  0956 07/27/17  0850 08/04/16  1259 07/16/15  1027   CHOL  --  148  --  130  --  124   HDL  --  70  --  48  --  45   LDL 59 57   < > 62   < > 61   TRIG  --  104  --  101  --  91   CHOLHDLRATIO  --   --   --   --   --  2.8    < > = values in this interval not displayed.     ASSESSMENT:  81-year-old male seen for dyspnea and abnormal stress test.  Stress test had a small apical defect, partially reversible.  It is hard to say if this is true ischemia or apical thinning.  He has no classic angina, but dyspnea could be an anginal equivalent.  Chest CT from 2016 showed at least moderate LAD calcification, therefore coronary CTA  may not be interpretable.  We discussed a coronary angiogram.  Patient would like to go forward with this, but understands it is not emergent.  The risk and benefit of the procedure  was explained in detail, he understands and wishes to proceed.    He has some mild CKD, therefore will give fluids and use limited contrast per protocol.    RECOMMENDATIONS:  1.  Dyspnea with mildly abnormal stress test, intermediate probability of obstructive CAD  -Coronary angiogram  -Continue aspirin and other medications    Follow-up with SANJANA a few weeks after angiogram.      Dwaine Reilly MD  Cardiology - [facility]  Pager:  [phone]  Text Page  November 4, 2021      cc:   Jovana Beaulieu DO  [id number] [facility]  [place] [id number]

## 2021-11-08 ENCOUNTER — ALLIED HEALTH/NURSE VISIT (OUTPATIENT)
Dept: AUDIOLOGY | Facility: CLINIC | Age: 81
End: 2021-11-08
Payer: COMMERCIAL

## 2021-11-08 DIAGNOSIS — H90.3 SENSORY HEARING LOSS, BILATERAL: Primary | ICD-10-CM

## 2021-11-08 PROCEDURE — 99207 PR NO CHARGE LOS: CPT | Performed by: AUDIOLOGIST

## 2021-11-08 PROCEDURE — V5299 HEARING SERVICE: HCPCS | Performed by: AUDIOLOGIST

## 2021-11-08 NOTE — PROGRESS NOTES
[facility]        SUBJECTIVE:  Storm Dutta, 81 year old male requests in office repair of his 2019 left  Phonak Audeo M90-R hearing aid. He reports the hearing aid has stopped working.     OBJECTIVE:  Replaced plugged cerushield wax guard vent and medium open dome. Verified hearing aid functionality.      ASSESSMENT/PLAN:    Patient will  his repaired hearing aid at the specialty clinic .     Sara WILSON-DHAVAL, #[id number]

## 2021-11-10 ENCOUNTER — TRANSFERRED RECORDS (OUTPATIENT)
Dept: HEALTH INFORMATION MANAGEMENT | Facility: CLINIC | Age: 81
End: 2021-11-10
Payer: COMMERCIAL

## 2021-11-10 LAB — RETINOPATHY: POSITIVE

## 2021-11-18 DIAGNOSIS — R94.39 ABNORMAL CARDIOVASCULAR STRESS TEST: Primary | ICD-10-CM

## 2021-11-18 RX ORDER — SODIUM CHLORIDE 9 MG/ML
INJECTION, SOLUTION INTRAVENOUS CONTINUOUS
Status: CANCELLED | OUTPATIENT
Start: 2021-11-18

## 2021-11-18 RX ORDER — LIDOCAINE 40 MG/G
CREAM TOPICAL
Status: CANCELLED | OUTPATIENT
Start: 2021-11-18

## 2021-11-19 ENCOUNTER — LAB (OUTPATIENT)
Dept: LAB | Facility: CLINIC | Age: 81
End: 2021-11-19
Payer: COMMERCIAL

## 2021-11-19 DIAGNOSIS — Z11.59 ENCOUNTER FOR SCREENING FOR OTHER VIRAL DISEASES: ICD-10-CM

## 2021-11-19 PROCEDURE — U0003 INFECTIOUS AGENT DETECTION BY NUCLEIC ACID (DNA OR RNA); SEVERE ACUTE RESPIRATORY SYNDROME CORONAVIRUS 2 (SARS-COV-2) (CORONAVIRUS DISEASE [COVID-19]), AMPLIFIED PROBE TECHNIQUE, MAKING USE OF HIGH THROUGHPUT TECHNOLOGIES AS DESCRIBED BY CMS-2020-01-R: HCPCS | Performed by: INTERNAL MEDICINE

## 2021-11-19 PROCEDURE — U0005 INFEC AGEN DETEC AMPLI PROBE: HCPCS | Performed by: INTERNAL MEDICINE

## 2021-11-20 LAB — SARS-COV-2 RNA RESP QL NAA+PROBE: NEGATIVE

## 2021-11-22 ENCOUNTER — HOSPITAL ENCOUNTER (OUTPATIENT)
Facility: CLINIC | Age: 81
Discharge: HOME | End: 2021-11-22
Admitting: INTERNAL MEDICINE
Payer: COMMERCIAL

## 2021-11-22 VITALS
DIASTOLIC BLOOD PRESSURE: 78 MMHG | RESPIRATION RATE: 16 BRPM | TEMPERATURE: 98.2 F | SYSTOLIC BLOOD PRESSURE: 127 MMHG | BODY MASS INDEX: 30.21 KG/M2 | OXYGEN SATURATION: 99 % | HEIGHT: 69 IN | WEIGHT: 204 LBS | HEART RATE: 66 BPM

## 2021-11-22 DIAGNOSIS — R06.09 DOE (DYSPNEA ON EXERTION): ICD-10-CM

## 2021-11-22 DIAGNOSIS — R94.39 ABNORMAL CARDIOVASCULAR STRESS TEST: ICD-10-CM

## 2021-11-22 PROBLEM — Z98.890 STATUS POST CORONARY ANGIOGRAM: Status: ACTIVE | Noted: 2021-11-22

## 2021-11-22 LAB
ANION GAP SERPL CALCULATED.3IONS-SCNC: 6 MMOL/L (ref 3–14)
APTT PPP: 35 SECONDS (ref 22–38)
BUN SERPL-MCNC: 31 MG/DL (ref 7–30)
CALCIUM SERPL-MCNC: 9.6 MG/DL (ref 8.5–10.1)
CHLORIDE BLD-SCNC: 109 MMOL/L (ref 94–109)
CO2 SERPL-SCNC: 24 MMOL/L (ref 20–32)
CREAT SERPL-MCNC: 1.29 MG/DL (ref 0.66–1.25)
ERYTHROCYTE [DISTWIDTH] IN BLOOD BY AUTOMATED COUNT: 13.6 % (ref 10–15)
GFR SERPL CREATININE-BSD FRML MDRD: 52 ML/MIN/1.73M2
GLUCOSE BLD-MCNC: 130 MG/DL (ref 70–99)
GLUCOSE BLDC GLUCOMTR-MCNC: 121 MG/DL (ref 70–99)
HCT VFR BLD AUTO: 37.8 % (ref 40–53)
HGB BLD-MCNC: 12.4 G/DL (ref 13.3–17.7)
INR PPP: 1.03 (ref 0.85–1.15)
MCH RBC QN AUTO: 29.7 PG (ref 26.5–33)
MCHC RBC AUTO-ENTMCNC: 32.8 G/DL (ref 31.5–36.5)
MCV RBC AUTO: 91 FL (ref 78–100)
PLATELET # BLD AUTO: 193 10E3/UL (ref 150–450)
POTASSIUM BLD-SCNC: 4.1 MMOL/L (ref 3.4–5.3)
RBC # BLD AUTO: 4.17 10E6/UL (ref 4.4–5.9)
SODIUM SERPL-SCNC: 139 MMOL/L (ref 133–144)
WBC # BLD AUTO: 6.2 10E3/UL (ref 4–11)

## 2021-11-22 PROCEDURE — 258N000003 HC RX IP 258 OP 636: Performed by: INTERNAL MEDICINE

## 2021-11-22 PROCEDURE — 85610 PROTHROMBIN TIME: CPT | Performed by: INTERNAL MEDICINE

## 2021-11-22 PROCEDURE — 250N000009 HC RX 250: Performed by: INTERNAL MEDICINE

## 2021-11-22 PROCEDURE — 80048 BASIC METABOLIC PNL TOTAL CA: CPT | Performed by: INTERNAL MEDICINE

## 2021-11-22 PROCEDURE — 85730 THROMBOPLASTIN TIME PARTIAL: CPT | Performed by: INTERNAL MEDICINE

## 2021-11-22 PROCEDURE — 99152 MOD SED SAME PHYS/QHP 5/>YRS: CPT | Performed by: INTERNAL MEDICINE

## 2021-11-22 PROCEDURE — 93005 ELECTROCARDIOGRAM TRACING: CPT

## 2021-11-22 PROCEDURE — 250N000011 HC RX IP 250 OP 636: Performed by: INTERNAL MEDICINE

## 2021-11-22 PROCEDURE — 93454 CORONARY ARTERY ANGIO S&I: CPT | Performed by: INTERNAL MEDICINE

## 2021-11-22 PROCEDURE — 272N000001 HC OR GENERAL SUPPLY STERILE: Performed by: INTERNAL MEDICINE

## 2021-11-22 PROCEDURE — 999N000184 HC STATISTIC TELEMETRY

## 2021-11-22 PROCEDURE — 36415 COLL VENOUS BLD VENIPUNCTURE: CPT | Performed by: INTERNAL MEDICINE

## 2021-11-22 PROCEDURE — 999N000071 HC STATISTIC HEART CATH LAB OR EP LAB

## 2021-11-22 PROCEDURE — 93454 CORONARY ARTERY ANGIO S&I: CPT | Mod: 26 | Performed by: INTERNAL MEDICINE

## 2021-11-22 PROCEDURE — 999N000054 HC STATISTIC EKG NON-CHARGEABLE

## 2021-11-22 PROCEDURE — 85027 COMPLETE CBC AUTOMATED: CPT | Performed by: INTERNAL MEDICINE

## 2021-11-22 PROCEDURE — 82962 GLUCOSE BLOOD TEST: CPT

## 2021-11-22 RX ORDER — METOPROLOL TARTRATE 25 MG/1
25 TABLET, FILM COATED ORAL 2 TIMES DAILY
Qty: 180 TABLET | Refills: 3 | Status: SHIPPED | OUTPATIENT
Start: 2021-11-22 | End: 2021-12-03 | Stop reason: ALTCHOICE

## 2021-11-22 RX ORDER — SODIUM CHLORIDE 9 MG/ML
INJECTION, SOLUTION INTRAVENOUS CONTINUOUS
Status: DISCONTINUED | OUTPATIENT
Start: 2021-11-22 | End: 2021-11-22 | Stop reason: HOSPADM

## 2021-11-22 RX ORDER — FLUMAZENIL 0.1 MG/ML
0.2 INJECTION, SOLUTION INTRAVENOUS
Status: DISCONTINUED | OUTPATIENT
Start: 2021-11-22 | End: 2021-11-22 | Stop reason: HOSPADM

## 2021-11-22 RX ORDER — DEXTROSE MONOHYDRATE 25 G/50ML
25-50 INJECTION, SOLUTION INTRAVENOUS
Status: DISCONTINUED | OUTPATIENT
Start: 2021-11-22 | End: 2021-11-22 | Stop reason: HOSPADM

## 2021-11-22 RX ORDER — NALOXONE HYDROCHLORIDE 0.4 MG/ML
0.2 INJECTION, SOLUTION INTRAMUSCULAR; INTRAVENOUS; SUBCUTANEOUS
Status: DISCONTINUED | OUTPATIENT
Start: 2021-11-22 | End: 2021-11-22 | Stop reason: HOSPADM

## 2021-11-22 RX ORDER — NICOTINE POLACRILEX 4 MG
15-30 LOZENGE BUCCAL
Status: DISCONTINUED | OUTPATIENT
Start: 2021-11-22 | End: 2021-11-22 | Stop reason: HOSPADM

## 2021-11-22 RX ORDER — FENTANYL CITRATE 50 UG/ML
25 INJECTION, SOLUTION INTRAMUSCULAR; INTRAVENOUS
Status: DISCONTINUED | OUTPATIENT
Start: 2021-11-22 | End: 2021-11-22 | Stop reason: HOSPADM

## 2021-11-22 RX ORDER — NALOXONE HYDROCHLORIDE 0.4 MG/ML
0.4 INJECTION, SOLUTION INTRAMUSCULAR; INTRAVENOUS; SUBCUTANEOUS
Status: DISCONTINUED | OUTPATIENT
Start: 2021-11-22 | End: 2021-11-22 | Stop reason: HOSPADM

## 2021-11-22 RX ORDER — LIDOCAINE 40 MG/G
CREAM TOPICAL
Status: DISCONTINUED | OUTPATIENT
Start: 2021-11-22 | End: 2021-11-22 | Stop reason: HOSPADM

## 2021-11-22 RX ORDER — ATROPINE SULFATE 0.1 MG/ML
0.5 INJECTION INTRAVENOUS
Status: DISCONTINUED | OUTPATIENT
Start: 2021-11-22 | End: 2021-11-22 | Stop reason: HOSPADM

## 2021-11-22 RX ORDER — OXYCODONE HYDROCHLORIDE 5 MG/1
5 TABLET ORAL EVERY 4 HOURS PRN
Status: DISCONTINUED | OUTPATIENT
Start: 2021-11-22 | End: 2021-11-22 | Stop reason: HOSPADM

## 2021-11-22 RX ORDER — FENTANYL CITRATE 50 UG/ML
INJECTION, SOLUTION INTRAMUSCULAR; INTRAVENOUS
Status: DISCONTINUED | OUTPATIENT
Start: 2021-11-22 | End: 2021-11-22 | Stop reason: HOSPADM

## 2021-11-22 RX ORDER — OXYCODONE HYDROCHLORIDE 5 MG/1
10 TABLET ORAL EVERY 4 HOURS PRN
Status: DISCONTINUED | OUTPATIENT
Start: 2021-11-22 | End: 2021-11-22 | Stop reason: HOSPADM

## 2021-11-22 RX ORDER — ACETAMINOPHEN 325 MG/1
650 TABLET ORAL EVERY 4 HOURS PRN
Status: DISCONTINUED | OUTPATIENT
Start: 2021-11-22 | End: 2021-11-22 | Stop reason: HOSPADM

## 2021-11-22 RX ORDER — IOPAMIDOL 755 MG/ML
INJECTION, SOLUTION INTRAVASCULAR
Status: DISCONTINUED | OUTPATIENT
Start: 2021-11-22 | End: 2021-11-22 | Stop reason: HOSPADM

## 2021-11-22 RX ADMIN — SODIUM CHLORIDE: 9 INJECTION, SOLUTION INTRAVENOUS at 06:56

## 2021-11-22 ASSESSMENT — MIFFLIN-ST. JEOR: SCORE: 1612.78

## 2021-11-22 NOTE — DISCHARGE INSTRUCTIONS
Cardiac Angiogram Discharge Instructions - Femoral    After you go home:      Have an adult stay with you until tomorrow.    Drink extra fluids for 2 days.    You may resume your normal diet.    No smoking       For 24 hours - due to the sedation you received:    Relax and take it easy.    Do NOT make any important or legal decisions.    Do NOT drive or operate machines at home or at work.    Do NOT drink alcohol.    Care of Groin Puncture Site:      For the first 24 hrs - check the puncture site every 1-2 hours while awake.    For 2 days, when you cough, sneeze, laugh or move your bowels, hold your hand over the puncture site and press firmly.    Remove the bandaid after 24 hours. If there is minor oozing, apply another bandaid and remove it after 12 hours.    It is normal to have a small bruise or pea size lump at the site.    You may shower tomorrow. Do NOT take a bath, or use a hot tub or pool for at least 3 days. Do NOT scrub the site. Do not use lotion or powder near the puncture site.    Activity:            For 2 days:    No stooping or squatting    Do NOT do any heavy activity such as exercise, lifting, or straining.     No housework, yard work or any activity that make you sweat    Do NOT lift more than 10 pounds    Bleeding:      If you start bleeding from the site in your groin, lie down flat and press firmly on/above the site for 10 minutes.     Once bleeding stops, lay flat for 2 hours.     Call [facility] as soon as you can.       Call 911 right away if you have heavy bleeding or bleeding that does not stop.      Medicines:      If you are taking an antiplatelet medication such as Plavix, Brilinta or Effient, do not stop taking it until you talk to your cardiologist.      If you are on Metformin (Glucophage), do not restart it until you have blood tests (within 2 to 3 days after discharge).  After you have your blood drawn, you may restart the Metformin.     Take your medications, including blood  thinners, unless your provider tells you not to.      If you take Coumadin (Warfarin), have your INR checked by your provider in  3-5 days. Call your clinic to schedule this.    If you have stopped any medicines, check with your provider about when to restart them.    Follow Up Appointments:      Follow up with [facility] Nurse Practitioner at [facility] of patient preference in 7-10 days.    Call the clinic if:      You have increased pain or a large or growing hard lump around the site.    The site is red, swollen, hot or tender.    Blood or fluid is draining from the site.    You have chills or a fever greater than 101 F (38 C).    Your leg feels numb, cool or changes color.    You have hives, a rash or unusual itching.    New pain in the back or belly that you cannot control with Tylenol.    Any questions or concerns.          [facility] [place]:    [phone] [facility] (7 days a week)

## 2021-11-22 NOTE — PRE-PROCEDURE
GENERAL PRE-PROCEDURE:   Procedure:  Coronary angiogram, possible intervention  Date/Time:  11/22/2021 8:24 AM    Verbal consent obtained?: Yes    Risks and benefits: Risks, benefits and alternatives were discussed    Consent given by:  Patient  Patient states understanding of procedure being performed: Yes    Patient's understanding of procedure matches consent: Yes    Procedure consent matches procedure scheduled: Yes    Expected level of sedation:  Moderate  Appropriately NPO:  Yes  ASA Class:  2  Mallampati  :  Grade 2- soft palate, base of uvula, tonsillar pillars, and portion of posterior pharyngeal wall visible  Lungs:  Lungs clear with good breath sounds bilaterally  Heart:  Normal heart sounds and rate  History & Physical reviewed:  History and physical reviewed and no updates needed  Statement of review:  I have reviewed the lab findings, diagnostic data, medications, and the plan for sedation

## 2021-11-22 NOTE — PROGRESS NOTES
Care Suites Admission Nursing Note    Patient Information  Name: Storm Dutta  Age: 81 year old  Reason for admission: heart cath   Care Suites arrival time: 0630    Visitor Information  Name: abiola  Informed of visitor restrictions: Yes  1 visitor allowed per patient   Visitor must screen negative for COVID symptoms   Visitor must wear a mask  Waiting rooms closed to visitors    Patient Admission/Assessment   Pre-procedure assessment complete: Yes  If abnormal assessment/labs, provider notified: N/A  NPO: Yes  Medications held per instructions/orders: Yes  Consent: deferred  If applicable, pregnancy test status: deferred  Patient oriented to room: Yes  Education/questions answered: Yes  Plan/other: heart cath    Discharge Planning  Discharge name/phone number: abiola [phone]   Overnight post sedation caregiver: abiola  Discharge location: home    Janet Hartley RN

## 2021-11-22 NOTE — PROGRESS NOTES
PATIENT/VISITOR WELLNESS SCREENING    Step 1 Patient Screening    1. In the last month, have you been in contact with someone who was confirmed or suspected to have Coronavirus/COVID-19? No    2. Do you have the following symptoms?  Fever/Chills? No   Cough? No   Shortness of breath? No   New loss of taste or smell? No  Sore throat? No  Muscle or body aches? No  Headaches? No  Fatigue? No  Vomiting or diarrhea? No    Step 2 Visitor Screening    1. Name of Visitor (1 visitor per patient):     2. In the last month, have you been in contact with someone who was confirmed or suspected to have Coronavirus/COVID-19? No    3. Do you have the following symptoms?  Fever/Chills? No   Cough? No   Shortness of breath? No   Skin rash? No   Loss of taste or smell? No  Sore throat? No  Runny or stuffy nose? No  Muscle or body aches? No  Headaches? No  Fatigue? No  Vomiting or diarrhea? No    If the visitor has positive symptoms, notify supervisor/manger  Per policy, the visitor will need to leave the facility     Step 3 Refer to logic grid below for actions    NO SYMPTOM(S)    ACTIONS:  1. Standard rooming process  2. Provider to assess per normal protocol  3. Implement precautions as needed and per guidelines     POSITIVE SYMPTOM(S)  If positive for ANY of the following symptoms: fever, cough, shortness of breath, rash    ACTION:  1. Continue to have the patient wear a mask   2. Room patient as soon as possible  3. Don appropriate PPE when entering room  4. Provider evaluation

## 2021-11-22 NOTE — PROGRESS NOTES
Care Suites Discharge Nursing Note    Patient Information  Name: Storm Dutta  Age: 81 year old    Discharge Education:  Discharge instructions reviewed: Yes  Additional education/resources provided: no  Patient/patient representative verbalizes understanding: Yes  Patient discharging on new medications: No  Medication education completed: N/A    Discharge Plans:   Discharge location: home  Discharge ride contacted: Yes  Approximate discharge time: 1115    Discharge Criteria:  Discharge criteria met and vital signs stable: Yes    Patient Belongs:  Patient belongings returned to patient: Yes    Janet Hartley RN

## 2021-11-26 LAB
ATRIAL RATE - MUSE: 71 BPM
DIASTOLIC BLOOD PRESSURE - MUSE: NORMAL MMHG
INTERPRETATION ECG - MUSE: NORMAL
P AXIS - MUSE: 37 DEGREES
PR INTERVAL - MUSE: 216 MS
QRS DURATION - MUSE: 74 MS
QT - MUSE: 416 MS
QTC - MUSE: 452 MS
R AXIS - MUSE: 3 DEGREES
SYSTOLIC BLOOD PRESSURE - MUSE: NORMAL MMHG
T AXIS - MUSE: 25 DEGREES
VENTRICULAR RATE- MUSE: 71 BPM

## 2021-12-03 ENCOUNTER — OFFICE VISIT (OUTPATIENT)
Dept: CARDIOLOGY | Facility: CLINIC | Age: 81
End: 2021-12-03
Payer: COMMERCIAL

## 2021-12-03 VITALS
WEIGHT: 207 LBS | SYSTOLIC BLOOD PRESSURE: 155 MMHG | BODY MASS INDEX: 31.02 KG/M2 | DIASTOLIC BLOOD PRESSURE: 73 MMHG | HEART RATE: 86 BPM | OXYGEN SATURATION: 97 %

## 2021-12-03 DIAGNOSIS — I10 BENIGN ESSENTIAL HYPERTENSION: ICD-10-CM

## 2021-12-03 DIAGNOSIS — R06.09 DOE (DYSPNEA ON EXERTION): ICD-10-CM

## 2021-12-03 DIAGNOSIS — I25.10 CORONARY ARTERY DISEASE INVOLVING NATIVE CORONARY ARTERY OF NATIVE HEART WITHOUT ANGINA PECTORIS: Primary | ICD-10-CM

## 2021-12-03 DIAGNOSIS — E78.5 HYPERLIPIDEMIA LDL GOAL <70: ICD-10-CM

## 2021-12-03 PROCEDURE — 99214 OFFICE O/P EST MOD 30 MIN: CPT | Performed by: NURSE PRACTITIONER

## 2021-12-03 RX ORDER — METOPROLOL SUCCINATE 25 MG/1
25 TABLET, EXTENDED RELEASE ORAL DAILY
Qty: 30 TABLET | Refills: 11 | Status: SHIPPED | OUTPATIENT
Start: 2021-12-03 | End: 2022-10-21

## 2021-12-03 RX ORDER — METOPROLOL TARTRATE 25 MG/1
25 TABLET, FILM COATED ORAL 2 TIMES DAILY
Qty: 180 TABLET | Refills: 3 | Status: CANCELLED | OUTPATIENT
Start: 2021-12-03

## 2021-12-03 NOTE — PROGRESS NOTES
Cardiology Clinic Progress Note  Storm Dutta MRN# [id number]   YOB: 1940 Age: 81 year old      Primary Cardiologist:   Dr. Reilly           History of Presenting Illness:      tSorm Dutta is a pleasant 81 year old patient with a past cardiac history significant for   1. CAD  2. Hypertension  3. Hyperlipidemia  4. PVD  Past medical history significant for colorectal cancer, CKD, DM type II, small bowel obstruction, prior tobacco use 10-15 years.     Patient was seen by Dr. Reilly in November 2021 for consultation of abnormal stress test.  He reported generalized fatigue and dyspnea on exertion for the past 3 to 4 months.  He was able to do light to moderate activity.  Echocardiogram September 2021 showed EF 60%, normal RV, no valve disease.  Nuclear stress test showed small area of ischemia.  Angiogram was recommended.    Pt presents today for angiogram follow-up.  Coronary angiogram 11/22/2021 showed multivessel CAD but poor LAD target.  Medical management was recommended and started on metoprolol.  Results reviewed today.    Unfortunately, he never received the metoprolol so we will resend this to the pharmacy.  Blood pressure today is mildly elevated and will hopefully improve after starting metoprolol.  He will monitor these at home.  Of note, he is on full dose aspirin per recommendations from oncology.  He continues with dyspnea on exertion and fatigue which have been stable since his last visit.  He does also have a history of tobacco use of approximately 10 to 15 years with pipe and cigars.  PCP mentioned possibly PFTs if no significant coronary disease.  Given his angiogram findings, we will see if we can improve symptoms with antianginals, but symptoms may be multifactorial.  Right femoral angiogram site has a moderate amount of ecchymosis and is without bleeding, hematoma, or bruit.  Patient reports no chest pain, PND, orthopnea, presyncope, syncope, edema, heart racing, or  palpitations.      Labs:  LIPID RESULTS:  Lab Results   Component Value Date    CHOL 148 09/21/2020    HDL 70 09/21/2020    LDL 59 08/09/2021    LDL 57 09/21/2020    TRIG 104 09/21/2020    CHOLHDLRATIO 2.8 07/16/2015       LIVER ENZYME RESULTS:  Lab Results   Component Value Date    AST 16 08/09/2021    AST 14 09/21/2020    ALT 19 08/09/2021    ALT 23 09/21/2020       CBC RESULTS:  Lab Results   Component Value Date    WBC 6.2 11/22/2021    WBC 6.2 01/22/2021    RBC 4.17 (L) 11/22/2021    RBC 4.20 (L) 01/22/2021    HGB 12.4 (L) 11/22/2021    HGB 12.7 (L) 01/22/2021    HCT 37.8 (L) 11/22/2021    HCT 39.5 (L) 01/22/2021    MCV 91 11/22/2021    MCV 94 01/22/2021    MCH 29.7 11/22/2021    MCH 30.2 01/22/2021    MCHC 32.8 11/22/2021    MCHC 32.2 01/22/2021    RDW 13.6 11/22/2021    RDW 13.4 01/22/2021     11/22/2021     01/22/2021       BMP RESULTS:  Lab Results   Component Value Date     11/22/2021     01/22/2021    POTASSIUM 4.1 11/22/2021    POTASSIUM 4.3 01/22/2021    CHLORIDE 109 11/22/2021    CHLORIDE 106 01/22/2021    CO2 24 11/22/2021    CO2 27 01/22/2021    ANIONGAP 6 11/22/2021    ANIONGAP 4 01/22/2021     (H) 11/22/2021     (H) 11/22/2021     (H) 01/22/2021    BUN 31 (H) 11/22/2021    BUN 29 01/22/2021    CR 1.29 (H) 11/22/2021    CR 1.34 (H) 01/22/2021    GFRESTIMATED 52 (L) 11/22/2021    GFRESTIMATED 50 (L) 01/22/2021    GFRESTBLACK 57 (L) 01/22/2021    PILY 9.6 11/22/2021    PILY 9.5 01/22/2021        A1C RESULTS:  Lab Results   Component Value Date    A1C 7.1 (H) 08/09/2021    A1C 7.6 (H) 01/22/2021       INR RESULTS:  Lab Results   Component Value Date    INR 1.03 11/22/2021    INR 1.01 04/26/2014    INR 0.95 04/15/2014       Results reviewed today.       Current Cardiac Medications     Amlodipine 10 mg daily  Aspirin 325 mg daily  Atorvastatin 20 mg daily  Losartan 100 mg daily  Lopressor 25 mg twice daily-patient not taking                   Assessment and  Plan:       Plan    Patient Instructions   Medication Changes:  START metoprolol XL 25 mg daily      Recommendations:  1. Check blood pressure at least 1 hour after medications. Call the clinic if your blood pressure is consistently greater than 130/80.     Follow-up:  1. Annual Fasting lab within 2 months (lipid/ALT)  2. See Rosio LUCAS for cardiology follow up at [place] [facility] [place]: 1 month      Cardiology Scheduling~[phone]  Cardiology Clinic RN~[phone] (Yeni Honeycutt, RN and Rere FRIED RN)          1. CAD    Angio 11/2021 with P RCA , Distal LCx 40%, OM1 20%, LAD 60 to 75%, poor LAD target so medical management recommended.    TINAJERO and fatigue - stable     Continue statin, aspirin, ARB, beta-blocker, CCB    Consider complex PCI if needed       2. Hypertension    Not well controlled     Continue amlodipine, losartan, metoprolol      3. Hyperlipidemia    Last LDL 57 on 9/2020    Continue atorvastatin 20 mg daily      4. TINAJERO     Coronary disease unable to stent-medical management    10 to 15-year history of cigar and pipe use             Thank you for allowing me to participate in this delightful patient's care.      This note was completed in part using Dragon voice recognition software. Although reviewed after completion, some word and grammatical errors may occur.    Rosio Vela, NASRA CNP, APRN, CNP           Data:   All laboratory data reviewed      Total time spent today was 37 mins, reviewing labs, testing, notes, documenting notes, and seeing patient.          Constitutional:  cooperative, alert and oriented, well developed, well nourished, in no acute distress  overweight    Skin:  warm and dry to the touch         Head:  normocephalic       Eyes:  pupils equal and round       ENT:  no pallor or cyanosis       Neck:  no stiffness       Respiratory:  clear to auscultation; normal symmetry        Cardiac: regular rate and rhythm; normal S1 and S2                 pulses full and equal      GI:  abdomen soft, nondistended     Extremities and Muscular Skeletal:  no edema        Neurological:  affect appropriate; no gross motor deficits       Psych:  Alert and Oriented x 3 , appropriate affact

## 2021-12-03 NOTE — PATIENT INSTRUCTIONS
Medication Changes:  START metoprolol XL 25 mg daily      Recommendations:  1. Check blood pressure at least 1 hour after medications. Call the clinic if your blood pressure is consistently greater than 130/80.     Follow-up:  1. Annual Fasting lab within 2 months (lipid/ALT)  2. See Rosio LUCAS for cardiology follow up at [facility]: 1 month      Cardiology Scheduling~[phone]  Cardiology Clinic RN~[phone] (Yeni Honeycutt RN and Rere FRIED RN)

## 2021-12-03 NOTE — LETTER
12/3/2021    Jovana Beaulieu,   [address] [place] [address]    RE: Storm Dutta       Dear Colleague,    I had the pleasure of seeing Storm Dutta in the [facility].  Cardiology Clinic Progress Note  Storm Dutta MRN# [id number]   YOB: 1940 Age: 81 year old      Primary Cardiologist:   Dr. Reilly           History of Presenting Illness:      Storm Dutta is a pleasant 81 year old patient with a past cardiac history significant for   1. CAD  2. Hypertension  3. Hyperlipidemia  4. PVD  Past medical history significant for colorectal cancer, CKD, DM type II, small bowel obstruction, prior tobacco use 10-15 years.     Patient was seen by Dr. Reilly in November 2021 for consultation of abnormal stress test.  He reported generalized fatigue and dyspnea on exertion for the past 3 to 4 months.  He was able to do light to moderate activity.  Echocardiogram September 2021 showed EF 60%, normal RV, no valve disease.  Nuclear stress test showed small area of ischemia.  Angiogram was recommended.    Pt presents today for angiogram follow-up.  Coronary angiogram 11/22/2021 showed multivessel CAD but poor LAD target.  Medical management was recommended and started on metoprolol.  Results reviewed today.    Unfortunately, he never received the metoprolol so we will resend this to the pharmacy.  Blood pressure today is mildly elevated and will hopefully improve after starting metoprolol.  He will monitor these at home.  Of note, he is on full dose aspirin per recommendations from oncology.  He continues with dyspnea on exertion and fatigue which have been stable since his last visit.  He does also have a history of tobacco use of approximately 10 to 15 years with pipe and cigars.  PCP mentioned possibly PFTs if no significant coronary disease.  Given his angiogram findings, we will see if we can improve symptoms with  antianginals, but symptoms may be multifactorial.  Right femoral angiogram site has a moderate amount of ecchymosis and is without bleeding, hematoma, or bruit.  Patient reports no chest pain, PND, orthopnea, presyncope, syncope, edema, heart racing, or palpitations.      Labs:  LIPID RESULTS:  Lab Results   Component Value Date    CHOL 148 09/21/2020    HDL 70 09/21/2020    LDL 59 08/09/2021    LDL 57 09/21/2020    TRIG 104 09/21/2020    CHOLHDLRATIO 2.8 07/16/2015       LIVER ENZYME RESULTS:  Lab Results   Component Value Date    AST 16 08/09/2021    AST 14 09/21/2020    ALT 19 08/09/2021    ALT 23 09/21/2020       CBC RESULTS:  Lab Results   Component Value Date    WBC 6.2 11/22/2021    WBC 6.2 01/22/2021    RBC 4.17 (L) 11/22/2021    RBC 4.20 (L) 01/22/2021    HGB 12.4 (L) 11/22/2021    HGB 12.7 (L) 01/22/2021    HCT 37.8 (L) 11/22/2021    HCT 39.5 (L) 01/22/2021    MCV 91 11/22/2021    MCV 94 01/22/2021    MCH 29.7 11/22/2021    MCH 30.2 01/22/2021    MCHC 32.8 11/22/2021    MCHC 32.2 01/22/2021    RDW 13.6 11/22/2021    RDW 13.4 01/22/2021     11/22/2021     01/22/2021       BMP RESULTS:  Lab Results   Component Value Date     11/22/2021     01/22/2021    POTASSIUM 4.1 11/22/2021    POTASSIUM 4.3 01/22/2021    CHLORIDE 109 11/22/2021    CHLORIDE 106 01/22/2021    CO2 24 11/22/2021    CO2 27 01/22/2021    ANIONGAP 6 11/22/2021    ANIONGAP 4 01/22/2021     (H) 11/22/2021     (H) 11/22/2021     (H) 01/22/2021    BUN 31 (H) 11/22/2021    BUN 29 01/22/2021    CR 1.29 (H) 11/22/2021    CR 1.34 (H) 01/22/2021    GFRESTIMATED 52 (L) 11/22/2021    GFRESTIMATED 50 (L) 01/22/2021    GFRESTBLACK 57 (L) 01/22/2021    PILY 9.6 11/22/2021    PILY 9.5 01/22/2021        A1C RESULTS:  Lab Results   Component Value Date    A1C 7.1 (H) 08/09/2021    A1C 7.6 (H) 01/22/2021       INR RESULTS:  Lab Results   Component Value Date    INR 1.03 11/22/2021    INR 1.01 04/26/2014    INR 0.95  04/15/2014       Results reviewed today.       Current Cardiac Medications     Amlodipine 10 mg daily  Aspirin 325 mg daily  Atorvastatin 20 mg daily  Losartan 100 mg daily  Lopressor 25 mg twice daily-patient not taking                   Assessment and Plan:       Plan    Patient Instructions   Medication Changes:  START metoprolol XL 25 mg daily      Recommendations:  1. Check blood pressure at least 1 hour after medications. Call the clinic if your blood pressure is consistently greater than 130/80.     Follow-up:  1. Annual Fasting lab within 2 months (lipid/ALT)  2. See Rosio LUCAS for cardiology follow up at [facility]: 1 month      Cardiology Scheduling~[phone]  Cardiology Clinic RN~[phone] (Yeni Honeycutt, RN and Rere FRIED RN)          1. CAD    Angio 11/2021 with P RCA , Distal LCx 40%, OM1 20%, LAD 60 to 75%, poor LAD target so medical management recommended.    TINAJERO and fatigue - stable     Continue statin, aspirin, ARB, beta-blocker, CCB    Consider complex PCI if needed       2. Hypertension    Not well controlled     Continue amlodipine, losartan, metoprolol      3. Hyperlipidemia    Last LDL 57 on 9/2020    Continue atorvastatin 20 mg daily      4. TINAJERO     Coronary disease unable to stent-medical management    10 to 15-year history of cigar and pipe use             Thank you for allowing me to participate in this delightful patient's care.      This note was completed in part using Dragon voice recognition software. Although reviewed after completion, some word and grammatical errors may occur.    Rosio Vela, NASRA CNP, APRN, CNP           Data:   All laboratory data reviewed      Total time spent today was 37 mins, reviewing labs, testing, notes, documenting notes, and seeing patient.          Constitutional:  cooperative, alert and oriented, well developed, well nourished, in no acute distress  overweight    Skin:  warm and dry to the touch         Head:  normocephalic        Eyes:  pupils equal and round       ENT:  no pallor or cyanosis       Neck:  no stiffness       Respiratory:  clear to auscultation; normal symmetry        Cardiac: regular rate and rhythm; normal S1 and S2                 pulses full and equal     GI:  abdomen soft, nondistended     Extremities and Muscular Skeletal:  no edema        Neurological:  affect appropriate; no gross motor deficits       Psych:  Alert and Oriented x 3 , appropriate affact      NASRA Mahan [facility]      cc:   Dwaine Reilly MD  [facility]  [address] [place] [address]

## 2022-01-06 ENCOUNTER — OFFICE VISIT (OUTPATIENT)
Dept: CARDIOLOGY | Facility: CLINIC | Age: 82
End: 2022-01-06
Attending: NURSE PRACTITIONER
Payer: COMMERCIAL

## 2022-01-06 ENCOUNTER — LAB (OUTPATIENT)
Dept: LAB | Facility: CLINIC | Age: 82
End: 2022-01-06
Payer: COMMERCIAL

## 2022-01-06 VITALS
WEIGHT: 204.4 LBS | OXYGEN SATURATION: 98 % | HEART RATE: 61 BPM | DIASTOLIC BLOOD PRESSURE: 54 MMHG | BODY MASS INDEX: 30.63 KG/M2 | SYSTOLIC BLOOD PRESSURE: 121 MMHG

## 2022-01-06 DIAGNOSIS — E78.5 HYPERLIPIDEMIA LDL GOAL <70: ICD-10-CM

## 2022-01-06 DIAGNOSIS — I10 BENIGN ESSENTIAL HYPERTENSION: ICD-10-CM

## 2022-01-06 DIAGNOSIS — I25.10 CORONARY ARTERY DISEASE INVOLVING NATIVE CORONARY ARTERY OF NATIVE HEART WITHOUT ANGINA PECTORIS: ICD-10-CM

## 2022-01-06 DIAGNOSIS — R06.09 DOE (DYSPNEA ON EXERTION): Primary | ICD-10-CM

## 2022-01-06 DIAGNOSIS — R94.39 ABNORMAL CARDIOVASCULAR STRESS TEST: ICD-10-CM

## 2022-01-06 LAB
ALT SERPL W P-5'-P-CCNC: 22 U/L (ref 0–70)
CHOLEST SERPL-MCNC: 125 MG/DL
FASTING STATUS PATIENT QL REPORTED: YES
HDLC SERPL-MCNC: 60 MG/DL
LDLC SERPL CALC-MCNC: 51 MG/DL
NONHDLC SERPL-MCNC: 65 MG/DL
TRIGL SERPL-MCNC: 71 MG/DL

## 2022-01-06 PROCEDURE — 84460 ALANINE AMINO (ALT) (SGPT): CPT

## 2022-01-06 PROCEDURE — 99213 OFFICE O/P EST LOW 20 MIN: CPT | Performed by: NURSE PRACTITIONER

## 2022-01-06 PROCEDURE — 36415 COLL VENOUS BLD VENIPUNCTURE: CPT

## 2022-01-06 PROCEDURE — 80061 LIPID PANEL: CPT

## 2022-01-06 NOTE — LETTER
1/6/2022    Jovana Beaulieu,   [address] [place] [address]    RE: Storm Dutta       Dear Colleague,     I had the pleasure of seeing Storm Dutta in the [facility].  Cardiology Clinic Progress Note  Storm Dutta MRN# [id number]   YOB: 1940 Age: 81 year old      Primary Cardiologist:   Dr. Reilly           History of Presenting Illness:      Storm Dutta is a pleasant 81 year old patient with a past cardiac history significant for   1. CAD  2. Hypertension  3. Hyperlipidemia  4. PVD  Past medical history significant for colorectal cancer, CKD, DM type II, small bowel obstruction, prior tobacco use 10-15 years pipe and cigars.     Patient was seen by Dr. Reilly in November 2021 for consultation of abnormal stress test.  He reported generalized fatigue and dyspnea on exertion for the past 3 to 4 months.  He was able to do light to moderate activity. Echocardiogram September 2021 showed EF 60%, normal RV, no valve disease.  Nuclear stress test showed small area of ischemia.      Patient was seen by me in December 2021. Coronary angiogram 11/22/2021 showed multivessel CAD but poor LAD target.  Medical management was recommended and started on metoprolol.  He had not actually received the metoprolol so this was sent to the pharmacy.      Pt presents today for 1 month follow-up.  Lipid profile today is controlled and ALT WNL.  Results reviewed today.    After starting metoprolol, he denies any side effects.  Blood pressure today is well controlled.  He continues with dyspnea on exertion and occasional fatigue.  He has not noted any improvement after starting the metoprolol and symptoms have been stable.  He again reports that this started a few months ago and prior to that had no dyspnea on exertion.  The dyspnea occurs with exertion such as shoveling snow.  PCP previously discussed possible PFTs given his prior tobacco use.  Prior to considering  complex PCI, I would recommend following up with PCP for PFTs to r/o lung disease.  If normal, could consider complex PCI if it is amenable to this. Patient reports no chest pain, PND, orthopnea, presyncope, syncope, edema, heart racing, or palpitations.      Labs:  LIPID RESULTS:  Lab Results   Component Value Date    CHOL 125 01/06/2022    CHOL 148 09/21/2020    HDL 60 01/06/2022    HDL 70 09/21/2020    LDL 51 01/06/2022    LDL 59 08/09/2021    LDL 57 09/21/2020    TRIG 71 01/06/2022    TRIG 104 09/21/2020    CHOLHDLRATIO 2.8 07/16/2015       LIVER ENZYME RESULTS:  Lab Results   Component Value Date    AST 16 08/09/2021    AST 14 09/21/2020    ALT 22 01/06/2022    ALT 23 09/21/2020       CBC RESULTS:  Lab Results   Component Value Date    WBC 6.2 11/22/2021    WBC 6.2 01/22/2021    RBC 4.17 (L) 11/22/2021    RBC 4.20 (L) 01/22/2021    HGB 12.4 (L) 11/22/2021    HGB 12.7 (L) 01/22/2021    HCT 37.8 (L) 11/22/2021    HCT 39.5 (L) 01/22/2021    MCV 91 11/22/2021    MCV 94 01/22/2021    MCH 29.7 11/22/2021    MCH 30.2 01/22/2021    MCHC 32.8 11/22/2021    MCHC 32.2 01/22/2021    RDW 13.6 11/22/2021    RDW 13.4 01/22/2021     11/22/2021     01/22/2021       BMP RESULTS:  Lab Results   Component Value Date     11/22/2021     01/22/2021    POTASSIUM 4.1 11/22/2021    POTASSIUM 4.3 01/22/2021    CHLORIDE 109 11/22/2021    CHLORIDE 106 01/22/2021    CO2 24 11/22/2021    CO2 27 01/22/2021    ANIONGAP 6 11/22/2021    ANIONGAP 4 01/22/2021     (H) 11/22/2021     (H) 11/22/2021     (H) 01/22/2021    BUN 31 (H) 11/22/2021    BUN 29 01/22/2021    CR 1.29 (H) 11/22/2021    CR 1.34 (H) 01/22/2021    GFRESTIMATED 52 (L) 11/22/2021    GFRESTIMATED 50 (L) 01/22/2021    GFRESTBLACK 57 (L) 01/22/2021    PILY 9.6 11/22/2021    PILY 9.5 01/22/2021        A1C RESULTS:  Lab Results   Component Value Date    A1C 7.1 (H) 08/09/2021    A1C 7.6 (H) 01/22/2021       INR RESULTS:  Lab Results    Component Value Date    INR 1.03 11/22/2021    INR 1.01 04/26/2014    INR 0.95 04/15/2014       Results reviewed today.       Current Cardiac Medications     Amlodipine 10 mg daily  Aspirin 325 mg daily- per onco  Atorvastatin 20 mg daily  Losartan 100 mg daily  Metoprolol xl 25 mg daily                    Assessment and Plan:       Plan    Patient Instructions   Medication Changes:  5. None     Recommendations:  1. Check blood pressure at least 1 hour after medications. Call the clinic if your blood pressure is consistently greater than 130/80.    2. Call if shortness of breath with exertion is getting worse.     Follow-up:  1. Follow-up with primary care about doing lung function testing   2. See Dr. Reilly for cardiology follow up at [facility]: 2 months      Cardiology Scheduling~[phone]  Cardiology Clinic RN~[phone] (Yeni Honeycutt, RN and Rere FRIED RN)            1. CAD    Angio 11/2021 with P RCA , Distal LCx 40%, OM1 20%, LAD 60 to 75%, poor LAD target so medical management recommended.    TINAJERO and fatigue - stable     Continue statin, aspirin, ARB, beta-blocker, CCB    Consider complex PCI if needed       2. Hypertension    controlled     Continue amlodipine, losartan, metoprolol      3. Hyperlipidemia    Last LDL 51 1/2022    Continue atorvastatin 20 mg daily      4. TINAJERO     Coronary disease unable to stent-medical management    10 to 15-year history of cigar and pipe use    Consider PFTs              Thank you for allowing me to participate in this delightful patient's care.      This note was completed in part using Dragon voice recognition software. Although reviewed after completion, some word and grammatical errors may occur.    Rosio Vela, APRN CNP, APRN, CNP           Data:   All laboratory data reviewed       Constitutional:  cooperative, alert and oriented, well developed, well nourished, in no acute distress  overweight    Skin:  warm and dry to the touch          Head:  normocephalic       Eyes:  pupils equal and round       ENT:  no pallor or cyanosis       Neck:  no stiffness       Respiratory:  clear to auscultation; normal symmetry        Cardiac: regular rate and rhythm; normal S1 and S2                 pulses full and equal     GI:  abdomen soft, nondistended     Extremities and Muscular Skeletal:  no edema        Neurological:  affect appropriate; no gross motor deficits       Psych:  Alert and Oriented x 3 , appropriate affact    Thank you for allowing me to participate in the care of your patient.      Sincerely,     NASRA Mahan CNP     [facility]  cc:   NASRA Singh CNP  [address]  [place] [address]

## 2022-01-06 NOTE — PROGRESS NOTES
Cardiology Clinic Progress Note  Storm Dutta MRN# [id number]   YOB: 1940 Age: 81 year old      Primary Cardiologist:   Dr. Reilly           History of Presenting Illness:      Storm Dutta is a pleasant 81 year old patient with a past cardiac history significant for   1. CAD  2. Hypertension  3. Hyperlipidemia  4. PVD  Past medical history significant for colorectal cancer, CKD, DM type II, small bowel obstruction, prior tobacco use 10-15 years pipe and cigars.     Patient was seen by Dr. Reilly in November 2021 for consultation of abnormal stress test.  He reported generalized fatigue and dyspnea on exertion for the past 3 to 4 months.  He was able to do light to moderate activity. Echocardiogram September 2021 showed EF 60%, normal RV, no valve disease.  Nuclear stress test showed small area of ischemia.      Patient was seen by me in December 2021. Coronary angiogram 11/22/2021 showed multivessel CAD but poor LAD target.  Medical management was recommended and started on metoprolol.  He had not actually received the metoprolol so this was sent to the pharmacy.      Pt presents today for 1 month follow-up.  Lipid profile today is controlled and ALT WNL.  Results reviewed today.    After starting metoprolol, he denies any side effects.  Blood pressure today is well controlled.  He continues with dyspnea on exertion and occasional fatigue.  He has not noted any improvement after starting the metoprolol and symptoms have been stable.  He again reports that this started a few months ago and prior to that had no dyspnea on exertion.  The dyspnea occurs with exertion such as shoveling snow.  PCP previously discussed possible PFTs given his prior tobacco use.  Prior to considering complex PCI, I would recommend following up with PCP for PFTs to r/o lung disease.  If normal, could consider complex PCI if it is amenable to this. Patient reports no chest pain, PND, orthopnea, presyncope,  syncope, edema, heart racing, or palpitations.      Labs:  LIPID RESULTS:  Lab Results   Component Value Date    CHOL 125 01/06/2022    CHOL 148 09/21/2020    HDL 60 01/06/2022    HDL 70 09/21/2020    LDL 51 01/06/2022    LDL 59 08/09/2021    LDL 57 09/21/2020    TRIG 71 01/06/2022    TRIG 104 09/21/2020    CHOLHDLRATIO 2.8 07/16/2015       LIVER ENZYME RESULTS:  Lab Results   Component Value Date    AST 16 08/09/2021    AST 14 09/21/2020    ALT 22 01/06/2022    ALT 23 09/21/2020       CBC RESULTS:  Lab Results   Component Value Date    WBC 6.2 11/22/2021    WBC 6.2 01/22/2021    RBC 4.17 (L) 11/22/2021    RBC 4.20 (L) 01/22/2021    HGB 12.4 (L) 11/22/2021    HGB 12.7 (L) 01/22/2021    HCT 37.8 (L) 11/22/2021    HCT 39.5 (L) 01/22/2021    MCV 91 11/22/2021    MCV 94 01/22/2021    MCH 29.7 11/22/2021    MCH 30.2 01/22/2021    MCHC 32.8 11/22/2021    MCHC 32.2 01/22/2021    RDW 13.6 11/22/2021    RDW 13.4 01/22/2021     11/22/2021     01/22/2021       BMP RESULTS:  Lab Results   Component Value Date     11/22/2021     01/22/2021    POTASSIUM 4.1 11/22/2021    POTASSIUM 4.3 01/22/2021    CHLORIDE 109 11/22/2021    CHLORIDE 106 01/22/2021    CO2 24 11/22/2021    CO2 27 01/22/2021    ANIONGAP 6 11/22/2021    ANIONGAP 4 01/22/2021     (H) 11/22/2021     (H) 11/22/2021     (H) 01/22/2021    BUN 31 (H) 11/22/2021    BUN 29 01/22/2021    CR 1.29 (H) 11/22/2021    CR 1.34 (H) 01/22/2021    GFRESTIMATED 52 (L) 11/22/2021    GFRESTIMATED 50 (L) 01/22/2021    GFRESTBLACK 57 (L) 01/22/2021    PILY 9.6 11/22/2021    PILY 9.5 01/22/2021        A1C RESULTS:  Lab Results   Component Value Date    A1C 7.1 (H) 08/09/2021    A1C 7.6 (H) 01/22/2021       INR RESULTS:  Lab Results   Component Value Date    INR 1.03 11/22/2021    INR 1.01 04/26/2014    INR 0.95 04/15/2014       Results reviewed today.       Current Cardiac Medications     Amlodipine 10 mg daily  Aspirin 325 mg daily- per  onco  Atorvastatin 20 mg daily  Losartan 100 mg daily  Metoprolol xl 25 mg daily                    Assessment and Plan:       Plan    Patient Instructions   Medication Changes:  5. None     Recommendations:  1. Check blood pressure at least 1 hour after medications. Call the clinic if your blood pressure is consistently greater than 130/80.    2. Call if shortness of breath with exertion is getting worse.     Follow-up:  1. Follow-up with primary care about doing lung function testing   2. See Dr. Reilly for cardiology follow up at [facility]: 2 months      Cardiology Scheduling~[phone]  Cardiology Clinic RN~[phone] (Yeni Honeycutt, RN and Rere FRIED RN)            1. CAD    Angio 11/2021 with P RCA , Distal LCx 40%, OM1 20%, LAD 60 to 75%, poor LAD target so medical management recommended.    TINAJERO and fatigue - stable     Continue statin, aspirin, ARB, beta-blocker, CCB    Consider complex PCI if needed       2. Hypertension    controlled     Continue amlodipine, losartan, metoprolol      3. Hyperlipidemia    Last LDL 51 1/2022    Continue atorvastatin 20 mg daily      4. TINAJERO     Coronary disease unable to stent-medical management    10 to 15-year history of cigar and pipe use    Consider PFTs              Thank you for allowing me to participate in this delightful patient's care.      This note was completed in part using Dragon voice recognition software. Although reviewed after completion, some word and grammatical errors may occur.    Rosio Vela, APRN CNP, APRN, CNP           Data:   All laboratory data reviewed       Constitutional:  cooperative, alert and oriented, well developed, well nourished, in no acute distress  overweight    Skin:  warm and dry to the touch         Head:  normocephalic       Eyes:  pupils equal and round       ENT:  no pallor or cyanosis       Neck:  no stiffness       Respiratory:  clear to auscultation; normal symmetry        Cardiac: regular rate and  rhythm; normal S1 and S2                 pulses full and equal     GI:  abdomen soft, nondistended     Extremities and Muscular Skeletal:  no edema        Neurological:  affect appropriate; no gross motor deficits       Psych:  Alert and Oriented x 3 , appropriate affact

## 2022-01-06 NOTE — PATIENT INSTRUCTIONS
Medication Changes:  1. None     Recommendations:  1. Check blood pressure at least 1 hour after medications. Call the clinic if your blood pressure is consistently greater than 130/80.    2. Call if shortness of breath with exertion is getting worse.     Follow-up:  1. Follow-up with primary care about doing lung function testing   2. See Dr. Reilly for cardiology follow up at [facility]: 2 months      Cardiology Scheduling~[phone]  Cardiology Clinic RN~[phone] (Yeni Honeycutt RN and Rere FRIED RN)

## 2022-01-18 ENCOUNTER — MYC MEDICAL ADVICE (OUTPATIENT)
Dept: FAMILY MEDICINE | Facility: CLINIC | Age: 82
End: 2022-01-18
Payer: COMMERCIAL

## 2022-01-24 ENCOUNTER — TELEPHONE (OUTPATIENT)
Dept: FAMILY MEDICINE | Facility: CLINIC | Age: 82
End: 2022-01-24
Payer: COMMERCIAL

## 2022-01-24 NOTE — TELEPHONE ENCOUNTER
"Reason for Call:  Lung Test     Detailed comments: Pt had a Angiogram  11/22/21 and was told to Follow up for a Lung Test done per ROLANDO Fierro Cardiologist Provider.  Pt said he has been transferred around and no one seems to know what he should be having or who should be doing the test. Please Advise.   Phone Number Patient can be reached at: Home number on file [phone] (home)    Best Time: Any Time      Can we leave a detailed message on this number? YES    Call taken on 1/24/2022 at 10:15 AM by Giana Oro    ADDENDUM: Per Rosio Fierro NPs note of 1/6/22: \"PCP previously discussed possible PFTs given his prior tobacco use.  Prior to considering complex PCI, I would recommend following up with PCP for PFTs to r/o lung disease.  If normal, could consider complex PCI if it is amenable to this\". Disc with patient. He has sent a couple messages to Dr Beaulieu and keeps getting the run around. Message sent to Dr Beaulieu outlining situation. Pt will await a call from Dr Beaulieu's office. Yeni Honeycutt RN Cardiology January 24, 2022, 11:01 AM      "

## 2022-01-26 ENCOUNTER — TELEPHONE (OUTPATIENT)
Dept: FAMILY MEDICINE | Facility: CLINIC | Age: 82
End: 2022-01-26
Payer: COMMERCIAL

## 2022-01-26 DIAGNOSIS — R06.09 DOE (DYSPNEA ON EXERTION): Primary | ICD-10-CM

## 2022-01-26 NOTE — TELEPHONE ENCOUNTER
Please call Lamonte and let him know I ordered the breathing test for him.  He should get a call in 1-2 days to get scheduled.  He can reach them at [phone] to schedule himself if he does not hear back.    Jvoana Beaulieu, DO

## 2022-02-09 ENCOUNTER — HOSPITAL ENCOUNTER (OUTPATIENT)
Dept: RESPIRATORY THERAPY | Facility: CLINIC | Age: 82
Discharge: HOME | End: 2022-02-09
Attending: FAMILY MEDICINE | Admitting: FAMILY MEDICINE
Payer: COMMERCIAL

## 2022-02-09 DIAGNOSIS — R06.09 DOE (DYSPNEA ON EXERTION): ICD-10-CM

## 2022-02-09 PROCEDURE — 94060 EVALUATION OF WHEEZING: CPT

## 2022-02-09 PROCEDURE — 94726 PLETHYSMOGRAPHY LUNG VOLUMES: CPT

## 2022-02-09 PROCEDURE — 94729 DIFFUSING CAPACITY: CPT

## 2022-02-09 PROCEDURE — 250N000009 HC RX 250: Performed by: FAMILY MEDICINE

## 2022-02-09 RX ORDER — ALBUTEROL SULFATE 5 MG/ML
2.5 SOLUTION RESPIRATORY (INHALATION) EVERY 6 HOURS PRN
Status: DISCONTINUED | OUTPATIENT
Start: 2022-02-09 | End: 2022-02-10 | Stop reason: HOSPADM

## 2022-02-09 RX ADMIN — ALBUTEROL SULFATE 2.5 MG: 5 SOLUTION RESPIRATORY (INHALATION) at 09:55

## 2022-02-10 LAB
DLCOUNC-%PRED-PRE: 92 %
DLCOUNC-PRE: 21.33 ML/MIN/MMHG
DLCOUNC-PRED: 23.15 ML/MIN/MMHG
ERV-%PRED-PRE: 103 %
ERV-PRE: 0.65 L
ERV-PRED: 0.62 L
EXPTIME-PRE: 7.09 SEC
FEF2575-%PRED-POST: 139 %
FEF2575-%PRED-PRE: 129 %
FEF2575-POST: 2.68 L/SEC
FEF2575-PRE: 2.48 L/SEC
FEF2575-PRED: 1.92 L/SEC
FEFMAX-%PRED-PRE: 99 %
FEFMAX-PRE: 6.75 L/SEC
FEFMAX-PRED: 6.77 L/SEC
FEV1-%PRED-PRE: 105 %
FEV1-PRE: 2.87 L
FEV1FEV6-PRE: 78 %
FEV1FEV6-PRED: 76 %
FEV1FVC-PRE: 77 %
FEV1FVC-PRED: 75 %
FEV1SVC-PRE: 76 %
FEV1SVC-PRED: 64 %
FIFMAX-PRE: 3.74 L/SEC
FRCPLETH-%PRED-PRE: 89 %
FRCPLETH-PRE: 3.33 L
FRCPLETH-PRED: 3.71 L
FVC-%PRED-PRE: 101 %
FVC-PRE: 3.72 L
FVC-PRED: 3.66 L
IC-%PRED-PRE: 84 %
IC-PRE: 3.06 L
IC-PRED: 3.62 L
RVPLETH-%PRED-PRE: 92 %
RVPLETH-PRE: 2.63 L
RVPLETH-PRED: 2.83 L
TLCPLETH-%PRED-PRE: 93 %
TLCPLETH-PRE: 6.39 L
TLCPLETH-PRED: 6.82 L
VA-%PRED-PRE: 100 %
VA-PRE: 5.96 L
VC-%PRED-PRE: 88 %
VC-PRE: 3.76 L
VC-PRED: 4.25 L

## 2022-02-28 ENCOUNTER — TELEPHONE (OUTPATIENT)
Dept: AUDIOLOGY | Facility: CLINIC | Age: 82
End: 2022-02-28
Payer: COMMERCIAL

## 2022-02-28 ENCOUNTER — ALLIED HEALTH/NURSE VISIT (OUTPATIENT)
Dept: AUDIOLOGY | Facility: CLINIC | Age: 82
End: 2022-02-28
Payer: COMMERCIAL

## 2022-02-28 DIAGNOSIS — H90.3 SENSORY HEARING LOSS, BILATERAL: Primary | ICD-10-CM

## 2022-02-28 PROCEDURE — V5299 HEARING SERVICE: HCPCS | Performed by: AUDIOLOGIST

## 2022-02-28 PROCEDURE — 99207 PR NO CHARGE LOS: CPT | Performed by: AUDIOLOGIST

## 2022-02-28 NOTE — PROGRESS NOTES
[facility]        SUBJECTIVE:  Storm Dutta , 81 year old male requests in office repair of his 2019 [employer] M90-R hearing aids. He reports the right hearing aid will not charge and the left hearing aid indicator light is not working.    OBJECTIVE:  Verified above complaints. Sent hearing aids in for warranty repair (expires:12/17/2022).     ASSESSMENT/PLAN:    Patient will be contacted when his hearing aids are ready to be picked up.     Sara WILSON-DHAVAL, #[id number]

## 2022-02-28 NOTE — TELEPHONE ENCOUNTER
Reason for Call:  Other call back    Detailed comments: Pt states he is having problems with his hearing aids. One is not charging properly-stays green like it never needs charging, and the other one only recharges intermittently. Pt not sure if he should bring his aids in to be checked.  Please reach out to pt to discuss.       Phone Number Patient can be reached at: Cell number on file:    Telephone Information:   Mobile [phone]       Best Time: any    Can we leave a detailed message on this number? YES    Call taken on 2/28/2022 at 8:16 AM by Gregorio Peralta

## 2022-02-28 NOTE — TELEPHONE ENCOUNTER
Discussed hearing aid problems with patient. He will drop off his hearing aids and  at the specialty clinic .     Sara DELCID, #[id number]

## 2022-03-03 NOTE — PROGRESS NOTES
CARDIOLOGY VISIT    REASON FOR VISIT: CAD    SUBJECTIVE:  81-year-old male seen for CAD.  He has peripheral vascular disease, hypertension, dyslipidemia, history of colorectal cancer, CKD, diabetes type 2, small bowel obstruction.     Echo September 2021 showed EF 60%, normal RV, no valve disease.  Nuclear stress showed small area of ischemia of the apical segment.    Angiogram November 2021 showed 75% mid LAD, moderate distal LAD, mild to moderate disease of the circumflex, 100% proximal to mid RCA occlusion with right to right collaterals, also left to right collaterals.  Medical management recommended.    He has been doing well.  He is active for his age doing walks.  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  He denies any significant shortness of breath or chest pain.    MEDICATIONS:  Current Outpatient Medications   Medication     amLODIPine (NORVASC) 10 MG tablet     aspirin 325 MG tablet     atorvastatin (LIPITOR) 20 MG tablet     blood glucose (ACCU-CHEK REGIS PLUS) test strip     blood glucose monitoring (NO BRAND SPECIFIED) meter device kit     Cholecalciferol (VITAMIN D-3) 1000 units CAPS     finasteride (PROSCAR) 5 MG tablet     insulin lispro (HUMALOG KWIKPEN) 100 UNIT/ML (1 unit dial) KWIKPEN     insulin pen needle (ULTICARE MINI) 31G X 6 MM miscellaneous     LANTUS SOLOSTAR 100 UNIT/ML soln     losartan (COZAAR) 100 MG tablet     metoprolol succinate ER (TOPROL-XL) 25 MG 24 hr tablet     tamsulosin (FLOMAX) 0.4 MG capsule     No current facility-administered medications for this visit.       ALLERGIES:  Allergies   Allergen Reactions     Nkda [No Known Drug Allergies]        REVIEW OF SYSTEMS:  Constitutional:  No weight loss, fever, chills  HEENT:  Eyes:  No visual loss, blurred vision, double vision or yellow sclerae. No hearing loss, sneezing, congestion, runny nose or sore throat.  Skin:  No rash or itching.  Cardiovascular: per HPI  Respiratory: per HPI  GI:  No  anorexia, nausea, vomiting or diarrhea. No abdominal pain or blood.  :  No dysurea, hematuria  Neurologic:  No headache, paralysis, ataxia, numbness or tingling in the extremities. No change in bowel or bladder control.  Musculoskeletal:  No muscle pain  Hematologic:  No bleeding or bruising.  Lymphatics:  No enlarged nodes. No history of splenectomy.  Endocrine:  No reports of sweating, cold or heat intolerance. No polyuria or polydipsia.  Allergies:  No history of asthma, hives, eczema or rhinitis.    PHYSICAL EXAM:  BP (!) 144/66   Pulse 60   Temp (!) 96.7  F (35.9  C) (Tympanic)   Wt 93.5 kg (206 lb 3.2 oz)   SpO2 99%   BMI 30.90 kg/m      Constitutional: awake, alert, no distress  Eyes: PERRL, sclera nonicteric  ENT: trachea midline  Respiratory: Lungs clear  Cardiovascular: Regular rate and rhythm, no murmurs  GI: nondistended, nontender, bowel sounds present  Lymph/Hematologic: no lymphadenopathy  Skin: dry, no rash  Musculoskeletal: good muscle tone, strength 5/5 in upper and lower extremities  Neurologic: no focal deficits  Neuropsychiatric: appropriate affact    DATA:  Lab:   Recent Labs   Lab Test 01/06/22  0927 08/09/21  1015 09/21/20  0820 08/04/16  1259 07/16/15  1027   CHOL 125  --  148   < > 124   HDL 60  --  70   < > 45   LDL 51 59 57   < > 61   TRIG 71  --  104   < > 91   CHOLHDLRATIO  --   --   --   --  2.8    < > = values in this interval not displayed.     ASSESSMENT:  81-year-old male seen for CAD.  He had multivessel CAD, but no easy options for stenting, there was not a good LAD target for bypass.  Therefore medical management is currently being recommended.  He has no angina or heart failure symptoms.  He was encouraged to check his blood pressure more at home.  Lipids are at goal.    RECOMMENDATIONS:  1.  Multivessel CAD  -Continue medical management  -If symptoms are refractory to medications, consider higher risk PCI    Follow-up in 6 months with SANJANA.    Dwaine Reilly,  MD  Cardiology - [facility]  Pager:  [phone]  Text Page  March 4, 2022

## 2022-03-04 ENCOUNTER — OFFICE VISIT (OUTPATIENT)
Dept: CARDIOLOGY | Facility: CLINIC | Age: 82
End: 2022-03-04
Payer: COMMERCIAL

## 2022-03-04 ENCOUNTER — TELEPHONE (OUTPATIENT)
Dept: AUDIOLOGY | Facility: CLINIC | Age: 82
End: 2022-03-04

## 2022-03-04 VITALS
SYSTOLIC BLOOD PRESSURE: 144 MMHG | WEIGHT: 206.2 LBS | HEART RATE: 60 BPM | BODY MASS INDEX: 30.9 KG/M2 | TEMPERATURE: 96.7 F | OXYGEN SATURATION: 99 % | DIASTOLIC BLOOD PRESSURE: 66 MMHG

## 2022-03-04 DIAGNOSIS — I25.10 CORONARY ARTERY DISEASE INVOLVING NATIVE CORONARY ARTERY OF NATIVE HEART WITHOUT ANGINA PECTORIS: Primary | ICD-10-CM

## 2022-03-04 PROCEDURE — 99214 OFFICE O/P EST MOD 30 MIN: CPT | Performed by: INTERNAL MEDICINE

## 2022-03-04 NOTE — PATIENT INSTRUCTIONS
Check blood pressure at home.  Goal is 130/80 or less.  Call our clinic if BP is running over 140's.

## 2022-03-04 NOTE — TELEPHONE ENCOUNTER
I informed Lamonte that his [employer] hearing aids were back from repair and ready to be picked up at the [facility] .     -Donis Nick.D CCC-A

## 2022-03-04 NOTE — LETTER
3/4/2022    Jovana Beaulieu, [facility] [place] [address]    RE: Storm Dutta       Dear Colleague,     I had the pleasure of seeing Storm Dutta in the [facility].  CARDIOLOGY VISIT    REASON FOR VISIT: CAD    SUBJECTIVE:  81-year-old male seen for CAD.  He has peripheral vascular disease, hypertension, dyslipidemia, history of colorectal cancer, CKD, diabetes type 2, small bowel obstruction.     Echo September 2021 showed EF 60%, normal RV, no valve disease.  Nuclear stress showed small area of ischemia of the apical segment.    Angiogram November 2021 showed 75% mid LAD, moderate distal LAD, mild to moderate disease of the circumflex, 100% proximal to mid RCA occlusion with right to right collaterals, also left to right collaterals.  Medical management recommended.    He has been doing well.  He is active for his age doing walks.  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  He denies any significant shortness of breath or chest pain.    MEDICATIONS:  Current Outpatient Medications   Medication     amLODIPine (NORVASC) 10 MG tablet     aspirin 325 MG tablet     atorvastatin (LIPITOR) 20 MG tablet     blood glucose (ACCU-CHEK REGIS PLUS) test strip     blood glucose monitoring (NO BRAND SPECIFIED) meter device kit     Cholecalciferol (VITAMIN D-3) 1000 units CAPS     finasteride (PROSCAR) 5 MG tablet     insulin lispro (HUMALOG KWIKPEN) 100 UNIT/ML (1 unit dial) KWIKPEN     insulin pen needle (ULTICARE MINI) 31G X 6 MM miscellaneous     LANTUS SOLOSTAR 100 UNIT/ML soln     losartan (COZAAR) 100 MG tablet     metoprolol succinate ER (TOPROL-XL) 25 MG 24 hr tablet     tamsulosin (FLOMAX) 0.4 MG capsule     No current facility-administered medications for this visit.       ALLERGIES:  Allergies   Allergen Reactions     Nkda [No Known Drug Allergies]        REVIEW OF SYSTEMS:  Constitutional:  No weight loss, fever, chills  HEENT:  Eyes:  No visual  loss, blurred vision, double vision or yellow sclerae. No hearing loss, sneezing, congestion, runny nose or sore throat.  Skin:  No rash or itching.  Cardiovascular: per HPI  Respiratory: per HPI  GI:  No anorexia, nausea, vomiting or diarrhea. No abdominal pain or blood.  :  No dysurea, hematuria  Neurologic:  No headache, paralysis, ataxia, numbness or tingling in the extremities. No change in bowel or bladder control.  Musculoskeletal:  No muscle pain  Hematologic:  No bleeding or bruising.  Lymphatics:  No enlarged nodes. No history of splenectomy.  Endocrine:  No reports of sweating, cold or heat intolerance. No polyuria or polydipsia.  Allergies:  No history of asthma, hives, eczema or rhinitis.    PHYSICAL EXAM:  BP (!) 144/66   Pulse 60   Temp (!) 96.7  F (35.9  C) (Tympanic)   Wt 93.5 kg (206 lb 3.2 oz)   SpO2 99%   BMI 30.90 kg/m      Constitutional: awake, alert, no distress  Eyes: PERRL, sclera nonicteric  ENT: trachea midline  Respiratory: Lungs clear  Cardiovascular: Regular rate and rhythm, no murmurs  GI: nondistended, nontender, bowel sounds present  Lymph/Hematologic: no lymphadenopathy  Skin: dry, no rash  Musculoskeletal: good muscle tone, strength 5/5 in upper and lower extremities  Neurologic: no focal deficits  Neuropsychiatric: appropriate affact    DATA:  Lab:   Recent Labs   Lab Test 01/06/22  0927 08/09/21  1015 09/21/20  0820 08/04/16  1259 07/16/15  1027   CHOL 125  --  148   < > 124   HDL 60  --  70   < > 45   LDL 51 59 57   < > 61   TRIG 71  --  104   < > 91   CHOLHDLRATIO  --   --   --   --  2.8    < > = values in this interval not displayed.     ASSESSMENT:  81-year-old male seen for CAD.  He had multivessel CAD, but no easy options for stenting, there was not a good LAD target for bypass.  Therefore medical management is currently being recommended.  He has no angina or heart failure symptoms.  He was encouraged to check his blood pressure more at home.  Lipids are at  goal.    RECOMMENDATIONS:  1.  Multivessel CAD  -Continue medical management  -If symptoms are refractory to medications, consider higher risk PCI    Follow-up in 6 months with SANJANA.    Dwaine Reilly MD  Cardiology - [facility] [[email or web address]] Heart  Pager:  [phone]  Text Page  March 4, 2022    Thank you for allowing me to participate in the care of your patient.      Sincerely,     Dwaine Reilly MD     [facility]  cc:   No referring provider defined for this encounter.

## 2022-03-12 ENCOUNTER — HEALTH MAINTENANCE LETTER (OUTPATIENT)
Age: 82
End: 2022-03-12

## 2022-03-15 ENCOUNTER — DOCUMENTATION ONLY (OUTPATIENT)
Dept: LAB | Facility: CLINIC | Age: 82
End: 2022-03-15
Payer: COMMERCIAL

## 2022-03-15 DIAGNOSIS — E11.42 TYPE 2 DIABETES MELLITUS WITH DIABETIC POLYNEUROPATHY, WITH LONG-TERM CURRENT USE OF INSULIN (H): Primary | ICD-10-CM

## 2022-03-15 DIAGNOSIS — Z79.4 TYPE 2 DIABETES MELLITUS WITH DIABETIC POLYNEUROPATHY, WITH LONG-TERM CURRENT USE OF INSULIN (H): Primary | ICD-10-CM

## 2022-03-15 NOTE — PROGRESS NOTES
Storm Dutta has an upcoming lab appointment:    Future Appointments   Date Time Provider Department Center   3/16/2022  8:30 AM [facility]     Patient is scheduled for the following lab(s): A1C    There is no order available. Please review and place either future orders or HMPO (Review of Health Maintenance Protocol Orders), as appropriate.    Health Maintenance Due   Topic     ANNUAL REVIEW OF HM ORDERS      MICROALBUMIN      A1C      Olga Lunsford

## 2022-03-16 ENCOUNTER — LAB (OUTPATIENT)
Dept: LAB | Facility: CLINIC | Age: 82
End: 2022-03-16
Payer: COMMERCIAL

## 2022-03-16 DIAGNOSIS — E11.42 TYPE 2 DIABETES MELLITUS WITH DIABETIC POLYNEUROPATHY, WITH LONG-TERM CURRENT USE OF INSULIN (H): ICD-10-CM

## 2022-03-16 DIAGNOSIS — Z79.4 TYPE 2 DIABETES MELLITUS WITH DIABETIC POLYNEUROPATHY, WITH LONG-TERM CURRENT USE OF INSULIN (H): ICD-10-CM

## 2022-03-16 LAB — HBA1C MFR BLD: 6.8 % (ref 0–5.6)

## 2022-03-16 PROCEDURE — 36415 COLL VENOUS BLD VENIPUNCTURE: CPT

## 2022-03-16 PROCEDURE — 83036 HEMOGLOBIN GLYCOSYLATED A1C: CPT

## 2022-03-18 DIAGNOSIS — N40.1 HYPERTROPHY OF PROSTATE WITH URINARY OBSTRUCTION: ICD-10-CM

## 2022-03-18 DIAGNOSIS — N13.8 HYPERTROPHY OF PROSTATE WITH URINARY OBSTRUCTION: ICD-10-CM

## 2022-03-18 DIAGNOSIS — E78.5 HYPERLIPIDEMIA LDL GOAL <70: ICD-10-CM

## 2022-03-18 RX ORDER — TAMSULOSIN HYDROCHLORIDE 0.4 MG/1
CAPSULE ORAL
Qty: 90 CAPSULE | Refills: 0 | Status: SHIPPED | OUTPATIENT
Start: 2022-03-18 | End: 2022-06-03

## 2022-03-18 NOTE — TELEPHONE ENCOUNTER
Routing refill request to provider for review/approval because:  Last recorded blood pressure does not meet RN protocol parameters    Sam Benítez RN

## 2022-05-07 ENCOUNTER — HEALTH MAINTENANCE LETTER (OUTPATIENT)
Age: 82
End: 2022-05-07

## 2022-05-27 ASSESSMENT — ENCOUNTER SYMPTOMS
DIZZINESS: 0
JOINT SWELLING: 0
HEMATURIA: 0
NAUSEA: 0
SORE THROAT: 0
ARTHRALGIAS: 0
PARESTHESIAS: 0
MYALGIAS: 0
FEVER: 0
FREQUENCY: 1
COUGH: 0
ABDOMINAL PAIN: 0
DYSURIA: 0
NERVOUS/ANXIOUS: 0
DIARRHEA: 0
CONSTIPATION: 0
HEMATOCHEZIA: 0
EYE PAIN: 0
SHORTNESS OF BREATH: 1
HEARTBURN: 0
CHILLS: 0
PALPITATIONS: 0
HEADACHES: 0
WEAKNESS: 1

## 2022-05-27 ASSESSMENT — ACTIVITIES OF DAILY LIVING (ADL): CURRENT_FUNCTION: NO ASSISTANCE NEEDED

## 2022-06-03 ENCOUNTER — OFFICE VISIT (OUTPATIENT)
Dept: FAMILY MEDICINE | Facility: CLINIC | Age: 82
End: 2022-06-03
Payer: COMMERCIAL

## 2022-06-03 ENCOUNTER — TELEPHONE (OUTPATIENT)
Dept: UROLOGY | Facility: CLINIC | Age: 82
End: 2022-06-03

## 2022-06-03 VITALS
OXYGEN SATURATION: 96 % | TEMPERATURE: 97.5 F | HEIGHT: 68 IN | SYSTOLIC BLOOD PRESSURE: 138 MMHG | BODY MASS INDEX: 30.74 KG/M2 | DIASTOLIC BLOOD PRESSURE: 62 MMHG | WEIGHT: 202.8 LBS | HEART RATE: 87 BPM

## 2022-06-03 DIAGNOSIS — Z79.4 TYPE 2 DIABETES MELLITUS WITH STAGE 3A CHRONIC KIDNEY DISEASE, WITH LONG-TERM CURRENT USE OF INSULIN (H): ICD-10-CM

## 2022-06-03 DIAGNOSIS — E11.42 TYPE 2 DIABETES MELLITUS WITH DIABETIC POLYNEUROPATHY, WITH LONG-TERM CURRENT USE OF INSULIN (H): ICD-10-CM

## 2022-06-03 DIAGNOSIS — Z85.048 H/O MALIGNANT NEOPLASM OF RECTUM: ICD-10-CM

## 2022-06-03 DIAGNOSIS — N13.8 HYPERTROPHY OF PROSTATE WITH URINARY OBSTRUCTION: ICD-10-CM

## 2022-06-03 DIAGNOSIS — I10 HYPERTENSION GOAL BP (BLOOD PRESSURE) < 140/90: ICD-10-CM

## 2022-06-03 DIAGNOSIS — I73.9 PVD (PERIPHERAL VASCULAR DISEASE) (H): ICD-10-CM

## 2022-06-03 DIAGNOSIS — E78.5 HYPERLIPIDEMIA LDL GOAL <70: ICD-10-CM

## 2022-06-03 DIAGNOSIS — Z86.006 H/O MELANOMA IN SITU: ICD-10-CM

## 2022-06-03 DIAGNOSIS — N40.1 HYPERTROPHY OF PROSTATE WITH URINARY OBSTRUCTION: ICD-10-CM

## 2022-06-03 DIAGNOSIS — N18.31 TYPE 2 DIABETES MELLITUS WITH STAGE 3A CHRONIC KIDNEY DISEASE, WITH LONG-TERM CURRENT USE OF INSULIN (H): ICD-10-CM

## 2022-06-03 DIAGNOSIS — Z12.11 SPECIAL SCREENING FOR MALIGNANT NEOPLASMS, COLON: ICD-10-CM

## 2022-06-03 DIAGNOSIS — Z00.00 ENCOUNTER FOR MEDICARE ANNUAL WELLNESS EXAM: Primary | ICD-10-CM

## 2022-06-03 DIAGNOSIS — Z89.421 HISTORY OF AMPUTATION OF LESSER TOE OF RIGHT FOOT (H): ICD-10-CM

## 2022-06-03 DIAGNOSIS — Z79.4 TYPE 2 DIABETES MELLITUS WITH DIABETIC POLYNEUROPATHY, WITH LONG-TERM CURRENT USE OF INSULIN (H): ICD-10-CM

## 2022-06-03 DIAGNOSIS — E11.22 TYPE 2 DIABETES MELLITUS WITH STAGE 3A CHRONIC KIDNEY DISEASE, WITH LONG-TERM CURRENT USE OF INSULIN (H): ICD-10-CM

## 2022-06-03 PROBLEM — D03.4 MELANOMA IN SITU OF NECK (H): Status: RESOLVED | Noted: 2021-08-17 | Resolved: 2022-06-03

## 2022-06-03 LAB
ALBUMIN SERPL-MCNC: 3.6 G/DL (ref 3.4–5)
ALP SERPL-CCNC: 131 U/L (ref 40–150)
ALT SERPL W P-5'-P-CCNC: 19 U/L (ref 0–70)
ANION GAP SERPL CALCULATED.3IONS-SCNC: 8 MMOL/L (ref 3–14)
AST SERPL W P-5'-P-CCNC: 14 U/L (ref 0–45)
BILIRUB SERPL-MCNC: 0.9 MG/DL (ref 0.2–1.3)
BUN SERPL-MCNC: 30 MG/DL (ref 7–30)
CALCIUM SERPL-MCNC: 9.4 MG/DL (ref 8.5–10.1)
CHLORIDE BLD-SCNC: 109 MMOL/L (ref 94–109)
CO2 SERPL-SCNC: 26 MMOL/L (ref 20–32)
CREAT SERPL-MCNC: 1.35 MG/DL (ref 0.66–1.25)
GFR SERPL CREATININE-BSD FRML MDRD: 53 ML/MIN/1.73M2
GLUCOSE BLD-MCNC: 117 MG/DL (ref 70–99)
HBA1C MFR BLD: 7.2 % (ref 0–5.6)
POTASSIUM BLD-SCNC: 4.2 MMOL/L (ref 3.4–5.3)
PROT SERPL-MCNC: 7.6 G/DL (ref 6.8–8.8)
SODIUM SERPL-SCNC: 143 MMOL/L (ref 133–144)

## 2022-06-03 PROCEDURE — 83036 HEMOGLOBIN GLYCOSYLATED A1C: CPT | Performed by: FAMILY MEDICINE

## 2022-06-03 PROCEDURE — 99207 PR FOOT EXAM NO CHARGE: CPT | Mod: 25 | Performed by: FAMILY MEDICINE

## 2022-06-03 PROCEDURE — 80053 COMPREHEN METABOLIC PANEL: CPT | Performed by: FAMILY MEDICINE

## 2022-06-03 PROCEDURE — 90715 TDAP VACCINE 7 YRS/> IM: CPT | Performed by: FAMILY MEDICINE

## 2022-06-03 PROCEDURE — 99214 OFFICE O/P EST MOD 30 MIN: CPT | Mod: 25 | Performed by: FAMILY MEDICINE

## 2022-06-03 PROCEDURE — 36415 COLL VENOUS BLD VENIPUNCTURE: CPT | Performed by: FAMILY MEDICINE

## 2022-06-03 PROCEDURE — 90471 IMMUNIZATION ADMIN: CPT | Performed by: FAMILY MEDICINE

## 2022-06-03 PROCEDURE — 99397 PER PM REEVAL EST PAT 65+ YR: CPT | Mod: 25 | Performed by: FAMILY MEDICINE

## 2022-06-03 RX ORDER — INSULIN GLARGINE 100 [IU]/ML
INJECTION, SOLUTION SUBCUTANEOUS
Qty: 30 ML | Refills: 2 | Status: SHIPPED | OUTPATIENT
Start: 2022-06-03 | End: 2023-02-24

## 2022-06-03 RX ORDER — FINASTERIDE 5 MG/1
1 TABLET, FILM COATED ORAL DAILY
Qty: 90 TABLET | Refills: 1 | Status: SHIPPED | OUTPATIENT
Start: 2022-06-03 | End: 2022-12-20

## 2022-06-03 RX ORDER — AMLODIPINE BESYLATE 10 MG/1
10 TABLET ORAL DAILY
Qty: 90 TABLET | Refills: 3 | Status: SHIPPED | OUTPATIENT
Start: 2022-06-03 | End: 2023-06-09

## 2022-06-03 RX ORDER — INSULIN LISPRO 100 [IU]/ML
INJECTION, SOLUTION INTRAVENOUS; SUBCUTANEOUS
Qty: 60 ML | Refills: 1 | Status: SHIPPED | OUTPATIENT
Start: 2022-06-03 | End: 2023-02-24

## 2022-06-03 RX ORDER — LOSARTAN POTASSIUM 100 MG/1
100 TABLET ORAL DAILY
Qty: 90 TABLET | Refills: 1 | Status: SHIPPED | OUTPATIENT
Start: 2022-06-03 | End: 2023-02-15

## 2022-06-03 RX ORDER — TAMSULOSIN HYDROCHLORIDE 0.4 MG/1
CAPSULE ORAL
Qty: 90 CAPSULE | Refills: 1 | Status: SHIPPED | OUTPATIENT
Start: 2022-06-03 | End: 2022-06-09

## 2022-06-03 RX ORDER — ATORVASTATIN CALCIUM 20 MG/1
20 TABLET, FILM COATED ORAL DAILY
Qty: 90 TABLET | Refills: 3 | Status: SHIPPED | OUTPATIENT
Start: 2022-06-03 | End: 2023-07-24

## 2022-06-03 ASSESSMENT — ENCOUNTER SYMPTOMS
COUGH: 0
WEAKNESS: 1
PARESTHESIAS: 0
DYSURIA: 0
HEARTBURN: 0
PALPITATIONS: 0
SHORTNESS OF BREATH: 1
HEMATOCHEZIA: 0
NAUSEA: 0
NERVOUS/ANXIOUS: 0
EYE PAIN: 0
CHILLS: 0
ABDOMINAL PAIN: 0
DIZZINESS: 0
FEVER: 0
SORE THROAT: 0
JOINT SWELLING: 0
DIARRHEA: 0
HEMATURIA: 0
HEADACHES: 0
ARTHRALGIAS: 0
MYALGIAS: 0
CONSTIPATION: 0
FREQUENCY: 1

## 2022-06-03 ASSESSMENT — ACTIVITIES OF DAILY LIVING (ADL): CURRENT_FUNCTION: NO ASSISTANCE NEEDED

## 2022-06-03 NOTE — PROGRESS NOTES
"SUBJECTIVE:   Storm Dutta is a 81 year old male who presents for Preventive Visit.      Patient has been advised of split billing requirements and indicates understanding: Yes     Are you in the first 12 months of your Medicare coverage?  No    Healthy Habits:     In general, how would you rate your overall health?  Good    Frequency of exercise:  None    Do you usually eat at least 4 servings of fruit and vegetables a day, include whole grains    & fiber and avoid regularly eating high fat or \"junk\" foods?  No    Medication side effects:  Not applicable    Ability to successfully perform activities of daily living:  No assistance needed    Home Safety:  No safety concerns identified    Hearing Impairment:  No hearing concerns    In the past 6 months, have you been bothered by leaking of urine? Yes    In general, how would you rate your overall mental or emotional health?  Good      PHQ-2 Total Score: 0    Do you feel safe in your environment? Yes    Have you ever done Advance Care Planning? (For example, a Health Directive, POLST, or a discussion with a medical provider or your loved ones about your wishes): Yes, advance care planning is on file.       Fall risk  Fallen 2 or more times in the past year?: No  Any fall with injury in the past year?: No    Cognitive Screening   1) Repeat 3 items (Leader, Season, Table)    2) Clock draw: NORMAL  3) 3 item recall: Recalls 1 object   Results: NORMAL clock, 1-2 items recalled: COGNITIVE IMPAIRMENT LESS LIKELY    Mini-CogTM Copyright JOSELYN Raya. Licensed by the author for use in [facility]; reprinted with permission ([email or web address]@.[email or web address]). All rights reserved.          Reviewed and updated as needed this visit by clinical staff   Tobacco  Allergies  Meds   Med Hx  Surg Hx  Fam Hx  Soc Hx          Reviewed and updated as needed this visit by Provider   Tobacco  Allergies  Meds   Med Hx  Surg Hx  Fam Hx  Soc Hx         Social History     Tobacco Use     " Smoking status: Former Smoker     Packs/day: 0.00     Years: 30.00     Pack years: 0.00     Types: Cigars     Quit date: 2002     Years since quittin.7     Smokeless tobacco: Never Used   Substance Use Topics     Alcohol use: No         Alcohol Use 2022   Prescreen: >3 drinks/day or >7 drinks/week? Not Applicable   Prescreen: >3 drinks/day or >7 drinks/week? -         Current providers sharing in care for this patient include:   Patient Care Team:  Jovana Beaulieu DO as PCP - General (Family Practice)  Margaret Gamble MD as MD (Colon and Rectal Surgery)  Khadijah Hernandez, RN as Specialty Care Coordinator (Oncology)  Jovana Beaulieu DO as Assigned PCP  Dwaine Reilly MD as Assigned Heart and Vascular Provider    The following health maintenance items are reviewed in Epic and correct as of today:  Health Maintenance Due   Topic Date Due     ANNUAL REVIEW OF HM ORDERS  Never done     ZOSTER IMMUNIZATION (1 of 2) Never done     MICROALBUMIN  2022         Review of Systems   Constitutional: Negative for chills and fever.   HENT: Positive for hearing loss. Negative for congestion, ear pain and sore throat.    Eyes: Positive for visual disturbance. Negative for pain.   Respiratory: Positive for shortness of breath. Negative for cough.    Cardiovascular: Negative for chest pain, palpitations and peripheral edema.   Gastrointestinal: Negative for abdominal pain, constipation, diarrhea, heartburn, hematochezia and nausea.   Genitourinary: Positive for frequency, impotence and urgency. Negative for dysuria, genital sores, hematuria and penile discharge.   Musculoskeletal: Negative for arthralgias, joint swelling and myalgias.   Skin: Negative for rash.   Neurological: Positive for weakness. Negative for dizziness, headaches and paresthesias.   Psychiatric/Behavioral: Negative for mood changes. The patient is not nervous/anxious.      Takes 8-10 units before meals.  Does not check  "glucose before meals, just takes a standard dose.  Has been taking his insulin regularly and trying to watch his diet.  Feels diabetes is going well.    SOB has been stable, jut not able to do as much as he once was.  No change.  Planning medical management with cardiology    Urinary symptoms persist.  Still waking more frequently at [facility] then he would like.  Has been a long time since he has seen urology    OBJECTIVE:   /62   Pulse 87   Temp 97.5  F (36.4  C) (Tympanic)   Ht 1.715 m (5' 7.5\")   Wt 92 kg (202 lb 12.8 oz)   SpO2 96%   BMI 31.29 kg/m   Estimated body mass index is 31.29 kg/m  as calculated from the following:    Height as of this encounter: 1.715 m (5' 7.5\").    Weight as of this encounter: 92 kg (202 lb 12.8 oz).  Physical Exam  GENERAL: healthy, alert and no distress  EYES: Eyes grossly normal to inspection, PERRL and conjunctivae and sclerae normal  NECK: no adenopathy, no asymmetry, masses, or scars and thyroid normal to palpation  RESP: lungs clear to auscultation - no rales, rhonchi or wheezes  CV: regular rate and rhythm, normal S1 S2, no S3 or S4, no murmur, click or rub, no peripheral edema and peripheral pulses strong  MS: no gross musculoskeletal defects noted, no edema  NEURO: Normal strength and tone, mentation intact and speech normal  PSYCH: mentation appears normal, affect normal/bright    Diagnostic Test Results:  Labs reviewed in Epic    ASSESSMENT / PLAN:       ICD-10-CM    1. Encounter for Medicare annual wellness exam  Z00.00    2. Type 2 diabetes mellitus with stage 3a chronic kidney disease, with long-term current use of insulin (H)  E11.22 Comprehensive metabolic panel (BMP + Alb, Alk Phos, ALT, AST, Total. Bili, TP)    N18.31 Albumin Random Urine Quantitative with Creat Ratio    Z79.4 Hemoglobin A1c     Comprehensive metabolic panel (BMP + Alb, Alk Phos, ALT, AST, Total. Bili, TP)     Hemoglobin A1c   3. Hypertension goal BP (blood pressure) < 140/90  I10 " "amLODIPine (NORVASC) 10 MG tablet     losartan (COZAAR) 100 MG tablet   4. Hyperlipidemia LDL goal <70  E78.5 atorvastatin (LIPITOR) 20 MG tablet     tamsulosin (FLOMAX) 0.4 MG capsule   5. Hypertrophy of prostate with urinary obstruction  N40.1 finasteride (PROSCAR) 5 MG tablet    N13.8 tamsulosin (FLOMAX) 0.4 MG capsule     Adult Urology Referral   6. Type 2 diabetes mellitus with diabetic polyneuropathy, with long-term current use of insulin (H)  E11.42 insulin lispro (HUMALOG KWIKPEN) 100 UNIT/ML (1 unit dial) KWIKPEN    Z79.4 insulin glargine (LANTUS SOLOSTAR) 100 UNIT/ML pen     FOOT EXAM   7. 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 PVD (peripheral vascular disease) (H)  I73.9    9. H/O malignant neoplasm of rectum  Z85.048 Adult Gastro Ref - Procedure Only   10. Special screening for malignant neoplasms, colon  Z12.11 Adult Gastro Ref - Procedure Only   11. H/O melanoma in situ  Z86.006 Adult Dermatology Referral     DM:  Controlled, continue meds    HTN:  Controlled, continue meds    Lipids:  Continue statin    BPH:  On meds, persistent symptoms.  No recent change.  Urology referral placed    H/o toe amputation:  Stable    H/o PVD:  HTN/Lipids/DM managed, no concerns    H/o melanoma:  Referred to derm for skin exam    H/o rectal cancer:  Due for colonoscopy  Patient has been advised of split billing requirements and indicates understanding: Yes    COUNSELING:  Reviewed preventive health counseling, as reflected in patient instructions    Estimated body mass index is 31.29 kg/m  as calculated from the following:    Height as of this encounter: 1.715 m (5' 7.5\").    Weight as of this encounter: 92 kg (202 lb 12.8 oz).    Weight management plan: Discussed healthy diet and exercise guidelines    He reports that he quit smoking about 19 years ago. His smoking use included cigars. He smoked 0.00 packs per day for 30.00 years. He has never used smokeless tobacco.      Appropriate " preventive services were discussed with this patient, including applicable screening as appropriate for cardiovascular disease, diabetes, osteopenia/osteoporosis, and glaucoma.  As appropriate for age/gender, discussed screening for colorectal cancer, prostate cancer, breast cancer, and cervical cancer. Checklist reviewing preventive services available has been given to the patient.    Reviewed patients plan of care and provided an AVS. The Complex Care Plan (for patients with higher acuity and needing more deliberate coordination of services) for Storm meets the Care Plan requirement. This Care Plan has been established and reviewed with the Patient and spouse.    Counseling Resources:  ATP IV Guidelines  Pooled Cohorts Equation Calculator  Breast Cancer Risk Calculator  Breast Cancer: Medication to Reduce Risk  FRAX Risk Assessment  ICSI Preventive Guidelines  Dietary Guidelines for Americans, 2010  USDA's MyPlate  ASA Prophylaxis  Lung CA Screening    DO GOLDY Flowers [facility]    Identified Health Risks:

## 2022-06-03 NOTE — TELEPHONE ENCOUNTER
[facility] Call Center    Phone Message    May a detailed message be left on voicemail: yes     Reason for Call: Other: Patient is being referred to Urology for urinary obstruction, per protocols send TE.Please triage and call patient to schedule     Action Taken: Message routed to:  [facility] ([facility] Urology     Travel Screening: Not Applicable

## 2022-06-03 NOTE — NURSING NOTE
"Initial /62   Pulse 87   Temp 97.5  F (36.4  C) (Tympanic)   Ht 1.715 m (5' 7.5\")   Wt 92 kg (202 lb 12.8 oz)   SpO2 96%   BMI 31.29 kg/m   Estimated body mass index is 31.29 kg/m  as calculated from the following:    Height as of this encounter: 1.715 m (5' 7.5\").    Weight as of this encounter: 92 kg (202 lb 12.8 oz). .      "

## 2022-06-03 NOTE — PATIENT INSTRUCTIONS
Patient Education   Personalized Prevention Plan  You are due for the preventive services outlined below.  Your care team is available to assist you in scheduling these services.  If you have already completed any of these items, please share that information with your care team to update in your medical record.  Health Maintenance Due   Topic Date Due     ANNUAL REVIEW OF HM ORDERS  Never done     Zoster (Shingles) Vaccine (1 of 2) Never done     Annual Wellness Visit  07/19/2014     Diptheria Tetanus Pertussis (DTAP/TDAP/TD) Vaccine (2 - Td or Tdap) 12/24/2018     Diabetic Foot Exam  11/15/2020     Kidney Microalbumin Urine Test  01/22/2022     Preventive Health Recommendations  See your health care provider every year to    Review health changes.     Discuss preventive care.      Review your medicines if your doctor has prescribed any.    Talk with your health care provider about whether you should have a test to screen for prostate cancer (PSA).    Every 3 years, have a diabetes test (fasting glucose). If you are at risk for diabetes, you should have this test more often.    Every 5 years, have a cholesterol test. Have this test more often if you are at risk for high cholesterol or heart disease.     Every 10 years, have a colonoscopy. Or, have a yearly FIT test (stool test). These exams will check for colon cancer.    Talk to with your health care provider about screening for Abdominal Aortic Aneurysm if you have a family history of AAA or have a history of smoking.    Shots:     Get a flu shot each year.     Get a tetanus shot every 10 years.     Talk to your doctor about your pneumonia vaccines. There are now two you should receive - Pneumovax (PPSV 23) and Prevnar (PCV 13).    Talk to your pharmacist about a shingles vaccine.     Talk to your doctor about the hepatitis B vaccine.    Nutrition:     Eat at least 5 servings of fruits and vegetables each day.     Eat whole-grain bread, whole-wheat pasta and  brown rice instead of white grains and rice.     Get adequate Calcium and Vitamin D.     Lifestyle    Exercise for at least 150 minutes a week (30 minutes a day, 5 days a week). This will help you control your weight and prevent disease.     Limit alcohol to one drink per day.     No smoking.     Wear sunscreen to prevent skin cancer.     See your dentist every six months for an exam and cleaning.     See your eye doctor every 1 to 2 years to screen for conditions such as glaucoma, macular degeneration and cataracts.    Personalized Prevention Plan  You are due for the preventive services outlined below.  Your care team is available to assist you in scheduling these services.  If you have already completed any of these items, please share that information with your care team to update in your medical record.  Health Maintenance   Topic Date Due     ANNUAL REVIEW OF  ORDERS  Never done     ZOSTER IMMUNIZATION (1 of 2) Never done     MEDICARE ANNUAL WELLNESS VISIT  07/19/2014     DTAP/TDAP/TD IMMUNIZATION (2 - Td or Tdap) 12/24/2018     DIABETIC FOOT EXAM  11/15/2020     MICROALBUMIN  01/22/2022     INFLUENZA VACCINE (Season Ended) 09/01/2022     A1C  09/16/2022     EYE EXAM  11/10/2022     BMP  11/22/2022     HEMOGLOBIN  11/22/2022     LIPID  01/06/2023     FALL RISK ASSESSMENT  06/03/2023     ADVANCE CARE PLANNING  10/29/2023     PHQ-2 (once per calendar year)  Completed     Pneumococcal Vaccine: 65+ Years  Completed     URINALYSIS  Completed     COVID-19 Vaccine  Completed     IPV IMMUNIZATION  Aged Out     MENINGITIS IMMUNIZATION  Aged Out

## 2022-06-09 ENCOUNTER — OFFICE VISIT (OUTPATIENT)
Dept: UROLOGY | Facility: CLINIC | Age: 82
End: 2022-06-09
Payer: COMMERCIAL

## 2022-06-09 VITALS — HEART RATE: 60 BPM | SYSTOLIC BLOOD PRESSURE: 147 MMHG | DIASTOLIC BLOOD PRESSURE: 71 MMHG | OXYGEN SATURATION: 99 %

## 2022-06-09 DIAGNOSIS — N40.1 HYPERTROPHY OF PROSTATE WITH URINARY OBSTRUCTION: ICD-10-CM

## 2022-06-09 DIAGNOSIS — N13.8 HYPERTROPHY OF PROSTATE WITH URINARY OBSTRUCTION: ICD-10-CM

## 2022-06-09 DIAGNOSIS — R35.1 NOCTURIA: Primary | ICD-10-CM

## 2022-06-09 LAB
ALBUMIN UR-MCNC: NEGATIVE MG/DL
APPEARANCE UR: CLEAR
BACTERIA #/AREA URNS HPF: ABNORMAL /HPF
BILIRUB UR QL STRIP: NEGATIVE
COLOR UR AUTO: YELLOW
GLUCOSE UR STRIP-MCNC: NEGATIVE MG/DL
HGB UR QL STRIP: ABNORMAL
KETONES UR STRIP-MCNC: NEGATIVE MG/DL
LEUKOCYTE ESTERASE UR QL STRIP: NEGATIVE
NITRATE UR QL: NEGATIVE
PH UR STRIP: 6 [PH] (ref 5–7)
RBC #/AREA URNS AUTO: ABNORMAL /HPF
SP GR UR STRIP: 1.02 (ref 1–1.03)
SQUAMOUS #/AREA URNS AUTO: ABNORMAL /LPF
UROBILINOGEN UR STRIP-ACNC: 0.2 E.U./DL
WBC #/AREA URNS AUTO: ABNORMAL /HPF

## 2022-06-09 PROCEDURE — 51798 US URINE CAPACITY MEASURE: CPT | Performed by: STUDENT IN AN ORGANIZED HEALTH CARE EDUCATION/TRAINING PROGRAM

## 2022-06-09 PROCEDURE — 81001 URINALYSIS AUTO W/SCOPE: CPT | Performed by: STUDENT IN AN ORGANIZED HEALTH CARE EDUCATION/TRAINING PROGRAM

## 2022-06-09 PROCEDURE — 87086 URINE CULTURE/COLONY COUNT: CPT | Performed by: STUDENT IN AN ORGANIZED HEALTH CARE EDUCATION/TRAINING PROGRAM

## 2022-06-09 PROCEDURE — 99203 OFFICE O/P NEW LOW 30 MIN: CPT | Mod: 25 | Performed by: STUDENT IN AN ORGANIZED HEALTH CARE EDUCATION/TRAINING PROGRAM

## 2022-06-09 RX ORDER — TAMSULOSIN HYDROCHLORIDE 0.4 MG/1
0.8 CAPSULE ORAL DAILY
Qty: 180 CAPSULE | Refills: 3 | Status: SHIPPED | OUTPATIENT
Start: 2022-06-09 | End: 2022-08-23

## 2022-06-09 NOTE — PROGRESS NOTES
Chief Complaint:   BPH with incomplete bladder emptying         History of Present Illness:   Storm Dutta is a 81 year old male with a history of T2 DM, HLD, HTN, CKD stage 3, and rectal cancer who presents for evaluation of BPH with incomplete bladder emptying.     Patient was last seen by Dr. Curran on 9/25/2013. At the time, he was undergoing treatment for advanced rectal cancer and had started radiation, which worsened his urinary urgency and frequency. He was taking finasteride and tamsulosin 0.8 mg daily. His PVR was >260 mL. He was taught CIC during this visit.     Today, he is taking finasteride 5 mg daily and tamsulosin 0.4 mg daily. He reports nocturia every 2 hours. Daytime frequency does not bother him as much, though he has significant urge. Occasionally, he will have urge incontinence which is more prevalent at night.     He reports sensation of incomplete bladder emptying and double voiding. He denies straining to void, dysuria, and gross hematuria. He has loose stool since being treated for rectal cancer. He takes Imodium daily.     He states he was not successful with CIC. He reported hematuria with catheterization and no change in his symptoms.     He is doing well in regards to his rectal cancer.          Past Medical History:     Past Medical History:   Diagnosis Date     Acute urinary retention 11/2012    ER visit   / 1200 cc post-void residual     Acute urinary retention 11/1/2012    ER      Diabetes mellitus type II      Epididymitis 3/20/2014    Started on doxycyclline for UTI/orchitis. He was discharged on 03/22/14.     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     PE (pulmonary embolism) 3/20/2014     Rectal cancer (H)      Skin cancer      Squamous cell carcinoma      Thrombosis of leg     +PE            Past Surgical History:     Past Surgical History:    Procedure Laterality Date     AMPUTATE TOE(S) Right 6/4/2019    Procedure: AMPUTATION 2nd Toe;  Surgeon: Adriel Cabello DPM;  Location: [facility]     COLONOSCOPY  7/29/2013    Procedure: COMBINED COLONOSCOPY, SINGLE BIOPSY/POLYPECTOMY BY BIOPSY;;  Surgeon: Bari Burnett MD;  Location: [facility]     COLONOSCOPY N/A 6/4/2015    Procedure: COMBINED COLONOSCOPY, SINGLE OR MULTIPLE BIOPSY/POLYPECTOMY BY BIOPSY;  Surgeon: Tara Mtz MD;  Location:  GI     COLONOSCOPY N/A 12/5/2016    Procedure: COLONOSCOPY;  Surgeon: Breanna Kline MD;  Location:  GI     CV CORONARY ANGIOGRAM N/A 11/22/2021    Procedure: Coronary Angiogram;  Surgeon: Jak Noe MD;  Location:  [facility]     EYE SURGERY  5/2012    Right eye procedure     HC CIRCUMCISION SURGICAL NON-DEV >28 DAYS AGE  2/97    due to phimosis     HC REMOVAL GALLBLADDER  5/01     HC UGI ENDOSCOPY W PLACEMENT GASTROSTOMY TUBE PERCUT  3/13/2014    Procedure: COMBINED ESOPHAGOSCOPY, GASTROSCOPY, DUODENOSCOPY (EGD), PLACE PERCUTANEOUS ENDOSCOPIC GASTROSTOMY TUBE;  Surgeon: Loco Casillas MD;  Location: [facility]     LAPAROSCOPIC ASSISTED COLECTOMY LEFT (DESCENDING)  1/7/2014    Procedure: LAPAROSCOPIC ASSISTED COLECTOMY LEFT (DESCENDING);  Laparoscopic hand assisted Low Anterior Resection With colonic J pouch and end to end colorectal anastamosis. Laparoscopic splenic flexure mobilization.  Loop Ileostomy.  Rigid proctoscopy;  Surgeon: Margaret Gamble MD;  [facility]     PHACOEMULSIFICATION WITH STANDARD INTRAOCULAR LENS IMPLANT  4/11/2013    Procedure: PHACOEMULSIFICATION WITH STANDARD INTRAOCULAR LENS IMPLANT;  Right Kelman Phacoemulsification with Intraocular Lens Implant;  Surgeon: Caesar Turner MD;  Location: [facility]     REMOVE GASTROSTOMY TUBE ADULT  7/25/2014    Procedure: REMOVE GASTROSTOMY TUBE ADULT;  Surgeon: Margaret Gamble MD;  [facility]     SIGMOIDOSCOPY FLEXIBLE   6/23/2014    Procedure: SIGMOIDOSCOPY FLEXIBLE;  Surgeon: Margaret Gamble MD;  [facility]     SIGMOIDOSCOPY FLEXIBLE  7/22/2014    Procedure: SIGMOIDOSCOPY FLEXIBLE;  Surgeon: Margaret Gamble MD;  [facility]     SIGMOIDOSCOPY FLEXIBLE  7/25/2014    Procedure: SIGMOIDOSCOPY FLEXIBLE;  Surgeon: Margaret Gamble MD;  [facility]     SIGMOIDOSCOPY FLEXIBLE  7/28/2014    Procedure: SIGMOIDOSCOPY FLEXIBLE;  Surgeon: Margaret Gamble MD;  [facility]     SIGMOIDOSCOPY FLEXIBLE  7/31/2014    Procedure: SIGMOIDOSCOPY FLEXIBLE;  Surgeon: Margaret Gamble MD;  [facility]     SIGMOIDOSCOPY FLEXIBLE  8/3/2014    Procedure: SIGMOIDOSCOPY FLEXIBLE;  Surgeon: Margaret Gamble MD;  [facility]     SIGMOIDOSCOPY FLEXIBLE  8/5/2014    Procedure: SIGMOIDOSCOPY FLEXIBLE;  Surgeon: Margaret Gamble MD;  [facility]     SIGMOIDOSCOPY FLEXIBLE  8/8/2014    Procedure: SIGMOIDOSCOPY FLEXIBLE;  Surgeon: Margarte Gamble MD;  [facility]     SIGMOIDOSCOPY FLEXIBLE  8/18/2014    Procedure: SIGMOIDOSCOPY FLEXIBLE;  Surgeon: Jose Roberto Cervantes MD;  [facility]     SIGMOIDOSCOPY FLEXIBLE N/A 8/15/2014    Procedure: SIGMOIDOSCOPY FLEXIBLE;  Surgeon: Margaret Gamble MD;  [facility]     SIGMOIDOSCOPY FLEXIBLE N/A 8/22/2014    Procedure: SIGMOIDOSCOPY FLEXIBLE;  Surgeon: Jose Roberto Cervantes MD;  [facility]     SIGMOIDOSCOPY FLEXIBLE N/A 8/11/2014    Procedure: SIGMOIDOSCOPY FLEXIBLE;  Surgeon: Margaret Gamble MD;  [facility]     SIGMOIDOSCOPY FLEXIBLE N/A 8/26/2014    Procedure: SIGMOIDOSCOPY FLEXIBLE;  Surgeon: Margaret Gamble MD;  [facility]     SIGMOIDOSCOPY FLEXIBLE N/A 8/29/2014    Procedure: SIGMOIDOSCOPY FLEXIBLE;  Surgeon: Margaret Gamble MD;  [facility]     SIGMOIDOSCOPY FLEXIBLE N/A 9/8/2014    Procedure: SIGMOIDOSCOPY FLEXIBLE;  Surgeon: Margaret Gamble  MD CHRISTIAN;  [facility]     SIGMOIDOSCOPY FLEXIBLE N/A 9/2/2014    Procedure: SIGMOIDOSCOPY FLEXIBLE;  Surgeon: Breanna Kline MD;  [facility]     SIGMOIDOSCOPY FLEXIBLE N/A 9/11/2014    Procedure: SIGMOIDOSCOPY FLEXIBLE;  Surgeon: Margaret Gamble MD;  [facility]     SIGMOIDOSCOPY FLEXIBLE N/A 9/19/2014    Procedure: SIGMOIDOSCOPY FLEXIBLE;  Surgeon: Margaret Gamble MD;  [facility]     SIGMOIDOSCOPY FLEXIBLE N/A 9/15/2014    Procedure: SIGMOIDOSCOPY FLEXIBLE;  Surgeon: Margaret Gamble MD;  [facility]     SIGMOIDOSCOPY FLEXIBLE N/A 9/5/2014    Procedure: SIGMOIDOSCOPY FLEXIBLE;  Surgeon: Margaret Gamble MD;  [facility]     SIGMOIDOSCOPY FLEXIBLE N/A 9/23/2014    Procedure: SIGMOIDOSCOPY FLEXIBLE;  Surgeon: Margaret Gamble MD;  [facility]     SIGMOIDOSCOPY FLEXIBLE N/A 9/26/2014    Procedure: SIGMOIDOSCOPY FLEXIBLE;  Surgeon: Margaret Gamble MD;  [facility]     SIGMOIDOSCOPY FLEXIBLE N/A 9/30/2014    Procedure: SIGMOIDOSCOPY FLEXIBLE;  Surgeon: Margaret Gamble MD;  [facility]     SIGMOIDOSCOPY FLEXIBLE N/A 10/3/2014    Procedure: SIGMOIDOSCOPY FLEXIBLE;  Surgeon: Margaret Gamble MD;  [facility]     SIGMOIDOSCOPY FLEXIBLE N/A 10/30/2014    Procedure: SIGMOIDOSCOPY FLEXIBLE;  Surgeon: Margaret Gamble MD;  [facility]     SIGMOIDOSCOPY FLEXIBLE, PLACEMENT OF DRAINAGE SPONGE  9/30/14     TAKEDOWN ILEOSTOMY N/A 1/6/2015    Procedure: TAKEDOWN ILEOSTOMY;  Surgeon: Margaret Gamble MD;  [facility]            Medications     Current Outpatient Medications   Medication     amLODIPine (NORVASC) 10 MG tablet     aspirin 325 MG tablet     atorvastatin (LIPITOR) 20 MG tablet     blood glucose (ACCU-CHEK REGIS PLUS) test strip     blood glucose monitoring (NO BRAND SPECIFIED) meter device kit     Cholecalciferol (VITAMIN D-3) 1000 units CAPS     finasteride (PROSCAR) 5  MG tablet     insulin glargine (LANTUS SOLOSTAR) 100 UNIT/ML pen     insulin lispro (HUMALOG KWIKPEN) 100 UNIT/ML (1 unit dial) KWIKPEN     insulin pen needle (ULTICARE MINI) 31G X 6 MM miscellaneous     losartan (COZAAR) 100 MG tablet     metoprolol succinate ER (TOPROL-XL) 25 MG 24 hr tablet     tamsulosin (FLOMAX) 0.4 MG capsule     No current facility-administered medications for this visit.            Allergies:   Nkda [no known drug allergies]         Review of Systems:  From intake questionnaire   Negative 14 system review except as noted on HPI, nurse's note.         Physical Exam:   Patient is a 81 year old  male   Vitals: Blood pressure (!) 147/71, pulse 60, SpO2 99 %.  General Appearance Adult: Alert, no acute distress, oriented.  Lungs: Non-labored breathing.  Heart: No obvious jugular venous distension present.  Neuro: Alert, oriented, speech and mentation normal    PVR: >208 mL      Labs and Pathology:    I personally reviewed all applicable laboratory data and went over findings with patient  Significant for:    PSA RESULTS  PSA   Date Value Ref Range Status   05/31/2013 2.79 0 - 4 ug/L Final   11/27/2012 12.60 (H) 0 - 4 ug/L Final            Assessment and Plan:     Assessment: 81 year old male with a history of rectal cancer who reports nocturia, daytime urgency, urge incontinence, double voiding, and sensation of incomplete bladder emptying. He currently takes tamsulosin 0.4 mg and finasteride 5 mg daily. His PVR was >202 mL. He has been evaluated by Dr. Curran and Dr. Ngo in the past. He did CIC at one time without success.    We discussed that his symptoms are likely related to incomplete bladder emptying. Management options at this point include increasing tamsulosin dose to 0.8 mg, re-starting CIC prior to bedtime, or surgery consultation with one of our BPH surgeons.     At this time, he would like to increase his tamsulosin to 0.8 mg daily and see if his symptoms improve. He is not  interested in CIC again as he struggled with this previously. We discussed that if his symptoms do not improve, he would be on maximum medical management for incomplete bladder emptying and I would encourage a consultation with one of our BPH surgeons.     Plan:  1. Increase tamsulosin from 0.4 mg to 0.8 mg daily.   2. Follow up in 2-3 months. If symptoms have not improved, would consider surgical intervention.     VIRGINIA GASCA PA-C  Department of Urology

## 2022-06-09 NOTE — NURSING NOTE
"Initial BP (!) 147/71 (BP Location: Left arm, Patient Position: Chair, Cuff Size: Adult Regular)   Pulse 60   SpO2 99%  Estimated body mass index is 31.29 kg/m  as calculated from the following:    Height as of 6/3/22: 1.715 m (5' 7.5\").    Weight as of 6/3/22: 92 kg (202 lb 12.8 oz). .    Active order to obtain bladder scan? Yes   Name of ordering provider:  Ana Gray  Bladder scan preformed post void Yes.  Bladder scan reveled 208ML  Provider notified?  Yes    GIOVANNI Yadav MA on 6/9/2022 at 9:54 AM    "

## 2022-06-11 LAB — BACTERIA UR CULT: NO GROWTH

## 2022-07-01 ENCOUNTER — TRANSFERRED RECORDS (OUTPATIENT)
Dept: HEALTH INFORMATION MANAGEMENT | Facility: CLINIC | Age: 82
End: 2022-07-01

## 2022-07-01 LAB — RETINOPATHY: POSITIVE

## 2022-08-03 ENCOUNTER — TELEPHONE (OUTPATIENT)
Dept: FAMILY MEDICINE | Facility: CLINIC | Age: 82
End: 2022-08-03

## 2022-08-03 NOTE — TELEPHONE ENCOUNTER
Patient requesting handicap form renewal. Will  at  when complete. Form placed in provider basket.    Thank you,    Rere Traore, NATAN Guardado

## 2022-08-05 NOTE — LETTER
9/17/2018         RE: Storm Dutta  [address] [place] [address]        Dear Colleague,    Thank you for referring your patient, Storm Dutta, to the [facility]. Please see a copy of my visit note below.    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  Patient has no other skin complaints today.  Remainder of the HPI, Meds, PMH, Allergies, FH, and SH was reviewed in chart.      Past Medical History:   Diagnosis Date     Acute urinary retention 11/2012    ER visit   / 1200 cc post-void residual     Acute urinary retention 11/1/2012    ER      Diabetes mellitus type II      Epididymitis 3/20/2014    Started on doxycyclline for UTI/orchitis. He was discharged on 03/22/14.     Former smoker     dc'd 2006     Hypertension goal BP (blood pressure) < 140/90 8/24/2012     PE (pulmonary embolism) 3/20/2014     Rectal cancer (H)      Skin cancer      Squamous cell carcinoma      Thrombosis of leg     +PE       Past Surgical History:   Procedure Laterality Date     COLONOSCOPY  7/29/2013    Procedure: COMBINED COLONOSCOPY, SINGLE BIOPSY/POLYPECTOMY BY BIOPSY;;  Surgeon: Bari Burnett MD;  Location: [facility]     COLONOSCOPY N/A 6/4/2015    Procedure: COMBINED COLONOSCOPY, SINGLE OR MULTIPLE BIOPSY/POLYPECTOMY BY BIOPSY;  Surgeon: Tara Mtz MD;  Location:  GI     COLONOSCOPY N/A 12/5/2016    Procedure: COLONOSCOPY;  Surgeon: Breanna Kline MD;  Location:  GI     EYE SURGERY  5/2012    Right eye procedure     HC CIRCUMCISION SURGICAL NON-DEV >28 DAYS AGE  2/97    due to phimosis     HC REMOVAL GALLBLADDER  5/01     HC UGI ENDOSCOPY W PLACEMENT GASTROSTOMY TUBE PERCUT  3/13/2014    Procedure: COMBINED ESOPHAGOSCOPY, GASTROSCOPY, DUODENOSCOPY (EGD), PLACE PERCUTANEOUS ENDOSCOPIC GASTROSTOMY TUBE;  Surgeon: Loco Casillas MD;  Location: [facility]     LAPAROSCOPIC  ASSISTED COLECTOMY LEFT (DESCENDING)  1/7/2014    Procedure: LAPAROSCOPIC ASSISTED COLECTOMY LEFT (DESCENDING);  Laparoscopic hand assisted Low Anterior Resection With colonic J pouch and end to end colorectal anastamosis. Laparoscopic splenic flexure mobilization.  Loop Ileostomy.  Rigid proctoscopy;  Surgeon: Margaret Gamble MD;  [facility]     PHACOEMULSIFICATION WITH STANDARD INTRAOCULAR LENS IMPLANT  4/11/2013    Procedure: PHACOEMULSIFICATION WITH STANDARD INTRAOCULAR LENS IMPLANT;  Right Kelman Phacoemulsification with Intraocular Lens Implant;  Surgeon: Caesar Turner MD;  Location: [facility]     REMOVE GASTROSTOMY TUBE ADULT  7/25/2014    Procedure: REMOVE GASTROSTOMY TUBE ADULT;  Surgeon: Margaret Gamble MD;  [facility]     SIGMOIDOSCOPY FLEXIBLE  6/23/2014    Procedure: SIGMOIDOSCOPY FLEXIBLE;  Surgeon: Margaret Gamble MD;  [facility]     SIGMOIDOSCOPY FLEXIBLE  7/22/2014    Procedure: SIGMOIDOSCOPY FLEXIBLE;  Surgeon: Margaret Gamble MD;  [facility]     SIGMOIDOSCOPY FLEXIBLE  7/25/2014    Procedure: SIGMOIDOSCOPY FLEXIBLE;  Surgeon: Margaret Gamble MD;  [facility]     SIGMOIDOSCOPY FLEXIBLE  7/28/2014    Procedure: SIGMOIDOSCOPY FLEXIBLE;  Surgeon: Margaret Gamble MD;  [facility]     SIGMOIDOSCOPY FLEXIBLE  7/31/2014    Procedure: SIGMOIDOSCOPY FLEXIBLE;  Surgeon: Margaret Gamble MD;  [facility]     SIGMOIDOSCOPY FLEXIBLE  8/3/2014    Procedure: SIGMOIDOSCOPY FLEXIBLE;  Surgeon: Margaret Gamble MD;  [facility]     SIGMOIDOSCOPY FLEXIBLE  8/5/2014    Procedure: SIGMOIDOSCOPY FLEXIBLE;  Surgeon: Margaret Gamble MD;  [facility]     SIGMOIDOSCOPY FLEXIBLE  8/8/2014    Procedure: SIGMOIDOSCOPY FLEXIBLE;  Surgeon: Margaret Gamble MD;  [facility]     SIGMOIDOSCOPY FLEXIBLE  8/18/2014    Procedure: SIGMOIDOSCOPY FLEXIBLE;  Surgeon: Jose Roberto Cervantes MD;   [facility]     SIGMOIDOSCOPY FLEXIBLE N/A 8/15/2014    Procedure: SIGMOIDOSCOPY FLEXIBLE;  Surgeon: Margaret Gamble MD;  [facility]     SIGMOIDOSCOPY FLEXIBLE N/A 8/22/2014    Procedure: SIGMOIDOSCOPY FLEXIBLE;  Surgeon: Jose Roberto Cervantes MD;  [facility]     SIGMOIDOSCOPY FLEXIBLE N/A 8/11/2014    Procedure: SIGMOIDOSCOPY FLEXIBLE;  Surgeon: Margaret Gamble MD;  [facility]     SIGMOIDOSCOPY FLEXIBLE N/A 8/26/2014    Procedure: SIGMOIDOSCOPY FLEXIBLE;  Surgeon: Margaret Gamble MD;  [facility]     SIGMOIDOSCOPY FLEXIBLE N/A 8/29/2014    Procedure: SIGMOIDOSCOPY FLEXIBLE;  Surgeon: Margaret Gamble MD;  [facility]     SIGMOIDOSCOPY FLEXIBLE N/A 9/8/2014    Procedure: SIGMOIDOSCOPY FLEXIBLE;  Surgeon: Margaret Gamble MD;  [facility]     SIGMOIDOSCOPY FLEXIBLE N/A 9/2/2014    Procedure: SIGMOIDOSCOPY FLEXIBLE;  Surgeon: Breanna Kline MD;  [facility]     SIGMOIDOSCOPY FLEXIBLE N/A 9/11/2014    Procedure: SIGMOIDOSCOPY FLEXIBLE;  Surgeon: Margaret Gamble MD;  [facility]     SIGMOIDOSCOPY FLEXIBLE N/A 9/19/2014    Procedure: SIGMOIDOSCOPY FLEXIBLE;  Surgeon: Margaret Gamble MD;  [facility]     SIGMOIDOSCOPY FLEXIBLE N/A 9/15/2014    Procedure: SIGMOIDOSCOPY FLEXIBLE;  Surgeon: Margaret Gamble MD;  [facility]     SIGMOIDOSCOPY FLEXIBLE N/A 9/5/2014    Procedure: SIGMOIDOSCOPY FLEXIBLE;  Surgeon: Margaret Gamble MD;  [facility]     SIGMOIDOSCOPY FLEXIBLE N/A 9/23/2014    Procedure: SIGMOIDOSCOPY FLEXIBLE;  Surgeon: Margaret Gamble MD;  [facility]     SIGMOIDOSCOPY FLEXIBLE N/A 9/26/2014    Procedure: SIGMOIDOSCOPY FLEXIBLE;  Surgeon: Margaret Gamble MD;  [facility]     SIGMOIDOSCOPY FLEXIBLE N/A 9/30/2014    Procedure: SIGMOIDOSCOPY FLEXIBLE;  Surgeon: Margaret Gamble MD;  [facility]     SIGMOIDOSCOPY FLEXIBLE N/A  10/3/2014    Procedure: SIGMOIDOSCOPY FLEXIBLE;  Surgeon: Margaret Gamble MD;  Location:  GI     SIGMOIDOSCOPY FLEXIBLE N/A 10/30/2014    Procedure: SIGMOIDOSCOPY FLEXIBLE;  Surgeon: Margaret Gamble MD;  Location:  GI     SIGMOIDOSCOPY FLEXIBLE, PLACEMENT OF DRAINAGE SPONGE  9/30/14     TAKEDOWN ILEOSTOMY N/A 1/6/2015    Procedure: TAKEDOWN ILEOSTOMY;  Surgeon: Margaret Gamble MD;  Location:  OR        Family History   Problem Relation Age of Onset     Diabetes Mother      Hypertension Mother      Cancer Father      Cancer Maternal Grandmother      Alzheimer Disease Maternal Grandmother      Cardiovascular Paternal Grandmother      Breast Cancer Sister      Colon Cancer No family hx of      Colon Polyps No family hx of      Crohn Disease No family hx of      Ulcerative Colitis No family hx of      Anesthesia Reaction No family hx of        Social History     Social History     Marital status:      Spouse name: N/A     Number of children: N/A     Years of education: N/A     Occupational History      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 No     Social History Narrative       Outpatient Encounter Prescriptions as of 9/17/2018   Medication Sig Dispense Refill     ACCU-CHEK REGIS PLUS test strip TEST BLOOD SUGARS 2-4 TIMES DAILY OR AS DIRECTED 100 each 11     aspirin 325 MG tablet Take 325 mg by mouth daily       atorvastatin (LIPITOR) 20 MG tablet Take 1 tablet (20 mg) by mouth daily 90 tablet 3     BASAGLAR 100 UNIT/ML injection 16 units at bedtime 15 mL 1     blood glucose monitoring (NO BRAND SPECIFIED) meter device kit Use to  test blood sugar 3 times daily or as directed. 1 kit 0     blood glucose monitoring (NO BRAND SPECIFIED) test strip Use to test blood sugars 3 times daily or as directed 100 strip 11     Blood Glucose Monitoring Suppl (ACCU-CHEK REGIS PLUS) W/DEVICE KIT Check blood sugars 1-2 times daily 1 kit 0     Cholecalciferol (VITAMIN D-3) 1000 units CAPS Take by mouth daily       diphenoxylate-atropine (LOMOTIL) 2.5-0.025 MG per tablet Take 1 tablet by mouth 4 times daily as needed for diarrhea 60 tablet 2     finasteride (PROSCAR) 5 MG tablet TAKE ONE TABLET BY MOUTH EVERY DAY 90 tablet 1     insulin aspart (NOVOLOG FLEXPEN) 100 UNIT/ML injection Use 8 units plus low sliding scale before each meal 120-149 0 units  150-199 1 unit  200-249 2 units  250-299 3 units  300-349 4 units  > 350 5 units   Max dose 50 units day 15 mL 3     losartan (COZAAR) 100 MG tablet TAKE ONE TABLET BY MOUTH EVERY DAY 90 tablet 1     tamsulosin (FLOMAX) 0.4 MG capsule TAKE ONE CAPSULE BY MOUTH EVERY DAY 90 capsule 1     ULTICARE MINI 31G X 6 MM insulin pen needle USE 4 TO 5 TIMES DAILY OR AS DIRECTED FOR SLIDING SCALE INSULIN 100 each 7     No facility-administered encounter medications on file as of 9/17/2018.              Review Of Systems  Skin: As above  Eyes: negative  Ears/Nose/Throat: negative  Respiratory: No shortness of breath, dyspnea on exertion, cough, or hemoptysis  Cardiovascular: negative  Gastrointestinal: negative  Genitourinary: negative  Musculoskeletal: negative  Neurologic: negative  Psychiatric: negative  Hematologic/Lymphatic/Immunologic: negative  Endocrine: negative      O:   NAD, WDWN, Alert & Oriented, Mood & Affect wnl, Vitals stable   Here today alone   /68  Pulse 74  SpO2 98%   General appearance normal   Vitals stable   Alert, oriented and in no acute distress      Following lymph nodes palpated: Occipital, Cervical, Supraclavicular no lad   L neck 5mm red macule   L PA cheek gritty papule      Eyes:  Conjunctivae/lids:Normal     ENT: Lips, buccal mucosa, tongue: normal    MSK:Normal    Cardiovascular: peripheral edema none    Pulm: Breathing Normal    Lymph Nodes: No Head and Neck Lymphadenopathy     Neuro/Psych: Orientation:Normal; Mood/Affect:Normal      A/P:  1. L PA cheek actinic keratosis  LN2:  Treated with LN2 for 5s for 1-2 cycles. Warned risks of blistering, pain, pigment change, scarring, and incomplete resolution.  Advised patient to return if lesions do not completely resolve.  Wound care sheet given.  2. L neck melanoma in situ   MOHS MART-1:  I discussed the specifics of tumor, prognosis, metachronous melanoma, self exam, and genetics with the patient. I explained the need for monthly skin exams including palpating lymph nodes, and taught the patient how to do this. Patient was asked about new or changing moles . I reviewed treatment options, including a discussion of wide excision (the gold standard) versus Mohs surgery with MART-1 immunostains.     Note: MART-1 (Melanoma Antigen Recognized by T-cells) antibody immunostaining was used during Mohs surgery as per standard protocol, in addition to routine processing of all specimens with hematoxylin and eosin. The peripheral margins/edges were processed with the MART-1 stain (2 specimens total). The center was examined with hematoxylin & eosin and MART-1 immunostains. The patient was informed of the procedure and its risk/benefits during the consent for the procedure.    One or more of the reagents used in immunohistochemical testing in this case may not have been cleared or approved by the U.[place]. Food and Drug Administration (FDA). The FDA has determined that such clearance or approval is not necessary. These tests are used for clinical purposes. They should not be regarded as investigational or for research. These reagents  performance characteristics have been determined by [facility]. This laboratory is certified under the Clinical  Laboratory Improvement Amendments of 1988 (CLIA-88) as qualified to perform high complexity clinical laboratory testing.    After [facility] discussed with patient, decision for Mohs surgery was made. Indication for Mohs was aggressive histology. Patient confirmed the site with Dr. Neal. After anesthesia with LEC, the tumor was excised using standard Mohs technique in 1 stages(s).  MART 1 stains were performed on 2 specimens. CLEAR MARGINS OBTAINED and Final defect size was 1.3 cm.     REPAIR COMPLEX: Because of the tightness of the surrounding skin and Because of the size and full thickness nature of the defect, a complex closure was planned. After LEC anesthesia and prep, Burow's triangles were excised in the relaxed skin tension lines. The wound edges were widely undermined by dissection in the subcutaneous plane until adequate tissue mobility was obtained. Hemostasis was obtained. The wound edges were closed in a layered fashion using Vicryl and Fast Absorbing Plain Gut sutures. Postoperative length was 5.3 cm.   EBL minimal; complications none; wound care routine.  The patient was discharged in good condition and will return in one week for wound evaluation.      BENIGN LESIONS DISCUSSED WITH PATIENT:  I discussed the specifics of tumor, prognosis, and genetics of benign lesions.  I explained that treatment of these lesions would be purely cosmetic and not medically neccessary.  I discussed with patient different removal options including excision, cautery and /or laser.      Nature and genetics of benign skin lesions dicussed with patient.  Signs and Symptoms of skin cancer discussed with patient.  ABCDEs of melanoma reviewed with patient.  Patient encouraged to perform monthly skin exams.  UV precautions reviewed with patient.  Patient to follow up with Primary Care provider regarding elevated blood pressure.  Skin care regimen reviewed with patient: Eliminate harsh soaps, i.e. Dial, zest, irsih spring; Mild  soaps such as Cetaphil or Dove sensitive skin, avoid hot or cold showers, aggressive use of emollients including vanicream, cetaphil or cerave discussed with patient.    Risks of non-melanoma skin cancer discussed with patient   Return to clinic 6 months      Again, thank you for allowing me to participate in the care of your patient.        Sincerely,        Jesus Neal MD     no

## 2022-08-23 ENCOUNTER — VIRTUAL VISIT (OUTPATIENT)
Dept: UROLOGY | Facility: CLINIC | Age: 82
End: 2022-08-23
Payer: COMMERCIAL

## 2022-08-23 DIAGNOSIS — R35.1 NOCTURIA: Primary | ICD-10-CM

## 2022-08-23 DIAGNOSIS — N40.1 HYPERTROPHY OF PROSTATE WITH URINARY OBSTRUCTION: ICD-10-CM

## 2022-08-23 DIAGNOSIS — N13.8 HYPERTROPHY OF PROSTATE WITH URINARY OBSTRUCTION: ICD-10-CM

## 2022-08-23 PROCEDURE — 99213 OFFICE O/P EST LOW 20 MIN: CPT | Mod: 95 | Performed by: STUDENT IN AN ORGANIZED HEALTH CARE EDUCATION/TRAINING PROGRAM

## 2022-08-23 RX ORDER — TAMSULOSIN HYDROCHLORIDE 0.4 MG/1
0.4 CAPSULE ORAL DAILY
Qty: 90 CAPSULE | Refills: 3
Start: 2022-08-23 | End: 2023-04-04

## 2022-08-23 RX ORDER — TROSPIUM CHLORIDE 20 MG/1
TABLET, FILM COATED ORAL
Qty: 90 TABLET | Refills: 1 | Status: SHIPPED | OUTPATIENT
Start: 2022-08-23 | End: 2023-03-01

## 2022-08-23 NOTE — PROGRESS NOTES
UROLOGY FOLLOW-UP NOTE          Chief Complaint:   Today I had the pleasure of seeing Mr. Storm Dutta in follow-up for a chief complaint of nocturia.          Interval Update   Storm Dutta is a very pleasant 82 year old male with a history of T2 DM, HLD, HTN, CKD stage 3, and rectal cancer.     Brief History: Mr. Storm Dutta was last seen on 6/09/2022 with nocturia, urinary urgency, urge incontinence, and sensation of incomplete bladder emptying. He had tried CIC previously, which was unsuccessful. His PVR was >208 mL at this visit. His tamsulosin dose was increased to 0.8 mg daily. He also takes finasteride.     Today's notes: He reports no change in his urination with the increased dose of tamsulosin. His most bothersome urinary symptom is nocturia.          Physical Exam:   Patient is a 82 year old male evaluated via telephone visit.       Labs and Pathology:    I personally reviewed all applicable laboratory data and went over findings with patient  Significant for:     BMP RESULTS:  Recent Labs   Lab Test 06/03/22  0827 11/22/21  0924 11/22/21  0654 08/09/21  1015 01/22/21  1017 01/14/21  1030 09/21/20  0820 03/16/20  0906     --  139 139 137  --  141 139   POTASSIUM 4.2  --  4.1 4.2 4.3  --  4.2 4.4   CHLORIDE 109  --  109 106 106  --  109 110*   CO2 26  --  24 24 27  --  27 26   ANIONGAP 8  --  6 9 4  --  5 3   * 121* 130* 214* 246*  --  131* 132*   BUN 30  --  31* 24 29  --  27 28   CR 1.35*  --  1.29* 1.42* 1.34*  --  1.21 1.38*   GFRESTIMATED 53*  --  52* 46* 50* 49* 56* 48*   GFRESTBLACK  --   --   --   --  57* 59* 65 56*   PILY 9.4  --  9.6 9.0 9.5  --  9.6 8.9       UA RESULTS:   Recent Labs   Lab Test 06/09/22  0955 11/19/19  2213   SG 1.020 1.016   URINEPH 6.0 5.0   NITRITE Negative Negative   RBCU 2-5* 3*   WBCU None Seen <1       PSA RESULTS  PSA   Date Value Ref Range Status   05/31/2013 2.79 0 - 4 ug/L Final   11/27/2012 12.60 (H) 0 - 4 ug/L Final               Assessment/Plan   82 year old male seen in follow up for nocturia. He has been taking tamsulosin 0.8 mg daily for the last 2 months, he reports no notable change with the increased dose of tamsulosin.  He also takes finasteride 5 mg daily.    He has a history of incomplete bladder emptying with PVRs around 200 mL for several years.  He has tried CIC in the past, which was unsuccessful.  He is not interested in attempting to relearn CIC.  His most bothersome urinary symptom is nocturia.  We discussed a cautious trial of an anticholinergic to target the nocturia.  We reviewed side effects including constipation, dry mouth, and risk of urinary retention.    Plan:  1.  Start trospium 20 mg once daily in the evening.  2.  Continue finasteride 5 mg.  Decrease tamsulosin dose to 0.4 mg as higher dose was not effective.  3.  Follow-up for nurse visit with PVR in 1 month.         Past Medical History:     Past Medical History:   Diagnosis Date     Acute urinary retention 11/2012    ER visit   / 1200 cc post-void residual     Acute urinary retention 11/1/2012    ER      Diabetes mellitus type II      Epididymitis 3/20/2014    Started on doxycyclline for UTI/orchitis. He was discharged on 03/22/14.     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     PE (pulmonary embolism) 3/20/2014     Rectal cancer (H)      Skin cancer      Squamous cell carcinoma      Thrombosis of leg     +PE            Past Surgical History:     Past Surgical History:   Procedure Laterality Date     AMPUTATE TOE(S) Right 6/4/2019    Procedure: AMPUTATION 2nd Toe;  Surgeon: Adriel Cabello DPM;  Location: [facility]     COLONOSCOPY  7/29/2013    Procedure: COMBINED COLONOSCOPY, SINGLE BIOPSY/POLYPECTOMY BY BIOPSY;;  Surgeon: Bari Burnett MD;  Location: [facility]     COLONOSCOPY N/A 6/4/2015    Procedure: COMBINED COLONOSCOPY, SINGLE OR  MULTIPLE BIOPSY/POLYPECTOMY BY BIOPSY;  Surgeon: Tara Mtz MD;  Location:  GI     COLONOSCOPY N/A 12/5/2016    Procedure: COLONOSCOPY;  Surgeon: Breanna Kline MD;  [facility]     CV CORONARY ANGIOGRAM N/A 11/22/2021    Procedure: Coronary Angiogram;  Surgeon: Jak Noe MD;  Location:  [facility]     EYE SURGERY  5/2012    Right eye procedure     HC CIRCUMCISION SURGICAL NON-DEV >28 DAYS AGE  2/97    due to phimosis     HC REMOVAL GALLBLADDER  5/01     HC UGI ENDOSCOPY W PLACEMENT GASTROSTOMY TUBE PERCUT  3/13/2014    Procedure: COMBINED ESOPHAGOSCOPY, GASTROSCOPY, DUODENOSCOPY (EGD), PLACE PERCUTANEOUS ENDOSCOPIC GASTROSTOMY TUBE;  Surgeon: Loco Casillas MD;  Location: [facility]     LAPAROSCOPIC ASSISTED COLECTOMY LEFT (DESCENDING)  1/7/2014    Procedure: LAPAROSCOPIC ASSISTED COLECTOMY LEFT (DESCENDING);  Laparoscopic hand assisted Low Anterior Resection With colonic J pouch and end to end colorectal anastamosis. Laparoscopic splenic flexure mobilization.  Loop Ileostomy.  Rigid proctoscopy;  Surgeon: Margaret Gamble MD;  Location:  OR     PHACOEMULSIFICATION WITH STANDARD INTRAOCULAR LENS IMPLANT  4/11/2013    Procedure: PHACOEMULSIFICATION WITH STANDARD INTRAOCULAR LENS IMPLANT;  Right Kelman Phacoemulsification with Intraocular Lens Implant;  Surgeon: Caesar Turner MD;  Location: [facility]     REMOVE GASTROSTOMY TUBE ADULT  7/25/2014    Procedure: REMOVE GASTROSTOMY TUBE ADULT;  Surgeon: Margaret Gamble MD;  Location:  OR     SIGMOIDOSCOPY FLEXIBLE  6/23/2014    Procedure: SIGMOIDOSCOPY FLEXIBLE;  Surgeon: Margaret Gamble MD;  Location:  GI     SIGMOIDOSCOPY FLEXIBLE  7/22/2014    Procedure: SIGMOIDOSCOPY FLEXIBLE;  Surgeon: Margaret Gamble MD;  Location:  OR     SIGMOIDOSCOPY FLEXIBLE  7/25/2014    Procedure: SIGMOIDOSCOPY FLEXIBLE;  Surgeon: Margaret Gamble MD;  Location:  OR      SIGMOIDOSCOPY FLEXIBLE  7/28/2014    Procedure: SIGMOIDOSCOPY FLEXIBLE;  Surgeon: Margaret Gamble MD;  [facility]     SIGMOIDOSCOPY FLEXIBLE  7/31/2014    Procedure: SIGMOIDOSCOPY FLEXIBLE;  Surgeon: Margaret Gamble MD;  [facility]     SIGMOIDOSCOPY FLEXIBLE  8/3/2014    Procedure: SIGMOIDOSCOPY FLEXIBLE;  Surgeon: Margaret Gamble MD;  [facility]     SIGMOIDOSCOPY FLEXIBLE  8/5/2014    Procedure: SIGMOIDOSCOPY FLEXIBLE;  Surgeon: Margaret Gamble MD;  [facility]     SIGMOIDOSCOPY FLEXIBLE  8/8/2014    Procedure: SIGMOIDOSCOPY FLEXIBLE;  Surgeon: Margaret Gamble MD;  [facility]     SIGMOIDOSCOPY FLEXIBLE  8/18/2014    Procedure: SIGMOIDOSCOPY FLEXIBLE;  Surgeon: Jose Roberto Cervantes MD;  [facility]     SIGMOIDOSCOPY FLEXIBLE N/A 8/15/2014    Procedure: SIGMOIDOSCOPY FLEXIBLE;  Surgeon: Margaret Gamble MD;  [facility]     SIGMOIDOSCOPY FLEXIBLE N/A 8/22/2014    Procedure: SIGMOIDOSCOPY FLEXIBLE;  Surgeon: Jose Roberto Cervantes MD;  [facility]     SIGMOIDOSCOPY FLEXIBLE N/A 8/11/2014    Procedure: SIGMOIDOSCOPY FLEXIBLE;  Surgeon: Margaret Gamble MD;  [facility]     SIGMOIDOSCOPY FLEXIBLE N/A 8/26/2014    Procedure: SIGMOIDOSCOPY FLEXIBLE;  Surgeon: Margaret Gamble MD;  [facility]     SIGMOIDOSCOPY FLEXIBLE N/A 8/29/2014    Procedure: SIGMOIDOSCOPY FLEXIBLE;  Surgeon: Margaret Gamble MD;  [facility]     SIGMOIDOSCOPY FLEXIBLE N/A 9/8/2014    Procedure: SIGMOIDOSCOPY FLEXIBLE;  Surgeon: Margaret Gamble MD;  [facility]     SIGMOIDOSCOPY FLEXIBLE N/A 9/2/2014    Procedure: SIGMOIDOSCOPY FLEXIBLE;  Surgeon: Breanna Kline MD;  [facility]     SIGMOIDOSCOPY FLEXIBLE N/A 9/11/2014    Procedure: SIGMOIDOSCOPY FLEXIBLE;  Surgeon: Margaret Gamble MD;  [facility]     SIGMOIDOSCOPY FLEXIBLE N/A 9/19/2014    Procedure: SIGMOIDOSCOPY FLEXIBLE;  Surgeon:  Margaret Gamble MD;  [facility]     SIGMOIDOSCOPY FLEXIBLE N/A 9/15/2014    Procedure: SIGMOIDOSCOPY FLEXIBLE;  Surgeon: Margaret Gamble MD;  [facility]     SIGMOIDOSCOPY FLEXIBLE N/A 9/5/2014    Procedure: SIGMOIDOSCOPY FLEXIBLE;  Surgeon: Margaret Gamble MD;  [facility]     SIGMOIDOSCOPY FLEXIBLE N/A 9/23/2014    Procedure: SIGMOIDOSCOPY FLEXIBLE;  Surgeon: Margaret Gamble MD;  [facility]     SIGMOIDOSCOPY FLEXIBLE N/A 9/26/2014    Procedure: SIGMOIDOSCOPY FLEXIBLE;  Surgeon: Margaret Gamble MD;  [facility]     SIGMOIDOSCOPY FLEXIBLE N/A 9/30/2014    Procedure: SIGMOIDOSCOPY FLEXIBLE;  Surgeon: Margaret Gamble MD;  [facility]     SIGMOIDOSCOPY FLEXIBLE N/A 10/3/2014    Procedure: SIGMOIDOSCOPY FLEXIBLE;  Surgeon: Margaret Gamble MD;  [facility]     SIGMOIDOSCOPY FLEXIBLE N/A 10/30/2014    Procedure: SIGMOIDOSCOPY FLEXIBLE;  Surgeon: Margaret Gamble MD;  [facility]     SIGMOIDOSCOPY FLEXIBLE, PLACEMENT OF DRAINAGE SPONGE  9/30/14     TAKEDOWN ILEOSTOMY N/A 1/6/2015    Procedure: TAKEDOWN ILEOSTOMY;  Surgeon: Margaret Gamble MD;  [facility]            Medications     Current Outpatient Medications   Medication     amLODIPine (NORVASC) 10 MG tablet     aspirin 325 MG tablet     atorvastatin (LIPITOR) 20 MG tablet     blood glucose (ACCU-CHEK REGIS PLUS) test strip     blood glucose monitoring (NO BRAND SPECIFIED) meter device kit     Cholecalciferol (VITAMIN D-3) 1000 units CAPS     finasteride (PROSCAR) 5 MG tablet     insulin glargine (LANTUS SOLOSTAR) 100 UNIT/ML pen     insulin lispro (HUMALOG KWIKPEN) 100 UNIT/ML (1 unit dial) KWIKPEN     insulin pen needle (ULTICARE MINI) 31G X 6 MM miscellaneous     losartan (COZAAR) 100 MG tablet     metoprolol succinate ER (TOPROL-XL) 25 MG 24 hr tablet     tamsulosin (FLOMAX) 0.4 MG capsule     No current facility-administered  medications for this visit.            Family History:     Family History   Problem Relation Age of Onset     Diabetes Mother      Hypertension Mother      Cancer Father      Cancer Maternal Grandmother      Alzheimer Disease Maternal Grandmother      Cardiovascular Paternal Grandmother      Breast Cancer Sister      Colon Cancer No family hx of      Colon Polyps No family hx of      Crohn's Disease No family hx of      Ulcerative Colitis No family hx of      Anesthesia Reaction No family hx of      Melanoma No family hx of             Social History:     Social History     Socioeconomic History     Marital status:      Spouse name: Not on file     Number of children: Not on file     Years of education: Not on file     Highest education level: Not on file   Occupational History     Employer: RETIRED     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 No   Social History Narrative     Not on file     Social Determinants of Health     Financial Resource Strain: Not on file   Food Insecurity: Not on file   Transportation Needs: Not on file   Physical Activity: Not on file   Stress: Not on file   Social Connections: Not on file   Intimate Partner Violence: Not on file   Housing Stability: Not on file            Allergies:   Nkda [no known drug allergies]         Review of Systems:  From intake questionnaire   Negative 14 system review except as noted on HPI, nurse's note.        VIRGINIA GASCA PA-C  Department of Urology

## 2022-09-13 ENCOUNTER — OFFICE VISIT (OUTPATIENT)
Dept: DERMATOLOGY | Facility: CLINIC | Age: 82
End: 2022-09-13
Attending: FAMILY MEDICINE
Payer: COMMERCIAL

## 2022-09-13 DIAGNOSIS — L81.4 LENTIGO: ICD-10-CM

## 2022-09-13 DIAGNOSIS — L82.1 SEBORRHEIC KERATOSIS: Primary | ICD-10-CM

## 2022-09-13 DIAGNOSIS — D18.01 CHERRY ANGIOMA: ICD-10-CM

## 2022-09-13 DIAGNOSIS — Z86.006 H/O MELANOMA IN SITU: ICD-10-CM

## 2022-09-13 DIAGNOSIS — L57.0 ACTINIC KERATOSIS: ICD-10-CM

## 2022-09-13 DIAGNOSIS — D48.5 NEOPLASM OF UNCERTAIN BEHAVIOR OF SKIN: ICD-10-CM

## 2022-09-13 DIAGNOSIS — D22.9 MULTIPLE BENIGN NEVI: ICD-10-CM

## 2022-09-13 PROCEDURE — 88342 IMHCHEM/IMCYTCHM 1ST ANTB: CPT | Performed by: DERMATOLOGY

## 2022-09-13 PROCEDURE — 88305 TISSUE EXAM BY PATHOLOGIST: CPT | Performed by: DERMATOLOGY

## 2022-09-13 PROCEDURE — 11102 TANGNTL BX SKIN SINGLE LES: CPT | Performed by: PHYSICIAN ASSISTANT

## 2022-09-13 PROCEDURE — 17000 DESTRUCT PREMALG LESION: CPT | Mod: 59 | Performed by: PHYSICIAN ASSISTANT

## 2022-09-13 PROCEDURE — 17003 DESTRUCT PREMALG LES 2-14: CPT | Mod: 59 | Performed by: PHYSICIAN ASSISTANT

## 2022-09-13 PROCEDURE — 99213 OFFICE O/P EST LOW 20 MIN: CPT | Mod: 25 | Performed by: PHYSICIAN ASSISTANT

## 2022-09-13 ASSESSMENT — PAIN SCALES - GENERAL: PAINLEVEL: NO PAIN (0)

## 2022-09-13 NOTE — LETTER
9/13/2022         RE: Storm Dutta  [address] [place] [address]        Dear Colleague,    Thank you for referring your patient, Storm Dutat, to the [facility]. Please see a copy of my visit note below.    Storm Dutta is an extremely pleasant 82 year old year old male patient here today for skin check. He notes rough areas on forehead. Present for months, no pain or bleeding.  Patient has no other skin complaints today.  Remainder of the HPI, Meds, PMH, Allergies, FH, and SH was reviewed in chart.    Pertinent Hx:  History of  MIS on left lateral neck 2018  Past Medical History:   Diagnosis Date     Acute urinary retention 11/2012    ER visit   / 1200 cc post-void residual     Acute urinary retention 11/1/2012    ER      Diabetes mellitus type II      Epididymitis 3/20/2014    Started on doxycyclline for UTI/orchitis. He was discharged on 03/22/14.     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     PE (pulmonary embolism) 3/20/2014     Rectal cancer (H)      Skin cancer      Squamous cell carcinoma      Thrombosis of leg     +PE       Past Surgical History:   Procedure Laterality Date     AMPUTATE TOE(S) Right 6/4/2019    Procedure: AMPUTATION 2nd Toe;  Surgeon: Adriel Cabello DPM;  Location: [facility]     COLONOSCOPY  7/29/2013    Procedure: COMBINED COLONOSCOPY, SINGLE BIOPSY/POLYPECTOMY BY BIOPSY;;  Surgeon: Bari Burnett MD;  [facility]     COLONOSCOPY N/A 6/4/2015    Procedure: COMBINED COLONOSCOPY, SINGLE OR MULTIPLE BIOPSY/POLYPECTOMY BY BIOPSY;  Surgeon: Tara Mtz MD;  Location:  GI     COLONOSCOPY N/A 12/5/2016    Procedure: COLONOSCOPY;  Surgeon: Breanna Kline MD;  Location:  GI     CV CORONARY ANGIOGRAM N/A 11/22/2021    Procedure: Coronary Angiogram;  Surgeon: Jak Noe MD;   Location:  [facility]     EYE SURGERY  5/2012    Right eye procedure     HC CIRCUMCISION SURGICAL NON-DEV >28 DAYS AGE  2/97    due to phimosis     HC REMOVAL GALLBLADDER  5/01     HC UGI ENDOSCOPY W PLACEMENT GASTROSTOMY TUBE PERCUT  3/13/2014    Procedure: COMBINED ESOPHAGOSCOPY, GASTROSCOPY, DUODENOSCOPY (EGD), PLACE PERCUTANEOUS ENDOSCOPIC GASTROSTOMY TUBE;  Surgeon: Loco Casillas MD;  [facility]: [facility]     LAPAROSCOPIC ASSISTED COLECTOMY LEFT (DESCENDING)  1/7/2014    Procedure: LAPAROSCOPIC ASSISTED COLECTOMY LEFT (DESCENDING);  Laparoscopic hand assisted Low Anterior Resection With colonic J pouch and end to end colorectal anastamosis. Laparoscopic splenic flexure mobilization.  Loop Ileostomy.  Rigid proctoscopy;  Surgeon: Margaret Gamble MD;  [facility]     PHACOEMULSIFICATION WITH STANDARD INTRAOCULAR LENS IMPLANT  4/11/2013    Procedure: PHACOEMULSIFICATION WITH STANDARD INTRAOCULAR LENS IMPLANT;  Right Kelman Phacoemulsification with Intraocular Lens Implant;  Surgeon: Caesar Turner MD;  [facility]: [facility]     REMOVE GASTROSTOMY TUBE ADULT  7/25/2014    Procedure: REMOVE GASTROSTOMY TUBE ADULT;  Surgeon: Margaret Gamble MD;  [facility]     SIGMOIDOSCOPY FLEXIBLE  6/23/2014    Procedure: SIGMOIDOSCOPY FLEXIBLE;  Surgeon: Margaret Gamble MD;  [facility]     SIGMOIDOSCOPY FLEXIBLE  7/22/2014    Procedure: SIGMOIDOSCOPY FLEXIBLE;  Surgeon: Margaret Gamble MD;  [facility]     SIGMOIDOSCOPY FLEXIBLE  7/25/2014    Procedure: SIGMOIDOSCOPY FLEXIBLE;  Surgeon: Margaret Gamble MD;  [facility]     SIGMOIDOSCOPY FLEXIBLE  7/28/2014    Procedure: SIGMOIDOSCOPY FLEXIBLE;  Surgeon: Margarte Gamble MD;  [facility]     SIGMOIDOSCOPY FLEXIBLE  7/31/2014    Procedure: SIGMOIDOSCOPY FLEXIBLE;  Surgeon: Margaret Gamble MD;  [facility]     SIGMOIDOSCOPY FLEXIBLE  8/3/2014    Procedure:  SIGMOIDOSCOPY FLEXIBLE;  Surgeon: Margaret Gamble MD;  [facility]     SIGMOIDOSCOPY FLEXIBLE  8/5/2014    Procedure: SIGMOIDOSCOPY FLEXIBLE;  Surgeon: Margaret Gamble MD;  [facility]     SIGMOIDOSCOPY FLEXIBLE  8/8/2014    Procedure: SIGMOIDOSCOPY FLEXIBLE;  Surgeon: Margaret Gamble MD;  [facility]     SIGMOIDOSCOPY FLEXIBLE  8/18/2014    Procedure: SIGMOIDOSCOPY FLEXIBLE;  Surgeon: Jose Roberto Cervantes MD;  [facility]     SIGMOIDOSCOPY FLEXIBLE N/A 8/15/2014    Procedure: SIGMOIDOSCOPY FLEXIBLE;  Surgeon: Margaret Gamble MD;  [facility]     SIGMOIDOSCOPY FLEXIBLE N/A 8/22/2014    Procedure: SIGMOIDOSCOPY FLEXIBLE;  Surgeon: Jose Roberto Cervantes MD;  [facility]     SIGMOIDOSCOPY FLEXIBLE N/A 8/11/2014    Procedure: SIGMOIDOSCOPY FLEXIBLE;  Surgeon: Margaret Gamble MD;  [facility]     SIGMOIDOSCOPY FLEXIBLE N/A 8/26/2014    Procedure: SIGMOIDOSCOPY FLEXIBLE;  Surgeon: Margaret Gamble MD;  [facility]     SIGMOIDOSCOPY FLEXIBLE N/A 8/29/2014    Procedure: SIGMOIDOSCOPY FLEXIBLE;  Surgeon: Margaret Gamble MD;  [facility]     SIGMOIDOSCOPY FLEXIBLE N/A 9/8/2014    Procedure: SIGMOIDOSCOPY FLEXIBLE;  Surgeon: Margaret Gamble MD;  [facility]     SIGMOIDOSCOPY FLEXIBLE N/A 9/2/2014    Procedure: SIGMOIDOSCOPY FLEXIBLE;  Surgeon: Breanna Kline MD;  [facility]     SIGMOIDOSCOPY FLEXIBLE N/A 9/11/2014    Procedure: SIGMOIDOSCOPY FLEXIBLE;  Surgeon: Margaret Gamble MD;  [facility]     SIGMOIDOSCOPY FLEXIBLE N/A 9/19/2014    Procedure: SIGMOIDOSCOPY FLEXIBLE;  Surgeon: Margaret Gamble MD;  [facility]     SIGMOIDOSCOPY FLEXIBLE N/A 9/15/2014    Procedure: SIGMOIDOSCOPY FLEXIBLE;  Surgeon: Margaret Gamble MD;  [facility]     SIGMOIDOSCOPY FLEXIBLE N/A 9/5/2014    Procedure: SIGMOIDOSCOPY FLEXIBLE;  Surgeon: Margaret Gamble MD;  [facility]     SIGMOIDOSCOPY FLEXIBLE N/A 2014    Procedure: SIGMOIDOSCOPY FLEXIBLE;  Surgeon: Margaret Gamble MD;  [facility]     SIGMOIDOSCOPY FLEXIBLE N/A 2014    Procedure: SIGMOIDOSCOPY FLEXIBLE;  Surgeon: Margaret Gamble MD;  [facility]     SIGMOIDOSCOPY FLEXIBLE N/A 2014    Procedure: SIGMOIDOSCOPY FLEXIBLE;  Surgeon: Margaret Gamble MD;  [facility]     SIGMOIDOSCOPY FLEXIBLE N/A 10/3/2014    Procedure: SIGMOIDOSCOPY FLEXIBLE;  Surgeon: Margaret Gamble MD;  [facility]     SIGMOIDOSCOPY FLEXIBLE N/A 10/30/2014    Procedure: SIGMOIDOSCOPY FLEXIBLE;  Surgeon: Margaret Gamble MD;  [facility]     SIGMOIDOSCOPY FLEXIBLE, PLACEMENT OF DRAINAGE SPONGE  14     TAKEDOWN ILEOSTOMY N/A 2015    Procedure: TAKEDOWN ILEOSTOMY;  Surgeon: Margaret Gamble MD;  Location:  OR        Family History   Problem Relation Age of Onset     Diabetes Mother      Hypertension Mother      Cancer Father      Cancer Maternal Grandmother      Alzheimer Disease Maternal Grandmother      Cardiovascular Paternal Grandmother      Breast Cancer Sister      Colon Cancer No family hx of      Colon Polyps No family hx of      Crohn's Disease No family hx of      Ulcerative Colitis No family hx of      Anesthesia Reaction No family hx of      Melanoma No family hx of        Social History     Socioeconomic History     Marital status:      Spouse name: Not on file     Number of children: Not on file     Years of education: Not on file     Highest education level: Not on file   Occupational History     Employer: RETIRED     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 No   Social History Narrative     Not on file     Social Determinants of Health     Financial Resource Strain: Not on file   Food Insecurity: Not on file   Transportation Needs: Not on file   Physical Activity: Not on file   Stress: Not on file   Social Connections: Not on file   Intimate Partner Violence: Not on file   Housing Stability: Not on file       Outpatient Encounter Medications as of 9/13/2022   Medication Sig Dispense Refill     amLODIPine (NORVASC) 10 MG tablet Take 1 tablet (10 mg) by mouth daily 90 tablet 3     aspirin 325 MG tablet Take 325 mg by mouth daily       atorvastatin (LIPITOR) 20 MG tablet Take 1 tablet (20 mg) by mouth daily 90 tablet 3     blood glucose (ACCU-CHEK REGIS PLUS) test strip USE TO TEST BLOOD SUGAR THREE TIMES A DAY OR AS DIRECTED 300 strip 3     blood glucose monitoring (NO BRAND SPECIFIED) meter device kit Use to test blood sugar 3 times daily or as directed. 1 kit 0     Cholecalciferol (VITAMIN D-3) 1000 units CAPS Take 1 capsule by mouth daily        finasteride (PROSCAR) 5 MG tablet Take 1 tablet (5 mg) by mouth daily 90 tablet 1     insulin glargine (LANTUS SOLOSTAR) 100 UNIT/ML pen INJECT 16 UNITS UNDER THE SKIN AT BEDTIME 30 mL 2     insulin lispro (HUMALOG KWIKPEN) 100 UNIT/ML (1 unit dial) KWIKPEN USE 8 UNITS  PLUS LOW SLIDING SCALE BEFORE EACH MEAL. MAX 50 UNITS PER DAY. 60 mL 1     insulin pen needle (ULTICARE MINI) 31G X 6 MM miscellaneous USE 4 TO 5 TIMES DAILY OR AS DIRECTED FOR SLIDING SCALE INSULIN 500 each 1     losartan (COZAAR) 100 MG tablet Take 1 tablet (100 mg) by mouth daily 90 tablet 1     metoprolol succinate ER (TOPROL-XL) 25 MG 24 hr tablet Take 1 tablet (25 mg) by mouth daily 30 tablet 11     tamsulosin (FLOMAX) 0.4 MG capsule Take 1 capsule (0.4 mg) by mouth daily 90 capsule 3     trospium  (SANCTURA) 20 MG tablet Take 1 tablet every evening 90 tablet 1     No facility-administered encounter medications on file as of 9/13/2022.             O:   NAD, WDWN, Alert & Oriented, Mood & Affect wnl, Vitals stable   Here today alone   There were no vitals taken for this visit.   General appearance normal   Vitals stable   Alert, oriented and in no acute distress     0.3 cm brown macule on left lateral neck  Well healed scar on left neck   Gritty papules on forehead x 6  Stuck on papules and brown macules on trunk and ext   Red papules on trunk  Brown papules an macules with regular pigment network and borders on torso and extremities      The remainder of skin exam is normal       Eyes: Conjunctivae/lids:Normal     ENT: Lips normal    MSK:Normal    Cardiovascular: peripheral edema none    Pulm: Breathing Normal    Lymph Nodes: No Head and Neck Lymphadenopathy     Neuro/Psych: Orientation:Alert and Orientedx3 ; Mood/Affect:normal   A/P:  1. R/O MIS on left lateral neck  TANGENTIAL BIOPSY SENT OUT:  After consent, anesthesia with LEC and prep, tangential excision performed and specimen sent out for permanent section histology.  No complications and routine wound care. Patient told to call our office in 1-2 weeks for result.      2.  Actinic keratoses on forehead x 6  LN2:  Treated with LN2 for 5s for 1-2 cycles. Warned risks of blistering, pain, pigment change, scarring, and incomplete resolution.  Advised patient to return if lesions do not completely resolve.  Wound care sheet given.  3. History of MIS on left lateral neck 2018  MELANOMA DISCUSSED WITH PATIENT:  I discussed the specifics of tumor, prognosis, metachronous melanoma, self exam, and genetics with the patient. I explained the need for monthly skin exams including and taught the patient how to do this. Patient was asked about new or changing moles . I discussed with patient signs and symptoms that could arise in the setting of recurrent locoregional  or metastatic disease. In addition, the need to undergo every 12 month dermatologic full skin survey and evaluation given that patients with a diagnosis of melanoma are at risk of recurrence (local and distant) and of subsequent de cassie melanoma.    4. Seborrheic keratosis, lentigo, angioma, benign nevi  It was a pleasure speaking to Stormgeorgia Dutta today.BENIGN LESIONS DISCUSSED WITH PATIENT:  I discussed the specifics of tumor, prognosis, and genetics of benign lesions.  I explained that treatment of these lesions would be purely cosmetic and not medically neccessary.  I discussed with patient different removal options including excision, cautery and /or laser.      Nature and genetics of benign skin lesions dicussed with patient.  Signs and Symptoms of skin cancer discussed with patient.  ABCDEs of melanoma reviewed with patient.  Patient encouraged to perform monthly skin exams.  UV precautions reviewed with patient.  Risks of non-melanoma skin cancer discussed with patient   Return to clinic pending biopsy results.         Again, thank you for allowing me to participate in the care of your patient.        Sincerely,        Fransisca Kelley PA-C

## 2022-09-13 NOTE — PATIENT INSTRUCTIONS
Wound Care Instructions     FOR SUPERFICIAL WOUNDS     [facility] [phone]    [facility] [phone]          Left neck  AFTER 24 HOURS YOU SHOULD REMOVE THE BANDAGE AND BEGIN DAILY DRESSING CHANGES AS FOLLOWS:     1) Remove Dressing.     2) Clean and dry the area with tap water using a Q-tip or sterile gauze pad.     3) Apply Vaseline, Aquaphor, Polysporin ointment or Bacitracin ointment over entire wound.  Do NOT use Neosporin ointment.     4) Cover the wound with a band-aid, or a sterile non-stick gauze pad and micropore paper tape      REPEAT THESE INSTRUCTIONS AT LEAST ONCE A DAY UNTIL THE WOUND HAS COMPLETELY HEALED.    It is an old wives tale that a wound heals better when it is exposed to air and allowed to dry out. The wound will heal faster with a better cosmetic result if it is kept moist with ointment and covered with a bandage.    **Do not let the wound dry out.**      Supplies Needed:      *Cotton tipped applicators (Q-tips)    *Polysporin Ointment or Bacitracin Ointment (NOT NEOSPORIN)    *Band-aids or non-stick gauze pads and micropore paper tape.      PATIENT INFORMATION:    During the healing process you will notice a number of changes. All wounds develop a small halo of redness surrounding the wound.  This means healing is occurring. Severe itching with extensive redness usually indicates sensitivity to the ointment or bandage tape used to dress the wound.  You should call our office if this develops.      Swelling  and/or discoloration around your surgical site is common, particularly when performed around the eye.    All wounds normally drain.  The larger the wound the more drainage there will be.  After 7-10 days, you will notice the wound beginning to shrink and new skin will begin to grow.  The wound is healed when you can see skin has formed over the entire area.  A healed wound has a healthy, shiny look to the surface and is red to dark pink in color to  normalize.  Wounds may take approximately 4-6 weeks to heal.  Larger wounds may take 6-8 weeks.  After the wound is healed you may discontinue dressing changes.    You may experience a sensation of tightness as your wound heals. This is normal and will gradually subside.    Your healed wound may be sensitive to temperature changes. This sensitivity improves with time, but if you re having a lot of discomfort, try to avoid temperature extremes.    Patients frequently experience itching after their wound appears to have healed because of the continue healing under the skin.  Plain Vaseline will help relieve the itching.        POSSIBLE COMPLICATIONS    BLEEDING:    Leave the bandage in place.  Use tightly rolled up gauze or a cloth to apply direct pressure over the bandage for 30  minutes.  Reapply pressure for an additional 30 minutes if necessary  Use additional gauze and tape to maintain pressure once the bleeding has stopped.     WOUND CARE INSTRUCTIONS   FOR CRYOSURGERY   Face  This area treated with liquid nitrogen should form a blister (areas treated may or may not blister-skin may just turn dark and slough off). You do not need to bandage the area unless a blister forms and breaks (which may be a few days). When the blister breaks, begin daily dressing changes as follows:  1) Clean and dry the area with tap water using clean Q-tip or sterile gauze pad.   2) Apply Polysporin ointment or Bacitracin ointment over entire wound. Do NOT use Neosporin ointment.   3) Cover the wound with a band-aid or sterile non-stick gauze pad and micropore paper tape.   REPEAT THESE INSTRUCTIONS AT LEAST ONCE A DAY UNTIL THE WOUND HAS COMPLETELY HEALED.   It is an old wives tale that a wound heals better when it is exposed to air and allowed to dry out. The wound will heal faster with a better cosmetic result if it is kept moist with ointment and covered with a bandage.   Do not let the wound dry out.   IMPORTANT INFORMATION ON  REVERSE SIDE   Supplies Needed:   *Cotton tipped applicators (Q-tips)   *Polysporin ointment or Bacitracin ointment (NOT NEOSPORIN)   *Band-aids, or non stick gauze pads and micropore paper tape   PATIENT INFORMATION   During the healing process you will notice a number of changes. All wounds develop a small halo of redness surrounding the wound. This means healing is occurring. Severe itching with extensive redness usually indicates sensitivity to the ointment or bandage tape used to dress the wound. You should call our office if this develops.   Swelling and/or discoloration around your surgical site is common, particularly when performed around the eye.   All wounds normally drain. The larger the wound the more drainage there will be. After 7-10 days, you will notice the wound beginning to shrink and new skin will begin to grow. The wound is healed when you can see skin has formed over the entire area. A healed wound has a healthy, shiny look to the surface and is red to dark pink in color to normalize. Wounds may take approximately 4-6 weeks to heal. Larger wounds may take 6-8 weeks. After the wound is healed you may discontinue dressing changes.   You may experience a sensation of tightness as your wound heals. This is normal and will gradually subside.   Your healed wound may be sensitive to temperature changes. This sensitivity improves with time, but if you re having a lot of discomfort, try to avoid temperature extremes.   Patients frequently experience itching after their wound appears to have healed because of the continue healing under the skin. Plain Vaseline will help relieve the itching.

## 2022-09-18 LAB
PATH REPORT.COMMENTS IMP SPEC: NORMAL
PATH REPORT.FINAL DX SPEC: NORMAL
PATH REPORT.GROSS SPEC: NORMAL
PATH REPORT.MICROSCOPIC SPEC OTHER STN: NORMAL
PATH REPORT.RELEVANT HX SPEC: NORMAL

## 2022-09-19 NOTE — PROGRESS NOTES
Storm Dutta is an extremely pleasant 82 year old year old male patient here today for skin check. He notes rough areas on forehead. Present for months, no pain or bleeding.  Patient has no other skin complaints today.  Remainder of the HPI, Meds, PMH, Allergies, FH, and SH was reviewed in chart.    Pertinent Hx:  History of  MIS on left lateral neck 2018  Past Medical History:   Diagnosis Date     Acute urinary retention 11/2012    ER visit   / 1200 cc post-void residual     Acute urinary retention 11/1/2012    ER      Diabetes mellitus type II      Epididymitis 3/20/2014    Started on doxycyclline for UTI/orchitis. He was discharged on 03/22/14.     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     PE (pulmonary embolism) 3/20/2014     Rectal cancer (H)      Skin cancer      Squamous cell carcinoma      Thrombosis of leg     +PE       Past Surgical History:   Procedure Laterality Date     AMPUTATE TOE(S) Right 6/4/2019    Procedure: AMPUTATION 2nd Toe;  Surgeon: Adriel Cabello DPM;  Location: [facility]     COLONOSCOPY  7/29/2013    Procedure: COMBINED COLONOSCOPY, SINGLE BIOPSY/POLYPECTOMY BY BIOPSY;;  Surgeon: Bari Burnett MD;  Location: [facility]     COLONOSCOPY N/A 6/4/2015    Procedure: COMBINED COLONOSCOPY, SINGLE OR MULTIPLE BIOPSY/POLYPECTOMY BY BIOPSY;  Surgeon: Tara Mtz MD;  Location:  GI     COLONOSCOPY N/A 12/5/2016    Procedure: COLONOSCOPY;  Surgeon: Breanna Kline MD;  Location:  GI     CV CORONARY ANGIOGRAM N/A 11/22/2021    Procedure: Coronary Angiogram;  Surgeon: Jak Noe MD;  Location:  [facility]     EYE SURGERY  5/2012    Right eye procedure     HC CIRCUMCISION SURGICAL NON-DEV >28 DAYS AGE  2/97    due to phimosis     HC REMOVAL GALLBLADDER  5/01     HC UGI ENDOSCOPY W PLACEMENT GASTROSTOMY TUBE PERCUT  3/13/2014     Procedure: COMBINED ESOPHAGOSCOPY, GASTROSCOPY, DUODENOSCOPY (EGD), PLACE PERCUTANEOUS ENDOSCOPIC GASTROSTOMY TUBE;  Surgeon: Loco Casillas MD;  Location: [facility]     LAPAROSCOPIC ASSISTED COLECTOMY LEFT (DESCENDING)  1/7/2014    Procedure: LAPAROSCOPIC ASSISTED COLECTOMY LEFT (DESCENDING);  Laparoscopic hand assisted Low Anterior Resection With colonic J pouch and end to end colorectal anastamosis. Laparoscopic splenic flexure mobilization.  Loop Ileostomy.  Rigid proctoscopy;  Surgeon: Margaret Gamble MD;  [facility]     PHACOEMULSIFICATION WITH STANDARD INTRAOCULAR LENS IMPLANT  4/11/2013    Procedure: PHACOEMULSIFICATION WITH STANDARD INTRAOCULAR LENS IMPLANT;  Right Kelman Phacoemulsification with Intraocular Lens Implant;  Surgeon: Caesar Turner MD;  Location: [facility]     REMOVE GASTROSTOMY TUBE ADULT  7/25/2014    Procedure: REMOVE GASTROSTOMY TUBE ADULT;  Surgeon: Margaret Gamble MD;  [facility]     SIGMOIDOSCOPY FLEXIBLE  6/23/2014    Procedure: SIGMOIDOSCOPY FLEXIBLE;  Surgeon: Margaret Gamble MD;  [facility]     SIGMOIDOSCOPY FLEXIBLE  7/22/2014    Procedure: SIGMOIDOSCOPY FLEXIBLE;  Surgeon: Margaret Gamble MD;  [facility]     SIGMOIDOSCOPY FLEXIBLE  7/25/2014    Procedure: SIGMOIDOSCOPY FLEXIBLE;  Surgeon: Margaret Gamble MD;  [facility]     SIGMOIDOSCOPY FLEXIBLE  7/28/2014    Procedure: SIGMOIDOSCOPY FLEXIBLE;  Surgeon: Margaret Gamble MD;  [facility]     SIGMOIDOSCOPY FLEXIBLE  7/31/2014    Procedure: SIGMOIDOSCOPY FLEXIBLE;  Surgeon: Margaret Gamble MD;  [facility]     SIGMOIDOSCOPY FLEXIBLE  8/3/2014    Procedure: SIGMOIDOSCOPY FLEXIBLE;  Surgeon: Margaret Gamble MD;  [facility]     SIGMOIDOSCOPY FLEXIBLE  8/5/2014    Procedure: SIGMOIDOSCOPY FLEXIBLE;  Surgeon: Margaret Gamble MD;  [facility]     SIGMOIDOSCOPY FLEXIBLE  8/8/2014    Procedure:  SIGMOIDOSCOPY FLEXIBLE;  Surgeon: Margaret Gamble MD;  [facility]     SIGMOIDOSCOPY FLEXIBLE  8/18/2014    Procedure: SIGMOIDOSCOPY FLEXIBLE;  Surgeon: Jose Roberto Cervantes MD;  [facility]     SIGMOIDOSCOPY FLEXIBLE N/A 8/15/2014    Procedure: SIGMOIDOSCOPY FLEXIBLE;  Surgeon: Margaret Gamble MD;  [facility]     SIGMOIDOSCOPY FLEXIBLE N/A 8/22/2014    Procedure: SIGMOIDOSCOPY FLEXIBLE;  Surgeon: Jose Roberto Cervantes MD;  [facility]     SIGMOIDOSCOPY FLEXIBLE N/A 8/11/2014    Procedure: SIGMOIDOSCOPY FLEXIBLE;  Surgeon: Margaret Gamble MD;  [facility]     SIGMOIDOSCOPY FLEXIBLE N/A 8/26/2014    Procedure: SIGMOIDOSCOPY FLEXIBLE;  Surgeon: Margaret Gamble MD;  [facility]     SIGMOIDOSCOPY FLEXIBLE N/A 8/29/2014    Procedure: SIGMOIDOSCOPY FLEXIBLE;  Surgeon: Margaret Gamble MD;  [facility]     SIGMOIDOSCOPY FLEXIBLE N/A 9/8/2014    Procedure: SIGMOIDOSCOPY FLEXIBLE;  Surgeon: Margaret Gamble MD;  [facility]     SIGMOIDOSCOPY FLEXIBLE N/A 9/2/2014    Procedure: SIGMOIDOSCOPY FLEXIBLE;  Surgeon: Breanna Kline MD;  [facility]     SIGMOIDOSCOPY FLEXIBLE N/A 9/11/2014    Procedure: SIGMOIDOSCOPY FLEXIBLE;  Surgeon: Margaret Gamble MD;  [facility]     SIGMOIDOSCOPY FLEXIBLE N/A 9/19/2014    Procedure: SIGMOIDOSCOPY FLEXIBLE;  Surgeon: Margaret Gamble MD;  [facility]     SIGMOIDOSCOPY FLEXIBLE N/A 9/15/2014    Procedure: SIGMOIDOSCOPY FLEXIBLE;  Surgeon: Margaret Gmable MD;  [facility]     SIGMOIDOSCOPY FLEXIBLE N/A 9/5/2014    Procedure: SIGMOIDOSCOPY FLEXIBLE;  Surgeon: Margaret Gamble MD;  [facility]     SIGMOIDOSCOPY FLEXIBLE N/A 9/23/2014    Procedure: SIGMOIDOSCOPY FLEXIBLE;  Surgeon: Margaret Gamble MD;  [facility]     SIGMOIDOSCOPY FLEXIBLE N/A 9/26/2014    Procedure: SIGMOIDOSCOPY FLEXIBLE;  Surgeon: Margaret Gamble MD;   Location:  GI     SIGMOIDOSCOPY FLEXIBLE N/A 2014    Procedure: SIGMOIDOSCOPY FLEXIBLE;  Surgeon: Margaret Gamble MD;  [facility]     SIGMOIDOSCOPY FLEXIBLE N/A 10/3/2014    Procedure: SIGMOIDOSCOPY FLEXIBLE;  Surgeon: Margaret Gamble MD;  Location:  GI     SIGMOIDOSCOPY FLEXIBLE N/A 10/30/2014    Procedure: SIGMOIDOSCOPY FLEXIBLE;  Surgeon: Margaret Gamble MD;  Location:  GI     SIGMOIDOSCOPY FLEXIBLE, PLACEMENT OF DRAINAGE SPONGE  14     TAKEDOWN ILEOSTOMY N/A 2015    Procedure: TAKEDOWN ILEOSTOMY;  Surgeon: Margaret Gamble MD;  Location:  OR        Family History   Problem Relation Age of Onset     Diabetes Mother      Hypertension Mother      Cancer Father      Cancer Maternal Grandmother      Alzheimer Disease Maternal Grandmother      Cardiovascular Paternal Grandmother      Breast Cancer Sister      Colon Cancer No family hx of      Colon Polyps No family hx of      Crohn's Disease No family hx of      Ulcerative Colitis No family hx of      Anesthesia Reaction No family hx of      Melanoma No family hx of        Social History     Socioeconomic History     Marital status:      Spouse name: Not on file     Number of children: Not on file     Years of education: Not on file     Highest education level: Not on file   Occupational History     Employer: RETIRED     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 No   Social History Narrative     Not on file     Social Determinants of Health     Financial Resource Strain:  Not on file   Food Insecurity: Not on file   Transportation Needs: Not on file   Physical Activity: Not on file   Stress: Not on file   Social Connections: Not on file   Intimate Partner Violence: Not on file   Housing Stability: Not on file       Outpatient Encounter Medications as of 9/13/2022   Medication Sig Dispense Refill     amLODIPine (NORVASC) 10 MG tablet Take 1 tablet (10 mg) by mouth daily 90 tablet 3     aspirin 325 MG tablet Take 325 mg by mouth daily       atorvastatin (LIPITOR) 20 MG tablet Take 1 tablet (20 mg) by mouth daily 90 tablet 3     blood glucose (ACCU-CHEK REGIS PLUS) test strip USE TO TEST BLOOD SUGAR THREE TIMES A DAY OR AS DIRECTED 300 strip 3     blood glucose monitoring (NO BRAND SPECIFIED) meter device kit Use to test blood sugar 3 times daily or as directed. 1 kit 0     Cholecalciferol (VITAMIN D-3) 1000 units CAPS Take 1 capsule by mouth daily        finasteride (PROSCAR) 5 MG tablet Take 1 tablet (5 mg) by mouth daily 90 tablet 1     insulin glargine (LANTUS SOLOSTAR) 100 UNIT/ML pen INJECT 16 UNITS UNDER THE SKIN AT BEDTIME 30 mL 2     insulin lispro (HUMALOG KWIKPEN) 100 UNIT/ML (1 unit dial) KWIKPEN USE 8 UNITS  PLUS LOW SLIDING SCALE BEFORE EACH MEAL. MAX 50 UNITS PER DAY. 60 mL 1     insulin pen needle (ULTICARE MINI) 31G X 6 MM miscellaneous USE 4 TO 5 TIMES DAILY OR AS DIRECTED FOR SLIDING SCALE INSULIN 500 each 1     losartan (COZAAR) 100 MG tablet Take 1 tablet (100 mg) by mouth daily 90 tablet 1     metoprolol succinate ER (TOPROL-XL) 25 MG 24 hr tablet Take 1 tablet (25 mg) by mouth daily 30 tablet 11     tamsulosin (FLOMAX) 0.4 MG capsule Take 1 capsule (0.4 mg) by mouth daily 90 capsule 3     trospium (SANCTURA) 20 MG tablet Take 1 tablet every evening 90 tablet 1     No facility-administered encounter medications on file as of 9/13/2022.             O:   NAD, WDWN, Alert & Oriented, Mood & Affect wnl, Vitals stable   Here today alone   There were no vitals taken for  this visit.   General appearance normal   Vitals stable   Alert, oriented and in no acute distress     0.3 cm brown macule on left lateral neck  Well healed scar on left neck   Gritty papules on forehead x 6  Stuck on papules and brown macules on trunk and ext   Red papules on trunk  Brown papules an macules with regular pigment network and borders on torso and extremities      The remainder of skin exam is normal       Eyes: Conjunctivae/lids:Normal     ENT: Lips normal    MSK:Normal    Cardiovascular: peripheral edema none    Pulm: Breathing Normal    Lymph Nodes: No Head and Neck Lymphadenopathy     Neuro/Psych: Orientation:Alert and Orientedx3 ; Mood/Affect:normal   A/P:  1. R/O MIS on left lateral neck  TANGENTIAL BIOPSY SENT OUT:  After consent, anesthesia with LEC and prep, tangential excision performed and specimen sent out for permanent section histology.  No complications and routine wound care. Patient told to call our office in 1-2 weeks for result.      2.  Actinic keratoses on forehead x 6  LN2:  Treated with LN2 for 5s for 1-2 cycles. Warned risks of blistering, pain, pigment change, scarring, and incomplete resolution.  Advised patient to return if lesions do not completely resolve.  Wound care sheet given.  3. History of MIS on left lateral neck 2018  MELANOMA DISCUSSED WITH PATIENT:  I discussed the specifics of tumor, prognosis, metachronous melanoma, self exam, and genetics with the patient. I explained the need for monthly skin exams including and taught the patient how to do this. Patient was asked about new or changing moles . I discussed with patient signs and symptoms that could arise in the setting of recurrent locoregional or metastatic disease. In addition, the need to undergo every 12 month dermatologic full skin survey and evaluation given that patients with a diagnosis of melanoma are at risk of recurrence (local and distant) and of subsequent de cassie melanoma.    4. Seborrheic  keratosis, lentigo, angioma, benign nevi  It was a pleasure speaking to Storm Dutta today.BENIGN LESIONS DISCUSSED WITH PATIENT:  I discussed the specifics of tumor, prognosis, and genetics of benign lesions.  I explained that treatment of these lesions would be purely cosmetic and not medically neccessary.  I discussed with patient different removal options including excision, cautery and /or laser.      Nature and genetics of benign skin lesions dicussed with patient.  Signs and Symptoms of skin cancer discussed with patient.  ABCDEs of melanoma reviewed with patient.  Patient encouraged to perform monthly skin exams.  UV precautions reviewed with patient.  Risks of non-melanoma skin cancer discussed with patient   Return to clinic pending biopsy results.

## 2022-09-20 ENCOUNTER — ALLIED HEALTH/NURSE VISIT (OUTPATIENT)
Dept: UROLOGY | Facility: CLINIC | Age: 82
End: 2022-09-20
Payer: COMMERCIAL

## 2022-09-20 DIAGNOSIS — N13.8 HYPERTROPHY OF PROSTATE WITH URINARY OBSTRUCTION: Primary | ICD-10-CM

## 2022-09-20 DIAGNOSIS — N40.1 HYPERTROPHY OF PROSTATE WITH URINARY OBSTRUCTION: Primary | ICD-10-CM

## 2022-09-20 PROCEDURE — 51798 US URINE CAPACITY MEASURE: CPT

## 2022-09-20 NOTE — NURSING NOTE
Patient reports void every few hours throughout the day and since starting the medication he is able to sleep for 3 hours now before getting up to use restroom.  Just prior to scan; patient was asked to void until feels empty.  After void, pt reports he feels empty.  Pt states he has a large prostate. Has been unsuccessful with CIC in the past resulting in hematuria.  Patient states he has had >1500ml in bladder on previous occasions and knows he may be at the point of needing TURP or other procedure to reduce obstruction and potential Renal Function alterations.    Bladder scan today showed 400ml.  Pt instructed to increase daytime void schedule to try and not allow bladder to stretch more than it is now. Patient not interested in CIC nor going home with a Ivey today.  Patient will await provider instruction by Phone Call or other follow up as Provider Directs.    Giana CASTILLO   [facility]ty Clinic RN

## 2022-09-23 ENCOUNTER — VIRTUAL VISIT (OUTPATIENT)
Dept: UROLOGY | Facility: CLINIC | Age: 82
End: 2022-09-23
Payer: COMMERCIAL

## 2022-09-23 DIAGNOSIS — N13.8 HYPERTROPHY OF PROSTATE WITH URINARY OBSTRUCTION: Primary | ICD-10-CM

## 2022-09-23 DIAGNOSIS — N40.1 HYPERTROPHY OF PROSTATE WITH URINARY OBSTRUCTION: Primary | ICD-10-CM

## 2022-09-23 PROCEDURE — 99213 OFFICE O/P EST LOW 20 MIN: CPT | Mod: 93 | Performed by: STUDENT IN AN ORGANIZED HEALTH CARE EDUCATION/TRAINING PROGRAM

## 2022-09-23 NOTE — PROGRESS NOTES
UROLOGY FOLLOW-UP NOTE          Chief Complaint:   Today I had the pleasure of seeing Mr. Storm Dutta in follow-up for a chief complaint of nocturia.          Interval Update   Storm Dutta is a very pleasant 82 year old male with a history of T2 DM, HLD, HTN, CKD stage 3, and rectal cancer.     Brief History: Mr. Storm Dutta was last seen on 8/23/2022. He takes tamsulosin and finasteride. His most bothersome urinary symptom was nocturia, and he was started on trospium 20 mg once daily at bedtime.     Today's notes: He is doing well today. He noticed a decrease in the frequency of nocturia with trospium. He came into clinic for a nurse visit on 9/20/2022 which noted a PVR of 400 mL, increased from his last PVR of 208 mL on 6/09/2022.         Physical Exam:   Patient is a 82 year old male evaluation via telephone visit.       Labs and Pathology:    I personally reviewed all applicable laboratory data and went over findings with patient  Significant for:    CBC RESULTS:  Recent Labs   Lab Test 11/22/21  0654 08/17/21  0951 01/22/21  1017 11/22/19  1324   WBC 6.2 5.8 6.2 7.9   HGB 12.4* 12.4* 12.7* 11.9*    171 169 221        BMP RESULTS:  Recent Labs   Lab Test 06/03/22  0827 11/22/21  0924 11/22/21  0654 08/09/21  1015 01/22/21  1017 01/14/21  1030 09/21/20  0820 03/16/20  0906     --  139 139 137  --  141 139   POTASSIUM 4.2  --  4.1 4.2 4.3  --  4.2 4.4   CHLORIDE 109  --  109 106 106  --  109 110*   CO2 26  --  24 24 27  --  27 26   ANIONGAP 8  --  6 9 4  --  5 3   * 121* 130* 214* 246*  --  131* 132*   BUN 30  --  31* 24 29  --  27 28   CR 1.35*  --  1.29* 1.42* 1.34*  --  1.21 1.38*   GFRESTIMATED 53*  --  52* 46* 50* 49* 56* 48*   GFRESTBLACK  --   --   --   --  57* 59* 65 56*   PILY 9.4  --  9.6 9.0 9.5  --  9.6 8.9       UA RESULTS:   Recent Labs   Lab Test 06/09/22  0955 11/19/19  2213   SG 1.020 1.016   URINEPH 6.0 5.0   NITRITE Negative Negative   RBCU 2-5* 3*    WBCU None Seen <1       PSA RESULTS  PSA   Date Value Ref Range Status   05/31/2013 2.79 0 - 4 ug/L Final   11/27/2012 12.60 (H) 0 - 4 ug/L Final            Assessment/Plan   82 year old male seen in follow up for nocturia and incomplete bladder emptying.  He takes tamsulosin and finasteride and was recently started on trospium once daily at bedtime for management of nocturia.  He noticed an improvement in his nocturia with the trospium.  We discussed that his PVR has increased since his last visit.  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    We discussed the risks of incomplete bladder emptying including acute cystitis, bladder stones, hydronephrosis, and decreased renal function.  He is not interested in a TURP or other prostate procedures at this time and would like to continue his current medication regimen.    We discussed that in order to continue on his medication regimen we should evaluate for hydronephrosis and assess his kidney function to ensure it is stable.  Patient is in agreement with this.  He has procedures coming up in the next few weeks.  We will plan for a lab and imaging visit for about a month from now.    Plan:  1.  Continue tamsulosin, finasteride, and trospium.  2.  Renal ultrasound, BMP, and PSA in the next month.  3.  Follow-up with results of labs and imaging.           Past Medical History:     Past Medical History:   Diagnosis Date     Acute urinary retention 11/2012    ER visit   / 1200 cc post-void residual     Acute urinary retention 11/1/2012    ER      Diabetes mellitus type II      Epididymitis 3/20/2014    Started on doxycyclline for UTI/orchitis. He was discharged on 03/22/14.     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     PE (pulmonary embolism) 3/20/2014     Rectal cancer (H)      Skin cancer      Squamous cell carcinoma       Thrombosis of leg     +PE            Past Surgical History:     Past Surgical History:   Procedure Laterality Date     AMPUTATE TOE(S) Right 6/4/2019    Procedure: AMPUTATION 2nd Toe;  Surgeon: Adriel Cabello DPM;  Location: [facility]     COLONOSCOPY  7/29/2013    Procedure: COMBINED COLONOSCOPY, SINGLE BIOPSY/POLYPECTOMY BY BIOPSY;;  Surgeon: Bari Burnett MD;  [facility]     COLONOSCOPY N/A 6/4/2015    Procedure: COMBINED COLONOSCOPY, SINGLE OR MULTIPLE BIOPSY/POLYPECTOMY BY BIOPSY;  Surgeon: Tara Mtz MD;  Location:  GI     COLONOSCOPY N/A 12/5/2016    Procedure: COLONOSCOPY;  Surgeon: Breanna Kline MD;  Location:  GI     CV CORONARY ANGIOGRAM N/A 11/22/2021    Procedure: Coronary Angiogram;  Surgeon: Jak Noe MD;  Location:  [facility]     EYE SURGERY  5/2012    Right eye procedure     HC CIRCUMCISION SURGICAL NON-DEV >28 DAYS AGE  2/97    due to phimosis     HC REMOVAL GALLBLADDER  5/01     HC UGI ENDOSCOPY W PLACEMENT GASTROSTOMY TUBE PERCUT  3/13/2014    Procedure: COMBINED ESOPHAGOSCOPY, GASTROSCOPY, DUODENOSCOPY (EGD), PLACE PERCUTANEOUS ENDOSCOPIC GASTROSTOMY TUBE;  Surgeon: Loco Casillas MD;  Location: [facility]     LAPAROSCOPIC ASSISTED COLECTOMY LEFT (DESCENDING)  1/7/2014    Procedure: LAPAROSCOPIC ASSISTED COLECTOMY LEFT (DESCENDING);  Laparoscopic hand assisted Low Anterior Resection With colonic J pouch and end to end colorectal anastamosis. Laparoscopic splenic flexure mobilization.  Loop Ileostomy.  Rigid proctoscopy;  Surgeon: Margaret Gamble MD;  Location:  OR     PHACOEMULSIFICATION WITH STANDARD INTRAOCULAR LENS IMPLANT  4/11/2013    Procedure: PHACOEMULSIFICATION WITH STANDARD INTRAOCULAR LENS IMPLANT;  Right Kelman Phacoemulsification with Intraocular Lens Implant;  Surgeon: Caesar Turner MD;  Location: [facility]     REMOVE GASTROSTOMY TUBE ADULT  7/25/2014    Procedure: REMOVE GASTROSTOMY TUBE  ADULT;  Surgeon: Margaret Gamble MD;  [facility]     SIGMOIDOSCOPY FLEXIBLE  6/23/2014    Procedure: SIGMOIDOSCOPY FLEXIBLE;  Surgeon: Margaret Gamble MD;  [facility]     SIGMOIDOSCOPY FLEXIBLE  7/22/2014    Procedure: SIGMOIDOSCOPY FLEXIBLE;  Surgeon: Margaret Gamble MD;  [facility]     SIGMOIDOSCOPY FLEXIBLE  7/25/2014    Procedure: SIGMOIDOSCOPY FLEXIBLE;  Surgeon: Margaret Gamble MD;  [facility]     SIGMOIDOSCOPY FLEXIBLE  7/28/2014    Procedure: SIGMOIDOSCOPY FLEXIBLE;  Surgeon: Margaret Gamble MD;  [facility]     SIGMOIDOSCOPY FLEXIBLE  7/31/2014    Procedure: SIGMOIDOSCOPY FLEXIBLE;  Surgeon: Margaret Gamble MD;  [facility]     SIGMOIDOSCOPY FLEXIBLE  8/3/2014    Procedure: SIGMOIDOSCOPY FLEXIBLE;  Surgeon: Margaret Gamble MD;  [facility]     SIGMOIDOSCOPY FLEXIBLE  8/5/2014    Procedure: SIGMOIDOSCOPY FLEXIBLE;  Surgeon: Margaret Gamble MD;  [facility]     SIGMOIDOSCOPY FLEXIBLE  8/8/2014    Procedure: SIGMOIDOSCOPY FLEXIBLE;  Surgeon: Margaret Gamble MD;  [facility]     SIGMOIDOSCOPY FLEXIBLE  8/18/2014    Procedure: SIGMOIDOSCOPY FLEXIBLE;  Surgeon: Jose Roberto Cervantes MD;  [facility]     SIGMOIDOSCOPY FLEXIBLE N/A 8/15/2014    Procedure: SIGMOIDOSCOPY FLEXIBLE;  Surgeon: Margaret Gamble MD;  [facility]     SIGMOIDOSCOPY FLEXIBLE N/A 8/22/2014    Procedure: SIGMOIDOSCOPY FLEXIBLE;  Surgeon: Jose Roberto Cervantes MD;  [facility]     SIGMOIDOSCOPY FLEXIBLE N/A 8/11/2014    Procedure: SIGMOIDOSCOPY FLEXIBLE;  Surgeon: Margaret Gamble MD;  [facility]     SIGMOIDOSCOPY FLEXIBLE N/A 8/26/2014    Procedure: SIGMOIDOSCOPY FLEXIBLE;  Surgeon: Margaret Gamble MD;  [facility]     SIGMOIDOSCOPY FLEXIBLE N/A 8/29/2014    Procedure: SIGMOIDOSCOPY FLEXIBLE;  Surgeon: Margaret Gamble MD;  [facility]     SIGMOIDOSCOPY  FLEXIBLE N/A 9/8/2014    Procedure: SIGMOIDOSCOPY FLEXIBLE;  Surgeon: Margaret Gamble MD;  [facility]     SIGMOIDOSCOPY FLEXIBLE N/A 9/2/2014    Procedure: SIGMOIDOSCOPY FLEXIBLE;  Surgeon: Breanna Kline MD;  [facility]     SIGMOIDOSCOPY FLEXIBLE N/A 9/11/2014    Procedure: SIGMOIDOSCOPY FLEXIBLE;  Surgeon: Margaret Gamble MD;  [facility]     SIGMOIDOSCOPY FLEXIBLE N/A 9/19/2014    Procedure: SIGMOIDOSCOPY FLEXIBLE;  Surgeon: Margaret Gamble MD;  [facility]     SIGMOIDOSCOPY FLEXIBLE N/A 9/15/2014    Procedure: SIGMOIDOSCOPY FLEXIBLE;  Surgeon: Margaret Gamble MD;  [facility]     SIGMOIDOSCOPY FLEXIBLE N/A 9/5/2014    Procedure: SIGMOIDOSCOPY FLEXIBLE;  Surgeon: Margaret Gamble MD;  [facility]     SIGMOIDOSCOPY FLEXIBLE N/A 9/23/2014    Procedure: SIGMOIDOSCOPY FLEXIBLE;  Surgeon: Margaret Gamble MD;  [facility]     SIGMOIDOSCOPY FLEXIBLE N/A 9/26/2014    Procedure: SIGMOIDOSCOPY FLEXIBLE;  Surgeon: Margaret Gamble MD;  [facility]     SIGMOIDOSCOPY FLEXIBLE N/A 9/30/2014    Procedure: SIGMOIDOSCOPY FLEXIBLE;  Surgeon: Margaret Gamble MD;  [facility]     SIGMOIDOSCOPY FLEXIBLE N/A 10/3/2014    Procedure: SIGMOIDOSCOPY FLEXIBLE;  Surgeon: Margaret Gamble MD;  [facility]     SIGMOIDOSCOPY FLEXIBLE N/A 10/30/2014    Procedure: SIGMOIDOSCOPY FLEXIBLE;  Surgeon: Margaret Gamble MD;  [facility]     SIGMOIDOSCOPY FLEXIBLE, PLACEMENT OF DRAINAGE SPONGE  9/30/14     TAKEDOWN ILEOSTOMY N/A 1/6/2015    Procedure: TAKEDOWN ILEOSTOMY;  Surgeon: Margaret Gamble MD;  [facility]            Medications     Current Outpatient Medications   Medication     amLODIPine (NORVASC) 10 MG tablet     aspirin 325 MG tablet     atorvastatin (LIPITOR) 20 MG tablet     blood glucose (ACCU-CHEK REGIS PLUS) test strip     blood glucose monitoring (NO BRAND SPECIFIED)  meter device kit     Cholecalciferol (VITAMIN D-3) 1000 units CAPS     finasteride (PROSCAR) 5 MG tablet     insulin glargine (LANTUS SOLOSTAR) 100 UNIT/ML pen     insulin lispro (HUMALOG KWIKPEN) 100 UNIT/ML (1 unit dial) KWIKPEN     insulin pen needle (ULTICARE MINI) 31G X 6 MM miscellaneous     losartan (COZAAR) 100 MG tablet     metoprolol succinate ER (TOPROL-XL) 25 MG 24 hr tablet     tamsulosin (FLOMAX) 0.4 MG capsule     trospium (SANCTURA) 20 MG tablet     No current facility-administered medications for this visit.            Family History:     Family History   Problem Relation Age of Onset     Diabetes Mother      Hypertension Mother      Cancer Father      Cancer Maternal Grandmother      Alzheimer Disease Maternal Grandmother      Cardiovascular Paternal Grandmother      Breast Cancer Sister      Colon Cancer No family hx of      Colon Polyps No family hx of      Crohn's Disease No family hx of      Ulcerative Colitis No family hx of      Anesthesia Reaction No family hx of      Melanoma No family hx of             Social History:     Social History     Socioeconomic History     Marital status:      Spouse name: Not on file     Number of children: Not on file     Years of education: Not on file     Highest education level: Not on file   Occupational History     Employer: RETIRED     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 No   Social History Narrative     Not on file     Social Determinants of Health     Financial Resource Strain: Not on file   Food Insecurity: Not on file    Transportation Needs: Not on file   Physical Activity: Not on file   Stress: Not on file   Social Connections: Not on file   Intimate Partner Violence: Not on file   Housing Stability: Not on file            Allergies:   Nkda [no known drug allergies]         Review of Systems:  From intake questionnaire   Negative 14 system review except as noted on HPI, nurse's note.        VIRGINIA GASCA PA-C  Department of Urology

## 2022-10-06 ENCOUNTER — HOSPITAL ENCOUNTER (OUTPATIENT)
Dept: ULTRASOUND IMAGING | Facility: CLINIC | Age: 82
Discharge: HOME | End: 2022-10-06
Attending: STUDENT IN AN ORGANIZED HEALTH CARE EDUCATION/TRAINING PROGRAM | Admitting: STUDENT IN AN ORGANIZED HEALTH CARE EDUCATION/TRAINING PROGRAM
Payer: COMMERCIAL

## 2022-10-06 DIAGNOSIS — N40.1 HYPERTROPHY OF PROSTATE WITH URINARY OBSTRUCTION: ICD-10-CM

## 2022-10-06 DIAGNOSIS — N13.8 HYPERTROPHY OF PROSTATE WITH URINARY OBSTRUCTION: ICD-10-CM

## 2022-10-06 PROCEDURE — 76770 US EXAM ABDO BACK WALL COMP: CPT

## 2022-10-10 ENCOUNTER — ANESTHESIA EVENT (OUTPATIENT)
Dept: GASTROENTEROLOGY | Facility: CLINIC | Age: 82
End: 2022-10-10
Payer: COMMERCIAL

## 2022-10-10 NOTE — ANESTHESIA PREPROCEDURE EVALUATION
Anesthesia Pre-Procedure Evaluation    Patient: Storm Dutta   MRN: [id number] : 1940        Procedure : Procedure(s):  COLONOSCOPY          Past Medical History:   Diagnosis Date     Acute urinary retention 2012    ER visit   / 1200 cc post-void residual     Acute urinary retention 2012    ER      Diabetes mellitus type II      Epididymitis 3/20/2014    Started on doxycyclline for UTI/orchitis. He was discharged on 14.     Former smoker     dc'd      Hypertension goal BP (blood pressure) < 140/90 2012     Malignant melanoma (H)      Melanoma in situ of neck (H) 2021     PE (pulmonary embolism) 3/20/2014     Rectal cancer (H)      Skin cancer      Squamous cell carcinoma      Thrombosis of leg     +PE      Past Surgical History:   Procedure Laterality Date     AMPUTATE TOE(S) Right 2019    Procedure: AMPUTATION 2nd Toe;  Surgeon: Adriel Cabello DPM;  [facility]     COLONOSCOPY  2013    Procedure: COMBINED COLONOSCOPY, SINGLE BIOPSY/POLYPECTOMY BY BIOPSY;;  Surgeon: Bari Burnett MD;  [facility]     COLONOSCOPY N/A 2015    Procedure: COMBINED COLONOSCOPY, SINGLE OR MULTIPLE BIOPSY/POLYPECTOMY BY BIOPSY;  Surgeon: Tara Mtz MD;  Location:  GI     COLONOSCOPY N/A 2016    Procedure: COLONOSCOPY;  Surgeon: Breanna Kline MD;  Location:  GI     CV CORONARY ANGIOGRAM N/A 2021    Procedure: Coronary Angiogram;  Surgeon: Jak Noe MD;  Location:  [facility]     EYE SURGERY  2012    Right eye procedure     HC CIRCUMCISION SURGICAL NON-DEV >28 DAYS AGE      due to phimosis     HC REMOVAL GALLBLADDER       HC UGI ENDOSCOPY W PLACEMENT GASTROSTOMY TUBE PERCUT  3/13/2014    Procedure: COMBINED ESOPHAGOSCOPY, GASTROSCOPY, DUODENOSCOPY (EGD), PLACE PERCUTANEOUS ENDOSCOPIC GASTROSTOMY TUBE;  Surgeon: Loco Casillas MD;  [facility]     LAPAROSCOPIC ASSISTED COLECTOMY LEFT  (DESCENDING)  1/7/2014    Procedure: LAPAROSCOPIC ASSISTED COLECTOMY LEFT (DESCENDING);  Laparoscopic hand assisted Low Anterior Resection With colonic J pouch and end to end colorectal anastamosis. Laparoscopic splenic flexure mobilization.  Loop Ileostomy.  Rigid proctoscopy;  Surgeon: Margaret Gamble MD;  [facility]     PHACOEMULSIFICATION WITH STANDARD INTRAOCULAR LENS IMPLANT  4/11/2013    Procedure: PHACOEMULSIFICATION WITH STANDARD INTRAOCULAR LENS IMPLANT;  Right Kelman Phacoemulsification with Intraocular Lens Implant;  Surgeon: Caesar Turner MD;  [facility]     REMOVE GASTROSTOMY TUBE ADULT  7/25/2014    Procedure: REMOVE GASTROSTOMY TUBE ADULT;  Surgeon: Margraet Gamble MD;  [facility]     SIGMOIDOSCOPY FLEXIBLE  6/23/2014    Procedure: SIGMOIDOSCOPY FLEXIBLE;  Surgeon: Margaret Gamble MD;  [facility]     SIGMOIDOSCOPY FLEXIBLE  7/22/2014    Procedure: SIGMOIDOSCOPY FLEXIBLE;  Surgeon: Margaret Gamble MD;  [facility]     SIGMOIDOSCOPY FLEXIBLE  7/25/2014    Procedure: SIGMOIDOSCOPY FLEXIBLE;  Surgeon: Margaret Gamble MD;  [facility]     SIGMOIDOSCOPY FLEXIBLE  7/28/2014    Procedure: SIGMOIDOSCOPY FLEXIBLE;  Surgeon: Margaret Gamble MD;  [facility]     SIGMOIDOSCOPY FLEXIBLE  7/31/2014    Procedure: SIGMOIDOSCOPY FLEXIBLE;  Surgeon: Margaret Gamble MD;  [facility]     SIGMOIDOSCOPY FLEXIBLE  8/3/2014    Procedure: SIGMOIDOSCOPY FLEXIBLE;  Surgeon: Margaret Gamble MD;  [facility]     SIGMOIDOSCOPY FLEXIBLE  8/5/2014    Procedure: SIGMOIDOSCOPY FLEXIBLE;  Surgeon: Margaret Gamble MD;  [facility]     SIGMOIDOSCOPY FLEXIBLE  8/8/2014    Procedure: SIGMOIDOSCOPY FLEXIBLE;  Surgeon: Margaret Gamble MD;  [facility]     SIGMOIDOSCOPY FLEXIBLE  8/18/2014    Procedure: SIGMOIDOSCOPY FLEXIBLE;  Surgeon: Jose Roberto Cervantes MD;  [facility]      SIGMOIDOSCOPY FLEXIBLE N/A 8/15/2014    Procedure: SIGMOIDOSCOPY FLEXIBLE;  Surgeon: Margaret Gamble MD;  [facility]     SIGMOIDOSCOPY FLEXIBLE N/A 8/22/2014    Procedure: SIGMOIDOSCOPY FLEXIBLE;  Surgeon: Jose Roberto Cervantes MD;  [facility]     SIGMOIDOSCOPY FLEXIBLE N/A 8/11/2014    Procedure: SIGMOIDOSCOPY FLEXIBLE;  Surgeon: Margaret Gamble MD;  [facility]     SIGMOIDOSCOPY FLEXIBLE N/A 8/26/2014    Procedure: SIGMOIDOSCOPY FLEXIBLE;  Surgeon: Margaret Gamble MD;  [facility]     SIGMOIDOSCOPY FLEXIBLE N/A 8/29/2014    Procedure: SIGMOIDOSCOPY FLEXIBLE;  Surgeon: Margaret Gamble MD;  [facility]     SIGMOIDOSCOPY FLEXIBLE N/A 9/8/2014    Procedure: SIGMOIDOSCOPY FLEXIBLE;  Surgeon: Margaret Gamble MD;  [facility]     SIGMOIDOSCOPY FLEXIBLE N/A 9/2/2014    Procedure: SIGMOIDOSCOPY FLEXIBLE;  Surgeon: Breanna Kline MD;  [facility]     SIGMOIDOSCOPY FLEXIBLE N/A 9/11/2014    Procedure: SIGMOIDOSCOPY FLEXIBLE;  Surgeon: Margaret Gamble MD;  [facility]     SIGMOIDOSCOPY FLEXIBLE N/A 9/19/2014    Procedure: SIGMOIDOSCOPY FLEXIBLE;  Surgeon: Margaret Gamble MD;  [facility]     SIGMOIDOSCOPY FLEXIBLE N/A 9/15/2014    Procedure: SIGMOIDOSCOPY FLEXIBLE;  Surgeon: Margaret Gamble MD;  [facility]     SIGMOIDOSCOPY FLEXIBLE N/A 9/5/2014    Procedure: SIGMOIDOSCOPY FLEXIBLE;  Surgeon: Margaret Gamble MD;  [facility]     SIGMOIDOSCOPY FLEXIBLE N/A 9/23/2014    Procedure: SIGMOIDOSCOPY FLEXIBLE;  Surgeon: Margaret Gamble MD;  [facility]     SIGMOIDOSCOPY FLEXIBLE N/A 9/26/2014    Procedure: SIGMOIDOSCOPY FLEXIBLE;  Surgeon: Margaret Gamble MD;  [facility]     SIGMOIDOSCOPY FLEXIBLE N/A 9/30/2014    Procedure: SIGMOIDOSCOPY FLEXIBLE;  Surgeon: Margaret Gamble MD;  [facility]     SIGMOIDOSCOPY FLEXIBLE N/A 10/3/2014    Procedure:  SIGMOIDOSCOPY FLEXIBLE;  Surgeon: Margaret Gamble MD;  [facility]     SIGMOIDOSCOPY FLEXIBLE N/A 10/30/2014    Procedure: SIGMOIDOSCOPY FLEXIBLE;  Surgeon: Margaret Gamble MD;  [facility]     SIGMOIDOSCOPY FLEXIBLE, PLACEMENT OF DRAINAGE SPONGE  14     TAKEDOWN ILEOSTOMY N/A 2015    Procedure: TAKEDOWN ILEOSTOMY;  Surgeon: Margaret Gamble MD;  [facility]      Allergies   Allergen Reactions     Nkda [No Known Drug Allergies]       Social History     Tobacco Use     Smoking status: Former     Packs/day: 0.00     Years: 30.00     Pack years: 0.00     Types: Cigars, Cigarettes     Quit date: 2002     Years since quittin.0     Smokeless tobacco: Never   Substance Use Topics     Alcohol use: No      Wt Readings from Last 1 Encounters:   22 92 kg (202 lb 12.8 oz)        Anesthesia Evaluation            ROS/MED HX  ENT/Pulmonary:  - neg pulmonary ROS     Neurologic:  - neg neurologic ROS     Cardiovascular: Comment:   Echo:   The left ventricle is normal in size.  Left ventricular systolic function is normal.  The visual ejection fraction is 60-65%.  Normal left ventricular wall motion  There is trace mitral regurgitation.  Compared to prior study, there is no significant change.    (+) Dyslipidemia hypertension-Peripheral Vascular Disease----    METS/Exercise Tolerance:  Comment:   Has had angiogram   Hematologic:     (+) History of blood clots,     Musculoskeletal:       GI/Hepatic:       Renal/Genitourinary:     (+) renal disease,     Endo:     (+) type II DM,     Psychiatric/Substance Use:  - neg psychiatric ROS     Infectious Disease:       Malignancy:       Other:  - neg other ROS             OUTSIDE LABS:  CBC:   Lab Results   Component Value Date    WBC 6.2 2021    WBC 5.8 2021    HGB 12.4 (L) 2021    HGB 12.4 (L) 2021    HCT 37.8 (L) 2021    HCT 37.1 (L) 2021     2021     2021      BMP:   Lab Results   Component Value Date     06/03/2022     11/22/2021    POTASSIUM 4.2 06/03/2022    POTASSIUM 4.1 11/22/2021    CHLORIDE 109 06/03/2022    CHLORIDE 109 11/22/2021    CO2 26 06/03/2022    CO2 24 11/22/2021    BUN 30 06/03/2022    BUN 31 (H) 11/22/2021    CR 1.35 (H) 06/03/2022    CR 1.29 (H) 11/22/2021     (H) 06/03/2022     (H) 11/22/2021     COAGS:   Lab Results   Component Value Date    PTT 35 11/22/2021    INR 1.03 11/22/2021     POC:   Lab Results   Component Value Date     (H) 06/04/2019     HEPATIC:   Lab Results   Component Value Date    ALBUMIN 3.6 06/03/2022    PROTTOTAL 7.6 06/03/2022    ALT 19 06/03/2022    AST 14 06/03/2022    ALKPHOS 131 06/03/2022    BILITOTAL 0.9 06/03/2022     OTHER:   Lab Results   Component Value Date    LACT 1.9 04/27/2014    A1C 7.2 (H) 06/03/2022    PILY 9.4 06/03/2022    PHOS 3.7 05/05/2014    MAG 1.5 (L) 05/05/2014    LIPASE 319 (H) 01/15/2014    TSH 1.81 08/17/2021    SED 11 09/10/2012       Anesthesia Plan    ASA Status:  3      Anesthesia Type: General.   Induction: Intravenous, Propofol.   Maintenance: TIVA.        Consents    Anesthesia Plan(s) and associated risks, benefits, and realistic alternatives discussed. Questions answered and patient/representative(s) expressed understanding.     - Discussed: Risks, Benefits and Alternatives for BOTH SEDATION and the PROCEDURE were discussed     - Discussed with:  Patient         Postoperative Care    Pain management: IV analgesics, Oral pain medications, Multi-modal analgesia.   PONV prophylaxis: Ondansetron (or other 5HT-3), Dexamethasone or Solumedrol     Comments:    Other Comments: Patient aware of plan, what to expect, and potential risks. Timeout was performed before bringing the patient back to OR, and once again, patient was asked if they had any questions            NASRA Merino CRNA

## 2022-10-11 ENCOUNTER — HOSPITAL ENCOUNTER (OUTPATIENT)
Facility: CLINIC | Age: 82
Discharge: HOME | End: 2022-10-11
Attending: SURGERY | Admitting: SURGERY
Payer: COMMERCIAL

## 2022-10-11 ENCOUNTER — ANESTHESIA (OUTPATIENT)
Dept: GASTROENTEROLOGY | Facility: CLINIC | Age: 82
End: 2022-10-11
Payer: COMMERCIAL

## 2022-10-11 VITALS
SYSTOLIC BLOOD PRESSURE: 103 MMHG | DIASTOLIC BLOOD PRESSURE: 87 MMHG | HEART RATE: 77 BPM | RESPIRATION RATE: 14 BRPM | OXYGEN SATURATION: 96 %

## 2022-10-11 DIAGNOSIS — R93.3 ABNORMAL COLONOSCOPY: Primary | ICD-10-CM

## 2022-10-11 LAB
ALBUMIN SERPL BCG-MCNC: 3.7 G/DL (ref 3.5–5.2)
ALP SERPL-CCNC: 142 U/L (ref 40–129)
ALT SERPL W P-5'-P-CCNC: 16 U/L (ref 10–50)
ANION GAP SERPL CALCULATED.3IONS-SCNC: 12 MMOL/L (ref 7–15)
AST SERPL W P-5'-P-CCNC: 20 U/L (ref 10–50)
BILIRUB SERPL-MCNC: 0.6 MG/DL
BUN SERPL-MCNC: 19.3 MG/DL (ref 8–23)
CALCIUM SERPL-MCNC: 9.3 MG/DL (ref 8.8–10.2)
CEA SERPL-MCNC: 1.1 NG/ML
CHLORIDE SERPL-SCNC: 101 MMOL/L (ref 98–107)
COLONOSCOPY: NORMAL
CREAT SERPL-MCNC: 1.18 MG/DL (ref 0.67–1.17)
DEPRECATED HCO3 PLAS-SCNC: 24 MMOL/L (ref 22–29)
ERYTHROCYTE [DISTWIDTH] IN BLOOD BY AUTOMATED COUNT: 14 % (ref 10–15)
GFR SERPL CREATININE-BSD FRML MDRD: 62 ML/MIN/1.73M2
GLUCOSE BLDC GLUCOMTR-MCNC: 122 MG/DL (ref 70–99)
GLUCOSE SERPL-MCNC: 131 MG/DL (ref 70–99)
HCT VFR BLD AUTO: 33.8 % (ref 40–53)
HGB BLD-MCNC: 11 G/DL (ref 13.3–17.7)
MCH RBC QN AUTO: 28.6 PG (ref 26.5–33)
MCHC RBC AUTO-ENTMCNC: 32.5 G/DL (ref 31.5–36.5)
MCV RBC AUTO: 88 FL (ref 78–100)
PLATELET # BLD AUTO: 194 10E3/UL (ref 150–450)
POTASSIUM SERPL-SCNC: 4.3 MMOL/L (ref 3.4–5.3)
PROT SERPL-MCNC: 6.9 G/DL (ref 6.4–8.3)
RBC # BLD AUTO: 3.85 10E6/UL (ref 4.4–5.9)
SODIUM SERPL-SCNC: 137 MMOL/L (ref 136–145)
WBC # BLD AUTO: 6.2 10E3/UL (ref 4–11)

## 2022-10-11 PROCEDURE — 36415 COLL VENOUS BLD VENIPUNCTURE: CPT | Performed by: SURGERY

## 2022-10-11 PROCEDURE — 85027 COMPLETE CBC AUTOMATED: CPT | Performed by: SURGERY

## 2022-10-11 PROCEDURE — 80053 COMPREHEN METABOLIC PANEL: CPT | Performed by: SURGERY

## 2022-10-11 PROCEDURE — 88305 TISSUE EXAM BY PATHOLOGIST: CPT | Mod: TC | Performed by: SURGERY

## 2022-10-11 PROCEDURE — 82040 ASSAY OF SERUM ALBUMIN: CPT | Performed by: SURGERY

## 2022-10-11 PROCEDURE — 82962 GLUCOSE BLOOD TEST: CPT

## 2022-10-11 PROCEDURE — 45380 COLONOSCOPY AND BIOPSY: CPT | Mod: 59 | Performed by: SURGERY

## 2022-10-11 PROCEDURE — 45385 COLONOSCOPY W/LESION REMOVAL: CPT | Mod: PT | Performed by: SURGERY

## 2022-10-11 PROCEDURE — 370N000017 HC ANESTHESIA TECHNICAL FEE, PER MIN: Performed by: SURGERY

## 2022-10-11 PROCEDURE — 82378 CARCINOEMBRYONIC ANTIGEN: CPT | Performed by: SURGERY

## 2022-10-11 PROCEDURE — 45382 COLONOSCOPY W/CONTROL BLEED: CPT | Performed by: SURGERY

## 2022-10-11 PROCEDURE — 45380 COLONOSCOPY AND BIOPSY: CPT | Mod: PT,XU | Performed by: SURGERY

## 2022-10-11 PROCEDURE — 250N000011 HC RX IP 250 OP 636: Performed by: NURSE ANESTHETIST, CERTIFIED REGISTERED

## 2022-10-11 RX ORDER — ONDANSETRON 2 MG/ML
4 INJECTION INTRAMUSCULAR; INTRAVENOUS EVERY 6 HOURS PRN
Status: DISCONTINUED | OUTPATIENT
Start: 2022-10-11 | End: 2022-10-11 | Stop reason: HOSPADM

## 2022-10-11 RX ORDER — ONDANSETRON 2 MG/ML
4 INJECTION INTRAMUSCULAR; INTRAVENOUS EVERY 30 MIN PRN
Status: DISCONTINUED | OUTPATIENT
Start: 2022-10-11 | End: 2022-10-11 | Stop reason: HOSPADM

## 2022-10-11 RX ORDER — NALOXONE HYDROCHLORIDE 0.4 MG/ML
0.2 INJECTION, SOLUTION INTRAMUSCULAR; INTRAVENOUS; SUBCUTANEOUS
Status: DISCONTINUED | OUTPATIENT
Start: 2022-10-11 | End: 2022-10-11 | Stop reason: HOSPADM

## 2022-10-11 RX ORDER — LIDOCAINE 40 MG/G
CREAM TOPICAL
Status: DISCONTINUED | OUTPATIENT
Start: 2022-10-11 | End: 2022-10-11 | Stop reason: HOSPADM

## 2022-10-11 RX ORDER — SODIUM CHLORIDE, SODIUM LACTATE, POTASSIUM CHLORIDE, CALCIUM CHLORIDE 600; 310; 30; 20 MG/100ML; MG/100ML; MG/100ML; MG/100ML
INJECTION, SOLUTION INTRAVENOUS CONTINUOUS
Status: DISCONTINUED | OUTPATIENT
Start: 2022-10-11 | End: 2022-10-11 | Stop reason: HOSPADM

## 2022-10-11 RX ORDER — FENTANYL CITRATE 50 UG/ML
25 INJECTION, SOLUTION INTRAMUSCULAR; INTRAVENOUS
Status: DISCONTINUED | OUTPATIENT
Start: 2022-10-11 | End: 2022-10-11 | Stop reason: HOSPADM

## 2022-10-11 RX ORDER — PROCHLORPERAZINE MALEATE 5 MG
5 TABLET ORAL EVERY 6 HOURS PRN
Status: DISCONTINUED | OUTPATIENT
Start: 2022-10-11 | End: 2022-10-11 | Stop reason: HOSPADM

## 2022-10-11 RX ORDER — FENTANYL CITRATE 50 UG/ML
25 INJECTION, SOLUTION INTRAMUSCULAR; INTRAVENOUS EVERY 5 MIN PRN
Status: DISCONTINUED | OUTPATIENT
Start: 2022-10-11 | End: 2022-10-11 | Stop reason: HOSPADM

## 2022-10-11 RX ORDER — FLUMAZENIL 0.1 MG/ML
0.2 INJECTION, SOLUTION INTRAVENOUS
Status: DISCONTINUED | OUTPATIENT
Start: 2022-10-11 | End: 2022-10-11 | Stop reason: HOSPADM

## 2022-10-11 RX ORDER — PROPOFOL 10 MG/ML
INJECTION, EMULSION INTRAVENOUS CONTINUOUS PRN
Status: DISCONTINUED | OUTPATIENT
Start: 2022-10-11 | End: 2022-10-11

## 2022-10-11 RX ORDER — ONDANSETRON 4 MG/1
4 TABLET, ORALLY DISINTEGRATING ORAL EVERY 30 MIN PRN
Status: DISCONTINUED | OUTPATIENT
Start: 2022-10-11 | End: 2022-10-11 | Stop reason: HOSPADM

## 2022-10-11 RX ORDER — ONDANSETRON 4 MG/1
4 TABLET, ORALLY DISINTEGRATING ORAL EVERY 6 HOURS PRN
Status: DISCONTINUED | OUTPATIENT
Start: 2022-10-11 | End: 2022-10-11 | Stop reason: HOSPADM

## 2022-10-11 RX ORDER — PROPOFOL 10 MG/ML
INJECTION, EMULSION INTRAVENOUS PRN
Status: DISCONTINUED | OUTPATIENT
Start: 2022-10-11 | End: 2022-10-11

## 2022-10-11 RX ORDER — HYDROMORPHONE HCL IN WATER/PF 6 MG/30 ML
0.2 PATIENT CONTROLLED ANALGESIA SYRINGE INTRAVENOUS EVERY 5 MIN PRN
Status: DISCONTINUED | OUTPATIENT
Start: 2022-10-11 | End: 2022-10-11 | Stop reason: HOSPADM

## 2022-10-11 RX ORDER — OXYCODONE HYDROCHLORIDE 5 MG/1
5 TABLET ORAL EVERY 4 HOURS PRN
Status: DISCONTINUED | OUTPATIENT
Start: 2022-10-11 | End: 2022-10-11 | Stop reason: HOSPADM

## 2022-10-11 RX ORDER — NALOXONE HYDROCHLORIDE 0.4 MG/ML
0.4 INJECTION, SOLUTION INTRAMUSCULAR; INTRAVENOUS; SUBCUTANEOUS
Status: DISCONTINUED | OUTPATIENT
Start: 2022-10-11 | End: 2022-10-11 | Stop reason: HOSPADM

## 2022-10-11 RX ORDER — MEPERIDINE HYDROCHLORIDE 25 MG/ML
12.5 INJECTION INTRAMUSCULAR; INTRAVENOUS; SUBCUTANEOUS
Status: DISCONTINUED | OUTPATIENT
Start: 2022-10-11 | End: 2022-10-11 | Stop reason: HOSPADM

## 2022-10-11 RX ADMIN — PROPOFOL 200 MCG/KG/MIN: 10 INJECTION, EMULSION INTRAVENOUS at 11:23

## 2022-10-11 RX ADMIN — PROPOFOL 50 MG: 10 INJECTION, EMULSION INTRAVENOUS at 11:36

## 2022-10-11 ASSESSMENT — ACTIVITIES OF DAILY LIVING (ADL)
ADLS_ACUITY_SCORE: 37
ADLS_ACUITY_SCORE: 37

## 2022-10-11 NOTE — LETTER
[facility]           [address]           [place] [address]           Tel [phone]  Storm BK Dutta  [address] [place] [address]      October 19, 2022    Dear Storm,  This letter is to inform you of the results of your pathology report on your colonoscopy.  If you have questions please feel free to call:  Please call [phone], for  [facility] or  for [facility], to schedule a follow up appointment in 2 weeks.   Your pathology report was:  (1).   Colon, Ascending, polyp, polypectomy:  -Tubular adenoma  -Negative for high-grade dysplasia and malignancy.     B(2).  Colon, rectum at 15 cm, biopsy:  -Completely denuded mucosa/granulation tissue  -Immunohistochemical stains for CMV are negative  -Negative for dysplasia or malignancy  So the one at 15 cm did not show any evidence of cancer.  This is probably scar tissue that you had from your colon surgery.   The other one showed:  Showed an Adenomatous polyp. This is a benign (not cancerous) growth; however these can become cancer over time. This polyp is usually removed completely at the time of the biopsy. Because it is an Adenomatous polyp you do have a slight higher risk for colon cancer. This is why you will need a repeat colonoscopy in approximately 3 years.  If you do have further questions please don t hesitate to call the below number.    To make an appointment call:  Please call [phone], for  [facility] or  for [facility], to schedule a follow up appointment in 2 weeks.     Sincerely,     Pierre Doe M.D.  ___

## 2022-10-11 NOTE — ANESTHESIA CARE TRANSFER NOTE
Patient: Storm Dutta    Procedure: Procedure(s):  COLONOSCOPY, FLEXIBLE, WITH LESION REMOVAL USING SNARE  COLONOSCOPY, WITH HEMORRHAGE CONTROL       Diagnosis: History of malignant neoplasm of rectum [Z85.048]  Special screening for malignant neoplasm of colon [Z12.11]  Diagnosis Additional Information: No value filed.    Anesthesia Type:   General     Note:    Oropharynx: oropharynx clear of all foreign objects and spontaneously breathing  Level of Consciousness: awake  Oxygen Supplementation: room air    Independent Airway: airway patency satisfactory and stable  Dentition: dentition unchanged  Vital Signs Stable: post-procedure vital signs reviewed and stable  Report to RN Given: handoff report given  Patient transferred to: Phase II    Handoff Report: Identifed the Patient, Identified the Reponsible Provider, Reviewed the pertinent medical history, Discussed the surgical course, Reviewed Intra-OP anesthesia mangement and issues during anesthesia, Set expectations for post-procedure period and Allowed opportunity for questions and acknowledgement of understanding      Vitals:  Vitals Value Taken Time   /87 10/11/22 1232   Temp     Pulse 74 10/11/22 1232   Resp 14 10/11/22 1230   SpO2 97 % 10/11/22 1234   Vitals shown include unvalidated device data.    Electronically Signed By: NASRA Martinez CRNA  October 11, 2022  1:00 PM

## 2022-10-11 NOTE — LETTER
[facility]           [address] [place] [address] [place] [address]           Tel [phone]  Storm BK Dutta  [address]   [place] [address]      October 13, 2022    Dear Storm,  This letter is to inform you of the results of your labs and ct scan.    Notes     1 HM Topic  Component Ref Range & Units 2 d ago   (10/11/22) 10 mo ago   (11/22/21) 1 yr ago   (8/17/21) 1 yr ago   (1/22/21) 2 yr ago   (11/22/19) 2 yr ago   (11/19/19) 3 yr ago   (10/7/19)   WBC Count 4.0 - 11.0 10e3/uL 6.2  6.2  5.8  6.2 R  7.9 R  18.0 High  R  14.7 High  R    RBC Count 4.40 - 5.90 10e6/uL 3.85 Low   4.17 Low   4.01 Low   4.20 Low  R  4.04 Low  R  4.19 Low  R  4.18 Low  R    Hemoglobin 13.3 - 17.7 g/dL 11.0 Low   12.4 Low   12.4 Low   12.7 Low   11.9 Low   12.3 Low   12.2 Low     Hematocrit 40.0 - 53.0 % 33.8 Low   37.8 Low   37.1 Low   39.5 Low   36.7 Low   38.7 Low   38.8 Low     MCV 78 - 100 fL 88  91  93  94 R  91 R  92 R  93 R    MCH 26.5 - 33.0 pg 28.6  29.7  30.9  30.2  29.5  29.4  29.2    MCHC 31.5 - 36.5 g/dL 32.5  32.8  33.4  32.2  32.4  31.8  31.4 Low     RDW 10.0 - 15.0 % 14.0  13.6  13.3  13.4  13.3  13.2  13.3    Platelet Count 150 - 450 10e3/uL 194  193  171  169 R  221 R  226 R  220 R    Resulting Agency  [facility]            1 HM Topic  Component Ref Range & Units 2 d ago   (10/11/22) 4 mo ago   (6/3/22) 9 mo ago   (1/6/22) 10 mo ago   (11/22/21) 1 yr ago   (8/9/21) 1 yr ago   (1/22/21) 1 yr ago   (1/14/21)   Sodium 136 - 145 mmol/L 137  143 R   139 R  139 R  137 R     Potassium 3.4 - 5.3 mmol/L 4.3          Chloride 98 - 107 mmol/L 101          Carbon Dioxide (CO2) 22 - 29 mmol/L 24          Anion Gap 7 - 15 mmol/L 12  8 R   6 R  9 R  4 R     Urea Nitrogen 8.0 - 23.0 mg/dL 19.3          Creatinine 0.67 - 1.17 mg/dL 1.18 High   1.35 High  R   1.29 High  R  1.42 High  R  1.34 High  R  1.4 High  R    Calcium 8.8 - 10.2 mg/dL  9.3  9.4 R   9.6 R  9.0 R  9.5 R     Glucose 70 - 99 mg/dL 131 High           Alkaline Phosphatase 40 - 129 U/L 142 High   131 R    106 R      AST 10 - 50 U/L 20  14 R    16 R      ALT 10 - 50 U/L 16  19 R  22 R   19 R      Protein Total 6.4 - 8.3 g/dL 6.9  7.6 R    7.1 R      Albumin 3.5 - 5.2 g/dL 3.7          Bilirubin Total <=1.2 mg/dL 0.6  0.9 R    0.7 R      GFR Estimate >60 mL/min/1.73m2 62  53 Low  CM   52 Low  CM  46 Low  CM  50 Low  R, CM  49 Low  R    Comment: Effective December 21, 2021 eGFRcr in adults is calculated         your CEA was normal and that is used to follow colon cancer. So that is good.  Your labs are mostly good your hemaglobin  Is a little lower than last time.   [phone] 10/12/2022      Narrative & Impression  CT ABDOMEN AND PELVIS WITH CONTRAST 10/12/2022 9:22 AM     CLINICAL HISTORY: Abnormal colonoscopy.     TECHNIQUE: CT scan of the abdomen and pelvis was performed following  injection of IV contrast. Multiplanar reformats were obtained. Dose  reduction techniques were used.  CONTRAST: 90 mL Isovue 370     COMPARISON: 1/14/2021     FINDINGS:   LOWER CHEST: Calcified granuloma is seen in the posterior left lower  lobe.     HEPATOBILIARY: Stable mild intrahepatic biliary dilation likely  related to the patient's age and postcholecystectomy state. No focal  lesions through the liver. The portal vein is patent.     PANCREAS: Normal.     SPLEEN: Normal.     ADRENAL GLANDS: Normal.     KIDNEYS/BLADDER: Within the right renal pelvis there is subtle soft  tissue density measuring 15 x 11 mm on image 86 of series 2. This may  be artifactual or could be related to blood products or an underlying  mass. Follow-up urogram is recommended to better visualize this area.  No hydronephrosis on either side. The prostate is enlarged and indents  the underside of the bladder.     BOWEL: Distal small bowel resection and anastomosis is noted. No  colonic wall thickening or inflammatory change. The  lumen of the  rectum is collapsed and stable postsurgical changes are seen in the  presacral region with suture lines and surgical clips. No definite  extension of disease or viable tumor. There are stable prominent lymph  nodes just above the rectum in the adjacent perirectal fat.     PELVIC ORGANS: Posttherapy changes are seen in the pelvis as described  above. The prostate is enlarged and indents the underside of the  bladder. No free fluid.     ADDITIONAL FINDINGS: None.     MUSCULOSKELETAL: Degenerative changes are noted through the spine.  Compression deformity is unchanged at the L1 normal. Mild  anterolisthesis is noted of L4 on L5.                                                                      IMPRESSION:   1.  Stable post therapy changes in the presacral region. The lumen of  the rectum is collapsed. No definite mass or viable tumor.  2.  Stable prominent perirectal lymph nodes.  3.  Enlarged prostate.  4.  Ill-defined possible soft tissue lesion in the right renal pelvis  measuring up to 15 mm. Follow-up CT urogram is recommended to better  evaluate this area.     MAGDALENE BOWERS MD         Your ct scan shows something around your bladder so may need to see urology for that.  But no evidence of any cancer.  Please talk to your primary doctor if they just want to order the suggested ct urogram or if they want you to see urology.      If you have questions please feel free to call:  Please call [phone], for  [facility] or  for [facility], to schedule a follow up appointment in 2 weeks.   If you do have further questions please don t hesitate to call the below number.    To make an appointment call:  Please call [phone], for  [facility] or  for [facility], to schedule a follow up appointment in 2 weeks.     Sincerely,     Pierre Doe M.D.  ___

## 2022-10-11 NOTE — ANESTHESIA POSTPROCEDURE EVALUATION
Patient: Storm Dutta    Procedure: Procedure(s):  COLONOSCOPY, FLEXIBLE, WITH LESION REMOVAL USING SNARE  COLONOSCOPY, WITH HEMORRHAGE CONTROL       Anesthesia Type:  General    Note:  Disposition: Outpatient   Postop Pain Control: Uneventful            Sign Out: Well controlled pain   PONV: No   Neuro/Psych: Uneventful            Sign Out: Acceptable/Baseline neuro status   Airway/Respiratory: Uneventful            Sign Out: Acceptable/Baseline resp. status   CV/Hemodynamics: Uneventful            Sign Out: Acceptable CV status; No obvious hypovolemia; No obvious fluid overload   Other NRE: NONE   DID A NON-ROUTINE EVENT OCCUR? No           Last vitals:  Vitals Value Taken Time   /87 10/11/22 1232   Temp     Pulse 74 10/11/22 1232   Resp 14 10/11/22 1230   SpO2 97 % 10/11/22 1234   Vitals shown include unvalidated device data.    Electronically Signed By: NASRA Martniez CRNA  October 11, 2022  1:00 PM

## 2022-10-11 NOTE — H&P
ENDOSCOPY PRE-SEDATION H&P FOR OUTPATIENT PROCEDURES    Storm Dutta  [id number]  1940    Procedure:   Colonoscopy possible biopsy, possible polypectomy, with moderate sedation.     Pre-procedure diagnosis: history of rectal cancer.     Past medical history:   Past Medical History:   Diagnosis Date     Acute urinary retention 11/2012    ER visit   / 1200 cc post-void residual     Acute urinary retention 11/1/2012    ER      Diabetes mellitus type II      Epididymitis 3/20/2014    Started on doxycyclline for UTI/orchitis. He was discharged on 03/22/14.     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     PE (pulmonary embolism) 3/20/2014     Rectal cancer (H)      Skin cancer      Squamous cell carcinoma      Thrombosis of leg     +PE       Past surgical history:   Past Surgical History:   Procedure Laterality Date     AMPUTATE TOE(S) Right 6/4/2019    Procedure: AMPUTATION 2nd Toe;  Surgeon: Adriel Cabello DPM;  Location: [facility]     COLONOSCOPY  7/29/2013    Procedure: COMBINED COLONOSCOPY, SINGLE BIOPSY/POLYPECTOMY BY BIOPSY;;  Surgeon: Bari Burnett MD;  Location: [facility]     COLONOSCOPY N/A 6/4/2015    Procedure: COMBINED COLONOSCOPY, SINGLE OR MULTIPLE BIOPSY/POLYPECTOMY BY BIOPSY;  Surgeon: Tara Mtz MD;  Location:  GI     COLONOSCOPY N/A 12/5/2016    Procedure: COLONOSCOPY;  Surgeon: Breanna Kline MD;  Location:  GI     CV CORONARY ANGIOGRAM N/A 11/22/2021    Procedure: Coronary Angiogram;  Surgeon: Jak Noe MD;  Location:  [facility] LAB     EYE SURGERY  5/2012    Right eye procedure     HC CIRCUMCISION SURGICAL NON-DEV >28 DAYS AGE  2/97    due to phimosis     HC REMOVAL GALLBLADDER  5/01     HC UGI ENDOSCOPY W PLACEMENT GASTROSTOMY TUBE PERCUT  3/13/2014    Procedure: COMBINED ESOPHAGOSCOPY, GASTROSCOPY, DUODENOSCOPY  (EGD), PLACE PERCUTANEOUS ENDOSCOPIC GASTROSTOMY TUBE;  Surgeon: Loco Casillas MD;  Location: [facility]     LAPAROSCOPIC ASSISTED COLECTOMY LEFT (DESCENDING)  1/7/2014    Procedure: LAPAROSCOPIC ASSISTED COLECTOMY LEFT (DESCENDING);  Laparoscopic hand assisted Low Anterior Resection With colonic J pouch and end to end colorectal anastamosis. Laparoscopic splenic flexure mobilization.  Loop Ileostomy.  Rigid proctoscopy;  Surgeon: Margaret Gamble MD;  [facility]     PHACOEMULSIFICATION WITH STANDARD INTRAOCULAR LENS IMPLANT  4/11/2013    Procedure: PHACOEMULSIFICATION WITH STANDARD INTRAOCULAR LENS IMPLANT;  Right Kelman Phacoemulsification with Intraocular Lens Implant;  Surgeon: Caesar Turner MD;  Location: [facility]     REMOVE GASTROSTOMY TUBE ADULT  7/25/2014    Procedure: REMOVE GASTROSTOMY TUBE ADULT;  Surgeon: Margaret Gamble MD;  [facility]     SIGMOIDOSCOPY FLEXIBLE  6/23/2014    Procedure: SIGMOIDOSCOPY FLEXIBLE;  Surgeon: Margaret Gamble MD;  [facility]     SIGMOIDOSCOPY FLEXIBLE  7/22/2014    Procedure: SIGMOIDOSCOPY FLEXIBLE;  Surgeon: Margaret Gamble MD;  [facility]     SIGMOIDOSCOPY FLEXIBLE  7/25/2014    Procedure: SIGMOIDOSCOPY FLEXIBLE;  Surgeon: Margaret Gamble MD;  [facility]     SIGMOIDOSCOPY FLEXIBLE  7/28/2014    Procedure: SIGMOIDOSCOPY FLEXIBLE;  Surgeon: Margaret Gamble MD;  [facility]     SIGMOIDOSCOPY FLEXIBLE  7/31/2014    Procedure: SIGMOIDOSCOPY FLEXIBLE;  Surgeon: Margaret Gamble MD;  [facility]     SIGMOIDOSCOPY FLEXIBLE  8/3/2014    Procedure: SIGMOIDOSCOPY FLEXIBLE;  Surgeon: Margaret Gamble MD;  [facility]     SIGMOIDOSCOPY FLEXIBLE  8/5/2014    Procedure: SIGMOIDOSCOPY FLEXIBLE;  Surgeon: Margaret Gamble MD;  [facility]     SIGMOIDOSCOPY FLEXIBLE  8/8/2014    Procedure: SIGMOIDOSCOPY FLEXIBLE;  Surgeon: Margaret Gamble MD;   [facility]     SIGMOIDOSCOPY FLEXIBLE  8/18/2014    Procedure: SIGMOIDOSCOPY FLEXIBLE;  Surgeon: Jose Roberto Cervantes MD;  [facility]     SIGMOIDOSCOPY FLEXIBLE N/A 8/15/2014    Procedure: SIGMOIDOSCOPY FLEXIBLE;  Surgeon: Margaret Gamble MD;  [facility]     SIGMOIDOSCOPY FLEXIBLE N/A 8/22/2014    Procedure: SIGMOIDOSCOPY FLEXIBLE;  Surgeon: Jose Roberto Cervantes MD;  [facility]     SIGMOIDOSCOPY FLEXIBLE N/A 8/11/2014    Procedure: SIGMOIDOSCOPY FLEXIBLE;  Surgeon: Margaret Gamble MD;  [facility]     SIGMOIDOSCOPY FLEXIBLE N/A 8/26/2014    Procedure: SIGMOIDOSCOPY FLEXIBLE;  Surgeon: Margaret Gamble MD;  [facility]     SIGMOIDOSCOPY FLEXIBLE N/A 8/29/2014    Procedure: SIGMOIDOSCOPY FLEXIBLE;  Surgeon: Margraet Gamble MD;  [facility]     SIGMOIDOSCOPY FLEXIBLE N/A 9/8/2014    Procedure: SIGMOIDOSCOPY FLEXIBLE;  Surgeon: Margaret Gamble MD;  [facility]     SIGMOIDOSCOPY FLEXIBLE N/A 9/2/2014    Procedure: SIGMOIDOSCOPY FLEXIBLE;  Surgeon: Breanna Kline MD;  [facility]     SIGMOIDOSCOPY FLEXIBLE N/A 9/11/2014    Procedure: SIGMOIDOSCOPY FLEXIBLE;  Surgeon: Margaret Gamble MD;  [facility]     SIGMOIDOSCOPY FLEXIBLE N/A 9/19/2014    Procedure: SIGMOIDOSCOPY FLEXIBLE;  Surgeon: Margaret Gamble MD;  [facility]     SIGMOIDOSCOPY FLEXIBLE N/A 9/15/2014    Procedure: SIGMOIDOSCOPY FLEXIBLE;  Surgeon: Margaret Gamble MD;  [facility]     SIGMOIDOSCOPY FLEXIBLE N/A 9/5/2014    Procedure: SIGMOIDOSCOPY FLEXIBLE;  Surgeon: Margaret Gamble MD;  [facility]     SIGMOIDOSCOPY FLEXIBLE N/A 9/23/2014    Procedure: SIGMOIDOSCOPY FLEXIBLE;  Surgeon: Margaret Gamble MD;  [facility]     SIGMOIDOSCOPY FLEXIBLE N/A 9/26/2014    Procedure: SIGMOIDOSCOPY FLEXIBLE;  Surgeon: Margaret Gamble MD;  [facility]     SIGMOIDOSCOPY FLEXIBLE N/A 9/30/2014     Procedure: SIGMOIDOSCOPY FLEXIBLE;  Surgeon: Margaret Gamble MD;  [facility]     SIGMOIDOSCOPY FLEXIBLE N/A 10/3/2014    Procedure: SIGMOIDOSCOPY FLEXIBLE;  Surgeon: Margaret Gamble MD;  [facility]     SIGMOIDOSCOPY FLEXIBLE N/A 10/30/2014    Procedure: SIGMOIDOSCOPY FLEXIBLE;  Surgeon: Margaret Gamble MD;  Location:  GI     SIGMOIDOSCOPY FLEXIBLE, PLACEMENT OF DRAINAGE SPONGE  9/30/14     TAKEDOWN ILEOSTOMY N/A 1/6/2015    Procedure: TAKEDOWN ILEOSTOMY;  Surgeon: Margaret Gamble MD;  [facility]       No current facility-administered medications for this encounter.       Allergies   Allergen Reactions     Nkda [No Known Drug Allergies]        History of Anesthesia/Sedation Problems: no    Physical Exam:    Mental status: alert  Heart: Normal  Lung: Normal  Assessment of patient's airway: Normal  Other as pertinent for procedure: None     ASA Score: See Provation note    Mallampati score:  II - Faucial pillars and soft palate may be seen, but uvula is masked by the base of the tongue    Assessment/Plan:     The patient is an appropriate candidate to receive sedation.    Informed consent was discussed with the patient/family, including the risks, benefits, potential complications and any alternative options associated with sedation.    Patient assessment completed just prior to sedation and while under constant observation by the provider. Condition determined to be adequate for proceeding with sedation.    The specific risks for the procedure were discussed with the patient at the time of informed consent and include but are not limited to perforation which could require surgery, missing significant neoplasm or lesion, hemorrhage and adverse sedative complication.      Pierre Doe MD

## 2022-10-12 ENCOUNTER — HOSPITAL ENCOUNTER (OUTPATIENT)
Dept: CT IMAGING | Facility: CLINIC | Age: 82
Discharge: HOME | End: 2022-10-12
Attending: SURGERY | Admitting: SURGERY
Payer: COMMERCIAL

## 2022-10-12 DIAGNOSIS — R93.3 ABNORMAL COLONOSCOPY: ICD-10-CM

## 2022-10-12 PROCEDURE — 74177 CT ABD & PELVIS W/CONTRAST: CPT

## 2022-10-12 PROCEDURE — 250N000009 HC RX 250: Performed by: SURGERY

## 2022-10-12 PROCEDURE — 250N000011 HC RX IP 250 OP 636: Performed by: SURGERY

## 2022-10-12 RX ORDER — IOPAMIDOL 755 MG/ML
90 INJECTION, SOLUTION INTRAVASCULAR ONCE
Status: COMPLETED | OUTPATIENT
Start: 2022-10-12 | End: 2022-10-12

## 2022-10-12 RX ADMIN — SODIUM CHLORIDE 65 ML: 9 INJECTION, SOLUTION INTRAVENOUS at 09:04

## 2022-10-12 RX ADMIN — IOPAMIDOL 90 ML: 755 INJECTION, SOLUTION INTRAVENOUS at 09:04

## 2022-10-14 LAB
PATH REPORT.COMMENTS IMP SPEC: NORMAL
PATH REPORT.FINAL DX SPEC: NORMAL
PATH REPORT.GROSS SPEC: NORMAL
PATH REPORT.MICROSCOPIC SPEC OTHER STN: NORMAL
PATH REPORT.MICROSCOPIC SPEC OTHER STN: NORMAL
PATH REPORT.RELEVANT HX SPEC: NORMAL
PHOTO IMAGE: NORMAL

## 2022-10-14 PROCEDURE — 88342 IMHCHEM/IMCYTCHM 1ST ANTB: CPT | Mod: 26 | Performed by: PATHOLOGY

## 2022-10-14 PROCEDURE — 88305 TISSUE EXAM BY PATHOLOGIST: CPT | Mod: 26 | Performed by: PATHOLOGY

## 2022-10-19 ENCOUNTER — OFFICE VISIT (OUTPATIENT)
Dept: CARDIOLOGY | Facility: CLINIC | Age: 82
End: 2022-10-19
Attending: INTERNAL MEDICINE
Payer: COMMERCIAL

## 2022-10-19 VITALS
HEART RATE: 53 BPM | WEIGHT: 206.4 LBS | SYSTOLIC BLOOD PRESSURE: 138 MMHG | BODY MASS INDEX: 31.85 KG/M2 | DIASTOLIC BLOOD PRESSURE: 76 MMHG | OXYGEN SATURATION: 98 %

## 2022-10-19 DIAGNOSIS — R00.1 BRADYCARDIA: ICD-10-CM

## 2022-10-19 DIAGNOSIS — I25.10 CORONARY ARTERY DISEASE INVOLVING NATIVE CORONARY ARTERY OF NATIVE HEART WITHOUT ANGINA PECTORIS: Primary | ICD-10-CM

## 2022-10-19 DIAGNOSIS — I10 BENIGN ESSENTIAL HYPERTENSION: ICD-10-CM

## 2022-10-19 DIAGNOSIS — I49.9 IRREGULAR HEART BEAT: ICD-10-CM

## 2022-10-19 DIAGNOSIS — E78.5 HYPERLIPIDEMIA LDL GOAL <70: ICD-10-CM

## 2022-10-19 PROCEDURE — 99214 OFFICE O/P EST MOD 30 MIN: CPT | Performed by: NURSE PRACTITIONER

## 2022-10-19 NOTE — PROGRESS NOTES
Cardiology Clinic Progress Note  Storm Dutta MRN# [id number]   YOB: 1940 Age: 82 year old      Primary Cardiologist:   Dr. Reilly           History of Presenting Illness:      Storm Dutta is a pleasant 82 year old patient with a past cardiac history significant for   1. CAD    Angio 11/2021 with pRCA , Distal LCx 40%, OM1 20%, LAD 60 to 75%, poor LAD target so medical management recommended.  2. Hypertension  3. Hyperlipidemia  4. PVD  Past medical history significant for colorectal cancer, CKD, DM type II, small bowel obstruction, prior tobacco use 10-15 years pipe and cigars.   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    Patient was seen by Dr. Reilly in November 2021 for consultation of abnormal stress test.  He reported generalized fatigue and dyspnea on exertion for the past 3 to 4 months.  He was able to do light to moderate activity. Echocardiogram September 2021 showed EF 60%, normal RV, no valve disease.  Nuclear stress test showed small area of ischemia.  Coronary angiogram 11/22/2021 showed multivessel CAD but poor LAD target.  Medical management was recommended and started on metoprolol.      Follow-up in January 2022 he continued with dyspnea on exertion and occasional fatigue, without improvement after starting metoprolol.  Symptoms remain stable and occurred with exertion such as shoveling snow.     Patient was seen by Dr. Reilly in March 2022 for routine follow-up.  He denies any anginal symptoms and medical management was continued.    Patient had an abnormal colonoscopy on 10/11/2022 with polyps removed.     Pt presents today, with his wife, for 6-month follow-up. PFTs February 2022 showed normal function.  Results reviewed today.    Blood pressure today is controlled.  On exam, heart rate is irregular and is bradycardic at 53 bpm.  They seem to be PVCs but he is agreeable to Holter monitor to assess burden or any other arrhythmias.  He is asymptomatic with  this.  He denies any anginal symptoms. Patient reports no chest pain, PND, orthopnea, presyncope, syncope, edema, heart racing, or palpitations.      Labs:  LIPID RESULTS:  Lab Results   Component Value Date    CHOL 125 01/06/2022    CHOL 148 09/21/2020    HDL 60 01/06/2022    HDL 70 09/21/2020    LDL 51 01/06/2022    LDL 59 08/09/2021    LDL 57 09/21/2020    TRIG 71 01/06/2022    TRIG 104 09/21/2020    CHOLHDLRATIO 2.8 07/16/2015       LIVER ENZYME RESULTS:  Lab Results   Component Value Date    AST 20 10/11/2022    AST 14 09/21/2020    ALT 16 10/11/2022    ALT 23 09/21/2020       CBC RESULTS:  Lab Results   Component Value Date    WBC 6.2 10/11/2022    WBC 6.2 01/22/2021    RBC 3.85 (L) 10/11/2022    RBC 4.20 (L) 01/22/2021    HGB 11.0 (L) 10/11/2022    HGB 12.7 (L) 01/22/2021    HCT 33.8 (L) 10/11/2022    HCT 39.5 (L) 01/22/2021    MCV 88 10/11/2022    MCV 94 01/22/2021    MCH 28.6 10/11/2022    MCH 30.2 01/22/2021    MCHC 32.5 10/11/2022    MCHC 32.2 01/22/2021    RDW 14.0 10/11/2022    RDW 13.4 01/22/2021     10/11/2022     01/22/2021       BMP RESULTS:  Lab Results   Component Value Date     10/11/2022     01/22/2021    POTASSIUM 4.3 10/11/2022    POTASSIUM 4.2 06/03/2022    POTASSIUM 4.3 01/22/2021    CHLORIDE 101 10/11/2022    CHLORIDE 109 06/03/2022    CHLORIDE 106 01/22/2021    CO2 24 10/11/2022    CO2 26 06/03/2022    CO2 27 01/22/2021    ANIONGAP 12 10/11/2022    ANIONGAP 8 06/03/2022    ANIONGAP 4 01/22/2021     (H) 10/11/2022     (H) 10/11/2022     (H) 06/03/2022     (H) 01/22/2021    BUN 19.3 10/11/2022    BUN 30 06/03/2022    BUN 29 01/22/2021    CR 1.18 (H) 10/11/2022    CR 1.34 (H) 01/22/2021    GFRESTIMATED 62 10/11/2022    GFRESTIMATED 50 (L) 01/22/2021    GFRESTBLACK 57 (L) 01/22/2021    PILY 9.3 10/11/2022    PILY 9.5 01/22/2021        A1C RESULTS:  Lab Results   Component Value Date    A1C 7.2 (H) 06/03/2022    A1C 7.6 (H) 01/22/2021        INR RESULTS:  Lab Results   Component Value Date    INR 1.03 11/22/2021    INR 1.01 04/26/2014    INR 0.95 04/15/2014       Results reviewed today.       Current Cardiac Medications     Amlodipine 10 mg daily  Atorvastatin 20 mg daily  Losartan 100 mg daily  Metoprolol xl 25 mg daily                    Assessment and Plan:       Plan    Patient Instructions   Medication Changes:  5. None     Recommendations:  1. Check blood pressure at least 1 hour after medications. Call the clinic if your blood pressure is consistently greater than 130/80.      Follow-up:  1. 24 hr Holter monitor - we will call with recommendations   2. See Dr. Reilly for cardiology follow up at [facility]: April 2023.   Call 6 months prior, to schedule.     Cardiology Scheduling~[phone]  Cardiology Clinic RN~[phone] (Yeni RN, Jaen RN, Barbara RN)            1. CAD    No angina     Continue statin, ARB, beta-blocker, CCB    Consider complex PCI if needed       2. Hypertension    controlled     Continue amlodipine, losartan, metoprolol      3. Hyperlipidemia    Last LDL 51 1/2022    Continue atorvastatin 20 mg daily      4. Irregular heart rhythm    Likely PVCs but will assess with Holter monitor and assess burden    Denies lightheadedness with bradycardia    Continue beta-blocker    Recent stress with abnormal colonoscopy             Thank you for allowing me to participate in this delightful patient's care.      This note was completed in part using Dragon voice recognition software. Although reviewed after completion, some word and grammatical errors may occur.    Rosio Vela, APRN CNP, APRN, CNP           Data:   All laboratory data reviewed         Constitutional:  cooperative, alert and oriented, well developed, well nourished, in no acute distress  overweight    Skin:  warm and dry to the touch         Head:  normocephalic       Eyes:  pupils equal and round       ENT:  no pallor or cyanosis        Neck:  no stiffness       Respiratory:  clear to auscultation; normal symmetry        Cardiac: regular rate and irregular rhythm; normal S1 and S2                 pulses full and equal     GI:  abdomen soft, nondistended     Extremities and Muscular Skeletal:  no edema        Neurological:  affect appropriate; no gross motor deficits       Psych:  Alert and Oriented x 3 , appropriate affact

## 2022-10-19 NOTE — LETTER
10/19/2022    Jovana Beaulieu,   [address] [place] [address]    RE: Storm Dutta       Dear Colleague,     I had the pleasure of seeing Storm Dutta in the [facility].  Cardiology Clinic Progress Note  Storm Dutta MRN# [id number]   YOB: 1940 Age: 82 year old      Primary Cardiologist:   Dr. Reilly           History of Presenting Illness:      Storm Dutta is a pleasant 82 year old patient with a past cardiac history significant for   1. CAD    Angio 11/2021 with pRCA , Distal LCx 40%, OM1 20%, LAD 60 to 75%, poor LAD target so medical management recommended.  2. Hypertension  3. Hyperlipidemia  4. PVD  Past medical history significant for colorectal cancer, CKD, DM type II, small bowel obstruction, prior tobacco use 10-15 years pipe and cigars.   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    Patient was seen by Dr. Reilly in November 2021 for consultation of abnormal stress test.  He reported generalized fatigue and dyspnea on exertion for the past 3 to 4 months.  He was able to do light to moderate activity. Echocardiogram September 2021 showed EF 60%, normal RV, no valve disease.  Nuclear stress test showed small area of ischemia.  Coronary angiogram 11/22/2021 showed multivessel CAD but poor LAD target.  Medical management was recommended and started on metoprolol.      Follow-up in January 2022 he continued with dyspnea on exertion and occasional fatigue, without improvement after starting metoprolol.  Symptoms remain stable and occurred with exertion such as shoveling snow.     Patient was seen by Dr. Reilly in March 2022 for routine follow-up.  He denies any anginal symptoms and medical management was continued.    Patient had an abnormal colonoscopy on 10/11/2022 with polyps removed.     Pt presents today, with his wife, for 6-month follow-up. PFTs February 2022 showed normal function.  Results reviewed today.    Blood pressure  today is controlled.  On exam, heart rate is irregular and is bradycardic at 53 bpm.  They seem to be PVCs but he is agreeable to Holter monitor to assess burden or any other arrhythmias.  He is asymptomatic with this.  He denies any anginal symptoms. Patient reports no chest pain, PND, orthopnea, presyncope, syncope, edema, heart racing, or palpitations.      Labs:  LIPID RESULTS:  Lab Results   Component Value Date    CHOL 125 01/06/2022    CHOL 148 09/21/2020    HDL 60 01/06/2022    HDL 70 09/21/2020    LDL 51 01/06/2022    LDL 59 08/09/2021    LDL 57 09/21/2020    TRIG 71 01/06/2022    TRIG 104 09/21/2020    CHOLHDLRATIO 2.8 07/16/2015       LIVER ENZYME RESULTS:  Lab Results   Component Value Date    AST 20 10/11/2022    AST 14 09/21/2020    ALT 16 10/11/2022    ALT 23 09/21/2020       CBC RESULTS:  Lab Results   Component Value Date    WBC 6.2 10/11/2022    WBC 6.2 01/22/2021    RBC 3.85 (L) 10/11/2022    RBC 4.20 (L) 01/22/2021    HGB 11.0 (L) 10/11/2022    HGB 12.7 (L) 01/22/2021    HCT 33.8 (L) 10/11/2022    HCT 39.5 (L) 01/22/2021    MCV 88 10/11/2022    MCV 94 01/22/2021    MCH 28.6 10/11/2022    MCH 30.2 01/22/2021    MCHC 32.5 10/11/2022    MCHC 32.2 01/22/2021    RDW 14.0 10/11/2022    RDW 13.4 01/22/2021     10/11/2022     01/22/2021       BMP RESULTS:  Lab Results   Component Value Date     10/11/2022     01/22/2021    POTASSIUM 4.3 10/11/2022    POTASSIUM 4.2 06/03/2022    POTASSIUM 4.3 01/22/2021    CHLORIDE 101 10/11/2022    CHLORIDE 109 06/03/2022    CHLORIDE 106 01/22/2021    CO2 24 10/11/2022    CO2 26 06/03/2022    CO2 27 01/22/2021    ANIONGAP 12 10/11/2022    ANIONGAP 8 06/03/2022    ANIONGAP 4 01/22/2021     (H) 10/11/2022     (H) 10/11/2022     (H) 06/03/2022     (H) 01/22/2021    BUN 19.3 10/11/2022    BUN 30 06/03/2022    BUN 29 01/22/2021    CR 1.18 (H) 10/11/2022    CR 1.34 (H) 01/22/2021    GFRESTIMATED 62 10/11/2022     GFRESTIMATED 50 (L) 01/22/2021    GFRESTBLACK 57 (L) 01/22/2021    PILY 9.3 10/11/2022    PILY 9.5 01/22/2021        A1C RESULTS:  Lab Results   Component Value Date    A1C 7.2 (H) 06/03/2022    A1C 7.6 (H) 01/22/2021       INR RESULTS:  Lab Results   Component Value Date    INR 1.03 11/22/2021    INR 1.01 04/26/2014    INR 0.95 04/15/2014       Results reviewed today.       Current Cardiac Medications     Amlodipine 10 mg daily  Atorvastatin 20 mg daily  Losartan 100 mg daily  Metoprolol xl 25 mg daily                    Assessment and Plan:       Plan    Patient Instructions   Medication Changes:  5. None     Recommendations:  1. Check blood pressure at least 1 hour after medications. Call the clinic if your blood pressure is consistently greater than 130/80.      Follow-up:  1. 24 hr Holter monitor - we will call with recommendations   2. See Dr. Reilly for cardiology follow up at [facility]: April 2023.   Call 6 months prior, to schedule.     Cardiology Scheduling~[phone]  Cardiology Clinic RN~[phone] (Yeni RN, Jane RN, Barbara RN)            1. CAD    No angina     Continue statin, ARB, beta-blocker, CCB    Consider complex PCI if needed       2. Hypertension    controlled     Continue amlodipine, losartan, metoprolol      3. Hyperlipidemia    Last LDL 51 1/2022    Continue atorvastatin 20 mg daily      4. Irregular heart rhythm    Likely PVCs but will assess with Holter monitor and assess burden    Denies lightheadedness with bradycardia    Continue beta-blocker    Recent stress with abnormal colonoscopy             Thank you for allowing me to participate in this delightful patient's care.      This note was completed in part using Dragon voice recognition software. Although reviewed after completion, some word and grammatical errors may occur.    Rosio Vela, APRN CNP, APRN, CNP           Data:   All laboratory data reviewed         Constitutional:  cooperative, alert and  oriented, well developed, well nourished, in no acute distress  overweight    Skin:  warm and dry to the touch         Head:  normocephalic       Eyes:  pupils equal and round       ENT:  no pallor or cyanosis       Neck:  no stiffness       Respiratory:  clear to auscultation; normal symmetry        Cardiac: regular rate and irregular rhythm; normal S1 and S2                 pulses full and equal     GI:  abdomen soft, nondistended     Extremities and Muscular Skeletal:  no edema        Neurological:  affect appropriate; no gross motor deficits       Psych:  Alert and Oriented x 3 , appropriate affact    Thank you for allowing me to participate in the care of your patient.      Sincerely,     NASRA Mahan [facility]  cc:   Dwaine Reilly MD  [facility]  [address] [place] [address]

## 2022-10-19 NOTE — PATIENT INSTRUCTIONS
Medication Changes:  None     Recommendations:  Check blood pressure at least 1 hour after medications. Call the clinic if your blood pressure is consistently greater than 130/80.      Follow-up:  24 hr Holter monitor - we will call with recommendations   See Dr. Reilly for cardiology follow up at [facility]: April 2023.   Call 6 months prior, to schedule.     Cardiology Scheduling~[phone]  Cardiology Clinic RN~[phone] (Yeni RN, Jane RN, Barbara RN)

## 2022-10-21 DIAGNOSIS — I25.10 CORONARY ARTERY DISEASE INVOLVING NATIVE CORONARY ARTERY OF NATIVE HEART WITHOUT ANGINA PECTORIS: ICD-10-CM

## 2022-10-21 DIAGNOSIS — I10 BENIGN ESSENTIAL HYPERTENSION: ICD-10-CM

## 2022-10-21 RX ORDER — METOPROLOL SUCCINATE 25 MG/1
25 TABLET, EXTENDED RELEASE ORAL DAILY
Qty: 30 TABLET | Refills: 11 | Status: SHIPPED | OUTPATIENT
Start: 2022-10-21 | End: 2022-10-31 | Stop reason: DRUGHIGH

## 2022-10-21 NOTE — TELEPHONE ENCOUNTER
[facility] Cardiology Refill Guideline reviewed.  Medication meets criteria for refill.     Turner Lal RN

## 2022-10-25 ENCOUNTER — HOSPITAL ENCOUNTER (OUTPATIENT)
Dept: CARDIOLOGY | Facility: CLINIC | Age: 82
Discharge: HOME | End: 2022-10-25
Attending: NURSE PRACTITIONER | Admitting: NURSE PRACTITIONER
Payer: COMMERCIAL

## 2022-10-25 DIAGNOSIS — I49.9 IRREGULAR HEART BEAT: ICD-10-CM

## 2022-10-25 DIAGNOSIS — R00.1 BRADYCARDIA: ICD-10-CM

## 2022-10-25 PROCEDURE — 93226 XTRNL ECG REC<48 HR SCAN A/R: CPT

## 2022-10-25 PROCEDURE — 93227 XTRNL ECG REC<48 HR R&I: CPT | Performed by: INTERNAL MEDICINE

## 2022-10-27 DIAGNOSIS — N28.9 LESION OF RIGHT NATIVE KIDNEY: Primary | ICD-10-CM

## 2022-10-31 DIAGNOSIS — I49.3 PVC'S (PREMATURE VENTRICULAR CONTRACTIONS): ICD-10-CM

## 2022-10-31 DIAGNOSIS — I49.9 IRREGULAR HEART BEAT: Primary | ICD-10-CM

## 2022-10-31 RX ORDER — METOPROLOL SUCCINATE 100 MG/1
100 TABLET, EXTENDED RELEASE ORAL DAILY
Qty: 30 TABLET | Refills: 11 | Status: SHIPPED | OUTPATIENT
Start: 2022-10-31 | End: 2022-11-07

## 2022-11-02 ENCOUNTER — HOSPITAL ENCOUNTER (OUTPATIENT)
Dept: CT IMAGING | Facility: CLINIC | Age: 82
Discharge: HOME | End: 2022-11-02
Attending: STUDENT IN AN ORGANIZED HEALTH CARE EDUCATION/TRAINING PROGRAM | Admitting: STUDENT IN AN ORGANIZED HEALTH CARE EDUCATION/TRAINING PROGRAM
Payer: COMMERCIAL

## 2022-11-02 DIAGNOSIS — N28.9 LESION OF RIGHT NATIVE KIDNEY: ICD-10-CM

## 2022-11-02 PROCEDURE — 250N000009 HC RX 250: Performed by: STUDENT IN AN ORGANIZED HEALTH CARE EDUCATION/TRAINING PROGRAM

## 2022-11-02 PROCEDURE — 250N000011 HC RX IP 250 OP 636: Performed by: STUDENT IN AN ORGANIZED HEALTH CARE EDUCATION/TRAINING PROGRAM

## 2022-11-02 PROCEDURE — 74178 CT ABD&PLV WO CNTR FLWD CNTR: CPT

## 2022-11-02 RX ORDER — IOPAMIDOL 755 MG/ML
90 INJECTION, SOLUTION INTRAVASCULAR ONCE
Status: COMPLETED | OUTPATIENT
Start: 2022-11-02 | End: 2022-11-02

## 2022-11-02 RX ADMIN — IOPAMIDOL 90 ML: 755 INJECTION, SOLUTION INTRAVENOUS at 10:45

## 2022-11-02 RX ADMIN — SODIUM CHLORIDE 100 ML: 9 INJECTION, SOLUTION INTRAVENOUS at 10:46

## 2022-11-07 ENCOUNTER — HOSPITAL ENCOUNTER (OUTPATIENT)
Dept: CARDIOLOGY | Facility: CLINIC | Age: 82
Discharge: HOME | End: 2022-11-07
Attending: NURSE PRACTITIONER | Admitting: NURSE PRACTITIONER
Payer: COMMERCIAL

## 2022-11-07 DIAGNOSIS — I49.9 IRREGULAR HEART BEAT: ICD-10-CM

## 2022-11-07 DIAGNOSIS — I49.3 PVC'S (PREMATURE VENTRICULAR CONTRACTIONS): ICD-10-CM

## 2022-11-07 PROCEDURE — 93227 XTRNL ECG REC<48 HR R&I: CPT | Performed by: INTERNAL MEDICINE

## 2022-11-07 PROCEDURE — 93226 XTRNL ECG REC<48 HR SCAN A/R: CPT

## 2022-11-07 RX ORDER — METOPROLOL SUCCINATE 100 MG/1
100 TABLET, EXTENDED RELEASE ORAL DAILY
Qty: 30 TABLET | Refills: 0 | Status: SHIPPED | OUTPATIENT
Start: 2022-11-07 | End: 2023-03-07

## 2022-12-20 DIAGNOSIS — N40.1 HYPERTROPHY OF PROSTATE WITH URINARY OBSTRUCTION: ICD-10-CM

## 2022-12-20 DIAGNOSIS — N13.8 HYPERTROPHY OF PROSTATE WITH URINARY OBSTRUCTION: ICD-10-CM

## 2022-12-20 RX ORDER — FINASTERIDE 5 MG/1
TABLET, FILM COATED ORAL
Qty: 90 TABLET | Refills: 1 | Status: SHIPPED | OUTPATIENT
Start: 2022-12-20 | End: 2023-06-21

## 2022-12-20 NOTE — TELEPHONE ENCOUNTER
"Last Written Prescription Date: 6/3/22  Last Fill Quantity: 90, # refills: 1  Last office visit provider: 6/3/22        Requested Prescriptions   Pending Prescriptions Disp Refills     finasteride (PROSCAR) 5 MG tablet [Pharmacy Med Name: FINASTERIDE 5MG TABS] 90 tablet 1     Sig: TAKE ONE TABLET BY MOUTH ONCE DAILY       BPH Agents Passed - 12/20/2022  9:17 AM        Passed - Recent (12 mo) or future (30 days) visit within the authorizing provider's department     Patient has had an office visit with the authorizing provider or a provider within the authorizing providers department within the previous 12 mos or has a future within next 30 days. See \"Patient Info\" tab in inbasket, or \"Choose Columns\" in Meds & Orders section of the refill encounter.              Passed - Medication is active on med list        Passed - Patient is 18 years of age or older                 Rochelle Patel RN 12/20/22 3:02 PM  "

## 2023-02-24 ENCOUNTER — OFFICE VISIT (OUTPATIENT)
Dept: FAMILY MEDICINE | Facility: CLINIC | Age: 83
End: 2023-02-24
Payer: COMMERCIAL

## 2023-02-24 VITALS
TEMPERATURE: 97.5 F | BODY MASS INDEX: 31.48 KG/M2 | WEIGHT: 204 LBS | OXYGEN SATURATION: 99 % | SYSTOLIC BLOOD PRESSURE: 122 MMHG | DIASTOLIC BLOOD PRESSURE: 58 MMHG | HEART RATE: 57 BPM

## 2023-02-24 DIAGNOSIS — E11.42 TYPE 2 DIABETES MELLITUS WITH DIABETIC POLYNEUROPATHY, WITH LONG-TERM CURRENT USE OF INSULIN (H): Primary | ICD-10-CM

## 2023-02-24 DIAGNOSIS — Z79.4 TYPE 2 DIABETES MELLITUS WITH DIABETIC POLYNEUROPATHY, WITH LONG-TERM CURRENT USE OF INSULIN (H): Primary | ICD-10-CM

## 2023-02-24 DIAGNOSIS — E78.5 HYPERLIPIDEMIA LDL GOAL <70: ICD-10-CM

## 2023-02-24 DIAGNOSIS — E11.22 TYPE 2 DIABETES MELLITUS WITH STAGE 3A CHRONIC KIDNEY DISEASE, WITH LONG-TERM CURRENT USE OF INSULIN (H): ICD-10-CM

## 2023-02-24 DIAGNOSIS — I73.9 PVD (PERIPHERAL VASCULAR DISEASE) (H): ICD-10-CM

## 2023-02-24 DIAGNOSIS — Z89.421 HISTORY OF AMPUTATION OF LESSER TOE OF RIGHT FOOT (H): ICD-10-CM

## 2023-02-24 DIAGNOSIS — Z79.4 TYPE 2 DIABETES MELLITUS WITH STAGE 3A CHRONIC KIDNEY DISEASE, WITH LONG-TERM CURRENT USE OF INSULIN (H): ICD-10-CM

## 2023-02-24 DIAGNOSIS — N18.31 TYPE 2 DIABETES MELLITUS WITH STAGE 3A CHRONIC KIDNEY DISEASE, WITH LONG-TERM CURRENT USE OF INSULIN (H): ICD-10-CM

## 2023-02-24 DIAGNOSIS — I10 HYPERTENSION GOAL BP (BLOOD PRESSURE) < 140/90: ICD-10-CM

## 2023-02-24 LAB
ALBUMIN SERPL BCG-MCNC: 4.2 G/DL (ref 3.5–5.2)
ALP SERPL-CCNC: 166 U/L (ref 40–129)
ALT SERPL W P-5'-P-CCNC: 18 U/L (ref 10–50)
ANION GAP SERPL CALCULATED.3IONS-SCNC: 9 MMOL/L (ref 7–15)
AST SERPL W P-5'-P-CCNC: 20 U/L (ref 10–50)
BILIRUB SERPL-MCNC: 0.8 MG/DL
BUN SERPL-MCNC: 26.5 MG/DL (ref 8–23)
CALCIUM SERPL-MCNC: 10.2 MG/DL (ref 8.8–10.2)
CHLORIDE SERPL-SCNC: 103 MMOL/L (ref 98–107)
CHOLEST SERPL-MCNC: 126 MG/DL
CREAT SERPL-MCNC: 1.36 MG/DL (ref 0.67–1.17)
DEPRECATED HCO3 PLAS-SCNC: 27 MMOL/L (ref 22–29)
GFR SERPL CREATININE-BSD FRML MDRD: 52 ML/MIN/1.73M2
GLUCOSE SERPL-MCNC: 119 MG/DL (ref 70–99)
HBA1C MFR BLD: 6.9 % (ref 0–5.6)
HDLC SERPL-MCNC: 54 MG/DL
LDLC SERPL CALC-MCNC: 56 MG/DL
NONHDLC SERPL-MCNC: 72 MG/DL
POTASSIUM SERPL-SCNC: 4.6 MMOL/L (ref 3.4–5.3)
PROT SERPL-MCNC: 7.8 G/DL (ref 6.4–8.3)
SODIUM SERPL-SCNC: 139 MMOL/L (ref 136–145)
TRIGL SERPL-MCNC: 81 MG/DL

## 2023-02-24 PROCEDURE — 83036 HEMOGLOBIN GLYCOSYLATED A1C: CPT | Performed by: FAMILY MEDICINE

## 2023-02-24 PROCEDURE — 36415 COLL VENOUS BLD VENIPUNCTURE: CPT | Performed by: FAMILY MEDICINE

## 2023-02-24 PROCEDURE — 80053 COMPREHEN METABOLIC PANEL: CPT | Performed by: FAMILY MEDICINE

## 2023-02-24 PROCEDURE — 80061 LIPID PANEL: CPT | Performed by: FAMILY MEDICINE

## 2023-02-24 PROCEDURE — 99214 OFFICE O/P EST MOD 30 MIN: CPT | Performed by: FAMILY MEDICINE

## 2023-02-24 RX ORDER — FLURBIPROFEN SODIUM 0.3 MG/ML
SOLUTION/ DROPS OPHTHALMIC
Qty: 500 EACH | Refills: 1 | Status: SHIPPED | OUTPATIENT
Start: 2023-02-24 | End: 2024-03-29

## 2023-02-24 RX ORDER — LOSARTAN POTASSIUM 100 MG/1
100 TABLET ORAL DAILY
Qty: 90 TABLET | Refills: 1 | Status: SHIPPED | OUTPATIENT
Start: 2023-02-24 | End: 2023-07-31

## 2023-02-24 RX ORDER — INSULIN GLARGINE 100 [IU]/ML
INJECTION, SOLUTION SUBCUTANEOUS
Qty: 30 ML | Refills: 2 | Status: SHIPPED | OUTPATIENT
Start: 2023-02-24 | End: 2023-07-31

## 2023-02-24 RX ORDER — BLOOD SUGAR DIAGNOSTIC
STRIP MISCELLANEOUS
Qty: 300 STRIP | Refills: 3 | Status: ON HOLD | OUTPATIENT
Start: 2023-02-24 | End: 2024-08-22

## 2023-02-24 RX ORDER — INSULIN LISPRO 100 [IU]/ML
INJECTION, SOLUTION INTRAVENOUS; SUBCUTANEOUS
Qty: 60 ML | Refills: 1 | Status: SHIPPED | OUTPATIENT
Start: 2023-02-24 | End: 2023-07-31

## 2023-02-24 NOTE — LETTER
"    4/23/2019         RE: Storm Dutta  [address] [place] [address]        Dear Colleague,    Thank you for referring your patient, Storm Dutta, to the [facility]. Please see a copy of my visit note below.    Oncology Rooming Note    April 23, 2019 11:22 AM   Storm Dutta is a 78 year old male who presents for:    Chief Complaint   Patient presents with     Oncology Clinic Visit     6 month recheck Rectal cancer, review Labs      Initial Vitals: /70 (BP Location: Right arm, Patient Position: Sitting, Cuff Size: Adult Large)   Pulse 58   Temp 96.7  F (35.9  C) (Tympanic)   Resp 16   Ht 1.721 m (5' 7.75\")   Wt 95.8 kg (211 lb 4.8 oz)   SpO2 99%   BMI 32.37 kg/m    Estimated body mass index is 32.37 kg/m  as calculated from the following:    Height as of this encounter: 1.721 m (5' 7.75\").    Weight as of this encounter: 95.8 kg (211 lb 4.8 oz). Body surface area is 2.14 meters squared.  No Pain (0) Comment: Data Unavailable   No LMP for male patient.  Allergies reviewed: Yes  Medications reviewed: Yes    Medications: Medication refills not needed today.  Pharmacy name entered into [employer]: [facility] [place], [place] - [address]    Clinical concerns: 6 month recheck Rectal cancer, review Labs.       Afshan Keating CMA                Hematology /oncology Visit      CHIEF COMPLAINT AND REASON FOR VISIT:    1. PE post op   2. Rectal cancer 2013     HISTORY OF PRESENT ILLNESS:  He presented with SOB found to have large right pulmonary embolism in 03/2014 when he was recovering from a laparoscopic-assisted low anterior resection for rectal cancer resection.  He had colorectal end-to-end anastomosis and loop ileostomy at that time.    He was on Lovenox 70-80 mg subcu b.i.d. for 7 months. It was stopped after repeat CT chest in 9/2014 indicated no PE.   Hypercoagulable work up through us in 9/2014 are negative.      He was diagnosed with " "locally-advanced rectal adenocarcinoma in 07/2013 through colonoscopy due to bloody bowel movements and anemia.  Preop evaluation showed the lesion is about mid to upper rectum 7 cm from the anal verge, there are prominent 5-10 perirectal lymph nodes. He had minimal stage III disease on presentation.     He received neoadjuvant concurrent chemoradiation with oral Xeloda, completed in 10/2013, then went on to have in 1/2014 laparoscopic-assisted LAR with splenic flexure mobilization and colonic J pouch with colorectal end-to-end anastomosis, loop ileostomy.    Final pathology indicating CR with no residual tumor in the rectum and in the lymph nodes.  He was only able to proceed with 2 cycles of XELOX and then dropped due to his general poor condition.     PMH  Type 2 diabetes, peripheral neuropathy, BPH, hypertension, hyperlipidemia, history of urinary retention.      SOCIAL HISTORY:  He lives in [place].  He never used tobacco.  Denies alcohol abuse.  He quit smoking in 2002.  He had 30-pack-year smoking history prior.   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     FAMILY HISTORY:  Positive for maternal grandmother who had unknown type of cancer, 1 sister was treated will breast cancer.        REVIEW OF SYSTEMS:   He is eating nl, no pain, gaining weight, no blood in stool.  He is living active life. He barely eats vegi.       PHYSICAL EXAMINATION:   VITAL SIGNS: Blood pressure 159/70, pulse 58, temperature 96.7  F (35.9  C), temperature source Tympanic, resp. rate 16, height 1.721 m (5' 7.75\"), weight 95.8 kg (211 lb 4.8 oz), SpO2 99 %.     ECOG 0    GENERAL APPEARANCE:   Elderly gentleman, looks much younger than his stated age, not in acute distress.  He looks very comfortable.   HEENT: The patient is normocephalic, atraumatic. Pupils are equal react to light.  Sclerae are anicteric.  Moist oral mucosa.  Negative pharynx.  No oral thrush.   NECK:  Supple.  No jugular venous distention.  Thyroid is not palpable.   LYMPH " NODES:  Superficial lymphadenopathy is not appreciable in the bilateral cervical, supraclavicular, axillary or inguinal adenopathy.    CARDIOVASCULAR:  S1, S2 regular with no murmurs or gallops.  No carotid or abdominal bruits.   PULMONARY:  Lungs are clear to auscultation and percussion bilaterally.  There is no wheezing or rhonchi.   GASTROINTESTINAL:  Abdomen is very soft, PEG feeding tube properly in place.   He has a right abdomen ileostomy bag, brownish liquid in the bag.   MUSCULOSKELETAL/EXTREMITIES:  No edema.  No cyanotic changes.  No signs of joint deformity.  No lymphedema.   NEUROLOGIC:  Cranial nerves II-XII are grossly intact.  Sensation intact.  Muscle strength and muscle tone symmetrical all through 5/5.   BACK:  No spinal or paraspinal tenderness.  No CVA tenderness.   SKIN:  No petechiae.  No rash.  No signs of cellulitis. Bruising on dorsal hands.       Current LAB Data Reviewed  Cbc diff/CEA are fine,   Cr  1.32 from 1.18 from 1.22 from 1.26 from 1.37 stable.       OLD DATA REVIEW IN SUMMARY:   12/2016 colonoscopy: multiple tubulovillous adenoma removed.     5/2015: Tubulovillous adenoma excised from transverse colon.     CT body 10/2016: no mets.     CT 10/2015  1. Negative chest. No PE  2. Interval right lower quadrant ostomy takedown.  3. Postoperative changes in the rectal and presacral region without any appreciable change since the prior exam.  4. Enlarged prostate gland.    CT 9/2014 - No evidence for pulmonary embolism.    CT enterography in 06/2014 with no evidence of bowel obstruction, no convincing focal small bowel abnormality, possible fistula track connecting this walled-off collection colonic anastomosis site in the pelvis.       Laparoscopic-assisted LAR in 01/2014 - no residual carcinoma identified in the sigmoid or rectum, 1 lymph node was negative.      Preop MRI in 08/2013:  sessile tumor in the posterior mid to upper rectum, between 5 and 10 prominent perirectal lymph nodes  which are suspicious.      Original pathology from colonoscopy, rectal mass biopsy 07/2013 - invasive moderately differentiated adenocarcinoma with ulceration, all 4 DNA mismatch repair proteins are expressed in the tumor nuclei.        ASSESSMENT AND PLAN:   1.  Postop large right-sided pulmonary embolism in 03/2014, s/p 7 months of 70 to 80 mg subcu b.i.d.  after repeat CT chest 9/2014 indicated no PE.   Most likely his 03/2014 pulmonary embolism was due to postop debilitated condition. Hypercoagulable work up was negative. He was off coumadin since fall 2014.    Advise full dose enteric coated ASA. He is tolerating this fine.      2.  Renal insufficiency has been improving .  He is encouraged on oral fluid intake.     3.  Locally advanced rectal cancer, status post concurrent chemoradiation.  Surgical resection achieved complete CR.   He did not finish adjuvant treatment due to then poor condition.     Advise ASA and vit D.      4. Colonic polyps multiple times with hx of rectal ca 2014.   He needs on top of colonoscopy.                    Again, thank you for allowing me to participate in the care of your patient.        Sincerely,        Ruth Martins MD, MD     30

## 2023-02-24 NOTE — NURSING NOTE
"Initial /58   Pulse 57   Temp 97.5  F (36.4  C) (Tympanic)   Wt 92.5 kg (204 lb)   SpO2 99%   BMI 31.48 kg/m   Estimated body mass index is 31.48 kg/m  as calculated from the following:    Height as of 6/3/22: 1.715 m (5' 7.5\").    Weight as of this encounter: 92.5 kg (204 lb). .      "

## 2023-02-24 NOTE — PROGRESS NOTES
A.P:      ICD-10-CM    1. Type 2 diabetes mellitus with diabetic polyneuropathy, with long-term current use of insulin (H)  E11.42 Hemoglobin A1c    Z79.4 Comprehensive metabolic panel (BMP + Alb, Alk Phos, ALT, AST, Total. Bili, TP)     Albumin Random Urine Quantitative with Creat Ratio     Hemoglobin A1c     Comprehensive metabolic panel (BMP + Alb, Alk Phos, ALT, AST, Total. Bili, TP)     insulin lispro (HUMALOG KWIKPEN) 100 UNIT/ML (1 unit dial) KWIKPEN     insulin glargine (LANTUS SOLOSTAR) 100 UNIT/ML pen      2. Hyperlipidemia LDL goal <70  E78.5 Lipid panel reflex to direct LDL Fasting     Lipid panel reflex to direct LDL Fasting      3. Hypertension goal BP (blood pressure) < 140/90  I10 losartan (COZAAR) 100 MG tablet      4. 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 PVD (peripheral vascular disease) (H)  I73.9       6. Type 2 diabetes mellitus with stage 3a chronic kidney disease, with long-term current use of insulin (H)  E11.22     N18.31     Z79.4         DM:  Well controlled.  No hypoglycemia.  Continue current management.    Lipids:  On statin, labs today    HTN:  Well controlled, continue meds    H/o toe amputation:  Stable,     PVD:  Controlled HTN, on statin and ASA.  No current symptoms.    CKD:  Multifactorial, stable.  Avoid nephrotoxins    Elsa Aburto is a 82 year old, presenting for the following health issues:  Diabetes      History of Present Illness       Hypertension: He presents for follow up of hypertension.  He does not check blood pressure  regularly outside of the clinic. Outpatient blood pressures have not been over 140/90. He does not follow a low salt diet.         Diabetes Follow-up    How often are you checking your blood sugar? One time daily  What time of day are you checking your blood sugars (select all that apply)?  Before meals  Have you had any blood sugars above 200?  No  Have you had any blood sugars below 70?  No    What symptoms  do you notice when your blood sugar is low?  None    What concerns do you have today about your diabetes? None     Do you have any of these symptoms? (Select all that apply)  No numbness or tingling in feet.  No redness, sores or blisters on feet.  No complaints of excessive thirst.  No reports of blurry vision.  No significant changes to weight.      BP Readings from Last 2 Encounters:   02/24/23 122/58   10/19/22 138/76     Hemoglobin A1C (%)   Date Value   02/24/2023 6.9 (H)   06/03/2022 7.2 (H)   01/22/2021 7.6 (H)   09/21/2020 6.7 (H)     LDL Cholesterol Calculated (mg/dL)   Date Value   01/06/2022 51   09/21/2020 57   07/27/2017 62     LDL Cholesterol Direct (mg/dL)   Date Value   08/09/2021 59   11/15/2019 61   06/12/2018 64       Feels like breathing has been better.  Was able to do some snow shoveling which he tolerated okay.  Still does his usual chores and works in his shop without difficulty.    Urine symptoms have been stable.  Improved on current med regimen form urology    Had a scare with his colonoscopy but everything turned out okay.  Next recommend colonoscopy in 3 years.    Review of Systems   ROS: Remainder of Constitutional, CV, Respiratory, GI,  negative with exception of that mentioned above        Objective    /58   Pulse 57   Temp 97.5  F (36.4  C) (Tympanic)   Wt 92.5 kg (204 lb)   SpO2 99%   BMI 31.48 kg/m    Body mass index is 31.48 kg/m .  Physical Exam   PE:  VS as above   Gen:  WN/WD/WH male in NAD   Heart:  RRR without murmur, nl S1, S2, no rubs or gallops   Lungs CTA leydi without rales/ronchi/wheezes   Ext:  No pedal edema      Epic reviewed

## 2023-02-28 ENCOUNTER — TELEPHONE (OUTPATIENT)
Dept: FAMILY MEDICINE | Facility: CLINIC | Age: 83
End: 2023-02-28
Payer: COMMERCIAL

## 2023-02-28 DIAGNOSIS — R35.1 NOCTURIA: ICD-10-CM

## 2023-02-28 NOTE — TELEPHONE ENCOUNTER
[facility]    Fax: [phone]    [place]: [phone]    [place]: [phone]

## 2023-03-01 RX ORDER — TROSPIUM CHLORIDE 20 MG/1
TABLET, FILM COATED ORAL
Qty: 90 TABLET | Refills: 1 | Status: SHIPPED | OUTPATIENT
Start: 2023-03-01 | End: 2023-09-01

## 2023-03-02 DIAGNOSIS — I49.9 IRREGULAR HEART BEAT: ICD-10-CM

## 2023-03-02 DIAGNOSIS — I49.3 PVC'S (PREMATURE VENTRICULAR CONTRACTIONS): ICD-10-CM

## 2023-03-02 NOTE — TELEPHONE ENCOUNTER
Central Prior Authorization Team   Phone: [phone]      Prior Authorization Approval    Authorization Effective Date: 1/31/2023  Authorization Expiration Date: 3/1/2024  Medication: Pentips 31G X 6MM Misc - PA APPROVED  Insurance Company: [employer] - Phone [phone] Fax [phone]  Which Pharmacy is filling the prescription (Not needed for infusion/clinic administered): [facility] - [place], [place] - [facility] [address]   Pharmacy Notified: Yes  Patient Notified: Yes - called patient, no answer, left v/m notifying of a PA approval and to contact pharmacy to have filled.

## 2023-03-07 RX ORDER — METOPROLOL SUCCINATE 100 MG/1
100 TABLET, EXTENDED RELEASE ORAL DAILY
Qty: 90 TABLET | Refills: 1 | Status: SHIPPED | OUTPATIENT
Start: 2023-03-07 | End: 2023-08-29

## 2023-03-07 NOTE — CONFIDENTIAL NOTE
[facility] Cardiology Refill Guideline reviewed.  Criteria met for refill. Turner Lal RN

## 2023-04-03 DIAGNOSIS — N13.8 HYPERTROPHY OF PROSTATE WITH URINARY OBSTRUCTION: ICD-10-CM

## 2023-04-03 DIAGNOSIS — N40.1 HYPERTROPHY OF PROSTATE WITH URINARY OBSTRUCTION: ICD-10-CM

## 2023-04-04 RX ORDER — TAMSULOSIN HYDROCHLORIDE 0.4 MG/1
0.4 CAPSULE ORAL DAILY
Qty: 90 CAPSULE | Refills: 0 | Status: SHIPPED | OUTPATIENT
Start: 2023-04-04 | End: 2023-06-29

## 2023-04-12 ENCOUNTER — TRANSFERRED RECORDS (OUTPATIENT)
Dept: HEALTH INFORMATION MANAGEMENT | Facility: CLINIC | Age: 83
End: 2023-04-12
Payer: COMMERCIAL

## 2023-04-12 LAB — RETINOPATHY: POSITIVE

## 2023-04-22 ENCOUNTER — HEALTH MAINTENANCE LETTER (OUTPATIENT)
Age: 83
End: 2023-04-22

## 2023-05-04 ENCOUNTER — PATIENT OUTREACH (OUTPATIENT)
Dept: CARE COORDINATION | Facility: CLINIC | Age: 83
End: 2023-05-04
Payer: COMMERCIAL

## 2023-05-18 ENCOUNTER — PATIENT OUTREACH (OUTPATIENT)
Dept: CARE COORDINATION | Facility: CLINIC | Age: 83
End: 2023-05-18
Payer: COMMERCIAL

## 2023-06-09 DIAGNOSIS — I10 HYPERTENSION GOAL BP (BLOOD PRESSURE) < 140/90: ICD-10-CM

## 2023-06-09 RX ORDER — AMLODIPINE BESYLATE 10 MG/1
TABLET ORAL
Qty: 90 TABLET | Refills: 0 | Status: SHIPPED | OUTPATIENT
Start: 2023-06-09 | End: 2023-07-31

## 2023-06-09 NOTE — TELEPHONE ENCOUNTER
"Routing refill request to provider for review/approval because:  Labs out of range:  Creatinine         Requested Prescriptions   Pending Prescriptions Disp Refills     amLODIPine (NORVASC) 10 MG tablet [Pharmacy Med Name: AMLODIPINE BESYLATE 10MG TABS] 90 tablet 3     Sig: TAKE ONE TABLET BY MOUTH ONCE DAILY       Calcium Channel Blockers Protocol  Failed - 6/9/2023  8:36 AM        Failed - Normal serum creatinine on file in past 12 months     Recent Labs   Lab Test 02/24/23  0947 01/22/21  1017 01/14/21  1030   CR 1.36*   < >  --    CREAT  --   --  1.4*    < > = values in this interval not displayed.       Ok to refill medication if creatinine is low          Passed - Blood pressure under 140/90 in past 12 months     BP Readings from Last 3 Encounters:   02/24/23 122/58   10/19/22 138/76   10/11/22 103/87                 Passed - Recent (12 mo) or future (30 days) visit within the authorizing provider's specialty     Patient has had an office visit with the authorizing provider or a provider within the authorizing providers department within the previous 12 mos or has a future within next 30 days. See \"Patient Info\" tab in inbasket, or \"Choose Columns\" in Meds & Orders section of the refill encounter.              Passed - Medication is active on med list        Passed - Patient is age 18 or older                 Sam Benítez RN 06/09/23 9:25 AM  "

## 2023-06-26 ENCOUNTER — OFFICE VISIT (OUTPATIENT)
Dept: DERMATOLOGY | Facility: CLINIC | Age: 83
End: 2023-06-26
Payer: COMMERCIAL

## 2023-06-26 DIAGNOSIS — L57.0 ACTINIC KERATOSIS: Primary | ICD-10-CM

## 2023-06-26 DIAGNOSIS — L82.1 SEBORRHEIC KERATOSIS: ICD-10-CM

## 2023-06-26 DIAGNOSIS — Z86.006 HISTORY OF MELANOMA IN SITU: ICD-10-CM

## 2023-06-26 DIAGNOSIS — Z85.828 HISTORY OF SCC (SQUAMOUS CELL CARCINOMA) OF SKIN: ICD-10-CM

## 2023-06-26 DIAGNOSIS — D48.5 NEOPLASM OF UNCERTAIN BEHAVIOR OF SKIN: ICD-10-CM

## 2023-06-26 PROCEDURE — 17003 DESTRUCT PREMALG LES 2-14: CPT | Performed by: PHYSICIAN ASSISTANT

## 2023-06-26 PROCEDURE — 11102 TANGNTL BX SKIN SINGLE LES: CPT | Mod: 59 | Performed by: PHYSICIAN ASSISTANT

## 2023-06-26 PROCEDURE — 69100 BIOPSY OF EXTERNAL EAR: CPT | Mod: 59 | Performed by: PHYSICIAN ASSISTANT

## 2023-06-26 PROCEDURE — 17000 DESTRUCT PREMALG LESION: CPT | Mod: 59 | Performed by: PHYSICIAN ASSISTANT

## 2023-06-26 PROCEDURE — 99212 OFFICE O/P EST SF 10 MIN: CPT | Mod: 25 | Performed by: PHYSICIAN ASSISTANT

## 2023-06-26 PROCEDURE — 88305 TISSUE EXAM BY PATHOLOGIST: CPT | Performed by: PATHOLOGY

## 2023-06-26 ASSESSMENT — PAIN SCALES - GENERAL: PAINLEVEL: NO PAIN (0)

## 2023-06-26 NOTE — PATIENT INSTRUCTIONS
Wound Care Instructions     FOR SUPERFICIAL WOUNDS     [facility]  [phone]                 AFTER 24 HOURS YOU SHOULD REMOVE THE BANDAGE AND BEGIN DAILY DRESSING CHANGES AS FOLLOWS:     1) Remove Dressing.     2) Clean and dry the area with tap water using a Q-tip or sterile gauze pad.     3) Apply Vaseline, Aquaphor, Polysporin ointment or Bacitracin ointment over entire wound.  Do NOT use Neosporin ointment.     4) Cover the wound with a band-aid, or a sterile non-stick gauze pad and micropore paper tape    REPEAT THESE INSTRUCTIONS AT LEAST ONCE A DAY UNTIL THE WOUND HAS COMPLETELY HEALED.    It is an old wives tale that a wound heals better when it is exposed to air and allowed to dry out. The wound will heal faster with a better cosmetic result if it is kept moist with ointment and covered with a bandage.    **Do not let the wound dry out.**    Supplies Needed:      *Cotton tipped applicators (Q-tips)    *Vaseline, Aquaphor, Polysporin or Bacitracin Ointment (NOT NEOSPORIN)    *Band-aids or non-stick gauze pads and micropore paper tape.      PATIENT INFORMATION:    During the healing process you will notice a number of changes. All wounds develop a small halo of redness surrounding the wound.  This means healing is occurring. Severe itching with extensive redness usually indicates sensitivity to the ointment or bandage tape used to dress the wound.  You should call our office if this develops.      Swelling  and/or discoloration around your surgical site is common, particularly when performed around the eye.    All wounds normally drain.  The larger the wound the more drainage there will be.  After 7-10 days, you will notice the wound beginning to shrink and new skin will begin to grow.  The wound is healed when you can see skin has formed over the entire area.  A healed wound has a healthy, shiny look to the surface and is red to dark pink in color to normalize.  Wounds may  take approximately 4-6 weeks to heal.  Larger wounds may take 6-8 weeks.  After the wound is healed you may discontinue dressing changes.    You may experience a sensation of tightness as your wound heals. This is normal and will gradually subside.    Your healed wound may be sensitive to temperature changes. This sensitivity improves with time, but if you re having a lot of discomfort, try to avoid temperature extremes.    Patients frequently experience itching after their wound appears to have healed because of the continue healing under the skin.  Plain Vaseline will help relieve the itching.      POSSIBLE COMPLICATIONS    BLEEDING:    Leave the bandage in place.  Use tightly rolled up gauze or a cloth to apply direct pressure over the bandage for 30  minutes.  Reapply pressure for an additional 30 minutes if necessary  Use additional gauze and tape to maintain pressure once the bleeding has stopped.

## 2023-06-26 NOTE — LETTER
6/26/2023         RE: Storm Dutta  [address] [place] [address]        Dear Colleague,    Thank you for referring your patient, Storm Dutta, to the [facility]. Please see a copy of my visit note below.    HPI:   Chief complaints: Storm Dutta is a pleasant 82 year old male who presents for evaluation of spots of concern. He has a non healing spot on the left ear which will bleed. He has a non tender scaly spot on the left side of the nose. He also has a new brown spot on the right forearm. He does have a history of MIS and SCC      PHYSICAL EXAM:    There were no vitals taken for this visit.  Skin exam performed as follows: Type 2 skin. Mood appropriate  Alert and Oriented X 3. Well developed, well nourished in no distress.  General appearance: Normal  Head including face: Normal  Eyes: conjunctiva and lids: Normal  Mouth: Lips, teeth, gums: Normal  Neck: Normal  Skin: Scalp and body hair: See below    Keratotic papule on the right forearm  Pink gritty papule on the mid upper forehead, left nasal side wall  5 mm pink ulcerated papule on the left helix  8 mm pink papule on the right preauricular cheek    ASSESSMENT/PLAN:     1. Seborrheic keratosis on the right forearm - advised benign no treatment needed  2. Actinic keratosis on the mid upper forehead, left nasal side wall. As precancerous, cryosurgery performed. Advised on blistering and post-op care. Advised if not resolved in 1-2 months to return for evaluation  3. R/o BCC on the left helix, right PA cheek. Shave biopsy performed.  Area cleaned and anesthetized with 1% lidocaine with epinephrine.  Dermablade used to remove the lesion and sent to pathology. Bleeding was cauterized. Pt tolerated procedure well with no complications.             Follow-up: pending path  CC:   Scribed By: Ashlee Harris, MS, PA-C          Again, thank you for allowing me to participate in the care of your patient.         Sincerely,        Ashlee Guerra PA-C

## 2023-06-26 NOTE — PROGRESS NOTES
HPI:   Chief complaints: Storm Dutta is a pleasant 82 year old male who presents for evaluation of spots of concern. He has a non healing spot on the left ear which will bleed. He has a non tender scaly spot on the left side of the nose. He also has a new brown spot on the right forearm. He does have a history of MIS and SCC      PHYSICAL EXAM:    There were no vitals taken for this visit.  Skin exam performed as follows: Type 2 skin. Mood appropriate  Alert and Oriented X 3. Well developed, well nourished in no distress.  General appearance: Normal  Head including face: Normal  Eyes: conjunctiva and lids: Normal  Mouth: Lips, teeth, gums: Normal  Neck: Normal  Skin: Scalp and body hair: See below    Keratotic papule on the right forearm  Pink gritty papule on the mid upper forehead, left nasal side wall  5 mm pink ulcerated papule on the left helix  8 mm pink papule on the right preauricular cheek    ASSESSMENT/PLAN:     1. Seborrheic keratosis on the right forearm - advised benign no treatment needed  2. Actinic keratosis on the mid upper forehead, left nasal side wall. As precancerous, cryosurgery performed. Advised on blistering and post-op care. Advised if not resolved in 1-2 months to return for evaluation  3. R/o BCC on the left helix, right PA cheek. Shave biopsy performed.  Area cleaned and anesthetized with 1% lidocaine with epinephrine.  Dermablade used to remove the lesion and sent to pathology. Bleeding was cauterized. Pt tolerated procedure well with no complications.             Follow-up: pending path  CC:   Scribed By: Ashlee Harris, MS, PAJODI

## 2023-06-27 ENCOUNTER — ALLIED HEALTH/NURSE VISIT (OUTPATIENT)
Dept: AUDIOLOGY | Facility: CLINIC | Age: 83
End: 2023-06-27
Payer: COMMERCIAL

## 2023-06-27 DIAGNOSIS — H90.3 SENSORINEURAL HEARING LOSS, BILATERAL: Primary | ICD-10-CM

## 2023-06-27 PROCEDURE — V5014 HEARING AID REPAIR/MODIFYING: HCPCS | Performed by: AUDIOLOGIST

## 2023-06-27 NOTE — PROGRESS NOTES
HEARING AID DROP-OFF    Patient Name:  Storm Dutta    Patient Age:   82 year old    :  1940    Background:   Patient dropped off their hearing aid with the report of hearing aid will not charge.      SIDE: Left    : Phonak     TYPE: Audeo M90-R    S/N: [id number]    WARRANTY: : 2022    Procedures:   Did hard reset on hearing aid. Replaced wax guard, dome and retention tail. Verified hearing aid functionality.      Plan:   Patient was contacted in regards to status of hearing aid/s dropped off today. He will  the hearing aid at the specialty clinic .        Sara DELCID, #[id number]

## 2023-06-28 ENCOUNTER — TELEPHONE (OUTPATIENT)
Dept: DERMATOLOGY | Facility: CLINIC | Age: 83
End: 2023-06-28
Payer: COMMERCIAL

## 2023-06-28 LAB
PATH REPORT.COMMENTS IMP SPEC: NORMAL
PATH REPORT.COMMENTS IMP SPEC: NORMAL
PATH REPORT.FINAL DX SPEC: NORMAL
PATH REPORT.GROSS SPEC: NORMAL
PATH REPORT.MICROSCOPIC SPEC OTHER STN: NORMAL
PATH REPORT.RELEVANT HX SPEC: NORMAL

## 2023-06-28 NOTE — TELEPHONE ENCOUNTER
----- Message from Ashlee Harris PA-C sent at 6/28/2023 12:49 PM CDT -----  Left helix BCC please schedule mohs  Path for the biopsy on the right PA cheek came back as an actinic keratosis. Recommend cryo for complete resolution.

## 2023-06-28 NOTE — TELEPHONE ENCOUNTER
Patient Contact    Attempt # 1    Was call answered?  Yes    Called patient. Result were given. Patient was scheduled for Mohs with Dr. Neal. Patient declined to have information sent in the mail.     Khadijah Negron LPN   [facility]   [phone]

## 2023-06-29 DIAGNOSIS — N40.1 HYPERTROPHY OF PROSTATE WITH URINARY OBSTRUCTION: ICD-10-CM

## 2023-06-29 DIAGNOSIS — N13.8 HYPERTROPHY OF PROSTATE WITH URINARY OBSTRUCTION: ICD-10-CM

## 2023-06-29 RX ORDER — TAMSULOSIN HYDROCHLORIDE 0.4 MG/1
0.4 CAPSULE ORAL DAILY
Qty: 90 CAPSULE | Refills: 0 | Status: SHIPPED | OUTPATIENT
Start: 2023-06-29 | End: 2023-09-01

## 2023-06-29 NOTE — TELEPHONE ENCOUNTER
Virtual visit 9/23/22: for nocturia and incomplete bladder emptying  Plan:  1.  Continue tamsulosin, finasteride, and trospium.  2.  Renal ultrasound, BMP, and PSA in the next month.  3.  Follow-up with results of labs and imaging.    Bethel SULLIVAN RN  [facility]

## 2023-07-11 ENCOUNTER — OFFICE VISIT (OUTPATIENT)
Dept: DERMATOLOGY | Facility: CLINIC | Age: 83
End: 2023-07-11
Payer: COMMERCIAL

## 2023-07-11 VITALS — HEART RATE: 58 BPM | DIASTOLIC BLOOD PRESSURE: 71 MMHG | SYSTOLIC BLOOD PRESSURE: 130 MMHG

## 2023-07-11 DIAGNOSIS — C44.219 BASAL CELL CARCINOMA (BCC) OF HELIX OF LEFT EAR: ICD-10-CM

## 2023-07-11 DIAGNOSIS — L57.0 AK (ACTINIC KERATOSIS): Primary | ICD-10-CM

## 2023-07-11 PROCEDURE — 17000 DESTRUCT PREMALG LESION: CPT | Performed by: DERMATOLOGY

## 2023-07-11 PROCEDURE — 17312 MOHS ADDL STAGE: CPT | Performed by: DERMATOLOGY

## 2023-07-11 PROCEDURE — 17311 MOHS 1 STAGE H/N/HF/G: CPT | Performed by: DERMATOLOGY

## 2023-07-11 PROCEDURE — 15260 FTH/GFT FR N/E/E/L 20 SQCM/<: CPT | Performed by: DERMATOLOGY

## 2023-07-11 ASSESSMENT — PAIN SCALES - GENERAL: PAINLEVEL: NO PAIN (0)

## 2023-07-11 NOTE — LETTER
7/11/2023         RE: Storm Dutta  [address] [place] [address]        Dear Colleague,    Thank you for referring your patient, Storm Dutta, to the [facility]. Please see a copy of my visit note below.    Surgical Office Location :   [facility]  [address] [place] [address]      Storm Dutta is an extremely pleasant 82 year old year old male patient here today for evaluation and managment of actinic keratosis and basal cell carcinoma.  Patient has no other skin complaints today.  Remainder of the HPI, Meds, PMH, Allergies, FH, and SH was reviewed in chart.      Past Medical History:   Diagnosis Date     Acute urinary retention 11/2012    ER visit   / 1200 cc post-void residual     Acute urinary retention 11/01/2012    ER      Basal cell carcinoma      Diabetes mellitus type II      Epididymitis 03/20/2014    Started on doxycyclline for UTI/orchitis. He was discharged on 03/22/14.     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     PE (pulmonary embolism) 03/20/2014     Rectal cancer (H)      Skin cancer      Squamous cell carcinoma      Thrombosis of leg     +PE       Past Surgical History:   Procedure Laterality Date     AMPUTATE TOE(S) Right 6/4/2019    Procedure: AMPUTATION 2nd Toe;  Surgeon: Adriel Cabello DPM;  Location: [facility]     COLONOSCOPY  7/29/2013    Procedure: COMBINED COLONOSCOPY, SINGLE BIOPSY/POLYPECTOMY BY BIOPSY;;  Surgeon: Bari Burnett MD;  Location: [facility]     COLONOSCOPY N/A 6/4/2015    Procedure: COMBINED COLONOSCOPY, SINGLE OR MULTIPLE BIOPSY/POLYPECTOMY BY BIOPSY;  Surgeon: Tara Mtz MD;  Location:  GI     COLONOSCOPY N/A 12/5/2016    Procedure: COLONOSCOPY;  Surgeon: Breanna Kline MD;  Location:  GI     COLONOSCOPY N/A 10/11/2022    Procedure:  COLONOSCOPY, FLEXIBLE, WITH LESION REMOVAL USING SNARE;  Surgeon: Pierre Doe MD;  Location: [facility] GI     CV CORONARY ANGIOGRAM N/A 11/22/2021    Procedure: Coronary Angiogram;  Surgeon: Jak Noe MD;  Location:  HEART CARDIAC CATH LAB     EYE SURGERY  5/2012    Right eye procedure     HC CIRCUMCISION SURGICAL NON-DEV >28 DAYS AGE  2/97    due to phimosis     HC REMOVAL GALLBLADDER  5/01     HC UGI ENDOSCOPY W PLACEMENT GASTROSTOMY TUBE PERCUT  3/13/2014    Procedure: COMBINED ESOPHAGOSCOPY, GASTROSCOPY, DUODENOSCOPY (EGD), PLACE PERCUTANEOUS ENDOSCOPIC GASTROSTOMY TUBE;  Surgeon: Loco Casillas MD;  Location: [facility] GI     LAPAROSCOPIC ASSISTED COLECTOMY LEFT (DESCENDING)  1/7/2014    Procedure: LAPAROSCOPIC ASSISTED COLECTOMY LEFT (DESCENDING);  Laparoscopic hand assisted Low Anterior Resection With colonic J pouch and end to end colorectal anastamosis. Laparoscopic splenic flexure mobilization.  Loop Ileostomy.  Rigid proctoscopy;  Surgeon: Margaret Gamble MD;  [facility]     PHACOEMULSIFICATION WITH STANDARD INTRAOCULAR LENS IMPLANT  4/11/2013    Procedure: PHACOEMULSIFICATION WITH STANDARD INTRAOCULAR LENS IMPLANT;  Right Kelman Phacoemulsification with Intraocular Lens Implant;  Surgeon: Caesar Turner MD;  Location: [facility]     REMOVE GASTROSTOMY TUBE ADULT  7/25/2014    Procedure: REMOVE GASTROSTOMY TUBE ADULT;  Surgeon: Margaret Gamble MD;  [facility]     SIGMOIDOSCOPY FLEXIBLE  6/23/2014    Procedure: SIGMOIDOSCOPY FLEXIBLE;  Surgeon: Margaret Gamble MD;  Location:  GI     SIGMOIDOSCOPY FLEXIBLE  7/22/2014    Procedure: SIGMOIDOSCOPY FLEXIBLE;  Surgeon: Margaret Gamble MD;  [facility]     SIGMOIDOSCOPY FLEXIBLE  7/25/2014    Procedure: SIGMOIDOSCOPY FLEXIBLE;  Surgeon: Margaret Gamble MD;  [facility]     SIGMOIDOSCOPY FLEXIBLE  7/28/2014    Procedure: SIGMOIDOSCOPY FLEXIBLE;  Surgeon: Tye  Margaret GONZALES MD;  [facility]     SIGMOIDOSCOPY FLEXIBLE  7/31/2014    Procedure: SIGMOIDOSCOPY FLEXIBLE;  Surgeon: Margaret Gamble MD;  [facility]     SIGMOIDOSCOPY FLEXIBLE  8/3/2014    Procedure: SIGMOIDOSCOPY FLEXIBLE;  Surgeon: Margaret Gamble MD;  [facility]     SIGMOIDOSCOPY FLEXIBLE  8/5/2014    Procedure: SIGMOIDOSCOPY FLEXIBLE;  Surgeon: Margaret Gamble MD;  [facility]     SIGMOIDOSCOPY FLEXIBLE  8/8/2014    Procedure: SIGMOIDOSCOPY FLEXIBLE;  Surgeon: Margaret Gamble MD;  [facility]     SIGMOIDOSCOPY FLEXIBLE  8/18/2014    Procedure: SIGMOIDOSCOPY FLEXIBLE;  Surgeon: Jose Roberto Cervantes MD;  [facility]     SIGMOIDOSCOPY FLEXIBLE N/A 8/15/2014    Procedure: SIGMOIDOSCOPY FLEXIBLE;  Surgeon: Margaret Gamble MD;  [facility]     SIGMOIDOSCOPY FLEXIBLE N/A 8/22/2014    Procedure: SIGMOIDOSCOPY FLEXIBLE;  Surgeon: Jose Roberto Cervantes MD;  [facility]     SIGMOIDOSCOPY FLEXIBLE N/A 8/11/2014    Procedure: SIGMOIDOSCOPY FLEXIBLE;  Surgeon: Margaret Gamble MD;  [facility]     SIGMOIDOSCOPY FLEXIBLE N/A 8/26/2014    Procedure: SIGMOIDOSCOPY FLEXIBLE;  Surgeon: Margaret Gamble MD;  [facility]     SIGMOIDOSCOPY FLEXIBLE N/A 8/29/2014    Procedure: SIGMOIDOSCOPY FLEXIBLE;  Surgeon: Margaret Gamble MD;  [facility]     SIGMOIDOSCOPY FLEXIBLE N/A 9/8/2014    Procedure: SIGMOIDOSCOPY FLEXIBLE;  Surgeon: Margaret Gamble MD;  [facility]     SIGMOIDOSCOPY FLEXIBLE N/A 9/2/2014    Procedure: SIGMOIDOSCOPY FLEXIBLE;  Surgeon: Breanna Kline MD;  [facility]     SIGMOIDOSCOPY FLEXIBLE N/A 9/11/2014    Procedure: SIGMOIDOSCOPY FLEXIBLE;  Surgeon: Margaret Gamble MD;  [facility]     SIGMOIDOSCOPY FLEXIBLE N/A 9/19/2014    Procedure: SIGMOIDOSCOPY FLEXIBLE;  Surgeon: Margaret Gamble MD;  [facility]     SIGMOIDOSCOPY FLEXIBLE N/A 9/15/2014     Procedure: SIGMOIDOSCOPY FLEXIBLE;  Surgeon: Margaret Gamble MD;  [facility]     SIGMOIDOSCOPY FLEXIBLE N/A 9/5/2014    Procedure: SIGMOIDOSCOPY FLEXIBLE;  Surgeon: Margaret Gamble MD;  [facility]     SIGMOIDOSCOPY FLEXIBLE N/A 9/23/2014    Procedure: SIGMOIDOSCOPY FLEXIBLE;  Surgeon: Margaret Gamble MD;  [facility]     SIGMOIDOSCOPY FLEXIBLE N/A 9/26/2014    Procedure: SIGMOIDOSCOPY FLEXIBLE;  Surgeon: Margaret Gamble MD;  [facility]     SIGMOIDOSCOPY FLEXIBLE N/A 9/30/2014    Procedure: SIGMOIDOSCOPY FLEXIBLE;  Surgeon: Margaret Gamble MD;  [facility]     SIGMOIDOSCOPY FLEXIBLE N/A 10/3/2014    Procedure: SIGMOIDOSCOPY FLEXIBLE;  Surgeon: Margaret Gamble MD;  [facility]     SIGMOIDOSCOPY FLEXIBLE N/A 10/30/2014    Procedure: SIGMOIDOSCOPY FLEXIBLE;  Surgeon: Margaret Gamble MD;  [facility]     SIGMOIDOSCOPY FLEXIBLE, PLACEMENT OF DRAINAGE SPONGE  9/30/14     TAKEDOWN ILEOSTOMY N/A 1/6/2015    Procedure: TAKEDOWN ILEOSTOMY;  Surgeon: Margaret Gamble MD;  [facility]        Family History   Problem Relation Age of Onset     Diabetes Mother      Hypertension Mother      Cancer Father      Cancer Maternal Grandmother      Alzheimer Disease Maternal Grandmother      Cardiovascular Paternal Grandmother      Breast Cancer Sister      Colon Cancer No family hx of      Colon Polyps No family hx of      Crohn's Disease No family hx of      Ulcerative Colitis No family hx of      Anesthesia Reaction No family hx of      Melanoma No family hx of        Social History     Socioeconomic History     Marital status:      Spouse name: Not on file     Number of children: Not on file     Years of education: Not on file     Highest education level: Not on file   Occupational History     Employer: RETIRED     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 No   Social History Narrative     Not on file     Social Determinants of Health     Financial Resource Strain: Not on file   Food Insecurity: Not on file   Transportation Needs: Not on file   Physical Activity: Not on file   Stress: Not on file   Social Connections: Not on file   Intimate Partner Violence: Not on file   Housing Stability: Not on file       Outpatient Encounter Medications as of 2023   Medication Sig Dispense Refill     amLODIPine (NORVASC) 10 MG tablet TAKE ONE TABLET BY MOUTH ONCE DAILY 90 tablet 0     atorvastatin (LIPITOR) 20 MG tablet Take 1 tablet (20 mg) by mouth daily 90 tablet 3     blood glucose (ACCU-CHEK REGIS PLUS) test strip USE TO TEST BLOOD SUGAR THREE TIMES A DAY OR AS DIRECTED 300 strip 3     blood glucose monitoring (NO BRAND SPECIFIED) meter device kit Use to test blood sugar 3 times daily or as directed. 1 kit 0     Cholecalciferol (VITAMIN D-3) 1000 units CAPS Take 1 capsule by mouth daily        finasteride (PROSCAR) 5 MG tablet TAKE ONE TABLET BY MOUTH ONCE DAILY 90 tablet 1     insulin glargine (LANTUS SOLOSTAR) 100 UNIT/ML pen INJECT 16 UNITS UNDER THE SKIN AT BEDTIME 30 mL 2     insulin lispro (HUMALOG KWIKPEN) 100 UNIT/ML (1 unit dial) KWIKPEN USE 8 UNITS  PLUS LOW SLIDING SCALE BEFORE EACH MEAL. MAX 50 UNITS PER DAY. 60 mL 1     insulin pen needle (ULTICARE MINI) 31G X 6 MM miscellaneous USE 4 TO 5 TIMES DAILY OR AS DIRECTED FOR SLIDING SCALE INSULIN 500 each 1     losartan (COZAAR) 100 MG tablet Take 1 tablet (100 mg) by mouth daily 90 tablet 1     metoprolol succinate ER (TOPROL XL) 100 MG 24  hr tablet Take 1 tablet (100 mg) by mouth daily 90 tablet 1     tamsulosin (FLOMAX) 0.4 MG capsule Take 1 capsule (0.4 mg) by mouth daily Office visit due in October: [phone] 90 capsule 0     trospium (SANCTURA) 20 MG tablet Take 1 tablet every evening 90 tablet 1     No facility-administered encounter medications on file as of 7/11/2023.             O:   NAD, WDWN, Alert & Oriented, Mood & Affect wnl, Vitals stable   Here today alone   /71   Pulse 58    General appearance normal   Vitals stable   Alert, oriented and in no acute distress     R PA cheek gritty scaly papule   L helix 5mm pink pearly papule       Eyes: Conjunctivae/lids:Normal     ENT: Lips, buccal mucosa, tongue: normal    MSK:Normal    Cardiovascular: peripheral edema none    Pulm: Breathing Normal    Neuro/Psych: Orientation:Alert and Orientedx3 ; Mood/Affect:normal       A/P:  R PA cheek actinic keratosis   LN2:  Treated with LN2 for 5s for 1-2 cycles. Warned risks of blistering, pain, pigment change, scarring, and incomplete resolution.  Advised patient to return if lesions do not completely resolve.  Wound care sheet given.  2. L helix basal cell carcinoma   MOHS:   Location    The rationale for Mohs surgery was discussed with the patient and consent was obtained.  The risks and benefits as well as alternatives to therapy were discussed, in detail.  Specifically, the risks of infection, scarring, bleeding, prolonged wound healing, incomplete removal, allergy to anesthesia, nerve injury and recurrence were addressed.  Indication for Mohs was Location. Prior to the procedure, the treatment site was clearly identified and, if available, confirmed with previous photos and confirmed by the patient   All components of the Universal Protocol/PAUSE rule were completed.  The Mohs surgeon operated in two distinct and integrated capacities as the surgeon and pathologist.      The area was prepped with Betasept.  A rim of normal appearing skin  was marked circumferentially around the lesion.  The area was infiltrated with local anesthesia.  The tumor was first debulked to remove all clinically apparent tumor.  An incision following the standard Mohs approach was done and the specimen was oriented,mapped and placed in 1 block(s).  Each specimen was then chromacoded and processed in the Mohs laboratory using standard Mohs technique and submitted for frozen section histology.  Frozen section analysis showed no residual tumor but CLEAR MARGINS.      The tumor was excised using standard Mohs technique in 1 stages(s).  CLEAR MARGINS OBTAINED and Final defect size was 1.1cm.     We discussed the options for wound management in full with the patient including risks/benefits/ possible outcomes.    REPAIR SECOND INTENT: We discussed the options for wound management in full with the patient including risks/benefits/possible outcomes. Decision made to allow the wound to heal by second intention. Cautery was used for for hemostasis. EBL minimal; complications none; wound care routine.  The patient was discharged in good condition and will return in one month or prn for wound evaluation.   It was a pleasure speaking to Storm Dutta today.  Previous clinic notes and pertinent laboratory tests were reviewed prior to Storm Dutta's visit.  Signs and Symptoms of skin cancer discussed with patient.  Patient encouraged to perform monthly skin exams.  UV precautions reviewed with patient.  Risks of non-melanoma skin cancer discussed with patient   Return to clinic 6 months        Again, thank you for allowing me to participate in the care of your patient.        Sincerely,        Jesus Neal MD

## 2023-07-11 NOTE — PATIENT INSTRUCTIONS
Skin Graft Wound Care     for left ear      No strenuous activity for 48 hours. Resume moderate activity in 48 hours.  No heavy exercising until you are seen for follow up in one week.    Take Tylenol as needed for discomfort.                       No alcoholic beverages for 48 hours.    Leave the bandage in place until you come in for follow up in one week.         Keep the bandage dry. Wash around it carefully.    If the tape becomes soiled or starts to come off, reinforce it with additional paper tape.    Do not smoke for 3 weeks; smoking is detrimental to wound healing and may cause the graft to die.    Avoid prolonged exposure to extremely cold temperatures for 3 weeks.    It is normal to have swelling and bruising around the surgical site. The bruising will fade in approximately 10-14 days. Elevate the area to reduce swelling.    Numbness, itchiness and sensitivity to temperature changes can occur after surgery and may take up to 18 months to normalize.        POSSIBLE COMPLICATIONS      BLEEDING:    Leave the bandage in place.  Use tightly rolled up gauze or a cloth to apply direct pressure over the bandage for 20   minutes.  Reapply pressure for an additional 20 minutes if necessary  Call the office or go to the nearest emergency room if pressure fails to stop the bleeding.  Use additional gauze and tape to maintain pressure once the bleeding has stopped.      PAIN:    Post operative pain should slowly get better, beginning the evening after surgery.  A sudden or severe increase in pain may indicate a problem. Call the office if this occurs.      In case of emergency phone:Dr Neal [phone]

## 2023-07-11 NOTE — PROGRESS NOTES
Storm Dutta is an extremely pleasant 82 year old year old male patient here today for evaluation and managment of actinic keratosis and basal cell carcinoma.  Patient has no other skin complaints today.  Remainder of the HPI, Meds, PMH, Allergies, FH, and SH was reviewed in chart.      Past Medical History:   Diagnosis Date    Acute urinary retention 11/2012    ER visit   / 1200 cc post-void residual    Acute urinary retention 11/01/2012    ER     Basal cell carcinoma     Diabetes mellitus type II     Epididymitis 03/20/2014    Started on doxycyclline for UTI/orchitis. He was discharged on 03/22/14.    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    PE (pulmonary embolism) 03/20/2014    Rectal cancer (H)     Skin cancer     Squamous cell carcinoma     Thrombosis of leg     +PE       Past Surgical History:   Procedure Laterality Date    AMPUTATE TOE(S) Right 6/4/2019    Procedure: AMPUTATION 2nd Toe;  Surgeon: Adriel Cabello DPM;  Location: [facility]    COLONOSCOPY  7/29/2013    Procedure: COMBINED COLONOSCOPY, SINGLE BIOPSY/POLYPECTOMY BY BIOPSY;;  Surgeon: Bari Burnett MD;  Location: [facility]    COLONOSCOPY N/A 6/4/2015    Procedure: COMBINED COLONOSCOPY, SINGLE OR MULTIPLE BIOPSY/POLYPECTOMY BY BIOPSY;  Surgeon: Tara Mtz MD;  Location:  GI    COLONOSCOPY N/A 12/5/2016    Procedure: COLONOSCOPY;  Surgeon: Breanna Kline MD;  Location:  GI    COLONOSCOPY N/A 10/11/2022    Procedure: COLONOSCOPY, FLEXIBLE, WITH LESION REMOVAL USING SNARE;  Surgeon: Pierre Doe MD;  Location: [facility]    CV CORONARY ANGIOGRAM N/A 11/22/2021    Procedure: Coronary Angiogram;  Surgeon: Jak Noe MD;  Location:  [facility]    EYE SURGERY  5/2012    Right eye procedure    HC CIRCUMCISION SURGICAL NON-DEV >28 DAYS AGE  2/97    due to phimosis    HC  REMOVAL GALLBLADDER  5/01     [facility] ENDOSCOPY W PLACEMENT GASTROSTOMY TUBE PERCUT  3/13/2014    Procedure: COMBINED ESOPHAGOSCOPY, GASTROSCOPY, DUODENOSCOPY (EGD), PLACE PERCUTANEOUS ENDOSCOPIC GASTROSTOMY TUBE;  Surgeon: Loco Casillas MD;  Location: [facility]    LAPAROSCOPIC ASSISTED COLECTOMY LEFT (DESCENDING)  1/7/2014    Procedure: LAPAROSCOPIC ASSISTED COLECTOMY LEFT (DESCENDING);  Laparoscopic hand assisted Low Anterior Resection With colonic J pouch and end to end colorectal anastamosis. Laparoscopic splenic flexure mobilization.  Loop Ileostomy.  Rigid proctoscopy;  Surgeon: Margaret Gamble MD;  [facility]    PHACOEMULSIFICATION WITH STANDARD INTRAOCULAR LENS IMPLANT  4/11/2013    Procedure: PHACOEMULSIFICATION WITH STANDARD INTRAOCULAR LENS IMPLANT;  Right Kelman Phacoemulsification with Intraocular Lens Implant;  Surgeon: Caesar Turner MD;  Location: [facility]    REMOVE GASTROSTOMY TUBE ADULT  7/25/2014    Procedure: REMOVE GASTROSTOMY TUBE ADULT;  Surgeon: Margaret Gamble MD;  [facility]    SIGMOIDOSCOPY FLEXIBLE  6/23/2014    Procedure: SIGMOIDOSCOPY FLEXIBLE;  Surgeon: Margaret Gamble MD;  [facility]    SIGMOIDOSCOPY FLEXIBLE  7/22/2014    Procedure: SIGMOIDOSCOPY FLEXIBLE;  Surgeon: Margaret Gamble MD;  [facility]    SIGMOIDOSCOPY FLEXIBLE  7/25/2014    Procedure: SIGMOIDOSCOPY FLEXIBLE;  Surgeon: Margaret Gamble MD;  [facility]    SIGMOIDOSCOPY FLEXIBLE  7/28/2014    Procedure: SIGMOIDOSCOPY FLEXIBLE;  Surgeon: Margaret Gamble MD;  [facility]    SIGMOIDOSCOPY FLEXIBLE  7/31/2014    Procedure: SIGMOIDOSCOPY FLEXIBLE;  Surgeon: Margaret Gamble MD;  [facility]    SIGMOIDOSCOPY FLEXIBLE  8/3/2014    Procedure: SIGMOIDOSCOPY FLEXIBLE;  Surgeon: Margaret Gamble MD;  [facility]    SIGMOIDOSCOPY FLEXIBLE  8/5/2014    Procedure: SIGMOIDOSCOPY FLEXIBLE;  Surgeon: Tye  Margaret GONZALES MD;  [facility]    SIGMOIDOSCOPY FLEXIBLE  8/8/2014    Procedure: SIGMOIDOSCOPY FLEXIBLE;  Surgeon: Margaret Gamble MD;  [facility]    SIGMOIDOSCOPY FLEXIBLE  8/18/2014    Procedure: SIGMOIDOSCOPY FLEXIBLE;  Surgeon: Jose Roberto Cervantes MD;  [facility]    SIGMOIDOSCOPY FLEXIBLE N/A 8/15/2014    Procedure: SIGMOIDOSCOPY FLEXIBLE;  Surgeon: Margaret Gamble MD;  [facility]    SIGMOIDOSCOPY FLEXIBLE N/A 8/22/2014    Procedure: SIGMOIDOSCOPY FLEXIBLE;  Surgeon: Jose Roberto Cervantes MD;  [facility]    SIGMOIDOSCOPY FLEXIBLE N/A 8/11/2014    Procedure: SIGMOIDOSCOPY FLEXIBLE;  Surgeon: Margaret Gamble MD;  [facility]    SIGMOIDOSCOPY FLEXIBLE N/A 8/26/2014    Procedure: SIGMOIDOSCOPY FLEXIBLE;  Surgeon: Margaret Gamble MD;  [facility]    SIGMOIDOSCOPY FLEXIBLE N/A 8/29/2014    Procedure: SIGMOIDOSCOPY FLEXIBLE;  Surgeon: Margaret Gamble MD;  [facility]    SIGMOIDOSCOPY FLEXIBLE N/A 9/8/2014    Procedure: SIGMOIDOSCOPY FLEXIBLE;  Surgeon: Margaret Gamble MD;  [facility]    SIGMOIDOSCOPY FLEXIBLE N/A 9/2/2014    Procedure: SIGMOIDOSCOPY FLEXIBLE;  Surgeon: Breanna Kline MD;  [facility]    SIGMOIDOSCOPY FLEXIBLE N/A 9/11/2014    Procedure: SIGMOIDOSCOPY FLEXIBLE;  Surgeon: Margaret Gamble MD;  [facility]    SIGMOIDOSCOPY FLEXIBLE N/A 9/19/2014    Procedure: SIGMOIDOSCOPY FLEXIBLE;  Surgeon: Margaret Gamble MD;  [facility]    SIGMOIDOSCOPY FLEXIBLE N/A 9/15/2014    Procedure: SIGMOIDOSCOPY FLEXIBLE;  Surgeon: Margaret Gamble MD;  [facility]    SIGMOIDOSCOPY FLEXIBLE N/A 9/5/2014    Procedure: SIGMOIDOSCOPY FLEXIBLE;  Surgeon: Margaret Gamble MD;  [facility]    SIGMOIDOSCOPY FLEXIBLE N/A 9/23/2014    Procedure: SIGMOIDOSCOPY FLEXIBLE;  Surgeon: Margaret Gamble MD;  [facility]    SIGMOIDOSCOPY FLEXIBLE N/A 9/26/2014     Procedure: SIGMOIDOSCOPY FLEXIBLE;  Surgeon: Margaret Gamble MD;  [facility]    SIGMOIDOSCOPY FLEXIBLE N/A 2014    Procedure: SIGMOIDOSCOPY FLEXIBLE;  Surgeon: Margaret Gamble MD;  [facility]    SIGMOIDOSCOPY FLEXIBLE N/A 10/3/2014    Procedure: SIGMOIDOSCOPY FLEXIBLE;  Surgeon: Margaret Gamble MD;  [facility]    SIGMOIDOSCOPY FLEXIBLE N/A 10/30/2014    Procedure: SIGMOIDOSCOPY FLEXIBLE;  Surgeon: Margaret Gamble MD;  [facility]    SIGMOIDOSCOPY FLEXIBLE, PLACEMENT OF DRAINAGE SPONGE  14    TAKEDOWN ILEOSTOMY N/A 2015    Procedure: TAKEDOWN ILEOSTOMY;  Surgeon: Margaret Gamble MD;  [facility]        Family History   Problem Relation Age of Onset    Diabetes Mother     Hypertension Mother     Cancer Father     Cancer Maternal Grandmother     Alzheimer Disease Maternal Grandmother     Cardiovascular Paternal Grandmother     Breast Cancer Sister     Colon Cancer No family hx of     Colon Polyps No family hx of     Crohn's Disease No family hx of     Ulcerative Colitis No family hx of     Anesthesia Reaction No family hx of     Melanoma No family hx of        Social History     Socioeconomic History    Marital status:      Spouse name: Not on file    Number of children: Not on file    Years of education: Not on file    Highest education level: Not on file   Occupational History     Employer: RETIRED     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 No   Social History Narrative    Not on file     Social  Determinants of Health     Financial Resource Strain: Not on file   Food Insecurity: Not on file   Transportation Needs: Not on file   Physical Activity: Not on file   Stress: Not on file   Social Connections: Not on file   Intimate Partner Violence: Not on file   Housing Stability: Not on file       Outpatient Encounter Medications as of 7/11/2023   Medication Sig Dispense Refill    amLODIPine (NORVASC) 10 MG tablet TAKE ONE TABLET BY MOUTH ONCE DAILY 90 tablet 0    atorvastatin (LIPITOR) 20 MG tablet Take 1 tablet (20 mg) by mouth daily 90 tablet 3    blood glucose (ACCU-CHEK REGIS PLUS) test strip USE TO TEST BLOOD SUGAR THREE TIMES A DAY OR AS DIRECTED 300 strip 3    blood glucose monitoring (NO BRAND SPECIFIED) meter device kit Use to test blood sugar 3 times daily or as directed. 1 kit 0    Cholecalciferol (VITAMIN D-3) 1000 units CAPS Take 1 capsule by mouth daily       finasteride (PROSCAR) 5 MG tablet TAKE ONE TABLET BY MOUTH ONCE DAILY 90 tablet 1    insulin glargine (LANTUS SOLOSTAR) 100 UNIT/ML pen INJECT 16 UNITS UNDER THE SKIN AT BEDTIME 30 mL 2    insulin lispro (HUMALOG KWIKPEN) 100 UNIT/ML (1 unit dial) KWIKPEN USE 8 UNITS  PLUS LOW SLIDING SCALE BEFORE EACH MEAL. MAX 50 UNITS PER DAY. 60 mL 1    insulin pen needle (ULTICARE MINI) 31G X 6 MM miscellaneous USE 4 TO 5 TIMES DAILY OR AS DIRECTED FOR SLIDING SCALE INSULIN 500 each 1    losartan (COZAAR) 100 MG tablet Take 1 tablet (100 mg) by mouth daily 90 tablet 1    metoprolol succinate ER (TOPROL XL) 100 MG 24 hr tablet Take 1 tablet (100 mg) by mouth daily 90 tablet 1    tamsulosin (FLOMAX) 0.4 MG capsule Take 1 capsule (0.4 mg) by mouth daily Office visit due in October: [phone] 90 capsule 0    trospium (SANCTURA) 20 MG tablet Take 1 tablet every evening 90 tablet 1     No facility-administered encounter medications on file as of 7/11/2023.             O:   NAD, WDWN, Alert & Oriented, Mood & Affect wnl, Vitals stable   Here today alone   BP  130/71   Pulse 58    General appearance normal   Vitals stable   Alert, oriented and in no acute distress     R PA cheek gritty scaly papule   L helix 5mm pink pearly papule       Eyes: Conjunctivae/lids:Normal     ENT: Lips, buccal mucosa, tongue: normal    MSK:Normal    Cardiovascular: peripheral edema none    Pulm: Breathing Normal    Neuro/Psych: Orientation:Alert and Orientedx3 ; Mood/Affect:normal       A/P:  R PA cheek actinic keratosis   LN2:  Treated with LN2 for 5s for 1-2 cycles. Warned risks of blistering, pain, pigment change, scarring, and incomplete resolution.  Advised patient to return if lesions do not completely resolve.  Wound care sheet given.  2. L helix basal cell carcinoma   MOHS:   Location    The rationale for Mohs surgery was discussed with the patient and consent was obtained.  The risks and benefits as well as alternatives to therapy were discussed, in detail.  Specifically, the risks of infection, scarring, bleeding, prolonged wound healing, incomplete removal, allergy to anesthesia, nerve injury and recurrence were addressed.  Indication for Mohs was Location. Prior to the procedure, the treatment site was clearly identified and, if available, confirmed with previous photos and confirmed by the patient   All components of the Universal Protocol/PAUSE rule were completed.  The Mohs surgeon operated in two distinct and integrated capacities as the surgeon and pathologist.      The area was prepped with Betasept.  A rim of normal appearing skin was marked circumferentially around the lesion.  The area was infiltrated with local anesthesia.  The tumor was first debulked to remove all clinically apparent tumor.  An incision following the standard Mohs approach was done and the specimen was oriented,mapped and placed in 3 block(s).  Each specimen was then chromacoded and processed in the Mohs laboratory using standard Mohs technique and submitted for frozen section histology.  Frozen  section analysis showed residual tumor but CLEAR MARGINS.    1st stage:Orthokeratosis of epidermis with a proliferation of nests of basaloid cells, with peripheral palisading and a haphazard arrangement in the center extending into the dermis, forming nodules.  The tumor cells have hyperchromatic nuclei. Poor cytoplasm and intercellular bridging.      The tumor was excised using standard Mohs technique in 2 stages(s).  CLEAR MARGINS OBTAINED and Final defect size was 1.5cm.     We discussed the options for wound management in full with the patient including risks/benefits/ possible outcomes.    REPAIR FTSG FROM POSTAURICULAR: Because of the full-thickness nature of the defect and to avoid distortion, a full-thickness skin graft was planned. After LEC anesthesia and prep, a template was made of the defect and the graft was harvested from the ipsilateral post-auricular sulcus.  The graft was defatted and trimmed to fit the defect. It was sutured into place with Fast Absorbing Plain Gut suture and a taped Bolster dressing was applied.    The donor site was converted to a fusiform defect, and after hemostasis, was closed with subcutaneous stitches using Vicryl sutures.   EBL minimal; complications none; wound care routine.  The patient was discharged in good condition and will return on one week  for wound evaluation.  It was a pleasure speaking to Storm Dutta today.  Previous clinic notes and pertinent laboratory tests were reviewed prior to Storm Dutta's visit.  Signs and Symptoms of skin cancer discussed with patient.  Patient encouraged to perform monthly skin exams.  UV precautions reviewed with patient.  Risks of non-melanoma skin cancer discussed with patient   Return to clinic 6 months

## 2023-07-14 ENCOUNTER — TRANSFERRED RECORDS (OUTPATIENT)
Dept: HEALTH INFORMATION MANAGEMENT | Facility: CLINIC | Age: 83
End: 2023-07-14
Payer: COMMERCIAL

## 2023-07-14 LAB — RETINOPATHY: POSITIVE

## 2023-07-15 ENCOUNTER — HEALTH MAINTENANCE LETTER (OUTPATIENT)
Age: 83
End: 2023-07-15

## 2023-07-18 ENCOUNTER — ALLIED HEALTH/NURSE VISIT (OUTPATIENT)
Dept: DERMATOLOGY | Facility: CLINIC | Age: 83
End: 2023-07-18
Payer: COMMERCIAL

## 2023-07-18 DIAGNOSIS — Z48.01 ENCOUNTER FOR CHANGE OR REMOVAL OF SURGICAL WOUND DRESSING: Primary | ICD-10-CM

## 2023-07-18 PROCEDURE — 99207 PR NO CHARGE NURSE ONLY: CPT

## 2023-07-18 NOTE — PATIENT INSTRUCTIONS
ONE WEEK DRESSING CHANGE  for  SKIN GRAFTS  The following information has been compiled to offer you assistance with the dressing change or wound evaluation. Please feel free to call our office to speak with one of the nurses if you have any questions or concerns about the progress of the wound healing process especially if there are any signs of graft necrosis or infection. We will be happy to answer any questions you might have.                                                               AFTER 24 HOURS YOU SHOULD REMOVE THE BANDAGE AND BEGIN DAILY DRESSING CHANGES AS FOLLOWS:     1) Remove Dressing.     2) Clean and dry the area with tap water using a Q-tip or sterile gauze pad.     3) Apply Vaseline, Polysporin ointment, Aquaphor or Bacitracin ointment over entire wound.  Do NOT use Neosporin ointment.     4) Cover the wound with a band-aid, or a sterile non-stick gauze pad and micropore paper tape      REPEAT THESE INSTRUCTIONS AT LEAST ONCE A DAY UNTIL THE WOUND HAS COMPLETELY HEALED. DO NOT LET THE WOUND SCAB OVER.    It is an old wives tale that a wound heals better when it is exposed to air and allowed to dry out. The wound will heal faster with a better cosmetic result if it is kept moist with ointment and covered with a bandage.       Massaging the wound site hastens the healing process by softening the scar tissue and fading the scar. Begin massaging the area one month after surgery as often as possible. Continue to massage the area for 2-3 months or until you feel the scar tissue has softened. Moisturizers can be used during the massaging but are not necessary. Ultimately it takes six months for the graft to heal and blend into the surrounding skin.

## 2023-07-18 NOTE — PROGRESS NOTES
Storm Dutta comes into clinic today at the request of Dr. Neal Ordering Provider for Wound Check Action taken: See Below.    This service provided today was under the supervising provider of the day Dr. Neal, who was available if needed.    Pt returned to clinic for post surgery 1 week follow up bandage change. Pt has no complaints, denies pain. Bandage removed from from left ear, area cleansed with normal saline. Site is healing and wound edges approximating well. Applied Aquaphor and bandage.    Advised to watch for signs/sx of infection; spreading redness, drainage, odor, fever. Call or report promptly to clinic. Pt given written instructions and informed to rtc as needed. Patient verbalized understanding.     Larisa GONZALES,  CMA

## 2023-07-24 DIAGNOSIS — E78.5 HYPERLIPIDEMIA LDL GOAL <70: ICD-10-CM

## 2023-07-24 RX ORDER — ATORVASTATIN CALCIUM 20 MG/1
TABLET, FILM COATED ORAL
Qty: 90 TABLET | Refills: 0 | Status: SHIPPED | OUTPATIENT
Start: 2023-07-24 | End: 2023-07-31

## 2023-07-24 NOTE — TELEPHONE ENCOUNTER
"Prescription approved per [facility] Refill Protocol.     Requested Prescriptions   Pending Prescriptions Disp Refills    atorvastatin (LIPITOR) 20 MG tablet [Pharmacy Med Name: ATORVASTATIN CALCIUM 20MG TABS] 90 tablet 3     Sig: TAKE ONE TABLET BY MOUTH ONCE DAILY       Statins Protocol Passed - 7/24/2023  9:29 AM        Passed - LDL on file in past 12 months     Recent Labs   Lab Test 02/24/23  0947   LDL 56             Passed - No abnormal creatine kinase in past 12 months     No lab results found.             Passed - Recent (12 mo) or future (30 days) visit within the authorizing provider's specialty     Patient has had an office visit with the authorizing provider or a provider within the authorizing providers department within the previous 12 mos or has a future within next 30 days. See \"Patient Info\" tab in inbasket, or \"Choose Columns\" in Meds & Orders section of the refill encounter.              Passed - Medication is active on med list        Passed - Patient is age 18 or older                 Khadijah Degroot RN 07/24/23 3:48 PM   "

## 2023-07-24 NOTE — PROGRESS NOTES
CARDIOLOGY VISIT    REASON FOR VISIT: CAD, PVCs    SUBJECTIVE:  82-year-old male seen for CAD and PVC's. He has peripheral vascular disease, hypertension, dyslipidemia, history of colorectal cancer, CKD, diabetes type 2, small bowel obstruction.     Echo September 2021 showed EF 60%, normal RV, no valve disease.  Nuclear stress showed small area of ischemia of the apical segment.     Angiogram November 2021 showed 75% mid LAD, moderate distal LAD, mild to moderate disease of the circumflex, 100% proximal to mid RCA occlusion with right to right collaterals, also left to right collaterals.  Medical management recommended.    Holter 11/2022 showed sinus, 7% PVC's.    He has been doing well recently.  He is quite active with his [employer] shop.  He does some light walking and works on his 2 acre property.  He denies chest pain, shortness of breath, or edema.    MEDICATIONS:  Current Outpatient Medications   Medication    amLODIPine (NORVASC) 10 MG tablet    atorvastatin (LIPITOR) 20 MG tablet    blood glucose (ACCU-CHEK REGIS PLUS) test strip    blood glucose monitoring (NO BRAND SPECIFIED) meter device kit    Cholecalciferol (VITAMIN D-3) 1000 units CAPS    finasteride (PROSCAR) 5 MG tablet    insulin glargine (LANTUS SOLOSTAR) 100 UNIT/ML pen    insulin lispro (HUMALOG KWIKPEN) 100 UNIT/ML (1 unit dial) KWIKPEN    insulin pen needle (ULTICARE MINI) 31G X 6 MM miscellaneous    losartan (COZAAR) 100 MG tablet    metoprolol succinate ER (TOPROL XL) 100 MG 24 hr tablet    tamsulosin (FLOMAX) 0.4 MG capsule    trospium (SANCTURA) 20 MG tablet     No current facility-administered medications for this visit.       ALLERGIES:  Allergies   Allergen Reactions    Nkda [No Known Drug Allergy]        REVIEW OF SYSTEMS:  Constitutional:  No weight loss, fever, chills  HEENT:  Eyes:  No visual loss, blurred vision, double vision or yellow sclerae. No hearing loss, sneezing, congestion, runny nose or sore throat.  Skin:   "No rash or itching.  Cardiovascular: per HPI  Respiratory: per HPI  GI:  No anorexia, nausea, vomiting or diarrhea. No abdominal pain or blood.  :  No dysurea, hematuria  Neurologic:  No headache, paralysis, ataxia, numbness or tingling in the extremities. No change in bowel or bladder control.  Musculoskeletal:  No muscle pain  Hematologic:  No bleeding or bruising.  Lymphatics:  No enlarged nodes. No history of splenectomy.  Endocrine:  No reports of sweating, cold or heat intolerance. No polyuria or polydipsia.  Allergies:  No history of asthma, hives, eczema or rhinitis.    PHYSICAL EXAM:  /66   Pulse 61   Ht 1.727 m (5' 8\")   Wt 91.6 kg (202 lb)   SpO2 97%   BMI 30.71 kg/m        Constitutional: awake, alert, no distress  Eyes: PERRL, sclera nonicteric  ENT: trachea midline  Respiratory: Lungs clear  Cardiovascular: Regular rate and rhythm, no murmurs  GI: nondistended, nontender, bowel sounds present  Lymph/Hematologic: no lymphadenopathy  Skin: dry, no rash  Musculoskeletal: good muscle tone, strength 5/5 in upper and lower extremities  Neurologic: no focal deficits  Neuropsychiatric: appropriate affact    DATA:  Lab: February 2023: Creatinine 1.4  Recent Labs   Lab Test 02/24/23  0947 01/06/22  0927 08/04/16  1259 07/16/15  1027   CHOL 126 125   < > 124   HDL 54 60   < > 45   LDL 56 51   < > 61   TRIG 81 71   < > 91   CHOLHDLRATIO  --   --   --  2.8    < > = values in this interval not displayed.     ASSESSMENT:  82-year-old male seen for CAD and PVCs.  He is doing well and has no concerning cardiac symptoms.  Lipids and blood pressure are well controlled.  He will continue with medical therapy.    RECOMMENDATIONS:  1.  CAD  -Continue medical therapy    2.  PVCs, asymptomatic  -No further evaluation    Follow-up in 1 year with SANJANA.    Dwaine Reilly MD  Cardiology - [facility]  Pager:  [phone]  Text Page  July 25, 2023      "

## 2023-07-25 ENCOUNTER — OFFICE VISIT (OUTPATIENT)
Dept: CARDIOLOGY | Facility: CLINIC | Age: 83
End: 2023-07-25
Payer: COMMERCIAL

## 2023-07-25 VITALS
HEART RATE: 61 BPM | WEIGHT: 202 LBS | DIASTOLIC BLOOD PRESSURE: 66 MMHG | BODY MASS INDEX: 30.62 KG/M2 | HEIGHT: 68 IN | SYSTOLIC BLOOD PRESSURE: 132 MMHG | OXYGEN SATURATION: 97 %

## 2023-07-25 DIAGNOSIS — E78.5 HYPERLIPIDEMIA LDL GOAL <70: ICD-10-CM

## 2023-07-25 DIAGNOSIS — I10 BENIGN ESSENTIAL HYPERTENSION: ICD-10-CM

## 2023-07-25 DIAGNOSIS — R00.1 BRADYCARDIA: ICD-10-CM

## 2023-07-25 DIAGNOSIS — I49.9 IRREGULAR HEART BEAT: ICD-10-CM

## 2023-07-25 DIAGNOSIS — I25.10 CORONARY ARTERY DISEASE INVOLVING NATIVE CORONARY ARTERY OF NATIVE HEART WITHOUT ANGINA PECTORIS: ICD-10-CM

## 2023-07-25 PROCEDURE — 99214 OFFICE O/P EST MOD 30 MIN: CPT | Performed by: INTERNAL MEDICINE

## 2023-07-25 NOTE — LETTER
7/25/2023    Jovana Nichol Christofer,   [address] [place] [address]    RE: Storm Dutta       Dear Colleague,     I had the pleasure of seeing Storm Dutta in the [facility].  CARDIOLOGY VISIT    REASON FOR VISIT: CAD, PVCs    SUBJECTIVE:  82-year-old male seen for CAD and PVC's. He has peripheral vascular disease, hypertension, dyslipidemia, history of colorectal cancer, CKD, diabetes type 2, small bowel obstruction.     Echo September 2021 showed EF 60%, normal RV, no valve disease.  Nuclear stress showed small area of ischemia of the apical segment.     Angiogram November 2021 showed 75% mid LAD, moderate distal LAD, mild to moderate disease of the circumflex, 100% proximal to mid RCA occlusion with right to right collaterals, also left to right collaterals.  Medical management recommended.    Holter 11/2022 showed sinus, 7% PVC's.    He has been doing well recently.  He is quite active with his [employer].  He does some light walking and works on his 2 acre property.  He denies chest pain, shortness of breath, or edema.    MEDICATIONS:  Current Outpatient Medications   Medication    amLODIPine (NORVASC) 10 MG tablet    atorvastatin (LIPITOR) 20 MG tablet    blood glucose (ACCU-CHEK REGIS PLUS) test strip    blood glucose monitoring (NO BRAND SPECIFIED) meter device kit    Cholecalciferol (VITAMIN D-3) 1000 units CAPS    finasteride (PROSCAR) 5 MG tablet    insulin glargine (LANTUS SOLOSTAR) 100 UNIT/ML pen    insulin lispro (HUMALOG KWIKPEN) 100 UNIT/ML (1 unit dial) KWIKPEN    insulin pen needle (ULTICARE MINI) 31G X 6 MM miscellaneous    losartan (COZAAR) 100 MG tablet    metoprolol succinate ER (TOPROL XL) 100 MG 24 hr tablet    tamsulosin (FLOMAX) 0.4 MG capsule    trospium (SANCTURA) 20 MG tablet     No current facility-administered medications for this visit.       ALLERGIES:  Allergies   Allergen Reactions    Nkda [No Known Drug Allergy]        REVIEW OF  "SYSTEMS:  Constitutional:  No weight loss, fever, chills  HEENT:  Eyes:  No visual loss, blurred vision, double vision or yellow sclerae. No hearing loss, sneezing, congestion, runny nose or sore throat.  Skin:  No rash or itching.  Cardiovascular: per HPI  Respiratory: per HPI  GI:  No anorexia, nausea, vomiting or diarrhea. No abdominal pain or blood.  :  No dysurea, hematuria  Neurologic:  No headache, paralysis, ataxia, numbness or tingling in the extremities. No change in bowel or bladder control.  Musculoskeletal:  No muscle pain  Hematologic:  No bleeding or bruising.  Lymphatics:  No enlarged nodes. No history of splenectomy.  Endocrine:  No reports of sweating, cold or heat intolerance. No polyuria or polydipsia.  Allergies:  No history of asthma, hives, eczema or rhinitis.    PHYSICAL EXAM:  /66   Pulse 61   Ht 1.727 m (5' 8\")   Wt 91.6 kg (202 lb)   SpO2 97%   BMI 30.71 kg/m        Constitutional: awake, alert, no distress  Eyes: PERRL, sclera nonicteric  ENT: trachea midline  Respiratory: Lungs clear  Cardiovascular: Regular rate and rhythm, no murmurs  GI: nondistended, nontender, bowel sounds present  Lymph/Hematologic: no lymphadenopathy  Skin: dry, no rash  Musculoskeletal: good muscle tone, strength 5/5 in upper and lower extremities  Neurologic: no focal deficits  Neuropsychiatric: appropriate affact    DATA:  Lab: February 2023: Creatinine 1.4  Recent Labs   Lab Test 02/24/23  0947 01/06/22  0927 08/04/16  1259 07/16/15  1027   CHOL 126 125   < > 124   HDL 54 60   < > 45   LDL 56 51   < > 61   TRIG 81 71   < > 91   CHOLHDLRATIO  --   --   --  2.8    < > = values in this interval not displayed.     ASSESSMENT:  82-year-old male seen for CAD and PVCs.  He is doing well and has no concerning cardiac symptoms.  Lipids and blood pressure are well controlled.  He will continue with medical therapy.    RECOMMENDATIONS:  1.  CAD  -Continue medical therapy    2.  PVCs, asymptomatic  -No " further evaluation    Follow-up in 1 year with SANJANA.    Dwaine Reilly MD  Cardiology - [facility]  Pager:  [phone]  Text Page  July 25, 2023      Thank you for allowing me to participate in the care of your patient.      Sincerely,     Dwaine Reilly MD     [facility]  cc:   Rosio Fierro, APRN CNP  [address]  [place]  [place] [address]

## 2023-07-27 ASSESSMENT — ENCOUNTER SYMPTOMS
HEMATOCHEZIA: 0
PARESTHESIAS: 0
NERVOUS/ANXIOUS: 0
DIARRHEA: 0
SORE THROAT: 0
HEARTBURN: 0
FREQUENCY: 1
FEVER: 0
JOINT SWELLING: 0
DYSURIA: 0
CONSTIPATION: 0
HEADACHES: 0
NAUSEA: 0
ABDOMINAL PAIN: 0
EYE PAIN: 0
WEAKNESS: 0
COUGH: 0
HEMATURIA: 0
SHORTNESS OF BREATH: 0
PALPITATIONS: 0
DIZZINESS: 0
MYALGIAS: 0
ARTHRALGIAS: 0
CHILLS: 0

## 2023-07-27 ASSESSMENT — ACTIVITIES OF DAILY LIVING (ADL): CURRENT_FUNCTION: NO ASSISTANCE NEEDED

## 2023-07-31 ENCOUNTER — OFFICE VISIT (OUTPATIENT)
Dept: FAMILY MEDICINE | Facility: CLINIC | Age: 83
End: 2023-07-31
Payer: COMMERCIAL

## 2023-07-31 VITALS
TEMPERATURE: 97 F | DIASTOLIC BLOOD PRESSURE: 52 MMHG | BODY MASS INDEX: 30.6 KG/M2 | SYSTOLIC BLOOD PRESSURE: 128 MMHG | WEIGHT: 201.9 LBS | HEIGHT: 68 IN | OXYGEN SATURATION: 95 % | HEART RATE: 58 BPM | RESPIRATION RATE: 18 BRPM

## 2023-07-31 DIAGNOSIS — E78.5 HYPERLIPIDEMIA LDL GOAL <70: ICD-10-CM

## 2023-07-31 DIAGNOSIS — I10 HYPERTENSION GOAL BP (BLOOD PRESSURE) < 140/90: ICD-10-CM

## 2023-07-31 DIAGNOSIS — E11.42 TYPE 2 DIABETES MELLITUS WITH DIABETIC POLYNEUROPATHY, WITH LONG-TERM CURRENT USE OF INSULIN (H): ICD-10-CM

## 2023-07-31 DIAGNOSIS — N18.31 STAGE 3A CHRONIC KIDNEY DISEASE (H): ICD-10-CM

## 2023-07-31 DIAGNOSIS — Z79.4 TYPE 2 DIABETES MELLITUS WITH DIABETIC POLYNEUROPATHY, WITH LONG-TERM CURRENT USE OF INSULIN (H): ICD-10-CM

## 2023-07-31 DIAGNOSIS — Z00.00 ENCOUNTER FOR MEDICARE ANNUAL WELLNESS EXAM: Primary | ICD-10-CM

## 2023-07-31 LAB
ANION GAP SERPL CALCULATED.3IONS-SCNC: 9 MMOL/L (ref 7–15)
BUN SERPL-MCNC: 23.1 MG/DL (ref 8–23)
CALCIUM SERPL-MCNC: 9.5 MG/DL (ref 8.8–10.2)
CHLORIDE SERPL-SCNC: 105 MMOL/L (ref 98–107)
CREAT SERPL-MCNC: 1.34 MG/DL (ref 0.67–1.17)
CREAT UR-MCNC: 75.5 MG/DL
DEPRECATED HCO3 PLAS-SCNC: 26 MMOL/L (ref 22–29)
GFR SERPL CREATININE-BSD FRML MDRD: 53 ML/MIN/1.73M2
GLUCOSE SERPL-MCNC: 145 MG/DL (ref 70–99)
HBA1C MFR BLD: 6.8 % (ref 0–5.6)
HGB BLD-MCNC: 11.8 G/DL (ref 13.3–17.7)
MICROALBUMIN UR-MCNC: 32 MG/L
MICROALBUMIN/CREAT UR: 42.38 MG/G CR (ref 0–17)
POTASSIUM SERPL-SCNC: 4.8 MMOL/L (ref 3.4–5.3)
SODIUM SERPL-SCNC: 140 MMOL/L (ref 136–145)

## 2023-07-31 PROCEDURE — G0438 PPPS, INITIAL VISIT: HCPCS | Performed by: FAMILY MEDICINE

## 2023-07-31 PROCEDURE — 80048 BASIC METABOLIC PNL TOTAL CA: CPT | Performed by: FAMILY MEDICINE

## 2023-07-31 PROCEDURE — 82570 ASSAY OF URINE CREATININE: CPT | Performed by: FAMILY MEDICINE

## 2023-07-31 PROCEDURE — 36415 COLL VENOUS BLD VENIPUNCTURE: CPT | Performed by: FAMILY MEDICINE

## 2023-07-31 PROCEDURE — 99207 PR FOOT EXAM NO CHARGE: CPT | Performed by: FAMILY MEDICINE

## 2023-07-31 PROCEDURE — 82043 UR ALBUMIN QUANTITATIVE: CPT | Performed by: FAMILY MEDICINE

## 2023-07-31 PROCEDURE — 83036 HEMOGLOBIN GLYCOSYLATED A1C: CPT | Performed by: FAMILY MEDICINE

## 2023-07-31 PROCEDURE — 99214 OFFICE O/P EST MOD 30 MIN: CPT | Mod: 25 | Performed by: FAMILY MEDICINE

## 2023-07-31 PROCEDURE — 85018 HEMOGLOBIN: CPT | Performed by: FAMILY MEDICINE

## 2023-07-31 RX ORDER — LOSARTAN POTASSIUM 100 MG/1
100 TABLET ORAL DAILY
Qty: 90 TABLET | Refills: 1 | Status: SHIPPED | OUTPATIENT
Start: 2023-07-31 | End: 2024-03-05

## 2023-07-31 RX ORDER — AMLODIPINE BESYLATE 10 MG/1
10 TABLET ORAL DAILY
Qty: 90 TABLET | Refills: 1 | Status: SHIPPED | OUTPATIENT
Start: 2023-07-31 | End: 2024-03-05

## 2023-07-31 RX ORDER — ATORVASTATIN CALCIUM 20 MG/1
20 TABLET, FILM COATED ORAL DAILY
Qty: 90 TABLET | Refills: 3 | Status: SHIPPED | OUTPATIENT
Start: 2023-07-31

## 2023-07-31 RX ORDER — INSULIN LISPRO 100 [IU]/ML
INJECTION, SOLUTION INTRAVENOUS; SUBCUTANEOUS
Qty: 60 ML | Refills: 1 | Status: SHIPPED | OUTPATIENT
Start: 2023-07-31 | End: 2024-03-05

## 2023-07-31 RX ORDER — INSULIN GLARGINE 100 [IU]/ML
INJECTION, SOLUTION SUBCUTANEOUS
Qty: 30 ML | Refills: 2 | Status: SHIPPED | OUTPATIENT
Start: 2023-07-31 | End: 2024-03-05

## 2023-07-31 ASSESSMENT — ENCOUNTER SYMPTOMS
FREQUENCY: 1
SHORTNESS OF BREATH: 0
HEMATOCHEZIA: 0
SORE THROAT: 0
WEAKNESS: 0
PALPITATIONS: 0
FEVER: 0
CONSTIPATION: 0
PARESTHESIAS: 0
DYSURIA: 0
JOINT SWELLING: 0
ABDOMINAL PAIN: 0
DIZZINESS: 0
MYALGIAS: 0
COUGH: 0
ARTHRALGIAS: 0
NAUSEA: 0
NERVOUS/ANXIOUS: 0
HEMATURIA: 0
EYE PAIN: 0
HEARTBURN: 0
HEADACHES: 0
CHILLS: 0
DIARRHEA: 0

## 2023-07-31 ASSESSMENT — ACTIVITIES OF DAILY LIVING (ADL): CURRENT_FUNCTION: NO ASSISTANCE NEEDED

## 2023-07-31 NOTE — PATIENT INSTRUCTIONS
You are due for your urology visit in October.  Please reach out to them to schedule.    Preventive Health Recommendations  See your health care provider every year to  Review health changes.   Discuss preventive care.    Review your medicines if your doctor has prescribed any.  Talk with your health care provider about whether you should have a test to screen for prostate cancer (PSA).  Every 3 years, have a diabetes test (fasting glucose). If you are at risk for diabetes, you should have this test more often.  Every 5 years, have a cholesterol test. Have this test more often if you are at risk for high cholesterol or heart disease.   Every 10 years, have a colonoscopy. Or, have a yearly FIT test (stool test). These exams will check for colon cancer.  Talk to with your health care provider about screening for Abdominal Aortic Aneurysm if you have a family history of AAA or have a history of smoking.    Shots:   Get a flu shot each year.   Get a tetanus shot every 10 years.   Talk to your doctor about your pneumonia vaccines. There are now two you should receive - Pneumovax (PPSV 23) and Prevnar (PCV 13).  Talk to your pharmacist about a shingles vaccine.   Talk to your doctor about the hepatitis B vaccine.    Nutrition:   Eat at least 5 servings of fruits and vegetables each day.   Eat whole-grain bread, whole-wheat pasta and brown rice instead of white grains and rice.   Get adequate Calcium and Vitamin D.     Lifestyle  Exercise for at least 150 minutes a week (30 minutes a day, 5 days a week). This will help you control your weight and prevent disease.   Limit alcohol to one drink per day.   No smoking.   Wear sunscreen to prevent skin cancer.   See your dentist every six months for an exam and cleaning.   See your eye doctor every 1 to 2 years to screen for conditions such as glaucoma, macular degeneration and cataracts.

## 2023-07-31 NOTE — PROGRESS NOTES
"SUBJECTIVE:   Lamonte is a 82 year old who presents for Preventive Visit.      Are you in the first 12 months of your Medicare coverage?  No    Healthy Habits:     In general, how would you rate your overall health?  Excellent    Frequency of exercise:  None    Do you usually eat at least 4 servings of fruit and vegetables a day, include whole grains    & fiber and avoid regularly eating high fat or \"junk\" foods?  No    Ability to successfully perform activities of daily living:  No assistance needed    Home Safety:  No safety concerns identified    Hearing Impairment:  No hearing concerns    In the past 6 months, have you been bothered by leaking of urine? Yes    In general, how would you rate your overall mental or emotional health?  Good        Have you ever done Advance Care Planning? (For example, a Health Directive, POLST, or a discussion with a medical provider or your loved ones about your wishes): Yes, advance care planning is on file.       Fall risk  Fallen 2 or more times in the past year?: No  Any fall with injury in the past year?: No    Cognitive Screening   1) Repeat 3 items (Leader, Season, Table)    2) Clock draw: NORMAL  3) 3 item recall: Recalls 2 objects   Results: NORMAL clock, 1-2 items recalled: COGNITIVE IMPAIRMENT LESS LIKELY    Mini-CogTM Copyright S [employer]. Licensed by the author for use in [facility]; reprinted with permission (gustavo@[facility]). All rights reserved.          Reviewed and updated as needed this visit by clinical staff   Tobacco  Allergies  Meds              Reviewed and updated as needed this visit by Provider   Tobacco  Allergies  Meds             Social History     Tobacco Use    Smoking status: Former     Types: Cigars    Smokeless tobacco: Never   Substance Use Topics    Alcohol use: Not Currently             7/27/2023     9:19 AM   Alcohol Use   Prescreen: >3 drinks/day or >7 drinks/week? Not Applicable     Do you have a current opioid prescription? " No  Do you use any other controlled substances or medications that are not prescribed by a provider? None        Current providers sharing in care for this patient include:   Patient Care Team:  Jovana Beaulieu DO as PCP - General (Family Practice)  Margaret Gamble MD as MD (Colon and Rectal Surgery)  Khadijah Hernandez, RN as Specialty Care Coordinator (Oncology)  Jovana Beaulieu DO as Assigned PCP  Ana Gray PA-C as Assigned Surgical Provider  Rosio Fierro APRN CNP as Nurse Practitioner (Cardiovascular Disease)  Rosio Fierro APRN CNP as Assigned Heart and Vascular Provider  Dwaine Reilly MD as MD (Cardiovascular Disease)  Ashlee Harris PA-C as Physician Assistant (Dermatology)    The following health maintenance items are reviewed in Epic and correct as of today:  Health Maintenance   Topic Date Due    ANNUAL REVIEW OF HM ORDERS  Never done    ZOSTER IMMUNIZATION (1 of 2) Never done    MICROALBUMIN  01/22/2022    COVID-19 Vaccine (6 - Moderna series) 05/10/2023    MEDICARE ANNUAL WELLNESS VISIT  06/03/2023    INFLUENZA VACCINE (1) 09/01/2023    A1C  01/31/2024    BMP  02/24/2024    LIPID  02/24/2024    EYE EXAM  07/14/2024    DIABETIC FOOT EXAM  07/31/2024    FALL RISK ASSESSMENT  07/31/2024    HEMOGLOBIN  07/31/2024    COLORECTAL CANCER SCREENING  10/11/2025    ADVANCE CARE PLANNING  07/31/2028    DTAP/TDAP/TD IMMUNIZATION (2 - Td or Tdap) 06/03/2032    PHQ-2 (once per calendar year)  Completed    Pneumococcal Vaccine: 65+ Years  Completed    URINALYSIS  Completed    IPV IMMUNIZATION  Aged Out    MENINGITIS IMMUNIZATION  Aged Out         Review of Systems   Constitutional:  Negative for chills and fever.   HENT:  Negative for congestion, ear pain, hearing loss and sore throat.    Eyes:  Negative for pain and visual disturbance.   Respiratory:  Negative for cough and shortness of breath.    Cardiovascular:  Negative for chest pain, palpitations and  "peripheral edema.   Gastrointestinal:  Negative for abdominal pain, constipation, diarrhea, heartburn, hematochezia and nausea.   Genitourinary:  Positive for frequency, impotence and urgency. Negative for dysuria, genital sores, hematuria and penile discharge.   Musculoskeletal:  Negative for arthralgias, joint swelling and myalgias.   Skin:  Negative for rash.   Neurological:  Negative for dizziness, weakness, headaches and paresthesias.   Psychiatric/Behavioral:  Negative for mood changes. The patient is not nervous/anxious.      Diabetes control remains good.  No hypoglycemia  Takes 8 units with each meal (no sliding scale).  Takes 16 units Lantus at bedtime.  Fasting . Never below 80.  Rarely in the 80's.      OBJECTIVE:   /52   Pulse 58   Temp 97  F (36.1  C) (Tympanic)   Resp 18   Ht 1.727 m (5' 8\")   Wt 91.6 kg (201 lb 14.4 oz)   SpO2 95%   BMI 30.70 kg/m   Estimated body mass index is 30.7 kg/m  as calculated from the following:    Height as of this encounter: 1.727 m (5' 8\").    Weight as of this encounter: 91.6 kg (201 lb 14.4 oz).  Physical Exam  GENERAL: healthy, alert and no distress  EYES: Eyes grossly normal to inspection, PERRL and conjunctivae and sclerae normal  NECK: no adenopathy, no asymmetry, masses, or scars and thyroid normal to palpation  RESP: lungs clear to auscultation - no rales, rhonchi or wheezes  CV: regular rate and rhythm, normal S1 S2, no S3 or S4, no murmur, click or rub, no peripheral edema and peripheral pulses strong  MS: no gross musculoskeletal defects noted, no edema  NEURO: Normal strength and tone, mentation intact and speech normal  PSYCH: mentation appears normal, affect normal/bright  Diabetic foot exam: normal DP and PT pulses, no trophic changes or ulcerative lesions, normal sensory exam, and normal monofilament exam L foot, decreased throughout R foot.  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  Follows with podiatry    Diagnostic Test Results:  Labs " "reviewed in Epic    ASSESSMENT / PLAN:       ICD-10-CM    1. Encounter for Medicare annual wellness exam  Z00.00       2. Type 2 diabetes mellitus with diabetic polyneuropathy, with long-term current use of insulin (H)  E11.42 Hemoglobin A1c    Z79.4 Basic metabolic panel  (Ca, Cl, CO2, Creat, Gluc, K, Na, BUN)     FOOT EXAM     Hemoglobin A1c     Basic metabolic panel  (Ca, Cl, CO2, Creat, Gluc, K, Na, BUN)     Albumin Random Urine Quantitative with Creat Ratio     insulin lispro (HUMALOG KWIKPEN) 100 UNIT/ML (1 unit dial) KWIKPEN     insulin glargine (LANTUS SOLOSTAR) 100 UNIT/ML pen      3. Hypertension goal BP (blood pressure) < 140/90  I10 amLODIPine (NORVASC) 10 MG tablet     losartan (COZAAR) 100 MG tablet      4. Hyperlipidemia LDL goal <70  E78.5 atorvastatin (LIPITOR) 20 MG tablet      5. Stage 3a chronic kidney disease (H)  N18.31 Hemoglobin     Basic metabolic panel  (Ca, Cl, CO2, Creat, Gluc, K, Na, BUN)     Hemoglobin     Basic metabolic panel  (Ca, Cl, CO2, Creat, Gluc, K, Na, BUN)        DM:  well controlled, no hypoglycemia.  Continue current plan    HTN:  well controlled, continue current meds    CKD:  labs today, avoid nephrotoxins.  Mils stable anemia    Lipids:  on statin, LDL at goal      Patient has been advised of split billing requirements and indicates understanding: Yes      COUNSELING:  Reviewed preventive health counseling, as reflected in patient instructions      BMI:   Estimated body mass index is 30.7 kg/m  as calculated from the following:    Height as of this encounter: 1.727 m (5' 8\").    Weight as of this encounter: 91.6 kg (201 lb 14.4 oz).         He reports that he has quit smoking. His smoking use included cigars. He has never used smokeless tobacco.      Appropriate preventive services were discussed with this patient, including applicable screening as appropriate for cardiovascular disease, diabetes, osteopenia/osteoporosis, and glaucoma.  As appropriate for age/gender, " discussed screening for colorectal cancer, prostate cancer, breast cancer, and cervical cancer. Checklist reviewing preventive services available has been given to the patient.    Reviewed patients plan of care and provided an AVS. The Intermediate Care Plan ( asthma action plan, low back pain action plan, and migraine action plan) for Storm meets the Care Plan requirement. This Care Plan has been established and reviewed with the Patient.          Jovana Beaulieu DO  [facility]    Identified Health Risks:  I have reviewed Opioid Use Disorder and Substance Use Disorder risk factors and made any needed referrals.

## 2023-08-29 DIAGNOSIS — I49.9 IRREGULAR HEART BEAT: ICD-10-CM

## 2023-08-29 DIAGNOSIS — R35.1 NOCTURIA: ICD-10-CM

## 2023-08-29 DIAGNOSIS — I49.3 PVC'S (PREMATURE VENTRICULAR CONTRACTIONS): ICD-10-CM

## 2023-08-29 RX ORDER — METOPROLOL SUCCINATE 100 MG/1
100 TABLET, EXTENDED RELEASE ORAL DAILY
Qty: 90 TABLET | Refills: 3 | Status: SHIPPED | OUTPATIENT
Start: 2023-08-29 | End: 2024-08-28

## 2023-08-29 RX ORDER — TROSPIUM CHLORIDE 20 MG/1
TABLET, FILM COATED ORAL
Qty: 90 TABLET | Refills: 1 | OUTPATIENT
Start: 2023-08-29

## 2023-08-29 NOTE — TELEPHONE ENCOUNTER
[facility] Refill Guideline reviewed.  Medication meets criteria for refill.    Jane Camacho RN

## 2023-08-29 NOTE — TELEPHONE ENCOUNTER
Spoke to patient and informed him that he is due for follow up visit for further refills. Patient scheduled, informed to come to visit with comfortably full bladder as bladder scan may be performed. Patient informed to come to visit at 1pm even though it is scheduled for 3pm.  Patient verbalized understanding.  Xin ALVES CMA 08/29/23 3:12 PM

## 2023-08-29 NOTE — TELEPHONE ENCOUNTER
Per Ana Gray, patient needs follow up visit for further refills.    Xin ALVES CMA 08/29/23 3:01 PM

## 2023-09-01 ENCOUNTER — OFFICE VISIT (OUTPATIENT)
Dept: UROLOGY | Facility: CLINIC | Age: 83
End: 2023-09-01
Payer: COMMERCIAL

## 2023-09-01 VITALS
HEART RATE: 59 BPM | WEIGHT: 201.9 LBS | SYSTOLIC BLOOD PRESSURE: 144 MMHG | DIASTOLIC BLOOD PRESSURE: 66 MMHG | BODY MASS INDEX: 30.6 KG/M2 | TEMPERATURE: 98 F | HEIGHT: 68 IN | OXYGEN SATURATION: 98 %

## 2023-09-01 DIAGNOSIS — N40.1 HYPERTROPHY OF PROSTATE WITH URINARY OBSTRUCTION: Primary | ICD-10-CM

## 2023-09-01 DIAGNOSIS — R35.1 NOCTURIA: ICD-10-CM

## 2023-09-01 DIAGNOSIS — N13.8 HYPERTROPHY OF PROSTATE WITH URINARY OBSTRUCTION: Primary | ICD-10-CM

## 2023-09-01 PROCEDURE — 51798 US URINE CAPACITY MEASURE: CPT | Performed by: STUDENT IN AN ORGANIZED HEALTH CARE EDUCATION/TRAINING PROGRAM

## 2023-09-01 PROCEDURE — 99213 OFFICE O/P EST LOW 20 MIN: CPT | Mod: 25 | Performed by: STUDENT IN AN ORGANIZED HEALTH CARE EDUCATION/TRAINING PROGRAM

## 2023-09-01 RX ORDER — FINASTERIDE 5 MG/1
1 TABLET, FILM COATED ORAL DAILY
Qty: 90 TABLET | Refills: 3 | Status: SHIPPED | OUTPATIENT
Start: 2023-09-01 | End: 2024-09-30

## 2023-09-01 RX ORDER — TAMSULOSIN HYDROCHLORIDE 0.4 MG/1
0.4 CAPSULE ORAL DAILY
Qty: 90 CAPSULE | Refills: 3 | Status: ON HOLD | OUTPATIENT
Start: 2023-09-01 | End: 2024-08-22

## 2023-09-01 RX ORDER — TROSPIUM CHLORIDE 20 MG/1
20 TABLET, FILM COATED ORAL EVERY EVENING
Qty: 90 TABLET | Refills: 3 | Status: ON HOLD | OUTPATIENT
Start: 2023-09-01 | End: 2024-08-22

## 2023-09-01 ASSESSMENT — PAIN SCALES - GENERAL: PAINLEVEL: NO PAIN (0)

## 2023-09-01 NOTE — NURSING NOTE
"Initial BP (!) 144/66   Pulse 59   Temp 98  F (36.7  C) (Oral)   Ht 1.727 m (5' 8\")   Wt 91.6 kg (201 lb 14.4 oz)   SpO2 98%   BMI 30.70 kg/m   Estimated body mass index is 30.7 kg/m  as calculated from the following:    Height as of this encounter: 1.727 m (5' 8\").    Weight as of this encounter: 91.6 kg (201 lb 14.4 oz). .  Active order to obtain bladder scan? Yes   Name of ordering provider:  Ana Gray  Bladder scan preformed post void Yes.  Bladder scan reveled 280ML  Provider notified?  Yes    Xin Jack CMA        "

## 2023-09-01 NOTE — PROGRESS NOTES
"    UROLOGY FOLLOW-UP NOTE          Chief Complaint:   Today I had the pleasure of seeing Mr. Storm Dutta in follow-up for a chief complaint of LUTS.          Interval Update   Storm Dutta is a very pleasant 83 year old male with a history of T2 DM with CKD and polyneuropathy, HLD, HTN, and rectal cancer.     Brief History: Mr. Storm Dutta was last seen on 9/23/2022 for LUTS. He takes tamsulosin, finasteride, and trospium. He came into clinic for a nurse visit on 9/20/2022 which noted a PVR of 400 mL, increased from his last PVR of 208 mL on 6/09/2022. Renal US from 10/06/2022 showed no hydronephrosis.     Today's notes: He is doing well today. His medication regimen works well to manage his urinary symptoms. He denies dysuria and gross hematuria.          Physical Exam:   Patient is a 83 year old  male   Vitals: Blood pressure (!) 144/66, pulse 59, temperature 98  F (36.7  C), temperature source Oral, height 1.727 m (5' 8\"), weight 91.6 kg (201 lb 14.4 oz), SpO2 98 %.  General: Alert and oriented x 3, no acute distress.  Respiratory: Non-labored breathing.  Cardiac: Regular rate.    PVR: 280 mL        Labs and Pathology:    I personally reviewed all applicable laboratory data and went over findings with patient  Significant for:    CBC RESULTS:  Recent Labs   Lab Test 07/31/23  0822 10/11/22  1256 11/22/21  0654 08/17/21  0951 01/22/21  1017   WBC  --  6.2 6.2 5.8 6.2   HGB 11.8* 11.0* 12.4* 12.4* 12.7*   PLT  --  194 193 171 169        BMP RESULTS:  Recent Labs   Lab Test 07/31/23  0822 02/24/23  0947 10/11/22  1256 10/11/22  1111 06/03/22  0827 08/09/21  1015 01/22/21  1017 01/14/21  1030 09/21/20  0820 03/16/20  0906    139 137  --  143   < > 137  --  141 139   POTASSIUM 4.8 4.6 4.3  --  4.2   < > 4.3  --  4.2 4.4   CHLORIDE 105 103 101  --  109   < > 106  --  109 110*   CO2 26 27 24  --  26   < > 27  --  27 26   ANIONGAP 9 9 12  --  8   < > 4  --  5 3   * 119* 131* 122* 117*   " < > 246*  --  131* 132*   BUN 23.1* 26.5* 19.3  --  30   < > 29  --  27 28   CR 1.34* 1.36* 1.18*  --  1.35*   < > 1.34*  --  1.21 1.38*   GFRESTIMATED 53* 52* 62  --  53*   < > 50* 49* 56* 48*   GFRESTBLACK  --   --   --   --   --   --  57* 59* 65 56*   PILY 9.5 10.2 9.3  --  9.4   < > 9.5  --  9.6 8.9    < > = values in this interval not displayed.       UA RESULTS:   Recent Labs   Lab Test 06/09/22  0955 11/19/19  2213   SG 1.020 1.016   URINEPH 6.0 5.0   NITRITE Negative Negative   RBCU 2-5* 3*   WBCU None Seen <1       PSA RESULTS  PSA   Date Value Ref Range Status   05/31/2013 2.79 0 - 4 ug/L Final   11/27/2012 12.60 (H) 0 - 4 ug/L Final            Assessment/Plan   83 year old male seen in follow up for LUTS. His urinary symptoms are well managed with tamsulosin, finasteride, and trospium. He came into clinic for a nurse visit on 9/20/2022 which noted a PVR of 400 mL, increased from his last PVR of 208 mL on 6/09/2022. Renal US from 10/06/2022 showed no hydronephrosis. His PVR today was 280 mL, more consistent with his previous baseline.     Plan:  Continue tamsulosin 0.4 mg, finasteride 5 mg, and trospium 20 mg daily.   Follow up in one year, sooner if concerns.            Past Medical History:     Past Medical History:   Diagnosis Date    Acute urinary retention 11/2012    ER visit   / 1200 cc post-void residual    Acute urinary retention 11/01/2012    ER     Basal cell carcinoma     Diabetes mellitus type II     Epididymitis 03/20/2014    Started on doxycyclline for UTI/orchitis. He was discharged on 03/22/14.    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    PE (pulmonary embolism) 03/20/2014    Rectal cancer (H)     Skin cancer     Squamous cell carcinoma     Thrombosis of leg     +PE            Past Surgical History:     Past Surgical History:   Procedure Laterality Date    AMPUTATE TOE(S)  Right 06/04/2019    Procedure: AMPUTATION 2nd Toe;  Surgeon: Adriel Cabello DPM;  Location: [facility]    BIOPSY      COLONOSCOPY  07/29/2013    Procedure: COMBINED COLONOSCOPY, SINGLE BIOPSY/POLYPECTOMY BY BIOPSY;;  Surgeon: Bari Burnett MD;  [facility]    COLONOSCOPY N/A 06/04/2015    Procedure: COMBINED COLONOSCOPY, SINGLE OR MULTIPLE BIOPSY/POLYPECTOMY BY BIOPSY;  Surgeon: Tara Mtz MD;  Location:  GI    COLONOSCOPY N/A 12/05/2016    Procedure: COLONOSCOPY;  Surgeon: Breanna Kline MD;  Location:  GI    COLONOSCOPY N/A 10/11/2022    Procedure: COLONOSCOPY, FLEXIBLE, WITH LESION REMOVAL USING SNARE;  Surgeon: Pierre Doe MD;  Location: [facility]    CV CORONARY ANGIOGRAM N/A 11/22/2021    Procedure: Coronary Angiogram;  Surgeon: Jak Noe MD;  Location:  [facility]    ENT SURGERY      EYE SURGERY  05/01/2012    Right eye procedure    HC CIRCUMCISION SURGICAL NON-DEV >28 DAYS AGE  02/01/1997    due to phimosis    HC REMOVAL GALLBLADDER  05/01/2001    HC UGI ENDOSCOPY W PLACEMENT GASTROSTOMY TUBE PERCUT  03/13/2014    Procedure: COMBINED ESOPHAGOSCOPY, GASTROSCOPY, DUODENOSCOPY (EGD), PLACE PERCUTANEOUS ENDOSCOPIC GASTROSTOMY TUBE;  Surgeon: Loco Casillas MD;  [facility]: [facility]    LAPAROSCOPIC ASSISTED COLECTOMY LEFT (DESCENDING)  01/07/2014    Procedure: LAPAROSCOPIC ASSISTED COLECTOMY LEFT (DESCENDING);  Laparoscopic hand assisted Low Anterior Resection With colonic J pouch and end to end colorectal anastamosis. Laparoscopic splenic flexure mobilization.  Loop Ileostomy.  Rigid proctoscopy;  Surgeon: Margaret Gamble MD;  Location:  OR    PHACOEMULSIFICATION WITH STANDARD INTRAOCULAR LENS IMPLANT  04/11/2013    Procedure: PHACOEMULSIFICATION WITH STANDARD INTRAOCULAR LENS IMPLANT;  Right Kelman Phacoemulsification with Intraocular Lens Implant;  Surgeon: Caesar Turner MD;  [facility]    REMOVE GASTROSTOMY TUBE  ADULT  07/25/2014    Procedure: REMOVE GASTROSTOMY TUBE ADULT;  Surgeon: Margaret Gamble MD;  [facility]    SIGMOIDOSCOPY FLEXIBLE  06/23/2014    Procedure: SIGMOIDOSCOPY FLEXIBLE;  Surgeon: Margaret Gamble MD;  [facility]    SIGMOIDOSCOPY FLEXIBLE  07/22/2014    Procedure: SIGMOIDOSCOPY FLEXIBLE;  Surgeon: Margaret Gamble MD;  [facility]    SIGMOIDOSCOPY FLEXIBLE  07/25/2014    Procedure: SIGMOIDOSCOPY FLEXIBLE;  Surgeon: Margaret Gamble MD;  [facility]    SIGMOIDOSCOPY FLEXIBLE  07/28/2014    Procedure: SIGMOIDOSCOPY FLEXIBLE;  Surgeon: Margaret Gamble MD;  [facility]    SIGMOIDOSCOPY FLEXIBLE  07/31/2014    Procedure: SIGMOIDOSCOPY FLEXIBLE;  Surgeon: Margaret Gamble MD;  [facility]    SIGMOIDOSCOPY FLEXIBLE  08/03/2014    Procedure: SIGMOIDOSCOPY FLEXIBLE;  Surgeon: Margaret Gamble MD;  [facility]    SIGMOIDOSCOPY FLEXIBLE  08/05/2014    Procedure: SIGMOIDOSCOPY FLEXIBLE;  Surgeon: Margaret Gamble MD;  [facility]    SIGMOIDOSCOPY FLEXIBLE  08/08/2014    Procedure: SIGMOIDOSCOPY FLEXIBLE;  Surgeon: Margaret Gamble MD;  [facility]    SIGMOIDOSCOPY FLEXIBLE  08/18/2014    Procedure: SIGMOIDOSCOPY FLEXIBLE;  Surgeon: Jose Roberto Cervantes MD;  [facility]    SIGMOIDOSCOPY FLEXIBLE N/A 08/15/2014    Procedure: SIGMOIDOSCOPY FLEXIBLE;  Surgeon: Margaret Gamble MD;  [facility]    SIGMOIDOSCOPY FLEXIBLE N/A 08/22/2014    Procedure: SIGMOIDOSCOPY FLEXIBLE;  Surgeon: Jose Roberto Cervantes MD;  [facility]    SIGMOIDOSCOPY FLEXIBLE N/A 08/11/2014    Procedure: SIGMOIDOSCOPY FLEXIBLE;  Surgeon: Margaret Gamble MD;  [facility]    SIGMOIDOSCOPY FLEXIBLE N/A 08/26/2014    Procedure: SIGMOIDOSCOPY FLEXIBLE;  Surgeon: Margaret Gamble MD;  [facility]    SIGMOIDOSCOPY FLEXIBLE N/A 08/29/2014    Procedure: SIGMOIDOSCOPY FLEXIBLE;  Surgeon:  Margaret Gamble MD;  [facility]    SIGMOIDOSCOPY FLEXIBLE N/A 09/08/2014    Procedure: SIGMOIDOSCOPY FLEXIBLE;  Surgeon: Margaret Gamble MD;  [facility]    SIGMOIDOSCOPY FLEXIBLE N/A 09/02/2014    Procedure: SIGMOIDOSCOPY FLEXIBLE;  Surgeon: Breanna Kline MD;  [facility]    SIGMOIDOSCOPY FLEXIBLE N/A 09/11/2014    Procedure: SIGMOIDOSCOPY FLEXIBLE;  Surgeon: Margaret Gamble MD;  [facility]    SIGMOIDOSCOPY FLEXIBLE N/A 09/19/2014    Procedure: SIGMOIDOSCOPY FLEXIBLE;  Surgeon: Margaret Gamble MD;  [facility]    SIGMOIDOSCOPY FLEXIBLE N/A 09/15/2014    Procedure: SIGMOIDOSCOPY FLEXIBLE;  Surgeon: Margaret Gamble MD;  [facility]    SIGMOIDOSCOPY FLEXIBLE N/A 09/05/2014    Procedure: SIGMOIDOSCOPY FLEXIBLE;  Surgeon: Margaret Gamble MD;  [facility]    SIGMOIDOSCOPY FLEXIBLE N/A 09/23/2014    Procedure: SIGMOIDOSCOPY FLEXIBLE;  Surgeon: Margaret Gamble MD;  [facility]    SIGMOIDOSCOPY FLEXIBLE N/A 09/26/2014    Procedure: SIGMOIDOSCOPY FLEXIBLE;  Surgeon: Margaret Gamble MD;  [facility]    SIGMOIDOSCOPY FLEXIBLE N/A 09/30/2014    Procedure: SIGMOIDOSCOPY FLEXIBLE;  Surgeon: Margaret Gamble MD;  [facility]    SIGMOIDOSCOPY FLEXIBLE N/A 10/03/2014    Procedure: SIGMOIDOSCOPY FLEXIBLE;  Surgeon: Margaret Gamble MD;  [facility]    SIGMOIDOSCOPY FLEXIBLE N/A 10/30/2014    Procedure: SIGMOIDOSCOPY FLEXIBLE;  Surgeon: Margaret Gamble MD;  [facility]    SIGMOIDOSCOPY FLEXIBLE, PLACEMENT OF DRAINAGE SPONGE  09/30/2014    TAKEDOWN ILEOSTOMY N/A 01/06/2015    Procedure: TAKEDOWN ILEOSTOMY;  Surgeon: Margaret Gamble MD;  [facility]            Medications     Current Outpatient Medications   Medication    finasteride (PROSCAR) 5 MG tablet    tamsulosin (FLOMAX) 0.4 MG capsule    trospium (SANCTURA) 20 MG tablet    amLODIPine (NORVASC)  10 MG tablet    atorvastatin (LIPITOR) 20 MG tablet    blood glucose (ACCU-CHEK REGIS PLUS) test strip    blood glucose monitoring (NO BRAND SPECIFIED) meter device kit    Cholecalciferol (VITAMIN D-3) 1000 units CAPS    insulin glargine (LANTUS SOLOSTAR) 100 UNIT/ML pen    insulin lispro (HUMALOG KWIKPEN) 100 UNIT/ML (1 unit dial) KWIKPEN    insulin pen needle (ULTICARE MINI) 31G X 6 MM miscellaneous    losartan (COZAAR) 100 MG tablet    metoprolol succinate ER (TOPROL XL) 100 MG 24 hr tablet     No current facility-administered medications for this visit.            Family History:     Family History   Problem Relation Age of Onset    Diabetes Mother     Hypertension Mother     Cancer Father     Cancer Maternal Grandmother     Alzheimer Disease Maternal Grandmother     Cardiovascular Paternal Grandmother     Breast Cancer Sister     Colon Cancer No family hx of     Colon Polyps No family hx of     Crohn's Disease No family hx of     Ulcerative Colitis No family hx of     Anesthesia Reaction No family hx of     Melanoma No family hx of             Social History:     Social History     Socioeconomic History    Marital status:      Spouse name: Not on file    Number of children: Not on file    Years of education: Not on file    Highest education level: Not on file   Occupational History     Employer: RETIRED     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 No   Social History Narrative    Not on file     Social Determinants of Health     Financial Resource Strain: Not on file   Food Insecurity: Not on file   Transportation Needs: Not on file   Physical Activity: Not on file    Stress: Not on file   Social Connections: Not on file   Intimate Partner Violence: Not on file   Housing Stability: Not on file            Allergies:   Nkda [no known drug allergy]         Review of Systems:  From intake questionnaire   Negative 14 system review except as noted on HPI, nurse's note.        VIRGINIA GASCA PA-C  Department of Urology

## 2023-09-27 ENCOUNTER — OFFICE VISIT (OUTPATIENT)
Dept: DERMATOLOGY | Facility: CLINIC | Age: 83
End: 2023-09-27
Payer: COMMERCIAL

## 2023-09-27 DIAGNOSIS — L82.1 SEBORRHEIC KERATOSIS: ICD-10-CM

## 2023-09-27 DIAGNOSIS — D18.01 CHERRY ANGIOMA: ICD-10-CM

## 2023-09-27 DIAGNOSIS — D48.5 NEOPLASM OF UNCERTAIN BEHAVIOR OF SKIN: Primary | ICD-10-CM

## 2023-09-27 DIAGNOSIS — D23.9 DERMAL NEVUS: ICD-10-CM

## 2023-09-27 DIAGNOSIS — Z85.828 HISTORY OF SKIN CANCER: ICD-10-CM

## 2023-09-27 DIAGNOSIS — L81.4 LENTIGO: ICD-10-CM

## 2023-09-27 PROCEDURE — 88342 IMHCHEM/IMCYTCHM 1ST ANTB: CPT | Performed by: DERMATOLOGY

## 2023-09-27 PROCEDURE — 88305 TISSUE EXAM BY PATHOLOGIST: CPT | Performed by: DERMATOLOGY

## 2023-09-27 PROCEDURE — 99213 OFFICE O/P EST LOW 20 MIN: CPT | Mod: 25 | Performed by: DERMATOLOGY

## 2023-09-27 PROCEDURE — 88312 SPECIAL STAINS GROUP 1: CPT | Performed by: DERMATOLOGY

## 2023-09-27 PROCEDURE — 69100 BIOPSY OF EXTERNAL EAR: CPT | Mod: 79 | Performed by: DERMATOLOGY

## 2023-09-27 PROCEDURE — 88341 IMHCHEM/IMCYTCHM EA ADD ANTB: CPT | Performed by: DERMATOLOGY

## 2023-09-27 ASSESSMENT — PAIN SCALES - GENERAL: PAINLEVEL: NO PAIN (0)

## 2023-09-27 NOTE — LETTER
9/27/2023         RE: Storm Dutta  [address] [place] [address]        Dear Colleague,    Thank you for referring your patient, Storm Dutta, to the [facility]. Please see a copy of my visit note below.    Storm Dutta is an extremely pleasant 83 year old year old male patient here today for spot on left ear.   .   Patient states this has been present for not sure.  Patient reports the following symptoms:  tender.  Patient reports the following previous treatments none.  These treatments did not work.  Patient reports the following modifying factors none.  Associated symptoms: none.  Patient has no other skin complaints today.  Remainder of the HPI, Meds, PMH, Allergies, FH, and SH was reviewed in chart.      Past Medical History:   Diagnosis Date     Acute urinary retention 11/2012    ER visit   / 1200 cc post-void residual     Acute urinary retention 11/01/2012    ER      Basal cell carcinoma      Diabetes mellitus type II      Epididymitis 03/20/2014    Started on doxycyclline for UTI/orchitis. He was discharged on 03/22/14.     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     PE (pulmonary embolism) 03/20/2014     Rectal cancer (H)      Skin cancer      Squamous cell carcinoma      Thrombosis of leg     +PE       Past Surgical History:   Procedure Laterality Date     AMPUTATE TOE(S) Right 06/04/2019    Procedure: AMPUTATION 2nd Toe;  Surgeon: Adriel Cabello DPM;  [facility]: [facility]     BIOPSY       COLONOSCOPY  07/29/2013    Procedure: COMBINED COLONOSCOPY, SINGLE BIOPSY/POLYPECTOMY BY BIOPSY;;  Surgeon: Bari Burnett MD;  [facility]     COLONOSCOPY N/A 06/04/2015    Procedure: COMBINED COLONOSCOPY, SINGLE OR MULTIPLE BIOPSY/POLYPECTOMY BY BIOPSY;  Surgeon: Tara Mtz MD;  Location:  GI     COLONOSCOPY N/A  12/05/2016    Procedure: COLONOSCOPY;  Surgeon: Breanna Kline MD;  Location:  GI     COLONOSCOPY N/A 10/11/2022    Procedure: COLONOSCOPY, FLEXIBLE, WITH LESION REMOVAL USING SNARE;  Surgeon: Pierre Doe MD;  [facility]     CV CORONARY ANGIOGRAM N/A 11/22/2021    Procedure: Coronary Angiogram;  Surgeon: Jak Noe MD;  Location:  [facility]     ENT SURGERY       EYE SURGERY  05/01/2012    Right eye procedure     HC CIRCUMCISION SURGICAL NON-DEV >28 DAYS AGE  02/01/1997    due to phimosis     HC REMOVAL GALLBLADDER  05/01/2001     HC UGI ENDOSCOPY W PLACEMENT GASTROSTOMY TUBE PERCUT  03/13/2014    Procedure: COMBINED ESOPHAGOSCOPY, GASTROSCOPY, DUODENOSCOPY (EGD), PLACE PERCUTANEOUS ENDOSCOPIC GASTROSTOMY TUBE;  Surgeon: Loco Casillas MD;  [facility]     LAPAROSCOPIC ASSISTED COLECTOMY LEFT (DESCENDING)  01/07/2014    Procedure: LAPAROSCOPIC ASSISTED COLECTOMY LEFT (DESCENDING);  Laparoscopic hand assisted Low Anterior Resection With colonic J pouch and end to end colorectal anastamosis. Laparoscopic splenic flexure mobilization.  Loop Ileostomy.  Rigid proctoscopy;  Surgeon: Margaret Gamble MD;  Location:  OR     PHACOEMULSIFICATION WITH STANDARD INTRAOCULAR LENS IMPLANT  04/11/2013    Procedure: PHACOEMULSIFICATION WITH STANDARD INTRAOCULAR LENS IMPLANT;  Right Kelman Phacoemulsification with Intraocular Lens Implant;  Surgeon: Caesar Turner MD;  [facility]     REMOVE GASTROSTOMY TUBE ADULT  07/25/2014    Procedure: REMOVE GASTROSTOMY TUBE ADULT;  Surgeon: Margaret Gamble MD;  Location:  OR     SIGMOIDOSCOPY FLEXIBLE  06/23/2014    Procedure: SIGMOIDOSCOPY FLEXIBLE;  Surgeon: Margaret Gamble MD;  Location:  GI     SIGMOIDOSCOPY FLEXIBLE  07/22/2014    Procedure: SIGMOIDOSCOPY FLEXIBLE;  Surgeon: Margaret Gamble MD;  [facility]     SIGMOIDOSCOPY FLEXIBLE  07/25/2014    Procedure:  SIGMOIDOSCOPY FLEXIBLE;  Surgeon: Margaret Gamble MD;  [facility]     SIGMOIDOSCOPY FLEXIBLE  07/28/2014    Procedure: SIGMOIDOSCOPY FLEXIBLE;  Surgeon: Margaret Gamble MD;  [facility]     SIGMOIDOSCOPY FLEXIBLE  07/31/2014    Procedure: SIGMOIDOSCOPY FLEXIBLE;  Surgeon: Margaret Gamble MD;  [facility]     SIGMOIDOSCOPY FLEXIBLE  08/03/2014    Procedure: SIGMOIDOSCOPY FLEXIBLE;  Surgeon: Margaret Gamble MD;  [facility]     SIGMOIDOSCOPY FLEXIBLE  08/05/2014    Procedure: SIGMOIDOSCOPY FLEXIBLE;  Surgeon: Margaret Gamble MD;  [facility]     SIGMOIDOSCOPY FLEXIBLE  08/08/2014    Procedure: SIGMOIDOSCOPY FLEXIBLE;  Surgeon: Margaret Gamble MD;  [facility]     SIGMOIDOSCOPY FLEXIBLE  08/18/2014    Procedure: SIGMOIDOSCOPY FLEXIBLE;  Surgeon: Jose Rboerto Cervantes MD;  [facility]     SIGMOIDOSCOPY FLEXIBLE N/A 08/15/2014    Procedure: SIGMOIDOSCOPY FLEXIBLE;  Surgeon: Margaret Gamble MD;  [facility]     SIGMOIDOSCOPY FLEXIBLE N/A 08/22/2014    Procedure: SIGMOIDOSCOPY FLEXIBLE;  Surgeon: Jose Roberto Cervantes MD;  [facility]     SIGMOIDOSCOPY FLEXIBLE N/A 08/11/2014    Procedure: SIGMOIDOSCOPY FLEXIBLE;  Surgeon: Margaret Gamble MD;  [facility]     SIGMOIDOSCOPY FLEXIBLE N/A 08/26/2014    Procedure: SIGMOIDOSCOPY FLEXIBLE;  Surgeon: Margaret Gamble MD;  [facility]     SIGMOIDOSCOPY FLEXIBLE N/A 08/29/2014    Procedure: SIGMOIDOSCOPY FLEXIBLE;  Surgeon: Margaret Gamble MD;  [facility]     SIGMOIDOSCOPY FLEXIBLE N/A 09/08/2014    Procedure: SIGMOIDOSCOPY FLEXIBLE;  Surgeon: Margaret Gamble MD;  [facility]     SIGMOIDOSCOPY FLEXIBLE N/A 09/02/2014    Procedure: SIGMOIDOSCOPY FLEXIBLE;  Surgeon: Breanna Kline MD;  [facility]     SIGMOIDOSCOPY FLEXIBLE N/A 09/11/2014    Procedure: SIGMOIDOSCOPY FLEXIBLE;  Surgeon: Margaret Gamble MD;   [facility]     SIGMOIDOSCOPY FLEXIBLE N/A 09/19/2014    Procedure: SIGMOIDOSCOPY FLEXIBLE;  Surgeon: Margaret Gamble MD;  [facility]     SIGMOIDOSCOPY FLEXIBLE N/A 09/15/2014    Procedure: SIGMOIDOSCOPY FLEXIBLE;  Surgeon: Margaret Gamble MD;  [facility]     SIGMOIDOSCOPY FLEXIBLE N/A 09/05/2014    Procedure: SIGMOIDOSCOPY FLEXIBLE;  Surgeon: Margaret Gamble MD;  [facility]     SIGMOIDOSCOPY FLEXIBLE N/A 09/23/2014    Procedure: SIGMOIDOSCOPY FLEXIBLE;  Surgeon: Margaret Gamble MD;  [facility]     SIGMOIDOSCOPY FLEXIBLE N/A 09/26/2014    Procedure: SIGMOIDOSCOPY FLEXIBLE;  Surgeon: Margaret Gamble MD;  [facility]     SIGMOIDOSCOPY FLEXIBLE N/A 09/30/2014    Procedure: SIGMOIDOSCOPY FLEXIBLE;  Surgeon: Margaret Gamble MD;  [facility]     SIGMOIDOSCOPY FLEXIBLE N/A 10/03/2014    Procedure: SIGMOIDOSCOPY FLEXIBLE;  Surgeon: Margaret Gamble MD;  [facility]     SIGMOIDOSCOPY FLEXIBLE N/A 10/30/2014    Procedure: SIGMOIDOSCOPY FLEXIBLE;  Surgeon: Margaret Gamble MD;  [facility]     SIGMOIDOSCOPY FLEXIBLE, PLACEMENT OF DRAINAGE SPONGE  09/30/2014     TAKEDOWN ILEOSTOMY N/A 01/06/2015    Procedure: TAKEDOWN ILEOSTOMY;  Surgeon: Margaret Gamble MD;  Location:  OR        Family History   Problem Relation Age of Onset     Diabetes Mother      Hypertension Mother      Cancer Father      Cancer Maternal Grandmother      Alzheimer Disease Maternal Grandmother      Cardiovascular Paternal Grandmother      Breast Cancer Sister      Colon Cancer No family hx of      Colon Polyps No family hx of      Crohn's Disease No family hx of      Ulcerative Colitis No family hx of      Anesthesia Reaction No family hx of      Melanoma No family hx of        Social History     Socioeconomic History     Marital status:      Spouse name: Not on file     Number of children: Not on file     Years  of education: Not on file     Highest education level: Not on file   Occupational History     Employer: RETIRED     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 No   Social History Narrative     Not on file     Social Determinants of Health     Financial Resource Strain: Not on file   Food Insecurity: Not on file   Transportation Needs: Not on file   Physical Activity: Not on file   Stress: Not on file   Social Connections: Not on file   Interpersonal Safety: Not on file   Housing Stability: Not on file       Outpatient Encounter Medications as of 9/27/2023   Medication Sig Dispense Refill     amLODIPine (NORVASC) 10 MG tablet Take 1 tablet (10 mg) by mouth daily 90 tablet 1     atorvastatin (LIPITOR) 20 MG tablet Take 1 tablet (20 mg) by mouth daily 90 tablet 3     blood glucose (ACCU-CHEK REGIS PLUS) test strip USE TO TEST BLOOD SUGAR THREE TIMES A DAY OR AS DIRECTED 300 strip 3     blood glucose monitoring (NO BRAND SPECIFIED) meter device kit Use to test blood sugar 3 times daily or as directed. 1 kit 0     Cholecalciferol (VITAMIN D-3) 1000 units CAPS Take 1 capsule by mouth daily        finasteride (PROSCAR) 5 MG tablet Take 1 tablet (5 mg) by mouth daily 90 tablet 3     insulin glargine (LANTUS SOLOSTAR) 100 UNIT/ML pen INJECT 16 UNITS UNDER THE SKIN AT BEDTIME 30 mL 2     insulin lispro (HUMALOG KWIKPEN) 100 UNIT/ML (1 unit dial) KWIKPEN USE 8 UNITS  PLUS LOW SLIDING SCALE BEFORE EACH MEAL. MAX 50 UNITS PER DAY. 60 mL 1     insulin pen needle (ULTICARE MINI) 31G X 6 MM miscellaneous USE 4 TO 5 TIMES DAILY OR AS DIRECTED FOR SLIDING SCALE INSULIN 500 each 1      losartan (COZAAR) 100 MG tablet Take 1 tablet (100 mg) by mouth daily 90 tablet 1     metoprolol succinate ER (TOPROL XL) 100 MG 24 hr tablet Take 1 tablet (100 mg) by mouth daily 90 tablet 3     tamsulosin (FLOMAX) 0.4 MG capsule Take 1 capsule (0.4 mg) by mouth daily 90 capsule 3     trospium (SANCTURA) 20 MG tablet Take 1 tablet (20 mg) by mouth every evening 90 tablet 3     No facility-administered encounter medications on file as of 9/27/2023.             O:   NAD, WDWN, Alert & Oriented, Mood & Affect wnl, Vitals stable   Here today alone   General appearance normal   Vitals stable   Alert, oriented and in no acute distress     L post ear 6mm scaly papule      Stuck on papules and brown macules on trunk and ext   Red papules on trunk  Flesh colored papules on trunk         Eyes: Conjunctivae/lids:Normal     ENT: Lips, buccal mucosa, tongue: normal    MSK:Normal    Cardiovascular: peripheral edema none    Pulm: Breathing Normal    Lymph Nodes: No Head and Neck Lymphadenopathy     Neuro/Psych: Orientation:Alert and Orientedx3 ; Mood/Affect:normal       A/P:  1. Seborrheic keratosis, lentigo, angioma, dermal nevus, hx of non-melanoma skin cancer   2. L post ear r/o non-melanoma skin cancer  TANGENTIAL BIOPSY SENT OUT:  After consent, anesthesia with LEC and prep, tangential excision performed and specimen sent out for permanent section histology.  No complications and routine wound care. Patient told to call our office in 1-2 weeks for result.       It was a pleasure speaking to Storm Dutta today.  Previous clinic notes and pertinent laboratory tests were reviewed prior to Storm Dutta's visit.  Nature and genetics of benign skin lesions dicussed with patient.  Signs and Symptoms of skin cancer discussed with patient.  Patient encouraged to perform monthly skin exams.  UV precautions reviewed with patient.  Risks of non-melanoma skin cancer discussed with patient   Return to clinic pending path        Again, thank you for allowing me to participate in the care of your patient.        Sincerely,        Jesus Neal MD

## 2023-09-27 NOTE — PROGRESS NOTES
Storm Dutta is an extremely pleasant 83 year old year old male patient here today for spot on left ear.   .   Patient states this has been present for not sure.  Patient reports the following symptoms:  tender.  Patient reports the following previous treatments none.  These treatments did not work.  Patient reports the following modifying factors none.  Associated symptoms: none.  Patient has no other skin complaints today.  Remainder of the HPI, Meds, PMH, Allergies, FH, and SH was reviewed in chart.      Past Medical History:   Diagnosis Date    Acute urinary retention 11/2012    ER visit   / 1200 cc post-void residual    Acute urinary retention 11/01/2012    ER     Basal cell carcinoma     Diabetes mellitus type II     Epididymitis 03/20/2014    Started on doxycyclline for UTI/orchitis. He was discharged on 03/22/14.    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    PE (pulmonary embolism) 03/20/2014    Rectal cancer (H)     Skin cancer     Squamous cell carcinoma     Thrombosis of leg     +PE       Past Surgical History:   Procedure Laterality Date    AMPUTATE TOE(S) Right 06/04/2019    Procedure: AMPUTATION 2nd Toe;  Surgeon: Adriel Cabello DPM;  Location: [facility]    BIOPSY      COLONOSCOPY  07/29/2013    Procedure: COMBINED COLONOSCOPY, SINGLE BIOPSY/POLYPECTOMY BY BIOPSY;;  Surgeon: Bari Burnett MD;  Location: [facility]    COLONOSCOPY N/A 06/04/2015    Procedure: COMBINED COLONOSCOPY, SINGLE OR MULTIPLE BIOPSY/POLYPECTOMY BY BIOPSY;  Surgeon: Tara Mtz MD;  Location:  GI    COLONOSCOPY N/A 12/05/2016    Procedure: COLONOSCOPY;  Surgeon: Breanna Kline MD;  Location:  GI    COLONOSCOPY N/A 10/11/2022    Procedure: COLONOSCOPY, FLEXIBLE, WITH LESION REMOVAL USING SNARE;  Surgeon: Pierre Doe MD;  Location: [facility]    CV CORONARY ANGIOGRAM N/A  11/22/2021    Procedure: Coronary Angiogram;  Surgeon: Jak Noe MD;  Location:  [facility]    ENT SURGERY      EYE SURGERY  05/01/2012    Right eye procedure    HC CIRCUMCISION SURGICAL NON-DEV >28 DAYS AGE  02/01/1997    due to phimosis    HC REMOVAL GALLBLADDER  05/01/2001    HC UGI ENDOSCOPY W PLACEMENT GASTROSTOMY TUBE PERCUT  03/13/2014    Procedure: COMBINED ESOPHAGOSCOPY, GASTROSCOPY, DUODENOSCOPY (EGD), PLACE PERCUTANEOUS ENDOSCOPIC GASTROSTOMY TUBE;  Surgeon: Loco Casillas MD;  Location: [facility]    LAPAROSCOPIC ASSISTED COLECTOMY LEFT (DESCENDING)  01/07/2014    Procedure: LAPAROSCOPIC ASSISTED COLECTOMY LEFT (DESCENDING);  Laparoscopic hand assisted Low Anterior Resection With colonic J pouch and end to end colorectal anastamosis. Laparoscopic splenic flexure mobilization.  Loop Ileostomy.  Rigid proctoscopy;  Surgeon: Margaret Gamble MD;  [facility]    PHACOEMULSIFICATION WITH STANDARD INTRAOCULAR LENS IMPLANT  04/11/2013    Procedure: PHACOEMULSIFICATION WITH STANDARD INTRAOCULAR LENS IMPLANT;  Right Kelman Phacoemulsification with Intraocular Lens Implant;  Surgeon: Caesar Turner MD;  [facility]: [facility]    REMOVE GASTROSTOMY TUBE ADULT  07/25/2014    Procedure: REMOVE GASTROSTOMY TUBE ADULT;  Surgeon: Margaret Gamble MD;  [facility]    SIGMOIDOSCOPY FLEXIBLE  06/23/2014    Procedure: SIGMOIDOSCOPY FLEXIBLE;  Surgeon: Margaret Gamble MD;  [facility]    SIGMOIDOSCOPY FLEXIBLE  07/22/2014    Procedure: SIGMOIDOSCOPY FLEXIBLE;  Surgeon: Margaret Gamble MD;  [facility]    SIGMOIDOSCOPY FLEXIBLE  07/25/2014    Procedure: SIGMOIDOSCOPY FLEXIBLE;  Surgeon: Margaret Gamble MD;  [facility]    SIGMOIDOSCOPY FLEXIBLE  07/28/2014    Procedure: SIGMOIDOSCOPY FLEXIBLE;  Surgeon: Margaret Gamble MD;  [facility]    SIGMOIDOSCOPY FLEXIBLE  07/31/2014    Procedure: SIGMOIDOSCOPY FLEXIBLE;   Surgeon: Margaret Gamble MD;  [facility]    SIGMOIDOSCOPY FLEXIBLE  08/03/2014    Procedure: SIGMOIDOSCOPY FLEXIBLE;  Surgeon: Margaret Gamble MD;  [facility]    SIGMOIDOSCOPY FLEXIBLE  08/05/2014    Procedure: SIGMOIDOSCOPY FLEXIBLE;  Surgeon: Margaret Gamble MD;  [facility]    SIGMOIDOSCOPY FLEXIBLE  08/08/2014    Procedure: SIGMOIDOSCOPY FLEXIBLE;  Surgeon: Margaret Gamble MD;  [facility]    SIGMOIDOSCOPY FLEXIBLE  08/18/2014    Procedure: SIGMOIDOSCOPY FLEXIBLE;  Surgeon: Jose Roberto Cervantes MD;  [facility]    SIGMOIDOSCOPY FLEXIBLE N/A 08/15/2014    Procedure: SIGMOIDOSCOPY FLEXIBLE;  Surgeon: Margaret Gamlbe MD;  [facility]    SIGMOIDOSCOPY FLEXIBLE N/A 08/22/2014    Procedure: SIGMOIDOSCOPY FLEXIBLE;  Surgeon: Jose Roberto Cervantes MD;  [facility]    SIGMOIDOSCOPY FLEXIBLE N/A 08/11/2014    Procedure: SIGMOIDOSCOPY FLEXIBLE;  Surgeon: Margaret Gamble MD;  [facility]    SIGMOIDOSCOPY FLEXIBLE N/A 08/26/2014    Procedure: SIGMOIDOSCOPY FLEXIBLE;  Surgeon: Margaret Gamble MD;  [facility]    SIGMOIDOSCOPY FLEXIBLE N/A 08/29/2014    Procedure: SIGMOIDOSCOPY FLEXIBLE;  Surgeon: Margaret Gamble MD;  [facility]    SIGMOIDOSCOPY FLEXIBLE N/A 09/08/2014    Procedure: SIGMOIDOSCOPY FLEXIBLE;  Surgeon: Margaret Gamble MD;  [facility]    SIGMOIDOSCOPY FLEXIBLE N/A 09/02/2014    Procedure: SIGMOIDOSCOPY FLEXIBLE;  Surgeon: Breanna Kline MD;  [facility]    SIGMOIDOSCOPY FLEXIBLE N/A 09/11/2014    Procedure: SIGMOIDOSCOPY FLEXIBLE;  Surgeon: Margaret Gamble MD;  [facility]    SIGMOIDOSCOPY FLEXIBLE N/A 09/19/2014    Procedure: SIGMOIDOSCOPY FLEXIBLE;  Surgeon: Margaret Gamble MD;  [facility]    SIGMOIDOSCOPY FLEXIBLE N/A 09/15/2014    Procedure: SIGMOIDOSCOPY FLEXIBLE;  Surgeon: Margaret Gamble MD;  [facility]    SIGMOIDOSCOPY  FLEXIBLE N/A 09/05/2014    Procedure: SIGMOIDOSCOPY FLEXIBLE;  Surgeon: Margaret Gamble MD;  [facility]    SIGMOIDOSCOPY FLEXIBLE N/A 09/23/2014    Procedure: SIGMOIDOSCOPY FLEXIBLE;  Surgeon: Margaret Gamble MD;  [facility]    SIGMOIDOSCOPY FLEXIBLE N/A 09/26/2014    Procedure: SIGMOIDOSCOPY FLEXIBLE;  Surgeon: Margaret Gamble MD;  [facility]    SIGMOIDOSCOPY FLEXIBLE N/A 09/30/2014    Procedure: SIGMOIDOSCOPY FLEXIBLE;  Surgeon: Margaret Gamble MD;  [facility]    SIGMOIDOSCOPY FLEXIBLE N/A 10/03/2014    Procedure: SIGMOIDOSCOPY FLEXIBLE;  Surgeon: Margaret Gamble MD;  [facility]    SIGMOIDOSCOPY FLEXIBLE N/A 10/30/2014    Procedure: SIGMOIDOSCOPY FLEXIBLE;  Surgeon: Margaret Gamble MD;  [facility]    SIGMOIDOSCOPY FLEXIBLE, PLACEMENT OF DRAINAGE SPONGE  09/30/2014    TAKEDOWN ILEOSTOMY N/A 01/06/2015    Procedure: TAKEDOWN ILEOSTOMY;  Surgeon: Margaret Gamble MD;  [facility]        Family History   Problem Relation Age of Onset    Diabetes Mother     Hypertension Mother     Cancer Father     Cancer Maternal Grandmother     Alzheimer Disease Maternal Grandmother     Cardiovascular Paternal Grandmother     Breast Cancer Sister     Colon Cancer No family hx of     Colon Polyps No family hx of     Crohn's Disease No family hx of     Ulcerative Colitis No family hx of     Anesthesia Reaction No family hx of     Melanoma No family hx of        Social History     Socioeconomic History    Marital status:      Spouse name: Not on file    Number of children: Not on file    Years of education: Not on file    Highest education level: Not on file   Occupational History     Employer: RETIRED     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 No   Social History Narrative    Not on file     Social Determinants of Health     Financial Resource Strain: Not on file   Food Insecurity: Not on file   Transportation Needs: Not on file   Physical Activity: Not on file   Stress: Not on file   Social Connections: Not on file   Interpersonal Safety: Not on file   Housing Stability: Not on file       Outpatient Encounter Medications as of 9/27/2023   Medication Sig Dispense Refill    amLODIPine (NORVASC) 10 MG tablet Take 1 tablet (10 mg) by mouth daily 90 tablet 1    atorvastatin (LIPITOR) 20 MG tablet Take 1 tablet (20 mg) by mouth daily 90 tablet 3    blood glucose (ACCU-CHEK REGIS PLUS) test strip USE TO TEST BLOOD SUGAR THREE TIMES A DAY OR AS DIRECTED 300 strip 3    blood glucose monitoring (NO BRAND SPECIFIED) meter device kit Use to test blood sugar 3 times daily or as directed. 1 kit 0    Cholecalciferol (VITAMIN D-3) 1000 units CAPS Take 1 capsule by mouth daily       finasteride (PROSCAR) 5 MG tablet Take 1 tablet (5 mg) by mouth daily 90 tablet 3    insulin glargine (LANTUS SOLOSTAR) 100 UNIT/ML pen INJECT 16 UNITS UNDER THE SKIN AT BEDTIME 30 mL 2    insulin lispro (HUMALOG KWIKPEN) 100 UNIT/ML (1 unit dial) KWIKPEN USE 8 UNITS  PLUS LOW SLIDING SCALE BEFORE EACH MEAL. MAX 50 UNITS PER DAY. 60 mL 1    insulin pen needle (ULTICARE MINI) 31G X 6 MM miscellaneous USE 4 TO 5 TIMES DAILY OR AS DIRECTED FOR SLIDING SCALE INSULIN 500 each 1    losartan (COZAAR) 100 MG tablet Take 1 tablet (100 mg) by mouth daily 90 tablet 1    metoprolol succinate ER (TOPROL XL) 100 MG 24 hr tablet Take 1 tablet (100 mg) by mouth daily 90 tablet 3    tamsulosin (FLOMAX) 0.4 MG capsule Take 1 capsule (0.4 mg) by mouth daily 90 capsule 3    trospium (SANCTURA) 20 MG tablet Take 1 tablet (20 mg) by mouth every  evening 90 tablet 3     No facility-administered encounter medications on file as of 9/27/2023.             O:   NAD, WDWN, Alert & Oriented, Mood & Affect wnl, Vitals stable   Here today alone   General appearance normal   Vitals stable   Alert, oriented and in no acute distress     L post ear 6mm scaly papule      Stuck on papules and brown macules on trunk and ext   Red papules on trunk  Flesh colored papules on trunk         Eyes: Conjunctivae/lids:Normal     ENT: Lips, buccal mucosa, tongue: normal    MSK:Normal    Cardiovascular: peripheral edema none    Pulm: Breathing Normal    Lymph Nodes: No Head and Neck Lymphadenopathy     Neuro/Psych: Orientation:Alert and Orientedx3 ; Mood/Affect:normal       A/P:  1. Seborrheic keratosis, lentigo, angioma, dermal nevus, hx of non-melanoma skin cancer   2. L post ear r/o non-melanoma skin cancer  TANGENTIAL BIOPSY SENT OUT:  After consent, anesthesia with LEC and prep, tangential excision performed and specimen sent out for permanent section histology.  No complications and routine wound care. Patient told to call our office in 1-2 weeks for result.       It was a pleasure speaking to Storm Dutta today.  Previous clinic notes and pertinent laboratory tests were reviewed prior to Storm Dutta's visit.  Nature and genetics of benign skin lesions dicussed with patient.  Signs and Symptoms of skin cancer discussed with patient.  Patient encouraged to perform monthly skin exams.  UV precautions reviewed with patient.  Risks of non-melanoma skin cancer discussed with patient   Return to clinic pending path

## 2023-09-27 NOTE — NURSING NOTE
Chief Complaint   Patient presents with    Derm Problem     Spot on left ear, that this weeping        There were no vitals filed for this visit.  Wt Readings from Last 1 Encounters:   09/01/23 91.6 kg (201 lb 14.4 oz)       Khadijah Negron LPN .................9/27/2023

## 2023-09-27 NOTE — PATIENT INSTRUCTIONS
Wound Care Instructions     FOR SUPERFICIAL WOUNDS     [facility] [phone]  [facility] [phone]      AFTER 24 HOURS YOU SHOULD REMOVE THE BANDAGE AND BEGIN DAILY DRESSING CHANGES AS FOLLOWS:     1) Remove Dressing.     2) Clean and dry the area with tap water using a Q-tip or sterile gauze pad.     3) Apply Vaseline, Aquaphor, Polysporin ointment or Bacitracin ointment over entire wound.  Do NOT use Neosporin ointment.     4) Cover the wound with a band-aid, or a sterile non-stick gauze pad and micropore paper tape      REPEAT THESE INSTRUCTIONS AT LEAST ONCE A DAY UNTIL THE WOUND HAS COMPLETELY HEALED.    It is an old wives tale that a wound heals better when it is exposed to air and allowed to dry out. The wound will heal faster with a better cosmetic result if it is kept moist with ointment and covered with a bandage.    **Do not let the wound dry out.**      Supplies Needed:      *Cotton tipped applicators (Q-tips)    *Polysporin Ointment or Bacitracin Ointment (NOT NEOSPORIN)    *Band-aids or non-stick gauze pads and micropore paper tape.      PATIENT INFORMATION:    During the healing process you will notice a number of changes. All wounds develop a small halo of redness surrounding the wound.  This means healing is occurring. Severe itching with extensive redness usually indicates sensitivity to the ointment or bandage tape used to dress the wound.  You should call our office if this develops.      Swelling  and/or discoloration around your surgical site is common, particularly when performed around the eye.    All wounds normally drain.  The larger the wound the more drainage there will be.  After 7-10 days, you will notice the wound beginning to shrink and new skin will begin to grow.  The wound is healed when you can see skin has formed over the entire area.  A healed wound has a healthy, shiny look to the surface and is red to dark pink in color to normalize.   Wounds may take approximately 4-6 weeks to heal.  Larger wounds may take 6-8 weeks.  After the wound is healed you may discontinue dressing changes.    You may experience a sensation of tightness as your wound heals. This is normal and will gradually subside.    Your healed wound may be sensitive to temperature changes. This sensitivity improves with time, but if you re having a lot of discomfort, try to avoid temperature extremes.    Patients frequently experience itching after their wound appears to have healed because of the continue healing under the skin.  Plain Vaseline will help relieve the itching.    POSSIBLE COMPLICATIONS    BLEEDING:    Leave the bandage in place.  Use tightly rolled up gauze or a cloth to apply direct pressure over the bandage for 30  minutes.  Reapply pressure for an additional 30 minutes if necessary  Use additional gauze and tape to maintain pressure once the bleeding has stopped.

## 2024-02-10 ENCOUNTER — HEALTH MAINTENANCE LETTER (OUTPATIENT)
Age: 84
End: 2024-02-10

## 2024-02-11 NOTE — PROGRESS NOTES
"Storm Dutta has an upcoming lab appointment:    Future Appointments   Date Time Provider Department Center   2/13/2024 10:45 AM [facility]   6/5/2024  2:00 PM Jesus Neal MD [facility]     Patient is scheduled for the following lab(s): Patient lab appointment note states, \"A1C.\"    There is no order available. Please review and place either future orders or HMPO (Review of Health Maintenance Protocol Orders), as appropriate.    Health Maintenance Due   Topic    ANNUAL REVIEW OF HM ORDERS     A1C     LIPID      Thank you  Shahana Claros      "

## 2024-02-12 DIAGNOSIS — E11.42 TYPE 2 DIABETES MELLITUS WITH DIABETIC POLYNEUROPATHY, WITH LONG-TERM CURRENT USE OF INSULIN (H): Primary | ICD-10-CM

## 2024-02-12 DIAGNOSIS — Z79.4 TYPE 2 DIABETES MELLITUS WITH DIABETIC POLYNEUROPATHY, WITH LONG-TERM CURRENT USE OF INSULIN (H): Primary | ICD-10-CM

## 2024-02-12 DIAGNOSIS — E78.5 HYPERLIPIDEMIA LDL GOAL <70: ICD-10-CM

## 2024-02-12 NOTE — PROGRESS NOTES
"Storm uDtta has an upcoming lab appointment:    Future Appointments   Date Time Provider Department Center   2/13/2024 10:45 AM [facility]   6/5/2024  2:00 PM Jesus Neal MD [facility]     Patient is scheduled for the following lab(s): Patient lab appointment note states, \"A1C.\"    There is no order available. Please review and place either future orders or HMPO (Review of Health Maintenance Protocol Orders), as appropriate.    Health Maintenance Due   Topic    ANNUAL REVIEW OF HM ORDERS     A1C     LIPID      Thank you  Shahana Claros    "

## 2024-02-13 ENCOUNTER — LAB (OUTPATIENT)
Dept: LAB | Facility: CLINIC | Age: 84
End: 2024-02-13
Payer: COMMERCIAL

## 2024-02-13 DIAGNOSIS — E11.42 TYPE 2 DIABETES MELLITUS WITH DIABETIC POLYNEUROPATHY, WITH LONG-TERM CURRENT USE OF INSULIN (H): ICD-10-CM

## 2024-02-13 DIAGNOSIS — E78.5 HYPERLIPIDEMIA LDL GOAL <70: ICD-10-CM

## 2024-02-13 DIAGNOSIS — Z79.4 TYPE 2 DIABETES MELLITUS WITH DIABETIC POLYNEUROPATHY, WITH LONG-TERM CURRENT USE OF INSULIN (H): ICD-10-CM

## 2024-02-13 LAB
ALBUMIN SERPL BCG-MCNC: 3.9 G/DL (ref 3.5–5.2)
ALP SERPL-CCNC: 109 U/L (ref 40–150)
ALT SERPL W P-5'-P-CCNC: 20 U/L (ref 0–70)
ANION GAP SERPL CALCULATED.3IONS-SCNC: 11 MMOL/L (ref 7–15)
AST SERPL W P-5'-P-CCNC: 18 U/L (ref 0–45)
BILIRUB SERPL-MCNC: 0.5 MG/DL
BUN SERPL-MCNC: 26.4 MG/DL (ref 8–23)
CALCIUM SERPL-MCNC: 9.3 MG/DL (ref 8.8–10.2)
CHLORIDE SERPL-SCNC: 107 MMOL/L (ref 98–107)
CHOLEST SERPL-MCNC: 129 MG/DL
CREAT SERPL-MCNC: 1.27 MG/DL (ref 0.67–1.17)
DEPRECATED HCO3 PLAS-SCNC: 25 MMOL/L (ref 22–29)
EGFRCR SERPLBLD CKD-EPI 2021: 56 ML/MIN/1.73M2
FASTING STATUS PATIENT QL REPORTED: YES
GLUCOSE SERPL-MCNC: 102 MG/DL (ref 70–99)
HBA1C MFR BLD: 7.3 % (ref 0–5.6)
HDLC SERPL-MCNC: 55 MG/DL
LDLC SERPL CALC-MCNC: 54 MG/DL
NONHDLC SERPL-MCNC: 74 MG/DL
POTASSIUM SERPL-SCNC: 4.3 MMOL/L (ref 3.4–5.3)
PROT SERPL-MCNC: 6.6 G/DL (ref 6.4–8.3)
SODIUM SERPL-SCNC: 143 MMOL/L (ref 135–145)
TRIGL SERPL-MCNC: 98 MG/DL

## 2024-02-13 PROCEDURE — 80053 COMPREHEN METABOLIC PANEL: CPT

## 2024-02-13 PROCEDURE — 36415 COLL VENOUS BLD VENIPUNCTURE: CPT

## 2024-02-13 PROCEDURE — 80061 LIPID PANEL: CPT

## 2024-02-13 PROCEDURE — 83036 HEMOGLOBIN GLYCOSYLATED A1C: CPT

## 2024-02-20 ENCOUNTER — TELEPHONE (OUTPATIENT)
Dept: FAMILY MEDICINE | Facility: CLINIC | Age: 84
End: 2024-02-20
Payer: COMMERCIAL

## 2024-02-20 NOTE — TELEPHONE ENCOUNTER
Patient returned call, reviewed lab results from 2/13/24 and Dr. Jovana Beaulieu's detailed message and he verbalized good understanding.     Appointment scheduled with Dr. Beaulieu for 3/5/24 arrival time of 1110 AM.    Julie Behrendt RN

## 2024-03-05 ENCOUNTER — TELEPHONE (OUTPATIENT)
Dept: DERMATOLOGY | Facility: CLINIC | Age: 84
End: 2024-03-05

## 2024-03-05 ENCOUNTER — OFFICE VISIT (OUTPATIENT)
Dept: FAMILY MEDICINE | Facility: CLINIC | Age: 84
End: 2024-03-05
Payer: COMMERCIAL

## 2024-03-05 VITALS
WEIGHT: 208.6 LBS | TEMPERATURE: 97.2 F | HEART RATE: 60 BPM | DIASTOLIC BLOOD PRESSURE: 58 MMHG | BODY MASS INDEX: 31.72 KG/M2 | OXYGEN SATURATION: 98 % | SYSTOLIC BLOOD PRESSURE: 140 MMHG

## 2024-03-05 DIAGNOSIS — I73.9 PVD (PERIPHERAL VASCULAR DISEASE) (H): ICD-10-CM

## 2024-03-05 DIAGNOSIS — I10 HYPERTENSION GOAL BP (BLOOD PRESSURE) < 140/90: ICD-10-CM

## 2024-03-05 DIAGNOSIS — L98.9 SKIN LESION: ICD-10-CM

## 2024-03-05 DIAGNOSIS — N18.31 STAGE 3A CHRONIC KIDNEY DISEASE (H): ICD-10-CM

## 2024-03-05 DIAGNOSIS — Z79.4 TYPE 2 DIABETES MELLITUS WITH DIABETIC POLYNEUROPATHY, WITH LONG-TERM CURRENT USE OF INSULIN (H): Primary | ICD-10-CM

## 2024-03-05 DIAGNOSIS — E11.42 TYPE 2 DIABETES MELLITUS WITH DIABETIC POLYNEUROPATHY, WITH LONG-TERM CURRENT USE OF INSULIN (H): Primary | ICD-10-CM

## 2024-03-05 DIAGNOSIS — Z89.421 HISTORY OF AMPUTATION OF LESSER TOE OF RIGHT FOOT (H): ICD-10-CM

## 2024-03-05 PROCEDURE — G2211 COMPLEX E/M VISIT ADD ON: HCPCS | Performed by: FAMILY MEDICINE

## 2024-03-05 PROCEDURE — 99214 OFFICE O/P EST MOD 30 MIN: CPT | Performed by: FAMILY MEDICINE

## 2024-03-05 RX ORDER — INSULIN LISPRO 100 [IU]/ML
INJECTION, SOLUTION INTRAVENOUS; SUBCUTANEOUS
Qty: 60 ML | Refills: 1 | Status: SHIPPED | OUTPATIENT
Start: 2024-03-05

## 2024-03-05 RX ORDER — LOSARTAN POTASSIUM 100 MG/1
100 TABLET ORAL DAILY
Qty: 90 TABLET | Refills: 1 | Status: SHIPPED | OUTPATIENT
Start: 2024-03-05 | End: 2024-09-30

## 2024-03-05 RX ORDER — AMLODIPINE BESYLATE 10 MG/1
10 TABLET ORAL DAILY
Qty: 90 TABLET | Refills: 1 | Status: SHIPPED | OUTPATIENT
Start: 2024-03-05 | End: 2024-09-30

## 2024-03-05 RX ORDER — INSULIN GLARGINE 100 [IU]/ML
INJECTION, SOLUTION SUBCUTANEOUS
Qty: 30 ML | Refills: 2 | Status: SHIPPED | OUTPATIENT
Start: 2024-03-05

## 2024-03-05 NOTE — PROGRESS NOTES
A/P:      ICD-10-CM    1. Type 2 diabetes mellitus with diabetic polyneuropathy, with long-term current use of insulin (H)  E11.42 insulin glargine (LANTUS SOLOSTAR) 100 UNIT/ML pen    Z79.4 insulin lispro (HUMALOG KWIKPEN) 100 UNIT/ML (1 unit dial) KWIKPEN      2. Hypertension goal BP (blood pressure) < 140/90  I10 amLODIPine (NORVASC) 10 MG tablet     losartan (COZAAR) 100 MG tablet      3. Stage 3a chronic kidney disease (H)  N18.31       4. PVD (peripheral vascular disease) (H24)  I73.9       5. Skin lesion  L98.9 Adult Dermatology  Referral      6. 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  The A1c has slipped up a bit but still acceptable.  If you can remember to check blood sugars before your melas to add in the sliding scale insulin along with your base 8 units your control would be a bit better    Blood pressure was high today but has been good previously.  Let's have you schedule a nurse visit for blood pressure check in the next week or 2.  If the readings remains high we'll need to add a new blood pressure medicine for you.          Elsa Aburto is a 83 year old, presenting for the following health issues:  Diabetes        3/5/2024    10:01 AM   Additional Questions   Roomed by KELSEY Avelar   Accompanied by self     History of Present Illness       Diabetes:   He presents for follow up of diabetes.  He is checking home blood glucose one time daily.   He checks blood glucose before meals.  Blood glucose is never over 200 and never under 70. He is aware of hypoglycemia symptoms including none.    He has no concerns regarding his diabetes at this time.   He is not experiencing numbness or burning in feet, excessive thirst, blurry vision, weight changes or redness, sores or blisters on feet.           He eats 2-3 servings of fruits and vegetables daily.He consumes 2 sweetened  "beverage(s) daily.He exercises with enough effort to increase his heart rate 30 to 60 minutes per day.  He exercises with enough effort to increase his heart rate 5 days per week.   He is taking medications regularly.     Feels like he has a \"reflux problem\"  started about a month ago. Notices it if he leans over to pick something up off the floor.  Taking Rolaids.      Notices it when laying in bed at times.  Needs to get up and move to his recliner.  Feels like there is some acid coming up.  Gets waterbrash at times.  Has not bothered now for the last week or 2.  Symptoms when they occur are weekly or less often.  No swallowing problems.    Blood sugars in the low 100's fasting.  Only checks once daily.  Takes humalog with every meal.  Generally just taking 8 units with each meal.          Review of Systems  Constitutional, HEENT, cardiovascular, pulmonary, gi and gu systems are negative, except as otherwise noted.      Objective    BP (!) 140/58   Pulse 60   Temp 97.2  F (36.2  C) (Tympanic)   Wt 94.6 kg (208 lb 9.6 oz)   SpO2 98%   BMI 31.72 kg/m    Body mass index is 31.72 kg/m .  Physical Exam   GENERAL: alert and no distress  EYES: Eyes grossly normal to inspection, PERRL and conjunctivae and sclerae normal  RESP: lungs clear to auscultation - no rales, rhonchi or wheezes  CV: regular rate and rhythm, normal S1 S2, no S3 or S4, no murmur, click or rub, no peripheral edema  SKIN: R lateral face with lesion with scale and scab from recent bleeding.  NEURO: Normal strength and tone, mentation intact and speech normal  PSYCH: mentation appears normal, affect normal/bright    Epic reviewed.        Signed Electronically by: Jovana Beaulieu DO    "

## 2024-03-05 NOTE — TELEPHONE ENCOUNTER
Patient Contact    Attempt # 1    Was call answered?  No    Called patient. No answer. Left message to call back. Clinic number was provided.     Juany Lozada RN    [facility]   [phone]

## 2024-03-05 NOTE — TELEPHONE ENCOUNTER
This encounter is being sent to inform the clinic that this patient has a referral from Jovana Beaulieu DO in [facility] for the diagnoses of Skin lesion [L98.9] and has requested that this patient be seen within Priority: 1-2 Weeks .Based on the availability of our provider(s), we are unable to accommodate this request.    Were all sites offered this patient?  Yes  Patient scheduled prior to referral coming in  Does scheduling algorithm request to schedule next available?  Patient has been scheduled for the first available opening with Jesus Neal MD on 6/5/2024.  We have informed the patient that the clinic will review their referral and reach out if a sooner appointment is medically necessary.

## 2024-03-05 NOTE — PATIENT INSTRUCTIONS
We decided to continue your diabetes management as is.  The A1c has slipped up a bit but still acceptable.  If you can remember to check blood sugars before your melas to add in the sliding scale insulin along with your base 8 units your control would be a bit better    Blood pressure was high today but has been good previously.  Let's have you schedule a nurse visit for blood pressure check in the next week or 2.  If the readings remains high we'll need to add a new blood pressure medicine for you.

## 2024-03-06 ENCOUNTER — TRANSFERRED RECORDS (OUTPATIENT)
Dept: MULTI SPECIALTY CLINIC | Facility: CLINIC | Age: 84
End: 2024-03-06

## 2024-03-06 LAB — RETINOPATHY: NORMAL

## 2024-03-07 ENCOUNTER — OFFICE VISIT (OUTPATIENT)
Dept: DERMATOLOGY | Facility: CLINIC | Age: 84
End: 2024-03-07
Payer: COMMERCIAL

## 2024-03-07 DIAGNOSIS — D48.5 NEOPLASM OF UNCERTAIN BEHAVIOR OF SKIN: ICD-10-CM

## 2024-03-07 DIAGNOSIS — L98.9 SKIN LESION: ICD-10-CM

## 2024-03-07 PROCEDURE — 88305 TISSUE EXAM BY PATHOLOGIST: CPT | Performed by: DERMATOLOGY

## 2024-03-07 PROCEDURE — 11102 TANGNTL BX SKIN SINGLE LES: CPT | Performed by: PHYSICIAN ASSISTANT

## 2024-03-07 ASSESSMENT — PAIN SCALES - GENERAL: PAINLEVEL: NO PAIN (0)

## 2024-03-07 NOTE — LETTER
3/7/2024         RE: Storm Dutta  [address] [place] [address]        Dear Colleague,    Thank you for referring your patient, Storm Dutta, to the [facility]. Please see a copy of my visit note below.    Storm Dutta is an extremely pleasant 83 year old year old male patient here today for spot on right preauricular cheek. Present for over a month. No pain or bleeding.Patient has no other skin complaints today.  Remainder of the HPI, Meds, PMH, Allergies, FH, and SH was reviewed in chart.    Pertinent Hx:  history of skin cancer  Past Medical History:   Diagnosis Date     Acute urinary retention 11/2012    ER visit   / 1200 cc post-void residual     Acute urinary retention 11/01/2012    ER      Basal cell carcinoma      Diabetes mellitus type II      Epididymitis 03/20/2014    Started on doxycyclline for UTI/orchitis. He was discharged on 03/22/14.     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     PE (pulmonary embolism) 03/20/2014     Rectal cancer (H)      Skin cancer      Squamous cell carcinoma      Thrombosis of leg     +PE       Past Surgical History:   Procedure Laterality Date     AMPUTATE TOE(S) Right 06/04/2019    Procedure: AMPUTATION 2nd Toe;  Surgeon: Adriel Cabello DPM;  Location: [facility]     BIOPSY       COLONOSCOPY  07/29/2013    Procedure: COMBINED COLONOSCOPY, SINGLE BIOPSY/POLYPECTOMY BY BIOPSY;;  Surgeon: Bari Burnett MD;  Location: [facility]     COLONOSCOPY N/A 06/04/2015    Procedure: COMBINED COLONOSCOPY, SINGLE OR MULTIPLE BIOPSY/POLYPECTOMY BY BIOPSY;  Surgeon: Tara Mtz MD;  Location:  GI     COLONOSCOPY N/A 12/05/2016    Procedure: COLONOSCOPY;  Surgeon: Breanna Kline MD;  Location:  GI     COLONOSCOPY N/A 10/11/2022    Procedure: COLONOSCOPY, FLEXIBLE, WITH LESION REMOVAL USING  SNARE;  Surgeon: Pierre Doe MD;  Location: [facility]     CV CORONARY ANGIOGRAM N/A 11/22/2021    Procedure: Coronary Angiogram;  Surgeon: Jak Noe MD;  Location:  [facility]ARD[facility] CATH LAB     ENT SURGERY       EYE SURGERY  05/01/2012    Right eye procedure     HC CIRCUMCISION SURGICAL NON-DEV >28 DAYS AGE  02/01/1997    due to phimosis     HC REMOVAL GALLBLADDER  05/01/2001     HC UGI ENDOSCOPY W PLACEMENT GASTROSTOMY TUBE PERCUT  03/13/2014    Procedure: COMBINED ESOPHAGOSCOPY, GASTROSCOPY, DUODENOSCOPY (EGD), PLACE PERCUTANEOUS ENDOSCOPIC GASTROSTOMY TUBE;  Surgeon: Loco Casillas MD;  Location: [facility]     LAPAROSCOPIC ASSISTED COLECTOMY LEFT (DESCENDING)  01/07/2014    Procedure: LAPAROSCOPIC ASSISTED COLECTOMY LEFT (DESCENDING);  Laparoscopic hand assisted Low Anterior Resection With colonic J pouch and end to end colorectal anastamosis. Laparoscopic splenic flexure mobilization.  Loop Ileostomy.  Rigid proctoscopy;  Surgeon: Margaret Gamble MD;  Location:  OR     PHACOEMULSIFICATION WITH STANDARD INTRAOCULAR LENS IMPLANT  04/11/2013    Procedure: PHACOEMULSIFICATION WITH STANDARD INTRAOCULAR LENS IMPLANT;  Right Kelman Phacoemulsification with Intraocular Lens Implant;  Surgeon: Caesar Turner MD;  [facility]: [facility]     REMOVE GASTROSTOMY TUBE ADULT  07/25/2014    Procedure: REMOVE GASTROSTOMY TUBE ADULT;  Surgeon: Margaret Gamble MD;  [facility]     SIGMOIDOSCOPY FLEXIBLE  06/23/2014    Procedure: SIGMOIDOSCOPY FLEXIBLE;  Surgeon: Margaret Gamble MD;  Location:  GI     SIGMOIDOSCOPY FLEXIBLE  07/22/2014    Procedure: SIGMOIDOSCOPY FLEXIBLE;  Surgeon: Margaret Gamble MD;  [facility]     SIGMOIDOSCOPY FLEXIBLE  07/25/2014    Procedure: SIGMOIDOSCOPY FLEXIBLE;  Surgeon: Margaret Gamble MD;  [facility]     SIGMOIDOSCOPY FLEXIBLE  07/28/2014    Procedure: SIGMOIDOSCOPY FLEXIBLE;  Surgeon: Margaret Gamble  MD CHRISTIAN;  [facility]     SIGMOIDOSCOPY FLEXIBLE  07/31/2014    Procedure: SIGMOIDOSCOPY FLEXIBLE;  Surgeon: Margaret Gamble MD;  [facility]     SIGMOIDOSCOPY FLEXIBLE  08/03/2014    Procedure: SIGMOIDOSCOPY FLEXIBLE;  Surgeon: Margaret Gamble MD;  [facility]     SIGMOIDOSCOPY FLEXIBLE  08/05/2014    Procedure: SIGMOIDOSCOPY FLEXIBLE;  Surgeon: Margaret Gamble MD;  [facility]     SIGMOIDOSCOPY FLEXIBLE  08/08/2014    Procedure: SIGMOIDOSCOPY FLEXIBLE;  Surgeon: Margaret Gamble MD;  [facility]     SIGMOIDOSCOPY FLEXIBLE  08/18/2014    Procedure: SIGMOIDOSCOPY FLEXIBLE;  Surgeon: Jose Roberto Cervantes MD;  [facility]     SIGMOIDOSCOPY FLEXIBLE N/A 08/15/2014    Procedure: SIGMOIDOSCOPY FLEXIBLE;  Surgeon: Margaret Gamble MD;  [facility]     SIGMOIDOSCOPY FLEXIBLE N/A 08/22/2014    Procedure: SIGMOIDOSCOPY FLEXIBLE;  Surgeon: Jose Roberto Cervantes MD;  [facility]     SIGMOIDOSCOPY FLEXIBLE N/A 08/11/2014    Procedure: SIGMOIDOSCOPY FLEXIBLE;  Surgeon: Margaret Gamble MD;  [facility]     SIGMOIDOSCOPY FLEXIBLE N/A 08/26/2014    Procedure: SIGMOIDOSCOPY FLEXIBLE;  Surgeon: Margaret Gamble MD;  [facility]     SIGMOIDOSCOPY FLEXIBLE N/A 08/29/2014    Procedure: SIGMOIDOSCOPY FLEXIBLE;  Surgeon: Margaret Gamble MD;  [facility]     SIGMOIDOSCOPY FLEXIBLE N/A 09/08/2014    Procedure: SIGMOIDOSCOPY FLEXIBLE;  Surgeon: Margaret Gamble MD;  [facility]     SIGMOIDOSCOPY FLEXIBLE N/A 09/02/2014    Procedure: SIGMOIDOSCOPY FLEXIBLE;  Surgeon: Breanna Kline MD;  [facility]     SIGMOIDOSCOPY FLEXIBLE N/A 09/11/2014    Procedure: SIGMOIDOSCOPY FLEXIBLE;  Surgeon: Margaret Gamble MD;  [facility]     SIGMOIDOSCOPY FLEXIBLE N/A 09/19/2014    Procedure: SIGMOIDOSCOPY FLEXIBLE;  Surgeon: Margaret Gamble MD;  [facility]     SIGMOIDOSCOPY FLEXIBLE N/A  09/15/2014    Procedure: SIGMOIDOSCOPY FLEXIBLE;  Surgeon: Margaret Gamble MD;  [facility]     SIGMOIDOSCOPY FLEXIBLE N/A 09/05/2014    Procedure: SIGMOIDOSCOPY FLEXIBLE;  Surgeon: Margaret Gamble MD;  [facility]     SIGMOIDOSCOPY FLEXIBLE N/A 09/23/2014    Procedure: SIGMOIDOSCOPY FLEXIBLE;  Surgeon: Margaret Gamble MD;  [facility]     SIGMOIDOSCOPY FLEXIBLE N/A 09/26/2014    Procedure: SIGMOIDOSCOPY FLEXIBLE;  Surgeon: Margaret Gamble MD;  [facility]     SIGMOIDOSCOPY FLEXIBLE N/A 09/30/2014    Procedure: SIGMOIDOSCOPY FLEXIBLE;  Surgeon: Margaret Gamble MD;  [facility]     SIGMOIDOSCOPY FLEXIBLE N/A 10/03/2014    Procedure: SIGMOIDOSCOPY FLEXIBLE;  Surgeon: Margaret Gamble MD;  [facility]     SIGMOIDOSCOPY FLEXIBLE N/A 10/30/2014    Procedure: SIGMOIDOSCOPY FLEXIBLE;  Surgeon: Margaret Gamble MD;  [facility]     SIGMOIDOSCOPY FLEXIBLE, PLACEMENT OF DRAINAGE SPONGE  09/30/2014     TAKEDOWN ILEOSTOMY N/A 01/06/2015    Procedure: TAKEDOWN ILEOSTOMY;  Surgeon: Margaret Gamble MD;  [facility]        Family History   Problem Relation Age of Onset     Diabetes Mother      Hypertension Mother      Cancer Father      Cancer Maternal Grandmother      Alzheimer Disease Maternal Grandmother      Cardiovascular Paternal Grandmother      Breast Cancer Sister      Colon Cancer No family hx of      Colon Polyps No family hx of      Crohn's Disease No family hx of      Ulcerative Colitis No family hx of      Anesthesia Reaction No family hx of      Melanoma No family hx of        Social History     Socioeconomic History     Marital status:      Spouse name: Not on file     Number of children: Not on file     Years of education: Not on file     Highest education level: Not on file   Occupational History     Employer: RETIRED     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 No   Social History Narrative     Not on file     Social Determinants of Health     Financial Resource Strain: Not on file   Food Insecurity: Not on file   Transportation Needs: Not on file   Physical Activity: Not on file   Stress: Not on file   Social Connections: Not on file   Interpersonal Safety: Low Risk  (3/5/2024)    Interpersonal Safety      Do you feel physically and emotionally safe where you currently live?: Yes      Within the past 12 months, have you been hit, slapped, kicked or otherwise physically hurt by someone?: No      Within the past 12 months, have you been humiliated or emotionally abused in other ways by your partner or ex-partner?: No   Housing Stability: Not on file       Outpatient Encounter Medications as of 3/7/2024   Medication Sig Dispense Refill     amLODIPine (NORVASC) 10 MG tablet Take 1 tablet (10 mg) by mouth daily 90 tablet 1     atorvastatin (LIPITOR) 20 MG tablet Take 1 tablet (20 mg) by mouth daily 90 tablet 3     blood glucose (ACCU-CHEK REGIS PLUS) test strip USE TO TEST BLOOD SUGAR THREE TIMES A DAY OR AS DIRECTED 300 strip 3     blood glucose monitoring (NO BRAND SPECIFIED) meter device kit Use to test blood sugar 3 times daily or as directed. 1 kit 0     Cholecalciferol (VITAMIN D-3) 1000 units CAPS Take 1 capsule by mouth daily        finasteride (PROSCAR) 5 MG tablet Take 1 tablet (5 mg) by mouth daily 90 tablet 3     insulin glargine (LANTUS SOLOSTAR) 100 UNIT/ML pen INJECT 16 UNITS UNDER THE SKIN AT BEDTIME 30 mL 2     insulin lispro (HUMALOG KWIKPEN) 100 UNIT/ML (1 unit dial) KWIKPEN USE 8 UNITS  PLUS LOW SLIDING SCALE BEFORE EACH MEAL. MAX 50 UNITS  PER DAY. 60 mL 1     insulin pen needle (ULTICARE MINI) 31G X 6 MM miscellaneous USE 4 TO 5 TIMES DAILY OR AS DIRECTED FOR SLIDING SCALE INSULIN 500 each 1     losartan (COZAAR) 100 MG tablet Take 1 tablet (100 mg) by mouth daily 90 tablet 1     metoprolol succinate ER (TOPROL XL) 100 MG 24 hr tablet Take 1 tablet (100 mg) by mouth daily 90 tablet 3     tamsulosin (FLOMAX) 0.4 MG capsule Take 1 capsule (0.4 mg) by mouth daily 90 capsule 3     trospium (SANCTURA) 20 MG tablet Take 1 tablet (20 mg) by mouth every evening 90 tablet 3     No facility-administered encounter medications on file as of 3/7/2024.             O:   NAD, WDWN, Alert & Oriented, Mood & Affect wnl, Vitals stable   Here today alone   There were no vitals taken for this visit.   General appearance normal   Vitals stable   Alert, oriented and in no acute distress      1.1 cm pink scaly plaque on right preauricular cheek       Eyes: Conjunctivae/lids:Normal     ENT: Lips: normal    MSK:Normal    Pulm: Breathing Normal    Neuro/Psych: Orientation:Alert and Orientedx3 ; Mood/Affect:normal   A/P:  1. R/O SCC on right preauricular cheek   TANGENTIAL BIOPSY SENT OUT:  After consent, anesthesia with LEC and prep, tangential excision performed and specimen sent out for permanent section histology.  No complications and routine wound care. Patient told to call our office in 1-2 weeks for result.          Again, thank you for allowing me to participate in the care of your patient.        Sincerely,        Fransisca Kelley PA-C

## 2024-03-07 NOTE — PROGRESS NOTES
Storm Dutta is an extremely pleasant 83 year old year old male patient here today for spot on right preauricular cheek. Present for over a month. No pain or bleeding.Patient has no other skin complaints today.  Remainder of the HPI, Meds, PMH, Allergies, FH, and SH was reviewed in chart.    Pertinent Hx:  history of skin cancer  Past Medical History:   Diagnosis Date    Acute urinary retention 11/2012    ER visit   / 1200 cc post-void residual    Acute urinary retention 11/01/2012    ER     Basal cell carcinoma     Diabetes mellitus type II     Epididymitis 03/20/2014    Started on doxycyclline for UTI/orchitis. He was discharged on 03/22/14.    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    PE (pulmonary embolism) 03/20/2014    Rectal cancer (H)     Skin cancer     Squamous cell carcinoma     Thrombosis of leg     +PE       Past Surgical History:   Procedure Laterality Date    AMPUTATE TOE(S) Right 06/04/2019    Procedure: AMPUTATION 2nd Toe;  Surgeon: Adriel Cabello DPM;  Location: [facility]    BIOPSY      COLONOSCOPY  07/29/2013    Procedure: COMBINED COLONOSCOPY, SINGLE BIOPSY/POLYPECTOMY BY BIOPSY;;  Surgeon: Bari Burnett MD;  [facility]: [facility]    COLONOSCOPY N/A 06/04/2015    Procedure: COMBINED COLONOSCOPY, SINGLE OR MULTIPLE BIOPSY/POLYPECTOMY BY BIOPSY;  Surgeon: Tara Mtz MD;  Location:  GI    COLONOSCOPY N/A 12/05/2016    Procedure: COLONOSCOPY;  Surgeon: Breanna Kline MD;  Location:  GI    COLONOSCOPY N/A 10/11/2022    Procedure: COLONOSCOPY, FLEXIBLE, WITH LESION REMOVAL USING SNARE;  Surgeon: Pierre Doe MD;  [facility]: [facility]    CV CORONARY ANGIOGRAM N/A 11/22/2021    Procedure: Coronary Angiogram;  Surgeon: Jak Noe MD;  Location:  [facility]ARD[facility] LAB    ENT SURGERY      EYE SURGERY  05/01/2012    Right eye  procedure    HC CIRCUMCISION SURGICAL NON-DEV >28 DAYS AGE  02/01/1997    due to phimosis    HC REMOVAL GALLBLADDER  05/01/2001    HC UGI ENDOSCOPY W PLACEMENT GASTROSTOMY TUBE PERCUT  03/13/2014    Procedure: COMBINED ESOPHAGOSCOPY, GASTROSCOPY, DUODENOSCOPY (EGD), PLACE PERCUTANEOUS ENDOSCOPIC GASTROSTOMY TUBE;  Surgeon: Loco Casillas MD;  [facility]: [facility]    LAPAROSCOPIC ASSISTED COLECTOMY LEFT (DESCENDING)  01/07/2014    Procedure: LAPAROSCOPIC ASSISTED COLECTOMY LEFT (DESCENDING);  Laparoscopic hand assisted Low Anterior Resection With colonic J pouch and end to end colorectal anastamosis. Laparoscopic splenic flexure mobilization.  Loop Ileostomy.  Rigid proctoscopy;  Surgeon: Margaret Gamble MD;  [facility]    PHACOEMULSIFICATION WITH STANDARD INTRAOCULAR LENS IMPLANT  04/11/2013    Procedure: PHACOEMULSIFICATION WITH STANDARD INTRAOCULAR LENS IMPLANT;  Right Kelman Phacoemulsification with Intraocular Lens Implant;  Surgeon: Caesar Turner MD;  [facility]    REMOVE GASTROSTOMY TUBE ADULT  07/25/2014    Procedure: REMOVE GASTROSTOMY TUBE ADULT;  Surgeon: Margaret Gamble MD;  [facility]    SIGMOIDOSCOPY FLEXIBLE  06/23/2014    Procedure: SIGMOIDOSCOPY FLEXIBLE;  Surgeon: Margaret Gamble MD;  [facility]    SIGMOIDOSCOPY FLEXIBLE  07/22/2014    Procedure: SIGMOIDOSCOPY FLEXIBLE;  Surgeon: Margaret Gamble MD;  [facility]    SIGMOIDOSCOPY FLEXIBLE  07/25/2014    Procedure: SIGMOIDOSCOPY FLEXIBLE;  Surgeon: Margaret Gamble MD;  [facility]    SIGMOIDOSCOPY FLEXIBLE  07/28/2014    Procedure: SIGMOIDOSCOPY FLEXIBLE;  Surgeon: Margaret Gamble MD;  [facility]    SIGMOIDOSCOPY FLEXIBLE  07/31/2014    Procedure: SIGMOIDOSCOPY FLEXIBLE;  Surgeon: Margaret Gamble MD;  [facility]    SIGMOIDOSCOPY FLEXIBLE  08/03/2014    Procedure: SIGMOIDOSCOPY FLEXIBLE;  Surgeon: Margaret Gamble MD;   [facility]    SIGMOIDOSCOPY FLEXIBLE  08/05/2014    Procedure: SIGMOIDOSCOPY FLEXIBLE;  Surgeon: Margaret Gamble MD;  [facility]    SIGMOIDOSCOPY FLEXIBLE  08/08/2014    Procedure: SIGMOIDOSCOPY FLEXIBLE;  Surgeon: Margaret Gamble MD;  [facility]    SIGMOIDOSCOPY FLEXIBLE  08/18/2014    Procedure: SIGMOIDOSCOPY FLEXIBLE;  Surgeon: Jose Roberto Cervantes MD;  [facility]    SIGMOIDOSCOPY FLEXIBLE N/A 08/15/2014    Procedure: SIGMOIDOSCOPY FLEXIBLE;  Surgeon: Margaret Gamble MD;  [facility]    SIGMOIDOSCOPY FLEXIBLE N/A 08/22/2014    Procedure: SIGMOIDOSCOPY FLEXIBLE;  Surgeon: Jose Roberto Cervantes MD;  [facility]    SIGMOIDOSCOPY FLEXIBLE N/A 08/11/2014    Procedure: SIGMOIDOSCOPY FLEXIBLE;  Surgeon: Margaret Gamble MD;  [facility]    SIGMOIDOSCOPY FLEXIBLE N/A 08/26/2014    Procedure: SIGMOIDOSCOPY FLEXIBLE;  Surgeon: Margaret Gamble MD;  [facility]    SIGMOIDOSCOPY FLEXIBLE N/A 08/29/2014    Procedure: SIGMOIDOSCOPY FLEXIBLE;  Surgeon: Margaret Gamble MD;  [facility]    SIGMOIDOSCOPY FLEXIBLE N/A 09/08/2014    Procedure: SIGMOIDOSCOPY FLEXIBLE;  Surgeon: Margaret Gamble MD;  [facility]    SIGMOIDOSCOPY FLEXIBLE N/A 09/02/2014    Procedure: SIGMOIDOSCOPY FLEXIBLE;  Surgeon: Breanna Kline MD;  [facility]    SIGMOIDOSCOPY FLEXIBLE N/A 09/11/2014    Procedure: SIGMOIDOSCOPY FLEXIBLE;  Surgeon: Margaret Gamble MD;  [facility]    SIGMOIDOSCOPY FLEXIBLE N/A 09/19/2014    Procedure: SIGMOIDOSCOPY FLEXIBLE;  Surgeon: Margaret Gamble MD;  [facility]    SIGMOIDOSCOPY FLEXIBLE N/A 09/15/2014    Procedure: SIGMOIDOSCOPY FLEXIBLE;  Surgeon: Margaret Gamble MD;  [facility]    SIGMOIDOSCOPY FLEXIBLE N/A 09/05/2014    Procedure: SIGMOIDOSCOPY FLEXIBLE;  Surgeon: Margaret Gamble MD;  [facility]    SIGMOIDOSCOPY FLEXIBLE N/A 09/23/2014    Procedure:  SIGMOIDOSCOPY FLEXIBLE;  Surgeon: Margaret Gamble MD;  [facility]    SIGMOIDOSCOPY FLEXIBLE N/A 09/26/2014    Procedure: SIGMOIDOSCOPY FLEXIBLE;  Surgeon: Margaret Gamble MD;  [facility]    SIGMOIDOSCOPY FLEXIBLE N/A 09/30/2014    Procedure: SIGMOIDOSCOPY FLEXIBLE;  Surgeon: Margaret Gamble MD;  [facility]    SIGMOIDOSCOPY FLEXIBLE N/A 10/03/2014    Procedure: SIGMOIDOSCOPY FLEXIBLE;  Surgeon: Margaret Gamble MD;  [facility]    SIGMOIDOSCOPY FLEXIBLE N/A 10/30/2014    Procedure: SIGMOIDOSCOPY FLEXIBLE;  Surgeon: Margaret Gamble MD;  [facility]    SIGMOIDOSCOPY FLEXIBLE, PLACEMENT OF DRAINAGE SPONGE  09/30/2014    TAKEDOWN ILEOSTOMY N/A 01/06/2015    Procedure: TAKEDOWN ILEOSTOMY;  Surgeon: Margaret Gamble MD;  [facility]        Family History   Problem Relation Age of Onset    Diabetes Mother     Hypertension Mother     Cancer Father     Cancer Maternal Grandmother     Alzheimer Disease Maternal Grandmother     Cardiovascular Paternal Grandmother     Breast Cancer Sister     Colon Cancer No family hx of     Colon Polyps No family hx of     Crohn's Disease No family hx of     Ulcerative Colitis No family hx of     Anesthesia Reaction No family hx of     Melanoma No family hx of        Social History     Socioeconomic History    Marital status:      Spouse name: Not on file    Number of children: Not on file    Years of education: Not on file    Highest education level: Not on file   Occupational History     Employer: RETIRED     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 No    Social History Narrative    Not on file     Social Determinants of Health     Financial Resource Strain: Not on file   Food Insecurity: Not on file   Transportation Needs: Not on file   Physical Activity: Not on file   Stress: Not on file   Social Connections: Not on file   Interpersonal Safety: Low Risk  (3/5/2024)    Interpersonal Safety     Do you feel physically and emotionally safe where you currently live?: Yes     Within the past 12 months, have you been hit, slapped, kicked or otherwise physically hurt by someone?: No     Within the past 12 months, have you been humiliated or emotionally abused in other ways by your partner or ex-partner?: No   Housing Stability: Not on file       Outpatient Encounter Medications as of 3/7/2024   Medication Sig Dispense Refill    amLODIPine (NORVASC) 10 MG tablet Take 1 tablet (10 mg) by mouth daily 90 tablet 1    atorvastatin (LIPITOR) 20 MG tablet Take 1 tablet (20 mg) by mouth daily 90 tablet 3    blood glucose (ACCU-CHEK REGIS PLUS) test strip USE TO TEST BLOOD SUGAR THREE TIMES A DAY OR AS DIRECTED 300 strip 3    blood glucose monitoring (NO BRAND SPECIFIED) meter device kit Use to test blood sugar 3 times daily or as directed. 1 kit 0    Cholecalciferol (VITAMIN D-3) 1000 units CAPS Take 1 capsule by mouth daily       finasteride (PROSCAR) 5 MG tablet Take 1 tablet (5 mg) by mouth daily 90 tablet 3    insulin glargine (LANTUS SOLOSTAR) 100 UNIT/ML pen INJECT 16 UNITS UNDER THE SKIN AT BEDTIME 30 mL 2    insulin lispro (HUMALOG KWIKPEN) 100 UNIT/ML (1 unit dial) KWIKPEN USE 8 UNITS  PLUS LOW SLIDING SCALE BEFORE EACH MEAL. MAX 50 UNITS PER DAY. 60 mL 1    insulin pen needle (ULTICARE MINI) 31G X 6 MM miscellaneous USE 4 TO 5 TIMES DAILY OR AS DIRECTED FOR SLIDING SCALE INSULIN 500 each 1    losartan (COZAAR) 100 MG tablet Take 1 tablet (100 mg) by mouth daily 90 tablet 1    metoprolol succinate ER (TOPROL XL) 100 MG 24 hr tablet Take 1 tablet (100 mg) by mouth daily  90 tablet 3    tamsulosin (FLOMAX) 0.4 MG capsule Take 1 capsule (0.4 mg) by mouth daily 90 capsule 3    trospium (SANCTURA) 20 MG tablet Take 1 tablet (20 mg) by mouth every evening 90 tablet 3     No facility-administered encounter medications on file as of 3/7/2024.             O:   NAD, WDWN, Alert & Oriented, Mood & Affect wnl, Vitals stable   Here today alone   There were no vitals taken for this visit.   General appearance normal   Vitals stable   Alert, oriented and in no acute distress      1.1 cm pink scaly plaque on right preauricular cheek       Eyes: Conjunctivae/lids:Normal     ENT: Lips: normal    MSK:Normal    Pulm: Breathing Normal    Neuro/Psych: Orientation:Alert and Orientedx3 ; Mood/Affect:normal   A/P:  1. R/O SCC on right preauricular cheek   TANGENTIAL BIOPSY SENT OUT:  After consent, anesthesia with LEC and prep, tangential excision performed and specimen sent out for permanent section histology.  No complications and routine wound care. Patient told to call our office in 1-2 weeks for result.

## 2024-03-10 LAB
PATH REPORT.COMMENTS IMP SPEC: ABNORMAL
PATH REPORT.COMMENTS IMP SPEC: ABNORMAL
PATH REPORT.COMMENTS IMP SPEC: YES
PATH REPORT.FINAL DX SPEC: ABNORMAL
PATH REPORT.GROSS SPEC: ABNORMAL
PATH REPORT.MICROSCOPIC SPEC OTHER STN: ABNORMAL
PATH REPORT.RELEVANT HX SPEC: ABNORMAL

## 2024-03-11 ENCOUNTER — TELEPHONE (OUTPATIENT)
Dept: DERMATOLOGY | Facility: CLINIC | Age: 84
End: 2024-03-11
Payer: COMMERCIAL

## 2024-03-11 DIAGNOSIS — D48.5 NEOPLASM OF UNCERTAIN BEHAVIOR OF SKIN: Primary | ICD-10-CM

## 2024-03-11 NOTE — TELEPHONE ENCOUNTER
Patient Contact    Attempt # 1    Was call answered?  No    Called patient. No answer. Left message to call back. Clinic number was provided.     Khadijah Negron LPN   [facility]   [phone]

## 2024-03-11 NOTE — TELEPHONE ENCOUNTER
Patient Contact    Attempt # 2    Was call answered?  Yes    Called patient. Results were given. Patient denied having any questions. Patient was scheduled with Dr. Neal. Patient declined to have information sent in the mail as well. Derm Surg referral was place and linked.     Khadijah Negron LPN   [facility]   [phone]

## 2024-03-11 NOTE — TELEPHONE ENCOUNTER
----- Message from Fransisca Rod PA-C sent at 3/11/2024  6:37 AM CDT -----  Hi Lamonte,  Your right preauricular cheek returned as a basal cell carcinoma, please schedule mohs with Dr. Neal.

## 2024-03-11 NOTE — TELEPHONE ENCOUNTER
[facility] Call Center    Phone Message    May a detailed message be left on voicemail: yes     Reason for Call: Other: Patient returning call, please call back      Action Taken: Message routed to:  Other: [facility]    Travel Screening: Not Applicable

## 2024-03-13 ENCOUNTER — OFFICE VISIT (OUTPATIENT)
Dept: DERMATOLOGY | Facility: CLINIC | Age: 84
End: 2024-03-13
Payer: COMMERCIAL

## 2024-03-13 DIAGNOSIS — C44.319 BASAL CELL CARCINOMA (BCC) OF RIGHT CHEEK: Primary | ICD-10-CM

## 2024-03-13 PROCEDURE — 17312 MOHS ADDL STAGE: CPT | Performed by: DERMATOLOGY

## 2024-03-13 PROCEDURE — 13132 CMPLX RPR F/C/C/M/N/AX/G/H/F: CPT | Performed by: DERMATOLOGY

## 2024-03-13 PROCEDURE — 17311 MOHS 1 STAGE H/N/HF/G: CPT | Performed by: DERMATOLOGY

## 2024-03-13 ASSESSMENT — PAIN SCALES - GENERAL: PAINLEVEL: NO PAIN (0)

## 2024-03-13 NOTE — PROGRESS NOTES
Storm Dutta is an extremely pleasant 83 year old year old male patient here today for evaluation and managment of basal cell carcinoma on right pre auricular cheek.  Patient has no other skin complaints today.  Remainder of the HPI, Meds, PMH, Allergies, FH, and SH was reviewed in chart.      Past Medical History:   Diagnosis Date    Acute urinary retention 11/2012    ER visit   / 1200 cc post-void residual    Acute urinary retention 11/01/2012    ER     Basal cell carcinoma     Diabetes mellitus type II     Epididymitis 03/20/2014    Started on doxycyclline for UTI/orchitis. He was discharged on 03/22/14.    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    PE (pulmonary embolism) 03/20/2014    Rectal cancer (H)     Skin cancer     Squamous cell carcinoma     Thrombosis of leg     +PE       Past Surgical History:   Procedure Laterality Date    AMPUTATE TOE(S) Right 06/04/2019    Procedure: AMPUTATION 2nd Toe;  Surgeon: Adriel Cabello DPM;  Location: [facility]    BIOPSY      COLONOSCOPY  07/29/2013    Procedure: COMBINED COLONOSCOPY, SINGLE BIOPSY/POLYPECTOMY BY BIOPSY;;  Surgeon: Bari Burnett MD;  Location: [facility]    COLONOSCOPY N/A 06/04/2015    Procedure: COMBINED COLONOSCOPY, SINGLE OR MULTIPLE BIOPSY/POLYPECTOMY BY BIOPSY;  Surgeon: Tara Mtz MD;  Location:  GI    COLONOSCOPY N/A 12/05/2016    Procedure: COLONOSCOPY;  Surgeon: Breanna Kline MD;  Location:  GI    COLONOSCOPY N/A 10/11/2022    Procedure: COLONOSCOPY, FLEXIBLE, WITH LESION REMOVAL USING SNARE;  Surgeon: Pierre Doe MD;  Location: [facility]    CV CORONARY ANGIOGRAM N/A 11/22/2021    Procedure: Coronary Angiogram;  Surgeon: Jak Noe MD;  Location:  [facility]    ENT SURGERY      EYE SURGERY  05/01/2012    Right eye procedure    HC CIRCUMCISION SURGICAL  NON-DEV >28 DAYS AGE  02/01/1997    due to phimosis    HC REMOVAL GALLBLADDER  05/01/2001    [facility] UGI ENDOSCOPY W PLACEMENT GASTROSTOMY TUBE PERCUT  03/13/2014    Procedure: COMBINED ESOPHAGOSCOPY, GASTROSCOPY, DUODENOSCOPY (EGD), PLACE PERCUTANEOUS ENDOSCOPIC GASTROSTOMY TUBE;  Surgeon: Loco Casillas MD;  Location: [facility]    LAPAROSCOPIC ASSISTED COLECTOMY LEFT (DESCENDING)  01/07/2014    Procedure: LAPAROSCOPIC ASSISTED COLECTOMY LEFT (DESCENDING);  Laparoscopic hand assisted Low Anterior Resection With colonic J pouch and end to end colorectal anastamosis. Laparoscopic splenic flexure mobilization.  Loop Ileostomy.  Rigid proctoscopy;  Surgeon: Margaret Gamble MD;  [facility]    PHACOEMULSIFICATION WITH STANDARD INTRAOCULAR LENS IMPLANT  04/11/2013    Procedure: PHACOEMULSIFICATION WITH STANDARD INTRAOCULAR LENS IMPLANT;  Right Kelman Phacoemulsification with Intraocular Lens Implant;  Surgeon: Caesar Turner MD;  Location: [facility]    REMOVE GASTROSTOMY TUBE ADULT  07/25/2014    Procedure: REMOVE GASTROSTOMY TUBE ADULT;  Surgeon: Margaret Gamble MD;  [facility]    SIGMOIDOSCOPY FLEXIBLE  06/23/2014    Procedure: SIGMOIDOSCOPY FLEXIBLE;  Surgeon: Margaret Gamble MD;  [facility]    SIGMOIDOSCOPY FLEXIBLE  07/22/2014    Procedure: SIGMOIDOSCOPY FLEXIBLE;  Surgeon: Margaret Gamble MD;  [facility]    SIGMOIDOSCOPY FLEXIBLE  07/25/2014    Procedure: SIGMOIDOSCOPY FLEXIBLE;  Surgeon: Margaret Gamble MD;  [facility]    SIGMOIDOSCOPY FLEXIBLE  07/28/2014    Procedure: SIGMOIDOSCOPY FLEXIBLE;  Surgeon: Margaret Gamble MD;  [facility]    SIGMOIDOSCOPY FLEXIBLE  07/31/2014    Procedure: SIGMOIDOSCOPY FLEXIBLE;  Surgeon: Margaret Gamble MD;  [facility]    SIGMOIDOSCOPY FLEXIBLE  08/03/2014    Procedure: SIGMOIDOSCOPY FLEXIBLE;  Surgeon: Margaret Gamble MD;  [facility]    SIGMOIDOSCOPY FLEXIBLE   08/05/2014    Procedure: SIGMOIDOSCOPY FLEXIBLE;  Surgeon: Margaret Gamble MD;  [facility]    SIGMOIDOSCOPY FLEXIBLE  08/08/2014    Procedure: SIGMOIDOSCOPY FLEXIBLE;  Surgeon: Margaret Gamble MD;  [facility]    SIGMOIDOSCOPY FLEXIBLE  08/18/2014    Procedure: SIGMOIDOSCOPY FLEXIBLE;  Surgeon: Jose Roberto Cervantes MD;  [facility]    SIGMOIDOSCOPY FLEXIBLE N/A 08/15/2014    Procedure: SIGMOIDOSCOPY FLEXIBLE;  Surgeon: Margaret Gamble MD;  [facility]    SIGMOIDOSCOPY FLEXIBLE N/A 08/22/2014    Procedure: SIGMOIDOSCOPY FLEXIBLE;  Surgeon: Jose Roberto Cervantes MD;  [facility]    SIGMOIDOSCOPY FLEXIBLE N/A 08/11/2014    Procedure: SIGMOIDOSCOPY FLEXIBLE;  Surgeon: Margaret Gamble MD;  [facility]    SIGMOIDOSCOPY FLEXIBLE N/A 08/26/2014    Procedure: SIGMOIDOSCOPY FLEXIBLE;  Surgeon: Margaret Gamble MD;  [facility]    SIGMOIDOSCOPY FLEXIBLE N/A 08/29/2014    Procedure: SIGMOIDOSCOPY FLEXIBLE;  Surgeon: Margaret Gamble MD;  [facility]    SIGMOIDOSCOPY FLEXIBLE N/A 09/08/2014    Procedure: SIGMOIDOSCOPY FLEXIBLE;  Surgeon: Margaret Gamble MD;  [facility]    SIGMOIDOSCOPY FLEXIBLE N/A 09/02/2014    Procedure: SIGMOIDOSCOPY FLEXIBLE;  Surgeon: Breanna Kline MD;  [facility]    SIGMOIDOSCOPY FLEXIBLE N/A 09/11/2014    Procedure: SIGMOIDOSCOPY FLEXIBLE;  Surgeon: Margaret Gamble MD;  [facility]    SIGMOIDOSCOPY FLEXIBLE N/A 09/19/2014    Procedure: SIGMOIDOSCOPY FLEXIBLE;  Surgeon: Margaret Gamble MD;  [facility]    SIGMOIDOSCOPY FLEXIBLE N/A 09/15/2014    Procedure: SIGMOIDOSCOPY FLEXIBLE;  Surgeon: Margaret Gamble MD;  [facility]    SIGMOIDOSCOPY FLEXIBLE N/A 09/05/2014    Procedure: SIGMOIDOSCOPY FLEXIBLE;  Surgeon: Margaret Gamble MD;  [facility]    SIGMOIDOSCOPY FLEXIBLE N/A 09/23/2014    Procedure: SIGMOIDOSCOPY FLEXIBLE;  Surgeon:  Margaret Gamble MD;  [facility]    SIGMOIDOSCOPY FLEXIBLE N/A 09/26/2014    Procedure: SIGMOIDOSCOPY FLEXIBLE;  Surgeon: Margaret Gamble MD;  [facility]    SIGMOIDOSCOPY FLEXIBLE N/A 09/30/2014    Procedure: SIGMOIDOSCOPY FLEXIBLE;  Surgeon: Margaret Gamble MD;  [facility]    SIGMOIDOSCOPY FLEXIBLE N/A 10/03/2014    Procedure: SIGMOIDOSCOPY FLEXIBLE;  Surgeon: Margaret Gamble MD;  [facility]    SIGMOIDOSCOPY FLEXIBLE N/A 10/30/2014    Procedure: SIGMOIDOSCOPY FLEXIBLE;  Surgeon: Margaret Gamble MD;  [facility]    SIGMOIDOSCOPY FLEXIBLE, PLACEMENT OF DRAINAGE SPONGE  09/30/2014    TAKEDOWN ILEOSTOMY N/A 01/06/2015    Procedure: TAKEDOWN ILEOSTOMY;  Surgeon: Margaret Gamble MD;  [facility]        Family History   Problem Relation Age of Onset    Diabetes Mother     Hypertension Mother     Cancer Father     Cancer Maternal Grandmother     Alzheimer Disease Maternal Grandmother     Cardiovascular Paternal Grandmother     Breast Cancer Sister     Colon Cancer No family hx of     Colon Polyps No family hx of     Crohn's Disease No family hx of     Ulcerative Colitis No family hx of     Anesthesia Reaction No family hx of     Melanoma No family hx of        Social History     Socioeconomic History    Marital status:      Spouse name: Not on file    Number of children: Not on file    Years of education: Not on file    Highest education level: Not on file   Occupational History     Employer: RETIRED     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 No   Social History Narrative    Not on  file     Social Determinants of Health     Financial Resource Strain: Not on file   Food Insecurity: Not on file   Transportation Needs: Not on file   Physical Activity: Not on file   Stress: Not on file   Social Connections: Not on file   Interpersonal Safety: Low Risk  (3/5/2024)    Interpersonal Safety     Do you feel physically and emotionally safe where you currently live?: Yes     Within the past 12 months, have you been hit, slapped, kicked or otherwise physically hurt by someone?: No     Within the past 12 months, have you been humiliated or emotionally abused in other ways by your partner or ex-partner?: No   Housing Stability: Not on file       Outpatient Encounter Medications as of 3/13/2024   Medication Sig Dispense Refill    amLODIPine (NORVASC) 10 MG tablet Take 1 tablet (10 mg) by mouth daily 90 tablet 1    atorvastatin (LIPITOR) 20 MG tablet Take 1 tablet (20 mg) by mouth daily 90 tablet 3    blood glucose (ACCU-CHEK REGIS PLUS) test strip USE TO TEST BLOOD SUGAR THREE TIMES A DAY OR AS DIRECTED 300 strip 3    blood glucose monitoring (NO BRAND SPECIFIED) meter device kit Use to test blood sugar 3 times daily or as directed. 1 kit 0    Cholecalciferol (VITAMIN D-3) 1000 units CAPS Take 1 capsule by mouth daily       finasteride (PROSCAR) 5 MG tablet Take 1 tablet (5 mg) by mouth daily 90 tablet 3    insulin glargine (LANTUS SOLOSTAR) 100 UNIT/ML pen INJECT 16 UNITS UNDER THE SKIN AT BEDTIME 30 mL 2    insulin lispro (HUMALOG KWIKPEN) 100 UNIT/ML (1 unit dial) KWIKPEN USE 8 UNITS  PLUS LOW SLIDING SCALE BEFORE EACH MEAL. MAX 50 UNITS PER DAY. 60 mL 1    insulin pen needle (ULTICARE MINI) 31G X 6 MM miscellaneous USE 4 TO 5 TIMES DAILY OR AS DIRECTED FOR SLIDING SCALE INSULIN 500 each 1    losartan (COZAAR) 100 MG tablet Take 1 tablet (100 mg) by mouth daily 90 tablet 1    metoprolol succinate ER (TOPROL XL) 100 MG 24 hr tablet Take 1 tablet (100 mg) by mouth daily 90 tablet 3    tamsulosin (FLOMAX)  0.4 MG capsule Take 1 capsule (0.4 mg) by mouth daily 90 capsule 3    trospium (SANCTURA) 20 MG tablet Take 1 tablet (20 mg) by mouth every evening 90 tablet 3     No facility-administered encounter medications on file as of 3/13/2024.             O:   NAD, WDWN, Alert & Oriented, Mood & Affect wnl, Vitals stable   General appearance normal   Vitals stable   Alert, oriented and in no acute distress     R pre auricular cheek 1.1cm red plaque       Eyes: Conjunctivae/lids:Normal     ENT: Lips, buccal mucosa, tongue: normal    MSK:Normal    Cardiovascular: peripheral edema none    Pulm: Breathing Normal    Neuro/Psych: Orientation:Alert and Orientedx3 ; Mood/Affect:normal       A/P:  R pre auricular cheek basal cell carcinoma   MOHS:   Location    The rationale for Mohs surgery was discussed with the patient and consent was obtained.  The risks and benefits as well as alternatives to therapy were discussed, in detail.  Specifically, the risks of infection, scarring, bleeding, prolonged wound healing, incomplete removal, allergy to anesthesia, nerve injury and recurrence were addressed.  Indication for Mohs was Location. Prior to the procedure, the treatment site was clearly identified and, if available, confirmed with previous photos and confirmed by the patient   All components of the Universal Protocol/PAUSE rule were completed.  The Mohs surgeon operated in two distinct and integrated capacities as the surgeon and pathologist.      The area was prepped with Betasept.  A rim of normal appearing skin was marked circumferentially around the lesion.  The area was infiltrated with local anesthesia.  The tumor was first debulked to remove all clinically apparent tumor.  An incision following the standard Mohs approach was done and the specimen was oriented,mapped and placed in 2 block(s).  Each specimen was then chromacoded and processed in the Mohs laboratory using standard Mohs technique and submitted for frozen section  histology.  Frozen section analysis showed  residual tumor but CLEAR MARGINS.    1st stage:Orthokeratosis of epidermis with a proliferation of nests of basaloid cells, with peripheral palisading and a haphazard arrangement in the center extending into the dermis, forming nodules.  The tumor cells have hyperchromatic nuclei. Poor cytoplasm and intercellular bridging.      The tumor was excised using standard Mohs technique in 2 stages(s).  CLEAR MARGINS OBTAINED and Final defect size was 1.8 x 2.3 cm.     We discussed the options for wound management in full with the patient including risks/benefits/ possible outcomes.      REPAIR COMPLEX: Because of the tightness of the surrounding skin and Because of the size and full thickness nature of the defect, Because of the tightness of the surrounding skin, To maintain form and function, In order to avoid distortion, and Because of the proximity to the ear, a complex closure was planned. After LE anesthesia and prep, Burow's triangles were excised in the relaxed skin tension lines. The wound edges were widely undermined greater than width of the defect on both sides by dissection in the subcutaneous plane until adequate tissue mobility was obtained. Hemostasis was obtained. The wound edges were closed in a layered fashion using Vicryl and Fast Absorbing Plain Gut sutures. Postoperative length was 4.5 cm.   EBL minimal; complications none; wound care routine.  The patient was discharged in good condition and will return in one week for wound evaluation.  It was a pleasure speaking to Storm Dutta today.  Previous clinic notes and pertinent laboratory tests were reviewed prior to Storm Dutta's visit.  Signs and Symptoms of skin cancer discussed with patient.  Patient encouraged to perform monthly skin exams.  UV precautions reviewed with patient.  Risks of non-melanoma skin cancer discussed with patient   Return to clinic 6 months

## 2024-03-13 NOTE — NURSING NOTE
Storm Dutta's chief complaint for this visit includes:  Chief Complaint   Patient presents with    Derm Problem     Mohs- right cheek     PCP: Jovana Beaulieu    Referring Provider:  Fransisca Rod PA-C  [address] [facility]  [place] [id number]    There were no vitals taken for this visit.  No Pain (0)        Allergies   Allergen Reactions    Nkda [No Known Drug Allergy]          Do you need any medication refills at today's visit? No    Apple Nieves MA

## 2024-03-13 NOTE — LETTER
3/13/2024         RE: Storm Dutta  [address] [place] [address]        Dear Colleague,    Thank you for referring your patient, Storm Dutta, to the [facility]. Please see a copy of my visit note below.    Surgical Office Location :   [facility]  [address] [place] [address]      Storm Dutta is an extremely pleasant 83 year old year old male patient here today for evaluation and managment of basal cell carcinoma on right pre auricular cheek.  Patient has no other skin complaints today.  Remainder of the HPI, Meds, PMH, Allergies, FH, and SH was reviewed in chart.      Past Medical History:   Diagnosis Date     Acute urinary retention 11/2012    ER visit   / 1200 cc post-void residual     Acute urinary retention 11/01/2012    ER      Basal cell carcinoma      Diabetes mellitus type II      Epididymitis 03/20/2014    Started on doxycyclline for UTI/orchitis. He was discharged on 03/22/14.     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     PE (pulmonary embolism) 03/20/2014     Rectal cancer (H)      Skin cancer      Squamous cell carcinoma      Thrombosis of leg     +PE       Past Surgical History:   Procedure Laterality Date     AMPUTATE TOE(S) Right 06/04/2019    Procedure: AMPUTATION 2nd Toe;  Surgeon: Adriel Cabello DPM;  Location: [facility]     BIOPSY       COLONOSCOPY  07/29/2013    Procedure: COMBINED COLONOSCOPY, SINGLE BIOPSY/POLYPECTOMY BY BIOPSY;;  Surgeon: Bari Burnett MD;  Location: [facility]     COLONOSCOPY N/A 06/04/2015    Procedure: COMBINED COLONOSCOPY, SINGLE OR MULTIPLE BIOPSY/POLYPECTOMY BY BIOPSY;  Surgeon: Tara Mtz MD;  Location:  GI     COLONOSCOPY N/A 12/05/2016    Procedure: COLONOSCOPY;  Surgeon: Breanna Kline MD;  Location:  GI     COLONOSCOPY N/A  10/11/2022    Procedure: COLONOSCOPY, FLEXIBLE, WITH LESION REMOVAL USING SNARE;  Surgeon: Pierre Doe MD;  Location: [facility] GI     CV CORONARY ANGIOGRAM N/A 11/22/2021    Procedure: Coronary Angiogram;  Surgeon: Jak Noe MD;  Location:  HEART CARDIAC CATH LAB     ENT SURGERY       EYE SURGERY  05/01/2012    Right eye procedure     HC CIRCUMCISION SURGICAL NON-DEV >28 DAYS AGE  02/01/1997    due to phimosis     HC REMOVAL GALLBLADDER  05/01/2001     HC UGI ENDOSCOPY W PLACEMENT GASTROSTOMY TUBE PERCUT  03/13/2014    Procedure: COMBINED ESOPHAGOSCOPY, GASTROSCOPY, DUODENOSCOPY (EGD), PLACE PERCUTANEOUS ENDOSCOPIC GASTROSTOMY TUBE;  Surgeon: Loco Casillas MD;  Location: [facility]     LAPAROSCOPIC ASSISTED COLECTOMY LEFT (DESCENDING)  01/07/2014    Procedure: LAPAROSCOPIC ASSISTED COLECTOMY LEFT (DESCENDING);  Laparoscopic hand assisted Low Anterior Resection With colonic J pouch and end to end colorectal anastamosis. Laparoscopic splenic flexure mobilization.  Loop Ileostomy.  Rigid proctoscopy;  Surgeon: Margaret Gamble MD;  Location:  OR     PHACOEMULSIFICATION WITH STANDARD INTRAOCULAR LENS IMPLANT  04/11/2013    Procedure: PHACOEMULSIFICATION WITH STANDARD INTRAOCULAR LENS IMPLANT;  Right Kelman Phacoemulsification with Intraocular Lens Implant;  Surgeon: Caesar Turner MD;  Location: [facility]     REMOVE GASTROSTOMY TUBE ADULT  07/25/2014    Procedure: REMOVE GASTROSTOMY TUBE ADULT;  Surgeon: Margaret Gamble MD;  [facility]     SIGMOIDOSCOPY FLEXIBLE  06/23/2014    Procedure: SIGMOIDOSCOPY FLEXIBLE;  Surgeon: Margaret Gamble MD;  Location:  GI     SIGMOIDOSCOPY FLEXIBLE  07/22/2014    Procedure: SIGMOIDOSCOPY FLEXIBLE;  Surgeon: Margaret Gamble MD;  [facility]     SIGMOIDOSCOPY FLEXIBLE  07/25/2014    Procedure: SIGMOIDOSCOPY FLEXIBLE;  Surgeon: Margaret Gamble MD;  [facility]     SIGMOIDOSCOPY FLEXIBLE   07/28/2014    Procedure: SIGMOIDOSCOPY FLEXIBLE;  Surgeon: Margaret Gamble MD;  [facility]     SIGMOIDOSCOPY FLEXIBLE  07/31/2014    Procedure: SIGMOIDOSCOPY FLEXIBLE;  Surgeon: Margaret Gamble MD;  [facility]     SIGMOIDOSCOPY FLEXIBLE  08/03/2014    Procedure: SIGMOIDOSCOPY FLEXIBLE;  Surgeon: Margaret Gamble MD;  [facility]     SIGMOIDOSCOPY FLEXIBLE  08/05/2014    Procedure: SIGMOIDOSCOPY FLEXIBLE;  Surgeon: Margaret Gamble MD;  [facility]     SIGMOIDOSCOPY FLEXIBLE  08/08/2014    Procedure: SIGMOIDOSCOPY FLEXIBLE;  Surgeon: Margaret Gamble MD;  [facility]     SIGMOIDOSCOPY FLEXIBLE  08/18/2014    Procedure: SIGMOIDOSCOPY FLEXIBLE;  Surgeon: Jose Roberto Cervantes MD;  [facility]     SIGMOIDOSCOPY FLEXIBLE N/A 08/15/2014    Procedure: SIGMOIDOSCOPY FLEXIBLE;  Surgeon: Margaret Gamble MD;  [facility]     SIGMOIDOSCOPY FLEXIBLE N/A 08/22/2014    Procedure: SIGMOIDOSCOPY FLEXIBLE;  Surgeon: Jose Roberto Cervantes MD;  [facility]     SIGMOIDOSCOPY FLEXIBLE N/A 08/11/2014    Procedure: SIGMOIDOSCOPY FLEXIBLE;  Surgeon: Margaret Gamble MD;  [facility]     SIGMOIDOSCOPY FLEXIBLE N/A 08/26/2014    Procedure: SIGMOIDOSCOPY FLEXIBLE;  Surgeon: Margaret Gamble MD;  [facility]     SIGMOIDOSCOPY FLEXIBLE N/A 08/29/2014    Procedure: SIGMOIDOSCOPY FLEXIBLE;  Surgeon: Mragaret Gamble MD;  [facility]     SIGMOIDOSCOPY FLEXIBLE N/A 09/08/2014    Procedure: SIGMOIDOSCOPY FLEXIBLE;  Surgeon: Margaret Gamble MD;  [facility]     SIGMOIDOSCOPY FLEXIBLE N/A 09/02/2014    Procedure: SIGMOIDOSCOPY FLEXIBLE;  Surgeon: Breanna Kline MD;  [facility]     SIGMOIDOSCOPY FLEXIBLE N/A 09/11/2014    Procedure: SIGMOIDOSCOPY FLEXIBLE;  Surgeon: Margaret Gamble MD;  [facility]     SIGMOIDOSCOPY FLEXIBLE N/A 09/19/2014    Procedure: SIGMOIDOSCOPY FLEXIBLE;  Surgeon:  Margaret Gamble MD;  [facility]     SIGMOIDOSCOPY FLEXIBLE N/A 09/15/2014    Procedure: SIGMOIDOSCOPY FLEXIBLE;  Surgeon: Margaret Gamble MD;  [facility]     SIGMOIDOSCOPY FLEXIBLE N/A 09/05/2014    Procedure: SIGMOIDOSCOPY FLEXIBLE;  Surgeon: Margaret Gamble MD;  [facility]     SIGMOIDOSCOPY FLEXIBLE N/A 09/23/2014    Procedure: SIGMOIDOSCOPY FLEXIBLE;  Surgeon: Margaret Gamble MD;  [facility]     SIGMOIDOSCOPY FLEXIBLE N/A 09/26/2014    Procedure: SIGMOIDOSCOPY FLEXIBLE;  Surgeon: Margaret Gamble MD;  [facility]     SIGMOIDOSCOPY FLEXIBLE N/A 09/30/2014    Procedure: SIGMOIDOSCOPY FLEXIBLE;  Surgeon: Margaret Gamble MD;  [facility]     SIGMOIDOSCOPY FLEXIBLE N/A 10/03/2014    Procedure: SIGMOIDOSCOPY FLEXIBLE;  Surgeon: Margaret Gamble MD;  [facility]     SIGMOIDOSCOPY FLEXIBLE N/A 10/30/2014    Procedure: SIGMOIDOSCOPY FLEXIBLE;  Surgeon: Margaret Gamble MD;  [facility]     SIGMOIDOSCOPY FLEXIBLE, PLACEMENT OF DRAINAGE SPONGE  09/30/2014     TAKEDOWN ILEOSTOMY N/A 01/06/2015    Procedure: TAKEDOWN ILEOSTOMY;  Surgeon: Margaret Gamble MD;  [facility]        Family History   Problem Relation Age of Onset     Diabetes Mother      Hypertension Mother      Cancer Father      Cancer Maternal Grandmother      Alzheimer Disease Maternal Grandmother      Cardiovascular Paternal Grandmother      Breast Cancer Sister      Colon Cancer No family hx of      Colon Polyps No family hx of      Crohn's Disease No family hx of      Ulcerative Colitis No family hx of      Anesthesia Reaction No family hx of      Melanoma No family hx of        Social History     Socioeconomic History     Marital status:      Spouse name: Not on file     Number of children: Not on file     Years of education: Not on file     Highest education level: Not on file   Occupational History     Employer:  RETIRED     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 No   Social History Narrative     Not on file     Social Determinants of Health     Financial Resource Strain: Not on file   Food Insecurity: Not on file   Transportation Needs: Not on file   Physical Activity: Not on file   Stress: Not on file   Social Connections: Not on file   Interpersonal Safety: Low Risk  (3/5/2024)    Interpersonal Safety      Do you feel physically and emotionally safe where you currently live?: Yes      Within the past 12 months, have you been hit, slapped, kicked or otherwise physically hurt by someone?: No      Within the past 12 months, have you been humiliated or emotionally abused in other ways by your partner or ex-partner?: No   Housing Stability: Not on file       Outpatient Encounter Medications as of 3/13/2024   Medication Sig Dispense Refill     amLODIPine (NORVASC) 10 MG tablet Take 1 tablet (10 mg) by mouth daily 90 tablet 1     atorvastatin (LIPITOR) 20 MG tablet Take 1 tablet (20 mg) by mouth daily 90 tablet 3     blood glucose (ACCU-CHEK REGIS PLUS) test strip USE TO TEST BLOOD SUGAR THREE TIMES A DAY OR AS DIRECTED 300 strip 3     blood glucose monitoring (NO BRAND SPECIFIED) meter device kit Use to test blood sugar 3 times daily or as directed. 1 kit 0     Cholecalciferol (VITAMIN D-3) 1000 units CAPS Take 1 capsule by mouth daily        finasteride (PROSCAR) 5 MG tablet Take 1 tablet (5 mg) by mouth daily 90 tablet 3     insulin glargine (LANTUS SOLOSTAR) 100 UNIT/ML pen INJECT 16 UNITS UNDER THE SKIN AT BEDTIME 30 mL 2     insulin lispro (HUMALOG KWIKPEN) 100 UNIT/ML (1 unit  dial) KWIKPEN USE 8 UNITS  PLUS LOW SLIDING SCALE BEFORE EACH MEAL. MAX 50 UNITS PER DAY. 60 mL 1     insulin pen needle (ULTICARE MINI) 31G X 6 MM miscellaneous USE 4 TO 5 TIMES DAILY OR AS DIRECTED FOR SLIDING SCALE INSULIN 500 each 1     losartan (COZAAR) 100 MG tablet Take 1 tablet (100 mg) by mouth daily 90 tablet 1     metoprolol succinate ER (TOPROL XL) 100 MG 24 hr tablet Take 1 tablet (100 mg) by mouth daily 90 tablet 3     tamsulosin (FLOMAX) 0.4 MG capsule Take 1 capsule (0.4 mg) by mouth daily 90 capsule 3     trospium (SANCTURA) 20 MG tablet Take 1 tablet (20 mg) by mouth every evening 90 tablet 3     No facility-administered encounter medications on file as of 3/13/2024.             O:   NAD, WDWN, Alert & Oriented, Mood & Affect wnl, Vitals stable   General appearance normal   Vitals stable   Alert, oriented and in no acute distress     R pre auricular cheek 1.1cm red plaque       Eyes: Conjunctivae/lids:Normal     ENT: Lips, buccal mucosa, tongue: normal    MSK:Normal    Cardiovascular: peripheral edema none    Pulm: Breathing Normal    Neuro/Psych: Orientation:Alert and Orientedx3 ; Mood/Affect:normal       A/P:  R pre auricular cheek basal cell carcinoma   MOHS:   Location    The rationale for Mohs surgery was discussed with the patient and consent was obtained.  The risks and benefits as well as alternatives to therapy were discussed, in detail.  Specifically, the risks of infection, scarring, bleeding, prolonged wound healing, incomplete removal, allergy to anesthesia, nerve injury and recurrence were addressed.  Indication for Mohs was Location. Prior to the procedure, the treatment site was clearly identified and, if available, confirmed with previous photos and confirmed by the patient   All components of the Universal Protocol/PAUSE rule were completed.  The Mohs surgeon operated in two distinct and integrated capacities as the surgeon and pathologist.      The area was prepped with Betasept.   A rim of normal appearing skin was marked circumferentially around the lesion.  The area was infiltrated with local anesthesia.  The tumor was first debulked to remove all clinically apparent tumor.  An incision following the standard Mohs approach was done and the specimen was oriented,mapped and placed in 2 block(s).  Each specimen was then chromacoded and processed in the Mohs laboratory using standard Mohs technique and submitted for frozen section histology.  Frozen section analysis showed  residual tumor but CLEAR MARGINS.    1st stage:Orthokeratosis of epidermis with a proliferation of nests of basaloid cells, with peripheral palisading and a haphazard arrangement in the center extending into the dermis, forming nodules.  The tumor cells have hyperchromatic nuclei. Poor cytoplasm and intercellular bridging.      The tumor was excised using standard Mohs technique in 2 stages(s).  CLEAR MARGINS OBTAINED and Final defect size was 1.8 x 2.3 cm.     We discussed the options for wound management in full with the patient including risks/benefits/ possible outcomes.      REPAIR COMPLEX: Because of the tightness of the surrounding skin and Because of the size and full thickness nature of the defect, Because of the tightness of the surrounding skin, To maintain form and function, In order to avoid distortion, and Because of the proximity to the ear, a complex closure was planned. After LE anesthesia and prep, Burow's triangles were excised in the relaxed skin tension lines. The wound edges were widely undermined greater than width of the defect on both sides by dissection in the subcutaneous plane until adequate tissue mobility was obtained. Hemostasis was obtained. The wound edges were closed in a layered fashion using Vicryl and Fast Absorbing Plain Gut sutures. Postoperative length was 4.5 cm.   EBL minimal; complications none; wound care routine.  The patient was discharged in good condition and will return in  one week for wound evaluation.  It was a pleasure speaking to Storm Dutta today.  Previous clinic notes and pertinent laboratory tests were reviewed prior to Storm Dutta's visit.  Signs and Symptoms of skin cancer discussed with patient.  Patient encouraged to perform monthly skin exams.  UV precautions reviewed with patient.  Risks of non-melanoma skin cancer discussed with patient   Return to clinic 6 months        Again, thank you for allowing me to participate in the care of your patient.        Sincerely,        Jesus Neal MD

## 2024-03-29 DIAGNOSIS — E11.42 TYPE 2 DIABETES MELLITUS WITH DIABETIC POLYNEUROPATHY, WITH LONG-TERM CURRENT USE OF INSULIN (H): Primary | ICD-10-CM

## 2024-03-29 DIAGNOSIS — Z79.4 TYPE 2 DIABETES MELLITUS WITH DIABETIC POLYNEUROPATHY, WITH LONG-TERM CURRENT USE OF INSULIN (H): Primary | ICD-10-CM

## 2024-03-29 RX ORDER — PEN NEEDLE, DIABETIC 31 G X1/4"
NEEDLE, DISPOSABLE MISCELLANEOUS
Qty: 500 EACH | Refills: 1 | Status: SHIPPED | OUTPATIENT
Start: 2024-03-29

## 2024-05-06 ENCOUNTER — OFFICE VISIT (OUTPATIENT)
Dept: DERMATOLOGY | Facility: CLINIC | Age: 84
End: 2024-05-06
Payer: COMMERCIAL

## 2024-05-06 DIAGNOSIS — D23.9 DERMAL NEVUS: Primary | ICD-10-CM

## 2024-05-06 DIAGNOSIS — L82.1 SEBORRHEIC KERATOSES: ICD-10-CM

## 2024-05-06 DIAGNOSIS — L57.0 AK (ACTINIC KERATOSIS): ICD-10-CM

## 2024-05-06 DIAGNOSIS — Z85.828 HISTORY OF SKIN CANCER: ICD-10-CM

## 2024-05-06 DIAGNOSIS — D48.5 NEOPLASM OF UNCERTAIN BEHAVIOR OF SKIN: ICD-10-CM

## 2024-05-06 DIAGNOSIS — D18.01 ANGIOMA OF SKIN: ICD-10-CM

## 2024-05-06 DIAGNOSIS — L81.4 LENTIGO: ICD-10-CM

## 2024-05-06 PROCEDURE — 11102 TANGNTL BX SKIN SINGLE LES: CPT | Performed by: DERMATOLOGY

## 2024-05-06 PROCEDURE — 88342 IMHCHEM/IMCYTCHM 1ST ANTB: CPT | Performed by: PATHOLOGY

## 2024-05-06 PROCEDURE — 99213 OFFICE O/P EST LOW 20 MIN: CPT | Mod: 25 | Performed by: DERMATOLOGY

## 2024-05-06 PROCEDURE — 17003 DESTRUCT PREMALG LES 2-14: CPT | Performed by: DERMATOLOGY

## 2024-05-06 PROCEDURE — 88305 TISSUE EXAM BY PATHOLOGIST: CPT | Performed by: PATHOLOGY

## 2024-05-06 PROCEDURE — 17000 DESTRUCT PREMALG LESION: CPT | Mod: 59 | Performed by: DERMATOLOGY

## 2024-05-06 NOTE — PATIENT INSTRUCTIONS
Wound Care Instructions     FOR SUPERFICIAL WOUNDS     [facility] [phone]    [facility] [phone]                       AFTER 24 HOURS YOU SHOULD REMOVE THE BANDAGE AND BEGIN DAILY DRESSING CHANGES AS FOLLOWS:     1) Remove Dressing.     2) Clean and dry the area with tap water using a Q-tip or sterile gauze pad.     3) Apply Vaseline, Aquaphor, Polysporin ointment or Bacitracin ointment over entire wound.  Do NOT use Neosporin ointment.     4) Cover the wound with a band-aid, or a sterile non-stick gauze pad and micropore paper tape      REPEAT THESE INSTRUCTIONS AT LEAST ONCE A DAY UNTIL THE WOUND HAS COMPLETELY HEALED.    It is an old wives tale that a wound heals better when it is exposed to air and allowed to dry out. The wound will heal faster with a better cosmetic result if it is kept moist with ointment and covered with a bandage.    **Do not let the wound dry out.**      Supplies Needed:      *Cotton tipped applicators (Q-tips)    *Polysporin Ointment or Bacitracin Ointment (NOT NEOSPORIN)    *Band-aids or non-stick gauze pads and micropore paper tape.      PATIENT INFORMATION:    During the healing process you will notice a number of changes. All wounds develop a small halo of redness surrounding the wound.  This means healing is occurring. Severe itching with extensive redness usually indicates sensitivity to the ointment or bandage tape used to dress the wound.  You should call our office if this develops.      Swelling  and/or discoloration around your surgical site is common, particularly when performed around the eye.    All wounds normally drain.  The larger the wound the more drainage there will be.  After 7-10 days, you will notice the wound beginning to shrink and new skin will begin to grow.  The wound is healed when you can see skin has formed over the entire area.  A healed wound has a healthy, shiny look to the surface and is red to dark pink in color  to normalize.  Wounds may take approximately 4-6 weeks to heal.  Larger wounds may take 6-8 weeks.  After the wound is healed you may discontinue dressing changes.    You may experience a sensation of tightness as your wound heals. This is normal and will gradually subside.    Your healed wound may be sensitive to temperature changes. This sensitivity improves with time, but if you re having a lot of discomfort, try to avoid temperature extremes.    Patients frequently experience itching after their wound appears to have healed because of the continue healing under the skin.  Plain Vaseline will help relieve the itching.        POSSIBLE COMPLICATIONS    BLEEDING:    Leave the bandage in place.  Use tightly rolled up gauze or a cloth to apply direct pressure over the bandage for 30  minutes.  Reapply pressure for an additional 30 minutes if necessary  Use additional gauze and tape to maintain pressure once the bleeding has stopped.   WOUND CARE INSTRUCTIONS   FOR CRYOSURGERY   This area treated with liquid nitrogen should form a blister (areas treated may or may not blister-skin may just turn dark and slough off). You do not need to bandage the area unless a blister forms and breaks (which may be a few days). When the blister breaks, begin daily dressing changes as follows:  1) Clean and dry the area with tap water using clean Q-tip or sterile gauze pad.   2) Apply Polysporin ointment or Bacitracin ointment over entire wound. Do NOT use Neosporin ointment.   3) Cover the wound with a band-aid or sterile non-stick gauze pad and micropore paper tape.   REPEAT THESE INSTRUCTIONS AT LEAST ONCE A DAY UNTIL THE WOUND HAS COMPLETELY HEALED.   It is an old wives tale that a wound heals better when it is exposed to air and allowed to dry out. The wound will heal faster with a better cosmetic result if it is kept moist with ointment and covered with a bandage.   Do not let the wound dry out.   IMPORTANT INFORMATION ON REVERSE  SIDE   Supplies Needed:   *Cotton tipped applicators (Q-tips)   *Polysporin ointment or Bacitracin ointment (NOT NEOSPORIN)   *Band-aids, or non stick gauze pads and micropore paper tape   PATIENT INFORMATION   During the healing process you will notice a number of changes. All wounds develop a small halo of redness surrounding the wound. This means healing is occurring. Severe itching with extensive redness usually indicates sensitivity to the ointment or bandage tape used to dress the wound. You should call our office if this develops.   Swelling and/or discoloration around your surgical site is common, particularly when performed around the eye.   All wounds normally drain. The larger the wound the more drainage there will be. After 7-10 days, you will notice the wound beginning to shrink and new skin will begin to grow. The wound is healed when you can see skin has formed over the entire area. A healed wound has a healthy, shiny look to the surface and is red to dark pink in color to normalize. Wounds may take approximately 4-6 weeks to heal. Larger wounds may take 6-8 weeks. After the wound is healed you may discontinue dressing changes.   You may experience a sensation of tightness as your wound heals. This is normal and will gradually subside.   Your healed wound may be sensitive to temperature changes. This sensitivity improves with time, but if you re having a lot of discomfort, try to avoid temperature extremes.   Patients frequently experience itching after their wound appears to have healed because of the continue healing under the skin. Plain Vaseline will help relieve the itching.

## 2024-05-06 NOTE — LETTER
5/6/2024         RE: Storm Dutta  [address] [place] [address]        Dear Colleague,    Thank you for referring your patient, Storm Dutta, to the [facility]. Please see a copy of my visit note below.    Storm Dutta is an extremely pleasant 83 year old year old male patient here today for hx of non-melanoma skin cancer.  Patient has no other skin complaints today.  Remainder of the HPI, Meds, PMH, Allergies, FH, and SH was reviewed in chart.      Past Medical History:   Diagnosis Date     Acute urinary retention 11/2012    ER visit   / 1200 cc post-void residual     Acute urinary retention 11/01/2012    ER      Basal cell carcinoma      Diabetes mellitus type II      Epididymitis 03/20/2014    Started on doxycyclline for UTI/orchitis. He was discharged on 03/22/14.     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     PE (pulmonary embolism) 03/20/2014     Rectal cancer (H)      Skin cancer      Squamous cell carcinoma      Thrombosis of leg     +PE       Past Surgical History:   Procedure Laterality Date     AMPUTATE TOE(S) Right 06/04/2019    Procedure: AMPUTATION 2nd Toe;  Surgeon: Adriel Cabello DPM;  Location: [facility]     BIOPSY       COLONOSCOPY  07/29/2013    Procedure: COMBINED COLONOSCOPY, SINGLE BIOPSY/POLYPECTOMY BY BIOPSY;;  Surgeon: Bari Burnett MD;  [facility]: [facility]     COLONOSCOPY N/A 06/04/2015    Procedure: COMBINED COLONOSCOPY, SINGLE OR MULTIPLE BIOPSY/POLYPECTOMY BY BIOPSY;  Surgeon: Tara Mtz MD;  Location:  GI     COLONOSCOPY N/A 12/05/2016    Procedure: COLONOSCOPY;  Surgeon: Breanna Kline MD;  Location:  GI     COLONOSCOPY N/A 10/11/2022    Procedure: COLONOSCOPY, FLEXIBLE, WITH LESION REMOVAL USING SNARE;  Surgeon: Pierre Doe MD;  Location: [facility]     CV CORONARY  ANGIOGRAM N/A 11/22/2021    Procedure: Coronary Angiogram;  Surgeon: Jak Noe MD;  Location:  [facility]     ENT SURGERY       EYE SURGERY  05/01/2012    Right eye procedure     HC CIRCUMCISION SURGICAL NON-DEV >28 DAYS AGE  02/01/1997    due to phimosis     HC REMOVAL GALLBLADDER  05/01/2001     HC UGI ENDOSCOPY W PLACEMENT GASTROSTOMY TUBE PERCUT  03/13/2014    Procedure: COMBINED ESOPHAGOSCOPY, GASTROSCOPY, DUODENOSCOPY (EGD), PLACE PERCUTANEOUS ENDOSCOPIC GASTROSTOMY TUBE;  Surgeon: Loco Casillas MD;  Location: [facility]     LAPAROSCOPIC ASSISTED COLECTOMY LEFT (DESCENDING)  01/07/2014    Procedure: LAPAROSCOPIC ASSISTED COLECTOMY LEFT (DESCENDING);  Laparoscopic hand assisted Low Anterior Resection With colonic J pouch and end to end colorectal anastamosis. Laparoscopic splenic flexure mobilization.  Loop Ileostomy.  Rigid proctoscopy;  Surgeon: Margaret Gamble MD;  [facility]     PHACOEMULSIFICATION WITH STANDARD INTRAOCULAR LENS IMPLANT  04/11/2013    Procedure: PHACOEMULSIFICATION WITH STANDARD INTRAOCULAR LENS IMPLANT;  Right Kelman Phacoemulsification with Intraocular Lens Implant;  Surgeon: Caesar Turner MD;  [facility]     REMOVE GASTROSTOMY TUBE ADULT  07/25/2014    Procedure: REMOVE GASTROSTOMY TUBE ADULT;  Surgeon: Margaret Gamble MD;  [facility]     SIGMOIDOSCOPY FLEXIBLE  06/23/2014    Procedure: SIGMOIDOSCOPY FLEXIBLE;  Surgeon: Margaret Gambel MD;  Location:  GI     SIGMOIDOSCOPY FLEXIBLE  07/22/2014    Procedure: SIGMOIDOSCOPY FLEXIBLE;  Surgeon: Margaret Gamble MD;  [facility]     SIGMOIDOSCOPY FLEXIBLE  07/25/2014    Procedure: SIGMOIDOSCOPY FLEXIBLE;  Surgeon: Margaret Gamble MD;  [facility]     SIGMOIDOSCOPY FLEXIBLE  07/28/2014    Procedure: SIGMOIDOSCOPY FLEXIBLE;  Surgeon: Margaret Gamble MD;  [facility]     SIGMOIDOSCOPY FLEXIBLE  07/31/2014    Procedure:  SIGMOIDOSCOPY FLEXIBLE;  Surgeon: Margaret Gamble MD;  [facility]     SIGMOIDOSCOPY FLEXIBLE  08/03/2014    Procedure: SIGMOIDOSCOPY FLEXIBLE;  Surgeon: Margaret Gamble MD;  [facility]     SIGMOIDOSCOPY FLEXIBLE  08/05/2014    Procedure: SIGMOIDOSCOPY FLEXIBLE;  Surgeon: Margaret Gamble MD;  [facility]     SIGMOIDOSCOPY FLEXIBLE  08/08/2014    Procedure: SIGMOIDOSCOPY FLEXIBLE;  Surgeon: Margaret Gamble MD;  [facility]     SIGMOIDOSCOPY FLEXIBLE  08/18/2014    Procedure: SIGMOIDOSCOPY FLEXIBLE;  Surgeon: Jose Roberto Cervantes MD;  [facility]     SIGMOIDOSCOPY FLEXIBLE N/A 08/15/2014    Procedure: SIGMOIDOSCOPY FLEXIBLE;  Surgeon: Margaret Gamble MD;  [facility]     SIGMOIDOSCOPY FLEXIBLE N/A 08/22/2014    Procedure: SIGMOIDOSCOPY FLEXIBLE;  Surgeon: Jose Roberto Cervantes MD;  [facility]     SIGMOIDOSCOPY FLEXIBLE N/A 08/11/2014    Procedure: SIGMOIDOSCOPY FLEXIBLE;  Surgeon: Margaret Gamble MD;  [facility]     SIGMOIDOSCOPY FLEXIBLE N/A 08/26/2014    Procedure: SIGMOIDOSCOPY FLEXIBLE;  Surgeon: Margaret Gamble MD;  [facility]     SIGMOIDOSCOPY FLEXIBLE N/A 08/29/2014    Procedure: SIGMOIDOSCOPY FLEXIBLE;  Surgeon: Margaret Gamble MD;  [facility]     SIGMOIDOSCOPY FLEXIBLE N/A 09/08/2014    Procedure: SIGMOIDOSCOPY FLEXIBLE;  Surgeon: Margaret Gamble MD;  [facility]     SIGMOIDOSCOPY FLEXIBLE N/A 09/02/2014    Procedure: SIGMOIDOSCOPY FLEXIBLE;  Surgeon: Breanna Kline MD;  [facility]     SIGMOIDOSCOPY FLEXIBLE N/A 09/11/2014    Procedure: SIGMOIDOSCOPY FLEXIBLE;  Surgeon: Margaret Gamble MD;  [facility]     SIGMOIDOSCOPY FLEXIBLE N/A 09/19/2014    Procedure: SIGMOIDOSCOPY FLEXIBLE;  Surgeon: Margaret Gamble MD;  [facility]     SIGMOIDOSCOPY FLEXIBLE N/A 09/15/2014    Procedure: SIGMOIDOSCOPY FLEXIBLE;  Surgeon: Margaret Gamble  MD;  [facility]     SIGMOIDOSCOPY FLEXIBLE N/A 09/05/2014    Procedure: SIGMOIDOSCOPY FLEXIBLE;  Surgeon: Margaret Gamble MD;  [facility]     SIGMOIDOSCOPY FLEXIBLE N/A 09/23/2014    Procedure: SIGMOIDOSCOPY FLEXIBLE;  Surgeon: Margaret Gamble MD;  [facility]     SIGMOIDOSCOPY FLEXIBLE N/A 09/26/2014    Procedure: SIGMOIDOSCOPY FLEXIBLE;  Surgeon: Margaret Gamble MD;  [facility]     SIGMOIDOSCOPY FLEXIBLE N/A 09/30/2014    Procedure: SIGMOIDOSCOPY FLEXIBLE;  Surgeon: Margaret Gamble MD;  [facility]     SIGMOIDOSCOPY FLEXIBLE N/A 10/03/2014    Procedure: SIGMOIDOSCOPY FLEXIBLE;  Surgeon: Margaret Gamble MD;  [facility]     SIGMOIDOSCOPY FLEXIBLE N/A 10/30/2014    Procedure: SIGMOIDOSCOPY FLEXIBLE;  Surgeon: Margaret Gamble MD;  [facility]     SIGMOIDOSCOPY FLEXIBLE, PLACEMENT OF DRAINAGE SPONGE  09/30/2014     TAKEDOWN ILEOSTOMY N/A 01/06/2015    Procedure: TAKEDOWN ILEOSTOMY;  Surgeon: Margaret Gamble MD;  [facility]        Family History   Problem Relation Age of Onset     Diabetes Mother      Hypertension Mother      Cancer Father      Cancer Maternal Grandmother      Alzheimer Disease Maternal Grandmother      Cardiovascular Paternal Grandmother      Breast Cancer Sister      Colon Cancer No family hx of      Colon Polyps No family hx of      Crohn's Disease No family hx of      Ulcerative Colitis No family hx of      Anesthesia Reaction No family hx of      Melanoma No family hx of        Social History     Socioeconomic History     Marital status:      Spouse name: Not on file     Number of children: Not on file     Years of education: Not on file     Highest education level: Not on file   Occupational History     Employer: RETIRED     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 No   Social History Narrative     Not on file     Social Determinants of Health     Financial Resource Strain: Not on file   Food Insecurity: Not on file   Transportation Needs: Not on file   Physical Activity: Not on file   Stress: Not on file   Social Connections: Not on file   Interpersonal Safety: Low Risk  (3/5/2024)    Interpersonal Safety      Do you feel physically and emotionally safe where you currently live?: Yes      Within the past 12 months, have you been hit, slapped, kicked or otherwise physically hurt by someone?: No      Within the past 12 months, have you been humiliated or emotionally abused in other ways by your partner or ex-partner?: No   Housing Stability: Not on file       Outpatient Encounter Medications as of 5/6/2024   Medication Sig Dispense Refill     amLODIPine (NORVASC) 10 MG tablet Take 1 tablet (10 mg) by mouth daily 90 tablet 1     atorvastatin (LIPITOR) 20 MG tablet Take 1 tablet (20 mg) by mouth daily 90 tablet 3     blood glucose (ACCU-CHEK REGIS PLUS) test strip USE TO TEST BLOOD SUGAR THREE TIMES A DAY OR AS DIRECTED 300 strip 3     blood glucose monitoring (NO BRAND SPECIFIED) meter device kit Use to test blood sugar 3 times daily or as directed. 1 kit 0     Cholecalciferol (VITAMIN D-3) 1000 units CAPS Take 1 capsule by mouth daily        finasteride (PROSCAR) 5 MG tablet Take 1 tablet (5 mg) by mouth daily 90 tablet 3     insulin glargine (LANTUS SOLOSTAR) 100 UNIT/ML pen INJECT 16 UNITS UNDER THE SKIN AT BEDTIME 30 mL 2     insulin lispro (HUMALOG KWIKPEN) 100 UNIT/ML (1 unit dial) KWIKPEN USE 8 UNITS  PLUS LOW SLIDING SCALE BEFORE EACH MEAL. MAX 50 UNITS PER DAY. 60 mL 1     insulin pen needle (PENTIPS) 31G X 6 MM miscellaneous USE 4 TO 5  TIMES DAILY OR AS DIRECTED FOR SLIDING SCALE INSULIN 500 each 1     losartan (COZAAR) 100 MG tablet Take 1 tablet (100 mg) by mouth daily 90 tablet 1     metoprolol succinate ER (TOPROL XL) 100 MG 24 hr tablet Take 1 tablet (100 mg) by mouth daily 90 tablet 3     tamsulosin (FLOMAX) 0.4 MG capsule Take 1 capsule (0.4 mg) by mouth daily 90 capsule 3     trospium (SANCTURA) 20 MG tablet Take 1 tablet (20 mg) by mouth every evening 90 tablet 3     No facility-administered encounter medications on file as of 5/6/2024.             O:   NAD, WDWN, Alert & Oriented, Mood & Affect wnl, Vitals stable   General appearance normal   Vitals stable   Alert, oriented and in no acute distress     L medial cheek irregularly pigmented patch   R helix and forehead gritty scaly papule    Stuck on papules and brown macules on trunk and ext   Red papules on trunk  Flesh colored papules on trunk     The remainder of the full exam was normal; the following areas were examined:  conjunctiva/lids, oral mucosa, neck, peripheral vascular system, abdomen, lymph nodes, digits/nails, eccrine and apocrine glands, scalp/hair, face, neck, chest, abdomen, buttocks, back, RUE, LUE, RLE, LLE       Eyes: Conjunctivae/lids:Normal     ENT: Lips, mucosa: normal    MSK:Normal    Cardiovascular: peripheral edema none    Pulm: Breathing Normal    Lymph Nodes: No Head and Neck Lymphadenopathy     Neuro/Psych: Orientation:Alert and Orientedx3 ; Mood/Affect:normal       A/P:  1. Seborrheic keratosis, lentigo, angioma, dermal nevus, hx of non-melanoma skin cancer   2. L medial cheek r/o melanoma in situ   TANGENTIAL BIOPSY SENT OUT:  After consent, anesthesia with LEC and prep, tangential excision performed and specimen sent out for permanent section histology.  No complications and routine wound care. Patient told to call our office in 1-2 weeks for result.       3. Actinic keratosis   R helix x1, R forehead x2  LN2:  Treated with LN2 for 5s for 1-2 cycles.  Warned risks of blistering, pain, pigment change, scarring, and incomplete resolution.  Advised patient to return if lesions do not completely resolve.  Wound care sheet given.  It was a pleasure speaking to Storm Dutta today.  Previous clinic notes and pertinent laboratory tests were reviewed prior to Storm Dutta's visit.  Signs and Symptoms of skin cancer discussed with patient.  Patient encouraged to perform monthly skin exams.  UV precautions reviewed with patient.  Risks of non-melanoma skin cancer discussed with patient   Return to clinic pending path       Again, thank you for allowing me to participate in the care of your patient.        Sincerely,        Jesus Neal MD

## 2024-05-06 NOTE — PROGRESS NOTES
Storm Dutta is an extremely pleasant 83 year old year old male patient here today for hx of non-melanoma skin cancer.  Patient has no other skin complaints today.  Remainder of the HPI, Meds, PMH, Allergies, FH, and SH was reviewed in chart.      Past Medical History:   Diagnosis Date    Acute urinary retention 11/2012    ER visit   / 1200 cc post-void residual    Acute urinary retention 11/01/2012    ER     Basal cell carcinoma     Diabetes mellitus type II     Epididymitis 03/20/2014    Started on doxycyclline for UTI/orchitis. He was discharged on 03/22/14.    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    PE (pulmonary embolism) 03/20/2014    Rectal cancer (H)     Skin cancer     Squamous cell carcinoma     Thrombosis of leg     +PE       Past Surgical History:   Procedure Laterality Date    AMPUTATE TOE(S) Right 06/04/2019    Procedure: AMPUTATION 2nd Toe;  Surgeon: Adriel Cabello DPM;  [facility]: [facility]    BIOPSY      COLONOSCOPY  07/29/2013    Procedure: COMBINED COLONOSCOPY, SINGLE BIOPSY/POLYPECTOMY BY BIOPSY;;  Surgeon: Bari Burnett MD;  [facility]    COLONOSCOPY N/A 06/04/2015    Procedure: COMBINED COLONOSCOPY, SINGLE OR MULTIPLE BIOPSY/POLYPECTOMY BY BIOPSY;  Surgeon: Tara Mtz MD;  Location:  GI    COLONOSCOPY N/A 12/05/2016    Procedure: COLONOSCOPY;  Surgeon: Breanna Kline MD;  Location:  GI    COLONOSCOPY N/A 10/11/2022    Procedure: COLONOSCOPY, FLEXIBLE, WITH LESION REMOVAL USING SNARE;  Surgeon: Pierre Doe MD;  [facility]    CV CORONARY ANGIOGRAM N/A 11/22/2021    Procedure: Coronary Angiogram;  Surgeon: Jak Noe MD;  Location:  [facility]    ENT SURGERY      EYE SURGERY  05/01/2012    Right eye procedure    [facility] CIRCUMCISION SURGICAL NON-DEV >28 DAYS AGE  02/01/1997    due to phimosis     HC REMOVAL GALLBLADDER  05/01/2001    [facility] UGI ENDOSCOPY W PLACEMENT GASTROSTOMY TUBE PERCUT  03/13/2014    Procedure: COMBINED ESOPHAGOSCOPY, GASTROSCOPY, DUODENOSCOPY (EGD), PLACE PERCUTANEOUS ENDOSCOPIC GASTROSTOMY TUBE;  Surgeon: Loco Casillas MD;  [facility]    LAPAROSCOPIC ASSISTED COLECTOMY LEFT (DESCENDING)  01/07/2014    Procedure: LAPAROSCOPIC ASSISTED COLECTOMY LEFT (DESCENDING);  Laparoscopic hand assisted Low Anterior Resection With colonic J pouch and end to end colorectal anastamosis. Laparoscopic splenic flexure mobilization.  Loop Ileostomy.  Rigid proctoscopy;  Surgeon: Margaret Gamble MD;  [facility]    PHACOEMULSIFICATION WITH STANDARD INTRAOCULAR LENS IMPLANT  04/11/2013    Procedure: PHACOEMULSIFICATION WITH STANDARD INTRAOCULAR LENS IMPLANT;  Right Kelman Phacoemulsification with Intraocular Lens Implant;  Surgeon: Caesar Turner MD;  [facility]    REMOVE GASTROSTOMY TUBE ADULT  07/25/2014    Procedure: REMOVE GASTROSTOMY TUBE ADULT;  Surgeon: Margaret Gamble MD;  [facility]    SIGMOIDOSCOPY FLEXIBLE  06/23/2014    Procedure: SIGMOIDOSCOPY FLEXIBLE;  Surgeon: Margaret Gamble MD;  [facility]    SIGMOIDOSCOPY FLEXIBLE  07/22/2014    Procedure: SIGMOIDOSCOPY FLEXIBLE;  Surgeon: Margaret Gamble MD;  [facility]    SIGMOIDOSCOPY FLEXIBLE  07/25/2014    Procedure: SIGMOIDOSCOPY FLEXIBLE;  Surgeon: Margaret Gamble MD;  [facility]    SIGMOIDOSCOPY FLEXIBLE  07/28/2014    Procedure: SIGMOIDOSCOPY FLEXIBLE;  Surgeon: Margaret Gamble MD;  [facility]    SIGMOIDOSCOPY FLEXIBLE  07/31/2014    Procedure: SIGMOIDOSCOPY FLEXIBLE;  Surgeon: Margaret Gamble MD;  [facility]    SIGMOIDOSCOPY FLEXIBLE  08/03/2014    Procedure: SIGMOIDOSCOPY FLEXIBLE;  Surgeon: Margaret Gamble MD;  [facility]    SIGMOIDOSCOPY FLEXIBLE  08/05/2014    Procedure: SIGMOIDOSCOPY FLEXIBLE;   Surgeon: Margaret Gamble MD;  [facility]    SIGMOIDOSCOPY FLEXIBLE  08/08/2014    Procedure: SIGMOIDOSCOPY FLEXIBLE;  Surgeon: Margaret Gamble MD;  [facility]    SIGMOIDOSCOPY FLEXIBLE  08/18/2014    Procedure: SIGMOIDOSCOPY FLEXIBLE;  Surgeon: Jose Roberto Cervantes MD;  [facility]    SIGMOIDOSCOPY FLEXIBLE N/A 08/15/2014    Procedure: SIGMOIDOSCOPY FLEXIBLE;  Surgeon: Margaret Gamble MD;  [facility]    SIGMOIDOSCOPY FLEXIBLE N/A 08/22/2014    Procedure: SIGMOIDOSCOPY FLEXIBLE;  Surgeon: Jose Roberto Cervantes MD;  [facility]    SIGMOIDOSCOPY FLEXIBLE N/A 08/11/2014    Procedure: SIGMOIDOSCOPY FLEXIBLE;  Surgeon: Margaret Gamble MD;  [facility]    SIGMOIDOSCOPY FLEXIBLE N/A 08/26/2014    Procedure: SIGMOIDOSCOPY FLEXIBLE;  Surgeon: Margaret Gamble MD;  [facility]    SIGMOIDOSCOPY FLEXIBLE N/A 08/29/2014    Procedure: SIGMOIDOSCOPY FLEXIBLE;  Surgeon: Margaret Gamble MD;  [facility]    SIGMOIDOSCOPY FLEXIBLE N/A 09/08/2014    Procedure: SIGMOIDOSCOPY FLEXIBLE;  Surgeon: Margaret Gamble MD;  [facility]    SIGMOIDOSCOPY FLEXIBLE N/A 09/02/2014    Procedure: SIGMOIDOSCOPY FLEXIBLE;  Surgeon: Breanna Kline MD;  [facility]    SIGMOIDOSCOPY FLEXIBLE N/A 09/11/2014    Procedure: SIGMOIDOSCOPY FLEXIBLE;  Surgeon: Margaret Gamble MD;  [facility]    SIGMOIDOSCOPY FLEXIBLE N/A 09/19/2014    Procedure: SIGMOIDOSCOPY FLEXIBLE;  Surgeon: Margaret Gamble MD;  [facility]    SIGMOIDOSCOPY FLEXIBLE N/A 09/15/2014    Procedure: SIGMOIDOSCOPY FLEXIBLE;  Surgeon: Margaret Gamble MD;  [facility]    SIGMOIDOSCOPY FLEXIBLE N/A 09/05/2014    Procedure: SIGMOIDOSCOPY FLEXIBLE;  Surgeon: Margaret Gamble MD;  [facility]    SIGMOIDOSCOPY FLEXIBLE N/A 09/23/2014    Procedure: SIGMOIDOSCOPY FLEXIBLE;  Surgeon: Margaret Gamble MD;  Location: [facility]     SIGMOIDOSCOPY FLEXIBLE N/A 09/26/2014    Procedure: SIGMOIDOSCOPY FLEXIBLE;  Surgeon: Margaret Gamble MD;  [facility]    SIGMOIDOSCOPY FLEXIBLE N/A 09/30/2014    Procedure: SIGMOIDOSCOPY FLEXIBLE;  Surgeon: Margaret Gamble MD;  [facility]    SIGMOIDOSCOPY FLEXIBLE N/A 10/03/2014    Procedure: SIGMOIDOSCOPY FLEXIBLE;  Surgeon: Margaret Gamble MD;  [facility]    SIGMOIDOSCOPY FLEXIBLE N/A 10/30/2014    Procedure: SIGMOIDOSCOPY FLEXIBLE;  Surgeon: Margaret Gamble MD;  [facility]    SIGMOIDOSCOPY FLEXIBLE, PLACEMENT OF DRAINAGE SPONGE  09/30/2014    TAKEDOWN ILEOSTOMY N/A 01/06/2015    Procedure: TAKEDOWN ILEOSTOMY;  Surgeon: Margaret Gamble MD;  [facility]        Family History   Problem Relation Age of Onset    Diabetes Mother     Hypertension Mother     Cancer Father     Cancer Maternal Grandmother     Alzheimer Disease Maternal Grandmother     Cardiovascular Paternal Grandmother     Breast Cancer Sister     Colon Cancer No family hx of     Colon Polyps No family hx of     Crohn's Disease No family hx of     Ulcerative Colitis No family hx of     Anesthesia Reaction No family hx of     Melanoma No family hx of        Social History     Socioeconomic History    Marital status:      Spouse name: Not on file    Number of children: Not on file    Years of education: Not on file    Highest education level: Not on file   Occupational History     Employer: RETIRED     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 No   Social History Narrative    Not on file     Social Determinants of Health     Financial  Resource Strain: Not on file   Food Insecurity: Not on file   Transportation Needs: Not on file   Physical Activity: Not on file   Stress: Not on file   Social Connections: Not on file   Interpersonal Safety: Low Risk  (3/5/2024)    Interpersonal Safety     Do you feel physically and emotionally safe where you currently live?: Yes     Within the past 12 months, have you been hit, slapped, kicked or otherwise physically hurt by someone?: No     Within the past 12 months, have you been humiliated or emotionally abused in other ways by your partner or ex-partner?: No   Housing Stability: Not on file       Outpatient Encounter Medications as of 5/6/2024   Medication Sig Dispense Refill    amLODIPine (NORVASC) 10 MG tablet Take 1 tablet (10 mg) by mouth daily 90 tablet 1    atorvastatin (LIPITOR) 20 MG tablet Take 1 tablet (20 mg) by mouth daily 90 tablet 3    blood glucose (ACCU-CHEK REGIS PLUS) test strip USE TO TEST BLOOD SUGAR THREE TIMES A DAY OR AS DIRECTED 300 strip 3    blood glucose monitoring (NO BRAND SPECIFIED) meter device kit Use to test blood sugar 3 times daily or as directed. 1 kit 0    Cholecalciferol (VITAMIN D-3) 1000 units CAPS Take 1 capsule by mouth daily       finasteride (PROSCAR) 5 MG tablet Take 1 tablet (5 mg) by mouth daily 90 tablet 3    insulin glargine (LANTUS SOLOSTAR) 100 UNIT/ML pen INJECT 16 UNITS UNDER THE SKIN AT BEDTIME 30 mL 2    insulin lispro (HUMALOG KWIKPEN) 100 UNIT/ML (1 unit dial) KWIKPEN USE 8 UNITS  PLUS LOW SLIDING SCALE BEFORE EACH MEAL. MAX 50 UNITS PER DAY. 60 mL 1    insulin pen needle (PENTIPS) 31G X 6 MM miscellaneous USE 4 TO 5 TIMES DAILY OR AS DIRECTED FOR SLIDING SCALE INSULIN 500 each 1    losartan (COZAAR) 100 MG tablet Take 1 tablet (100 mg) by mouth daily 90 tablet 1    metoprolol succinate ER (TOPROL XL) 100 MG 24 hr tablet Take 1 tablet (100 mg) by mouth daily 90 tablet 3    tamsulosin (FLOMAX) 0.4 MG capsule Take 1 capsule (0.4 mg) by mouth daily 90  capsule 3    trospium (SANCTURA) 20 MG tablet Take 1 tablet (20 mg) by mouth every evening 90 tablet 3     No facility-administered encounter medications on file as of 5/6/2024.             O:   NAD, WDWN, Alert & Oriented, Mood & Affect wnl, Vitals stable   General appearance normal   Vitals stable   Alert, oriented and in no acute distress     L medial cheek irregularly pigmented patch   R helix and forehead gritty scaly papule    Stuck on papules and brown macules on trunk and ext   Red papules on trunk  Flesh colored papules on trunk     The remainder of the full exam was normal; the following areas were examined:  conjunctiva/lids, oral mucosa, neck, peripheral vascular system, abdomen, lymph nodes, digits/nails, eccrine and apocrine glands, scalp/hair, face, neck, chest, abdomen, buttocks, back, RUE, LUE, RLE, LLE       Eyes: Conjunctivae/lids:Normal     ENT: Lips, mucosa: normal    MSK:Normal    Cardiovascular: peripheral edema none    Pulm: Breathing Normal    Lymph Nodes: No Head and Neck Lymphadenopathy     Neuro/Psych: Orientation:Alert and Orientedx3 ; Mood/Affect:normal       A/P:  1. Seborrheic keratosis, lentigo, angioma, dermal nevus, hx of non-melanoma skin cancer   2. L medial cheek r/o melanoma in situ   TANGENTIAL BIOPSY SENT OUT:  After consent, anesthesia with LEC and prep, tangential excision performed and specimen sent out for permanent section histology.  No complications and routine wound care. Patient told to call our office in 1-2 weeks for result.       3. Actinic keratosis   R helix x1, R forehead x2  LN2:  Treated with LN2 for 5s for 1-2 cycles. Warned risks of blistering, pain, pigment change, scarring, and incomplete resolution.  Advised patient to return if lesions do not completely resolve.  Wound care sheet given.  It was a pleasure speaking to Storm Dutta today.  Previous clinic notes and pertinent laboratory tests were reviewed prior to Storm Dutta's visit.  Signs  and Symptoms of skin cancer discussed with patient.  Patient encouraged to perform monthly skin exams.  UV precautions reviewed with patient.  Risks of non-melanoma skin cancer discussed with patient   Return to clinic pending path

## 2024-05-09 LAB
PATH REPORT.COMMENTS IMP SPEC: ABNORMAL
PATH REPORT.COMMENTS IMP SPEC: YES
PATH REPORT.FINAL DX SPEC: ABNORMAL
PATH REPORT.GROSS SPEC: ABNORMAL
PATH REPORT.MICROSCOPIC SPEC OTHER STN: ABNORMAL
PATH REPORT.RELEVANT HX SPEC: ABNORMAL

## 2024-05-13 ENCOUNTER — TELEPHONE (OUTPATIENT)
Dept: DERMATOLOGY | Facility: CLINIC | Age: 84
End: 2024-05-13
Payer: COMMERCIAL

## 2024-05-13 NOTE — TELEPHONE ENCOUNTER
----- Message from Jesus Neal MD sent at 5/13/2024  9:09 AM CDT -----  A(1). Skin, L medial cheek, shave:  - Melanoma in situ arising in a nevus - (see comment)     Patient called and results discussed with patient   Please call and schedule excision with  3-4 weeks

## 2024-05-13 NOTE — LETTER
[facility]  [address] [place] [address]  [phone]      May 13, 2024    Storm Dutta  [address] [place] [facility] [address]      Dear Storm      You are scheduled for Mohs Surgery on Tuesday, May 21, 2024 at 7:30  am.     Please check in at 2nd floor Dermatology Clinic.     Be sure to eat a good breakfast and bathe and wash your hair prior to Surgery. Please bring  with you if this is above your neck    If you are taking any anti-coagulants that are prescribed by your Doctor (such as Coumadin/warfarin, Plavix, Aspirin, Ibuprofen), please continue taking them.     However, If you are taking anti-coagulants over the counter without  a Doctor's order for a Medical condition, please discontinue them 10 days prior to Surgery.      Please wear loose comfortable clothing as it could possibly be 4-6 hours until your surgery is completed depending upon how many layers of tissue need to be removed.     Thank you,    Jesus Neal MD

## 2024-05-13 NOTE — TELEPHONE ENCOUNTER
Called patient:  Patient notified and educated on test results   Mohs procedure explained- all questions answered  appointment scheduled- mohs packet mailed.    Thank you,    Yeni PRETTYRN BSN  [facility]  [phone]

## 2024-05-20 NOTE — PROGRESS NOTES
Surgical Office Location :   [facility]  [address] [place] [address]

## 2024-05-21 ENCOUNTER — OFFICE VISIT (OUTPATIENT)
Dept: DERMATOLOGY | Facility: CLINIC | Age: 84
End: 2024-05-21
Payer: COMMERCIAL

## 2024-05-21 DIAGNOSIS — D03.39 MELANOMA IN SITU OF CHEEK (H): Primary | ICD-10-CM

## 2024-05-21 PROCEDURE — 17311 MOHS 1 STAGE H/N/HF/G: CPT | Performed by: DERMATOLOGY

## 2024-05-21 PROCEDURE — 13133 CMPLX RPR F/C/C/M/N/AX/G/H/F: CPT | Performed by: DERMATOLOGY

## 2024-05-21 PROCEDURE — 17312 MOHS ADDL STAGE: CPT | Performed by: DERMATOLOGY

## 2024-05-21 PROCEDURE — 13132 CMPLX RPR F/C/C/M/N/AX/G/H/F: CPT | Performed by: DERMATOLOGY

## 2024-05-21 NOTE — PATIENT INSTRUCTIONS
Sutured Wound Care     [facility]: [phone]    [facility]: [phone]          No strenuous activity for 48 hours. Resume moderate activity in 48 hours. No heavy exercising until you are seen for follow up in one week.     Take Tylenol as needed for discomfort.                         Do not drink alcoholic beverages for 48 hours.     Keep the pressure bandage in place for 24 hours.(White Guaze)  If the bandage becomes blood tinged or loose, reinforce it with gauze and tape.        (Refer to the reverse side of this page for management of bleeding).    Remove pressure bandage in 24 hours (White Guaze)     Leave the flat bandage (Brown tape if not to saturated, you can re-apply white paper tape if needed)   in place until your follow up appointment.    Keep the bandage dry. Wash around it carefully.    If the tape becomes soiled or starts to come off, reinforce it with additional paper tape.    Do not smoke for 3 weeks; smoking is detrimental to wound healing.    It is normal to have swelling and bruising around the surgical site. The bruising will fade in approximately 10-14 days. Elevate the area to reduce swelling.    Numbness, itchiness and sensitivity to temperature changes can occur after surgery and may take up to 18 months to normalize.      POSSIBLE COMPLICATIONS    BLEEDING:    Leave the bandage in place.  Use tightly rolled up gauze or a cloth to apply direct pressure over the bandage for 20   minutes.  Reapply pressure for an additional 20 minutes if necessary  Call the office or go to the nearest emergency room if pressure fails to stop the bleeding.  Use additional gauze and tape to maintain pressure once the bleeding has stopped.        PAIN:    Post operative pain should slowly get better, never worse.  A severe increase in pain may indicate a problem. Call the office if this occurs.    In case of emergency phone:Dr Neal [phone]

## 2024-05-21 NOTE — PROGRESS NOTES
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  Patient has no other skin complaints today.  Remainder of the HPI, Meds, PMH, Allergies, FH, and SH was reviewed in chart.      Past Medical History:   Diagnosis Date    Acute urinary retention 11/2012    ER visit   / 1200 cc post-void residual    Acute urinary retention 11/01/2012    ER     Basal cell carcinoma     Diabetes mellitus type II     Epididymitis 03/20/2014    Started on doxycyclline for UTI/orchitis. He was discharged on 03/22/14.    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    PE (pulmonary embolism) 03/20/2014    Rectal cancer (H)     Skin cancer     Squamous cell carcinoma     Thrombosis of leg     +PE       Past Surgical History:   Procedure Laterality Date    AMPUTATE TOE(S) Right 06/04/2019    Procedure: AMPUTATION 2nd Toe;  Surgeon: Adriel Cabello DPM;  Location: [facility]    BIOPSY      COLONOSCOPY  07/29/2013    Procedure: COMBINED COLONOSCOPY, SINGLE BIOPSY/POLYPECTOMY BY BIOPSY;;  Surgeon: Bari Burnett MD;  Location: [facility]    COLONOSCOPY N/A 06/04/2015    Procedure: COMBINED COLONOSCOPY, SINGLE OR MULTIPLE BIOPSY/POLYPECTOMY BY BIOPSY;  Surgeon: Tara Mtz MD;  Location:  GI    COLONOSCOPY N/A 12/05/2016    Procedure: COLONOSCOPY;  Surgeon: Breanna Kline MD;  Location:  GI    COLONOSCOPY N/A 10/11/2022    Procedure: COLONOSCOPY, FLEXIBLE, WITH LESION REMOVAL USING SNARE;  Surgeon: Pierre Doe MD;  Location: [facility]    CV CORONARY ANGIOGRAM N/A 11/22/2021    Procedure: Coronary Angiogram;  Surgeon: Jak Noe MD;  Location:  HEART CARDIAC CATH LAB    ENT SURGERY      EYE SURGERY  05/01/2012    Right eye procedure    HC CIRCUMCISION SURGICAL NON-DEV >28 DAYS  AGE  [birth year]01[birth year]1997    due to phimosis    HC REMOVAL GALLBLADDER  05/01/2001    HC UGI ENDOSCOPY W PLACEMENT GASTROSTOMY TUBE PERCUT  03/13/2014    Procedure: COMBINED ESOPHAGOSCOPY, GASTROSCOPY, DUODENOSCOPY (EGD), PLACE PERCUTANEOUS ENDOSCOPIC GASTROSTOMY TUBE;  Surgeon: Loco Casillas MD;  Location: [facility]    LAPAROSCOPIC ASSISTED COLECTOMY LEFT (DESCENDING)  01/07/2014    Procedure: LAPAROSCOPIC ASSISTED COLECTOMY LEFT (DESCENDING);  Laparoscopic hand assisted Low Anterior Resection With colonic J pouch and end to end colorectal anastamosis. Laparoscopic splenic flexure mobilization.  Loop Ileostomy.  Rigid proctoscopy;  Surgeon: Margaret Gamble MD;  [facility]    PHACOEMULSIFICATION WITH STANDARD INTRAOCULAR LENS IMPLANT  04/11/2013    Procedure: PHACOEMULSIFICATION WITH STANDARD INTRAOCULAR LENS IMPLANT;  Right Kelman Phacoemulsification with Intraocular Lens Implant;  Surgeon: Caesar Turner MD;  Location: [facility]    REMOVE GASTROSTOMY TUBE ADULT  07/25/2014    Procedure: REMOVE GASTROSTOMY TUBE ADULT;  Surgeon: Margaret Gamble MD;  [facility]    SIGMOIDOSCOPY FLEXIBLE  06/23/2014    Procedure: SIGMOIDOSCOPY FLEXIBLE;  Surgeon: Margaret Gamble MD;  [facility]    SIGMOIDOSCOPY FLEXIBLE  07/22/2014    Procedure: SIGMOIDOSCOPY FLEXIBLE;  Surgeon: Margaret Gamble MD;  [facility]    SIGMOIDOSCOPY FLEXIBLE  07/25/2014    Procedure: SIGMOIDOSCOPY FLEXIBLE;  Surgeon: Margaret Gamble MD;  [facility]    SIGMOIDOSCOPY FLEXIBLE  07/28/2014    Procedure: SIGMOIDOSCOPY FLEXIBLE;  Surgeon: Margaret Gamble MD;  [facility]    SIGMOIDOSCOPY FLEXIBLE  07/31/2014    Procedure: SIGMOIDOSCOPY FLEXIBLE;  Surgeon: Margaret Gambel MD;  [facility]    SIGMOIDOSCOPY FLEXIBLE  08/03/2014    Procedure: SIGMOIDOSCOPY FLEXIBLE;  Surgeon: Margaret Gamble MD;  [facility]    SIGMOIDOSCOPY FLEXIBLE  08/05/2014     Procedure: SIGMOIDOSCOPY FLEXIBLE;  Surgeon: Margaret Gamble MD;  [facility]    SIGMOIDOSCOPY FLEXIBLE  08/08/2014    Procedure: SIGMOIDOSCOPY FLEXIBLE;  Surgeon: Margaret Gamble MD;  [facility]    SIGMOIDOSCOPY FLEXIBLE  08/18/2014    Procedure: SIGMOIDOSCOPY FLEXIBLE;  Surgeon: Jose Roberto Cervantes MD;  [facility]    SIGMOIDOSCOPY FLEXIBLE N/A 08/15/2014    Procedure: SIGMOIDOSCOPY FLEXIBLE;  Surgeon: Margaret Gamble MD;  [facility]    SIGMOIDOSCOPY FLEXIBLE N/A 08/22/2014    Procedure: SIGMOIDOSCOPY FLEXIBLE;  Surgeon: Jose Roberto Cervantes MD;  [facility]    SIGMOIDOSCOPY FLEXIBLE N/A 08/11/2014    Procedure: SIGMOIDOSCOPY FLEXIBLE;  Surgeon: Margaret Gamble MD;  [facility]    SIGMOIDOSCOPY FLEXIBLE N/A 08/26/2014    Procedure: SIGMOIDOSCOPY FLEXIBLE;  Surgeon: Margaret Gamble MD;  [facility]    SIGMOIDOSCOPY FLEXIBLE N/A 08/29/2014    Procedure: SIGMOIDOSCOPY FLEXIBLE;  Surgeon: Margaret Gamble MD;  [facility]    SIGMOIDOSCOPY FLEXIBLE N/A 09/08/2014    Procedure: SIGMOIDOSCOPY FLEXIBLE;  Surgeon: Margaret Gamble MD;  [facility]    SIGMOIDOSCOPY FLEXIBLE N/A 09/02/2014    Procedure: SIGMOIDOSCOPY FLEXIBLE;  Surgeon: Breanna Kline MD;  [facility]    SIGMOIDOSCOPY FLEXIBLE N/A 09/11/2014    Procedure: SIGMOIDOSCOPY FLEXIBLE;  Surgeon: Margaret Gamble MD;  [facility]    SIGMOIDOSCOPY FLEXIBLE N/A 09/19/2014    Procedure: SIGMOIDOSCOPY FLEXIBLE;  Surgeon: Margaret Gamble MD;  [facility]    SIGMOIDOSCOPY FLEXIBLE N/A 09/15/2014    Procedure: SIGMOIDOSCOPY FLEXIBLE;  Surgeon: Margaret Gamble MD;  [facility]    SIGMOIDOSCOPY FLEXIBLE N/A 09/05/2014    Procedure: SIGMOIDOSCOPY FLEXIBLE;  Surgeon: Margaret Gamble MD;  [facility]    SIGMOIDOSCOPY FLEXIBLE N/A 09/23/2014    Procedure: SIGMOIDOSCOPY FLEXIBLE;  Surgeon: Margaret Gamble  MD CHRISTIAN;  [facility]    SIGMOIDOSCOPY FLEXIBLE N/A 09/26/2014    Procedure: SIGMOIDOSCOPY FLEXIBLE;  Surgeon: Margaret Gamble MD;  [facility]    SIGMOIDOSCOPY FLEXIBLE N/A 09/30/2014    Procedure: SIGMOIDOSCOPY FLEXIBLE;  Surgeon: Margaret Gamble MD;  [facility]    SIGMOIDOSCOPY FLEXIBLE N/A 10/03/2014    Procedure: SIGMOIDOSCOPY FLEXIBLE;  Surgeon: Margaret Gamble MD;  [facility]    SIGMOIDOSCOPY FLEXIBLE N/A 10/30/2014    Procedure: SIGMOIDOSCOPY FLEXIBLE;  Surgeon: Margaret Gamble MD;  [facility]    SIGMOIDOSCOPY FLEXIBLE, PLACEMENT OF DRAINAGE SPONGE  09/30/2014    TAKEDOWN ILEOSTOMY N/A 01/06/2015    Procedure: TAKEDOWN ILEOSTOMY;  Surgeon: Margaret Gamble MD;  [facility]        Family History   Problem Relation Age of Onset    Diabetes Mother     Hypertension Mother     Cancer Father     Cancer Maternal Grandmother     Alzheimer Disease Maternal Grandmother     Cardiovascular Paternal Grandmother     Breast Cancer Sister     Colon Cancer No family hx of     Colon Polyps No family hx of     Crohn's Disease No family hx of     Ulcerative Colitis No family hx of     Anesthesia Reaction No family hx of     Melanoma No family hx of        Social History     Socioeconomic History    Marital status:      Spouse name: Not on file    Number of children: Not on file    Years of education: Not on file    Highest education level: Not on file   Occupational History     Employer: RETIRED     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 No   Social History Narrative    Not on file     Social  Determinants of Health     Financial Resource Strain: Not on file   Food Insecurity: Not on file   Transportation Needs: Not on file   Physical Activity: Not on file   Stress: Not on file   Social Connections: Not on file   Interpersonal Safety: Low Risk  (3/5/2024)    Interpersonal Safety     Do you feel physically and emotionally safe where you currently live?: Yes     Within the past 12 months, have you been hit, slapped, kicked or otherwise physically hurt by someone?: No     Within the past 12 months, have you been humiliated or emotionally abused in other ways by your partner or ex-partner?: No   Housing Stability: Not on file       Outpatient Encounter Medications as of 5/21/2024   Medication Sig Dispense Refill    amLODIPine (NORVASC) 10 MG tablet Take 1 tablet (10 mg) by mouth daily 90 tablet 1    atorvastatin (LIPITOR) 20 MG tablet Take 1 tablet (20 mg) by mouth daily 90 tablet 3    blood glucose (ACCU-CHEK REGIS PLUS) test strip USE TO TEST BLOOD SUGAR THREE TIMES A DAY OR AS DIRECTED 300 strip 3    blood glucose monitoring (NO BRAND SPECIFIED) meter device kit Use to test blood sugar 3 times daily or as directed. 1 kit 0    Cholecalciferol (VITAMIN D-3) 1000 units CAPS Take 1 capsule by mouth daily       finasteride (PROSCAR) 5 MG tablet Take 1 tablet (5 mg) by mouth daily 90 tablet 3    insulin glargine (LANTUS SOLOSTAR) 100 UNIT/ML pen INJECT 16 UNITS UNDER THE SKIN AT BEDTIME 30 mL 2    insulin lispro (HUMALOG KWIKPEN) 100 UNIT/ML (1 unit dial) KWIKPEN USE 8 UNITS  PLUS LOW SLIDING SCALE BEFORE EACH MEAL. MAX 50 UNITS PER DAY. 60 mL 1    insulin pen needle (PENTIPS) 31G X 6 MM miscellaneous USE 4 TO 5 TIMES DAILY OR AS DIRECTED FOR SLIDING SCALE INSULIN 500 each 1    losartan (COZAAR) 100 MG tablet Take 1 tablet (100 mg) by mouth daily 90 tablet 1    metoprolol succinate ER (TOPROL XL) 100 MG 24 hr tablet Take 1 tablet (100 mg) by mouth daily 90 tablet 3    tamsulosin (FLOMAX) 0.4 MG capsule Take 1  capsule (0.4 mg) by mouth daily 90 capsule 3    trospium (SANCTURA) 20 MG tablet Take 1 tablet (20 mg) by mouth every evening 90 tablet 3     No facility-administered encounter medications on file as of 5/21/2024.             O:   NAD, WDWN, Alert & Oriented, Mood & Affect wnl, Vitals stable   General appearance normal   Vitals stable   Alert, oriented and in no acute distress     L medial cheek 1.5x1.3cm red plaque      Eyes: Conjunctivae/lids:Normal     ENT: Lips, mucosa: normal    MSK:Normal    Cardiovascular: peripheral edema none    Pulm: Breathing Normal    Lymph Nodes: No Head and Neck Lymphadenopathy     Neuro/Psych: Orientation:Alert and Orientedx3 ; Mood/Affect:normal       A/P:   L medial cheek melanoma in situ   MELANOMA DISCUSSED WITH PATIENT:  I discussed the specifics of tumor, prognosis, metachronous melanoma, self exam, and genetics with the patient. I explained the need for monthly skin exams including and taught the patient how to do this. Patient was asked about new or changing moles . I discussed with patient signs and symptoms that could arise in the setting of recurrent locoregional or metastatic disease. In addition, the need to undergo every 6 month dermatologic full skin survey and evaluation given that patients with a diagnosis of melanoma are at risk of recurrence (local and distant) and of subsequent de cassie melanoma.  . I reviewed treatment options, including a discussion of wide excision (the gold standard) versus Mohs surgery with MART-1 immunostains.     Note: MART-1 (Melanoma Antigen Recognized by T-cells) antibody immunostaining was used during Mohs surgery as per standard protocol, in addition to routine processing of all specimens with hematoxylin and eosin. The peripheral margins/edges were processed with the MART-1 stain (3 specimens total). The center was examined with hematoxylin & eosin and MART-1 immunostains. The patient was informed of the procedure and its risk/benefits  during the consent for the procedure.    One or more of the reagents used in immunohistochemical testing in this case may not have been cleared or approved by the U.S. Food and Drug Administration (FDA). The FDA has determined that such clearance or approval is not necessary. These tests are used for clinical purposes. They should not be regarded as investigational or for research. These reagents  performance characteristics have been determined by [facility]. This laboratory is certified under the Clinical Laboratory Improvement Amendments of 1988 (CLIA-88) as qualified to perform high complexity clinical laboratory testing.      MOHS:   Aggressive histology    The rationale for Mohs surgery was discussed with the patient and consent was obtained.  The risks and benefits as well as alternatives to therapy were discussed, in detail.  Specifically, the risks of infection, scarring, bleeding, prolonged wound healing, incomplete removal, allergy to anesthesia, nerve injury and recurrence were addressed.  Indication for Mohs was Aggressive histology. Prior to the procedure, the treatment site was clearly identified and, if available, confirmed with previous photos and confirmed by the patient   All components of the Universal Protocol/PAUSE rule were completed.  The Mohs surgeon operated in two distinct and integrated capacities as the surgeon and pathologist.      The area was prepped with Betasept.  A rim of normal appearing skin was marked circumferentially around the lesion.  The area was infiltrated with local anesthesia.  The tumor was first debulked to remove all clinically apparent tumor.  An incision following the standard Mohs approach was done and the specimen was oriented,mapped and placed in 4 block(s).  Each specimen was then chromacoded and processed in the Mohs laboratory using standard Mohs technique and submitted for frozen section histology.  Frozen section analysis showed  residual  tumor but CLEAR MARGINS.    1st stage melanocytoic nesting on lateral margin,     The tumor was excised using standard Mohs technique in 2 stages(s).  MART 1 stains were performed on 3 specimens. CLEAR MARGINS OBTAINED and Final defect size was 2.5 x 2.8 cm.     We discussed the options for wound management in full with the patient including risks/benefits/ possible outcomes.      REPAIR COMPLEX: Because of the tightness of the surrounding skin and Because of the size and full thickness nature of the defect, Because of the tightness of the surrounding skin, To maintain form and function, In order to avoid distortion, and Because of the proximity to the lid, a complex closure was planned. After LE anesthesia and prep, Burow's triangles were excised in the relaxed skin tension lines. The wound edges were widely undermined greater than width of the defect on both sides by dissection in the subcutaneous plane until adequate tissue mobility was obtained. Hemostasis was obtained. The wound edges were closed in a layered fashion using Vicryl and Fast Absorbing Plain Gut sutures. Postoperative length was 8 cm.   EBL minimal; complications none; wound care routine.  The patient was discharged in good condition and will return in one week for wound evaluation.  It was a pleasure speaking to Storm Dutta today.  Previous clinic notes and pertinent laboratory tests were reviewed prior to Storm Dutta's visit.  Signs and Symptoms of skin cancer discussed with patient.  Patient encouraged to perform monthly skin exams.  UV precautions reviewed with patient.  Return to clinic 6 months

## 2024-05-21 NOTE — LETTER
5/21/2024         RE: Storm Dutta  [address] [place] [address]        Dear Colleague,    Thank you for referring your patient, Storm Dutta, to the [facility]. Please see a copy of my visit note below.    Surgical Office Location :   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  Patient has no other skin complaints today.  Remainder of the HPI, Meds, PMH, Allergies, FH, and SH was reviewed in chart.      Past Medical History:   Diagnosis Date     Acute urinary retention 11/2012    ER visit   / 1200 cc post-void residual     Acute urinary retention 11/01/2012    ER      Basal cell carcinoma      Diabetes mellitus type II      Epididymitis 03/20/2014    Started on doxycyclline for UTI/orchitis. He was discharged on 03/22/14.     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     PE (pulmonary embolism) 03/20/2014     Rectal cancer (H)      Skin cancer      Squamous cell carcinoma      Thrombosis of leg     +PE       Past Surgical History:   Procedure Laterality Date     AMPUTATE TOE(S) Right 06/04/2019    Procedure: AMPUTATION 2nd Toe;  Surgeon: Adriel Cabello DPM;  Location: [facility]     BIOPSY       COLONOSCOPY  07/29/2013    Procedure: COMBINED COLONOSCOPY, SINGLE BIOPSY/POLYPECTOMY BY BIOPSY;;  Surgeon: Bari Burnett MD;  Location: [facility] GI     COLONOSCOPY N/A 06/04/2015    Procedure: COMBINED COLONOSCOPY, SINGLE OR MULTIPLE BIOPSY/POLYPECTOMY BY BIOPSY;  Surgeon: Tara Mtz MD;  Location:  GI     COLONOSCOPY N/A 12/05/2016    Procedure: COLONOSCOPY;  Surgeon: Breanna Kline MD;  Location:  GI     COLONOSCOPY N/A 10/11/2022     Procedure: COLONOSCOPY, FLEXIBLE, WITH LESION REMOVAL USING SNARE;  Surgeon: Pierre Doe MD;  Location: [facility]     CV CORONARY ANGIOGRAM N/A 11/22/2021    Procedure: Coronary Angiogram;  Surgeon: Jak Noe MD;  Location:  HEART CARDIAC CATH LAB     ENT SURGERY       EYE SURGERY  05/01/2012    Right eye procedure     HC CIRCUMCISION SURGICAL NON-DEV >28 DAYS AGE  02/01/1997    due to phimosis     HC REMOVAL GALLBLADDER  05/01/2001     HC UGI ENDOSCOPY W PLACEMENT GASTROSTOMY TUBE PERCUT  03/13/2014    Procedure: COMBINED ESOPHAGOSCOPY, GASTROSCOPY, DUODENOSCOPY (EGD), PLACE PERCUTANEOUS ENDOSCOPIC GASTROSTOMY TUBE;  Surgeon: Loco Casillas MD;  Location: [facility] GI     LAPAROSCOPIC ASSISTED COLECTOMY LEFT (DESCENDING)  01/07/2014    Procedure: LAPAROSCOPIC ASSISTED COLECTOMY LEFT (DESCENDING);  Laparoscopic hand assisted Low Anterior Resection With colonic J pouch and end to end colorectal anastamosis. Laparoscopic splenic flexure mobilization.  Loop Ileostomy.  Rigid proctoscopy;  Surgeon: Margaret Gamble MD;  Location:  OR     PHACOEMULSIFICATION WITH STANDARD INTRAOCULAR LENS IMPLANT  04/11/2013    Procedure: PHACOEMULSIFICATION WITH STANDARD INTRAOCULAR LENS IMPLANT;  Right Kelman Phacoemulsification with Intraocular Lens Implant;  Surgeon: Caesar Turner MD;  Location: [facility]     REMOVE GASTROSTOMY TUBE ADULT  07/25/2014    Procedure: REMOVE GASTROSTOMY TUBE ADULT;  Surgeon: Margaret Gamble MD;  [facility]     SIGMOIDOSCOPY FLEXIBLE  06/23/2014    Procedure: SIGMOIDOSCOPY FLEXIBLE;  Surgeon: Margaret Gamble MD;  Location:  GI     SIGMOIDOSCOPY FLEXIBLE  07/22/2014    Procedure: SIGMOIDOSCOPY FLEXIBLE;  Surgeon: Margaret Gamble MD;  [facility]     SIGMOIDOSCOPY FLEXIBLE  07/25/2014    Procedure: SIGMOIDOSCOPY FLEXIBLE;  Surgeon: Margaret Gamble MD;  [facility]     SIGMOIDOSCOPY FLEXIBLE  07/28/2014     Procedure: SIGMOIDOSCOPY FLEXIBLE;  Surgeon: Margaret Gamble MD;  [facility]     SIGMOIDOSCOPY FLEXIBLE  07/31/2014    Procedure: SIGMOIDOSCOPY FLEXIBLE;  Surgeon: Margaret Gamble MD;  [facility]     SIGMOIDOSCOPY FLEXIBLE  08/03/2014    Procedure: SIGMOIDOSCOPY FLEXIBLE;  Surgeon: Margaret Gamble MD;  [facility]     SIGMOIDOSCOPY FLEXIBLE  08/05/2014    Procedure: SIGMOIDOSCOPY FLEXIBLE;  Surgeon: Margaret Gamble MD;  [facility]     SIGMOIDOSCOPY FLEXIBLE  08/08/2014    Procedure: SIGMOIDOSCOPY FLEXIBLE;  Surgeon: Margaret Gamble MD;  [facility]     SIGMOIDOSCOPY FLEXIBLE  08/18/2014    Procedure: SIGMOIDOSCOPY FLEXIBLE;  Surgeon: Jose Roberto Cervantes MD;  [facility]     SIGMOIDOSCOPY FLEXIBLE N/A 08/15/2014    Procedure: SIGMOIDOSCOPY FLEXIBLE;  Surgeon: Margaret Gamble MD;  [facility]     SIGMOIDOSCOPY FLEXIBLE N/A 08/22/2014    Procedure: SIGMOIDOSCOPY FLEXIBLE;  Surgeon: Jose Roberto Cervantes MD;  [facility]     SIGMOIDOSCOPY FLEXIBLE N/A 08/11/2014    Procedure: SIGMOIDOSCOPY FLEXIBLE;  Surgeon: Margaret Gmable MD;  [facility]     SIGMOIDOSCOPY FLEXIBLE N/A 08/26/2014    Procedure: SIGMOIDOSCOPY FLEXIBLE;  Surgeon: Margaret Gamble MD;  [facility]     SIGMOIDOSCOPY FLEXIBLE N/A 08/29/2014    Procedure: SIGMOIDOSCOPY FLEXIBLE;  Surgeon: Margaret Gamble MD;  [facility]     SIGMOIDOSCOPY FLEXIBLE N/A 09/08/2014    Procedure: SIGMOIDOSCOPY FLEXIBLE;  Surgeon: Margaret Gamble MD;  [facility]     SIGMOIDOSCOPY FLEXIBLE N/A 09/02/2014    Procedure: SIGMOIDOSCOPY FLEXIBLE;  Surgeon: Breanna Kline MD;  [facility]     SIGMOIDOSCOPY FLEXIBLE N/A 09/11/2014    Procedure: SIGMOIDOSCOPY FLEXIBLE;  Surgeon: Margaret Gamble MD;  [facility]     SIGMOIDOSCOPY FLEXIBLE N/A 09/19/2014    Procedure: SIGMOIDOSCOPY FLEXIBLE;  Surgeon: Tye  Margaret GONZALES MD;  [facility]     SIGMOIDOSCOPY FLEXIBLE N/A 09/15/2014    Procedure: SIGMOIDOSCOPY FLEXIBLE;  Surgeon: Margaret Gamble MD;  [facility]     SIGMOIDOSCOPY FLEXIBLE N/A 09/05/2014    Procedure: SIGMOIDOSCOPY FLEXIBLE;  Surgeon: Margaret Gamble MD;  [facility]     SIGMOIDOSCOPY FLEXIBLE N/A 09/23/2014    Procedure: SIGMOIDOSCOPY FLEXIBLE;  Surgeon: Margaret Gamble MD;  [facility]     SIGMOIDOSCOPY FLEXIBLE N/A 09/26/2014    Procedure: SIGMOIDOSCOPY FLEXIBLE;  Surgeon: Margaret Gamble MD;  [facility]     SIGMOIDOSCOPY FLEXIBLE N/A 09/30/2014    Procedure: SIGMOIDOSCOPY FLEXIBLE;  Surgeon: Margaret Gamble MD;  [facility]     SIGMOIDOSCOPY FLEXIBLE N/A 10/03/2014    Procedure: SIGMOIDOSCOPY FLEXIBLE;  Surgeon: Maragret Gamble MD;  [facility]     SIGMOIDOSCOPY FLEXIBLE N/A 10/30/2014    Procedure: SIGMOIDOSCOPY FLEXIBLE;  Surgeon: Margaret Gamble MD;  [facility]     SIGMOIDOSCOPY FLEXIBLE, PLACEMENT OF DRAINAGE SPONGE  09/30/2014     TAKEDOWN ILEOSTOMY N/A 01/06/2015    Procedure: TAKEDOWN ILEOSTOMY;  Surgeon: Margaret Gamble MD;  [facility]        Family History   Problem Relation Age of Onset     Diabetes Mother      Hypertension Mother      Cancer Father      Cancer Maternal Grandmother      Alzheimer Disease Maternal Grandmother      Cardiovascular Paternal Grandmother      Breast Cancer Sister      Colon Cancer No family hx of      Colon Polyps No family hx of      Crohn's Disease No family hx of      Ulcerative Colitis No family hx of      Anesthesia Reaction No family hx of      Melanoma No family hx of        Social History     Socioeconomic History     Marital status:      Spouse name: Not on file     Number of children: Not on file     Years of education: Not on file     Highest education level: Not on file   Occupational History     Employer: RETIRED      Comment: [employer]   Tobacco Use     Smoking status: Former     Types: Cigars     Smokeless tobacco: Never   Substance and Sexual Activity     Alcohol use: Not Currently     Drug use: Never     Sexual activity: Not Currently     Partners: Female     Birth control/protection: None   Other Topics Concern     Parent/sibling w/ CABG, MI or angioplasty before 65F 55M? No   Social History Narrative     Not on file     Social Determinants of Health     Financial Resource Strain: Not on file   Food Insecurity: Not on file   Transportation Needs: Not on file   Physical Activity: Not on file   Stress: Not on file   Social Connections: Not on file   Interpersonal Safety: Low Risk  (3/5/2024)    Interpersonal Safety      Do you feel physically and emotionally safe where you currently live?: Yes      Within the past 12 months, have you been hit, slapped, kicked or otherwise physically hurt by someone?: No      Within the past 12 months, have you been humiliated or emotionally abused in other ways by your partner or ex-partner?: No   Housing Stability: Not on file       Outpatient Encounter Medications as of 5/21/2024   Medication Sig Dispense Refill     amLODIPine (NORVASC) 10 MG tablet Take 1 tablet (10 mg) by mouth daily 90 tablet 1     atorvastatin (LIPITOR) 20 MG tablet Take 1 tablet (20 mg) by mouth daily 90 tablet 3     blood glucose (ACCU-CHEK REGIS PLUS) test strip USE TO TEST BLOOD SUGAR THREE TIMES A DAY OR AS DIRECTED 300 strip 3     blood glucose monitoring (NO BRAND SPECIFIED) meter device kit Use to test blood sugar 3 times daily or as directed. 1 kit 0     Cholecalciferol (VITAMIN D-3) 1000 units CAPS Take 1 capsule by mouth daily        finasteride (PROSCAR) 5 MG tablet Take 1 tablet (5 mg) by mouth daily 90 tablet 3     insulin glargine (LANTUS SOLOSTAR) 100 UNIT/ML pen INJECT 16 UNITS UNDER THE SKIN AT BEDTIME 30 mL 2     insulin lispro (HUMALOG KWIKPEN) 100 UNIT/ML (1 unit dial)  KWIKPEN USE 8 UNITS  PLUS LOW SLIDING SCALE BEFORE EACH MEAL. MAX 50 UNITS PER DAY. 60 mL 1     insulin pen needle (PENTIPS) 31G X 6 MM miscellaneous USE 4 TO 5 TIMES DAILY OR AS DIRECTED FOR SLIDING SCALE INSULIN 500 each 1     losartan (COZAAR) 100 MG tablet Take 1 tablet (100 mg) by mouth daily 90 tablet 1     metoprolol succinate ER (TOPROL XL) 100 MG 24 hr tablet Take 1 tablet (100 mg) by mouth daily 90 tablet 3     tamsulosin (FLOMAX) 0.4 MG capsule Take 1 capsule (0.4 mg) by mouth daily 90 capsule 3     trospium (SANCTURA) 20 MG tablet Take 1 tablet (20 mg) by mouth every evening 90 tablet 3     No facility-administered encounter medications on file as of 5/21/2024.             O:   NAD, WDWN, Alert & Oriented, Mood & Affect wnl, Vitals stable   General appearance normal   Vitals stable   Alert, oriented and in no acute distress     L medial cheek 1.5x1.3cm red plaque      Eyes: Conjunctivae/lids:Normal     ENT: Lips, mucosa: normal    MSK:Normal    Cardiovascular: peripheral edema none    Pulm: Breathing Normal    Lymph Nodes: No Head and Neck Lymphadenopathy     Neuro/Psych: Orientation:Alert and Orientedx3 ; Mood/Affect:normal       A/P:   L medial cheek melanoma in situ   MELANOMA DISCUSSED WITH PATIENT:  I discussed the specifics of tumor, prognosis, metachronous melanoma, self exam, and genetics with the patient. I explained the need for monthly skin exams including and taught the patient how to do this. Patient was asked about new or changing moles . I discussed with patient signs and symptoms that could arise in the setting of recurrent locoregional or metastatic disease. In addition, the need to undergo every 6 month dermatologic full skin survey and evaluation given that patients with a diagnosis of melanoma are at risk of recurrence (local and distant) and of subsequent de cassie melanoma.  . I reviewed treatment options, including a discussion of wide excision (the gold standard) versus Mohs surgery  with MART-1 immunostains.     Note: MART-1 (Melanoma Antigen Recognized by T-cells) antibody immunostaining was used during Mohs surgery as per standard protocol, in addition to routine processing of all specimens with hematoxylin and eosin. The peripheral margins/edges were processed with the MART-1 stain (3 specimens total). The center was examined with hematoxylin & eosin and MART-1 immunostains. The patient was informed of the procedure and its risk/benefits during the consent for the procedure.    One or more of the reagents used in immunohistochemical testing in this case may not have been cleared or approved by the U.S. Food and Drug Administration (FDA). The FDA has determined that such clearance or approval is not necessary. These tests are used for clinical purposes. They should not be regarded as investigational or for research. These reagents  performance characteristics have been determined by [facility]. This laboratory is certified under the Clinical Laboratory Improvement Amendments of 1988 (CLIA-88) as qualified to perform high complexity clinical laboratory testing.      MOHS:   Aggressive histology    The rationale for Mohs surgery was discussed with the patient and consent was obtained.  The risks and benefits as well as alternatives to therapy were discussed, in detail.  Specifically, the risks of infection, scarring, bleeding, prolonged wound healing, incomplete removal, allergy to anesthesia, nerve injury and recurrence were addressed.  Indication for Mohs was Aggressive histology. Prior to the procedure, the treatment site was clearly identified and, if available, confirmed with previous photos and confirmed by the patient   All components of the Universal Protocol/PAUSE rule were completed.  The Mohs surgeon operated in two distinct and integrated capacities as the surgeon and pathologist.      The area was prepped with Betasept.  A rim of normal appearing skin was marked  circumferentially around the lesion.  The area was infiltrated with local anesthesia.  The tumor was first debulked to remove all clinically apparent tumor.  An incision following the standard Mohs approach was done and the specimen was oriented,mapped and placed in 4 block(s).  Each specimen was then chromacoded and processed in the Mohs laboratory using standard Mohs technique and submitted for frozen section histology.  Frozen section analysis showed  residual tumor but CLEAR MARGINS.    1st stage melanocytoic nesting on lateral margin,     The tumor was excised using standard Mohs technique in 2 stages(s).  MART 1 stains were performed on 3 specimens. CLEAR MARGINS OBTAINED and Final defect size was 2.5 x 2.8 cm.     We discussed the options for wound management in full with the patient including risks/benefits/ possible outcomes.      REPAIR COMPLEX: Because of the tightness of the surrounding skin and Because of the size and full thickness nature of the defect, Because of the tightness of the surrounding skin, To maintain form and function, In order to avoid distortion, and Because of the proximity to the lid, a complex closure was planned. After LE anesthesia and prep, Burow's triangles were excised in the relaxed skin tension lines. The wound edges were widely undermined greater than width of the defect on both sides by dissection in the subcutaneous plane until adequate tissue mobility was obtained. Hemostasis was obtained. The wound edges were closed in a layered fashion using Vicryl and Fast Absorbing Plain Gut sutures. Postoperative length was 8 cm.   EBL minimal; complications none; wound care routine.  The patient was discharged in good condition and will return in one week for wound evaluation.  It was a pleasure speaking to Storm Dutta today.  Previous clinic notes and pertinent laboratory tests were reviewed prior to Storm Dutta's visit.  Signs and Symptoms of skin cancer discussed  with patient.  Patient encouraged to perform monthly skin exams.  UV precautions reviewed with patient.  Return to clinic 6 months      Again, thank you for allowing me to participate in the care of your patient.        Sincerely,        Jesus Neal MD

## 2024-05-28 ENCOUNTER — ALLIED HEALTH/NURSE VISIT (OUTPATIENT)
Dept: DERMATOLOGY | Facility: CLINIC | Age: 84
End: 2024-05-28
Payer: COMMERCIAL

## 2024-05-28 DIAGNOSIS — Z48.01 ENCOUNTER FOR CHANGE OR REMOVAL OF SURGICAL WOUND DRESSING: Primary | ICD-10-CM

## 2024-05-28 PROCEDURE — 99207 PR NO CHARGE NURSE ONLY: CPT

## 2024-05-28 NOTE — PATIENT INSTRUCTIONS
Wound care instructions for  ONE WEEK AFTER SURGERY    Leave the bandage on for 1 week after today's bandage change.  In 1 week you may resume your regular skin care when you remove the dressing. This includes washing with mild soap and water, applying moisturizer, make-up and sunscreen.  If the wound is open or bleeding in any part of the incision/graft site, you should cover the area with a bandage daily as directed below:    Clean and dry area with plain water using a Q-tip or sterile gauze pad.  Apply vaseline, aquaphor, polysporin, or bacitracin ointment to the wound  Cover the wound with a band-aid or a sterile non-stick gauze pad and micropore paper tape.      Repeat the instructions above until wound is completely healed    If you notice that the scar is red and firm (after you remove the dressing) this is normal and will fade over time. It may take up to 6-12 months for this to occur.    Massaging the area helps the scar fade and soften quicker. Begin to massage the area one month after the dressings have been removed. Apply pressure directly and firmly over the scar with the fingertips and move in circular motions. You may massage up to 10 times daily, each time for 30 seconds.    It is normal for there to be a tender, pimple-like bump along the scar about 6-8 weeks after surgery. This sometimes happens as the scar matures and the stitches beneath begin to dissolve. Do not pick or squeeze. It will resolve in time. If an area of the wound does open and there appears to be drainage, you may apply one of the ointments listed in the above directions a few times every day until the wound is completely healed.    Numbness around the surgical site is normal. It can take up to 12-18 months for the feeling to return to normal. Itchiness, tingling, and occasional sharp pains might be present during this portion of the healing. All of these feelings will subside once the nerves have completely healed.      IN CASE OF  EMERGENCY: Dr Neal [phone]     If you were seen in [place] call: [phone]    If you were seen in [place] call: [phone]

## 2024-05-28 NOTE — PROGRESS NOTES
Storm Dutta comes into clinic today at the request of Dr. Neal Ordering Provider for Wound Check Action taken: See Below.    This service provided today was under the supervising provider of the day DIOGENES Vega, who was available if needed.    Pt returned to clinic for post surgery 1 week follow up bandage change. Pt has no complaints, denies pain. Bandage removed from left medial cheek, area cleansed with normal saline. Site is healing and wound edges approximating well. Reapplied new steri strips and paper tape.    Advised to watch for signs/sx of infection; spreading redness, drainage, odor, fever. Call or report promptly to clinic. Pt given written instructions and informed to rtc as needed. Patient verbalized understanding.     Cele Kline on 5/28/2024 at 2:09 PM

## 2024-07-01 ENCOUNTER — PATIENT OUTREACH (OUTPATIENT)
Dept: CARE COORDINATION | Facility: CLINIC | Age: 84
End: 2024-07-01
Payer: COMMERCIAL

## 2024-07-05 ENCOUNTER — TELEPHONE (OUTPATIENT)
Dept: UROLOGY | Facility: CLINIC | Age: 84
End: 2024-07-05
Payer: COMMERCIAL

## 2024-07-05 ENCOUNTER — HOSPITAL ENCOUNTER (EMERGENCY)
Facility: CLINIC | Age: 84
Discharge: HOME | End: 2024-07-05
Attending: EMERGENCY MEDICINE | Admitting: EMERGENCY MEDICINE
Payer: COMMERCIAL

## 2024-07-05 VITALS
TEMPERATURE: 98.7 F | WEIGHT: 197 LBS | RESPIRATION RATE: 12 BRPM | SYSTOLIC BLOOD PRESSURE: 125 MMHG | OXYGEN SATURATION: 97 % | HEART RATE: 68 BPM | DIASTOLIC BLOOD PRESSURE: 70 MMHG | BODY MASS INDEX: 29.95 KG/M2

## 2024-07-05 DIAGNOSIS — R33.9 URINARY RETENTION: ICD-10-CM

## 2024-07-05 DIAGNOSIS — N39.0 URINARY TRACT INFECTION WITHOUT HEMATURIA, SITE UNSPECIFIED: ICD-10-CM

## 2024-07-05 LAB
ALBUMIN UR-MCNC: 100 MG/DL
APPEARANCE UR: ABNORMAL
BILIRUB UR QL STRIP: NEGATIVE
COLOR UR AUTO: YELLOW
GLUCOSE UR STRIP-MCNC: NEGATIVE MG/DL
HGB UR QL STRIP: ABNORMAL
KETONES UR STRIP-MCNC: NEGATIVE MG/DL
LEUKOCYTE ESTERASE UR QL STRIP: ABNORMAL
NITRATE UR QL: NEGATIVE
PH UR STRIP: 5 [PH] (ref 5–7)
RBC URINE: 60 /HPF
SP GR UR STRIP: 1.01 (ref 1–1.03)
SQUAMOUS EPITHELIAL: 4 /HPF
UROBILINOGEN UR STRIP-MCNC: NORMAL MG/DL
WBC CLUMPS #/AREA URNS HPF: PRESENT /HPF
WBC URINE: >182 /HPF

## 2024-07-05 PROCEDURE — 87086 URINE CULTURE/COLONY COUNT: CPT | Performed by: EMERGENCY MEDICINE

## 2024-07-05 PROCEDURE — 51702 INSERT TEMP BLADDER CATH: CPT | Performed by: EMERGENCY MEDICINE

## 2024-07-05 PROCEDURE — 99284 EMERGENCY DEPT VISIT MOD MDM: CPT | Mod: 25 | Performed by: EMERGENCY MEDICINE

## 2024-07-05 PROCEDURE — 250N000009 HC RX 250: Performed by: EMERGENCY MEDICINE

## 2024-07-05 PROCEDURE — 250N000013 HC RX MED GY IP 250 OP 250 PS 637: Performed by: EMERGENCY MEDICINE

## 2024-07-05 PROCEDURE — 81003 URINALYSIS AUTO W/O SCOPE: CPT | Performed by: EMERGENCY MEDICINE

## 2024-07-05 PROCEDURE — 51798 US URINE CAPACITY MEASURE: CPT | Performed by: EMERGENCY MEDICINE

## 2024-07-05 PROCEDURE — 99284 EMERGENCY DEPT VISIT MOD MDM: CPT | Performed by: EMERGENCY MEDICINE

## 2024-07-05 RX ORDER — LIDOCAINE HYDROCHLORIDE 20 MG/ML
JELLY TOPICAL
Status: COMPLETED | OUTPATIENT
Start: 2024-07-05 | End: 2024-07-05

## 2024-07-05 RX ORDER — CEFDINIR 300 MG/1
300 CAPSULE ORAL 2 TIMES DAILY
Qty: 20 CAPSULE | Refills: 0 | Status: SHIPPED | OUTPATIENT
Start: 2024-07-05 | End: 2024-07-15

## 2024-07-05 RX ORDER — CEFDINIR 300 MG/1
300 CAPSULE ORAL ONCE
Status: COMPLETED | OUTPATIENT
Start: 2024-07-05 | End: 2024-07-05

## 2024-07-05 RX ADMIN — LIDOCAINE HYDROCHLORIDE 1 TUBE: 20 JELLY TOPICAL at 13:04

## 2024-07-05 RX ADMIN — CEFDINIR 300 MG: 300 CAPSULE ORAL at 13:04

## 2024-07-05 ASSESSMENT — ACTIVITIES OF DAILY LIVING (ADL)
ADLS_ACUITY_SCORE: 37

## 2024-07-05 ASSESSMENT — COLUMBIA-SUICIDE SEVERITY RATING SCALE - C-SSRS
1. IN THE PAST MONTH, HAVE YOU WISHED YOU WERE DEAD OR WISHED YOU COULD GO TO SLEEP AND NOT WAKE UP?: NO
2. HAVE YOU ACTUALLY HAD ANY THOUGHTS OF KILLING YOURSELF IN THE PAST MONTH?: NO
6. HAVE YOU EVER DONE ANYTHING, STARTED TO DO ANYTHING, OR PREPARED TO DO ANYTHING TO END YOUR LIFE?: NO

## 2024-07-05 NOTE — ED PROVIDER NOTES
History     Chief Complaint   Patient presents with    Urinary Problem     HPI  Storm Dutta is a 83 year old male who presents to the emergency department reporting that over the past couple days he has been having symptoms of urinary retention.  He reports that he is having urgency to urinate but only able to urinate very small amounts and he feels as though he is not emptying his bladder.  He has had some incontinence at night.  He denies fevers but does report some discomfort when he urinates.  He denies blood in his urine.  He denies flank pain and has no other complaints.    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  Mild retinopathy of both eyes.  Monitor only.  Sees retinal specialist.  F/u 3 months.        Type 2 diabetes mellitus with diabetic chronic kidney disease (H) 10/22/2015     Priority: Medium    Small bowel obstruction, recurrent 05/21/2014     Priority: Medium    Senile nuclear sclerosis 04/10/2013     Priority: Medium    Hypertrophy of prostate with urinary obstruction 12/07/2012     Priority: Medium     Problem list name updated by automated process. 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    PE (pulmonary embolism) 03/20/2014    Rectal cancer (H)     Skin cancer     Squamous cell carcinoma     Thrombosis of leg        Past Surgical History:    Past Surgical History:   Procedure Laterality Date    AMPUTATE TOE(S) Right 06/04/2019    Procedure: AMPUTATION 2nd Toe;  Surgeon: Adriel Cabello DPM;  Location: [facility]    BIOPSY      COLONOSCOPY  07/29/2013    Procedure: COMBINED COLONOSCOPY, SINGLE BIOPSY/POLYPECTOMY BY BIOPSY;;  Surgeon: Bari Burnett MD;  Location: [facility]    COLONOSCOPY N/A 06/04/2015    Procedure: COMBINED COLONOSCOPY, SINGLE OR MULTIPLE BIOPSY/POLYPECTOMY BY BIOPSY;  Surgeon: Tara Mtz MD;  Location:  GI    COLONOSCOPY N/A 12/05/2016    Procedure: COLONOSCOPY;  Surgeon: Breanna Kline MD;  Location:  GI    COLONOSCOPY N/A 10/11/2022    Procedure: COLONOSCOPY, FLEXIBLE, WITH LESION REMOVAL USING SNARE;  Surgeon: Pierre Doe MD;  Location: [facility]    CV CORONARY ANGIOGRAM N/A 11/22/2021    Procedure: Coronary Angiogram;  Surgeon: Jak Noe MD;  Location:  [facility]    ENT SURGERY      EYE SURGERY  05/01/2012    Right eye procedure    HC CIRCUMCISION SURGICAL NON-DEV >28 DAYS AGE  02/01/1997    due to phimosis    HC REMOVAL GALLBLADDER  05/01/2001    HC UGI ENDOSCOPY W PLACEMENT GASTROSTOMY TUBE PERCUT  03/13/2014    Procedure: COMBINED ESOPHAGOSCOPY, GASTROSCOPY, DUODENOSCOPY (EGD), PLACE PERCUTANEOUS ENDOSCOPIC GASTROSTOMY TUBE;  Surgeon: Loco Casillas MD;  Location: [facility]     LAPAROSCOPIC ASSISTED COLECTOMY LEFT (DESCENDING)  01/07/2014    Procedure: LAPAROSCOPIC ASSISTED COLECTOMY LEFT (DESCENDING);  Laparoscopic hand assisted Low Anterior Resection With colonic J pouch and end to end colorectal anastamosis. Laparoscopic splenic flexure mobilization.  Loop Ileostomy.  Rigid proctoscopy;  Surgeon: Margaret Gamble MD;  [facility]    PHACOEMULSIFICATION WITH STANDARD INTRAOCULAR LENS IMPLANT  04/11/2013    Procedure: PHACOEMULSIFICATION WITH STANDARD INTRAOCULAR LENS IMPLANT;  Right Kelman Phacoemulsification with Intraocular Lens Implant;  Surgeon: Caesar Turner MD;  Location: [facility]    REMOVE GASTROSTOMY TUBE ADULT  07/25/2014    Procedure: REMOVE GASTROSTOMY TUBE ADULT;  Surgeon: Margaret Gamble MD;  [facility]    SIGMOIDOSCOPY FLEXIBLE  06/23/2014    Procedure: SIGMOIDOSCOPY FLEXIBLE;  Surgeon: Margaret Gamble MD;  [facility]    SIGMOIDOSCOPY FLEXIBLE  07/22/2014    Procedure: SIGMOIDOSCOPY FLEXIBLE;  Surgeon: Margaret Gamble MD;  [facility]    SIGMOIDOSCOPY FLEXIBLE  07/25/2014    Procedure: SIGMOIDOSCOPY FLEXIBLE;  Surgeon: Margaret Gamble MD;  [facility]    SIGMOIDOSCOPY FLEXIBLE  07/28/2014    Procedure: SIGMOIDOSCOPY FLEXIBLE;  Surgeon: Margaret Gamble MD;  [facility]    SIGMOIDOSCOPY FLEXIBLE  07/31/2014    Procedure: SIGMOIDOSCOPY FLEXIBLE;  Surgeon: Margaret Gamble MD;  [facility]    SIGMOIDOSCOPY FLEXIBLE  08/03/2014    Procedure: SIGMOIDOSCOPY FLEXIBLE;  Surgeon: Margaret Gamble MD;  [facility]    SIGMOIDOSCOPY FLEXIBLE  08/05/2014    Procedure: SIGMOIDOSCOPY FLEXIBLE;  Surgeon: Margaret Gamble MD;  [facility]    SIGMOIDOSCOPY FLEXIBLE  08/08/2014    Procedure: SIGMOIDOSCOPY FLEXIBLE;  Surgeon: Margaret Gamble MD;  [facility]    SIGMOIDOSCOPY FLEXIBLE  08/18/2014    Procedure: SIGMOIDOSCOPY FLEXIBLE;  Surgeon:  Jose Roberto Cervantes MD;  [facility]    SIGMOIDOSCOPY FLEXIBLE N/A 08/15/2014    Procedure: SIGMOIDOSCOPY FLEXIBLE;  Surgeon: Margaret Gamble MD;  [facility]    SIGMOIDOSCOPY FLEXIBLE N/A 08/22/2014    Procedure: SIGMOIDOSCOPY FLEXIBLE;  Surgeon: Jose Roberto Cervantes MD;  [facility]    SIGMOIDOSCOPY FLEXIBLE N/A 08/11/2014    Procedure: SIGMOIDOSCOPY FLEXIBLE;  Surgeon: Margaret Gamble MD;  [facility]    SIGMOIDOSCOPY FLEXIBLE N/A 08/26/2014    Procedure: SIGMOIDOSCOPY FLEXIBLE;  Surgeon: Margaret Gamble MD;  [facility]    SIGMOIDOSCOPY FLEXIBLE N/A 08/29/2014    Procedure: SIGMOIDOSCOPY FLEXIBLE;  Surgeon: Margaret Gamble MD;  [facility]    SIGMOIDOSCOPY FLEXIBLE N/A 09/08/2014    Procedure: SIGMOIDOSCOPY FLEXIBLE;  Surgeon: Margaret Gamble MD;  [facility]    SIGMOIDOSCOPY FLEXIBLE N/A 09/02/2014    Procedure: SIGMOIDOSCOPY FLEXIBLE;  Surgeon: Breanna Kline MD;  [facility]    SIGMOIDOSCOPY FLEXIBLE N/A 09/11/2014    Procedure: SIGMOIDOSCOPY FLEXIBLE;  Surgeon: Margaret Gamble MD;  [facility]    SIGMOIDOSCOPY FLEXIBLE N/A 09/19/2014    Procedure: SIGMOIDOSCOPY FLEXIBLE;  Surgeon: Margaret Gamble MD;  [facility]    SIGMOIDOSCOPY FLEXIBLE N/A 09/15/2014    Procedure: SIGMOIDOSCOPY FLEXIBLE;  Surgeon: Margaret Gamble MD;  [facility]    SIGMOIDOSCOPY FLEXIBLE N/A 09/05/2014    Procedure: SIGMOIDOSCOPY FLEXIBLE;  Surgeon: Margaret Gamble MD;  [facility]    SIGMOIDOSCOPY FLEXIBLE N/A 09/23/2014    Procedure: SIGMOIDOSCOPY FLEXIBLE;  Surgeon: Margaret Gamble MD;  [facility]    SIGMOIDOSCOPY FLEXIBLE N/A 09/26/2014    Procedure: SIGMOIDOSCOPY FLEXIBLE;  Surgeon: Margaret Gamble MD;  [facility]    SIGMOIDOSCOPY FLEXIBLE N/A 09/30/2014    Procedure: SIGMOIDOSCOPY FLEXIBLE;  Surgeon: Margaret Gamble MD;  Location: [facility]     SIGMOIDOSCOPY FLEXIBLE N/A 10/03/2014    Procedure: SIGMOIDOSCOPY FLEXIBLE;  Surgeon: Margaret Gamble MD;  [facility]    SIGMOIDOSCOPY FLEXIBLE N/A 10/30/2014    Procedure: SIGMOIDOSCOPY FLEXIBLE;  Surgeon: Margaret Gamble MD;  Location:  GI    SIGMOIDOSCOPY FLEXIBLE, PLACEMENT OF DRAINAGE SPONGE  09/30/2014    TAKEDOWN ILEOSTOMY N/A 01/06/2015    Procedure: TAKEDOWN ILEOSTOMY;  Surgeon: Margaret Gamble MD;  [facility]       Family History:    Family History   Problem Relation Age of Onset    Diabetes Mother     Hypertension Mother     Cancer Father     Cancer Maternal Grandmother     Alzheimer Disease Maternal Grandmother     Cardiovascular Paternal Grandmother     Breast Cancer Sister     Colon Cancer No family hx of     Colon Polyps No family hx of     Crohn's Disease No family hx of     Ulcerative Colitis No family hx of     Anesthesia Reaction No family hx of     Melanoma No family hx of        Social History:  Marital Status:   [2]  Social History     Tobacco Use    Smoking status: Former     Types: Cigars    Smokeless tobacco: Never   Substance Use Topics    Alcohol use: Not Currently    Drug use: Never        Medications:    cefdinir (OMNICEF) 300 MG capsule  amLODIPine (NORVASC) 10 MG tablet  atorvastatin (LIPITOR) 20 MG tablet  blood glucose (ACCU-CHEK REGIS PLUS) test strip  blood glucose monitoring (NO BRAND SPECIFIED) meter device kit  Cholecalciferol (VITAMIN D-3) 1000 units CAPS  finasteride (PROSCAR) 5 MG tablet  insulin glargine (LANTUS SOLOSTAR) 100 UNIT/ML pen  insulin lispro (HUMALOG KWIKPEN) 100 UNIT/ML (1 unit dial) KWIKPEN  insulin pen needle (PENTIPS) 31G X 6 MM miscellaneous  losartan (COZAAR) 100 MG tablet  metoprolol succinate ER (TOPROL XL) 100 MG 24 hr tablet  tamsulosin (FLOMAX) 0.4 MG capsule  trospium (SANCTURA) 20 MG tablet          Review of Systems    Physical Exam   BP: 125/70  Pulse: 68  Temp: 98.7  F (37.1  C)  Resp:  12  Weight: 89.4 kg (197 lb)  SpO2: 97 %      Physical Exam    ED Course        Procedures                Results for orders placed or performed during the hospital encounter of 07/05/24 (from the past 24 hour(s))   UA with Microscopic reflex to Culture    Specimen: Urine, Clean Catch   Result Value Ref Range    Color Urine Yellow Colorless, Straw, Light Yellow, Yellow    Appearance Urine Cloudy (A) Clear    Glucose Urine Negative Negative mg/dL    Bilirubin Urine Negative Negative    Ketones Urine Negative Negative mg/dL    Specific Gravity Urine 1.014 1.003 - 1.035    Blood Urine Moderate (A) Negative    pH Urine 5.0 5.0 - 7.0    Protein Albumin Urine 100 (A) Negative mg/dL    Urobilinogen Urine Normal Normal, 2.0 mg/dL    Nitrite Urine Negative Negative    Leukocyte Esterase Urine Large (A) Negative    WBC Clumps Urine Present (A) None Seen /HPF    RBC Urine 60 (H) <=2 /HPF    WBC Urine >182 (H) <=5 /HPF    Squamous Epithelials Urine 4 (H) <=1 /HPF    Narrative    Urine Culture ordered based on laboratory criteria       Medications   cefdinir (OMNICEF) capsule 300 mg (300 mg Oral $Given 7/5/24 1304)   lidocaine (XYLOCAINE) 2 % external gel (1 Tube Urethral $Given 7/5/24 1304)       Assessments & Plan (with Medical Decision Making)     I have reviewed the nursing notes.    I have reviewed the findings, diagnosis, plan and need for follow up with the patient.  This patient presented to the emergency department with symptoms concerning for urinary retention.  We were able to get some urine from the patient but postvoid residual demonstrated almost 500 cc still in the bladder.  Urinalysis is suggestive of urinary tract infection so patient was given a dose of cefdinir and prescription was sent to the pharmacy.  Ivey catheter was placed for urinary retention.  Patient is afebrile and clinically appears nontoxic so at this point I feel outpatient management is appropriate.  Patient was cautioned return to the  emergency department should he begin to develop fevers or other symptoms of concern.  He was discharged with instructions for care and follow-up in good condition.        New Prescriptions    CEFDINIR (OMNICEF) 300 MG CAPSULE    Take 1 capsule (300 mg) by mouth 2 times daily for 10 days       Final diagnoses:   Urinary tract infection without hematuria, site unspecified   Urinary retention       7/5/2024   [facility] EMERGENCY DEPT       Jak Graham MD  07/05/24 0626

## 2024-07-05 NOTE — ED TRIAGE NOTES
Urinary leakage, going small amounts frequently, having some incontinence at night     Triage Assessment (Adult)       Row Name 07/05/24 1044          Triage Assessment    Airway WDL WDL        Respiratory WDL    Respiratory WDL WDL        Peripheral/Neurovascular WDL    Peripheral Neurovascular WDL WDL        Cognitive/Neuro/Behavioral WDL    Cognitive/Neuro/Behavioral WDL WDL

## 2024-07-05 NOTE — TELEPHONE ENCOUNTER
[facility] Call Center    Phone Message    May a detailed message be left on voicemail: yes     Reason for Call: Other: Pt wants to be seen today for urine leaking. Please call pt back. Thanks.     Action Taken: Other: [facility]    Travel Screening: Not Applicable     Date of Service:

## 2024-07-05 NOTE — DISCHARGE INSTRUCTIONS
Follow-up with your urologist next week for reevaluation and to see about getting catheter removed.    Cefdinir as directed.    Return to the emergency department for fevers, worsening symptoms or any other problems.

## 2024-07-05 NOTE — TELEPHONE ENCOUNTER
Spoke with pt.  Pt having only small dribbling and then unable to urinate.  Notes uncomfortable.  No availability in clinic today.  Advised PCP and if no openings then UC or ER for possible infection and/or retention requiring catheterization.    Giana CASTILLO   Specialty Clinic RN

## 2024-07-08 LAB — BACTERIA UR CULT: NORMAL

## 2024-07-08 NOTE — RESULT ENCOUNTER NOTE
Final urine culture report is negative.  Adult: Negative urine culture parameters per protocol: Any # urogenital awa, single or mixed   [facility] Emergency Dept discharge antibiotic prescribed (If applicable): Cefdinir  Treatment recommendations per [facility] ED Lab Result Urine Culture protocol: No change in plan of care.

## 2024-07-11 ENCOUNTER — OFFICE VISIT (OUTPATIENT)
Dept: UROLOGY | Facility: CLINIC | Age: 84
End: 2024-07-11
Payer: COMMERCIAL

## 2024-07-11 VITALS
SYSTOLIC BLOOD PRESSURE: 129 MMHG | TEMPERATURE: 97.3 F | WEIGHT: 197 LBS | HEIGHT: 68 IN | OXYGEN SATURATION: 98 % | BODY MASS INDEX: 29.86 KG/M2 | HEART RATE: 63 BPM | DIASTOLIC BLOOD PRESSURE: 58 MMHG

## 2024-07-11 DIAGNOSIS — R33.9 URINARY RETENTION: ICD-10-CM

## 2024-07-11 DIAGNOSIS — N40.1 HYPERTROPHY OF PROSTATE WITH URINARY OBSTRUCTION: Primary | ICD-10-CM

## 2024-07-11 DIAGNOSIS — N13.8 HYPERTROPHY OF PROSTATE WITH URINARY OBSTRUCTION: Primary | ICD-10-CM

## 2024-07-11 PROCEDURE — 99213 OFFICE O/P EST LOW 20 MIN: CPT | Performed by: STUDENT IN AN ORGANIZED HEALTH CARE EDUCATION/TRAINING PROGRAM

## 2024-07-11 ASSESSMENT — PAIN SCALES - GENERAL: PAINLEVEL: NO PAIN (0)

## 2024-07-11 NOTE — PATIENT INSTRUCTIONS
Holmium Laser Enucleation of the Prostate Surgery    Benign prostatic hyperplasia  Your prostate is a walnut-sized gland. It surrounds the urethra just below the bladder. When you have benign prostatic hyperplasia (BPH), your prostate is larger than usual   (Figure 1B). It can create pressure on your urethra, which can begin to block the flow of urine. This may cause you to have a slow stream of urine or difficulty starting a stream of urine. This   also may force your bladder muscle to work too hard and become irritable or weakened. If you have BPH, the muscles of your pelvic floor do not have to work as hard to prevent urine leakage. Not   using these muscles can cause them to weaken        HoLEP surgery  HoLEP surgery is a treatment for BPH that decreases the pressure on your urethra   caused by the enlarged prostate. HoLEP surgery is done through a thin, tube-like scope   instrument inserted through your penis. It does not use incisions through your skin. The   inside tissue of your prostate is removed with a laser tool, leaving the outer shell intact.   This can be compared to hollowing out the juicy part of an orange and leaving the peel   (Figure 2). Another device (morcellator) acts like a small  to break up the   shelled-out tissue and remove it from your bladder. This way, there is no tissue that has   to be passed through your urine        HoLEP surgery is a treatment option for men with any size prostate and any of these   conditions:  ? You have bothersome urinary symptoms due to BPH.  ? You have had past procedures that have not fixed the BPH (in some cases).  ? You have a weak bladder and BPH.  ? You have a blocked flow of urine due to prostate cancer (HoLEP surgery is not a   treatment for prostate cancer).  ? You need treatment for BPH again.  ? You also need surgery for bladder stones, upper urinary tract stones or other urinary   problems.    Benefits of HoLEP surgery include:  ?  Patients do not typically need treatment for BPH again.  ? You may only need a urinary catheter (tube in the bladder) for a short time after   surgery.  ? The surgery can be done with less risk of bleeding even if the patient is taking an   anticoagulant (blood thinner) medication. This is because a laser is used.  ? The recovery time is shorter than other BPH surgeries. Most patients can resume   physical activities such as exercise 2 weeks after surgery    During the procedure  The procedure is done while you are asleep (under anesthesia). The urologist will insert   the thin, tube-like scope instrument through your urethra (Figure 3A). A high-powered   holmium laser will be used through the scope to core out the enlarged prostate tissue.   This will make your prostate smaller (Figures 3B, 3C). Leftover prostate tissue in your   bladder (Figure 3D) will then be removed by the morcellator device and suction.        After the procedure  You will have a catheter in your bladder after the procedure. We typically watch you overnight in the hospital and take the catheter out the next day. Sometimes we send you home same day with the catheter to have it taken out in clinic.    Care at home after surgery    What to expect  Expect to see blood in your urine for 1 to 2 weeks. This may last longer if you take   anticoagulant medication.  Some men may notice these symptoms after surgery:  ? Burning when you urinate  ? A small amount of pain or discomfort  ? Urine leakage (incontinence)    Activity  It is important to limit your activity at first to allow for healing.  ? First week - Rest and do as little activity as possible. Do not lift anything heavier than   10 pounds for 1 week.  ? Week 2 - Limit your activity, including exercise, to half of what you normally do.  ? Week 3 - You can return to your regular activities as you are able.    Limit strenuous activities such as biking, horseback riding and motorcycle riding for    1 month.    Retrograde ejaculation  Retrograde ejaculation means that ejaculate (semen) does not come out of the penis   during orgasm. You will have retrograde ejaculation after your surgery. Your erections   and orgasms will feel the same. Some men say the experience of the orgasm is somehow   changed without the ejaculate. Unfortunately, this is an irreversible effect of HoLEP.   After the prostate has been enucleated or  hollowed out , there is not enough pressure   to make the semen come out.    Caring for your bladder  You may have incontinence after your surgery. This is because the muscles that support   your bladder and stop the flow of urine are weak. You also may have an overactive   bladder. Incontinence does not usually last. It can improve if you do pelvic floor muscle   exercises. These tips can also help:  ? Wear an absorbent brief or pad to stay dry.  ? Stay hydrated. It is easy to think that if you drink less water, you will have less   urine leakage. This can actually make it worse because dehydration can irritate the   bladder.  ? Try to drink at least 2 liters (or 8 full 8-ounce glasses) of fluid a day unless your   urologist tells you otherwise. Fluids will also help to flush blood from your urine.

## 2024-07-11 NOTE — NURSING NOTE
"Initial /58   Pulse 63   Temp 97.3  F (36.3  C) (Tympanic)   Ht 1.727 m (5' 8\")   Wt 89.4 kg (197 lb)   SpO2 98%   BMI 29.95 kg/m   Estimated body mass index is 29.95 kg/m  as calculated from the following:    Height as of this encounter: 1.727 m (5' 8\").    Weight as of this encounter: 89.4 kg (197 lb). .  Xin ALVES CMA 07/11/24 10:17 AM  "

## 2024-07-11 NOTE — PROGRESS NOTES
"    UROLOGY FOLLOW-UP NOTE          Chief Complaint:   Today I had the pleasure of seeing Mr. Storm Dutta in follow-up for a chief complaint of LUTS.          Interval Update   Storm Dutta is a very pleasant 83 year old male with a history of T2 DM with CKD and polyneuropathy, HLD, HTN, and rectal cancer.     Brief History: Mr. Storm Dutta has followed with urology for LUTS and incomplete bladder emptying. He takes tamsulosin, finasteride, and trospium. At baseline, he tends to have a PVR between 200 and 200 mL. Renal US from 10/06/2022 showed no hydronephrosis.     He presented to the ED on 7/05/2024 for a few days of small volume voids and incontinence. He was found to have a UTI and be in urinary retention. An indwelling catheter was placed.     Today's notes: He is doing well. Things are going fine with the catheter.          Physical Exam:   Patient is a 83 year old  male   Vitals: Blood pressure 129/58, pulse 63, temperature 97.3  F (36.3  C), temperature source Tympanic, height 1.727 m (5' 8\"), weight 89.4 kg (197 lb), SpO2 98%.  General: Alert and oriented x 3, no acute distress.  Respiratory: Non-labored breathing.  Cardiac: Regular rate.      Labs and Pathology:    I personally reviewed all applicable laboratory data and went over findings with patient  Significant for:    CBC RESULTS:  Recent Labs   Lab Test 07/31/23  0822 10/11/22  1256 11/22/21  0654 08/17/21  0951 01/22/21  1017   WBC  --  6.2 6.2 5.8 6.2   HGB 11.8* 11.0* 12.4* 12.4* 12.7*   PLT  --  194 193 171 169        BMP RESULTS:  Recent Labs   Lab Test 02/13/24  1047 07/31/23  0822 02/24/23  0947 10/11/22  1256 08/09/21  1015 01/22/21  1017 01/14/21  1030 09/21/20  0820 03/16/20  0906    140 139 137   < > 137  --  141 139   POTASSIUM 4.3 4.8 4.6 4.3   < > 4.3  --  4.2 4.4   CHLORIDE 107 105 103 101   < > 106  --  109 110*   CO2 25 26 27 24   < > 27  --  27 26   ANIONGAP 11 9 9 12   < > 4  --  5 3   * 145* 119* " 131*   < > 246*  --  131* 132*   BUN 26.4* 23.1* 26.5* 19.3   < > 29  --  27 28   CR 1.27* 1.34* 1.36* 1.18*   < > 1.34*  --  1.21 1.38*   GFRESTIMATED 56* 53* 52* 62   < > 50* 49* 56* 48*   GFRESTBLACK  --   --   --   --   --  57* 59* 65 56*   PILY 9.3 9.5 10.2 9.3   < > 9.5  --  9.6 8.9    < > = values in this interval not displayed.       UA RESULTS:   Recent Labs   Lab Test 07/05/24  1157 06/09/22  0955 11/19/19  2213   SG 1.014 1.020 1.016   URINEPH 5.0 6.0 5.0   NITRITE Negative Negative Negative   RBCU 60* 2-5* 3*   WBCU >182* None Seen <1       PSA RESULTS  PSA   Date Value Ref Range Status   05/31/2013 2.79 0 - 4 ug/L Final   11/27/2012 12.60 (H) 0 - 4 ug/L Final              Assessment/Plan   83 year old male seen in follow up for LUTS and incomplete bladder emptying. He takes tamsulosin, finasteride, and trospium. At baseline, he tends to have a PVR between 200 and 200 mL. Renal US from 10/06/2022 showed no hydronephrosis.     He presented to the ED on 7/05/2024 for a few days of small volume voids and incontinence. He was found to have a UTI and be in urinary retention. An indwelling catheter was placed.     We will schedule the patient for a TOV on Monday. He tried CIC about 10 years ago and had some bleeding.     CT urogram from 11/02/2022 shows a significantly enlarged prostate with intrusion into the bladder. These images were reviewed with the patient and his wife. We discussed that he may be a good candidate for HoLEP. We discussed risks and benefits of this. He would like to first see how things do with the TOV on Monday before making a decision on meeting with the surgeon.     Plan:  Continue tamsulosin and finasteride.   Maintain indwelling catheter through the weekend.   TOV on 7/15/2024.              Past Medical History:     Past Medical History:   Diagnosis Date    Acute urinary retention 11/2012    ER visit   / 1200 cc post-void residual    Acute urinary retention 11/01/2012    ER     Basal  cell carcinoma     Diabetes mellitus type II     Epididymitis 03/20/2014    Started on doxycyclline for UTI/orchitis. He was discharged on 03/22/14.    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    PE (pulmonary embolism) 03/20/2014    Rectal cancer (H)     Skin cancer     Squamous cell carcinoma     Thrombosis of leg     +PE            Past Surgical History:     Past Surgical History:   Procedure Laterality Date    AMPUTATE TOE(S) Right 06/04/2019    Procedure: AMPUTATION 2nd Toe;  Surgeon: Adriel Cabello DPM;  Location: [facility]    BIOPSY      COLONOSCOPY  07/29/2013    Procedure: COMBINED COLONOSCOPY, SINGLE BIOPSY/POLYPECTOMY BY BIOPSY;;  Surgeon: Bari Burnett MD;  Location: [facility]    COLONOSCOPY N/A 06/04/2015    Procedure: COMBINED COLONOSCOPY, SINGLE OR MULTIPLE BIOPSY/POLYPECTOMY BY BIOPSY;  Surgeon: Tara Mtz MD;  Location:  GI    COLONOSCOPY N/A 12/05/2016    Procedure: COLONOSCOPY;  Surgeon: Breanna Kline MD;  Location:  GI    COLONOSCOPY N/A 10/11/2022    Procedure: COLONOSCOPY, FLEXIBLE, WITH LESION REMOVAL USING SNARE;  Surgeon: Pierre Doe MD;  Location: [facility]    CV CORONARY ANGIOGRAM N/A 11/22/2021    Procedure: Coronary Angiogram;  Surgeon: Jak Noe MD;  Location:  [facility]    ENT SURGERY      EYE SURGERY  05/01/2012    Right eye procedure    HC CIRCUMCISION SURGICAL NON-DEV >28 DAYS AGE  02/01/1997    due to phimosis    HC REMOVAL GALLBLADDER  05/01/2001    HC UGI ENDOSCOPY W PLACEMENT GASTROSTOMY TUBE PERCUT  03/13/2014    Procedure: COMBINED ESOPHAGOSCOPY, GASTROSCOPY, DUODENOSCOPY (EGD), PLACE PERCUTANEOUS ENDOSCOPIC GASTROSTOMY TUBE;  Surgeon: Loco Casillas MD;  Location: [facility]    LAPAROSCOPIC ASSISTED COLECTOMY LEFT (DESCENDING)  01/07/2014    Procedure: LAPAROSCOPIC ASSISTED COLECTOMY LEFT  (DESCENDING);  Laparoscopic hand assisted Low Anterior Resection With colonic J pouch and end to end colorectal anastamosis. Laparoscopic splenic flexure mobilization.  Loop Ileostomy.  Rigid proctoscopy;  Surgeon: Margaret Gamble MD;  [facility]    PHACOEMULSIFICATION WITH STANDARD INTRAOCULAR LENS IMPLANT  04/11/2013    Procedure: PHACOEMULSIFICATION WITH STANDARD INTRAOCULAR LENS IMPLANT;  Right Kelman Phacoemulsification with Intraocular Lens Implant;  Surgeon: Caesar Turner MD;  Location: [facility]    REMOVE GASTROSTOMY TUBE ADULT  07/25/2014    Procedure: REMOVE GASTROSTOMY TUBE ADULT;  Surgeon: Margaret Gamble MD;  [facility]    SIGMOIDOSCOPY FLEXIBLE  06/23/2014    Procedure: SIGMOIDOSCOPY FLEXIBLE;  Surgeon: Margaret Gamble MD;  [facility]    SIGMOIDOSCOPY FLEXIBLE  07/22/2014    Procedure: SIGMOIDOSCOPY FLEXIBLE;  Surgeon: Margaret Gamble MD;  [facility]    SIGMOIDOSCOPY FLEXIBLE  07/25/2014    Procedure: SIGMOIDOSCOPY FLEXIBLE;  Surgeon: Margaret Gamble MD;  [facility]    SIGMOIDOSCOPY FLEXIBLE  07/28/2014    Procedure: SIGMOIDOSCOPY FLEXIBLE;  Surgeon: Margaret Gamble MD;  [facility]    SIGMOIDOSCOPY FLEXIBLE  07/31/2014    Procedure: SIGMOIDOSCOPY FLEXIBLE;  Surgeon: Margaret Gamble MD;  [facility]    SIGMOIDOSCOPY FLEXIBLE  08/03/2014    Procedure: SIGMOIDOSCOPY FLEXIBLE;  Surgeon: Margaret Gamble MD;  [facility]    SIGMOIDOSCOPY FLEXIBLE  08/05/2014    Procedure: SIGMOIDOSCOPY FLEXIBLE;  Surgeon: Margaret Gamble MD;  [facility]    SIGMOIDOSCOPY FLEXIBLE  08/08/2014    Procedure: SIGMOIDOSCOPY FLEXIBLE;  Surgeon: Margaret Gamble MD;  [facility]    SIGMOIDOSCOPY FLEXIBLE  08/18/2014    Procedure: SIGMOIDOSCOPY FLEXIBLE;  Surgeon: Jose Roberto Cervantes MD;  [facility]    SIGMOIDOSCOPY FLEXIBLE N/A 08/15/2014    Procedure: SIGMOIDOSCOPY FLEXIBLE;   Surgeon: Margaret Gamble MD;  [facility]    SIGMOIDOSCOPY FLEXIBLE N/A 08/22/2014    Procedure: SIGMOIDOSCOPY FLEXIBLE;  Surgeon: Jose Roberto Cervantes MD;  [facility]    SIGMOIDOSCOPY FLEXIBLE N/A 08/11/2014    Procedure: SIGMOIDOSCOPY FLEXIBLE;  Surgeon: Margaret Gamble MD;  [facility]    SIGMOIDOSCOPY FLEXIBLE N/A 08/26/2014    Procedure: SIGMOIDOSCOPY FLEXIBLE;  Surgeon: Margaret Gamble MD;  [facility]    SIGMOIDOSCOPY FLEXIBLE N/A 08/29/2014    Procedure: SIGMOIDOSCOPY FLEXIBLE;  Surgeon: Margaret Gamble MD;  [facility]    SIGMOIDOSCOPY FLEXIBLE N/A 09/08/2014    Procedure: SIGMOIDOSCOPY FLEXIBLE;  Surgeon: Margaret Gamble MD;  [facility]    SIGMOIDOSCOPY FLEXIBLE N/A 09/02/2014    Procedure: SIGMOIDOSCOPY FLEXIBLE;  Surgeon: Breanna Kline MD;  [facility]    SIGMOIDOSCOPY FLEXIBLE N/A 09/11/2014    Procedure: SIGMOIDOSCOPY FLEXIBLE;  Surgeon: Margaret Gamble MD;  [facility]    SIGMOIDOSCOPY FLEXIBLE N/A 09/19/2014    Procedure: SIGMOIDOSCOPY FLEXIBLE;  Surgeon: Margaret Gamble MD;  [facility]    SIGMOIDOSCOPY FLEXIBLE N/A 09/15/2014    Procedure: SIGMOIDOSCOPY FLEXIBLE;  Surgeon: Margaret Gamble MD;  [facility]    SIGMOIDOSCOPY FLEXIBLE N/A 09/05/2014    Procedure: SIGMOIDOSCOPY FLEXIBLE;  Surgeon: Margaret Gamble MD;  [facility]    SIGMOIDOSCOPY FLEXIBLE N/A 09/23/2014    Procedure: SIGMOIDOSCOPY FLEXIBLE;  Surgeon: Margaret Gamble MD;  [facility]    SIGMOIDOSCOPY FLEXIBLE N/A 09/26/2014    Procedure: SIGMOIDOSCOPY FLEXIBLE;  Surgeon: Margaret Gamble MD;  [facility]    SIGMOIDOSCOPY FLEXIBLE N/A 09/30/2014    Procedure: SIGMOIDOSCOPY FLEXIBLE;  Surgeon: Margaret Gamble MD;  [facility]    SIGMOIDOSCOPY FLEXIBLE N/A 10/03/2014    Procedure: SIGMOIDOSCOPY FLEXIBLE;  Surgeon: Margaret Gamble MD;  [facility]    SIGMOIDOSCOPY FLEXIBLE N/A 10/30/2014    Procedure: SIGMOIDOSCOPY FLEXIBLE;  Surgeon: Margaret Gamble MD;  [facility]    SIGMOIDOSCOPY FLEXIBLE, PLACEMENT OF DRAINAGE SPONGE  09/30/2014    TAKEDOWN ILEOSTOMY N/A 01/06/2015    Procedure: TAKEDOWN ILEOSTOMY;  Surgeon: Margaret Gamble MD;  [facility]            Medications     Current Outpatient Medications   Medication Sig Dispense Refill    cefdinir (OMNICEF) 300 MG capsule Take 1 capsule (300 mg) by mouth 2 times daily for 10 days 20 capsule 0    tamsulosin (FLOMAX) 0.4 MG capsule Take 1 capsule (0.4 mg) by mouth daily 90 capsule 3    trospium (SANCTURA) 20 MG tablet Take 1 tablet (20 mg) by mouth every evening 90 tablet 3    amLODIPine (NORVASC) 10 MG tablet Take 1 tablet (10 mg) by mouth daily 90 tablet 1    atorvastatin (LIPITOR) 20 MG tablet Take 1 tablet (20 mg) by mouth daily 90 tablet 3    blood glucose (ACCU-CHEK REGIS PLUS) test strip USE TO TEST BLOOD SUGAR THREE TIMES A DAY OR AS DIRECTED 300 strip 3    blood glucose monitoring (NO BRAND SPECIFIED) meter device kit Use to test blood sugar 3 times daily or as directed. 1 kit 0    Cholecalciferol (VITAMIN D-3) 1000 units CAPS Take 1 capsule by mouth daily       finasteride (PROSCAR) 5 MG tablet Take 1 tablet (5 mg) by mouth daily 90 tablet 3    insulin glargine (LANTUS SOLOSTAR) 100 UNIT/ML pen INJECT 16 UNITS UNDER THE SKIN AT BEDTIME 30 mL 2    insulin lispro (HUMALOG KWIKPEN) 100 UNIT/ML (1 unit dial) KWIKPEN USE 8 UNITS  PLUS LOW SLIDING SCALE BEFORE EACH MEAL. MAX 50 UNITS PER DAY. 60 mL 1    insulin pen needle (PENTIPS) 31G X 6 MM miscellaneous USE 4 TO 5 TIMES DAILY OR AS DIRECTED FOR SLIDING SCALE INSULIN 500 each 1    losartan (COZAAR) 100 MG tablet Take 1 tablet (100 mg) by mouth daily 90 tablet 1    metoprolol succinate ER (TOPROL XL) 100 MG 24 hr tablet Take 1 tablet (100 mg) by mouth daily 90 tablet 3     No current facility-administered medications for  this visit.            Family History:     Family History   Problem Relation Age of Onset    Diabetes Mother     Hypertension Mother     Cancer Father     Cancer Maternal Grandmother     Alzheimer Disease Maternal Grandmother     Cardiovascular Paternal Grandmother     Breast Cancer Sister     Colon Cancer No family hx of     Colon Polyps No family hx of     Crohn's Disease No family hx of     Ulcerative Colitis No family hx of     Anesthesia Reaction No family hx of     Melanoma No family hx of             Social History:     Social History     Socioeconomic History    Marital status:      Spouse name: Not on file    Number of children: Not on file    Years of education: Not on file    Highest education level: Not on file   Occupational History     Employer: RETIRED     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 No   Social History Narrative    Not on file     Social Determinants of Health     Financial Resource Strain: Not on file   Food Insecurity: Not on file   Transportation Needs: Not on file   Physical Activity: Not on file   Stress: Not on file   Social Connections: Not on file   Interpersonal Safety: Low Risk  (3/5/2024)    Interpersonal Safety     Do you feel physically and emotionally safe where you currently live?: Yes     Within the past 12 months, have you been hit, slapped, kicked or otherwise physically hurt by someone?: No     Within the past 12 months, have you been humiliated or emotionally abused in other ways by your partner or ex-partner?: No   Housing Stability: Not on file            Allergies:   Nkda [no known drug allergy]         Review of  Systems:  From intake questionnaire   Negative 14 system review except as noted on HPI, nurse's note.        LEON SHANNONC  Department of Urology

## 2024-07-15 ENCOUNTER — PATIENT OUTREACH (OUTPATIENT)
Dept: CARE COORDINATION | Facility: CLINIC | Age: 84
End: 2024-07-15

## 2024-07-15 ENCOUNTER — ALLIED HEALTH/NURSE VISIT (OUTPATIENT)
Dept: UROLOGY | Facility: CLINIC | Age: 84
End: 2024-07-15
Payer: COMMERCIAL

## 2024-07-15 DIAGNOSIS — N13.8 HYPERTROPHY OF PROSTATE WITH URINARY OBSTRUCTION: Primary | ICD-10-CM

## 2024-07-15 DIAGNOSIS — R33.9 URINARY RETENTION: ICD-10-CM

## 2024-07-15 DIAGNOSIS — N40.1 HYPERTROPHY OF PROSTATE WITH URINARY OBSTRUCTION: Primary | ICD-10-CM

## 2024-07-15 PROCEDURE — 99207 PR NO CHARGE NURSE ONLY: CPT

## 2024-07-15 NOTE — PROGRESS NOTES
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 (H24)    PVD (peripheral vascular disease) (H24)    H/O malignant neoplasm of rectum    Status post coronary angiogram    H/O melanoma in situ       Allergies   Allergen Reactions    Nkda [No Known Drug Allergy]        Current Outpatient Medications   Medication Sig Dispense Refill    amLODIPine (NORVASC) 10 MG tablet Take 1 tablet (10 mg) by mouth daily 90 tablet 1    atorvastatin (LIPITOR) 20 MG tablet Take 1 tablet (20 mg) by mouth daily 90 tablet 3    blood glucose (ACCU-CHEK REGIS PLUS) test strip USE TO TEST BLOOD SUGAR THREE TIMES A DAY OR AS DIRECTED 300 strip 3    blood glucose monitoring (NO BRAND SPECIFIED) meter device kit Use to test blood sugar 3 times daily or as directed. 1 kit 0    cefdinir (OMNICEF) 300 MG capsule Take 1 capsule (300 mg) by mouth 2 times daily for 10 days 20 capsule 0    Cholecalciferol (VITAMIN D-3) 1000 units CAPS Take 1 capsule by mouth daily       finasteride (PROSCAR) 5 MG tablet Take 1 tablet (5 mg) by mouth daily 90 tablet 3    insulin glargine (LANTUS SOLOSTAR) 100 UNIT/ML pen INJECT 16 UNITS UNDER THE SKIN AT BEDTIME 30 mL 2    insulin lispro (HUMALOG KWIKPEN) 100 UNIT/ML (1 unit dial) KWIKPEN USE 8 UNITS  PLUS LOW SLIDING SCALE BEFORE EACH MEAL. MAX 50 UNITS PER DAY. 60 mL 1    insulin pen needle (PENTIPS) 31G X 6 MM miscellaneous USE 4 TO 5 TIMES DAILY OR AS DIRECTED FOR SLIDING SCALE  INSULIN 500 each 1    losartan (COZAAR) 100 MG tablet Take 1 tablet (100 mg) by mouth daily 90 tablet 1    metoprolol succinate ER (TOPROL XL) 100 MG 24 hr tablet Take 1 tablet (100 mg) by mouth daily 90 tablet 3    tamsulosin (FLOMAX) 0.4 MG capsule Take 1 capsule (0.4 mg) by mouth daily 90 capsule 3    trospium (SANCTURA) 20 MG tablet Take 1 tablet (20 mg) by mouth every evening 90 tablet 3       Social History     Tobacco Use    Smoking status: Former     Types: Cigars    Smokeless tobacco: Never   Substance Use Topics    Alcohol use: Not Currently    Drug use: Never       Storm Dutta comes into clinic today at the request of Ana Gray PA-C for TOV / catheter removal.    Patient diagnosis: retention    This service provided today was under the direct supervision of Ana Gray PA-C, who was available if needed.    Storm Dutta presented today for a trial of void.  Approximately 450 mL of normal saline instilled into bladder via catheter.  Patient stated he had urge to urinate and catheter was removed without difficulty.  Patient was given a urinal to measure urine output.  Patient voided approximately 200 mL of clear urine.  , 308, and 286 mL.      Catheter insertion documentation on 7/15/2024:    Reason for insertion: failed TOV  Catheter successfully inserted into the urethral meatus in the usual sterile fashion without immediate complication.  Type of catheter placed: 16 Welsh Coude catheter  Urine is clear in color.  275 cc's of urine output returned.  Balloon was filled with 10 cc's of normal saline.  Securement device placed for the catheter.  The patient tolerated the procedure and was instructed to follow up with their PCP or consultant as planned with Dr. Montano for cystoscopy and consult for HoLEP.     Patient did tolerate procedure well.    Teaching done with patient verbally as where to call or go if pain, fever, or unable to urinate post catheter removal.    Linda  PEDRO Mcbride  7/15/2024  11:00 AM

## 2024-07-24 ENCOUNTER — OFFICE VISIT (OUTPATIENT)
Dept: UROLOGY | Facility: CLINIC | Age: 84
End: 2024-07-24
Payer: COMMERCIAL

## 2024-07-24 VITALS
DIASTOLIC BLOOD PRESSURE: 75 MMHG | HEART RATE: 64 BPM | SYSTOLIC BLOOD PRESSURE: 131 MMHG | HEIGHT: 68 IN | BODY MASS INDEX: 29.87 KG/M2 | WEIGHT: 197.09 LBS

## 2024-07-24 DIAGNOSIS — R39.14 BENIGN PROSTATIC HYPERPLASIA WITH INCOMPLETE BLADDER EMPTYING: ICD-10-CM

## 2024-07-24 DIAGNOSIS — N40.1 BENIGN PROSTATIC HYPERPLASIA WITH INCOMPLETE BLADDER EMPTYING: ICD-10-CM

## 2024-07-24 DIAGNOSIS — N40.1 HYPERTROPHY OF PROSTATE WITH URINARY OBSTRUCTION: Primary | ICD-10-CM

## 2024-07-24 DIAGNOSIS — Z12.5 ENCOUNTER FOR SCREENING FOR MALIGNANT NEOPLASM OF PROSTATE: ICD-10-CM

## 2024-07-24 DIAGNOSIS — N13.8 HYPERTROPHY OF PROSTATE WITH URINARY OBSTRUCTION: Primary | ICD-10-CM

## 2024-07-24 PROCEDURE — 52000 CYSTOURETHROSCOPY: CPT | Performed by: STUDENT IN AN ORGANIZED HEALTH CARE EDUCATION/TRAINING PROGRAM

## 2024-07-24 PROCEDURE — 99213 OFFICE O/P EST LOW 20 MIN: CPT | Mod: 25 | Performed by: STUDENT IN AN ORGANIZED HEALTH CARE EDUCATION/TRAINING PROGRAM

## 2024-07-24 PROCEDURE — 87086 URINE CULTURE/COLONY COUNT: CPT | Performed by: STUDENT IN AN ORGANIZED HEALTH CARE EDUCATION/TRAINING PROGRAM

## 2024-07-24 RX ADMIN — Medication 500 MG: at 10:40

## 2024-07-24 NOTE — NURSING NOTE
"Initial /75 (BP Location: Right arm, Patient Position: Sitting, Cuff Size: Adult Regular)   Pulse 64   Ht 1.727 m (5' 7.99\")   Wt 89.4 kg (197 lb 1.5 oz)   BMI 29.97 kg/m   Estimated body mass index is 29.97 kg/m  as calculated from the following:    Height as of this encounter: 1.727 m (5' 7.99\").    Weight as of this encounter: 89.4 kg (197 lb 1.5 oz). .  Rosalba Goldberg MA    "

## 2024-07-24 NOTE — PROGRESS NOTES
Reason for cystoscopy: LUTS    Brief History:  82 yo male with a history of chronic kidney disease, diabetes, peripheral vascular     He presents for urinary retention on July 5, 2024.  He failed a voiding trial on July 15, 2024 on medical therapy.    He has about 120 g prostate with a large median lobe      The following distinct labs were reviewed   Most Recent 3 BMP's:  Recent Labs   Lab Test 02/13/24  1047 07/31/23  0822 02/24/23  0947    140 139   POTASSIUM 4.3 4.8 4.6   CHLORIDE 107 105 103   CO2 25 26 27   BUN 26.4* 23.1* 26.5*   CR 1.27* 1.34* 1.36*   ANIONGAP 11 9 9   PILY 9.3 9.5 10.2   * 145* 119*         CYSTOSCOPY  We discussed the risks and benefits of the procedure which include risk of bleeding and infection.  Informed consent was obtained, the patient was prepped and draped in the standard sterile fashion.  A flexible cystoscope was introduced through a well-lubricated urethra.     Anterior urethra strictures were absent.   The urinary sphincter was intact.  The prostate demonstrated trilobar hypertrophy.  Bladder neck was open.   Bladder signififcant for presence of the following:      Diverticuli: absent      Cellules: present several      Trabeculation: present moderate      Tumors: absent      Stones: absent  UO unable to be seen due to large median lobe  On retroflexion there was the usual bladder neck hyperemia.  There moderate intravesical protrusion of the prostate.      The flexible cystoscope was removed and the findings were described to the patient.     EVALUATION AND MANAGEMENT   Upon completion of the cystoscopy the patient was dressed to discuss findings of above testing, review available treatment options and make a plan for further steps in care     SUMMARY:  This 83-year-old male with a long history of lower urinary tract symptoms who presented with urinary tract infection and retention and has failed voiding trials.    #1 urinary retention  This is likely due to his  very large prostate at about 120 g with a large median lobe.  Since he has failed voiding trials we need to discuss outlet procedure he is already on maximal medical therapy.  Given the size of the prostate I think the 2's best surgeries would be robotic simple prostatectomy or a holmium laser nucleation of the prostate.    I discussed that I do a laser nucleation of the prostate.  We sat down and went over the surgery we went over the risks of bleeding and infection. We discussed the risks, benefits and alternatives of proceeding. Specifically, and in detail, we discussed the risk of incontinence which I estimate to be 33% at 3 months after surgery. We also discussed the risk of ejaculatory dysfunction, and the minor risk of erectile dysfunction. Patient voiced understanding of the risks and benefits and wishes to proceed.    Will plan to get a PSA since there has not been one in over 10 years.  If it is highly elevated we will get an MRI of the prostate but if it is PSA density is less than 0.15 and I am okay with proceeding to surgery      20 minutes spent were spent on the E/M portion of the visit in after cystoscopy discussing findings, available treatment options and next steps in care.

## 2024-07-25 ENCOUNTER — LAB (OUTPATIENT)
Dept: LAB | Facility: CLINIC | Age: 84
End: 2024-07-25
Payer: COMMERCIAL

## 2024-07-25 DIAGNOSIS — Z12.5 ENCOUNTER FOR SCREENING FOR MALIGNANT NEOPLASM OF PROSTATE: ICD-10-CM

## 2024-07-25 DIAGNOSIS — N40.1 HYPERTROPHY OF PROSTATE WITH URINARY OBSTRUCTION: ICD-10-CM

## 2024-07-25 DIAGNOSIS — N13.8 HYPERTROPHY OF PROSTATE WITH URINARY OBSTRUCTION: ICD-10-CM

## 2024-07-25 LAB — PSA SERPL DL<=0.01 NG/ML-MCNC: 0.2 NG/ML

## 2024-07-25 PROCEDURE — G0103 PSA SCREENING: HCPCS

## 2024-07-25 PROCEDURE — 36415 COLL VENOUS BLD VENIPUNCTURE: CPT

## 2024-07-26 ENCOUNTER — TELEPHONE (OUTPATIENT)
Dept: UROLOGY | Facility: CLINIC | Age: 84
End: 2024-07-26
Payer: COMMERCIAL

## 2024-07-26 LAB — BACTERIA UR CULT: NO GROWTH

## 2024-07-26 NOTE — TELEPHONE ENCOUNTER
[facility] patient       Spoke with: Patient       Date of surgery: Thursday August 22 2024 with Dr Montano      Location: [place]       Informed patient they will need a adult : YES      Pre op with provider: Patient will schedule at [facility] patient informed he does need a urine culture prior to surgery       H&P Scheduled in PAC- NA         Pre procedure covid : Not req      Additional imaging: NA        Surgery Packet : Sent via [employer]      Additional comments Please call for surgery teaching and scheduling post op in [place]

## 2024-07-30 ENCOUNTER — TELEPHONE (OUTPATIENT)
Dept: UROLOGY | Facility: CLINIC | Age: 84
End: 2024-07-30
Payer: COMMERCIAL

## 2024-07-30 DIAGNOSIS — R33.9 URINARY RETENTION: Primary | ICD-10-CM

## 2024-07-30 NOTE — TELEPHONE ENCOUNTER
Surgery packet mailed to pt. Will follow up in 5-7 business days to confirm pt has received surgery packet.       Fatuma [place]   Clinic [facility]    "[employer]  [phone] ( Please do not share number with patient)

## 2024-07-30 NOTE — TELEPHONE ENCOUNTER
Pre Op Teaching Flowsheet       Pre and Post op Patient Education  Relevant Diagnosis:  retention and large prostate  Teaching Topic:  Pre and post op teaching  Person Involved in teaching:  Patient and spouse    Patient demonstrates understanding of the following:  Date and time of surgery:  8/22/24  Location of surgery:  [facility]  History and Physical and any other testing necessary prior to surgery: Yes  Required time line for completion of History and Physical and any pre-op testing: Yes    NPO Guidelines: NPO after midnight   Do not eat anything after midnight (12 a.m.).     If you have questions about whether or not to  take certain medicines, ask your doctor.  Failure to follow these directions will cause a delay  in your surgery.    The morning of your surgery:   If you need to take pills, take them with a sip of  water.    Pre-op showering/scrub information with PCMX Soap: Yes  Medications to take the day of surgery:  Per PCP  Blood thinner medications discussed and when to stop (if applicable):  Yes  Diabetes medication management (if applicable):  Patient to discuss with Primary Care Provider  Discussed pain control after surgery: discuss post op  Infection Prevention: Patient demonstrates understanding of the following:  Patient instructed on hand hygiene:  Yes  Surgical procedure site care taught: Yes  Signs and symptoms of infection taught:  Yes  Wound care will be taught at the time of discharge.  Central venous catheter care will be taught at the time of discharge (if applicable).    Motivation Level:  Asks Questions: Yes  Eager to Learn:  Yes  Cooperative: Yes  Receptive (willing/able to accept information):  Yes    If you have any questions, please call:  Preoperative Assessment Center (PAC) Registered Nurse: [phone], or [facility] [place] [facility]: [phone]    Post-op follow-up:  Discussed how to contact the hospital, nurse, and clinic scheduling  staff if necessary.    Instructional materials used/given/mailed:  [place] Surgery Booklet, post op teaching sheet, Map, Soap, and arrival/location information.    Surgical instructions mailed to patient Yes.  Olga CURIEL RN BSN PHN  Specialty Clinics

## 2024-08-05 ENCOUNTER — TELEPHONE (OUTPATIENT)
Dept: FAMILY MEDICINE | Facility: CLINIC | Age: 84
End: 2024-08-05
Payer: COMMERCIAL

## 2024-08-07 NOTE — TELEPHONE ENCOUNTER
Called and spoke to pt. Pt confirmed they received the surgery packet.       [place]   Clinic Station    [facility]  [phone]

## 2024-08-08 ENCOUNTER — PATIENT OUTREACH (OUTPATIENT)
Dept: CARE COORDINATION | Facility: CLINIC | Age: 84
End: 2024-08-08

## 2024-08-08 ENCOUNTER — OFFICE VISIT (OUTPATIENT)
Dept: FAMILY MEDICINE | Facility: CLINIC | Age: 84
End: 2024-08-08
Payer: COMMERCIAL

## 2024-08-08 VITALS
SYSTOLIC BLOOD PRESSURE: 116 MMHG | BODY MASS INDEX: 29.42 KG/M2 | WEIGHT: 194.1 LBS | OXYGEN SATURATION: 98 % | HEIGHT: 68 IN | DIASTOLIC BLOOD PRESSURE: 52 MMHG | HEART RATE: 57 BPM | RESPIRATION RATE: 18 BRPM | TEMPERATURE: 97.2 F

## 2024-08-08 DIAGNOSIS — R33.9 URINARY RETENTION: ICD-10-CM

## 2024-08-08 DIAGNOSIS — Z23 NEED FOR SHINGLES VACCINE: ICD-10-CM

## 2024-08-08 DIAGNOSIS — N18.31 STAGE 3A CHRONIC KIDNEY DISEASE (H): ICD-10-CM

## 2024-08-08 DIAGNOSIS — I25.10 CORONARY ARTERY DISEASE INVOLVING NATIVE CORONARY ARTERY OF NATIVE HEART WITHOUT ANGINA PECTORIS: ICD-10-CM

## 2024-08-08 DIAGNOSIS — Z86.006 H/O MELANOMA IN SITU: ICD-10-CM

## 2024-08-08 DIAGNOSIS — N18.31 TYPE 2 DIABETES MELLITUS WITH STAGE 3A CHRONIC KIDNEY DISEASE, WITH LONG-TERM CURRENT USE OF INSULIN (H): ICD-10-CM

## 2024-08-08 DIAGNOSIS — Z01.818 PREOP GENERAL PHYSICAL EXAM: Primary | ICD-10-CM

## 2024-08-08 DIAGNOSIS — N40.1 HYPERTROPHY OF PROSTATE WITH URINARY OBSTRUCTION: ICD-10-CM

## 2024-08-08 DIAGNOSIS — Z29.11 NEED FOR VACCINATION AGAINST RESPIRATORY SYNCYTIAL VIRUS: ICD-10-CM

## 2024-08-08 DIAGNOSIS — E11.22 TYPE 2 DIABETES MELLITUS WITH STAGE 3A CHRONIC KIDNEY DISEASE, WITH LONG-TERM CURRENT USE OF INSULIN (H): ICD-10-CM

## 2024-08-08 DIAGNOSIS — N13.8 HYPERTROPHY OF PROSTATE WITH URINARY OBSTRUCTION: ICD-10-CM

## 2024-08-08 DIAGNOSIS — I10 HYPERTENSION GOAL BP (BLOOD PRESSURE) < 140/90: ICD-10-CM

## 2024-08-08 DIAGNOSIS — Z79.4 TYPE 2 DIABETES MELLITUS WITH STAGE 3A CHRONIC KIDNEY DISEASE, WITH LONG-TERM CURRENT USE OF INSULIN (H): ICD-10-CM

## 2024-08-08 LAB
ANION GAP SERPL CALCULATED.3IONS-SCNC: 12 MMOL/L (ref 7–15)
BUN SERPL-MCNC: 34.2 MG/DL (ref 8–23)
CALCIUM SERPL-MCNC: 9.8 MG/DL (ref 8.8–10.4)
CHLORIDE SERPL-SCNC: 103 MMOL/L (ref 98–107)
CREAT SERPL-MCNC: 1.51 MG/DL (ref 0.67–1.17)
CREAT UR-MCNC: 196.5 MG/DL
EGFRCR SERPLBLD CKD-EPI 2021: 46 ML/MIN/1.73M2
GLUCOSE SERPL-MCNC: 88 MG/DL (ref 70–99)
HBA1C MFR BLD: 7.3 % (ref 0–5.6)
HCO3 SERPL-SCNC: 25 MMOL/L (ref 22–29)
HGB BLD-MCNC: 12 G/DL (ref 13.3–17.7)
MICROALBUMIN UR-MCNC: 1189.7 MG/L
MICROALBUMIN/CREAT UR: 605.45 MG/G CR (ref 0–17)
POTASSIUM SERPL-SCNC: 4.7 MMOL/L (ref 3.4–5.3)
SODIUM SERPL-SCNC: 140 MMOL/L (ref 135–145)

## 2024-08-08 PROCEDURE — 85018 HEMOGLOBIN: CPT | Performed by: FAMILY MEDICINE

## 2024-08-08 PROCEDURE — 80048 BASIC METABOLIC PNL TOTAL CA: CPT | Performed by: FAMILY MEDICINE

## 2024-08-08 PROCEDURE — 87086 URINE CULTURE/COLONY COUNT: CPT | Performed by: FAMILY MEDICINE

## 2024-08-08 PROCEDURE — 93000 ELECTROCARDIOGRAM COMPLETE: CPT | Performed by: FAMILY MEDICINE

## 2024-08-08 PROCEDURE — 82043 UR ALBUMIN QUANTITATIVE: CPT | Performed by: FAMILY MEDICINE

## 2024-08-08 PROCEDURE — 87106 FUNGI IDENTIFICATION YEAST: CPT | Performed by: FAMILY MEDICINE

## 2024-08-08 PROCEDURE — 36415 COLL VENOUS BLD VENIPUNCTURE: CPT | Performed by: FAMILY MEDICINE

## 2024-08-08 PROCEDURE — G2211 COMPLEX E/M VISIT ADD ON: HCPCS | Performed by: FAMILY MEDICINE

## 2024-08-08 PROCEDURE — 83036 HEMOGLOBIN GLYCOSYLATED A1C: CPT | Performed by: FAMILY MEDICINE

## 2024-08-08 PROCEDURE — 82570 ASSAY OF URINE CREATININE: CPT | Performed by: FAMILY MEDICINE

## 2024-08-08 PROCEDURE — 99214 OFFICE O/P EST MOD 30 MIN: CPT | Performed by: FAMILY MEDICINE

## 2024-08-08 RX ORDER — ASPIRIN 325 MG
325 TABLET ORAL DAILY
Status: ON HOLD | COMMUNITY
Start: 2024-08-08 | End: 2024-08-23

## 2024-08-08 ASSESSMENT — PAIN SCALES - GENERAL: PAINLEVEL: NO PAIN (0)

## 2024-08-08 NOTE — H&P (VIEW-ONLY)
Preoperative Evaluation  [facility]  [address] [facility] [facility] [address]  Phone: [phone]  Primary Provider: Jovana Beaulieu, DO  Pre-op Performing Provider: Adriel Tidwell MD  Aug 8, 2024             8/6/2024   Surgical Information   What procedure is being done? Holmium Laser Enucleation of the Prostate Surgery   Facility or Hospital where procedure/surgery will be performed: [facility]   Who is doing the procedure / surgery? Dr. Montano   Date of surgery / procedure: 08/22/2024   Time of surgery / procedure: 11:25 AM   Where do you plan to recover after surgery? at home with family        Fax number for surgical facility: Note does not need to be faxed, will be available electronically in Epic.    Assessment & Plan     The proposed surgical procedure is considered INTERMEDIATE risk.    Preop general physical exam  Okay for procedure pending labs    Hypertrophy of prostate with urinary obstruction  Okay for procedure    Has had cardiology clearance prior to surgery,   CAD - Patient has a longstanding history of moderate-severe CAD. Patient denies recent chest pain or NTG use, denies exercise induced dyspnea or PND. Last angiogram 11/22/21   Prox RCA lesion is 100% stenosed.  Dist Cx lesion is 40% stenosed.  1st Mrg lesion is 20% stenosed.  Prox LAD to Mid LAD lesion is 75% stenosed.  Mid LAD to Dist LAD lesion is 60% stenosed.  Dist LAD lesion is 75% stenosed.     Multivessel CAD with poor LAD target  Recommend medical mangement optimization      Stage 3a chronic kidney disease (H)  Baseline GFR is around mid 50'3  - Albumin Random Urine Quantitative with Creat Ratio; Future  - Hemoglobin; Future  - Basic metabolic panel  (Ca, Cl, CO2, Creat, Gluc, K, Na, BUN); Future  Hypertension goal BP (blood pressure) < 140/90  Good control.  Continue currant medications, continue to monitor.      Type 2 diabetes mellitus with stage 3a chronic kidney  disease, with long-term current use of insulin (H)  Will decrease lantus on evening before procedure from 16 units to 12 units.   He does take aspirien daily, was not on his list  I added it to his list and told him to hold it before surgery.     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 It should be resumed postoperatively.     Additional Medication Instructions   - ACE/ARB: Continue without modification (e.g., MAC anesthesia, neurosurgery, spine surgery, heart failure, or labile hypertension with risk of hypertension).   - Long acting insulin (e.g. glargine, detemir): Take 80% of the usual evening or morning dose before surgery.     - short acting insulin (e.g. regular, lispro, aspart): DO NOT TAKE on the morning of surgery.     Recommendation  Approval given to proceed with proposed procedure, without further diagnostic evaluation.    Elsa Aburto is a 83 year old, presenting for the following:  Pre-Op Exam          8/8/2024     9:09 AM   Additional Questions   Roomed by Karen Shoemaker CMA   Accompanied by Wife     Via the Health Maintenance questionnaire, the patient has reported the following services have been completed -Eye Exam: [facility] [place], [place] 2024-03-06, this information has been sent to the abstraction team.  HPI related to upcoming procedure:         8/6/2024   Pre-Op Questionnaire   Have you ever had a heart attack or stroke? No   Have you ever had surgery on your heart or blood vessels, such as a stent placement, a coronary artery bypass, or surgery on an artery in your head, neck, heart, or legs? No   Do you have chest pain with activity? No   Do you have a history of heart failure? No   Do you currently  have a cold, bronchitis or symptoms of other infection? No   Do you have a cough, shortness of breath, or wheezing? No   Do you or anyone in your family have previous history of blood clots? No   Do you or does anyone in your family have a serious bleeding problem such as prolonged bleeding following surgeries or cuts? No   Have you ever had problems with anemia or been told to take iron pills? No   Have you had any abnormal blood loss such as black, tarry or bloody stools? No   Have you ever had a blood transfusion? No   Are you willing to have a blood transfusion if it is medically needed before, during, or after your surgery? Yes   Have you or any of your relatives ever had problems with anesthesia? No   Do you have sleep apnea, excessive snoring or daytime drowsiness? No   Do you have a CPAP machine? (!) NO    Do you have any artifical heart valves or other implanted medical devices like a pacemaker, defibrillator, or continuous glucose monitor? No   Do you have artificial joints? No   Are you allergic to latex? No        Health Care Directive  Patient has a Health Care Directive on file      Preoperative Review of    reviewed - no record of controlled substances prescribed.      Status of Chronic Conditions:  See problem list for active medical problems.  Problems all longstanding and stable, except as noted/documented.  See ROS for pertinent symptoms related to these conditions.      DIABETES - Patient has a longstanding history of DiabetesType Type II . Patient is being treated with insulin injections and denies significant side effects. Control has been good. Complicating factors include but are not limited to: CAD/PVD.     HYPERLIPIDEMIA - Patient has a long history of significant Hyperlipidemia requiring medication for treatment with recent good control. Patient reports no problems or side effects with the medication.     HYPERTENSION - Patient has longstanding history of HTN , currently denies any  symptoms referable to elevated blood pressure. Specifically denies chest pain, palpitations, dyspnea, orthopnea, PND or peripheral edema. Blood pressure readings have been in normal range. Current medication regimen is as listed below. Patient denies any side effects of medication.     RENAL INSUFFICIENCY - Patient has a longstanding history of moderate-severe chronic renal insufficiency. 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  Mild retinopathy of both eyes.  Monitor only.  Sees retinal specialist.  F/u 3 months.        Type 2 diabetes mellitus with diabetic chronic kidney disease (H) 10/22/2015     Priority: Medium    Small bowel obstruction, recurrent 05/21/2014     Priority: Medium    Senile nuclear sclerosis 04/10/2013     Priority: Medium    Hypertrophy of prostate with urinary obstruction 12/07/2012     Priority: Medium     Problem list name updated by automated process. Provider to review      Hypertension goal BP (blood pressure) < 140/90 08/24/2012     Priority: Medium    Type 2 diabetes mellitus with diabetic polyneuropathy (H)      Priority: Medium    Hyperlipidemia LDL goal <70 09/23/2009     Priority: Medium      Past Medical History:   Diagnosis Date    Acute urinary retention 11/2012    ER visit   / 1200 cc post-void residual    Acute urinary retention 11/01/2012    ER     Basal cell carcinoma     Diabetes mellitus type II      Epididymitis 03/20/2014    Started on doxycyclline for UTI/orchitis. He was discharged on 03/22/14.    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    PE (pulmonary embolism) 03/20/2014    Rectal cancer (H)     Skin cancer     Squamous cell carcinoma     Thrombosis of leg     +PE     Past Surgical History:   Procedure Laterality Date    AMPUTATE TOE(S) Right 06/04/2019    Procedure: AMPUTATION 2nd Toe;  Surgeon: Adriel Cabello DPM;  Location: [facility]    BIOPSY      COLONOSCOPY  07/29/2013    Procedure: COMBINED COLONOSCOPY, SINGLE BIOPSY/POLYPECTOMY BY BIOPSY;;  Surgeon: Bari Burnett MD;  Location: [facility]    COLONOSCOPY N/A 06/04/2015    Procedure: COMBINED COLONOSCOPY, SINGLE OR MULTIPLE BIOPSY/POLYPECTOMY BY BIOPSY;  Surgeon: Tara Mtz MD;  Location:  GI    COLONOSCOPY N/A 12/05/2016    Procedure: COLONOSCOPY;  Surgeon: Breanna Kline MD;  Location:  GI    COLONOSCOPY N/A 10/11/2022    Procedure: COLONOSCOPY, FLEXIBLE, WITH LESION REMOVAL USING SNARE;  Surgeon: Pierre Doe MD;  Location: [facility]    CV CORONARY ANGIOGRAM N/A 11/22/2021    Procedure: Coronary Angiogram;  Surgeon: Jak Noe MD;  Location:  HEART CARDIAC CATH LAB    ENT SURGERY      EYE SURGERY  05/01/2012    Right eye procedure    HC CIRCUMCISION SURGICAL NON-DEV >28 DAYS AGE  02/01/1997    due to phimosis    HC REMOVAL GALLBLADDER  05/01/2001    HC UGI ENDOSCOPY W PLACEMENT GASTROSTOMY TUBE PERCUT  03/13/2014    Procedure: COMBINED ESOPHAGOSCOPY, GASTROSCOPY, DUODENOSCOPY (EGD), PLACE PERCUTANEOUS ENDOSCOPIC GASTROSTOMY TUBE;  Surgeon: Loco Casillas MD;  Location: [facility]    LAPAROSCOPIC ASSISTED COLECTOMY LEFT (DESCENDING)  01/07/2014    Procedure: LAPAROSCOPIC ASSISTED COLECTOMY LEFT (DESCENDING);  Laparoscopic hand assisted Low Anterior Resection With colonic J pouch  and end to end colorectal anastamosis. Laparoscopic splenic flexure mobilization.  Loop Ileostomy.  Rigid proctoscopy;  Surgeon: Margaret Gamble MD;  [facility]    PHACOEMULSIFICATION WITH STANDARD INTRAOCULAR LENS IMPLANT  04/11/2013    Procedure: PHACOEMULSIFICATION WITH STANDARD INTRAOCULAR LENS IMPLANT;  Right Kelman Phacoemulsification with Intraocular Lens Implant;  Surgeon: Caesar Turner MD;  Location: [facility]    REMOVE GASTROSTOMY TUBE ADULT  07/25/2014    Procedure: REMOVE GASTROSTOMY TUBE ADULT;  Surgeon: Margaret Gamble MD;  [facility]    SIGMOIDOSCOPY FLEXIBLE  06/23/2014    Procedure: SIGMOIDOSCOPY FLEXIBLE;  Surgeon: Margaret Gamble MD;  [facility]    SIGMOIDOSCOPY FLEXIBLE  07/22/2014    Procedure: SIGMOIDOSCOPY FLEXIBLE;  Surgeon: Margaret Gamble MD;  [facility]    SIGMOIDOSCOPY FLEXIBLE  07/25/2014    Procedure: SIGMOIDOSCOPY FLEXIBLE;  Surgeon: Margaret Gamble MD;  [facility]    SIGMOIDOSCOPY FLEXIBLE  07/28/2014    Procedure: SIGMOIDOSCOPY FLEXIBLE;  Surgeon: Margaret Gamble MD;  [facility]    SIGMOIDOSCOPY FLEXIBLE  07/31/2014    Procedure: SIGMOIDOSCOPY FLEXIBLE;  Surgeon: Margaret Gamble MD;  [facility]    SIGMOIDOSCOPY FLEXIBLE  08/03/2014    Procedure: SIGMOIDOSCOPY FLEXIBLE;  Surgeon: Margaret Gamble MD;  [facility]    SIGMOIDOSCOPY FLEXIBLE  08/05/2014    Procedure: SIGMOIDOSCOPY FLEXIBLE;  Surgeon: Margaret Gamble MD;  [facility]    SIGMOIDOSCOPY FLEXIBLE  08/08/2014    Procedure: SIGMOIDOSCOPY FLEXIBLE;  Surgeon: Margaret Gamble MD;  [facility]    SIGMOIDOSCOPY FLEXIBLE  08/18/2014    Procedure: SIGMOIDOSCOPY FLEXIBLE;  Surgeon: Jose Roberto Cervantes MD;  [facility]    SIGMOIDOSCOPY FLEXIBLE N/A 08/15/2014    Procedure: SIGMOIDOSCOPY FLEXIBLE;  Surgeon: Margaret Gamble MD;  [facility]    SIGMOIDOSCOPY FLEXIBLE  N/A 08/22/2014    Procedure: SIGMOIDOSCOPY FLEXIBLE;  Surgeon: Jose Roberto Cervantes MD;  [facility]    SIGMOIDOSCOPY FLEXIBLE N/A 08/11/2014    Procedure: SIGMOIDOSCOPY FLEXIBLE;  Surgeon: Margaret Gamble MD;  [facility]    SIGMOIDOSCOPY FLEXIBLE N/A 08/26/2014    Procedure: SIGMOIDOSCOPY FLEXIBLE;  Surgeon: Margaret Gamble MD;  [facility]    SIGMOIDOSCOPY FLEXIBLE N/A 08/29/2014    Procedure: SIGMOIDOSCOPY FLEXIBLE;  Surgeon: Margaret Gamble MD;  [facility]    SIGMOIDOSCOPY FLEXIBLE N/A 09/08/2014    Procedure: SIGMOIDOSCOPY FLEXIBLE;  Surgeon: Margaret Gamble MD;  [facility]    SIGMOIDOSCOPY FLEXIBLE N/A 09/02/2014    Procedure: SIGMOIDOSCOPY FLEXIBLE;  Surgeon: Breanna Kline MD;  [facility]    SIGMOIDOSCOPY FLEXIBLE N/A 09/11/2014    Procedure: SIGMOIDOSCOPY FLEXIBLE;  Surgeon: Margaret Gamble MD;  [facility]    SIGMOIDOSCOPY FLEXIBLE N/A 09/19/2014    Procedure: SIGMOIDOSCOPY FLEXIBLE;  Surgeon: Margaret Gamble MD;  [facility]    SIGMOIDOSCOPY FLEXIBLE N/A 09/15/2014    Procedure: SIGMOIDOSCOPY FLEXIBLE;  Surgeon: Margaret Gamble MD;  [facility]    SIGMOIDOSCOPY FLEXIBLE N/A 09/05/2014    Procedure: SIGMOIDOSCOPY FLEXIBLE;  Surgeon: Margaret Gamble MD;  [facility]    SIGMOIDOSCOPY FLEXIBLE N/A 09/23/2014    Procedure: SIGMOIDOSCOPY FLEXIBLE;  Surgeon: Margaret Gamble MD;  [facility]    SIGMOIDOSCOPY FLEXIBLE N/A 09/26/2014    Procedure: SIGMOIDOSCOPY FLEXIBLE;  Surgeon: Margaret Gamble MD;  [facility]    SIGMOIDOSCOPY FLEXIBLE N/A 09/30/2014    Procedure: SIGMOIDOSCOPY FLEXIBLE;  Surgeon: Margaret Gamble MD;  [facility]    SIGMOIDOSCOPY FLEXIBLE N/A 10/03/2014    Procedure: SIGMOIDOSCOPY FLEXIBLE;  Surgeon: Margaret Gamble MD;  [facility]    SIGMOIDOSCOPY FLEXIBLE N/A 10/30/2014    Procedure: SIGMOIDOSCOPY  FLEXIBLE;  Surgeon: Margaret Gamble MD;  [facility]    SIGMOIDOSCOPY FLEXIBLE, PLACEMENT OF DRAINAGE SPONGE  09/30/2014    TAKEDOWN ILEOSTOMY N/A 01/06/2015    Procedure: TAKEDOWN ILEOSTOMY;  Surgeon: Margaret Gamble MD;  [facility]     Current Outpatient Medications   Medication Sig Dispense Refill    amLODIPine (NORVASC) 10 MG tablet Take 1 tablet (10 mg) by mouth daily 90 tablet 1    aspirin (ASA) 325 MG tablet Take 1 tablet (325 mg) by mouth daily      atorvastatin (LIPITOR) 20 MG tablet Take 1 tablet (20 mg) by mouth daily 90 tablet 3    blood glucose (ACCU-CHEK REGIS PLUS) test strip USE TO TEST BLOOD SUGAR THREE TIMES A DAY OR AS DIRECTED 300 strip 3    blood glucose monitoring (NO BRAND SPECIFIED) meter device kit Use to test blood sugar 3 times daily or as directed. 1 kit 0    Cholecalciferol (VITAMIN D-3) 1000 units CAPS Take 1 capsule by mouth daily       finasteride (PROSCAR) 5 MG tablet Take 1 tablet (5 mg) by mouth daily 90 tablet 3    insulin glargine (LANTUS SOLOSTAR) 100 UNIT/ML pen INJECT 16 UNITS UNDER THE SKIN AT BEDTIME 30 mL 2    insulin lispro (HUMALOG KWIKPEN) 100 UNIT/ML (1 unit dial) KWIKPEN USE 8 UNITS  PLUS LOW SLIDING SCALE BEFORE EACH MEAL. MAX 50 UNITS PER DAY. 60 mL 1    insulin pen needle (PENTIPS) 31G X 6 MM miscellaneous USE 4 TO 5 TIMES DAILY OR AS DIRECTED FOR SLIDING SCALE INSULIN 500 each 1    losartan (COZAAR) 100 MG tablet Take 1 tablet (100 mg) by mouth daily 90 tablet 1    metoprolol succinate ER (TOPROL XL) 100 MG 24 hr tablet Take 1 tablet (100 mg) by mouth daily 90 tablet 3    tamsulosin (FLOMAX) 0.4 MG capsule Take 1 capsule (0.4 mg) by mouth daily 90 capsule 3    trospium (SANCTURA) 20 MG tablet Take 1 tablet (20 mg) by mouth every evening 90 tablet 3       Allergies   Allergen Reactions    Nkda [No Known Drug Allergy]         Social History     Tobacco Use    Smoking status: Former     Types: Cigars    Smokeless tobacco: Never  "  Substance Use Topics    Alcohol use: Not Currently     Family History   Problem Relation Age of Onset    Diabetes Mother     Hypertension Mother     Cancer Father     Cancer Maternal Grandmother     Alzheimer Disease Maternal Grandmother     Cardiovascular Paternal Grandmother     Breast Cancer Sister     Colon Cancer No family hx of     Colon Polyps No family hx of     Crohn's Disease No family hx of     Ulcerative Colitis No family hx of     Anesthesia Reaction No family hx of     Melanoma No family hx of      History   Drug Use Unknown             Review of Systems  Constitutional, HEENT, cardiovascular, pulmonary, gi and gu systems are negative, except as otherwise noted.    Objective    /52   Pulse 57   Temp 97.2  F (36.2  C) (Tympanic)   Resp 18   Ht 1.727 m (5' 7.99\")   Wt 88 kg (194 lb 1.6 oz)   SpO2 98%   BMI 29.52 kg/m     Estimated body mass index is 29.52 kg/m  as calculated from the following:    Height as of this encounter: 1.727 m (5' 7.99\").    Weight as of this encounter: 88 kg (194 lb 1.6 oz).  Physical Exam  GENERAL: alert and no distress  NECK: no adenopathy, no asymmetry, masses, or scars  RESP: lungs clear to auscultation - no rales, rhonchi or wheezes  CV: regular rate and rhythm, normal S1 S2, no S3 or S4, no murmur, click or rub, no peripheral edema  ABDOMEN: soft, nontender, no hepatosplenomegaly, no masses and bowel sounds normal  MS: no gross musculoskeletal defects noted, no edema    Recent Labs   Lab Test 02/13/24  1047      POTASSIUM 4.3   CR 1.27*   A1C 7.3*        Diagnostics  Labs pending at this time.  Results will be reviewed when available.   EKG required for known coronary heart disease and not completed in the last 90 days.     Revised Cardiac Risk Index (RCRI)  The patient has the following serious cardiovascular risks for perioperative complications:   - Coronary Artery Disease (MI, positive stress test, angina, Qs on EKG) = 1 point   - Diabetes Mellitus " (on Insulin) = 1 point     RCRI Interpretation: 2 points: Class III (moderate risk - 6.6% complication rate)     Estimated Functional Capacity: Performs 4 METS exercise without symptoms (e.g., light housework, stairs, 4 mph walk, 7 mph bike, slow step dance)           Signed Electronically by: Adriel Tidwell MD  A copy of this evaluation report is provided to the requesting physician.

## 2024-08-08 NOTE — PATIENT INSTRUCTIONS
How to Take Your Medication Before Surgery  Preoperative Medication Instructions   Antiplatelet or Anticoagulation Medication Instructions   - aspirin: Discontinue aspirin 7-10 days prior to procedure to reduce bleeding risk. It should be resumed postoperatively.     Additional Medication Instructions   - ACE/ARB: Continue without modification (e.g., MAC anesthesia, neurosurgery, spine surgery, heart failure, or labile hypertension with risk of hypertension).   - Long acting insulin (e.g. glargine, detemir): Take 80% of the usual evening or morning dose before surgery.     - short acting insulin (e.g. regular, lispro, aspart): DO NOT TAKE on the morning of surgery.        Patient Education   Preparing for Your Surgery  Getting started  A nurse will call you to review your health history and instructions. They will give you an arrival time based on your scheduled surgery time. Please be ready to share:  Your doctor's clinic name and phone number  Your medical, surgical, and anesthesia history  A list of allergies and sensitivities  A list of medicines, including herbal treatments and over-the-counter drugs  Whether the patient has a legal guardian (ask how to send us the papers in advance)  Please tell us if you're pregnant--or if there's any chance you might be pregnant. Some surgeries may injure a fetus (unborn baby), so they require a pregnancy test. Surgeries that are safe for a fetus don't always need a test, and you can choose whether to have one.   If you have a child who's having surgery, please ask for a copy of Preparing for Your Child's Surgery.    Preparing for surgery  Within 10 to 30 days of surgery: Have a pre-op exam (sometimes called an H&P, or History and Physical). This can be done at a clinic or pre-operative center.  If you're having a , you may not need this exam. Talk to your care team.  At your pre-op exam, talk to your care team about all medicines you take. If you need to stop any  medicines before surgery, ask when to start taking them again.  We do this for your safety. Many medicines can make you bleed too much during surgery. Some change how well surgery (anesthesia) drugs work.  Call your insurance company to let them know you're having surgery. (If you don't have insurance, call [phone].)  Call your clinic if there's any change in your health. This includes signs of a cold or flu (sore throat, runny nose, cough, rash, fever). It also includes a scrape or scratch near the surgery site.  If you have questions on the day of surgery, call your hospital or surgery center.  Eating and drinking guidelines  For your safety: Unless your surgeon tells you otherwise, follow the guidelines below.  Eat and drink as usual until 8 hours before you arrive for surgery. After that, no food or milk.  Drink clear liquids until 2 hours before you arrive. These are liquids you can see through, like water, Gatorade, and Propel Water. They also include plain black coffee and tea (no cream or milk), candy, and breath mints. You can spit out gum when you arrive.  If you drink alcohol: Stop drinking it the night before surgery.  If your care team tells you to take medicine on the morning of surgery, it's okay to take it with a sip of water.  Preventing infection  Shower or bathe the night before and morning of your surgery. Follow the instructions your clinic gave you. (If no instructions, use regular soap.)  Don't shave or clip hair near your surgery site. We'll remove the hair if needed.  Don't smoke or vape the morning of surgery. You may chew nicotine gum up to 2 hours before surgery. A nicotine patch is okay.  Note: Some surgeries require you to completely quit smoking and nicotine. Check with your surgeon.  Your care team will make every effort to keep you safe from infection. We will:  Clean our hands often with soap and water (or an alcohol-based hand rub).  Clean the skin at your surgery site with a  special soap that kills germs.  Give you a special gown to keep you warm. (Cold raises the risk of infection.)  Wear special hair covers, masks, gowns and gloves during surgery.  Give antibiotic medicine, if prescribed. Not all surgeries need antibiotics.  What to bring on the day of surgery  Photo ID and insurance card  Copy of your health care directive, if you have one  Glasses and hearing aids (bring cases)  You can't wear contacts during surgery  Inhaler and eye drops, if you use them (tell us about these when you arrive)  CPAP machine or breathing device, if you use them  A few personal items, if spending the night  If you have . . .  A pacemaker, ICD (cardiac defibrillator) or other implant: Bring the ID card.  An implanted stimulator: Bring the remote control.  A legal guardian: Bring a copy of the certified (court-stamped) guardianship papers.  Please remove any jewelry, including body piercings. Leave jewelry and other valuables at home.  If you're going home the day of surgery  You must have a responsible adult drive you home. They should stay with you overnight as well.  If you don't have someone to stay with you, and you aren't safe to go home alone, we may keep you overnight. Insurance often won't pay for this.  After surgery  If it's hard to control your pain or you need more pain medicine, please call your surgeon's office.  Questions?   If you have any questions for your care team, list them here: _________________________________________________________________________________________________________________________________________________________________________ ____________________________________ ____________________________________ ____________________________________  For informational purposes only. Not to replace the advice of your health care provider. Copyright   2003, 2019 [employer]. All rights reserved. Clinically reviewed by Cynthia Brown MD. [employer] 022310 - REV  12/22.

## 2024-08-08 NOTE — PROGRESS NOTES
Preoperative Evaluation  [facility]  [address] [facility] [facility] [address]  Phone: [phone]  Primary Provider: Jovana Beaulieu, DO  Pre-op Performing Provider: Adriel Tidwell MD  Aug 8, 2024             8/6/2024   Surgical Information   What procedure is being done? Holmium Laser Enucleation of the Prostate Surgery   Facility or Hospital where procedure/surgery will be performed: [facility]   Who is doing the procedure / surgery? Dr. Montano   Date of surgery / procedure: 08/22/2024   Time of surgery / procedure: 11:25 AM   Where do you plan to recover after surgery? at home with family        Fax number for surgical facility: Note does not need to be faxed, will be available electronically in Epic.    Assessment & Plan     The proposed surgical procedure is considered INTERMEDIATE risk.    Preop general physical exam  Okay for procedure pending labs    Hypertrophy of prostate with urinary obstruction  Okay for procedure    Has had cardiology clearance prior to surgery,   CAD - Patient has a longstanding history of moderate-severe CAD. Patient denies recent chest pain or NTG use, denies exercise induced dyspnea or PND. Last angiogram 11/22/21   Prox RCA lesion is 100% stenosed.  Dist Cx lesion is 40% stenosed.  1st Mrg lesion is 20% stenosed.  Prox LAD to Mid LAD lesion is 75% stenosed.  Mid LAD to Dist LAD lesion is 60% stenosed.  Dist LAD lesion is 75% stenosed.     Multivessel CAD with poor LAD target  Recommend medical mangement optimization      Stage 3a chronic kidney disease (H)  Baseline GFR is around mid 50'3  - Albumin Random Urine Quantitative with Creat Ratio; Future  - Hemoglobin; Future  - Basic metabolic panel  (Ca, Cl, CO2, Creat, Gluc, K, Na, BUN); Future  Hypertension goal BP (blood pressure) < 140/90  Good control.  Continue currant medications, continue to monitor.      Type 2 diabetes mellitus with stage 3a chronic kidney  disease, with long-term current use of insulin (H)  Will decrease lantus on evening before procedure from 16 units to 12 units.   He does take aspirien daily, was not on his list  I added it to his list and told him to hold it before surgery.     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 It should be resumed postoperatively.     Additional Medication Instructions   - ACE/ARB: Continue without modification (e.g., MAC anesthesia, neurosurgery, spine surgery, heart failure, or labile hypertension with risk of hypertension).   - Long acting insulin (e.g. glargine, detemir): Take 80% of the usual evening or morning dose before surgery.     - short acting insulin (e.g. regular, lispro, aspart): DO NOT TAKE on the morning of surgery.     Recommendation  Approval given to proceed with proposed procedure, without further diagnostic evaluation.    Elsa Aburto is a 83 year old, presenting for the following:  Pre-Op Exam          8/8/2024     9:09 AM   Additional Questions   Roomed by Karen Shoemaker CMA   Accompanied by Wife     Via the Health Maintenance questionnaire, the patient has reported the following services have been completed -Eye Exam: [facility] [place], [place] 2024-03-06, this information has been sent to the abstraction team.  HPI related to upcoming procedure:         8/6/2024   Pre-Op Questionnaire   Have you ever had a heart attack or stroke? No   Have you ever had surgery on your heart or blood vessels, such as a stent placement, a coronary artery bypass, or surgery on an artery in your head, neck, heart, or legs? No   Do you have chest pain with activity? No   Do you have a history of heart failure? No   Do you currently  have a cold, bronchitis or symptoms of other infection? No   Do you have a cough, shortness of breath, or wheezing? No   Do you or anyone in your family have previous history of blood clots? No   Do you or does anyone in your family have a serious bleeding problem such as prolonged bleeding following surgeries or cuts? No   Have you ever had problems with anemia or been told to take iron pills? No   Have you had any abnormal blood loss such as black, tarry or bloody stools? No   Have you ever had a blood transfusion? No   Are you willing to have a blood transfusion if it is medically needed before, during, or after your surgery? Yes   Have you or any of your relatives ever had problems with anesthesia? No   Do you have sleep apnea, excessive snoring or daytime drowsiness? No   Do you have a CPAP machine? (!) NO    Do you have any artifical heart valves or other implanted medical devices like a pacemaker, defibrillator, or continuous glucose monitor? No   Do you have artificial joints? No   Are you allergic to latex? No        Health Care Directive  Patient has a Health Care Directive on file      Preoperative Review of    reviewed - no record of controlled substances prescribed.      Status of Chronic Conditions:  See problem list for active medical problems.  Problems all longstanding and stable, except as noted/documented.  See ROS for pertinent symptoms related to these conditions.      DIABETES - Patient has a longstanding history of DiabetesType Type II . Patient is being treated with insulin injections and denies significant side effects. Control has been good. Complicating factors include but are not limited to: CAD/PVD.     HYPERLIPIDEMIA - Patient has a long history of significant Hyperlipidemia requiring medication for treatment with recent good control. Patient reports no problems or side effects with the medication.     HYPERTENSION - Patient has longstanding history of HTN , currently denies any  symptoms referable to elevated blood pressure. Specifically denies chest pain, palpitations, dyspnea, orthopnea, PND or peripheral edema. Blood pressure readings have been in normal range. Current medication regimen is as listed below. Patient denies any side effects of medication.     RENAL INSUFFICIENCY - Patient has a longstanding history of moderate-severe chronic renal insufficiency. 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.  Mild retinopathy of both eyes.  Monitor only.  Sees retinal specialist.  F/u 3 months.        Type 2 diabetes mellitus with diabetic chronic kidney disease (H) 10/22/2015     Priority: Medium    Small bowel obstruction, recurrent 05/21/2014     Priority: Medium    Senile nuclear sclerosis 04/10/2013     Priority: Medium    Hypertrophy of prostate with urinary obstruction 12/07/2012     Priority: Medium     Problem list name updated by automated process. Provider to review      Hypertension goal BP (blood pressure) < 140/90 08/24/2012     Priority: Medium    Type 2 diabetes mellitus with diabetic polyneuropathy (H)      Priority: Medium    Hyperlipidemia LDL goal <70 09/23/2009     Priority: Medium      Past Medical History:   Diagnosis Date    Acute urinary retention 11/2012    ER visit   / 1200 cc post-void residual    Acute urinary retention 11/01/2012    ER     Basal cell carcinoma     Diabetes mellitus type II      Epididymitis 03/20/2014    Started on doxycyclline for UTI/orchitis. He was discharged on 03/22/14.    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    PE (pulmonary embolism) 03/20/2014    Rectal cancer (H)     Skin cancer     Squamous cell carcinoma     Thrombosis of leg     +PE     Past Surgical History:   Procedure Laterality Date    AMPUTATE TOE(S) Right 06/04/2019    Procedure: AMPUTATION 2nd Toe;  Surgeon: Adriel Cabello DPM;  Location: [facility]    BIOPSY      COLONOSCOPY  07/29/2013    Procedure: COMBINED COLONOSCOPY, SINGLE BIOPSY/POLYPECTOMY BY BIOPSY;;  Surgeon: Bari Burnett MD;  Location: [facility]    COLONOSCOPY N/A 06/04/2015    Procedure: COMBINED COLONOSCOPY, SINGLE OR MULTIPLE BIOPSY/POLYPECTOMY BY BIOPSY;  Surgeon: Tara Mtz MD;  Location:  GI    COLONOSCOPY N/A 12/05/2016    Procedure: COLONOSCOPY;  Surgeon: Breanna Kline MD;  Location:  GI    COLONOSCOPY N/A 10/11/2022    Procedure: COLONOSCOPY, FLEXIBLE, WITH LESION REMOVAL USING SNARE;  Surgeon: Pierre Doe MD;  Location: [facility]    CV CORONARY ANGIOGRAM N/A 11/22/2021    Procedure: Coronary Angiogram;  Surgeon: Jak Noe MD;  Location:  HEART CARDIAC CATH LAB    ENT SURGERY      EYE SURGERY  05/01/2012    Right eye procedure    HC CIRCUMCISION SURGICAL NON-DEV >28 DAYS AGE  02/01/1997    due to phimosis    HC REMOVAL GALLBLADDER  05/01/2001    HC UGI ENDOSCOPY W PLACEMENT GASTROSTOMY TUBE PERCUT  03/13/2014    Procedure: COMBINED ESOPHAGOSCOPY, GASTROSCOPY, DUODENOSCOPY (EGD), PLACE PERCUTANEOUS ENDOSCOPIC GASTROSTOMY TUBE;  Surgeon: Loco Casillas MD;  Location: [facility]    LAPAROSCOPIC ASSISTED COLECTOMY LEFT (DESCENDING)  01/07/2014    Procedure: LAPAROSCOPIC ASSISTED COLECTOMY LEFT (DESCENDING);  Laparoscopic hand assisted Low Anterior Resection With colonic J pouch  and end to end colorectal anastamosis. Laparoscopic splenic flexure mobilization.  Loop Ileostomy.  Rigid proctoscopy;  Surgeon: Margaret Gamble MD;  [facility]    PHACOEMULSIFICATION WITH STANDARD INTRAOCULAR LENS IMPLANT  04/11/2013    Procedure: PHACOEMULSIFICATION WITH STANDARD INTRAOCULAR LENS IMPLANT;  Right Kelman Phacoemulsification with Intraocular Lens Implant;  Surgeon: Caesar Turner MD;  Location: [facility]    REMOVE GASTROSTOMY TUBE ADULT  07/25/2014    Procedure: REMOVE GASTROSTOMY TUBE ADULT;  Surgeon: Margaret Gamble MD;  [facility]    SIGMOIDOSCOPY FLEXIBLE  06/23/2014    Procedure: SIGMOIDOSCOPY FLEXIBLE;  Surgeon: Margaret Gamble MD;  [facility]    SIGMOIDOSCOPY FLEXIBLE  07/22/2014    Procedure: SIGMOIDOSCOPY FLEXIBLE;  Surgeon: Margaret Gamble MD;  [facility]    SIGMOIDOSCOPY FLEXIBLE  07/25/2014    Procedure: SIGMOIDOSCOPY FLEXIBLE;  Surgeon: Margaret Gamble MD;  [facility]    SIGMOIDOSCOPY FLEXIBLE  07/28/2014    Procedure: SIGMOIDOSCOPY FLEXIBLE;  Surgeon: Margaret Gamble MD;  [facility]    SIGMOIDOSCOPY FLEXIBLE  07/31/2014    Procedure: SIGMOIDOSCOPY FLEXIBLE;  Surgeon: Margaret Gamble MD;  [facility]    SIGMOIDOSCOPY FLEXIBLE  08/03/2014    Procedure: SIGMOIDOSCOPY FLEXIBLE;  Surgeon: Margaret Gamble MD;  [facility]    SIGMOIDOSCOPY FLEXIBLE  08/05/2014    Procedure: SIGMOIDOSCOPY FLEXIBLE;  Surgeon: Margaret Gamble MD;  [facility]    SIGMOIDOSCOPY FLEXIBLE  08/08/2014    Procedure: SIGMOIDOSCOPY FLEXIBLE;  Surgeon: Margaret Gamble MD;  [facility]    SIGMOIDOSCOPY FLEXIBLE  08/18/2014    Procedure: SIGMOIDOSCOPY FLEXIBLE;  Surgeon: Jose Roberto Cervantes MD;  [facility]    SIGMOIDOSCOPY FLEXIBLE N/A 08/15/2014    Procedure: SIGMOIDOSCOPY FLEXIBLE;  Surgeon: Margaret Gamble MD;  [facility]    SIGMOIDOSCOPY FLEXIBLE  N/A 08/22/2014    Procedure: SIGMOIDOSCOPY FLEXIBLE;  Surgeon: Jose Roberto Cervantes MD;  [facility]    SIGMOIDOSCOPY FLEXIBLE N/A 08/11/2014    Procedure: SIGMOIDOSCOPY FLEXIBLE;  Surgeon: Margaret Gamble MD;  [facility]    SIGMOIDOSCOPY FLEXIBLE N/A 08/26/2014    Procedure: SIGMOIDOSCOPY FLEXIBLE;  Surgeon: Margaret Gamble MD;  [facility]    SIGMOIDOSCOPY FLEXIBLE N/A 08/29/2014    Procedure: SIGMOIDOSCOPY FLEXIBLE;  Surgeon: Margaret Gamble MD;  [facility]    SIGMOIDOSCOPY FLEXIBLE N/A 09/08/2014    Procedure: SIGMOIDOSCOPY FLEXIBLE;  Surgeon: Margaret Gamble MD;  [facility]    SIGMOIDOSCOPY FLEXIBLE N/A 09/02/2014    Procedure: SIGMOIDOSCOPY FLEXIBLE;  Surgeon: Breanna Kline MD;  [facility]    SIGMOIDOSCOPY FLEXIBLE N/A 09/11/2014    Procedure: SIGMOIDOSCOPY FLEXIBLE;  Surgeon: Margaret Gamble MD;  [facility]    SIGMOIDOSCOPY FLEXIBLE N/A 09/19/2014    Procedure: SIGMOIDOSCOPY FLEXIBLE;  Surgeon: Margaret Gamble MD;  [facility]    SIGMOIDOSCOPY FLEXIBLE N/A 09/15/2014    Procedure: SIGMOIDOSCOPY FLEXIBLE;  Surgeon: Margaret Gamble MD;  [facility]    SIGMOIDOSCOPY FLEXIBLE N/A 09/05/2014    Procedure: SIGMOIDOSCOPY FLEXIBLE;  Surgeon: Margaret Gamble MD;  [facility]    SIGMOIDOSCOPY FLEXIBLE N/A 09/23/2014    Procedure: SIGMOIDOSCOPY FLEXIBLE;  Surgeon: Margaret Gamble MD;  [facility]    SIGMOIDOSCOPY FLEXIBLE N/A 09/26/2014    Procedure: SIGMOIDOSCOPY FLEXIBLE;  Surgeon: Margaret Gamble MD;  [facility]    SIGMOIDOSCOPY FLEXIBLE N/A 09/30/2014    Procedure: SIGMOIDOSCOPY FLEXIBLE;  Surgeon: Margaret Gamble MD;  [facility]    SIGMOIDOSCOPY FLEXIBLE N/A 10/03/2014    Procedure: SIGMOIDOSCOPY FLEXIBLE;  Surgeon: Margaret Gamble MD;  [facility]    SIGMOIDOSCOPY FLEXIBLE N/A 10/30/2014    Procedure: SIGMOIDOSCOPY  FLEXIBLE;  Surgeon: Margaret Gamble MD;  [facility]    SIGMOIDOSCOPY FLEXIBLE, PLACEMENT OF DRAINAGE SPONGE  09/30/2014    TAKEDOWN ILEOSTOMY N/A 01/06/2015    Procedure: TAKEDOWN ILEOSTOMY;  Surgeon: Margaret Gamble MD;  [facility]     Current Outpatient Medications   Medication Sig Dispense Refill    amLODIPine (NORVASC) 10 MG tablet Take 1 tablet (10 mg) by mouth daily 90 tablet 1    aspirin (ASA) 325 MG tablet Take 1 tablet (325 mg) by mouth daily      atorvastatin (LIPITOR) 20 MG tablet Take 1 tablet (20 mg) by mouth daily 90 tablet 3    blood glucose (ACCU-CHEK REGIS PLUS) test strip USE TO TEST BLOOD SUGAR THREE TIMES A DAY OR AS DIRECTED 300 strip 3    blood glucose monitoring (NO BRAND SPECIFIED) meter device kit Use to test blood sugar 3 times daily or as directed. 1 kit 0    Cholecalciferol (VITAMIN D-3) 1000 units CAPS Take 1 capsule by mouth daily       finasteride (PROSCAR) 5 MG tablet Take 1 tablet (5 mg) by mouth daily 90 tablet 3    insulin glargine (LANTUS SOLOSTAR) 100 UNIT/ML pen INJECT 16 UNITS UNDER THE SKIN AT BEDTIME 30 mL 2    insulin lispro (HUMALOG KWIKPEN) 100 UNIT/ML (1 unit dial) KWIKPEN USE 8 UNITS  PLUS LOW SLIDING SCALE BEFORE EACH MEAL. MAX 50 UNITS PER DAY. 60 mL 1    insulin pen needle (PENTIPS) 31G X 6 MM miscellaneous USE 4 TO 5 TIMES DAILY OR AS DIRECTED FOR SLIDING SCALE INSULIN 500 each 1    losartan (COZAAR) 100 MG tablet Take 1 tablet (100 mg) by mouth daily 90 tablet 1    metoprolol succinate ER (TOPROL XL) 100 MG 24 hr tablet Take 1 tablet (100 mg) by mouth daily 90 tablet 3    tamsulosin (FLOMAX) 0.4 MG capsule Take 1 capsule (0.4 mg) by mouth daily 90 capsule 3    trospium (SANCTURA) 20 MG tablet Take 1 tablet (20 mg) by mouth every evening 90 tablet 3       Allergies   Allergen Reactions    Nkda [No Known Drug Allergy]         Social History     Tobacco Use    Smoking status: Former     Types: Cigars    Smokeless tobacco: Never  "  Substance Use Topics    Alcohol use: Not Currently     Family History   Problem Relation Age of Onset    Diabetes Mother     Hypertension Mother     Cancer Father     Cancer Maternal Grandmother     Alzheimer Disease Maternal Grandmother     Cardiovascular Paternal Grandmother     Breast Cancer Sister     Colon Cancer No family hx of     Colon Polyps No family hx of     Crohn's Disease No family hx of     Ulcerative Colitis No family hx of     Anesthesia Reaction No family hx of     Melanoma No family hx of      History   Drug Use Unknown             Review of Systems  Constitutional, HEENT, cardiovascular, pulmonary, gi and gu systems are negative, except as otherwise noted.    Objective    /52   Pulse 57   Temp 97.2  F (36.2  C) (Tympanic)   Resp 18   Ht 1.727 m (5' 7.99\")   Wt 88 kg (194 lb 1.6 oz)   SpO2 98%   BMI 29.52 kg/m     Estimated body mass index is 29.52 kg/m  as calculated from the following:    Height as of this encounter: 1.727 m (5' 7.99\").    Weight as of this encounter: 88 kg (194 lb 1.6 oz).  Physical Exam  GENERAL: alert and no distress  NECK: no adenopathy, no asymmetry, masses, or scars  RESP: lungs clear to auscultation - no rales, rhonchi or wheezes  CV: regular rate and rhythm, normal S1 S2, no S3 or S4, no murmur, click or rub, no peripheral edema  ABDOMEN: soft, nontender, no hepatosplenomegaly, no masses and bowel sounds normal  MS: no gross musculoskeletal defects noted, no edema    Recent Labs   Lab Test 02/13/24  1047      POTASSIUM 4.3   CR 1.27*   A1C 7.3*        Diagnostics  Labs pending at this time.  Results will be reviewed when available.   EKG required for known coronary heart disease and not completed in the last 90 days.     Revised Cardiac Risk Index (RCRI)  The patient has the following serious cardiovascular risks for perioperative complications:   - Coronary Artery Disease (MI, positive stress test, angina, Qs on EKG) = 1 point   - Diabetes Mellitus " (on Insulin) = 1 point     RCRI Interpretation: 2 points: Class III (moderate risk - 6.6% complication rate)     Estimated Functional Capacity: Performs 4 METS exercise without symptoms (e.g., light housework, stairs, 4 mph walk, 7 mph bike, slow step dance)           Signed Electronically by: Adriel Tidwell MD  A copy of this evaluation report is provided to the requesting physician.

## 2024-08-09 ENCOUNTER — OFFICE VISIT (OUTPATIENT)
Dept: CARDIOLOGY | Facility: CLINIC | Age: 84
End: 2024-08-09
Attending: FAMILY MEDICINE
Payer: COMMERCIAL

## 2024-08-09 VITALS
SYSTOLIC BLOOD PRESSURE: 116 MMHG | RESPIRATION RATE: 16 BRPM | BODY MASS INDEX: 29.66 KG/M2 | DIASTOLIC BLOOD PRESSURE: 65 MMHG | HEART RATE: 58 BPM | OXYGEN SATURATION: 95 % | WEIGHT: 195 LBS

## 2024-08-09 DIAGNOSIS — I25.10 CORONARY ARTERY DISEASE INVOLVING NATIVE CORONARY ARTERY OF NATIVE HEART WITHOUT ANGINA PECTORIS: ICD-10-CM

## 2024-08-09 DIAGNOSIS — Z01.818 PREOP GENERAL PHYSICAL EXAM: ICD-10-CM

## 2024-08-09 LAB — BACTERIA UR CULT: ABNORMAL

## 2024-08-09 PROCEDURE — G2211 COMPLEX E/M VISIT ADD ON: HCPCS | Performed by: INTERNAL MEDICINE

## 2024-08-09 PROCEDURE — 99214 OFFICE O/P EST MOD 30 MIN: CPT | Performed by: INTERNAL MEDICINE

## 2024-08-09 NOTE — PROGRESS NOTES
CARDIOLOGY CLINIC CONSULTATION    PRIMARY CARE PHYSICIAN:  Jovana Beaulieu    Tests reviewed/interpreted independently in clinic today:   EKG, Echocardiogram, Blood work,  Angiogram.     The level of medical decision making during this visit was of moderate complexity.     The longitudinal plan of care for the diagnosis(es)/condition(s) as documented were addressed during this visit. Due to the added complexity in care, I will continue to support Lamonte in the subsequent management and with ongoing continuity of care.     HISTORY OF PRESENT ILLNESS:  Today, I had the pleasure of connecting with Storm Dutta.  He is a very pleasant 83-year-old gentleman who presents to the clinic for follow-up visit.  He is a patient of Dr. Reilly and is seeing me on an urgent basis for preoperative cardiovascular risk stratification.  He has a history of coronary artery disease detected on coronary angiogram in 2021 [see below].  Medical management was recommended at that time.  His other medical history includes peripheral vascular disease, hypertension, dyslipidemia, colorectal cancer, CKD, type 2 diabetes and frequent PVCs.  He is now scheduled to undergo laser enucleation of prostate on 8/22/2024.  He tells me that he has been able to walk without any difficulty and has not noticed any chest pain, chest tightness or exertional shortness of breath.  He  does not exercise very often.    Echo September 2021 showed EF 60%, normal RV, no valve disease.  Nuclear stress showed small area of ischemia of the apical segment.     Angiogram November 2021 showed 75% mid LAD, moderate distal LAD, mild to moderate disease of the circumflex, 100% proximal to mid RCA occlusion with right to right collaterals, also left to right collaterals.  Medical management recommended.     Holter 11/2022 showed sinus, 7% PVC's.          Assessment and Impression:      Pertinent issues addressed/ reviewed during this cardiology visit    Coronary  disease-stable, asymptomatic  Peripheral vascular disease  Essential hypertension  Dyslipidemia  Colorectal cancer  CKD  Type 2 diabetes  Frequent PVCs  Preoperative cardiovascular risk ratification before laser enucleation of prostate.         Recommendations and Plan:     It was a pleasure to see Storm Dutta in clinic today.  He is doing well from cardiac standpoint and does not report anginal symptoms.  He is able to walk a couple of blocks without getting any anginal symptoms.  This is his baseline and has not changed in recent years.  Last coronary angiogram showed stable disease without any need for revascularization.  Since he is asymptomatic and has greater than 4 METS and the planned surgery is intermediate risk I do not believe we need to pursue ischemic workup at this time.  He is intermediate risk for an intermediate risk procedure.  Discussed with him that people who have coronary disease is at baseline are high risk for perioperative cardiac complications but there is not much we can do at this time to minimize the risk.  I recommend adequate cardiac precautions such as minimizing the risk of perioperative hypertension and blood loss.  Medications such as aspirin can be discontinued at the discretion of the surgeon and can be restarted after a few days.  I am going to have him come back and see Dr. Reilly in a few months.    Francisco PARKER, FACC, CARMELE  Cardiology - [facility]  August 9, 2024    No orders of the defined types were placed in this encounter.      PAST MEDICAL HISTORY:  Past Medical History:   Diagnosis Date    Acute urinary retention 11/2012    ER visit   / 1200 cc post-void residual    Acute urinary retention 11/01/2012    ER     Basal cell carcinoma     Diabetes mellitus type II     Epididymitis 03/20/2014    Started on doxycyclline for UTI/orchitis. He was discharged on 03/22/14.    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    PE (pulmonary embolism) 03/20/2014    Rectal cancer (H)     Skin cancer     Squamous cell carcinoma     Thrombosis of leg     +PE       MEDICATIONS:  Current Outpatient Medications   Medication Sig Dispense Refill    amLODIPine (NORVASC) 10 MG tablet Take 1 tablet (10 mg) by mouth daily 90 tablet 1    aspirin (ASA) 325 MG tablet Take 1 tablet (325 mg) by mouth daily      atorvastatin (LIPITOR) 20 MG tablet Take 1 tablet (20 mg) by mouth daily 90 tablet 3    blood glucose (ACCU-CHEK REGIS PLUS) test strip USE TO TEST BLOOD SUGAR THREE TIMES A DAY OR AS DIRECTED 300 strip 3    blood glucose monitoring (NO BRAND SPECIFIED) meter device kit Use to test blood sugar 3 times daily or as directed. 1 kit 0    Cholecalciferol (VITAMIN D-3) 1000 units CAPS Take 1 capsule by mouth daily       finasteride (PROSCAR) 5 MG tablet Take 1 tablet (5 mg) by mouth daily 90 tablet 3    insulin glargine (LANTUS SOLOSTAR) 100 UNIT/ML pen INJECT 16 UNITS UNDER THE SKIN AT BEDTIME 30 mL 2    insulin lispro (HUMALOG KWIKPEN) 100 UNIT/ML (1 unit dial) KWIKPEN USE 8 UNITS  PLUS LOW SLIDING SCALE BEFORE EACH MEAL. MAX 50 UNITS PER DAY. 60 mL 1    insulin pen needle (PENTIPS) 31G X 6 MM miscellaneous USE 4 TO 5 TIMES DAILY OR AS DIRECTED FOR SLIDING SCALE INSULIN 500 each 1    losartan (COZAAR) 100 MG tablet Take 1 tablet (100 mg) by mouth daily 90 tablet 1    metoprolol succinate ER (TOPROL XL) 100 MG 24 hr tablet Take 1 tablet (100 mg) by mouth daily 90 tablet 3    tamsulosin (FLOMAX) 0.4 MG capsule Take 1 capsule (0.4 mg) by mouth daily (Patient not taking: Reported on 8/9/2024) 90 capsule 3    trospium (SANCTURA) 20 MG tablet Take 1 tablet (20 mg) by mouth every evening (Patient not taking: Reported on 8/9/2024) 90 tablet 3     No current facility-administered medications for this visit.       ALLERGIES:  Allergies   Allergen Reactions    Nkda [No Known Drug Allergy]         SOCIAL HISTORY:  I have reviewed this patient's social history and updated it with pertinent information if needed. Storm Dutta  reports that he has quit smoking. His smoking use included cigars. He has never used smokeless tobacco. He reports that he does not currently use alcohol. He reports that he does not use drugs.    FAMILY HISTORY:  I have reviewed this patient's family history and updated it with pertinent information if needed.   Family History   Problem Relation Age of Onset    Diabetes Mother     Hypertension Mother     Cancer Father     Cancer Maternal Grandmother     Alzheimer Disease Maternal Grandmother     Cardiovascular Paternal Grandmother     Breast Cancer Sister     Colon Cancer No family hx of     Colon Polyps No family hx of     Crohn's Disease No family hx of     Ulcerative Colitis No family hx of     Anesthesia Reaction No family hx of     Melanoma No family hx of        REVIEW OF SYSTEMS:  Skin:        Eyes:       ENT:       Respiratory:  Negative dyspnea on exertion;dyspnea at rest;shortness of breath  Cardiovascular:  Negative;chest pain;palpitations;edema;syncope or near-syncope Positive for;dizziness;lightheadedness;fatigue  Gastroenterology:      Genitourinary:       Musculoskeletal:       Neurologic:       Psychiatric:       Heme/Lymph/Imm:       Endocrine:           PHYSICAL EXAM:  /65 (BP Location: Right arm, Patient Position: Sitting)   Pulse 58   Resp 16   Wt 88.5 kg (195 lb)   SpO2 95%   BMI 29.66 kg/m    Constitutional: alert, no distress  Respiratory: Good bilateral air entry  Cardiovascular: s1 s2 normal, no murmurs  GI: nondistended  Neuropsychiatric: appropriate affact  Edema: none    This note was completed in part using dictation via the Dragon voice recognition software. Some word and grammatical errors may occur and must be interpreted in the appropriate clinical context.  If there are any questions pertaining to this issue, please contact me for  further clarification.

## 2024-08-09 NOTE — LETTER
8/9/2024    Jovana Beaulieu, DO  [address] [place] [address]    RE: Storm T Tra       Dear Colleague,     I had the pleasure of seeing Storm Dutta in the [facility].      CARDIOLOGY CLINIC CONSULTATION    PRIMARY CARE PHYSICIAN:  Jovana Beaulieu    Tests reviewed/interpreted independently in clinic today:   EKG, Echocardiogram, Blood work,  Angiogram.     The level of medical decision making during this visit was of moderate complexity.     The longitudinal plan of care for the diagnosis(es)/condition(s) as documented were addressed during this visit. Due to the added complexity in care, I will continue to support Lamonte in the subsequent management and with ongoing continuity of care.     HISTORY OF PRESENT ILLNESS:  Today, I had the pleasure of connecting with Storm Dutta.  He is a very pleasant 83-year-old gentleman who presents to the clinic for follow-up visit.  He is a patient of Dr. Reilly and is seeing me on an urgent basis for preoperative cardiovascular risk stratification.  He has a history of coronary artery disease detected on coronary angiogram in 2021 [see below].  Medical management was recommended at that time.  His other medical history includes peripheral vascular disease, hypertension, dyslipidemia, colorectal cancer, CKD, type 2 diabetes and frequent PVCs.  He is now scheduled to undergo laser enucleation of prostate on 8/22/2024.  He tells me that he has been able to walk without any difficulty and has not noticed any chest pain, chest tightness or exertional shortness of breath.  He  does not exercise very often.    Echo September 2021 showed EF 60%, normal RV, no valve disease.  Nuclear stress showed small area of ischemia of the apical segment.     Angiogram November 2021 showed 75% mid LAD, moderate distal LAD, mild to moderate disease of the circumflex, 100% proximal to mid RCA occlusion with right to right collaterals, also left to right  collaterals.  Medical management recommended.     Holter 11/2022 showed sinus, 7% PVC's.          Assessment and Impression:      Pertinent issues addressed/ reviewed during this cardiology visit    Coronary disease-stable, asymptomatic  Peripheral vascular disease  Essential hypertension  Dyslipidemia  Colorectal cancer  CKD  Type 2 diabetes  Frequent PVCs  Preoperative cardiovascular risk ratification before laser enucleation of prostate.         Recommendations and Plan:     It was a pleasure to see Storm Dutta in clinic today.  He is doing well from cardiac standpoint and does not report anginal symptoms.  He is able to walk a couple of blocks without getting any anginal symptoms.  This is his baseline and has not changed in recent years.  Last coronary angiogram showed stable disease without any need for revascularization.  Since he is asymptomatic and has greater than 4 METS and the planned surgery is intermediate risk I do not believe we need to pursue ischemic workup at this time.  He is intermediate risk for an intermediate risk procedure.  Discussed with him that people who have coronary disease is at baseline are high risk for perioperative cardiac complications but there is not much we can do at this time to minimize the risk.  I recommend adequate cardiac precautions such as minimizing the risk of perioperative hypertension and blood loss.  Medications such as aspirin can be discontinued at the discretion of the surgeon and can be restarted after a few days.  I am going to have him come back and see Dr. Reilly in a few months.    Francisco PARKER, FACC, CARMELE  Cardiology - [facility]  August 9, 2024    No orders of the defined types were placed in this encounter.      PAST MEDICAL HISTORY:  Past Medical History:   Diagnosis Date     Acute urinary retention 11/2012    ER visit   / 1200 cc post-void residual     Acute urinary retention 11/01/2012    ER      Basal cell carcinoma      Diabetes  mellitus type II      Epididymitis 03/20/2014    Started on doxycyclline for UTI/orchitis. He was discharged on 03/22/14.     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     PE (pulmonary embolism) 03/20/2014     Rectal cancer (H)      Skin cancer      Squamous cell carcinoma      Thrombosis of leg     +PE       MEDICATIONS:  Current Outpatient Medications   Medication Sig Dispense Refill     amLODIPine (NORVASC) 10 MG tablet Take 1 tablet (10 mg) by mouth daily 90 tablet 1     aspirin (ASA) 325 MG tablet Take 1 tablet (325 mg) by mouth daily       atorvastatin (LIPITOR) 20 MG tablet Take 1 tablet (20 mg) by mouth daily 90 tablet 3     blood glucose (ACCU-CHEK REGIS PLUS) test strip USE TO TEST BLOOD SUGAR THREE TIMES A DAY OR AS DIRECTED 300 strip 3     blood glucose monitoring (NO BRAND SPECIFIED) meter device kit Use to test blood sugar 3 times daily or as directed. 1 kit 0     Cholecalciferol (VITAMIN D-3) 1000 units CAPS Take 1 capsule by mouth daily        finasteride (PROSCAR) 5 MG tablet Take 1 tablet (5 mg) by mouth daily 90 tablet 3     insulin glargine (LANTUS SOLOSTAR) 100 UNIT/ML pen INJECT 16 UNITS UNDER THE SKIN AT BEDTIME 30 mL 2     insulin lispro (HUMALOG KWIKPEN) 100 UNIT/ML (1 unit dial) KWIKPEN USE 8 UNITS  PLUS LOW SLIDING SCALE BEFORE EACH MEAL. MAX 50 UNITS PER DAY. 60 mL 1     insulin pen needle (PENTIPS) 31G X 6 MM miscellaneous USE 4 TO 5 TIMES DAILY OR AS DIRECTED FOR SLIDING SCALE INSULIN 500 each 1     losartan (COZAAR) 100 MG tablet Take 1 tablet (100 mg) by mouth daily 90 tablet 1     metoprolol succinate ER (TOPROL XL) 100 MG 24 hr tablet Take 1 tablet (100 mg) by mouth daily 90 tablet 3     tamsulosin (FLOMAX) 0.4 MG capsule Take 1 capsule (0.4 mg) by mouth daily (Patient not taking: Reported on 8/9/2024) 90 capsule 3     trospium (SANCTURA) 20 MG tablet Take 1  tablet (20 mg) by mouth every evening (Patient not taking: Reported on 8/9/2024) 90 tablet 3     No current facility-administered medications for this visit.       ALLERGIES:  Allergies   Allergen Reactions     Nkda [No Known Drug Allergy]        SOCIAL HISTORY:  I have reviewed this patient's social history and updated it with pertinent information if needed. Storm Dutta  reports that he has quit smoking. His smoking use included cigars. He has never used smokeless tobacco. He reports that he does not currently use alcohol. He reports that he does not use drugs.    FAMILY HISTORY:  I have reviewed this patient's family history and updated it with pertinent information if needed.   Family History   Problem Relation Age of Onset     Diabetes Mother      Hypertension Mother      Cancer Father      Cancer Maternal Grandmother      Alzheimer Disease Maternal Grandmother      Cardiovascular Paternal Grandmother      Breast Cancer Sister      Colon Cancer No family hx of      Colon Polyps No family hx of      Crohn's Disease No family hx of      Ulcerative Colitis No family hx of      Anesthesia Reaction No family hx of      Melanoma No family hx of        REVIEW OF SYSTEMS:  Skin:        Eyes:       ENT:       Respiratory:  Negative dyspnea on exertion;dyspnea at rest;shortness of breath  Cardiovascular:  Negative;chest pain;palpitations;edema;syncope or near-syncope Positive for;dizziness;lightheadedness;fatigue  Gastroenterology:      Genitourinary:       Musculoskeletal:       Neurologic:       Psychiatric:       Heme/Lymph/Imm:       Endocrine:           PHYSICAL EXAM:  /65 (BP Location: Right arm, Patient Position: Sitting)   Pulse 58   Resp 16   Wt 88.5 kg (195 lb)   SpO2 95%   BMI 29.66 kg/m    Constitutional: alert, no distress  Respiratory: Good bilateral air entry  Cardiovascular: s1 s2 normal, no murmurs  GI: nondistended  Neuropsychiatric: appropriate affact  Edema: none    This note was  completed in part using dictation via the Dragon voice recognition software. Some word and grammatical errors may occur and must be interpreted in the appropriate clinical context.  If there are any questions pertaining to this issue, please contact me for further clarification.      Thank you for allowing me to participate in the care of your patient.      Sincerely,     Francisco Hardy MD     [facility]  cc:   Adriel Tidwell [place]  [address] [id number]

## 2024-08-10 ENCOUNTER — TELEPHONE (OUTPATIENT)
Dept: SURGERY | Facility: CLINIC | Age: 84
End: 2024-08-10
Payer: COMMERCIAL

## 2024-08-10 DIAGNOSIS — R33.9 URINARY RETENTION: Primary | ICD-10-CM

## 2024-08-10 RX ORDER — FLUCONAZOLE 200 MG/1
200 TABLET ORAL DAILY
Qty: 5 TABLET | Refills: 0 | Status: SHIPPED | OUTPATIENT
Start: 2024-08-17 | End: 2024-08-12

## 2024-08-10 RX ORDER — NITROFURANTOIN 25; 75 MG/1; MG/1
100 CAPSULE ORAL 2 TIMES DAILY
Qty: 10 CAPSULE | Refills: 0 | Status: SHIPPED | OUTPATIENT
Start: 2024-08-17 | End: 2024-08-12

## 2024-08-12 ENCOUNTER — MYC MEDICAL ADVICE (OUTPATIENT)
Dept: SURGERY | Facility: CLINIC | Age: 84
End: 2024-08-12
Payer: COMMERCIAL

## 2024-08-12 DIAGNOSIS — R33.9 URINARY RETENTION: ICD-10-CM

## 2024-08-12 RX ORDER — FLUCONAZOLE 200 MG/1
200 TABLET ORAL DAILY
Qty: 5 TABLET | Refills: 0 | Status: ON HOLD | OUTPATIENT
Start: 2024-08-17 | End: 2024-08-22

## 2024-08-12 RX ORDER — NITROFURANTOIN 25; 75 MG/1; MG/1
100 CAPSULE ORAL 2 TIMES DAILY
Qty: 10 CAPSULE | Refills: 0 | Status: ON HOLD | OUTPATIENT
Start: 2024-08-17 | End: 2024-08-22

## 2024-08-12 NOTE — TELEPHONE ENCOUNTER
Medications re-ordered and sent to pharmacy. Patient contacted via [employer] regarding how to take this medication. Susana Hendrix RN on 8/12/2024 at 8:26 AM

## 2024-08-21 ENCOUNTER — ANESTHESIA EVENT (OUTPATIENT)
Dept: SURGERY | Facility: CLINIC | Age: 84
End: 2024-08-21
Payer: COMMERCIAL

## 2024-08-21 NOTE — ANESTHESIA PREPROCEDURE EVALUATION
Anesthesia Pre-Procedure Evaluation    Patient: Storm Dutta   MRN: [id number] : 1940        Procedure : Procedure(s):  Holmium Laser Enucleation of the Prostate          Past Medical History:   Diagnosis Date    Acute urinary retention 2012    ER visit   / 1200 cc post-void residual    Acute urinary retention 2012    ER     Basal cell carcinoma     Diabetes mellitus type II     Epididymitis 2014    Started on doxycyclline for UTI/orchitis. He was discharged on 14.    Former smoker     dc'd     Hypertension goal BP (blood pressure) < 140/90 2012    Malignant melanoma (H)     Melanoma in situ of neck (H) 2021    PE (pulmonary embolism) 2014    Rectal cancer (H)     Skin cancer     Squamous cell carcinoma     Thrombosis of leg     +PE      Past Surgical History:   Procedure Laterality Date    AMPUTATE TOE(S) Right 2019    Procedure: AMPUTATION 2nd Toe;  Surgeon: Adriel Cabello DPM;  Location: [facility]    BIOPSY      COLONOSCOPY  2013    Procedure: COMBINED COLONOSCOPY, SINGLE BIOPSY/POLYPECTOMY BY BIOPSY;;  Surgeon: Bari Burnett MD;  Location: [facility]    COLONOSCOPY N/A 2015    Procedure: COMBINED COLONOSCOPY, SINGLE OR MULTIPLE BIOPSY/POLYPECTOMY BY BIOPSY;  Surgeon: Tara Mtz MD;  Location:  GI    COLONOSCOPY N/A 2016    Procedure: COLONOSCOPY;  Surgeon: Breanna Kline MD;  Location:  GI    COLONOSCOPY N/A 10/11/2022    Procedure: COLONOSCOPY, FLEXIBLE, WITH LESION REMOVAL USING SNARE;  Surgeon: Pierre Doe MD;  Location: [facility]    CV CORONARY ANGIOGRAM N/A 2021    Procedure: Coronary Angiogram;  Surgeon: Jak Noe MD;  Location:  [facility]    ENT SURGERY      EYE SURGERY  2012    Right eye procedure    HC CIRCUMCISION SURGICAL NON-DEV >28 DAYS AGE  1997    due to phimosis    HC REMOVAL GALLBLADDER  2001    [facility] UGI ENDOSCOPY W PLACEMENT  GASTROSTOMY TUBE PERCUT  03/13/2014    Procedure: COMBINED ESOPHAGOSCOPY, GASTROSCOPY, DUODENOSCOPY (EGD), PLACE PERCUTANEOUS ENDOSCOPIC GASTROSTOMY TUBE;  Surgeon: Loco Casillas MD;  Location: [facility]    LAPAROSCOPIC ASSISTED COLECTOMY LEFT (DESCENDING)  01/07/2014    Procedure: LAPAROSCOPIC ASSISTED COLECTOMY LEFT (DESCENDING);  Laparoscopic hand assisted Low Anterior Resection With colonic J pouch and end to end colorectal anastamosis. Laparoscopic splenic flexure mobilization.  Loop Ileostomy.  Rigid proctoscopy;  Surgeon: Margaret Gamble MD;  [facility]    PHACOEMULSIFICATION WITH STANDARD INTRAOCULAR LENS IMPLANT  04/11/2013    Procedure: PHACOEMULSIFICATION WITH STANDARD INTRAOCULAR LENS IMPLANT;  Right Kelman Phacoemulsification with Intraocular Lens Implant;  Surgeon: Caesar Turner MD;  Location: [facility]    REMOVE GASTROSTOMY TUBE ADULT  07/25/2014    Procedure: REMOVE GASTROSTOMY TUBE ADULT;  Surgeon: Margaret Gamble MD;  [facility]    SIGMOIDOSCOPY FLEXIBLE  06/23/2014    Procedure: SIGMOIDOSCOPY FLEXIBLE;  Surgeon: Margaret Gamble MD;  Location:  GI    SIGMOIDOSCOPY FLEXIBLE  07/22/2014    Procedure: SIGMOIDOSCOPY FLEXIBLE;  Surgeon: Margaret Gamble MD;  [facility]    SIGMOIDOSCOPY FLEXIBLE  07/25/2014    Procedure: SIGMOIDOSCOPY FLEXIBLE;  Surgeon: Margaret Gamble MD;  [facility]    SIGMOIDOSCOPY FLEXIBLE  07/28/2014    Procedure: SIGMOIDOSCOPY FLEXIBLE;  Surgeon: Margaret Gamble MD;  [facility]    SIGMOIDOSCOPY FLEXIBLE  07/31/2014    Procedure: SIGMOIDOSCOPY FLEXIBLE;  Surgeon: Margaret Gamble MD;  [facility]    SIGMOIDOSCOPY FLEXIBLE  08/03/2014    Procedure: SIGMOIDOSCOPY FLEXIBLE;  Surgeon: Margaret Gamble MD;  [facility]    SIGMOIDOSCOPY FLEXIBLE  08/05/2014    Procedure: SIGMOIDOSCOPY FLEXIBLE;  Surgeon: Margaret Gamble MD;  [facility]     SIGMOIDOSCOPY FLEXIBLE  08/08/2014    Procedure: SIGMOIDOSCOPY FLEXIBLE;  Surgeon: Margaret Gamble MD;  [facility]    SIGMOIDOSCOPY FLEXIBLE  08/18/2014    Procedure: SIGMOIDOSCOPY FLEXIBLE;  Surgeon: Jose Roberto Cervantes MD;  [facility]    SIGMOIDOSCOPY FLEXIBLE N/A 08/15/2014    Procedure: SIGMOIDOSCOPY FLEXIBLE;  Surgeon: Margaret Gamble MD;  [facility]    SIGMOIDOSCOPY FLEXIBLE N/A 08/22/2014    Procedure: SIGMOIDOSCOPY FLEXIBLE;  Surgeon: Jose Roberto Cervantes MD;  [facility]    SIGMOIDOSCOPY FLEXIBLE N/A 08/11/2014    Procedure: SIGMOIDOSCOPY FLEXIBLE;  Surgeon: Margaret Gamble MD;  [facility]    SIGMOIDOSCOPY FLEXIBLE N/A 08/26/2014    Procedure: SIGMOIDOSCOPY FLEXIBLE;  Surgeon: Margaret Gamble MD;  [facility]    SIGMOIDOSCOPY FLEXIBLE N/A 08/29/2014    Procedure: SIGMOIDOSCOPY FLEXIBLE;  Surgeon: Margaret Gamble MD;  [facility]    SIGMOIDOSCOPY FLEXIBLE N/A 09/08/2014    Procedure: SIGMOIDOSCOPY FLEXIBLE;  Surgeon: Margaret Gamble MD;  [facility]    SIGMOIDOSCOPY FLEXIBLE N/A 09/02/2014    Procedure: SIGMOIDOSCOPY FLEXIBLE;  Surgeon: Breanna Kline MD;  [facility]    SIGMOIDOSCOPY FLEXIBLE N/A 09/11/2014    Procedure: SIGMOIDOSCOPY FLEXIBLE;  Surgeon: Margaret Gamble MD;  [facility]    SIGMOIDOSCOPY FLEXIBLE N/A 09/19/2014    Procedure: SIGMOIDOSCOPY FLEXIBLE;  Surgeon: Margaret Gamble MD;  [facility]    SIGMOIDOSCOPY FLEXIBLE N/A 09/15/2014    Procedure: SIGMOIDOSCOPY FLEXIBLE;  Surgeon: Margaret Gamble MD;  [facility]    SIGMOIDOSCOPY FLEXIBLE N/A 09/05/2014    Procedure: SIGMOIDOSCOPY FLEXIBLE;  Surgeon: Margaret Gamble MD;  [facility]    SIGMOIDOSCOPY FLEXIBLE N/A 09/23/2014    Procedure: SIGMOIDOSCOPY FLEXIBLE;  Surgeon: Margaret Gamble MD;  [facility]    SIGMOIDOSCOPY FLEXIBLE N/A 09/26/2014    Procedure: SIGMOIDOSCOPY  FLEXIBLE;  Surgeon: Margaret Gamble MD;  [facility]    SIGMOIDOSCOPY FLEXIBLE N/A 09/30/2014    Procedure: SIGMOIDOSCOPY FLEXIBLE;  Surgeon: Margaret Gamble MD;  [facility]    SIGMOIDOSCOPY FLEXIBLE N/A 10/03/2014    Procedure: SIGMOIDOSCOPY FLEXIBLE;  Surgeon: Margaret Gamble MD;  [facility]    SIGMOIDOSCOPY FLEXIBLE N/A 10/30/2014    Procedure: SIGMOIDOSCOPY FLEXIBLE;  Surgeon: Margaret Gamble MD;  [facility]    SIGMOIDOSCOPY FLEXIBLE, PLACEMENT OF DRAINAGE SPONGE  09/30/2014    TAKEDOWN ILEOSTOMY N/A 01/06/2015    Procedure: TAKEDOWN ILEOSTOMY;  Surgeon: Margaret Gamble MD;  [facility]      Allergies   Allergen Reactions    Nkda [No Known Drug Allergy]       Social History     Tobacco Use    Smoking status: Former     Types: Cigars    Smokeless tobacco: Never   Substance Use Topics    Alcohol use: Not Currently      Wt Readings from Last 1 Encounters:   08/09/24 88.5 kg (195 lb)        Anesthesia Evaluation            ROS/MED HX  ENT/Pulmonary:  - neg pulmonary ROS     Neurologic:  - neg neurologic ROS     Cardiovascular: Comment: Echo September 2021 showed EF 60%, normal RV, no valve disease.  Nuclear stress showed small area of ischemia of the apical segment.     Angiogram November 2021 showed 75% mid LAD, moderate distal LAD, mild to moderate disease of the circumflex, 100% proximal to mid RCA occlusion with right to right collaterals, also left to right collaterals.  Medical management recommended.     Holter 11/2022 showed sinus, 7% PVC's.    (+) Dyslipidemia hypertension- Peripheral Vascular Disease-   -  - -                                      METS/Exercise Tolerance:  Comment: Able to walk few blocks w/o angina    Hematologic:     (+) History of blood clots,               Musculoskeletal:       GI/Hepatic:       Renal/Genitourinary:     (+) renal disease,       BPH,      Endo:     (+)  type II DM,   Using insulin,                  Psychiatric/Substance Use:  - neg psychiatric ROS     Infectious Disease:       Malignancy:       Other:  - neg other ROS             OUTSIDE LABS:  CBC:   Lab Results   Component Value Date    WBC 6.2 10/11/2022    WBC 6.2 11/22/2021    HGB 12.0 (L) 08/08/2024    HGB 11.8 (L) 07/31/2023    HCT 33.8 (L) 10/11/2022    HCT 37.8 (L) 11/22/2021     10/11/2022     11/22/2021     BMP:   Lab Results   Component Value Date     08/08/2024     02/13/2024    POTASSIUM 4.7 08/08/2024    POTASSIUM 4.3 02/13/2024    CHLORIDE 103 08/08/2024    CHLORIDE 107 02/13/2024    CO2 25 08/08/2024    CO2 25 02/13/2024    BUN 34.2 (H) 08/08/2024    BUN 26.4 (H) 02/13/2024    CR 1.51 (H) 08/08/2024    CR 1.27 (H) 02/13/2024    GLC 88 08/08/2024     (H) 02/13/2024     COAGS:   Lab Results   Component Value Date    PTT 35 11/22/2021    INR 1.03 11/22/2021     POC:   Lab Results   Component Value Date     (H) 06/04/2019     HEPATIC:   Lab Results   Component Value Date    ALBUMIN 3.9 02/13/2024    PROTTOTAL 6.6 02/13/2024    ALT 20 02/13/2024    AST 18 02/13/2024    ALKPHOS 109 02/13/2024    BILITOTAL 0.5 02/13/2024     OTHER:   Lab Results   Component Value Date    LACT 1.9 04/27/2014    A1C 7.3 (H) 08/08/2024    PILY 9.8 08/08/2024    PHOS 3.7 05/05/2014    MAG 1.5 (L) 05/05/2014    LIPASE 319 (H) 01/15/2014    TSH 1.81 08/17/2021    SED 11 09/10/2012       Anesthesia Plan    ASA Status:  3       Anesthesia Type: General.     - Airway: LMA   Induction: Intravenous, Propofol.   Maintenance: Balanced.   Techniques and Equipment:     - Lines/Monitors: BIS     Consents            Postoperative Care    Pain management: IV analgesics, Oral pain medications, Multi-modal analgesia.   PONV prophylaxis: Ondansetron (or other 5HT-3), Dexamethasone or Solumedrol     Comments:               Tevin Almonte MD    I have reviewed the pertinent notes and labs in the chart from the past 30 days and (re)examined the  "patient.  Any updates or changes from those notes are reflected in this note.              # DMII: A1C = 7.3 % (Ref range: 0.0 - 5.6 %) within past 6 months  # Overweight: Estimated body mass index is 29.66 kg/m  as calculated from the following:    Height as of 8/8/24: 1.727 m (5' 7.99\").    Weight as of 8/9/24: 88.5 kg (195 lb).      "

## 2024-08-22 ENCOUNTER — HOSPITAL ENCOUNTER (OUTPATIENT)
Facility: CLINIC | Age: 84
Discharge: HOME | End: 2024-08-23
Attending: STUDENT IN AN ORGANIZED HEALTH CARE EDUCATION/TRAINING PROGRAM | Admitting: STUDENT IN AN ORGANIZED HEALTH CARE EDUCATION/TRAINING PROGRAM
Payer: COMMERCIAL

## 2024-08-22 ENCOUNTER — ANESTHESIA (OUTPATIENT)
Dept: SURGERY | Facility: CLINIC | Age: 84
End: 2024-08-22
Payer: COMMERCIAL

## 2024-08-22 DIAGNOSIS — N40.1 BENIGN PROSTATIC HYPERPLASIA WITH URINARY RETENTION: Primary | ICD-10-CM

## 2024-08-22 DIAGNOSIS — R33.9 URINARY RETENTION: ICD-10-CM

## 2024-08-22 DIAGNOSIS — S31.809A OPEN WOUND OF BUTTOCK, UNSPECIFIED LATERALITY, INITIAL ENCOUNTER: ICD-10-CM

## 2024-08-22 DIAGNOSIS — R33.8 BENIGN PROSTATIC HYPERPLASIA WITH URINARY RETENTION: Primary | ICD-10-CM

## 2024-08-22 PROBLEM — N40.0 BPH (BENIGN PROSTATIC HYPERPLASIA): Status: ACTIVE | Noted: 2024-08-22

## 2024-08-22 LAB
ABO/RH(D): NORMAL
ANTIBODY SCREEN: NEGATIVE
GLUCOSE BLDC GLUCOMTR-MCNC: 134 MG/DL (ref 70–99)
GLUCOSE BLDC GLUCOMTR-MCNC: 167 MG/DL (ref 70–99)
GLUCOSE BLDC GLUCOMTR-MCNC: 226 MG/DL (ref 70–99)
GLUCOSE BLDC GLUCOMTR-MCNC: 305 MG/DL (ref 70–99)
SPECIMEN EXPIRATION DATE: NORMAL

## 2024-08-22 PROCEDURE — 250N000013 HC RX MED GY IP 250 OP 250 PS 637

## 2024-08-22 PROCEDURE — 250N000011 HC RX IP 250 OP 636

## 2024-08-22 PROCEDURE — 999N000141 HC STATISTIC PRE-PROCEDURE NURSING ASSESSMENT: Performed by: STUDENT IN AN ORGANIZED HEALTH CARE EDUCATION/TRAINING PROGRAM

## 2024-08-22 PROCEDURE — 360N000077 HC SURGERY LEVEL 4, PER MIN: Performed by: STUDENT IN AN ORGANIZED HEALTH CARE EDUCATION/TRAINING PROGRAM

## 2024-08-22 PROCEDURE — 82962 GLUCOSE BLOOD TEST: CPT

## 2024-08-22 PROCEDURE — 52649 PROSTATE LASER ENUCLEATION: CPT | Performed by: STUDENT IN AN ORGANIZED HEALTH CARE EDUCATION/TRAINING PROGRAM

## 2024-08-22 PROCEDURE — 52649 PROSTATE LASER ENUCLEATION: CPT | Performed by: ANESTHESIOLOGY

## 2024-08-22 PROCEDURE — C1758 CATHETER, URETERAL: HCPCS | Performed by: STUDENT IN AN ORGANIZED HEALTH CARE EDUCATION/TRAINING PROGRAM

## 2024-08-22 PROCEDURE — 258N000001 HC RX 258

## 2024-08-22 PROCEDURE — 86900 BLOOD TYPING SEROLOGIC ABO: CPT | Performed by: STUDENT IN AN ORGANIZED HEALTH CARE EDUCATION/TRAINING PROGRAM

## 2024-08-22 PROCEDURE — 250N000009 HC RX 250

## 2024-08-22 PROCEDURE — 250N000011 HC RX IP 250 OP 636: Performed by: CHIROPRACTOR

## 2024-08-22 PROCEDURE — 258N000003 HC RX IP 258 OP 636: Performed by: CHIROPRACTOR

## 2024-08-22 PROCEDURE — 250N000011 HC RX IP 250 OP 636: Performed by: STUDENT IN AN ORGANIZED HEALTH CARE EDUCATION/TRAINING PROGRAM

## 2024-08-22 PROCEDURE — 250N000012 HC RX MED GY IP 250 OP 636 PS 637

## 2024-08-22 PROCEDURE — 36415 COLL VENOUS BLD VENIPUNCTURE: CPT | Performed by: STUDENT IN AN ORGANIZED HEALTH CARE EDUCATION/TRAINING PROGRAM

## 2024-08-22 PROCEDURE — 272N000001 HC OR GENERAL SUPPLY STERILE: Performed by: STUDENT IN AN ORGANIZED HEALTH CARE EDUCATION/TRAINING PROGRAM

## 2024-08-22 PROCEDURE — 710N000010 HC RECOVERY PHASE 1, LEVEL 2, PER MIN: Performed by: STUDENT IN AN ORGANIZED HEALTH CARE EDUCATION/TRAINING PROGRAM

## 2024-08-22 PROCEDURE — 370N000017 HC ANESTHESIA TECHNICAL FEE, PER MIN: Performed by: STUDENT IN AN ORGANIZED HEALTH CARE EDUCATION/TRAINING PROGRAM

## 2024-08-22 PROCEDURE — 88305 TISSUE EXAM BY PATHOLOGIST: CPT | Mod: 26 | Performed by: PATHOLOGY

## 2024-08-22 PROCEDURE — 250N000013 HC RX MED GY IP 250 OP 250 PS 637: Performed by: STUDENT IN AN ORGANIZED HEALTH CARE EDUCATION/TRAINING PROGRAM

## 2024-08-22 PROCEDURE — 88305 TISSUE EXAM BY PATHOLOGIST: CPT | Mod: TC | Performed by: STUDENT IN AN ORGANIZED HEALTH CARE EDUCATION/TRAINING PROGRAM

## 2024-08-22 PROCEDURE — 99100 ANES PT EXTEME AGE<1 YR&>70: CPT | Performed by: ANESTHESIOLOGY

## 2024-08-22 PROCEDURE — 250N000009 HC RX 250: Performed by: CHIROPRACTOR

## 2024-08-22 PROCEDURE — 250N000025 HC SEVOFLURANE, PER MIN: Performed by: STUDENT IN AN ORGANIZED HEALTH CARE EDUCATION/TRAINING PROGRAM

## 2024-08-22 RX ORDER — AMLODIPINE BESYLATE 10 MG/1
10 TABLET ORAL DAILY
Status: DISCONTINUED | OUTPATIENT
Start: 2024-08-23 | End: 2024-08-23 | Stop reason: HOSPADM

## 2024-08-22 RX ORDER — ACETAMINOPHEN 325 MG/1
975 TABLET ORAL ONCE
Status: COMPLETED | OUTPATIENT
Start: 2024-08-22 | End: 2024-08-22

## 2024-08-22 RX ORDER — ONDANSETRON 2 MG/ML
INJECTION INTRAMUSCULAR; INTRAVENOUS PRN
Status: DISCONTINUED | OUTPATIENT
Start: 2024-08-22 | End: 2024-08-22

## 2024-08-22 RX ORDER — ATORVASTATIN CALCIUM 20 MG/1
20 TABLET, FILM COATED ORAL DAILY
Status: DISCONTINUED | OUTPATIENT
Start: 2024-08-23 | End: 2024-08-23 | Stop reason: HOSPADM

## 2024-08-22 RX ORDER — OXYCODONE HYDROCHLORIDE 5 MG/1
5 TABLET ORAL EVERY 4 HOURS PRN
Status: DISCONTINUED | OUTPATIENT
Start: 2024-08-22 | End: 2024-08-23 | Stop reason: HOSPADM

## 2024-08-22 RX ORDER — ONDANSETRON 2 MG/ML
4 INJECTION INTRAMUSCULAR; INTRAVENOUS EVERY 6 HOURS PRN
Status: DISCONTINUED | OUTPATIENT
Start: 2024-08-22 | End: 2024-08-23 | Stop reason: HOSPADM

## 2024-08-22 RX ORDER — OXYCODONE HYDROCHLORIDE 10 MG/1
10 TABLET ORAL
Status: DISCONTINUED | OUTPATIENT
Start: 2024-08-22 | End: 2024-08-22 | Stop reason: HOSPADM

## 2024-08-22 RX ORDER — ACETAMINOPHEN 325 MG/1
975 TABLET ORAL EVERY 8 HOURS
Status: DISCONTINUED | OUTPATIENT
Start: 2024-08-22 | End: 2024-08-23 | Stop reason: HOSPADM

## 2024-08-22 RX ORDER — ACETAMINOPHEN 325 MG/1
650 TABLET ORAL EVERY 4 HOURS PRN
Status: DISCONTINUED | OUTPATIENT
Start: 2024-08-25 | End: 2024-08-23 | Stop reason: HOSPADM

## 2024-08-22 RX ORDER — NICOTINE POLACRILEX 4 MG
15-30 LOZENGE BUCCAL
Status: DISCONTINUED | OUTPATIENT
Start: 2024-08-22 | End: 2024-08-23 | Stop reason: HOSPADM

## 2024-08-22 RX ORDER — SODIUM CHLORIDE, SODIUM LACTATE, POTASSIUM CHLORIDE, CALCIUM CHLORIDE 600; 310; 30; 20 MG/100ML; MG/100ML; MG/100ML; MG/100ML
INJECTION, SOLUTION INTRAVENOUS CONTINUOUS
Status: DISCONTINUED | OUTPATIENT
Start: 2024-08-22 | End: 2024-08-22 | Stop reason: HOSPADM

## 2024-08-22 RX ORDER — NALOXONE HYDROCHLORIDE 0.4 MG/ML
0.2 INJECTION, SOLUTION INTRAMUSCULAR; INTRAVENOUS; SUBCUTANEOUS
Status: DISCONTINUED | OUTPATIENT
Start: 2024-08-22 | End: 2024-08-23 | Stop reason: HOSPADM

## 2024-08-22 RX ORDER — HYDROMORPHONE HYDROCHLORIDE 1 MG/ML
0.2 INJECTION, SOLUTION INTRAMUSCULAR; INTRAVENOUS; SUBCUTANEOUS EVERY 5 MIN PRN
Status: DISCONTINUED | OUTPATIENT
Start: 2024-08-22 | End: 2024-08-22 | Stop reason: HOSPADM

## 2024-08-22 RX ORDER — NALOXONE HYDROCHLORIDE 0.4 MG/ML
0.4 INJECTION, SOLUTION INTRAMUSCULAR; INTRAVENOUS; SUBCUTANEOUS
Status: DISCONTINUED | OUTPATIENT
Start: 2024-08-22 | End: 2024-08-23 | Stop reason: HOSPADM

## 2024-08-22 RX ORDER — LIDOCAINE 40 MG/G
CREAM TOPICAL
Status: DISCONTINUED | OUTPATIENT
Start: 2024-08-22 | End: 2024-08-23 | Stop reason: HOSPADM

## 2024-08-22 RX ORDER — HYDROMORPHONE HYDROCHLORIDE 1 MG/ML
0.4 INJECTION, SOLUTION INTRAMUSCULAR; INTRAVENOUS; SUBCUTANEOUS EVERY 5 MIN PRN
Status: DISCONTINUED | OUTPATIENT
Start: 2024-08-22 | End: 2024-08-22 | Stop reason: HOSPADM

## 2024-08-22 RX ORDER — ONDANSETRON 2 MG/ML
4 INJECTION INTRAMUSCULAR; INTRAVENOUS EVERY 30 MIN PRN
Status: DISCONTINUED | OUTPATIENT
Start: 2024-08-22 | End: 2024-08-22 | Stop reason: HOSPADM

## 2024-08-22 RX ORDER — FENTANYL CITRATE 50 UG/ML
25 INJECTION, SOLUTION INTRAMUSCULAR; INTRAVENOUS EVERY 5 MIN PRN
Status: DISCONTINUED | OUTPATIENT
Start: 2024-08-22 | End: 2024-08-22 | Stop reason: HOSPADM

## 2024-08-22 RX ORDER — PROPOFOL 10 MG/ML
INJECTION, EMULSION INTRAVENOUS CONTINUOUS PRN
Status: DISCONTINUED | OUTPATIENT
Start: 2024-08-22 | End: 2024-08-22

## 2024-08-22 RX ORDER — FAMOTIDINE 20 MG/1
20 TABLET, FILM COATED ORAL 2 TIMES DAILY
Status: DISCONTINUED | OUTPATIENT
Start: 2024-08-22 | End: 2024-08-22

## 2024-08-22 RX ORDER — DEXAMETHASONE SODIUM PHOSPHATE 4 MG/ML
4 INJECTION, SOLUTION INTRA-ARTICULAR; INTRALESIONAL; INTRAMUSCULAR; INTRAVENOUS; SOFT TISSUE
Status: DISCONTINUED | OUTPATIENT
Start: 2024-08-22 | End: 2024-08-22 | Stop reason: HOSPADM

## 2024-08-22 RX ORDER — CALCIUM CARBONATE 500 MG/1
500 TABLET, CHEWABLE ORAL 4 TIMES DAILY PRN
Status: DISCONTINUED | OUTPATIENT
Start: 2024-08-22 | End: 2024-08-23 | Stop reason: HOSPADM

## 2024-08-22 RX ORDER — FENTANYL CITRATE 50 UG/ML
50 INJECTION, SOLUTION INTRAMUSCULAR; INTRAVENOUS EVERY 5 MIN PRN
Status: DISCONTINUED | OUTPATIENT
Start: 2024-08-22 | End: 2024-08-22 | Stop reason: HOSPADM

## 2024-08-22 RX ORDER — FLUCONAZOLE 2 MG/ML
200 INJECTION, SOLUTION INTRAVENOUS
Status: COMPLETED | OUTPATIENT
Start: 2024-08-22 | End: 2024-08-22

## 2024-08-22 RX ORDER — OXYCODONE HYDROCHLORIDE 5 MG/1
5 TABLET ORAL
Status: DISCONTINUED | OUTPATIENT
Start: 2024-08-22 | End: 2024-08-22 | Stop reason: HOSPADM

## 2024-08-22 RX ORDER — HYDROMORPHONE HYDROCHLORIDE 1 MG/ML
0.1 INJECTION, SOLUTION INTRAMUSCULAR; INTRAVENOUS; SUBCUTANEOUS
Status: DISCONTINUED | OUTPATIENT
Start: 2024-08-22 | End: 2024-08-23 | Stop reason: HOSPADM

## 2024-08-22 RX ORDER — HYDROMORPHONE HYDROCHLORIDE 1 MG/ML
0.2 INJECTION, SOLUTION INTRAMUSCULAR; INTRAVENOUS; SUBCUTANEOUS
Status: DISCONTINUED | OUTPATIENT
Start: 2024-08-22 | End: 2024-08-23 | Stop reason: HOSPADM

## 2024-08-22 RX ORDER — LABETALOL HYDROCHLORIDE 5 MG/ML
10 INJECTION, SOLUTION INTRAVENOUS
Status: DISCONTINUED | OUTPATIENT
Start: 2024-08-22 | End: 2024-08-22 | Stop reason: HOSPADM

## 2024-08-22 RX ORDER — NALOXONE HYDROCHLORIDE 0.4 MG/ML
0.1 INJECTION, SOLUTION INTRAMUSCULAR; INTRAVENOUS; SUBCUTANEOUS
Status: DISCONTINUED | OUTPATIENT
Start: 2024-08-22 | End: 2024-08-22 | Stop reason: HOSPADM

## 2024-08-22 RX ORDER — ONDANSETRON 4 MG/1
4 TABLET, ORALLY DISINTEGRATING ORAL EVERY 6 HOURS PRN
Status: DISCONTINUED | OUTPATIENT
Start: 2024-08-22 | End: 2024-08-23 | Stop reason: HOSPADM

## 2024-08-22 RX ORDER — FLUCONAZOLE 200 MG/1
200 TABLET ORAL DAILY
Status: DISCONTINUED | OUTPATIENT
Start: 2024-08-22 | End: 2024-08-23 | Stop reason: HOSPADM

## 2024-08-22 RX ORDER — LIDOCAINE 40 MG/G
CREAM TOPICAL
Status: DISCONTINUED | OUTPATIENT
Start: 2024-08-22 | End: 2024-08-22 | Stop reason: HOSPADM

## 2024-08-22 RX ORDER — ACETAMINOPHEN 650 MG/1
650 SUPPOSITORY RECTAL ONCE
Status: COMPLETED | OUTPATIENT
Start: 2024-08-22 | End: 2024-08-22

## 2024-08-22 RX ORDER — ACETAMINOPHEN 325 MG/1
975 TABLET ORAL ONCE
Status: DISCONTINUED | OUTPATIENT
Start: 2024-08-22 | End: 2024-08-22

## 2024-08-22 RX ORDER — FAMOTIDINE 20 MG/1
20 TABLET, FILM COATED ORAL 2 TIMES DAILY
Status: DISCONTINUED | OUTPATIENT
Start: 2024-08-22 | End: 2024-08-23

## 2024-08-22 RX ORDER — DEXTROSE MONOHYDRATE 25 G/50ML
25-50 INJECTION, SOLUTION INTRAVENOUS
Status: DISCONTINUED | OUTPATIENT
Start: 2024-08-22 | End: 2024-08-23 | Stop reason: HOSPADM

## 2024-08-22 RX ORDER — PIPERACILLIN SODIUM, TAZOBACTAM SODIUM 3; .375 G/15ML; G/15ML
3.38 INJECTION, POWDER, LYOPHILIZED, FOR SOLUTION INTRAVENOUS EVERY 8 HOURS
Status: DISCONTINUED | OUTPATIENT
Start: 2024-08-22 | End: 2024-08-23

## 2024-08-22 RX ORDER — SODIUM CHLORIDE, SODIUM LACTATE, POTASSIUM CHLORIDE, CALCIUM CHLORIDE 600; 310; 30; 20 MG/100ML; MG/100ML; MG/100ML; MG/100ML
INJECTION, SOLUTION INTRAVENOUS CONTINUOUS PRN
Status: DISCONTINUED | OUTPATIENT
Start: 2024-08-22 | End: 2024-08-22

## 2024-08-22 RX ORDER — BISACODYL 10 MG
10 SUPPOSITORY, RECTAL RECTAL DAILY PRN
Status: DISCONTINUED | OUTPATIENT
Start: 2024-08-25 | End: 2024-08-23 | Stop reason: HOSPADM

## 2024-08-22 RX ORDER — METOPROLOL SUCCINATE 100 MG/1
100 TABLET, EXTENDED RELEASE ORAL AT BEDTIME
Status: DISCONTINUED | OUTPATIENT
Start: 2024-08-22 | End: 2024-08-23 | Stop reason: HOSPADM

## 2024-08-22 RX ORDER — POLYETHYLENE GLYCOL 3350 17 G/17G
17 POWDER, FOR SOLUTION ORAL DAILY
Status: DISCONTINUED | OUTPATIENT
Start: 2024-08-23 | End: 2024-08-23 | Stop reason: HOSPADM

## 2024-08-22 RX ORDER — HYDROXYZINE HYDROCHLORIDE 10 MG/1
10 TABLET, FILM COATED ORAL EVERY 6 HOURS PRN
Status: DISCONTINUED | OUTPATIENT
Start: 2024-08-22 | End: 2024-08-23 | Stop reason: HOSPADM

## 2024-08-22 RX ORDER — DEXAMETHASONE SODIUM PHOSPHATE 4 MG/ML
INJECTION, SOLUTION INTRA-ARTICULAR; INTRALESIONAL; INTRAMUSCULAR; INTRAVENOUS; SOFT TISSUE PRN
Status: DISCONTINUED | OUTPATIENT
Start: 2024-08-22 | End: 2024-08-22

## 2024-08-22 RX ORDER — FENTANYL CITRATE 50 UG/ML
INJECTION, SOLUTION INTRAMUSCULAR; INTRAVENOUS PRN
Status: DISCONTINUED | OUTPATIENT
Start: 2024-08-22 | End: 2024-08-22

## 2024-08-22 RX ORDER — LIDOCAINE HYDROCHLORIDE 20 MG/ML
INJECTION, SOLUTION INFILTRATION; PERINEURAL PRN
Status: DISCONTINUED | OUTPATIENT
Start: 2024-08-22 | End: 2024-08-22

## 2024-08-22 RX ORDER — AMOXICILLIN 250 MG
1 CAPSULE ORAL 2 TIMES DAILY
Status: DISCONTINUED | OUTPATIENT
Start: 2024-08-22 | End: 2024-08-23 | Stop reason: HOSPADM

## 2024-08-22 RX ORDER — CALCIUM CARBONATE 500 MG/1
500 TABLET, CHEWABLE ORAL 4 TIMES DAILY PRN
Status: DISCONTINUED | OUTPATIENT
Start: 2024-08-22 | End: 2024-08-22

## 2024-08-22 RX ORDER — PROCHLORPERAZINE MALEATE 5 MG
5 TABLET ORAL EVERY 6 HOURS PRN
Status: DISCONTINUED | OUTPATIENT
Start: 2024-08-22 | End: 2024-08-23 | Stop reason: HOSPADM

## 2024-08-22 RX ORDER — ONDANSETRON 4 MG/1
4 TABLET, ORALLY DISINTEGRATING ORAL EVERY 30 MIN PRN
Status: DISCONTINUED | OUTPATIENT
Start: 2024-08-22 | End: 2024-08-22 | Stop reason: HOSPADM

## 2024-08-22 RX ORDER — PROPOFOL 10 MG/ML
INJECTION, EMULSION INTRAVENOUS PRN
Status: DISCONTINUED | OUTPATIENT
Start: 2024-08-22 | End: 2024-08-22

## 2024-08-22 RX ORDER — SODIUM CHLORIDE, SODIUM LACTATE, POTASSIUM CHLORIDE, CALCIUM CHLORIDE 600; 310; 30; 20 MG/100ML; MG/100ML; MG/100ML; MG/100ML
INJECTION, SOLUTION INTRAVENOUS CONTINUOUS
Status: DISCONTINUED | OUTPATIENT
Start: 2024-08-22 | End: 2024-08-23 | Stop reason: HOSPADM

## 2024-08-22 RX ADMIN — FENTANYL CITRATE 50 MCG: 50 INJECTION INTRAMUSCULAR; INTRAVENOUS at 14:49

## 2024-08-22 RX ADMIN — FLUCONAZOLE 200 MG: 2 INJECTION, SOLUTION INTRAVENOUS at 13:07

## 2024-08-22 RX ADMIN — SUGAMMADEX 200 MG: 100 INJECTION, SOLUTION INTRAVENOUS at 15:32

## 2024-08-22 RX ADMIN — SODIUM CHLORIDE 3000 ML: 900 IRRIGANT IRRIGATION at 23:04

## 2024-08-22 RX ADMIN — LIDOCAINE HYDROCHLORIDE 80 MG: 20 INJECTION, SOLUTION INFILTRATION; PERINEURAL at 13:09

## 2024-08-22 RX ADMIN — ACETAMINOPHEN 975 MG: 325 TABLET ORAL at 21:19

## 2024-08-22 RX ADMIN — PHENYLEPHRINE HYDROCHLORIDE 100 MCG: 10 INJECTION INTRAVENOUS at 13:41

## 2024-08-22 RX ADMIN — PROPOFOL 25 MCG/KG/MIN: 10 INJECTION, EMULSION INTRAVENOUS at 13:42

## 2024-08-22 RX ADMIN — SODIUM CHLORIDE, POTASSIUM CHLORIDE, SODIUM LACTATE AND CALCIUM CHLORIDE: 600; 310; 30; 20 INJECTION, SOLUTION INTRAVENOUS at 12:59

## 2024-08-22 RX ADMIN — PROPOFOL 20 MG: 10 INJECTION, EMULSION INTRAVENOUS at 13:12

## 2024-08-22 RX ADMIN — FENTANYL CITRATE 100 MCG: 50 INJECTION INTRAMUSCULAR; INTRAVENOUS at 13:08

## 2024-08-22 RX ADMIN — FENTANYL CITRATE 50 MCG: 50 INJECTION INTRAMUSCULAR; INTRAVENOUS at 15:03

## 2024-08-22 RX ADMIN — PHENYLEPHRINE HYDROCHLORIDE 100 MCG: 10 INJECTION INTRAVENOUS at 13:10

## 2024-08-22 RX ADMIN — PHENYLEPHRINE HYDROCHLORIDE 100 MCG: 10 INJECTION INTRAVENOUS at 14:01

## 2024-08-22 RX ADMIN — PROPOFOL 20 MG: 10 INJECTION, EMULSION INTRAVENOUS at 13:11

## 2024-08-22 RX ADMIN — DEXAMETHASONE SODIUM PHOSPHATE 4 MG: 4 INJECTION, SOLUTION INTRAMUSCULAR; INTRAVENOUS at 13:14

## 2024-08-22 RX ADMIN — INSULIN ASPART 3 UNITS: 100 INJECTION, SOLUTION INTRAVENOUS; SUBCUTANEOUS at 23:03

## 2024-08-22 RX ADMIN — PHENYLEPHRINE HYDROCHLORIDE 100 MCG: 10 INJECTION INTRAVENOUS at 14:16

## 2024-08-22 RX ADMIN — METOPROLOL SUCCINATE 100 MG: 100 TABLET, EXTENDED RELEASE ORAL at 21:22

## 2024-08-22 RX ADMIN — Medication 10 MG: at 14:32

## 2024-08-22 RX ADMIN — PHENYLEPHRINE HYDROCHLORIDE 100 MCG: 10 INJECTION INTRAVENOUS at 13:36

## 2024-08-22 RX ADMIN — PIPERACILLIN AND TAZOBACTAM 3.38 G: 3; .375 INJECTION, POWDER, LYOPHILIZED, FOR SOLUTION INTRAVENOUS at 21:20

## 2024-08-22 RX ADMIN — SENNOSIDES AND DOCUSATE SODIUM 1 TABLET: 50; 8.6 TABLET ORAL at 21:19

## 2024-08-22 RX ADMIN — FAMOTIDINE 20 MG: 20 TABLET ORAL at 21:19

## 2024-08-22 RX ADMIN — PHENYLEPHRINE HYDROCHLORIDE 100 MCG: 10 INJECTION INTRAVENOUS at 13:56

## 2024-08-22 RX ADMIN — PHENYLEPHRINE HYDROCHLORIDE 100 MCG: 10 INJECTION INTRAVENOUS at 13:31

## 2024-08-22 RX ADMIN — DEXMEDETOMIDINE HYDROCHLORIDE 8 MCG: 100 INJECTION, SOLUTION INTRAVENOUS at 14:44

## 2024-08-22 RX ADMIN — PIPERACILLIN AND TAZOBACTAM 3.38 G: 3; .375 INJECTION, POWDER, LYOPHILIZED, FOR SOLUTION INTRAVENOUS at 13:24

## 2024-08-22 RX ADMIN — ACETAMINOPHEN 975 MG: 325 TABLET ORAL at 10:54

## 2024-08-22 RX ADMIN — PHENYLEPHRINE HYDROCHLORIDE 0.2 MCG/KG/MIN: 10 INJECTION INTRAVENOUS at 14:25

## 2024-08-22 RX ADMIN — Medication 40 MG: at 13:12

## 2024-08-22 RX ADMIN — PROPOFOL 50 MG: 10 INJECTION, EMULSION INTRAVENOUS at 13:09

## 2024-08-22 RX ADMIN — PROPOFOL 25 MCG/KG/MIN: 10 INJECTION, EMULSION INTRAVENOUS at 13:41

## 2024-08-22 RX ADMIN — ONDANSETRON 4 MG: 2 INJECTION INTRAMUSCULAR; INTRAVENOUS at 13:25

## 2024-08-22 ASSESSMENT — ACTIVITIES OF DAILY LIVING (ADL)
ADLS_ACUITY_SCORE: 35
ADLS_ACUITY_SCORE: 29
ADLS_ACUITY_SCORE: 35
ADLS_ACUITY_SCORE: 29
ADLS_ACUITY_SCORE: 35
ADLS_ACUITY_SCORE: 35
ADLS_ACUITY_SCORE: 29
ADLS_ACUITY_SCORE: 29

## 2024-08-22 NOTE — ANESTHESIA PROCEDURE NOTES
Airway    Staff -        Resident/Fellow: Tevin Parkinson MD       Performed By: residentIndications and Patient Condition       Induction type:intravenous       Mask difficulty assessment: 1 - vent by mask    Final Airway Details       Final airway type: supraglottic airway    Supraglottic Airway Details        Type: LMA       Brand: Air-Q       LMA size: 4    Post intubation assessment        Placement verified by: capnometry and equal breath sounds        Number of attempts at approach: 1       Number of other approaches attempted: 0       Secured with: commercial tube mcdowell       Ease of procedure: easy       Dentition: Unchanged

## 2024-08-22 NOTE — OP NOTE
OPERATIVE REPORT    PREOPERATIVE DIAGNOSIS: Benign Prostatic Hyperplasia     POSTOPERATIVE DIAGNOSIS: Same    PROCEDURES PERFORMED:   Holmium Laser Enucleation of the Prostate (HoLEP)    STAFF SURGEON: Esha Montano was present and participatory for the entire case.   RESIDENT(S): Nikhil Nathan MD [email or web address]  FELLOW: Donis Raymond MD    ANESTHESIA: General  ESTIMATED BLOOD LOSS: 50 ml  COMPLICATIONS: None.   SPECIMEN: Prostate Tissue      SIGNIFICANT FINDINGS:   Enucleation time: 62 min  Morcellation time: 23 min  Tissue weight: 61 g    individual lobe technique   We removed just the median lobe and the left lobe.  Quite notably the planes for enucleation became quite challenging near the apex of the prostate for unclear reasons.  Both for the median lobe and the left lobe.  Given that the fossa was very open just with the median lobe and the minimal contribution to the lateral lobe and the difficulty in dissection.  I opted to stop after removing the middle lobe and the left lobe.    BRIEF OPERATIVE INDICATIONS: Storm Dutta is a 84 year old year old male with a history of chronic kidney disease, diabetes and peripheral vascular disease.  He presented for urinary retention on July 5.  He failed voiding trials on medical therapy.  I did a cystoscopy on him that showed a large intravesical median lobe with trilobar hypertrophy.  We opted to proceed with holmium laser nucleation of the prostate given the best chance to void.  His PSA was low at 0.20.  His prostate size was measured around 120 g. After a discussion of all risks, benefits, and alternatives, the patient elected to proceed with HoLEP.    DESCRIPTION OF PROCEDURE:  After informed consent was obtained, the patient was transported to the operating room & placed supine on the table. After adequate anesthesia was induced, the patient was placed in lithotomy and prepped and draped in the usual sterile fashion. A timeout was taken to confirm correct  patient, procedure and laterality. Pre-operative IV antibiotics were administered.     The urethra was injected with lubricant jelly and was calibrated with sounds from 22 fr to 30fr sequentially in 2 fr increments.  These passed easily, and then 26 fr sheath was placed with the obturator.  The bladder was unremarkable.  The ureteral orifices were orthotopic.  The prostate had Trilobar anatomy with outlet obstruction.    The dissection was started at the apex by demarcating a line circumferentially just proximal to the urinary sphincter.    Incisions were made just proximal to the verumontanum in an inverted U shape to demarcate the distal extent of the incision.     Median Lobe Release  We started at the bladder neck and made incision at the 5 clock position and took the incision to the verumontanum. This incision was deepened until we reached the capsule. The same was repeated at the 7 oclock position in the natural groove inbetween the median and lateral lobe. We then made incision in the mucosa inbetween the 5 and 7 oclock positions proximal to the verumontanum to find the enucleation plane.We then proceeded to take down the posterior attachments which allowed us to push the adenoma into the bladder. The bladder neck mucosa was then cut and then the median lobe adenoma was liberated into the bladder.      Removing the median lobe itself significantly improved the outlet and much of the obstruction was from the median lobe.  There were only minimal lateral lobes.  However given the retention I opted to then proceed with a left lateral lobe release    Left Lobe Release  Incisions were then made just lateral on the left of the verumontanum to find the transition zone enucleation plane. As the plane was developed, the previously incised mucosa was continually checked to ensure detachment. This plane was taken up to the 12 o' clock position. The left side was then further dissected, carrying the dissection from the  bladder neck down to the posterior side, and then working the tissue forward. We next identified the mucosal strip anteriorly and transected it with the laser and ensured the adenoma was completely free from the apex. We then proceeded to take down the remaining lateral and posterior attachments which allowed us to push the adenoma into the bladder. The bladder neck mucosa was then cut and then the left lateral lobe adenoma was liberated into the bladder.  Notably the tissue planes were not typical and quite and difficult to dissect.  Especially near the apex the planes were almost .nonexistent but appeared more towards the bladder neck.      Given the open fossa after removing just the median lobe and the lateral lobe and the fact that he only had a minimal right lobe and how challenging it was to dissect out the lobe I opted to stop.  Because I think he has an excellent chance of voiding with what we had done.    Hemostasis  The prostatic fossa was examined and meticulous hemostasis was achieved with the laser, with special attention to the bladder neck and apex.    Morcellation  The laserscope was removed and the extra long offset nephroscope was inserted and the [place] morcellator was introduced.  After attaching two inflows and confirming they were open and bladder was distended, morcellation was carried out at a rate of 1500 oscillations/min.  After the adenoma was morcellated, the fossa was inspected again and any areas of bleeding were lasered for hemostasis.    A 22 fr 3 way dominique catheter was inserted with 60 ml in the balloon.   Continuous bladder irrigation was started and patient was undraped.  They were awakened from anesthesia and transferred to the PACU.       POSTOP PLAN:  Patient will be admitted postoperatively and catheter will be removed on POD1.  Labs CBC/BMP in AM  Home Antibiotics Macrobid 100 bid  and fluconazole    We will plan to start aspirin 81 on postop day 1 and then switched to his  home full dose aspirin 1 week after surgery.    Esha Montano MD  Was present for entire case

## 2024-08-22 NOTE — INTERVAL H&P NOTE
"I have reviewed the surgical (or preoperative) H&P that is linked to this encounter, and examined the patient. There are no significant changes    We discussed the associated risks of this procedure included but not limited to the following:  -Bleeding, potentially significant enough to require clot evacuation and blood transfusion  -Infection, for which we will plan to treat preoperatively based on targeted antibiotic therapy  -Damage to the bladder, urethra and penis including the risk of urethral stricture and bladder neck contracture  -Risk of incidentally discovered prostate cancer.  We discussed that this would not preclude him from further therapy though it could prolong recovery and potentially increase risk of complications associated with cancer treatment.  Further preoperative workup to assess for prostate cancer prior to surgery was offered but patient deferred.  -Risk of retrograde ejaculation which would be expected to occur in the majority if not all men after HoLEP  -Risk of urinary incontinence.  We discussed that in the majority of men this is a transient process that is generally self limited to the first 6-12 weeks after surgery though could take longer to resolve depending on baseline bladder instability.  -Risk of postperative urinary retention though in published series HoLEP has been found to be associated with high success rates of achieving spontaneous voiding even in men with underactive and atonic bladders.            Clinical Conditions Present on Arrival:  Clinically Significant Risk Factors Present on Admission                 # Drug Induced Platelet Defect: home medication list includes an antiplatelet medication      # DMII: A1C = 7.3 % (Ref range: 0.0 - 5.6 %) within past 6 months  # Overweight: Estimated body mass index is 28.69 kg/m  as calculated from the following:    Height as of this encounter: 1.727 m (5' 8\").    Weight as of this encounter: 85.6 kg (188 lb 11.4 oz).       "

## 2024-08-22 NOTE — ANESTHESIA CARE TRANSFER NOTE
Patient: Storm Dutta    Procedure: Procedure(s):  Holmium Laser Enucleation of the Prostate       Diagnosis: Benign prostatic hyperplasia with incomplete bladder emptying [N40.1, R39.14]  Diagnosis Additional Information: No value filed.    Anesthesia Type:   General     Note:    Oropharynx: oropharynx clear of all foreign objects, oral airway in place and spontaneously breathing  Level of Consciousness: drowsy  Oxygen Supplementation: face mask  Level of Supplemental Oxygen (L/min / FiO2): 6  Independent Airway: airway patency satisfactory and stable  Dentition: dentition unchanged  Vital Signs Stable: post-procedure vital signs reviewed and stable  Report to RN Given: handoff report given  Patient transferred to: PACU    Handoff Report: Identifed the Patient, Identified the Reponsible Provider, Reviewed the pertinent medical history, Discussed the surgical course, Reviewed Intra-OP anesthesia mangement and issues during anesthesia, Set expectations for post-procedure period and Allowed opportunity for questions and acknowledgement of understanding      Vitals:  Vitals Value Taken Time   BP     Temp 36.9    Pulse 50 08/22/24 1555   Resp 13 08/22/24 1555   SpO2 99 % 08/22/24 1555   Vitals shown include unfiled device data.    Electronically Signed By: NASRA STEARNS CRNA  August 22, 2024  3:56 PM

## 2024-08-22 NOTE — OR NURSING
PACU to Inpatient Nursing Handoff    Patient Storm Dutta is a 84 year old male who speaks English.   Procedure Procedure(s):  Holmium Laser Enucleation of the Prostate   Surgeon(s) Primary: Esha Montano MD  Resident - Assisting: Nikhil Nathan MD     Allergies   Allergen Reactions    Nkda [No Known Drug Allergy]        Isolation  No active isolations     Past Medical History   has a past medical history of Acute urinary retention (11/2012), Acute urinary retention (11/01/2012), Basal cell carcinoma, Diabetes mellitus type II, Epididymitis (03/20/2014), Former smoker, Hypertension goal BP (blood pressure) < 140/90 (08/24/2012), Malignant melanoma (H), Melanoma in situ of neck (H) (08/17/2021), PE (pulmonary embolism) (03/20/2014), Rectal cancer (H), Skin cancer, Squamous cell carcinoma, and Thrombosis of leg.    Anesthesia Choice   Dermatome Level     Preop Meds acetaminophen (Tylenol) - time given: 975mg @ 1054   Nerve block Not applicable   Intraop Meds dexamethasone (Decadron)  dexmedetomidine (Precedex): 8 mcg total  fentanyl (Sublimaze): 200 mcg total  ondansetron (Zofran): last given at 4mg @ 1325  IV neosynephrine intra-op.   Local Meds No   Antibiotics piperacillin-tazobactam (Zosyn) - last given at 1324  Diflucan  - last given at 1307     Pain Patient Currently in Pain: denies   PACU meds  Not applicable   PCA / epidural No   Capnography     Telemetry ECG Rhythm: Sinus bradycardia   Inpatient Telemetry Monitor Ordered? No        Labs Glucose Lab Results   Component Value Date     08/22/2024     06/03/2022     01/22/2021       Hgb Lab Results   Component Value Date    HGB 12.0 08/08/2024    HGB 12.7 01/22/2021       INR Lab Results   Component Value Date    INR 1.03 11/22/2021    INR 1.01 04/26/2014      PACU Imaging Not applicable     Wound/Incision Incision/Surgical Site 06/04/19 Right Toe (Comment  which one) (Active)   Number of days: 1906      CMS        Equipment CBI  in progress.   Other LDA       IV Access Peripheral IV 08/22/24 Anterior;Right Wrist (Active)   Site Assessment WDL 08/22/24 1615   Line Status Infusing 08/22/24 1615   Dressing Transparent 08/22/24 1615   Dressing Status clean;dry;intact 08/22/24 1615   Dressing Intervention New dressing  08/22/24 1115   Line Intervention Tubing change 08/22/24 1115   Phlebitis Scale 0-->no symptoms 08/22/24 1615   Number of days: 0      Blood Products Not applicable EBL 50 mL   Intake/Output Date 08/22/24 0700 - 08/23/24 0659   Shift 5400-2285 4030-6194 4489-0802 24 Hour Total   INTAKE   I.V. 1000 200  1200   Shift Total(mL/kg) 1000(11.68) 200(2.34)  1200(14.02)   OUTPUT   Urine  -4550  -4550   Blood  50  50   Shift Total(mL/kg)  -4500(-52.57)  -4500(-52.57)   Weight (kg) 85.6 85.6 85.6 85.6      Drains / Ivey Urethral Catheter 08/22/24 Latex 22 fr (Active)   Tube Description UTV 08/22/24 1615   Collection Container Standard 08/22/24 1615   Securement Method Securing device (Describe) 08/22/24 1615   Rationale for Continued Use Surgical procedure 08/22/24 1615   Number of days: 0      Time of void PreOp Time of Void Prior to Procedure: 1012 (08/22/24 1010)    PostOp      Diapered? No   Bladder Scan     PO    Nothing by mouth at present.     Vitals    B/P: 123/61  T: 98.4  F (36.9  C)    Temp src: Oral  P:  Pulse: (!) 48 (08/22/24 1615)          R: 13  O2:  SpO2: 98 %    O2 Device: Nasal cannula (08/22/24 1615)    Oxygen Delivery: 2 LPM (08/22/24 1615)         Family/support present Wife/Harleen   Patient belongings     Patient transported on cart   DC meds/scripts (obs/outpt) Not applicable   Inpatient Pain Meds Released? Yes       Special needs/considerations DM type 2   Tasks needing completion Arlene Daniels, RN  SAUNDRA trivedi

## 2024-08-22 NOTE — ANESTHESIA POSTPROCEDURE EVALUATION
Patient: Storm Dutta    Procedure: Procedure(s):  Holmium Laser Enucleation of the Prostate       Anesthesia Type:  General    Note:  Disposition: Inpatient   Postop Pain Control: Uneventful            Sign Out: Well controlled pain   PONV: No   Neuro/Psych: Uneventful            Sign Out: Acceptable/Baseline neuro status   Airway/Respiratory: Uneventful            Sign Out: Acceptable/Baseline resp. status   CV/Hemodynamics: Uneventful            Sign Out: Acceptable CV status; No obvious hypovolemia; No obvious fluid overload   Other NRE: NONE   DID A NON-ROUTINE EVENT OCCUR? No    Event details/Postop Comments:  Seen at the bedside. Fully awake and alert.  Comfortable and in  NAD.  VSS.  Anticipate discharge to the floor soon.    Dr. Noni Barrera MD  Anesthesiology       Last vitals:  Vitals Value Taken Time   /53 08/22/24 1600   Temp 36.9  C (98.4  F) 08/22/24 1554   Pulse 48 08/22/24 1609   Resp 12 08/22/24 1609   SpO2 97 % 08/22/24 1609   Vitals shown include unfiled device data.    Electronically Signed By: Noni Barrera MD  August 22, 2024  4:10 PM

## 2024-08-23 VITALS
DIASTOLIC BLOOD PRESSURE: 52 MMHG | SYSTOLIC BLOOD PRESSURE: 103 MMHG | RESPIRATION RATE: 16 BRPM | HEIGHT: 68 IN | OXYGEN SATURATION: 94 % | HEART RATE: 53 BPM | BODY MASS INDEX: 28.6 KG/M2 | WEIGHT: 188.71 LBS | TEMPERATURE: 98.3 F

## 2024-08-23 LAB
ANION GAP SERPL CALCULATED.3IONS-SCNC: 9 MMOL/L (ref 7–15)
BUN SERPL-MCNC: 28.8 MG/DL (ref 8–23)
CALCIUM SERPL-MCNC: 8.9 MG/DL (ref 8.8–10.4)
CHLORIDE SERPL-SCNC: 102 MMOL/L (ref 98–107)
CREAT SERPL-MCNC: 1.28 MG/DL (ref 0.67–1.17)
EGFRCR SERPLBLD CKD-EPI 2021: 55 ML/MIN/1.73M2
ERYTHROCYTE [DISTWIDTH] IN BLOOD BY AUTOMATED COUNT: 12.7 % (ref 10–15)
GLUCOSE BLDC GLUCOMTR-MCNC: 272 MG/DL (ref 70–99)
GLUCOSE BLDC GLUCOMTR-MCNC: 298 MG/DL (ref 70–99)
GLUCOSE BLDC GLUCOMTR-MCNC: 349 MG/DL (ref 70–99)
GLUCOSE SERPL-MCNC: 278 MG/DL (ref 70–99)
HCO3 SERPL-SCNC: 25 MMOL/L (ref 22–29)
HCT VFR BLD AUTO: 34.9 % (ref 40–53)
HGB BLD-MCNC: 12 G/DL (ref 13.3–17.7)
MCH RBC QN AUTO: 30.7 PG (ref 26.5–33)
MCHC RBC AUTO-ENTMCNC: 34.4 G/DL (ref 31.5–36.5)
MCV RBC AUTO: 89 FL (ref 78–100)
PLATELET # BLD AUTO: 227 10E3/UL (ref 150–450)
POTASSIUM SERPL-SCNC: 4.9 MMOL/L (ref 3.4–5.3)
RBC # BLD AUTO: 3.91 10E6/UL (ref 4.4–5.9)
SODIUM SERPL-SCNC: 136 MMOL/L (ref 135–145)
WBC # BLD AUTO: 10.4 10E3/UL (ref 4–11)

## 2024-08-23 PROCEDURE — 258N000001 HC RX 258

## 2024-08-23 PROCEDURE — G0463 HOSPITAL OUTPT CLINIC VISIT: HCPCS

## 2024-08-23 PROCEDURE — 36415 COLL VENOUS BLD VENIPUNCTURE: CPT

## 2024-08-23 PROCEDURE — 250N000011 HC RX IP 250 OP 636

## 2024-08-23 PROCEDURE — 85027 COMPLETE CBC AUTOMATED: CPT

## 2024-08-23 PROCEDURE — 80048 BASIC METABOLIC PNL TOTAL CA: CPT

## 2024-08-23 PROCEDURE — 258N000003 HC RX IP 258 OP 636

## 2024-08-23 PROCEDURE — 82962 GLUCOSE BLOOD TEST: CPT

## 2024-08-23 PROCEDURE — 250N000013 HC RX MED GY IP 250 OP 250 PS 637

## 2024-08-23 RX ORDER — FLUCONAZOLE 200 MG/1
200 TABLET ORAL DAILY
Qty: 7 TABLET | Refills: 0 | Status: SHIPPED | OUTPATIENT
Start: 2024-08-23 | End: 2024-08-30

## 2024-08-23 RX ORDER — PIPERACILLIN SODIUM, TAZOBACTAM SODIUM 3; .375 G/15ML; G/15ML
3.38 INJECTION, POWDER, LYOPHILIZED, FOR SOLUTION INTRAVENOUS EVERY 6 HOURS
Status: DISCONTINUED | OUTPATIENT
Start: 2024-08-23 | End: 2024-08-23 | Stop reason: HOSPADM

## 2024-08-23 RX ORDER — ASPIRIN 81 MG/1
81 TABLET, CHEWABLE ORAL DAILY
Status: DISCONTINUED | OUTPATIENT
Start: 2024-08-23 | End: 2024-08-23 | Stop reason: HOSPADM

## 2024-08-23 RX ORDER — DEXTROSE MONOHYDRATE 25 G/50ML
25-50 INJECTION, SOLUTION INTRAVENOUS
Status: DISCONTINUED | OUTPATIENT
Start: 2024-08-23 | End: 2024-08-23

## 2024-08-23 RX ORDER — ASPIRIN 325 MG
325 TABLET ORAL DAILY
Status: SHIPPED
Start: 2024-08-29

## 2024-08-23 RX ORDER — ASPIRIN 81 MG/1
81 TABLET ORAL DAILY
Qty: 5 TABLET | Refills: 0 | Status: SHIPPED | OUTPATIENT
Start: 2024-08-23 | End: 2024-08-28

## 2024-08-23 RX ORDER — NICOTINE POLACRILEX 4 MG
15-30 LOZENGE BUCCAL
Status: DISCONTINUED | OUTPATIENT
Start: 2024-08-23 | End: 2024-08-23

## 2024-08-23 RX ORDER — FAMOTIDINE 20 MG/1
20 TABLET, FILM COATED ORAL DAILY
Status: DISCONTINUED | OUTPATIENT
Start: 2024-08-24 | End: 2024-08-23 | Stop reason: HOSPADM

## 2024-08-23 RX ORDER — NITROFURANTOIN 25; 75 MG/1; MG/1
100 CAPSULE ORAL 2 TIMES DAILY
Qty: 14 CAPSULE | Refills: 0 | Status: SHIPPED | OUTPATIENT
Start: 2024-08-23 | End: 2024-08-30

## 2024-08-23 RX ORDER — FUROSEMIDE 10 MG/ML
20 INJECTION INTRAMUSCULAR; INTRAVENOUS ONCE
Status: COMPLETED | OUTPATIENT
Start: 2024-08-23 | End: 2024-08-23

## 2024-08-23 RX ADMIN — ATORVASTATIN CALCIUM 20 MG: 20 TABLET, FILM COATED ORAL at 08:45

## 2024-08-23 RX ADMIN — POLYETHYLENE GLYCOL 3350 17 G: 17 POWDER, FOR SOLUTION ORAL at 08:47

## 2024-08-23 RX ADMIN — SODIUM CHLORIDE 3000 ML: 900 IRRIGANT IRRIGATION at 02:41

## 2024-08-23 RX ADMIN — PIPERACILLIN AND TAZOBACTAM 3.38 G: 3; .375 INJECTION, POWDER, LYOPHILIZED, FOR SOLUTION INTRAVENOUS at 02:48

## 2024-08-23 RX ADMIN — SODIUM CHLORIDE 1000 ML: 9 INJECTION, SOLUTION INTRAVENOUS at 08:52

## 2024-08-23 RX ADMIN — FUROSEMIDE 20 MG: 10 INJECTION, SOLUTION INTRAMUSCULAR; INTRAVENOUS at 08:45

## 2024-08-23 RX ADMIN — SODIUM CHLORIDE, POTASSIUM CHLORIDE, SODIUM LACTATE AND CALCIUM CHLORIDE: 600; 310; 30; 20 INJECTION, SOLUTION INTRAVENOUS at 02:48

## 2024-08-23 RX ADMIN — PIPERACILLIN AND TAZOBACTAM 3.38 G: 3; .375 INJECTION, POWDER, LYOPHILIZED, FOR SOLUTION INTRAVENOUS at 11:04

## 2024-08-23 RX ADMIN — ACETAMINOPHEN 975 MG: 325 TABLET ORAL at 11:04

## 2024-08-23 RX ADMIN — SODIUM CHLORIDE 3000 ML: 900 IRRIGANT IRRIGATION at 00:38

## 2024-08-23 RX ADMIN — SODIUM CHLORIDE 3000 ML: 900 IRRIGANT IRRIGATION at 05:18

## 2024-08-23 RX ADMIN — ACETAMINOPHEN 975 MG: 325 TABLET ORAL at 02:48

## 2024-08-23 RX ADMIN — FLUCONAZOLE 200 MG: 200 TABLET ORAL at 08:45

## 2024-08-23 RX ADMIN — AMLODIPINE BESYLATE 10 MG: 10 TABLET ORAL at 08:45

## 2024-08-23 RX ADMIN — ASPIRIN 81 MG CHEWABLE TABLET 81 MG: 81 TABLET CHEWABLE at 08:51

## 2024-08-23 RX ADMIN — SENNOSIDES AND DOCUSATE SODIUM 1 TABLET: 50; 8.6 TABLET ORAL at 08:45

## 2024-08-23 ASSESSMENT — ACTIVITIES OF DAILY LIVING (ADL)
ADLS_ACUITY_SCORE: 29

## 2024-08-23 NOTE — PHARMACY-ADMISSION MEDICATION HISTORY
Pharmacist Admission Medication History    Admission medication history is complete. The information provided in this note is only as accurate as the sources available at the time of the update.    Information Source(s): Patient via in-person    Pertinent Information:   - Patient states he did complete pre-op courses of antibiotics (nitrofurantoin 100 mg BID x 5 days) and antifungal (fluconazole 200 mg daily x 5 days)  - Lantus: usually takes 16 units at bedtime, but took only 12 units last night as instructed. Lispro: takes 8 units TIDAC + sliding scale     Changes made to PTA medication list:  Added: None  Deleted:   Tamsulosin 0.4 mg daily - no longer taking   Trospium 20 mg QPM - no longer taking   Changed:   Metoprolol daily -> evening     Allergies reviewed with patient and updates made in EHR: yes    Medication History Completed By: RAMEZ BECK RPH 8/22/2024 7:11 PM    PTA Med List   Medication Sig Last Dose    amLODIPine (NORVASC) 10 MG tablet Take 1 tablet (10 mg) by mouth daily 8/21/2024 at AM    aspirin (ASA) 325 MG tablet Take 1 tablet (325 mg) by mouth daily Past Week at AM    atorvastatin (LIPITOR) 20 MG tablet Take 1 tablet (20 mg) by mouth daily 8/21/2024 at AM    Cholecalciferol (VITAMIN D-3) 1000 units CAPS Take 1 capsule by mouth daily  8/21/2024 at AM    finasteride (PROSCAR) 5 MG tablet Take 1 tablet (5 mg) by mouth daily 8/21/2024 at AM    insulin glargine (LANTUS SOLOSTAR) 100 UNIT/ML pen INJECT 16 UNITS UNDER THE SKIN AT BEDTIME 8/21/2024 at PM    insulin lispro (HUMALOG KWIKPEN) 100 UNIT/ML (1 unit dial) KWIKPEN USE 8 UNITS  PLUS LOW SLIDING SCALE BEFORE EACH MEAL. MAX 50 UNITS PER DAY. 8/21/2024 at PM    losartan (COZAAR) 100 MG tablet Take 1 tablet (100 mg) by mouth daily 8/21/2024 at AM    metoprolol succinate ER (TOPROL XL) 100 MG 24 hr tablet Take 1 tablet (100 mg) by mouth daily (Patient taking differently: Take 100 mg by mouth every evening.) 8/21/2024 at PM

## 2024-08-23 NOTE — CONSULTS
"[facility] Nurse Inpatient Assessment     Consulted for: buttocks and heels    Summary: No wounds on heels. Easily blanchable erythema. Buttocks friction wound consistent with prolonged sitting in chair.     Patient History (according to provider note(s):      Storm Dutta is a 84 year old year old male who presented with urinary retention and enlarged prostate of 120 grams.  After a discussion of all risks, benefits, and alternatives, the patient elected to proceed with HoLEP.     Assessment:      Areas visualized during today's visit: Perineal area and Sacrum/coccyx    Bilateral heels assessed - intact and blanchable.      Wound location: Buttocks    Last photo: 8/23/24  Wound due to: Friction  Wound history/plan of care: Pt states he has spent increasing amount of time in recliner at home due to recent medical condition. Noted \"Sitter's Sign\": Bilateral skin erosion on the buttocks with diagonal or horizontal lines frequently seen in people who lit for long periods. Discussed avoiding sliding in chair and how to relieve pressure over time. He also expects to be more mobile after his surgery.   Pt's wife is using aquaphor at home which is appropriate.   Wound base: 50 % Dermis, 50 % Epidermis -peeling     Palpation of the wound bed: normal      Drainage: none     Description of drainage: none     Measurements (length x width x depth, in cm): See photo    Periwound skin: Intact      Color: normal and consistent with surrounding tissue      Temperature: normal   Odor: none  Pain: denies , none  Pain interventions prior to dressing change: slow and gentle cares   Treatment goal: Protection  STATUS: initial assessment  Supplies ordered: discussed with patient        Treatment Plan:     Buttocks: BID and as needed  Cleanse the area with Traci cleanse and protect, very gently with soft cloth.  Apply thin layer of Barrier paste (ex: Critic aid).  With repeat " application, do not scrub the paste, only remove soiled paste and reapply.  If complete removal of paste is necessary use baby oil/mineral oil (#[id number]) and soft wash cloth.  Ensure pt has chair cushion (#[id number]) while sitting up in the chair.  Use only one blue pad in between mattress and pt. No brief in bed.      Orders: Written    RECOMMEND PRIMARY TEAM ORDER: None, at this time  Education provided: importance of repositioning, plan of care, and wound progress  Discussed plan of care with: Patient and Nurse  WO nurse follow-up plan: weekly  Notify WOC if wound(s) deteriorate.  Nursing to notify the Provider(s) and re-consult the WO Nurse if new skin concern.    DATA:     Current support surface: Standard  Standard Isoflex gel  Containment of urine/stool: Incontinence Protocol  BMI: Body mass index is 28.69 kg/m .   Active diet order: Orders Placed This Encounter      Advance Diet as Tolerated: Regular Diet Adult      Diet     Output: I/O last 3 completed shifts:  In: 1300 [P.O.:100; I.V.:1200]  Out: 7000 [Urine:6950; Blood:50]     Labs:   Recent Labs   Lab 08/23/24  0817   HGB 12.0*   WBC 10.4     Pressure injury risk assessment:   Sensory Perception: 4-->no impairment  Moisture: 4-->rarely moist  Activity: 3-->walks occasionally  Mobility: 4-->no limitation  Nutrition: 3-->adequate  Friction and Shear: 3-->no apparent problem  Benjamin Score: 21    Radha Shelton RN CWOCN  Pager no longer is use, please contact through [facility] group: [facility] Nurse [facility]  Dept. Office Number: [phone]

## 2024-08-23 NOTE — PROGRESS NOTES
"Urology  Progress Note    24 hour events/Subjective:     - No acute events overnight   - Tolerating CBI   - Pain well managed   - Tolerating diet, no n/v    Exam  BP (!) 117/99 (BP Location: Right arm)   Pulse 50   Temp 98.1  F (36.7  C) (Oral)   Resp 16   Ht 1.727 m (5' 8\")   Wt 85.6 kg (188 lb 11.4 oz)   SpO2 100%   BMI 28.69 kg/m    No acute distress  Unlabored breathing    Abdomen soft, nontender, nondistended.    CBI clamped with clear urine    I/O last 3 completed shifts:  In: 1300 [P.O.:100; I.V.:1200]  Out: 7000 [Urine:6950; Blood:50]     Labs    Recent Labs   Lab Test 08/23/24  0817 08/08/24  1046 02/13/24  1047 07/31/23  0822 02/24/23  0947 10/11/22  1256 06/03/22  0827 11/22/21  0654   WBC 10.4  --   --   --   --  6.2  --  6.2   HGB 12.0* 12.0*  --  11.8*  --  11.0*  --  12.4*   CR  --  1.51* 1.27* 1.34*   < > 1.18*   < > 1.29*    < > = values in this interval not displayed.                 Assessment/Plan  84 year old y/o male with    1 Day Post-Op after HOLEP    Note - plan changes for today are in bold below.    Neuro: tylenol, prn oxy, dilaudid   CV: HD appropriate, baby asa today, full dose next thursday  Pulm: incentive spirometry while awake  FEN/GI: regular diet, wean mIVF as tolerate  Endo: Relatively euglycemic  :  TOV this AM, check PVRs after each void  Heme/ID: HD stable, transfuse prn, watch for s/s of infection  Activity: Up as tolerated  Ambulate at least TID    Dispo: anticipate discharge to home.       Will discuss with staff surgeon.    Nikhil Nathan MD, PGY-3  Urology Resident      PTA medications:   Prior to Admission medications    Medication Sig Start Date End Date Taking? Authorizing Provider   amLODIPine (NORVASC) 10 MG tablet Take 1 tablet (10 mg) by mouth daily 3/5/24  Yes Jovana Beaulieu DO   aspirin (ASA) 325 MG tablet Take 1 tablet (325 mg) by mouth daily 8/8/24  Yes Adriel Tidwell MD   atorvastatin (LIPITOR) 20 MG tablet Take 1 tablet (20 mg) by mouth daily " "7/31/23  Yes Jovana Beaulieu DO   Cholecalciferol (VITAMIN D-3) 1000 units CAPS Take 1 capsule by mouth daily    Yes Reported, Patient   finasteride (PROSCAR) 5 MG tablet Take 1 tablet (5 mg) by mouth daily 9/1/23  Yes Ana Gray PA-C   insulin glargine (LANTUS SOLOSTAR) 100 UNIT/ML pen INJECT 16 UNITS UNDER THE SKIN AT BEDTIME 3/5/24  Yes Jovana Beaulieu DO   insulin lispro (HUMALOG KWIKPEN) 100 UNIT/ML (1 unit dial) KWIKPEN USE 8 UNITS  PLUS LOW SLIDING SCALE BEFORE EACH MEAL. MAX 50 UNITS PER DAY. 3/5/24  Yes Jovana Beaulieu DO   losartan (COZAAR) 100 MG tablet Take 1 tablet (100 mg) by mouth daily 3/5/24  Yes Jovana Beaulieu DO   metoprolol succinate ER (TOPROL XL) 100 MG 24 hr tablet Take 1 tablet (100 mg) by mouth daily  Patient taking differently: Take 100 mg by mouth every evening. 8/29/23  Yes Rosio Fierro APRN CNP   insulin pen needle (PENTIPS) 31G X 6 MM miscellaneous USE 4 TO 5 TIMES DAILY OR AS DIRECTED FOR SLIDING SCALE INSULIN 3/29/24   Jovana Beaulieu DO          Contacting the urology team: Please see [facility] and page on-call clinician with any questions or concerns regarding this patient. Note writer may be unavailable.     To access [facility] from intranet: under \"Applications\" --> \"Business Applications\" select \"[employer]\" and search \"Urology Adult & Pediatric/[facility].\" Please note that any question about a urology inpatient, [place] or [place], should go to job code [id number].      "

## 2024-08-23 NOTE — PLAN OF CARE
"Goal Outcome Evaluation:  Vitally stable, /63 (BP Location: Left arm, Patient Position: Semi-Hilliard's, Cuff Size: Adult Regular)   Pulse 57   Temp 97.2  F (36.2  C) (Oral)   Resp 16   Ht 1.727 m (5' 8\")   Wt 85.6 kg (188 lb 11.4 oz)   SpO2 94%   BMI 28.69 kg/m        Alert and oriented X4.    Not out of bed.    Urology informed about elevated blood sugar.    On CBI. See output in flowsheet. CBI clapped at 5 am per urology orders, irrigation tray, chucks and urinal in the room.     Continue plan of care.                       "

## 2024-08-23 NOTE — CARE PLAN
"Admission           -----------------------------------------------------------  Reason for admission: Prostate Surgery  Primary team notified of pt arrival.  Admitted from: PACU  Via: stretcher  Accompanied by: None  Belongings: Placed by window  Admission Profile: Complete.   Teaching:Educated Pt on fall prevention/safety and infection pt critical.   Access: Rt. PIV Running LR at 100 ml/hr  Telemetry: None  Ht./Wt.: complete 5' 8\" 1/2 per Patient 84.4 kg  bed scale  Code Status verified on armband: yes  2 RN Skin Assessment Completed By: Jesus BOURGEOIS RN and Lenore Krueger RN   Skin assessment:  bilat. buttocks open wound suspected PI,  mild rash in the groin, bluster/vesicle on Lt and Rt chest. Blanchable redness on right heel. Buttocks and heel covered with Mepilex.   Med Rec completed: no  Bed surface reassessed with algorithm and charted: yes  New bed surface ordered: no  Suction/Ambu bag/Flowmeter at bedside: yes    Pt arrived to unit at 1840, Awake, alert and Oriented x 4, denies SOB, chest pain nausea/vomiting, headache of dizziness. VSS, denies pain, CBI running dominique out is clear. Call light is in reach and Pt is able to make needs known. POC ongoing.  "

## 2024-08-23 NOTE — PLAN OF CARE
Goal Outcome Evaluation:      Plan of Care Reviewed With: patient    Overall Patient Progress: improvingOverall Patient Progress: improving    Pt. discharged at around 1800 to home and left with personal belongings. Son will be providing transportation.  Pt has been able to void but still retaining significant amount of urine. Urology team will continue to follow at the clinic.  Pt. received complete discharge paperwork and medications as filled by discharge pharmacy. Pt received and signed for the antibiotic  medications Macrobid and Diflucan to take for 7 days. Pt. was given times of last dose for all discharge medications in writing on discharge medication sheets. Discharge teaching included medication, pain management, activity urination, color and signs and symptoms of infection. Pt. to call and make  follow up appointment with urology. Pt. had no further questions at the time of discharge and no unmet needs were identified.

## 2024-08-26 LAB
PATH REPORT.COMMENTS IMP SPEC: NORMAL
PATH REPORT.COMMENTS IMP SPEC: NORMAL
PATH REPORT.FINAL DX SPEC: NORMAL
PATH REPORT.GROSS SPEC: NORMAL
PATH REPORT.RELEVANT HX SPEC: NORMAL
PHOTO IMAGE: NORMAL

## 2024-08-28 ENCOUNTER — TELEPHONE (OUTPATIENT)
Dept: SURGERY | Facility: CLINIC | Age: 84
End: 2024-08-28
Payer: COMMERCIAL

## 2024-08-28 DIAGNOSIS — I49.9 IRREGULAR HEART BEAT: ICD-10-CM

## 2024-08-28 DIAGNOSIS — I49.3 PVC'S (PREMATURE VENTRICULAR CONTRACTIONS): ICD-10-CM

## 2024-08-28 RX ORDER — METOPROLOL SUCCINATE 100 MG/1
100 TABLET, EXTENDED RELEASE ORAL EVERY EVENING
Qty: 90 TABLET | Refills: 3 | Status: SHIPPED | OUTPATIENT
Start: 2024-08-28

## 2024-08-28 NOTE — TELEPHONE ENCOUNTER
Spoke to pt on phone  Doing well  Path benign  No issues voiding  Using 1 depends per day for urge and urge incontinence

## 2024-08-28 NOTE — TELEPHONE ENCOUNTER
[facility] Refill Guideline reviewed.  Medication meets criteria for refill. Yeni Honeycutt RN Cardiology August 28, 2024, 3:38 PM

## 2024-09-07 ENCOUNTER — HEALTH MAINTENANCE LETTER (OUTPATIENT)
Age: 84
End: 2024-09-07

## 2024-09-11 ENCOUNTER — OFFICE VISIT (OUTPATIENT)
Dept: UROLOGY | Facility: CLINIC | Age: 84
End: 2024-09-11
Payer: COMMERCIAL

## 2024-09-11 VITALS
OXYGEN SATURATION: 97 % | WEIGHT: 188 LBS | HEIGHT: 68 IN | BODY MASS INDEX: 28.49 KG/M2 | SYSTOLIC BLOOD PRESSURE: 134 MMHG | HEART RATE: 58 BPM | DIASTOLIC BLOOD PRESSURE: 64 MMHG | TEMPERATURE: 97.7 F

## 2024-09-11 DIAGNOSIS — R33.9 URINARY RETENTION: Primary | ICD-10-CM

## 2024-09-11 PROCEDURE — 51798 US URINE CAPACITY MEASURE: CPT | Performed by: STUDENT IN AN ORGANIZED HEALTH CARE EDUCATION/TRAINING PROGRAM

## 2024-09-11 PROCEDURE — 99024 POSTOP FOLLOW-UP VISIT: CPT | Mod: 25 | Performed by: STUDENT IN AN ORGANIZED HEALTH CARE EDUCATION/TRAINING PROGRAM

## 2024-09-11 ASSESSMENT — PAIN SCALES - GENERAL: PAINLEVEL: NO PAIN (0)

## 2024-09-11 NOTE — PROGRESS NOTES
UROLOGY OUTPATIENT VISIT      Chief Complaint:   Post-op holep      Synopsis   Storm Dutta is a very pleasant AGE: 84 year old year old person  History of CKD, diabetes, PAD    He had urinary retention and 120 gram prostate with large median lobe    2024  I did holep - challenging planes, We removed just the median lobe and the left lobe. Quite notably the planes for enucleation became quite challenging near the apex of the prostate for unclear reasons. Both for the median lobe and the left lobe. Given that the fossa was very open just with the median lobe and the minimal contribution to the lateral lobe and the difficulty in dissection. I opted to stop after removing the middle lobe and the left lobe.     Tissue removed 54.4 grams  Path benign    Today the patient reports that he continues to have somewhat of a weak stream but its improving  Incontinence denies  AUASS   Incomplete Emptyin (not at all)  Frequency:  2  Intermittency:  0 (not at all)  Urgency:  2  Weak Stream:  5  Strainin (not at all)  Sleeping:  3  Total: 12      PVR measured by ultrasound 200 ml    The following distinct labs were reviewed   PSA   Date Value Ref Range Status   2013 2.79 0 - 4 ug/L Final     Prostate Specific Antigen Screen   Date Value Ref Range Status   2024 0.20 ng/mL Final     Comment:     No reference ranges have been established for patients over 80 years.     Most Recent 3 BMP's:  Recent Labs   Lab Test 24  1200 24  0817 24  0802 24  0948 24  1046 24  1047   NA  --  136  --   --  140 143   POTASSIUM  --  4.9  --   --  4.7 4.3   CHLORIDE  --  102  --   --  103 107   CO2  --  25  --   --  25 25   BUN  --  28.8*  --   --  34.2* 26.4*   CR  --  1.28*  --   --  1.51* 1.27*   ANIONGAP  --  9  --   --  12 11   PILY  --  8.9  --   --  9.8 9.3   * 278* 298*   < > 88 102*    < > = values in this interval not displayed.       Medical Comorbidities      Past  Medical History:   Diagnosis Date    Acute urinary retention 11/2012    ER visit   / 1200 cc post-void residual    Acute urinary retention 11/01/2012    ER     Basal cell carcinoma     Diabetes mellitus type II     Epididymitis 03/20/2014    Started on doxycyclline for UTI/orchitis. He was discharged on 03/22/14.    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 Do not start before August 29, 2024.      atorvastatin (LIPITOR) 20 MG tablet Take 1 tablet (20 mg) by mouth daily 90 tablet 3    Cholecalciferol (VITAMIN D-3) 1000 units CAPS Take 1 capsule by mouth daily       finasteride (PROSCAR) 5 MG tablet Take 1 tablet (5 mg) by mouth daily 90 tablet 3    insulin glargine (LANTUS SOLOSTAR) 100 UNIT/ML pen INJECT 16 UNITS UNDER THE SKIN AT BEDTIME 30 mL 2    insulin lispro (HUMALOG KWIKPEN) 100 UNIT/ML (1 unit dial) KWIKPEN USE 8 UNITS  PLUS LOW SLIDING SCALE BEFORE EACH MEAL. MAX 50 UNITS PER DAY. 60 mL 1    insulin pen needle (PENTIPS) 31G X 6 MM miscellaneous USE 4 TO 5 TIMES DAILY OR AS DIRECTED FOR SLIDING SCALE INSULIN 500 each 1    losartan (COZAAR) 100 MG tablet Take 1 tablet (100 mg) by mouth daily 90 tablet 1    metoprolol succinate ER (TOPROL XL) 100 MG 24 hr tablet Take 1 tablet (100 mg) by mouth every evening. 90 tablet 3     No current facility-administered medications for this visit.            Assessment/Plan   Storm Dutta is a very pleasant AGE: 84 year old year old person  CKD, DM and PAD    Post-op holep for  urinary retention  He is catheter free but still reports somewhat of a weak stream.  Challenging case only removed median lobe and left lobe due to almost absence of planes near apex   I will plan to see him back in a few weeks for a cystoscopy.  If his urine stream is still weak and bothersome and the cystoscopy does not show any strictures but the right sided lobe remains obstructing then we can consider a completion TURP.  I was nervous that he may have malignancy during the case due to the absence of planes but thankfully the tissue removed came back benign.  They are okay with this plan for now.    CC:  Jovana Beaulieu

## 2024-09-11 NOTE — PROGRESS NOTES
Active order to obtain bladder scan? Yes   Name of ordering provider:  Dr. Montano  Bladder scan preformed post void {Yes  Bladder scan reveled 200ML  Provider notified?  Yes    Shanthi Pittman MA on 9/11/2024 at 9:49 AM

## 2024-09-11 NOTE — NURSING NOTE
"Initial /64 (BP Location: Left arm, Patient Position: Sitting, Cuff Size: Adult Regular)   Pulse 58   Temp 97.7  F (36.5  C) (Tympanic)   Ht 1.727 m (5' 7.99\")   Wt 85.3 kg (188 lb)   SpO2 97%   BMI 28.59 kg/m   Estimated body mass index is 28.59 kg/m  as calculated from the following:    Height as of this encounter: 1.727 m (5' 7.99\").    Weight as of this encounter: 85.3 kg (188 lb). .  Shanthi Pittman MA on 9/11/2024 at 9:33 AM      "

## 2024-09-30 ENCOUNTER — PATIENT OUTREACH (OUTPATIENT)
Dept: ONCOLOGY | Facility: CLINIC | Age: 84
End: 2024-09-30
Payer: COMMERCIAL

## 2024-09-30 DIAGNOSIS — I10 HYPERTENSION GOAL BP (BLOOD PRESSURE) < 140/90: ICD-10-CM

## 2024-09-30 DIAGNOSIS — N13.8 HYPERTROPHY OF PROSTATE WITH URINARY OBSTRUCTION: ICD-10-CM

## 2024-09-30 DIAGNOSIS — N40.1 HYPERTROPHY OF PROSTATE WITH URINARY OBSTRUCTION: ICD-10-CM

## 2024-09-30 RX ORDER — AMLODIPINE BESYLATE 10 MG/1
10 TABLET ORAL DAILY
Qty: 90 TABLET | Refills: 1 | Status: SHIPPED | OUTPATIENT
Start: 2024-09-30

## 2024-09-30 RX ORDER — LOSARTAN POTASSIUM 100 MG/1
100 TABLET ORAL DAILY
Qty: 90 TABLET | Refills: 1 | Status: SHIPPED | OUTPATIENT
Start: 2024-09-30

## 2024-09-30 NOTE — PROGRESS NOTES
[facility]: Cancer Care                                                                                          Pt has not seen oncology since 2019. Removed RNCYNDY Hernandez from pt care team.    Signature:  TYE POON RN

## 2024-10-01 RX ORDER — FINASTERIDE 5 MG/1
1 TABLET, FILM COATED ORAL DAILY
Qty: 90 TABLET | Refills: 3 | Status: SHIPPED | OUTPATIENT
Start: 2024-10-01

## 2024-10-09 ENCOUNTER — OFFICE VISIT (OUTPATIENT)
Dept: UROLOGY | Facility: CLINIC | Age: 84
End: 2024-10-09
Payer: COMMERCIAL

## 2024-10-09 VITALS — RESPIRATION RATE: 16 BRPM | DIASTOLIC BLOOD PRESSURE: 63 MMHG | SYSTOLIC BLOOD PRESSURE: 132 MMHG | HEART RATE: 58 BPM

## 2024-10-09 DIAGNOSIS — N13.8 HYPERTROPHY OF PROSTATE WITH URINARY OBSTRUCTION: Primary | ICD-10-CM

## 2024-10-09 DIAGNOSIS — N40.1 HYPERTROPHY OF PROSTATE WITH URINARY OBSTRUCTION: Primary | ICD-10-CM

## 2024-10-09 PROCEDURE — 99212 OFFICE O/P EST SF 10 MIN: CPT | Mod: 25 | Performed by: STUDENT IN AN ORGANIZED HEALTH CARE EDUCATION/TRAINING PROGRAM

## 2024-10-09 PROCEDURE — 51798 US URINE CAPACITY MEASURE: CPT | Performed by: STUDENT IN AN ORGANIZED HEALTH CARE EDUCATION/TRAINING PROGRAM

## 2024-10-09 NOTE — PROGRESS NOTES
UROLOGY OUTPATIENT VISIT      Chief Complaint:   Post-op holep      Synopsis   Storm Dutta is a very pleasant AGE: 84 year old year old person  History of CKD, diabetes, PAD.    He had urinary retention and 120 gram prostate with large median lobe    2024  I did holep - challenging planes, We removed just the median lobe and the left lobe. Quite notably the planes for enucleation became quite challenging near the apex of the prostate for unclear reasons. Both for the median lobe and the left lobe. Given that the fossa was very open just with the median lobe and the minimal contribution to the lateral lobe and the difficulty in dissection. I opted to stop after removing the middle lobe and the left lobe.      Tissue removed 54.4 grams  Path benign  Today the patient reports that he continues to have somewhat of a weak stream but its improving  Incontinence denies  AUASS   Incomplete Emptyin (not at all)  Frequency:  2  Intermittency:  0 (not at all)  Urgency:  2  Weak Stream:  5  Strainin (not at all)  Sleeping:  3  Total: 12  PVR measured by ultrasound 200 ml    Today:   He feels well  Stream is stronger now - I was going to do cystoscopy today if stream was still weak  Bladder is emptying better,   Post void residual was 96  Minimal incontinence, using 1 pad per day    Imaging  None    The following distinct labs were reviewed   PSA   Date Value Ref Range Status   2013 2.79 0 - 4 ug/L Final     Prostate Specific Antigen Screen   Date Value Ref Range Status   2024 0.20 ng/mL Final     Comment:     No reference ranges have been established for patients over 80 years.     Most Recent 3 BMP's:  Recent Labs   Lab Test 24  1200 24  0817 24  0802 24  0948 24  1046 24  1047   NA  --  136  --   --  140 143   POTASSIUM  --  4.9  --   --  4.7 4.3   CHLORIDE  --  102  --   --  103 107   CO2  --  25  --   --  25 25   BUN  --  28.8*  --   --  34.2* 26.4*    CR  --  1.28*  --   --  1.51* 1.27*   ANIONGAP  --  9  --   --  12 11   PILY  --  8.9  --   --  9.8 9.3   * 278* 298*   < > 88 102*    < > = values in this interval not displayed.       Medical Comorbidities      Past Medical History:   Diagnosis Date    Acute urinary retention 11/2012    ER visit   / 1200 cc post-void residual    Acute urinary retention 11/01/2012    ER     Basal cell carcinoma     Diabetes mellitus type II     Epididymitis 03/20/2014    Started on doxycyclline for UTI/orchitis. He was discharged on 03/22/14.    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 Do not start before August 29, 2024.      atorvastatin (LIPITOR) 20 MG tablet Take 1 tablet (20 mg) by mouth daily 90 tablet 3    Cholecalciferol (VITAMIN D-3) 1000 units CAPS Take 1 capsule by mouth daily       finasteride (PROSCAR) 5 MG tablet Take 1 tablet (5 mg) by mouth daily. 90 tablet 3    insulin glargine (LANTUS SOLOSTAR) 100 UNIT/ML pen INJECT 16 UNITS UNDER THE SKIN AT BEDTIME 30 mL 2    insulin lispro (HUMALOG KWIKPEN) 100 UNIT/ML (1 unit dial) KWIKPEN USE 8 UNITS  PLUS LOW SLIDING SCALE BEFORE EACH MEAL. MAX 50 UNITS PER DAY. 60 mL 1    losartan (COZAAR) 100 MG tablet TAKE ONE TABLET BY MOUTH ONCE DAILY 90 tablet 1    metoprolol succinate ER (TOPROL XL) 100 MG 24 hr tablet Take 1 tablet (100 mg) by mouth every evening. 90 tablet 3    insulin pen needle (PENTIPS) 31G X 6 MM miscellaneous USE 4 TO 5 TIMES DAILY OR AS DIRECTED FOR SLIDING  SCALE INSULIN 500 each 1     No current facility-administered medications for this visit.            Assessment/Plan   Storm Dutta is a very pleasant AGE: 84 year old year old person  Post-op holep  Doing lot better  Stream stronger   Bladder scan only 96 today  Will follow-up 3 months with Ana to monitor incontinence and then can be PRN  CC:  Jovana Beaulieu

## 2024-10-09 NOTE — NURSING NOTE
"Initial /63 (BP Location: Left arm, Patient Position: Chair, Cuff Size: Adult Regular)   Pulse 58   Resp 16  Estimated body mass index is 28.59 kg/m  as calculated from the following:    Height as of 9/11/24: 1.727 m (5' 7.99\").    Weight as of 9/11/24: 85.3 kg (188 lb). .  Patient is here for a recheck,following a holep, states his urine stream is much stronger .  tripp chavez LPN    "

## 2024-10-16 ENCOUNTER — TRANSFERRED RECORDS (OUTPATIENT)
Dept: HEALTH INFORMATION MANAGEMENT | Facility: CLINIC | Age: 84
End: 2024-10-16
Payer: COMMERCIAL

## 2024-10-16 LAB — RETINOPATHY: POSITIVE

## 2024-11-25 ENCOUNTER — OFFICE VISIT (OUTPATIENT)
Dept: DERMATOLOGY | Facility: CLINIC | Age: 84
End: 2024-11-25
Payer: COMMERCIAL

## 2024-11-25 DIAGNOSIS — L81.4 LENTIGO: ICD-10-CM

## 2024-11-25 DIAGNOSIS — D18.01 ANGIOMA OF SKIN: ICD-10-CM

## 2024-11-25 DIAGNOSIS — Z85.828 HISTORY OF SKIN CANCER: ICD-10-CM

## 2024-11-25 DIAGNOSIS — L72.0 EPIDERMAL CYST: ICD-10-CM

## 2024-11-25 DIAGNOSIS — D23.9 DERMAL NEVUS: Primary | ICD-10-CM

## 2024-11-25 DIAGNOSIS — Z86.006 HISTORY OF MELANOMA IN SITU: ICD-10-CM

## 2024-11-25 DIAGNOSIS — L82.1 SEBORRHEIC KERATOSES: ICD-10-CM

## 2024-11-25 PROCEDURE — 99213 OFFICE O/P EST LOW 20 MIN: CPT | Performed by: DERMATOLOGY

## 2024-11-25 NOTE — PROGRESS NOTES
Storm Dutta is an extremely pleasant 84 year old year old male patient here today for hx of melanoma in situ.  Patient has no other skin complaints today.  Remainder of the HPI, Meds, PMH, Allergies, FH, and SH was reviewed in chart.      Past Medical History:   Diagnosis Date    Acute urinary retention 11/2012    ER visit   / 1200 cc post-void residual    Acute urinary retention 11/01/2012    ER     Basal cell carcinoma     Diabetes mellitus type II     Epididymitis 03/20/2014    Started on doxycyclline for UTI/orchitis. He was discharged on 03/22/14.    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    PE (pulmonary embolism) 03/20/2014    Rectal cancer (H)     Skin cancer     Squamous cell carcinoma     Thrombosis of leg     +PE       Past Surgical History:   Procedure Laterality Date    AMPUTATE TOE(S) Right 06/04/2019    Procedure: AMPUTATION 2nd Toe;  Surgeon: Adriel Cabello DPM;  Location: [facility]    BIOPSY      COLONOSCOPY  07/29/2013    Procedure: COMBINED COLONOSCOPY, SINGLE BIOPSY/POLYPECTOMY BY BIOPSY;;  Surgeon: Bari Burnett MD;  Location: [facility]    COLONOSCOPY N/A 06/04/2015    Procedure: COMBINED COLONOSCOPY, SINGLE OR MULTIPLE BIOPSY/POLYPECTOMY BY BIOPSY;  Surgeon: Tara Mtz MD;  Location:  GI    COLONOSCOPY N/A 12/05/2016    Procedure: COLONOSCOPY;  Surgeon: Breanna Kline MD;  Location:  GI    COLONOSCOPY N/A 10/11/2022    Procedure: COLONOSCOPY, FLEXIBLE, WITH LESION REMOVAL USING SNARE;  Surgeon: Pierre Doe MD;  Location: [facility]    CV CORONARY ANGIOGRAM N/A 11/22/2021    Procedure: Coronary Angiogram;  Surgeon: Jak Noe MD;  Location:  [facility]    ENT SURGERY      EYE SURGERY  05/01/2012    Right eye procedure    HC CIRCUMCISION SURGICAL NON-DEV >28 DAYS AGE  02/01/1997    due to phimosis    HC  REMOVAL GALLBLADDER  05/01/2001    [facility] UGI ENDOSCOPY W PLACEMENT GASTROSTOMY TUBE PERCUT  03/13/2014    Procedure: COMBINED ESOPHAGOSCOPY, GASTROSCOPY, DUODENOSCOPY (EGD), PLACE PERCUTANEOUS ENDOSCOPIC GASTROSTOMY TUBE;  Surgeon: Loco Casillas MD;  Location: [facility]    LAPAROSCOPIC ASSISTED COLECTOMY LEFT (DESCENDING)  01/07/2014    Procedure: LAPAROSCOPIC ASSISTED COLECTOMY LEFT (DESCENDING);  Laparoscopic hand assisted Low Anterior Resection With colonic J pouch and end to end colorectal anastamosis. Laparoscopic splenic flexure mobilization.  Loop Ileostomy.  Rigid proctoscopy;  Surgeon: Margaret Gamble MD;  [facility]    LASER HOLMIUM ENUCLEATION PROSTATE N/A 8/22/2024    Procedure: Holmium Laser Enucleation of the Prostate;  Surgeon: Esha Montano MD;  [facility]    PHACOEMULSIFICATION WITH STANDARD INTRAOCULAR LENS IMPLANT  04/11/2013    Procedure: PHACOEMULSIFICATION WITH STANDARD INTRAOCULAR LENS IMPLANT;  Right Kelman Phacoemulsification with Intraocular Lens Implant;  Surgeon: Caesar Turner MD;  Location: [facility]    REMOVE GASTROSTOMY TUBE ADULT  07/25/2014    Procedure: REMOVE GASTROSTOMY TUBE ADULT;  Surgeon: Margaret Gamble MD;  [facility]    SIGMOIDOSCOPY FLEXIBLE  06/23/2014    Procedure: SIGMOIDOSCOPY FLEXIBLE;  Surgeon: Margaret Gamble MD;  Location:  GI    SIGMOIDOSCOPY FLEXIBLE  07/22/2014    Procedure: SIGMOIDOSCOPY FLEXIBLE;  Surgeon: Margaret Gamble MD;  [facility]    SIGMOIDOSCOPY FLEXIBLE  07/25/2014    Procedure: SIGMOIDOSCOPY FLEXIBLE;  Surgeon: Margaret Gamble MD;  [facility]    SIGMOIDOSCOPY FLEXIBLE  07/28/2014    Procedure: SIGMOIDOSCOPY FLEXIBLE;  Surgeon: Margaret Gamble MD;  [facility]    SIGMOIDOSCOPY FLEXIBLE  07/31/2014    Procedure: SIGMOIDOSCOPY FLEXIBLE;  Surgeon: Margaret Gamble MD;  [facility]    SIGMOIDOSCOPY FLEXIBLE  08/03/2014    Procedure:  SIGMOIDOSCOPY FLEXIBLE;  Surgeon: Margaret Gamble MD;  [facility]    SIGMOIDOSCOPY FLEXIBLE  08/05/2014    Procedure: SIGMOIDOSCOPY FLEXIBLE;  Surgeon: Margaret Gamble MD;  [facility]    SIGMOIDOSCOPY FLEXIBLE  08/08/2014    Procedure: SIGMOIDOSCOPY FLEXIBLE;  Surgeon: Margaret Gamble MD;  [facility]    SIGMOIDOSCOPY FLEXIBLE  08/18/2014    Procedure: SIGMOIDOSCOPY FLEXIBLE;  Surgeon: Jose Roberto Cervantes MD;  [facility]    SIGMOIDOSCOPY FLEXIBLE N/A 08/15/2014    Procedure: SIGMOIDOSCOPY FLEXIBLE;  Surgeon: Margaret Gamble MD;  [facility]    SIGMOIDOSCOPY FLEXIBLE N/A 08/22/2014    Procedure: SIGMOIDOSCOPY FLEXIBLE;  Surgeon: Jose Roberto Cervantes MD;  [facility]    SIGMOIDOSCOPY FLEXIBLE N/A 08/11/2014    Procedure: SIGMOIDOSCOPY FLEXIBLE;  Surgeon: Margaret Gamble MD;  [facility]    SIGMOIDOSCOPY FLEXIBLE N/A 08/26/2014    Procedure: SIGMOIDOSCOPY FLEXIBLE;  Surgeon: Margaret Gamble MD;  [facility]    SIGMOIDOSCOPY FLEXIBLE N/A 08/29/2014    Procedure: SIGMOIDOSCOPY FLEXIBLE;  Surgeon: Margaret Gamble MD;  [facility]    SIGMOIDOSCOPY FLEXIBLE N/A 09/08/2014    Procedure: SIGMOIDOSCOPY FLEXIBLE;  Surgeon: Margaret Gamble MD;  [facility]    SIGMOIDOSCOPY FLEXIBLE N/A 09/02/2014    Procedure: SIGMOIDOSCOPY FLEXIBLE;  Surgeon: Breanna Kline MD;  [facility]    SIGMOIDOSCOPY FLEXIBLE N/A 09/11/2014    Procedure: SIGMOIDOSCOPY FLEXIBLE;  Surgeon: Margaret Gamble MD;  [facility]    SIGMOIDOSCOPY FLEXIBLE N/A 09/19/2014    Procedure: SIGMOIDOSCOPY FLEXIBLE;  Surgeon: Margaret Gamble MD;  [facility]    SIGMOIDOSCOPY FLEXIBLE N/A 09/15/2014    Procedure: SIGMOIDOSCOPY FLEXIBLE;  Surgeon: Margaret Gamble MD;  [facility]    SIGMOIDOSCOPY FLEXIBLE N/A 09/05/2014    Procedure: SIGMOIDOSCOPY FLEXIBLE;  Surgeon: Margaret Gamble MD;   [facility]    SIGMOIDOSCOPY FLEXIBLE N/A 09/23/2014    Procedure: SIGMOIDOSCOPY FLEXIBLE;  Surgeon: Margaret Gamble MD;  [facility]    SIGMOIDOSCOPY FLEXIBLE N/A 09/26/2014    Procedure: SIGMOIDOSCOPY FLEXIBLE;  Surgeon: Margaret Gamble MD;  [facility]    SIGMOIDOSCOPY FLEXIBLE N/A 09/30/2014    Procedure: SIGMOIDOSCOPY FLEXIBLE;  Surgeon: Margaret Gamble MD;  [facility]    SIGMOIDOSCOPY FLEXIBLE N/A 10/03/2014    Procedure: SIGMOIDOSCOPY FLEXIBLE;  Surgeon: Margaret Gamble MD;  [facility]    SIGMOIDOSCOPY FLEXIBLE N/A 10/30/2014    Procedure: SIGMOIDOSCOPY FLEXIBLE;  Surgeon: Margaret Gamble MD;  Location:  GI    SIGMOIDOSCOPY FLEXIBLE, PLACEMENT OF DRAINAGE SPONGE  09/30/2014    TAKEDOWN ILEOSTOMY N/A 01/06/2015    Procedure: TAKEDOWN ILEOSTOMY;  Surgeon: Margaret Gamble MD;  Location:  OR        Family History   Problem Relation Age of Onset    Diabetes Mother     Hypertension Mother     Cancer Father     Cancer Maternal Grandmother     Alzheimer Disease Maternal Grandmother     Cardiovascular Paternal Grandmother     Breast Cancer Sister     Colon Cancer No family hx of     Colon Polyps No family hx of     Crohn's Disease No family hx of     Ulcerative Colitis No family hx of     Anesthesia Reaction No family hx of     Melanoma No family hx of        Social History     Socioeconomic History    Marital status:      Spouse name: Not on file    Number of children: Not on file    Years of education: Not on file    Highest education level: Not on file   Occupational History     Employer: RETIRED     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 No   Social History Narrative    Not on file     Social Drivers of Health     Financial Resource Strain: Low Risk  (8/22/2024)    Financial Resource Strain     Within the past 12 months, have you or your family members you live with been unable to get utilities (heat, electricity) when it was really needed?: No   Food Insecurity: Low Risk  (8/22/2024)    Food Insecurity     Within the past 12 months, did you worry that your food would run out before you got money to buy more?: No     Within the past 12 months, did the food you bought just not last and you didn t have money to get more?: No   Transportation Needs: Low Risk  (8/22/2024)    Transportation Needs     Within the past 12 months, has lack of transportation kept you from medical appointments, getting your medicines, non-medical meetings or appointments, work, or from getting things that you need?: No   Physical Activity: Not on file   Stress: Not on file   Social Connections: Not on file   Interpersonal Safety: High Risk (8/22/2024)    Interpersonal Safety     Do you feel physically and emotionally safe where you currently live?: No     Within the past 12 months, have you been hit, slapped, kicked or otherwise physically hurt by someone?: No     Within the past 12 months, have you been humiliated or emotionally abused in other ways by your partner or ex-partner?: No   Housing Stability: Low Risk  (8/22/2024)    Housing Stability     Do you have housing? : Yes     Are you worried about losing your housing?: No       Outpatient Encounter Medications as of 11/25/2024   Medication Sig Dispense Refill    amLODIPine (NORVASC) 10 MG tablet TAKE ONE TABLET BY MOUTH ONCE DAILY 90 tablet 1    aspirin (ASA) 325 MG tablet Take 1 tablet (325 mg) by mouth daily. Do not start before August 29, 2024.      atorvastatin (LIPITOR) 20 MG tablet TAKE ONE TABLET BY MOUTH ONCE DAILY 90 tablet 0    Cholecalciferol  (VITAMIN D-3) 1000 units CAPS Take 1 capsule by mouth daily       insulin glargine (LANTUS SOLOSTAR) 100 UNIT/ML pen INJECT 16 UNITS UNDER THE SKIN AT BEDTIME 30 mL 2    insulin lispro (HUMALOG KWIKPEN) 100 UNIT/ML (1 unit dial) KWIKPEN USE 8 UNITS  PLUS LOW SLIDING SCALE BEFORE EACH MEAL. MAX 50 UNITS PER DAY. 60 mL 1    insulin pen needle (PENTIPS) 31G X 6 MM miscellaneous USE 4 TO 5 TIMES DAILY OR AS DIRECTED FOR SLIDING SCALE INSULIN 500 each 1    losartan (COZAAR) 100 MG tablet TAKE ONE TABLET BY MOUTH ONCE DAILY 90 tablet 1    metoprolol succinate ER (TOPROL XL) 100 MG 24 hr tablet Take 1 tablet (100 mg) by mouth every evening. 90 tablet 3     No facility-administered encounter medications on file as of 11/25/2024.             O:   NAD, WDWN, Alert & Oriented, Mood & Affect wnl, Vitals stable   General appearance normal   Vitals stable   Alert, oriented and in no acute distress      Following lymph nodes palpated: Occipital, Cervical, Supraclavicular no lad  L forehead nodule     Stuck on papules and brown macules on trunk and ext   Red papules on trunk  Flesh colored papules on trunk     The remainder of the full exam was normal; the following areas were examined:  conjunctiva/lids, , neck, peripheral vascular system, abdomen, lymph nodes, digits/nails, eccrine and apocrine glands, scalp/hair, face, neck, chest, abdomen, buttocks, back, RUE, LUE, RLE, LLE       Eyes: Conjunctivae/lids:Normal     ENT: Lips, mucosa: normal    MSK:Normal    Cardiovascular: peripheral edema none    Pulm: Breathing Normal    Lymph Nodes: No Head and Neck Lymphadenopathy     Neuro/Psych: Orientation:Alert and Orientedx3 ; Mood/Affect:normal       A/P:  1. Seborrheic keratosis, lentigo, angioma, dermal nevus, epidermal cyst, hx of melanoma in situ   It was a pleasure speaking to Storm Dutta today.  Previous clinic notes and pertinent laboratory tests were reviewed prior to Storm Dutta's visit.  Signs and Symptoms of  skin cancer discussed with patient.  Patient encouraged to perform monthly skin exams.  UV precautions reviewed with patient.  Return to clinic 6 months

## 2025-01-13 NOTE — CONFIDENTIAL NOTE
UROLOGY FOLLOW-UP NOTE          Chief Complaint:   Today I had the pleasure of seeing Mr. Storm Dutta in follow-up for a chief complaint of LUTS         Interval Update   Storm Dutta is a very pleasant 84 year old male with a hsitory of CKD stage 3, T2 DM, HTN, and HLD.     Brief History: Mr. Storm Dutta underwent HoLEP with Dr. Montano on 8/22/2024. 54.4 g of benign tissue was removed.     Today's notes: He is doing well. He denies any significant incontinence and wears one pad per day for protection. He stream is strong and he is happy with the results of his surgery.          Physical Exam:   Patient is a 84 year old male evaluated via telephone visit.       Labs and Pathology:    I personally reviewed all applicable laboratory data and went over findings with patient  Significant for:    CBC RESULTS:  Recent Labs   Lab Test 08/23/24  0817 08/08/24  1046 07/31/23  0822 10/11/22  1256 11/22/21  0654 08/17/21  0951   WBC 10.4  --   --  6.2 6.2 5.8   HGB 12.0* 12.0* 11.8* 11.0* 12.4* 12.4*     --   --  194 193 171        BMP RESULTS:  Recent Labs   Lab Test 08/23/24  1200 08/23/24  0817 08/23/24  0802 08/23/24  0145 08/22/24  0948 08/08/24  1046 02/13/24  1047 07/31/23  0822 08/09/21  1015 01/22/21  1017 01/14/21  1030 09/21/20  0820 03/16/20  0906   NA  --  136  --   --   --  140 143 140   < > 137  --  141 139   POTASSIUM  --  4.9  --   --   --  4.7 4.3 4.8   < > 4.3  --  4.2 4.4   CHLORIDE  --  102  --   --   --  103 107 105   < > 106  --  109 110*   CO2  --  25  --   --   --  25 25 26   < > 27  --  27 26   ANIONGAP  --  9  --   --   --  12 11 9   < > 4  --  5 3   * 278* 298* 349*   < > 88 102* 145*   < > 246*  --  131* 132*   BUN  --  28.8*  --   --   --  34.2* 26.4* 23.1*   < > 29  --  27 28   CR  --  1.28*  --   --   --  1.51* 1.27* 1.34*   < > 1.34*  --  1.21 1.38*   GFRESTIMATED  --  55*  --   --   --  46* 56* 53*   < > 50* 49* 56* 48*   GFRESTBLACK  --   --   --   --    --   --   --   --   --  57* 59* 65 56*   PILY  --  8.9  --   --   --  9.8 9.3 9.5   < > 9.5  --  9.6 8.9    < > = values in this interval not displayed.       UA RESULTS:   Recent Labs   Lab Test 07/05/24  1157 06/09/22  0955 11/19/19  2213   SG 1.014 1.020 1.016   URINEPH 5.0 6.0 5.0   NITRITE Negative Negative Negative   RBCU 60* 2-5* 3*   WBCU >182* None Seen <1       PSA RESULTS  PSA   Date Value Ref Range Status   05/31/2013 2.79 0 - 4 ug/L Final   11/27/2012 12.60 (H) 0 - 4 ug/L Final     Prostate Specific Antigen Screen   Date Value Ref Range Status   07/25/2024 0.20 ng/mL Final     Comment:     No reference ranges have been established for patients over 80 years.              Assessment/Plan   84 year old male seen in follow up for LUTS s/p HoLEP on 8/22/2024. He is doing very well and is happy with the results of surgery.     Plan:  Follow up with urology as needed.                Past Medical History:     Past Medical History:   Diagnosis Date    Acute urinary retention 11/2012    ER visit   / 1200 cc post-void residual    Acute urinary retention 11/01/2012    ER     Basal cell carcinoma     Diabetes mellitus type II     Epididymitis 03/20/2014    Started on doxycyclline for UTI/orchitis. He was discharged on 03/22/14.    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    PE (pulmonary embolism) 03/20/2014    Rectal cancer (H)     Skin cancer     Squamous cell carcinoma     Thrombosis of leg     +PE            Past Surgical History:     Past Surgical History:   Procedure Laterality Date    AMPUTATE TOE(S) Right 06/04/2019    Procedure: AMPUTATION 2nd Toe;  Surgeon: Adriel Cabello DPM;  Location: [facility]    BIOPSY      COLONOSCOPY  07/29/2013    Procedure: COMBINED COLONOSCOPY, SINGLE BIOPSY/POLYPECTOMY BY BIOPSY;;  Surgeon: Bari Burnett MD;  Location: [facility]    COLONOSCOPY N/A  06/04/2015    Procedure: COMBINED COLONOSCOPY, SINGLE OR MULTIPLE BIOPSY/POLYPECTOMY BY BIOPSY;  Surgeon: Tara Mtz MD;  Location:  GI    COLONOSCOPY N/A 12/05/2016    Procedure: COLONOSCOPY;  Surgeon: Breanna Kline MD;  Location:  GI    COLONOSCOPY N/A 10/11/2022    Procedure: COLONOSCOPY, FLEXIBLE, WITH LESION REMOVAL USING SNARE;  Surgeon: Pierre Doe MD;  Location: [facility]    CV CORONARY ANGIOGRAM N/A 11/22/2021    Procedure: Coronary Angiogram;  Surgeon: Jak Noe MD;  Location:  [facility]    ENT SURGERY      EYE SURGERY  05/01/2012    Right eye procedure    HC CIRCUMCISION SURGICAL NON-DEV >28 DAYS AGE  02/01/1997    due to phimosis    HC REMOVAL GALLBLADDER  05/01/2001    HC UGI ENDOSCOPY W PLACEMENT GASTROSTOMY TUBE PERCUT  03/13/2014    Procedure: COMBINED ESOPHAGOSCOPY, GASTROSCOPY, DUODENOSCOPY (EGD), PLACE PERCUTANEOUS ENDOSCOPIC GASTROSTOMY TUBE;  Surgeon: Loco Casillas MD;  Location: [facility]    LAPAROSCOPIC ASSISTED COLECTOMY LEFT (DESCENDING)  01/07/2014    Procedure: LAPAROSCOPIC ASSISTED COLECTOMY LEFT (DESCENDING);  Laparoscopic hand assisted Low Anterior Resection With colonic J pouch and end to end colorectal anastamosis. Laparoscopic splenic flexure mobilization.  Loop Ileostomy.  Rigid proctoscopy;  Surgeon: Margaret Gamble MD;  Location:  OR    LASER HOLMIUM ENUCLEATION PROSTATE N/A 8/22/2024    Procedure: Holmium Laser Enucleation of the Prostate;  Surgeon: Esha Montano MD;  Location:  OR    PHACOEMULSIFICATION WITH STANDARD INTRAOCULAR LENS IMPLANT  04/11/2013    Procedure: PHACOEMULSIFICATION WITH STANDARD INTRAOCULAR LENS IMPLANT;  Right Kelman Phacoemulsification with Intraocular Lens Implant;  Surgeon: Caesar Turner MD;  Location: [facility]    REMOVE GASTROSTOMY TUBE ADULT  07/25/2014    Procedure: REMOVE GASTROSTOMY TUBE ADULT;  Surgeon: Margaret Gamble MD;  Location:  OR     SIGMOIDOSCOPY FLEXIBLE  06/23/2014    Procedure: SIGMOIDOSCOPY FLEXIBLE;  Surgeon: Margaret Gamble MD;  [facility]    SIGMOIDOSCOPY FLEXIBLE  07/22/2014    Procedure: SIGMOIDOSCOPY FLEXIBLE;  Surgeon: Margaret Gamble MD;  [facility]    SIGMOIDOSCOPY FLEXIBLE  07/25/2014    Procedure: SIGMOIDOSCOPY FLEXIBLE;  Surgeon: Margaret Gamble MD;  [facility]    SIGMOIDOSCOPY FLEXIBLE  07/28/2014    Procedure: SIGMOIDOSCOPY FLEXIBLE;  Surgeon: Margaret Gamble MD;  [facility]    SIGMOIDOSCOPY FLEXIBLE  07/31/2014    Procedure: SIGMOIDOSCOPY FLEXIBLE;  Surgeon: Margaret Gamble MD;  [facility]    SIGMOIDOSCOPY FLEXIBLE  08/03/2014    Procedure: SIGMOIDOSCOPY FLEXIBLE;  Surgeon: Margaret Gamble MD;  [facility]    SIGMOIDOSCOPY FLEXIBLE  08/05/2014    Procedure: SIGMOIDOSCOPY FLEXIBLE;  Surgeon: Margaret Gamble MD;  [facility]    SIGMOIDOSCOPY FLEXIBLE  08/08/2014    Procedure: SIGMOIDOSCOPY FLEXIBLE;  Surgeon: Margaret Gamble MD;  [facility]    SIGMOIDOSCOPY FLEXIBLE  08/18/2014    Procedure: SIGMOIDOSCOPY FLEXIBLE;  Surgeon: Jose Roberto Cervantes MD;  [facility]    SIGMOIDOSCOPY FLEXIBLE N/A 08/15/2014    Procedure: SIGMOIDOSCOPY FLEXIBLE;  Surgeon: Margaret Gamble MD;  [facility]    SIGMOIDOSCOPY FLEXIBLE N/A 08/22/2014    Procedure: SIGMOIDOSCOPY FLEXIBLE;  Surgeon: Jose Roberto Cervantes MD;  [facility]    SIGMOIDOSCOPY FLEXIBLE N/A 08/11/2014    Procedure: SIGMOIDOSCOPY FLEXIBLE;  Surgeon: Margaret Gamble MD;  [facility]    SIGMOIDOSCOPY FLEXIBLE N/A 08/26/2014    Procedure: SIGMOIDOSCOPY FLEXIBLE;  Surgeon: Margaret Gamble MD;  [facility]    SIGMOIDOSCOPY FLEXIBLE N/A 08/29/2014    Procedure: SIGMOIDOSCOPY FLEXIBLE;  Surgeon: Margaret Gamble MD;  [facility]    SIGMOIDOSCOPY FLEXIBLE N/A 09/08/2014    Procedure: SIGMOIDOSCOPY FLEXIBLE;   Surgeon: Margaret Gamble MD;  [facility]    SIGMOIDOSCOPY FLEXIBLE N/A 09/02/2014    Procedure: SIGMOIDOSCOPY FLEXIBLE;  Surgeon: Breanna Kline MD;  [facility]    SIGMOIDOSCOPY FLEXIBLE N/A 09/11/2014    Procedure: SIGMOIDOSCOPY FLEXIBLE;  Surgeon: Margaret Gamble MD;  [facility]    SIGMOIDOSCOPY FLEXIBLE N/A 09/19/2014    Procedure: SIGMOIDOSCOPY FLEXIBLE;  Surgeon: Margaret Gamble MD;  [facility]    SIGMOIDOSCOPY FLEXIBLE N/A 09/15/2014    Procedure: SIGMOIDOSCOPY FLEXIBLE;  Surgeon: Margaret Gamble MD;  [facility]    SIGMOIDOSCOPY FLEXIBLE N/A 09/05/2014    Procedure: SIGMOIDOSCOPY FLEXIBLE;  Surgeon: Margaret Gamble MD;  [facility]    SIGMOIDOSCOPY FLEXIBLE N/A 09/23/2014    Procedure: SIGMOIDOSCOPY FLEXIBLE;  Surgeon: Margaret Gamble MD;  [facility]    SIGMOIDOSCOPY FLEXIBLE N/A 09/26/2014    Procedure: SIGMOIDOSCOPY FLEXIBLE;  Surgeon: Margaret Gamble MD;  [facility]    SIGMOIDOSCOPY FLEXIBLE N/A 09/30/2014    Procedure: SIGMOIDOSCOPY FLEXIBLE;  Surgeon: Margaret Gamble MD;  [facility]    SIGMOIDOSCOPY FLEXIBLE N/A 10/03/2014    Procedure: SIGMOIDOSCOPY FLEXIBLE;  Surgeon: Margaret Gamble MD;  [facility]    SIGMOIDOSCOPY FLEXIBLE N/A 10/30/2014    Procedure: SIGMOIDOSCOPY FLEXIBLE;  Surgeon: Margaret Gamble MD;  [facility]    SIGMOIDOSCOPY FLEXIBLE, PLACEMENT OF DRAINAGE SPONGE  09/30/2014    TAKEDOWN ILEOSTOMY N/A 01/06/2015    Procedure: TAKEDOWN ILEOSTOMY;  Surgeon: Margaret Gamble MD;  [facility]            Medications     Current Outpatient Medications   Medication Sig Dispense Refill    amLODIPine (NORVASC) 10 MG tablet TAKE ONE TABLET BY MOUTH ONCE DAILY 90 tablet 1    aspirin (ASA) 325 MG tablet Take 1 tablet (325 mg) by mouth daily. Do not start before August 29, 2024.      atorvastatin (LIPITOR) 20 MG tablet TAKE ONE  TABLET BY MOUTH ONCE DAILY 90 tablet 0    Cholecalciferol (VITAMIN D-3) 1000 units CAPS Take 1 capsule by mouth daily       insulin glargine (LANTUS SOLOSTAR) 100 UNIT/ML pen INJECT 16 UNITS UNDER THE SKIN AT BEDTIME 30 mL 2    insulin lispro (HUMALOG KWIKPEN) 100 UNIT/ML (1 unit dial) KWIKPEN USE 8 UNITS  PLUS LOW SLIDING SCALE BEFORE EACH MEAL. MAX 50 UNITS PER DAY. 60 mL 1    insulin pen needle (PENTIPS) 31G X 6 MM miscellaneous USE 4 TO 5 TIMES DAILY OR AS DIRECTED FOR SLIDING SCALE INSULIN 500 each 1    losartan (COZAAR) 100 MG tablet TAKE ONE TABLET BY MOUTH ONCE DAILY 90 tablet 1    metoprolol succinate ER (TOPROL XL) 100 MG 24 hr tablet Take 1 tablet (100 mg) by mouth every evening. 90 tablet 3     No current facility-administered medications for this visit.            Family History:     Family History   Problem Relation Age of Onset    Diabetes Mother     Hypertension Mother     Cancer Father     Cancer Maternal Grandmother     Alzheimer Disease Maternal Grandmother     Cardiovascular Paternal Grandmother     Breast Cancer Sister     Colon Cancer No family hx of     Colon Polyps No family hx of     Crohn's Disease No family hx of     Ulcerative Colitis No family hx of     Anesthesia Reaction No family hx of     Melanoma No family hx of             Social History:     Social History     Socioeconomic History    Marital status:      Spouse name: Not on file    Number of children: Not on file    Years of education: Not on file    Highest education level: Not on file   Occupational History     Employer: RETIRED     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 No    Social History Narrative    Not on file     Social Drivers of Health     Financial Resource Strain: Low Risk  (8/22/2024)    Financial Resource Strain     Within the past 12 months, have you or your family members you live with been unable to get utilities (heat, electricity) when it was really needed?: No   Food Insecurity: Low Risk  (8/22/2024)    Food Insecurity     Within the past 12 months, did you worry that your food would run out before you got money to buy more?: No     Within the past 12 months, did the food you bought just not last and you didn t have money to get more?: No   Transportation Needs: Low Risk  (8/22/2024)    Transportation Needs     Within the past 12 months, has lack of transportation kept you from medical appointments, getting your medicines, non-medical meetings or appointments, work, or from getting things that you need?: No   Physical Activity: Not on file   Stress: Not on file   Social Connections: Not on file   Interpersonal Safety: High Risk (8/22/2024)    Interpersonal Safety     Do you feel physically and emotionally safe where you currently live?: No     Within the past 12 months, have you been hit, slapped, kicked or otherwise physically hurt by someone?: No     Within the past 12 months, have you been humiliated or emotionally abused in other ways by your partner or ex-partner?: No   Housing Stability: Low Risk  (8/22/2024)    Housing Stability     Do you have housing? : Yes     Are you worried about losing your housing?: No            Allergies:   Nkda [no known drug allergy]         Review of Systems:  From intake questionnaire   Negative 14 system review except as noted on HPI, nurse's note.        VIRGINIA GASCA PA-C  Department of Urology

## 2025-01-15 ENCOUNTER — VIRTUAL VISIT (OUTPATIENT)
Dept: UROLOGY | Facility: CLINIC | Age: 85
End: 2025-01-15
Payer: COMMERCIAL

## 2025-01-15 DIAGNOSIS — N40.1 BENIGN PROSTATIC HYPERPLASIA WITH INCOMPLETE BLADDER EMPTYING: Primary | ICD-10-CM

## 2025-01-15 DIAGNOSIS — R39.14 BENIGN PROSTATIC HYPERPLASIA WITH INCOMPLETE BLADDER EMPTYING: Primary | ICD-10-CM

## 2025-01-15 NOTE — PROGRESS NOTES
Storm Dutta is a 84 year old who is being evaluated via telephone visit.     What phone number would you like to be contacted at? [phone]    How would you like to obtain your AVS? [place]  Originating Location (pt. Location): Home  Patient does not have access to a computer.     Distant Location (provider location):  Off-site    Phone call duration: 2 minutes

## 2025-01-27 ENCOUNTER — PATIENT OUTREACH (OUTPATIENT)
Dept: CARE COORDINATION | Facility: CLINIC | Age: 85
End: 2025-01-27
Payer: COMMERCIAL

## 2025-03-03 ENCOUNTER — OFFICE VISIT (OUTPATIENT)
Dept: FAMILY MEDICINE | Facility: CLINIC | Age: 85
End: 2025-03-03
Payer: COMMERCIAL

## 2025-03-03 VITALS
SYSTOLIC BLOOD PRESSURE: 162 MMHG | HEART RATE: 53 BPM | RESPIRATION RATE: 16 BRPM | HEIGHT: 68 IN | BODY MASS INDEX: 30.84 KG/M2 | OXYGEN SATURATION: 98 % | WEIGHT: 203.5 LBS | DIASTOLIC BLOOD PRESSURE: 62 MMHG | TEMPERATURE: 97.1 F

## 2025-03-03 DIAGNOSIS — D03.39 MELANOMA IN SITU OF CHEEK (H): ICD-10-CM

## 2025-03-03 DIAGNOSIS — I49.9 IRREGULAR HEART BEAT: ICD-10-CM

## 2025-03-03 DIAGNOSIS — E11.51 TYPE 2 DIABETES MELLITUS WITH DIABETIC PERIPHERAL ANGIOPATHY WITHOUT GANGRENE, WITH LONG-TERM CURRENT USE OF INSULIN (H): ICD-10-CM

## 2025-03-03 DIAGNOSIS — I73.9 PVD (PERIPHERAL VASCULAR DISEASE): ICD-10-CM

## 2025-03-03 DIAGNOSIS — K14.8 TONGUE LESION: ICD-10-CM

## 2025-03-03 DIAGNOSIS — Z00.00 ENCOUNTER FOR MEDICARE ANNUAL WELLNESS EXAM: Primary | ICD-10-CM

## 2025-03-03 DIAGNOSIS — Z85.048 H/O MALIGNANT NEOPLASM OF RECTUM: ICD-10-CM

## 2025-03-03 DIAGNOSIS — I10 HYPERTENSION GOAL BP (BLOOD PRESSURE) < 140/90: ICD-10-CM

## 2025-03-03 DIAGNOSIS — Z79.4 TYPE 2 DIABETES MELLITUS WITH DIABETIC POLYNEUROPATHY, WITH LONG-TERM CURRENT USE OF INSULIN (H): ICD-10-CM

## 2025-03-03 DIAGNOSIS — E11.42 TYPE 2 DIABETES MELLITUS WITH DIABETIC POLYNEUROPATHY, WITH LONG-TERM CURRENT USE OF INSULIN (H): ICD-10-CM

## 2025-03-03 DIAGNOSIS — Z79.4 TYPE 2 DIABETES MELLITUS WITH DIABETIC PERIPHERAL ANGIOPATHY WITHOUT GANGRENE, WITH LONG-TERM CURRENT USE OF INSULIN (H): ICD-10-CM

## 2025-03-03 DIAGNOSIS — N18.31 STAGE 3A CHRONIC KIDNEY DISEASE (H): ICD-10-CM

## 2025-03-03 DIAGNOSIS — I49.3 PVC'S (PREMATURE VENTRICULAR CONTRACTIONS): ICD-10-CM

## 2025-03-03 DIAGNOSIS — E78.5 HYPERLIPIDEMIA LDL GOAL <70: ICD-10-CM

## 2025-03-03 DIAGNOSIS — Z86.006 H/O MELANOMA IN SITU: ICD-10-CM

## 2025-03-03 LAB
ALBUMIN SERPL BCG-MCNC: 4.1 G/DL (ref 3.5–5.2)
ALP SERPL-CCNC: 113 U/L (ref 40–150)
ALT SERPL W P-5'-P-CCNC: 16 U/L (ref 0–70)
ANION GAP SERPL CALCULATED.3IONS-SCNC: 11 MMOL/L (ref 7–15)
AST SERPL W P-5'-P-CCNC: 17 U/L (ref 0–45)
BILIRUB SERPL-MCNC: 0.8 MG/DL
BUN SERPL-MCNC: 29.3 MG/DL (ref 8–23)
CALCIUM SERPL-MCNC: 9.8 MG/DL (ref 8.8–10.4)
CHLORIDE SERPL-SCNC: 104 MMOL/L (ref 98–107)
CHOLEST SERPL-MCNC: 150 MG/DL
CREAT SERPL-MCNC: 1.25 MG/DL (ref 0.67–1.17)
CREAT UR-MCNC: 94.6 MG/DL
EGFRCR SERPLBLD CKD-EPI 2021: 57 ML/MIN/1.73M2
ERYTHROCYTE [DISTWIDTH] IN BLOOD BY AUTOMATED COUNT: 13.8 % (ref 10–15)
EST. AVERAGE GLUCOSE BLD GHB EST-MCNC: 180 MG/DL
FASTING STATUS PATIENT QL REPORTED: YES
FASTING STATUS PATIENT QL REPORTED: YES
GLUCOSE SERPL-MCNC: 192 MG/DL (ref 70–99)
HBA1C MFR BLD: 7.9 % (ref 0–5.6)
HCO3 SERPL-SCNC: 25 MMOL/L (ref 22–29)
HCT VFR BLD AUTO: 37.1 % (ref 40–53)
HDLC SERPL-MCNC: 53 MG/DL
HGB BLD-MCNC: 12.4 G/DL (ref 13.3–17.7)
LDLC SERPL CALC-MCNC: 75 MG/DL
MCH RBC QN AUTO: 29.9 PG (ref 26.5–33)
MCHC RBC AUTO-ENTMCNC: 33.4 G/DL (ref 31.5–36.5)
MCV RBC AUTO: 89 FL (ref 78–100)
MICROALBUMIN UR-MCNC: 282.9 MG/L
MICROALBUMIN/CREAT UR: 299.05 MG/G CR (ref 0–17)
NONHDLC SERPL-MCNC: 97 MG/DL
PLATELET # BLD AUTO: 182 10E3/UL (ref 150–450)
POTASSIUM SERPL-SCNC: 4.5 MMOL/L (ref 3.4–5.3)
PROT SERPL-MCNC: 7.1 G/DL (ref 6.4–8.3)
RBC # BLD AUTO: 4.15 10E6/UL (ref 4.4–5.9)
SODIUM SERPL-SCNC: 140 MMOL/L (ref 135–145)
TRIGL SERPL-MCNC: 110 MG/DL
WBC # BLD AUTO: 7.6 10E3/UL (ref 4–11)

## 2025-03-03 PROCEDURE — 80053 COMPREHEN METABOLIC PANEL: CPT

## 2025-03-03 PROCEDURE — 3078F DIAST BP <80 MM HG: CPT

## 2025-03-03 PROCEDURE — 82043 UR ALBUMIN QUANTITATIVE: CPT

## 2025-03-03 PROCEDURE — 99207 PR FOOT EXAM NO CHARGE: CPT

## 2025-03-03 PROCEDURE — 85027 COMPLETE CBC AUTOMATED: CPT

## 2025-03-03 PROCEDURE — 36415 COLL VENOUS BLD VENIPUNCTURE: CPT

## 2025-03-03 PROCEDURE — 83036 HEMOGLOBIN GLYCOSYLATED A1C: CPT

## 2025-03-03 PROCEDURE — 99214 OFFICE O/P EST MOD 30 MIN: CPT | Mod: 25

## 2025-03-03 PROCEDURE — G0439 PPPS, SUBSEQ VISIT: HCPCS

## 2025-03-03 PROCEDURE — 3077F SYST BP >= 140 MM HG: CPT

## 2025-03-03 PROCEDURE — 82570 ASSAY OF URINE CREATININE: CPT

## 2025-03-03 PROCEDURE — 80061 LIPID PANEL: CPT

## 2025-03-03 RX ORDER — INSULIN GLARGINE 100 [IU]/ML
INJECTION, SOLUTION SUBCUTANEOUS
Qty: 30 ML | Refills: 2 | Status: SHIPPED | OUTPATIENT
Start: 2025-03-03

## 2025-03-03 RX ORDER — METOPROLOL SUCCINATE 100 MG/1
100 TABLET, EXTENDED RELEASE ORAL EVERY EVENING
Qty: 90 TABLET | Refills: 3 | Status: SHIPPED | OUTPATIENT
Start: 2025-03-03

## 2025-03-03 RX ORDER — LOSARTAN POTASSIUM 100 MG/1
100 TABLET ORAL DAILY
Qty: 90 TABLET | Refills: 1 | Status: SHIPPED | OUTPATIENT
Start: 2025-03-03

## 2025-03-03 RX ORDER — PEN NEEDLE, DIABETIC 31 G X1/4"
NEEDLE, DISPOSABLE MISCELLANEOUS
Qty: 500 EACH | Refills: 1 | Status: SHIPPED | OUTPATIENT
Start: 2025-03-03

## 2025-03-03 RX ORDER — ATORVASTATIN CALCIUM 20 MG/1
20 TABLET, FILM COATED ORAL DAILY
Qty: 90 TABLET | Refills: 1 | Status: SHIPPED | OUTPATIENT
Start: 2025-03-03

## 2025-03-03 RX ORDER — AMLODIPINE BESYLATE 10 MG/1
10 TABLET ORAL DAILY
Qty: 90 TABLET | Refills: 1 | Status: SHIPPED | OUTPATIENT
Start: 2025-03-03

## 2025-03-03 RX ORDER — INSULIN LISPRO 100 [IU]/ML
INJECTION, SOLUTION INTRAVENOUS; SUBCUTANEOUS
Qty: 60 ML | Refills: 1 | Status: SHIPPED | OUTPATIENT
Start: 2025-03-03

## 2025-03-03 SDOH — HEALTH STABILITY: PHYSICAL HEALTH: ON AVERAGE, HOW MANY DAYS PER WEEK DO YOU ENGAGE IN MODERATE TO STRENUOUS EXERCISE (LIKE A BRISK WALK)?: 7 DAYS

## 2025-03-03 ASSESSMENT — SOCIAL DETERMINANTS OF HEALTH (SDOH): HOW OFTEN DO YOU GET TOGETHER WITH FRIENDS OR RELATIVES?: TWICE A WEEK

## 2025-03-03 NOTE — PATIENT INSTRUCTIONS
Please send my blood pressure after taking your medications.     Schedule with ENT to followup on the tongue lesion.       Patient Education   Preventive Care Advice   This is general advice given by our system to help you stay healthy. However, your care team may have specific advice just for you. Please talk to your care team about your preventive care needs.  Nutrition  Eat 5 or more servings of fruits and vegetables each day.  Try wheat bread, brown rice and whole grain pasta (instead of white bread, rice, and pasta).  Get enough calcium and vitamin D. Check the label on foods and aim for 100% of the RDA (recommended daily allowance).  Lifestyle  Exercise at least 150 minutes each week  (30 minutes a day, 5 days a week).  Do muscle strengthening activities 2 days a week. These help control your weight and prevent disease.  No smoking.  Wear sunscreen to prevent skin cancer.  Have a dental exam and cleaning every 6 months.  Yearly exams  See your health care team every year to talk about:  Any changes in your health.  Any medicines your care team has prescribed.  Preventive care, family planning, and ways to prevent chronic diseases.  Shots (vaccines)   HPV shots (up to age 26), if you've never had them before.  Hepatitis B shots (up to age 59), if you've never had them before.  COVID-19 shot: Get this shot when it's due.  Flu shot: Get a flu shot every year.  Tetanus shot: Get a tetanus shot every 10 years.  Pneumococcal, hepatitis A, and RSV shots: Ask your care team if you need these based on your risk.  Shingles shot (for age 50 and up)  General health tests  Diabetes screening:  Starting at age 35, Get screened for diabetes at least every 3 years.  If you are younger than age 35, ask your care team if you should be screened for diabetes.  Cholesterol test: At age 39, start having a cholesterol test every 5 years, or more often if advised.  Bone density scan (DEXA): At age 50, ask your care team if you should  have this scan for osteoporosis (brittle bones).  Hepatitis C: Get tested at least once in your life.  STIs (sexually transmitted infections)  Before age 24: Ask your care team if you should be screened for STIs.  After age 24: Get screened for STIs if you're at risk. You are at risk for STIs (including HIV) if:  You are sexually active with more than one person.  You don't use condoms every time.  You or a partner was diagnosed with a sexually transmitted infection.  If you are at risk for HIV, ask about PrEP medicine to prevent HIV.  Get tested for HIV at least once in your life, whether you are at risk for HIV or not.  Cancer screening tests  Cervical cancer screening: If you have a cervix, begin getting regular cervical cancer screening tests starting at age 21.  Breast cancer scan (mammogram): If you've ever had breasts, begin having regular mammograms starting at age 40. This is a scan to check for breast cancer.  Colon cancer screening: It is important to start screening for colon cancer at age 45.  Have a colonoscopy test every 10 years (or more often if you're at risk) Or, ask your provider about stool tests like a FIT test every year or Cologuard test every 3 years.  To learn more about your testing options, visit:   .  For help making a decision, visit:   https://bit.ly/zc05649.  Prostate cancer screening test: If you have a prostate, ask your care team if a prostate cancer screening test (PSA) at age 55 is right for you.  Lung cancer screening: If you are a current or former smoker ages 50 to 80, ask your care team if ongoing lung cancer screenings are right for you.  For informational purposes only. Not to replace the advice of your health care provider. Copyright   2023 [employer]. All rights reserved. Clinically reviewed by the [facility] Transitions Program. [employer] 877036 - REV 01/24.     Patient Education   Preventive Care Advice   This is general advice given by our system  to help you stay healthy. However, your care team may have specific advice just for you. Please talk to your care team about your preventive care needs.  Nutrition  Eat 5 or more servings of fruits and vegetables each day.  Try wheat bread, brown rice and whole grain pasta (instead of white bread, rice, and pasta).  Get enough calcium and vitamin D. Check the label on foods and aim for 100% of the RDA (recommended daily allowance).  Lifestyle  Exercise at least 150 minutes each week  (30 minutes a day, 5 days a week).  Do muscle strengthening activities 2 days a week. These help control your weight and prevent disease.  No smoking.  Wear sunscreen to prevent skin cancer.  Have a dental exam and cleaning every 6 months.  Yearly exams  See your health care team every year to talk about:  Any changes in your health.  Any medicines your care team has prescribed.  Preventive care, family planning, and ways to prevent chronic diseases.  Shots (vaccines)   HPV shots (up to age 26), if you've never had them before.  Hepatitis B shots (up to age 59), if you've never had them before.  COVID-19 shot: Get this shot when it's due.  Flu shot: Get a flu shot every year.  Tetanus shot: Get a tetanus shot every 10 years.  Pneumococcal, hepatitis A, and RSV shots: Ask your care team if you need these based on your risk.  Shingles shot (for age 50 and up)  General health tests  Diabetes screening:  Starting at age 35, Get screened for diabetes at least every 3 years.  If you are younger than age 35, ask your care team if you should be screened for diabetes.  Cholesterol test: At age 39, start having a cholesterol test every 5 years, or more often if advised.  Bone density scan (DEXA): At age 50, ask your care team if you should have this scan for osteoporosis (brittle bones).  Hepatitis C: Get tested at least once in your life.  STIs (sexually transmitted infections)  Before age 24: Ask your care team if you should be screened for  STIs.  After age 24: Get screened for STIs if you're at risk. You are at risk for STIs (including HIV) if:  You are sexually active with more than one person.  You don't use condoms every time.  You or a partner was diagnosed with a sexually transmitted infection.  If you are at risk for HIV, ask about PrEP medicine to prevent HIV.  Get tested for HIV at least once in your life, whether you are at risk for HIV or not.  Cancer screening tests  Cervical cancer screening: If you have a cervix, begin getting regular cervical cancer screening tests starting at age 21.  Breast cancer scan (mammogram): If you've ever had breasts, begin having regular mammograms starting at age 40. This is a scan to check for breast cancer.  Colon cancer screening: It is important to start screening for colon cancer at age 45.  Have a colonoscopy test every 10 years (or more often if you're at risk) Or, ask your provider about stool tests like a FIT test every year or Cologuard test every 3 years.  To learn more about your testing options, visit:   .  For help making a decision, visit:   https://bit.ly/di00340.  Prostate cancer screening test: If you have a prostate, ask your care team if a prostate cancer screening test (PSA) at age 55 is right for you.  Lung cancer screening: If you are a current or former smoker ages 50 to 80, ask your care team if ongoing lung cancer screenings are right for you.  For informational purposes only. Not to replace the advice of your health care provider. Copyright   2023 [employer]. All rights reserved. Clinically reviewed by the [facility] Transitions Program. [employer] [id number] - REV 01/24.

## 2025-03-03 NOTE — PROGRESS NOTES
Preventive Care Visit  [facility] NASRA Bustos CNP, Family Medicine  Mar 3, 2025      Assessment & Plan     Encounter for Medicare annual wellness exam  Pt declines vaccines. Followup as indicated below.   - CBC with platelets    Type 2 diabetes mellitus with diabetic peripheral angiopathy without gangrene, with long-term current use of insulin (H)  Pt A1C has increased to 7.9 today. Insulin controlled, not having any lows. This is creeping up but currently still permissible given age. I will hold off on dose adjustments and recheck in 3 months.     - HEMOGLOBIN A1C  - Lipid panel reflex to direct LDL Non-fasting  - insulin glargine (LANTUS SOLOSTAR) 100 UNIT/ML pen  Dispense: 30 mL; Refill: 2  - insulin lispro (HUMALOG KWIKPEN) 100 UNIT/ML (1 unit dial) KWIKPEN  Dispense: 60 mL; Refill: 1  - insulin pen needle (PENTIPS) 31G X 6 MM miscellaneous  Dispense: 500 each; Refill: 1    Hypertension goal BP (blood pressure) < 140/90  Significantly elevated today, but pt has not taken medication. Pt instructed to send [employer] message with bp once he has taken medication. If elevated, will adjust meds.   - Albumin Random Urine Quantitative with Creat Ratio  - amLODIPine (NORVASC) 10 MG tablet  Dispense: 90 tablet; Refill: 1  - losartan (COZAAR) 100 MG tablet  Dispense: 90 tablet; Refill: 1  - Comprehensive metabolic panel (BMP + Alb, Alk Phos, ALT, AST, Total. Bili, TP)    Hyperlipidemia LDL goal <70  Check labs today  - atorvastatin (LIPITOR) 20 MG tablet  Dispense: 90 tablet; Refill: 1      PVC's (premature ventricular contractions)  Refills provided  - metoprolol succinate ER (TOPROL XL) 100 MG 24 hr tablet  Dispense: 90 tablet; Refill: 3    Irregular heart beat  Refills provided. Denies chest pain/palpitations.   - metoprolol succinate ER (TOPROL XL) 100 MG 24 hr tablet  Dispense: 90 tablet; Refill: 3    Tongue lesion  Pt has 4-5 mm raised dark lesion on R side of tongue. No pain. Referral placed  "to ENT for evaluation.   - Adult ENT  Referral    H/O melanoma in situ   Pt has some AK on forehead, follows with derm every 6 months for treatment    H/O malignant neoplasm of rectum   Pt has colonoscopy scheduled for later this year    Stage 3a chronic kidney disease (H)   Stable, continue to monitor    PVD (peripheral vascular disease)   Pt due for cardiology visit, no current symptoms. Encouraged pt to followup        BMI  Estimated body mass index is 30.95 kg/m  as calculated from the following:    Height as of this encounter: 1.727 m (5' 7.99\").    Weight as of this encounter: 92.3 kg (203 lb 8 oz).   Weight management plan: Discussed healthy diet and exercise guidelines    Counseling  Appropriate preventive services were addressed with this patient via screening, questionnaire, or discussion as appropriate for fall prevention, nutrition, physical activity, Tobacco-use cessation, social engagement, weight loss and cognition.  Checklist reviewing preventive services available has been given to the patient.  Reviewed patient's diet, addressing concerns and/or questions.   The patient was instructed to see the dentist every 6 months.           Elsa Aburto is a 84 year old, presenting for the following:  Physical        3/3/2025     9:49 AM   Additional Questions   Roomed by Kristina COLE   Accompanied by wife           History of Present Illness       Reason for visit:  A1c   He is taking medications regularly.    Here for a physical and med refill    Hx of DM2 controlled with insulin, htn, hld, melanoma, pvd, ckd, rectal cancer    Highest fasting 134   Lowest 84    Pt checking glucose and using sliding scale at each meal, and using 16 unit(s) lantus at night      No alcohol, no nicotine    Sees dermatologist every 6 months for skin checks  Will get colonoscopy later this year for rectal cancer monitoring.     Well balanced diet    Advance Care Planning  Patient has a Health Care Directive on " file  Advance care planning document is on file and is current.      3/3/2025   General Health   How would you rate your overall physical health? Good   Feel stress (tense, anxious, or unable to sleep) Not at all         3/3/2025   Nutrition   Diet: Diabetic         3/3/2025   Exercise   Days per week of moderate/strenous exercise 7 days         3/3/2025   Social Factors   Frequency of gathering with friends or relatives Twice a week   Worry food won't last until get money to buy more No   Food not last or not have enough money for food? No   Do you have housing? (Housing is defined as stable permanent housing and does not include staying ouside in a car, in a tent, in an abandoned building, in an overnight shelter, or couch-surfing.) No   Are you worried about losing your housing? No   Lack of transportation? No   Unable to get utilities (heat,electricity)? No   Want help with housing or utility concern? No   (!) HOUSING CONCERN PRESENT      3/3/2025   Fall Risk   Fallen 2 or more times in the past year? No   Trouble with walking or balance? No          3/3/2025   Activities of Daily Living- Home Safety   Needs help with the following daily activites None of the above   Safety concerns in the home None of the above         3/3/2025   Dental   Dentist two times every year? (!) NO         3/3/2025   Hearing Screening   Hearing concerns? None of the above         3/3/2025   Driving Risk Screening   Patient/family members have concerns about driving No         3/3/2025   General Alertness/Fatigue Screening   Have you been more tired than usual lately? No         3/3/2025   Urinary Incontinence Screening   Bothered by leaking urine in past 6 months No            Today's PHQ-2 Score:       3/3/2025     9:48 AM   PHQ-2 ( 1999 [employer])   Q1: Little interest or pleasure in doing things 0   Q2: Feeling down, depressed or hopeless 0   PHQ-2 Score 0    Q1: Little interest or pleasure in doing things Not at all   Q2: Feeling  down, depressed or hopeless Not at all   PHQ-2 Score 0       Patient-reported           3/3/2025   Substance Use   Alcohol more than 3/day or more than 7/wk No   Do you have a current opioid prescription? No   How severe/bad is pain from 1 to 10? 0/10 (No Pain)   Do you use any other substances recreationally? No     Social History     Tobacco Use    Smoking status: Former     Types: Cigars    Smokeless tobacco: Never   Substance Use Topics    Alcohol use: Not Currently    Drug use: Never               Reviewed and updated as needed this visit by Provider                    Current providers sharing in care for this patient include:  Patient Care Team:  Jovana Beaulieu DO as PCP - General (Family Practice)  Margaret Gamble MD as MD (Colon and Rectal Surgery)  Jovana Beaulieu DO as Assigned PCP  Rosio Fierro APRN CNP as Nurse Practitioner (Cardiovascular Disease)  Dwaine Reilly MD as MD (Cardiovascular Disease)  Ashlee Harris PA-C as Physician Assistant (Dermatology)  Jesus Neal MD as MD (Dermatology)  Jesus Neal MD as Assigned Surgical Provider  Francisco Hadry MD as Assigned Heart and Vascular Provider    The following health maintenance items are reviewed in Epic and correct as of today:  Health Maintenance   Topic Date Due    ZOSTER IMMUNIZATION (1 of 2) Never done    RSV VACCINE (1 - 1-dose 75+ series) Never done    MEDICARE ANNUAL WELLNESS VISIT  07/31/2024    DIABETIC FOOT EXAM  07/31/2024    INFLUENZA VACCINE (1) 09/01/2024    COVID-19 Vaccine (6 - 2024-25 season) 09/01/2024    A1C  02/08/2025    LIPID  02/13/2025    ANNUAL REVIEW OF HM ORDERS  08/08/2025    MICROALBUMIN  08/08/2025    BMP  08/23/2025    HEMOGLOBIN  08/23/2025    COLORECTAL CANCER SCREENING  10/11/2025    EYE EXAM  10/16/2025    FALL RISK ASSESSMENT  03/03/2026    ADVANCE CARE PLANNING  07/31/2028    DTAP/TDAP/TD IMMUNIZATION (2 - Td or Tdap) 06/03/2032    PHQ-2 (once  "per calendar year)  Completed    Pneumococcal Vaccine: 50+ Years  Completed    URINALYSIS  Completed    HPV IMMUNIZATION  Aged Out    MENINGITIS IMMUNIZATION  Aged Out          Objective    Exam  BP (!) 173/75   Pulse 53   Temp 97.1  F (36.2  C) (Tympanic)   Resp 16   Ht 1.727 m (5' 7.99\")   Wt 92.3 kg (203 lb 8 oz)   SpO2 98%   BMI 30.95 kg/m     Estimated body mass index is 30.95 kg/m  as calculated from the following:    Height as of this encounter: 1.727 m (5' 7.99\").    Weight as of this encounter: 92.3 kg (203 lb 8 oz).    Physical Exam  GENERAL: alert and no distress  EYES: Eyes grossly normal to inspection, PERRL and conjunctivae and sclerae normal  HENT: +4mm circular raised dark lesion on tongue on R side. ear canals and TM's normal, nose and mouth without ulcers or lesions  NECK: no adenopathy, no asymmetry, masses, or scars  RESP: lungs clear to auscultation - no rales, rhonchi or wheezes  CV: regular rate and rhythm, normal S1 S2, no S3 or S4, no murmur, click or rub, no peripheral edema  ABDOMEN: soft, nontender, no hepatosplenomegaly, no masses and bowel sounds normal  MS: no gross musculoskeletal defects noted, no edema  SKIN: +AK no suspicious lesions or rashes  NEURO: Normal strength and tone, mentation intact and speech normal  PSYCH: mentation appears normal, affect normal/bright  Diabetic foot exam: normal DP and PT pulses, trophic changes bilaterally, amputated digit R foot, and decreased sensation L foot distally. No ulcerations or wounds.          3/3/2025   Mini Cog   Clock Draw Score 2 Normal   3 Item Recall 3 objects recalled   Mini Cog Total Score 5              Signed Electronically by: NASRA Fernandes CNP    "

## 2025-03-04 ENCOUNTER — TELEPHONE (OUTPATIENT)
Dept: OTOLARYNGOLOGY | Facility: CLINIC | Age: 85
End: 2025-03-04
Payer: COMMERCIAL

## 2025-03-04 NOTE — TELEPHONE ENCOUNTER
Left message with callback number to schedule patient on 4/1 at 1145am in [place].    Ashlee Li RN Care Coordinator, [facility] 03/04/25 9:59 AM

## 2025-03-04 NOTE — TELEPHONE ENCOUNTER
Spoke with patient and changed appointment.    Ashlee Li RN Care Coordinator, [facility] 03/04/25 2:23 PM

## 2025-03-04 NOTE — TELEPHONE ENCOUNTER
This encounter is being sent to inform the clinic that this patient has a referral from Renae Goff for the diagnoses of     K14.8 (ICD-10-CM) - Tongue lesion    and has requested that this patient be seen within 1-2 weeks and/or with unknown.  Based on the availability of our provider(s), we are unable to accommodate this request.    Were all sites offered this patient?  Yes    Does scheduling algorithm request to schedule next available?  Patient has been scheduled for the first available opening with Dr Saenz on 6/10/25.  We have informed the patient that the clinic will review their referral and reach out if a sooner appointment is medically necessary.

## 2025-03-30 NOTE — PROGRESS NOTES
Chief Complaint - oral lesion    History of Present Illness - Storm Dutta is a 84 year old male presents with a lesion on the tongue. It was first noticed 3/3/25 by his primary. He hasn't seen a dentist in a long time. The patient was not aware of this. It hasn't been changing in size. It isn't painful. No citrus or spicy intolerance. No bleeding. Hasn't smoked in decades. No history of chewing tobacco. No lumps or swollen glands in the neck. I personally reviewed the relevant clinical notes in Epic including the primary care providers note.       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 Do not start before August 29, 2024., Disp: , Rfl:     atorvastatin (LIPITOR) 20 MG tablet, Take 1 tablet (20 mg) by mouth daily., Disp: 90 tablet, Rfl: 1    Cholecalciferol (VITAMIN D-3) 1000 units CAPS, Take 1 capsule by mouth daily , Disp: , Rfl:     insulin glargine (LANTUS SOLOSTAR) 100 UNIT/ML pen, INJECT 16 UNITS UNDER THE SKIN AT BEDTIME, Disp: 30 mL, Rfl: 2    insulin lispro (HUMALOG KWIKPEN) 100 UNIT/ML (1 unit dial) KWIKPEN, USE  8 UNITS PLUS LOW SCALE BEFORE EACH MEAL. MAX 50 UNITS PER DAY, Disp: 60 mL, Rfl: 1    insulin pen needle (PENTIPS) 31G X 6 MM miscellaneous, Use 4 pen needles daily or as directed., Disp: 500 each, Rfl: 1    losartan (COZAAR) 100 MG tablet, Take 1 tablet (100 mg) by mouth daily., Disp: 90 tablet, Rfl: 1    metoprolol succinate ER (TOPROL XL) 100 MG 24 hr tablet, Take 1 tablet (100 mg) by mouth every evening., Disp: 90 tablet, Rfl: 3    Allergies -   Allergies   Allergen Reactions    Nkda [No Known Drug Allergy]        Social History -   Social History     Socioeconomic History    Marital status:    Occupational History     Employer: RETIRED     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 No     Social Drivers of Health     Financial Resource Strain: Low Risk  (3/3/2025)    Financial Resource Strain     Within the past 12 months, have you or your family members you live with been unable to get utilities (heat, electricity) when it was really needed?: No   Food Insecurity: Low Risk  (3/3/2025)    Food Insecurity     Within the past 12 months, did you worry that your food would run out before you got money to buy more?: No     Within the past 12 months, did the food you bought just not last and you didn t have money to get more?: No   Transportation Needs: Low Risk  (3/3/2025)    Transportation Needs     Within the past 12 months, has lack of transportation kept you from medical appointments, getting your medicines, non-medical meetings or appointments, work, or from getting things that you need?: No   Physical Activity: Unknown (3/3/2025)    Exercise Vital Sign     Days of  Exercise per Week: 7 days   Stress: No Stress Concern Present (3/3/2025)    [employer] - Occupational Stress Questionnaire     Feeling of Stress : Not at all   Social Connections: Unknown (3/3/2025)    Social Connection and Isolation Panel [NHANES]     Frequency of Social Gatherings with Friends and Family: Twice a week   Interpersonal Safety: High Risk (8/22/2024)    Interpersonal Safety     Do you feel physically and emotionally safe where you currently live?: No     Within the past 12 months, have you been hit, slapped, kicked or otherwise physically hurt by someone?: No     Within the past 12 months, have you been humiliated or emotionally abused in other ways by your partner or ex-partner?: No   Housing Stability: High Risk (3/3/2025)    Housing Stability     Do you have housing? : No     Are you worried about losing your housing?: No       Family History -   Family History   Problem Relation Age of Onset    Diabetes Mother     Hypertension Mother     Cancer Father     Breast Cancer Sister     Cancer Maternal Grandmother     Alzheimer Disease Maternal Grandmother     Cardiovascular Paternal Grandmother     Colon Cancer No family hx of     Colon Polyps No family hx of     Crohn's Disease No family hx of     Ulcerative Colitis No family hx of     Anesthesia Reaction No family hx of     Melanoma No family hx of        Physical Exam  General - The patient is in no distress. Alert, answers questions and cooperates with examination appropriately.   Voice and Breathing - The patient was breathing comfortably without the use of accessory muscles. There was no wheezing, stridor, or stertor.  The patients voice was clear and strong.  Eyes - Extraocular movements intact. Sclera were not icteric or injected, conjunctiva were pink and moist.  Neurologic - Cranial nerves II-XII are grossly intact. Specifically, the facial nerve is intact, House-Brackmann grade 1 of 6.   Mouth - Examination of  the oral cavity showed a 3 mm vascular lesion located on the right dorsal tongue. It is likely an angioma or venous malformation. No true mass. No other lesions noted. The tongue was mobile and protrudes midline.   Neck -  Soft. Non-tender. Palpation of the occipital, submental, submandibular, internal jugular chain, and supraclavicular nodes did not demonstrate any abnormal lymph nodes or masses. The parotid glands were without masses. Palpation of the thyroid was soft and smooth, with no nodules or goiter appreciated.  The trachea was midline.    A/P -     ICD-10-CM    1. Tongue lesion  K14.8 Adult ENT [facility] Referral          Storm Dutta is a 84 year old male with a lesion on the right dorsal tongue that looks like a vascular lesion possibly venous malformation versus angioma.  He has a much smaller 1 adjacent to this, and similar patterns on the lateral ventral aspects of the tongue.  I reassured him there is nothing ominous.  If anything changes he can follow-up.  No biopsy needed at this time.      Fito Saenz MD  Otolaryngology  [facility]

## 2025-04-01 ENCOUNTER — OFFICE VISIT (OUTPATIENT)
Dept: OTOLARYNGOLOGY | Facility: CLINIC | Age: 85
End: 2025-04-01
Payer: COMMERCIAL

## 2025-04-01 VITALS
RESPIRATION RATE: 18 BRPM | DIASTOLIC BLOOD PRESSURE: 67 MMHG | HEART RATE: 63 BPM | OXYGEN SATURATION: 94 % | SYSTOLIC BLOOD PRESSURE: 145 MMHG

## 2025-04-01 DIAGNOSIS — K14.8 TONGUE LESION: ICD-10-CM

## 2025-04-01 PROCEDURE — 3077F SYST BP >= 140 MM HG: CPT | Performed by: OTOLARYNGOLOGY

## 2025-04-01 PROCEDURE — 99203 OFFICE O/P NEW LOW 30 MIN: CPT | Performed by: OTOLARYNGOLOGY

## 2025-04-01 PROCEDURE — 3078F DIAST BP <80 MM HG: CPT | Performed by: OTOLARYNGOLOGY

## 2025-04-01 NOTE — LETTER
4/1/2025      Storm Dutta  [address] [place] [address]      Dear Colleague,    Thank you for referring your patient, Storm Dutta, to the [facility]. Please see a copy of my visit note below.    Chief Complaint - oral lesion    History of Present Illness - Storm Dutta is a 84 year old male presents with a lesion on the tongue. It was first noticed 3/3/25 by his primary. He hasn't seen a dentist in a long time. The patient was not aware of this. It hasn't been changing in size. It isn't painful. No citrus or spicy intolerance. No bleeding. Hasn't smoked in decades. No history of chewing tobacco. No lumps or swollen glands in the neck. I personally reviewed the relevant clinical notes in Epic including the primary care providers note.       Past Medical History -   Patient Active Problem List   Diagnosis     Hyperlipidemia LDL goal <70     Type 2 diabetes mellitus with diabetic polyneuropathy (H)     Hypertension goal BP (blood pressure) < 140/90     Hypertrophy of prostate with urinary obstruction     Senile nuclear sclerosis     Small bowel obstruction, recurrent     Type 2 diabetes mellitus with diabetic chronic kidney disease (H)     Type 2 diabetes mellitus (H)     CKD (chronic kidney disease) stage 3, GFR 30-59 ml/min (H)     History of amputation of lesser toe of right foot     PVD (peripheral vascular disease)     H/O malignant neoplasm of rectum     Status post coronary angiogram     H/O melanoma in situ     BPH (benign prostatic hyperplasia)       Current Medications -   Current Outpatient Medications:      amLODIPine (NORVASC) 10 MG tablet, Take 1 tablet (10 mg) by mouth daily., Disp: 90 tablet, Rfl: 1     aspirin (ASA) 325 MG tablet, Take 1 tablet (325 mg) by mouth daily. Do not start before August 29, 2024., Disp: , Rfl:      atorvastatin (LIPITOR) 20 MG tablet, Take 1 tablet (20 mg) by mouth daily., Disp: 90 tablet, Rfl: 1     Cholecalciferol (VITAMIN  D-3) 1000 units CAPS, Take 1 capsule by mouth daily , Disp: , Rfl:      insulin glargine (LANTUS SOLOSTAR) 100 UNIT/ML pen, INJECT 16 UNITS UNDER THE SKIN AT BEDTIME, Disp: 30 mL, Rfl: 2     insulin lispro (HUMALOG KWIKPEN) 100 UNIT/ML (1 unit dial) KWIKPEN, USE 8 UNITS PLUS LOW SCALE BEFORE EACH MEAL. MAX 50 UNITS PER DAY, Disp: 60 mL, Rfl: 1     insulin pen needle (PENTIPS) 31G X 6 MM miscellaneous, Use 4 pen needles daily or as directed., Disp: 500 each, Rfl: 1     losartan (COZAAR) 100 MG tablet, Take 1 tablet (100 mg) by mouth daily., Disp: 90 tablet, Rfl: 1     metoprolol succinate ER (TOPROL XL) 100 MG 24 hr tablet, Take 1 tablet (100 mg) by mouth every evening., Disp: 90 tablet, Rfl: 3    Allergies -   Allergies   Allergen Reactions     Nkda [No Known Drug Allergy]        Social History -   Social History     Socioeconomic History     Marital status:    Occupational History     Employer: RETIRED     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 No     Social Drivers of Health     Financial Resource Strain: Low Risk  (3/3/2025)    Financial Resource Strain      Within the past 12 months, have you or your family members you live with been unable to get utilities (heat, electricity) when it was really needed?: No   Food Insecurity: Low Risk  (3/3/2025)    Food Insecurity      Within the past 12 months, did you worry that your food would run out before you got money to buy more?: No      Within the past 12 months, did the food you bought just not last and you didn t have money to get more?: No   Transportation Needs: Low Risk  (3/3/2025)    Transportation Needs       Within the past 12 months, has lack of transportation kept you from medical appointments, getting your medicines, non-medical meetings or appointments, work, or from getting things that you need?: No   Physical Activity: Unknown (3/3/2025)    Exercise Vital Sign      Days of Exercise per Week: 7 days   Stress: No Stress Concern Present (3/3/2025)    [place] [place] [employer] - Occupational Stress Questionnaire      Feeling of Stress : Not at all   Social Connections: Unknown (3/3/2025)    Social Connection and Isolation Panel [NHANES]      Frequency of Social Gatherings with Friends and Family: Twice a week   Interpersonal Safety: High Risk (8/22/2024)    Interpersonal Safety      Do you feel physically and emotionally safe where you currently live?: No      Within the past 12 months, have you been hit, slapped, kicked or otherwise physically hurt by someone?: No      Within the past 12 months, have you been humiliated or emotionally abused in other ways by your partner or ex-partner?: No   Housing Stability: High Risk (3/3/2025)    Housing Stability      Do you have housing? : No      Are you worried about losing your housing?: No       Family History -   Family History   Problem Relation Age of Onset     Diabetes Mother      Hypertension Mother      Cancer Father      Breast Cancer Sister      Cancer Maternal Grandmother      Alzheimer Disease Maternal Grandmother      Cardiovascular Paternal Grandmother      Colon Cancer No family hx of      Colon Polyps No family hx of      Crohn's Disease No family hx of      Ulcerative Colitis No family hx of      Anesthesia Reaction No family hx of      Melanoma No family hx of        Physical Exam  General - The patient is in no distress. Alert, answers questions and cooperates with examination appropriately.   Voice and Breathing - The patient was breathing comfortably without the use of accessory muscles. There was no wheezing, stridor, or stertor.  The  patients voice was clear and strong.  Eyes - Extraocular movements intact. Sclera were not icteric or injected, conjunctiva were pink and moist.  Neurologic - Cranial nerves II-XII are grossly intact. Specifically, the facial nerve is intact, House-Brackmann grade 1 of 6.   Mouth - Examination of the oral cavity showed a 3 mm vascular lesion located on the right dorsal tongue. It is likely an angioma or venous malformation. No true mass. No other lesions noted. The tongue was mobile and protrudes midline.   Neck -  Soft. Non-tender. Palpation of the occipital, submental, submandibular, internal jugular chain, and supraclavicular nodes did not demonstrate any abnormal lymph nodes or masses. The parotid glands were without masses. Palpation of the thyroid was soft and smooth, with no nodules or goiter appreciated.  The trachea was midline.    A/P -     ICD-10-CM    1. Tongue lesion  K14.8 Adult ENT [facility] Referral          Storm Dutta is a 84 year old male with a lesion on the right dorsal tongue that looks like a vascular lesion possibly venous malformation versus angioma.  He has a much smaller 1 adjacent to this, and similar patterns on the lateral ventral aspects of the tongue.  I reassured him there is nothing ominous.  If anything changes he can follow-up.  No biopsy needed at this time.      Fito Saenz MD  Otolaryngology  [facility]      Again, thank you for allowing me to participate in the care of your patient.        Sincerely,        Fito Saenz MD    Electronically signed

## 2025-04-04 ENCOUNTER — MYC MEDICAL ADVICE (OUTPATIENT)
Dept: FAMILY MEDICINE | Facility: CLINIC | Age: 85
End: 2025-04-04
Payer: COMMERCIAL

## 2025-04-14 ENCOUNTER — OFFICE VISIT (OUTPATIENT)
Dept: FAMILY MEDICINE | Facility: CLINIC | Age: 85
End: 2025-04-14
Payer: COMMERCIAL

## 2025-04-14 ENCOUNTER — ANCILLARY PROCEDURE (OUTPATIENT)
Dept: GENERAL RADIOLOGY | Facility: CLINIC | Age: 85
End: 2025-04-14
Attending: FAMILY MEDICINE
Payer: COMMERCIAL

## 2025-04-14 VITALS
SYSTOLIC BLOOD PRESSURE: 138 MMHG | RESPIRATION RATE: 16 BRPM | TEMPERATURE: 96.6 F | DIASTOLIC BLOOD PRESSURE: 60 MMHG | BODY MASS INDEX: 31.03 KG/M2 | HEART RATE: 54 BPM | OXYGEN SATURATION: 98 % | WEIGHT: 204 LBS

## 2025-04-14 DIAGNOSIS — M54.42 ACUTE MIDLINE LOW BACK PAIN WITH LEFT-SIDED SCIATICA: ICD-10-CM

## 2025-04-14 DIAGNOSIS — M79.605 PAIN IN BOTH LOWER EXTREMITIES: Primary | ICD-10-CM

## 2025-04-14 DIAGNOSIS — M54.41 ACUTE MIDLINE LOW BACK PAIN WITH RIGHT-SIDED SCIATICA: ICD-10-CM

## 2025-04-14 DIAGNOSIS — M79.604 PAIN IN BOTH LOWER EXTREMITIES: Primary | ICD-10-CM

## 2025-04-14 DIAGNOSIS — R26.89 BALANCE PROBLEMS: ICD-10-CM

## 2025-04-14 PROCEDURE — 99214 OFFICE O/P EST MOD 30 MIN: CPT | Performed by: FAMILY MEDICINE

## 2025-04-14 PROCEDURE — 3078F DIAST BP <80 MM HG: CPT | Performed by: FAMILY MEDICINE

## 2025-04-14 PROCEDURE — 72100 X-RAY EXAM L-S SPINE 2/3 VWS: CPT | Mod: TC | Performed by: RADIOLOGY

## 2025-04-14 PROCEDURE — 3075F SYST BP GE 130 - 139MM HG: CPT | Performed by: FAMILY MEDICINE

## 2025-04-14 RX ORDER — IBUPROFEN 200 MG
400 TABLET ORAL EVERY 6 HOURS PRN
COMMUNITY

## 2025-04-14 RX ORDER — LANOLIN ALCOHOL/MO/W.PET/CERES
1000 CREAM (GRAM) TOPICAL DAILY
COMMUNITY

## 2025-04-14 RX ORDER — GABAPENTIN 100 MG/1
100 CAPSULE ORAL AT BEDTIME
Qty: 90 CAPSULE | Refills: 1 | Status: SHIPPED | OUTPATIENT
Start: 2025-04-14

## 2025-04-14 NOTE — PROGRESS NOTES
Assessment & Plan       ICD-10-CM    1. Pain in both lower extremities  M79.604 gabapentin (NEURONTIN) 100 MG capsule    M79.605       2. Acute midline low back pain with right-sided sciatica  M54.41 XR Lumbar Spine 2/3 Views      3. Acute midline low back pain with left-sided sciatica  M54.42 XR Lumbar Spine 2/3 Views      4. Balance problems  R26.89           Pain in both lower extremities:  - Likely due to overuse from increased activity, such as inventory work involving reaching and bending. Strength is intact, and there is no loss of sensation. No immediate need for MRI; X-ray recommended to check the bony structure of the lower back and pelvis.  - Prescribe gabapentin, starting with 100 mg at bedtime for 3 days, then increasing to 200 mg for 3 days, and finally to 300 mg if tolerated. Order an X-ray of the lower back and pelvis. Provide exercises for balance improvement.  - Risks and side effects: gabapentin may cause drowsiness or grogginess, which could affect balance.     Reviewed caution with ibuprofen use due to decreased kidney function.  Pt is aware but does not find tylenol helpful.  Plans to continue ibuprofen.  Encourage PT for both leg pain and balance.  Pt declines.  He does not wish to visit with PT.    Patient Instructions   There are 2 videos attached which show the balance exercises done in PT.  I still think it would be a good idea to visit with them in person as well!  Let me know if you change your mind about the referral    Please let me know if you have any difficulty with the gabapentin.  Hopefully it will help with your pain in the next week or so.    If you are still having pain after 3-4 weeks please let me know        Elsa Aburto is a 84 year old, presenting for the following health issues:  Musculoskeletal Problem        4/14/2025     9:10 AM   Additional Questions   Roomed by Rosalba WINSLOW   Accompanied by Wife      History of Present Illness       Back Pain:  He presents for  follow up of back pain. Patient's back pain is a new problem.    Original cause of back pain: not sure  First noticed back pain: 1-4 weeks ago  Patient feels back pain: dailyLocation of back pain:  Right hip and left hip  Description of back pain: dull ache  Back pain spreads: right thigh, left thigh and left knee    Since patient first noticed back pain, pain is: gradually improving  Does back pain interfere with his job:  Not applicable  On a scale of 1-10 (10 being the worst), patient describes pain as:  5  What makes back pain worse: standing   Acupuncture: not tried  Acetaminophen: not tried  Activity or exercise: not tried  Chiropractor:  Not tried  Cold: not tried  Heat: not tried  Massage: not tried  Muscle relaxants: not tried  NSAIDS: helpful  Opioids: not tried  Physical Therapy: not tried  Rest: helpful  Steroid Injection: not tried  Stretching: not tried  Surgery: not tried  TENS unit: not tried  Topical pain relievers: not tried  Other healthcare providers patient is seeing for back pain: None   He is taking medications regularly.       - Symptoms began a few weeks ago and have been improving.  - Experiences difficulty walking upon getting out of bed due to pain, initially in the hip area, progressing to knees and down to ankles.  - Unsteadiness in the morning until he is able to get up and walk a bit with assistance from a cane or walker.  Does better if he sleeps in his recliner, then pain and unsteadiness is not as bad in the morning.  - Pain and unsteadiness improve with ibuprofen, taken in the morning.  - Symptoms worsen again later in the day, typically late afternoon.  - Pain is not present when sitting, but occurs when lying flat or standing upright.  - Bending over exacerbates leg pain, particularly in the back of the legs.  - No numbness or tingling reported, only pain.  No pain into the feet, pain is basically from the lower buttocks to the ankles leydi  - No history of similar pain in the  past.  - Previous use of a walker around the house when this first began due to increased pain and unsteadiness, but no longer needed as symptoms have improved.  - Balance issues have been a long-standing problem, unrelated to current leg pain.       Objective    /60   Pulse 54   Temp (!) 96.6  F (35.9  C) (Tympanic)   Resp 16   Wt 92.5 kg (204 lb)   SpO2 98%   BMI 31.03 kg/m    Body mass index is 31.03 kg/m .  Physical Exam   PE:  VS as above   Gen:  WN/WD/WH male in NAD   MSK:  no midline lumbar spin pain, tender at the top of the sacrum midline.  No SI or piriformis tenderness.  LE strength intact at 5/5 leydi, sensation intact to light touch.    X-ray lumbar spine pending        Signed Electronically by: Jovana Beaulieu DO

## 2025-04-14 NOTE — PATIENT INSTRUCTIONS
There are 2 videos attached which show the balance exercises done in PT.  I still think it would be a good idea to visit with them in person as well!  Let me know if you change your mind about the referral    Please let me know if you have any difficulty with the gabapentin.  Hopefully it will help with your pain in the next week or so.    If you are still having pain after 3-4 weeks please let me know

## 2025-05-21 ENCOUNTER — TRANSFERRED RECORDS (OUTPATIENT)
Dept: HEALTH INFORMATION MANAGEMENT | Facility: CLINIC | Age: 85
End: 2025-05-21
Payer: COMMERCIAL

## 2025-05-21 LAB — RETINOPATHY: POSITIVE

## 2025-07-09 ENCOUNTER — OFFICE VISIT (OUTPATIENT)
Dept: DERMATOLOGY | Facility: CLINIC | Age: 85
End: 2025-07-09
Payer: COMMERCIAL

## 2025-07-09 DIAGNOSIS — L81.4 LENTIGO: ICD-10-CM

## 2025-07-09 DIAGNOSIS — Z85.828 HISTORY OF SKIN CANCER: ICD-10-CM

## 2025-07-09 DIAGNOSIS — D22.9 MULTIPLE BENIGN NEVI: Primary | ICD-10-CM

## 2025-07-09 DIAGNOSIS — Z86.006 HISTORY OF MELANOMA IN SITU: ICD-10-CM

## 2025-07-09 DIAGNOSIS — L82.1 SEBORRHEIC KERATOSES: ICD-10-CM

## 2025-07-09 DIAGNOSIS — D18.01 ANGIOMA OF SKIN: ICD-10-CM

## 2025-07-09 DIAGNOSIS — B35.3 TINEA PEDIS OF BOTH FEET: ICD-10-CM

## 2025-07-09 DIAGNOSIS — D23.9 DERMAL NEVUS: ICD-10-CM

## 2025-07-09 PROCEDURE — 99213 OFFICE O/P EST LOW 20 MIN: CPT | Performed by: DERMATOLOGY

## 2025-07-09 PROCEDURE — G2211 COMPLEX E/M VISIT ADD ON: HCPCS | Performed by: DERMATOLOGY

## 2025-07-09 RX ORDER — CICLOPIROX OLAMINE 7.7 MG/G
CREAM TOPICAL AT BEDTIME
Qty: 60 G | Refills: 6 | Status: SHIPPED | OUTPATIENT
Start: 2025-07-09

## 2025-07-09 NOTE — LETTER
7/9/2025      Storm Dutta  [address] [place] [address]      Dear Colleague,    Thank you for referring your patient, Storm Dutta, to the [facility]. Please see a copy of my visit note below.    Storm Dutta is an extremely pleasant 84 year old year old male patient here today for hx of non-melanoma and melanoma skin cancer.  He notes rash on feet.  Patient has no other skin complaints today.  Remainder of the HPI, Meds, PMH, Allergies, FH, and SH was reviewed in chart.      Past Medical History:   Diagnosis Date     Acute urinary retention 11/2012    ER visit   / 1200 cc post-void residual     Acute urinary retention 11/01/2012    ER      Basal cell carcinoma      Diabetes mellitus type II      Epididymitis 03/20/2014    Started on doxycyclline for UTI/orchitis. He was discharged on 03/22/14.     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     PE (pulmonary embolism) 03/20/2014     Rectal cancer (H)      Skin cancer      Squamous cell carcinoma      Thrombosis of leg     +PE       Past Surgical History:   Procedure Laterality Date     AMPUTATE TOE(S) Right 06/04/2019    Procedure: AMPUTATION 2nd Toe;  Surgeon: Adriel Cabello DPM;  Location: [facility]     BIOPSY       COLONOSCOPY  07/29/2013    Procedure: COMBINED COLONOSCOPY, SINGLE BIOPSY/POLYPECTOMY BY BIOPSY;;  Surgeon: Bari Burnett MD;  Location: [facility]     COLONOSCOPY N/A 06/04/2015    Procedure: COMBINED COLONOSCOPY, SINGLE OR MULTIPLE BIOPSY/POLYPECTOMY BY BIOPSY;  Surgeon: Tara Mtz MD;  Location:  GI     COLONOSCOPY N/A 12/05/2016    Procedure: COLONOSCOPY;  Surgeon: Breanna Kline MD;  Location:  GI     COLONOSCOPY N/A 10/11/2022    Procedure: COLONOSCOPY, FLEXIBLE, WITH LESION REMOVAL USING SNARE;  Surgeon: Pierre Doe MD;  Location: [facility]  GI     CV CORONARY ANGIOGRAM N/A 11/22/2021    Procedure: Coronary Angiogram;  Surgeon: Jak Noe MD;  Location:  HE[facility] CARDIAC CATH LAB     ENT SURGERY       EYE SURGERY  05/01/2012    Right eye procedure     HC CIRCUMCISION SURGICAL NON-DEV >28 DAYS AGE  02/01/1997    due to phimosis     HC REMOVAL GALLBLADDER  05/01/2001     HC UGI ENDOSCOPY W PLACEMENT GASTROSTOMY TUBE PERCUT  03/13/2014    Procedure: COMBINED ESOPHAGOSCOPY, GASTROSCOPY, DUODENOSCOPY (EGD), PLACE PERCUTANEOUS ENDOSCOPIC GASTROSTOMY TUBE;  Surgeon: Loco Casillas MD;  Location: [facility]     LAPAROSCOPIC ASSISTED COLECTOMY LEFT (DESCENDING)  01/07/2014    Procedure: LAPAROSCOPIC ASSISTED COLECTOMY LEFT (DESCENDING);  Laparoscopic hand assisted Low Anterior Resection With colonic J pouch and end to end colorectal anastamosis. Laparoscopic splenic flexure mobilization.  Loop Ileostomy.  Rigid proctoscopy;  Surgeon: Margaret Gamble MD;  Location:  OR     LASER HOLMIUM ENUCLEATION PROSTATE N/A 8/22/2024    Procedure: Holmium Laser Enucleation of the Prostate;  Surgeon: Esha Montano MD;  [facility]: [facility]     PHACOEMULSIFICATION WITH STANDARD INTRAOCULAR LENS IMPLANT  04/11/2013    Procedure: PHACOEMULSIFICATION WITH STANDARD INTRAOCULAR LENS IMPLANT;  Right Kelman Phacoemulsification with Intraocular Lens Implant;  Surgeon: Caesar Turner MD;  Location: [facility]     REMOVE GASTROSTOMY TUBE ADULT  07/25/2014    Procedure: REMOVE GASTROSTOMY TUBE ADULT;  Surgeon: Margaret Gamble MD;  Location:  OR     SIGMOIDOSCOPY FLEXIBLE  06/23/2014    Procedure: SIGMOIDOSCOPY FLEXIBLE;  Surgeon: Margaret Gamble MD;  Location:  GI     SIGMOIDOSCOPY FLEXIBLE  07/22/2014    Procedure: SIGMOIDOSCOPY FLEXIBLE;  Surgeon: Margaret Gamble MD;  [facility]     SIGMOIDOSCOPY FLEXIBLE  07/25/2014    Procedure: SIGMOIDOSCOPY FLEXIBLE;  Surgeon: Margaret Gamble MD;  [facility]      SIGMOIDOSCOPY FLEXIBLE  07/28/2014    Procedure: SIGMOIDOSCOPY FLEXIBLE;  Surgeon: Margaret Gamble MD;  [facility]     SIGMOIDOSCOPY FLEXIBLE  07/31/2014    Procedure: SIGMOIDOSCOPY FLEXIBLE;  Surgeon: Margaret Gamble MD;  [facility]     SIGMOIDOSCOPY FLEXIBLE  08/03/2014    Procedure: SIGMOIDOSCOPY FLEXIBLE;  Surgeon: Margaret Gamble MD;  [facility]     SIGMOIDOSCOPY FLEXIBLE  08/05/2014    Procedure: SIGMOIDOSCOPY FLEXIBLE;  Surgeon: Margaret Gamble MD;  [facility]     SIGMOIDOSCOPY FLEXIBLE  08/08/2014    Procedure: SIGMOIDOSCOPY FLEXIBLE;  Surgeon: Margaret Gamble MD;  [facility]     SIGMOIDOSCOPY FLEXIBLE  08/18/2014    Procedure: SIGMOIDOSCOPY FLEXIBLE;  Surgeon: Jose Roberto Cervantes MD;  [facility]     SIGMOIDOSCOPY FLEXIBLE N/A 08/15/2014    Procedure: SIGMOIDOSCOPY FLEXIBLE;  Surgeon: Margaret Gamble MD;  [facility]     SIGMOIDOSCOPY FLEXIBLE N/A 08/22/2014    Procedure: SIGMOIDOSCOPY FLEXIBLE;  Surgeon: Jose Roberto Cervantes MD;  [facility]     SIGMOIDOSCOPY FLEXIBLE N/A 08/11/2014    Procedure: SIGMOIDOSCOPY FLEXIBLE;  Surgeon: Margaret Gamble MD;  [facility]     SIGMOIDOSCOPY FLEXIBLE N/A 08/26/2014    Procedure: SIGMOIDOSCOPY FLEXIBLE;  Surgeon: Margaret Gamble MD;  [facility]     SIGMOIDOSCOPY FLEXIBLE N/A 08/29/2014    Procedure: SIGMOIDOSCOPY FLEXIBLE;  Surgeon: Margaret Gamble MD;  [facility]     SIGMOIDOSCOPY FLEXIBLE N/A 09/08/2014    Procedure: SIGMOIDOSCOPY FLEXIBLE;  Surgeon: Margaret Gamble MD;  [facility]     SIGMOIDOSCOPY FLEXIBLE N/A 09/02/2014    Procedure: SIGMOIDOSCOPY FLEXIBLE;  Surgeon: Breanna Kline MD;  [facility]     SIGMOIDOSCOPY FLEXIBLE N/A 09/11/2014    Procedure: SIGMOIDOSCOPY FLEXIBLE;  Surgeon: Margaret Gamble MD;  [facility]     SIGMOIDOSCOPY FLEXIBLE N/A 09/19/2014    Procedure: SIGMOIDOSCOPY  FLEXIBLE;  Surgeon: Margaret Gamble MD;  [facility]     SIGMOIDOSCOPY FLEXIBLE N/A 09/15/2014    Procedure: SIGMOIDOSCOPY FLEXIBLE;  Surgeon: Margaret Gamble MD;  [facility]     SIGMOIDOSCOPY FLEXIBLE N/A 09/05/2014    Procedure: SIGMOIDOSCOPY FLEXIBLE;  Surgeon: Margaret Gamble MD;  [facility]     SIGMOIDOSCOPY FLEXIBLE N/A 09/23/2014    Procedure: SIGMOIDOSCOPY FLEXIBLE;  Surgeon: Margaret Gamble MD;  [facility]     SIGMOIDOSCOPY FLEXIBLE N/A 09/26/2014    Procedure: SIGMOIDOSCOPY FLEXIBLE;  Surgeon: Margaret Gamble MD;  [facility]     SIGMOIDOSCOPY FLEXIBLE N/A 09/30/2014    Procedure: SIGMOIDOSCOPY FLEXIBLE;  Surgeon: Margaret Gamble MD;  [facility]     SIGMOIDOSCOPY FLEXIBLE N/A 10/03/2014    Procedure: SIGMOIDOSCOPY FLEXIBLE;  Surgeon: Margaret Gamble MD;  [facility]     SIGMOIDOSCOPY FLEXIBLE N/A 10/30/2014    Procedure: SIGMOIDOSCOPY FLEXIBLE;  Surgeon: Margaret Gamble MD;  [facility]     SIGMOIDOSCOPY FLEXIBLE, PLACEMENT OF DRAINAGE SPONGE  09/30/2014     TAKEDOWN ILEOSTOMY N/A 01/06/2015    Procedure: TAKEDOWN ILEOSTOMY;  Surgeon: Margaret Gamble MD;  [facility]        Family History   Problem Relation Age of Onset     Diabetes Mother      Hypertension Mother      Cancer Father      Breast Cancer Sister      Cancer Maternal Grandmother      Alzheimer Disease Maternal Grandmother      Cardiovascular Paternal Grandmother      Colon Cancer No family hx of      Colon Polyps No family hx of      Crohn's Disease No family hx of      Ulcerative Colitis No family hx of      Anesthesia Reaction No family hx of      Melanoma No family hx of        Social History     Socioeconomic History     Marital status:      Spouse name: Not on file     Number of children: Not on file     Years of education: Not on file     Highest education level: Not on file   Occupational  History     Employer: RETIRED     Comment: [employer]   Tobacco Use     Smoking status: Former     Types: Cigars     Smokeless tobacco: Never   Substance and Sexual Activity     Alcohol use: Not Currently     Drug use: Never     Sexual activity: Not Currently     Partners: Female     Birth control/protection: None   Other Topics Concern     Parent/sibling w/ CABG, MI or angioplasty before 65F 55M? No   Social History Narrative     Not on file     Social Drivers of Health     Financial Resource Strain: Low Risk  (3/3/2025)    Financial Resource Strain      Within the past 12 months, have you or your family members you live with been unable to get utilities (heat, electricity) when it was really needed?: No   Food Insecurity: Low Risk  (3/3/2025)    Food Insecurity      Within the past 12 months, did you worry that your food would run out before you got money to buy more?: No      Within the past 12 months, did the food you bought just not last and you didn t have money to get more?: No   Transportation Needs: Low Risk  (3/3/2025)    Transportation Needs      Within the past 12 months, has lack of transportation kept you from medical appointments, getting your medicines, non-medical meetings or appointments, work, or from getting things that you need?: No   Physical Activity: Unknown (3/3/2025)    Exercise Vital Sign      Days of Exercise per Week: 7 days      Minutes of Exercise per Session: Not on file   Stress: No Stress Concern Present (3/3/2025)    [employer] - Occupational Stress Questionnaire      Feeling of Stress : Not at all   Social Connections: Unknown (3/3/2025)    Social Connection and Isolation Panel [NHANES]      Frequency of Communication with Friends and Family: Not on file      Frequency of Social Gatherings with Friends and Family: Twice a week      Attends Muslim Services: Not on file      Active Member of Clubs or Organizations: Not on  file      Attends Club or Organization Meetings: Not on file      Marital Status: Not on file   Interpersonal Safety: High Risk (8/22/2024)    Interpersonal Safety      Do you feel physically and emotionally safe where you currently live?: No      Within the past 12 months, have you been hit, slapped, kicked or otherwise physically hurt by someone?: No      Within the past 12 months, have you been humiliated or emotionally abused in other ways by your partner or ex-partner?: No   Housing Stability: High Risk (3/3/2025)    Housing Stability      Do you have housing? : No      Are you worried about losing your housing?: No       Outpatient Encounter Medications as of 7/9/2025   Medication Sig Dispense Refill     amLODIPine (NORVASC) 10 MG tablet Take 1 tablet (10 mg) by mouth daily. 90 tablet 1     aspirin (ASA) 325 MG tablet Take 1 tablet (325 mg) by mouth daily. Do not start before August 29, 2024.       atorvastatin (LIPITOR) 20 MG tablet Take 1 tablet (20 mg) by mouth daily. 90 tablet 1     Cholecalciferol (VITAMIN D-3) 1000 units CAPS Take 1 capsule by mouth daily        cyanocobalamin (VITAMIN B-12) 1000 MCG tablet Take 1,000 mcg by mouth daily.       gabapentin (NEURONTIN) 100 MG capsule Take 1 capsule (100 mg) by mouth at bedtime. For 3 nights then increase to 2 capsules at bedtime for 3 nights then 3 capsules at bedtime 90 capsule 1     ibuprofen (ADVIL/MOTRIN) 200 MG tablet Take 400 mg by mouth every 6 hours as needed for pain.       insulin glargine (LANTUS SOLOSTAR) 100 UNIT/ML pen INJECT 16 UNITS UNDER THE SKIN AT BEDTIME 30 mL 2     insulin lispro (HUMALOG KWIKPEN) 100 UNIT/ML (1 unit dial) KWIKPEN USE 8 UNITS PLUS LOW SCALE BEFORE EACH MEAL. MAX 50 UNITS PER DAY 60 mL 1     insulin pen needle (PENTIPS) 31G X 6 MM miscellaneous Use 4 pen needles daily or as directed. 500 each 1     losartan (COZAAR) 100 MG tablet Take 1 tablet (100 mg) by mouth daily. 90 tablet 1     metoprolol succinate ER (TOPROL XL)  100 MG 24 hr tablet Take 1 tablet (100 mg) by mouth every evening. 90 tablet 3     No facility-administered encounter medications on file as of 7/9/2025.             O:   NAD, WDWN, Alert & Oriented, Mood & Affect wnl, Vitals stable   General appearance normal   Vitals stable   Alert, oriented and in no acute distress     Red scaly plaques on feet    Stuck on papules and brown macules on trunk and ext   Red papules on trunk  pigmented macules on trunk and ext with regular borders and pigment networks   Flesh colored papules on trunk     The remainder of the full exam was normal; the following areas were examined:  conjunctiva/lids, , neck, peripheral vascular system, abdomen, lymph nodes, digits/nails, eccrine and apocrine glands, scalp/hair, face, neck, chest, abdomen, buttocks, back, RUE, LUE, RLE, LLE       Eyes: Conjunctivae/lids:Normal     ENT: Lips, mucosa: normal    MSK:Normal    Cardiovascular: peripheral edema none    Pulm: Breathing Normal    Lymph Nodes: No Head and Neck Lymphadenopathy     Neuro/Psych: Orientation:Alert and Orientedx3 ; Mood/Affect:normal       A/P:  1. Seborrheic keratosis, lentigo, angioma, dermal nevus, nevi, hx of non-melanoma skin cancer, hx of melanoma  2. Tinea  Loprox twice daily  It was a pleasure speaking to Storm Dutta today.  Previous clinic notes and pertinent laboratory tests were reviewed prior to Storm Dutta's visit.  Signs and Symptoms of skin cancer discussed with patient.  Patient encouraged to perform monthly skin exams.  UV precautions reviewed with patient.  Return to clinic 12 months      Again, thank you for allowing me to participate in the care of your patient.        Sincerely,        Jesus Neal MD    Electronically signed

## 2025-07-09 NOTE — PROGRESS NOTES
Storm Dutta is an extremely pleasant 84 year old year old male patient here today for hx of non-melanoma and melanoma skin cancer.  He notes rash on feet.  Patient has no other skin complaints today.  Remainder of the HPI, Meds, PMH, Allergies, FH, and SH was reviewed in chart.      Past Medical History:   Diagnosis Date    Acute urinary retention 11/2012    ER visit   / 1200 cc post-void residual    Acute urinary retention 11/01/2012    ER     Basal cell carcinoma     Diabetes mellitus type II     Epididymitis 03/20/2014    Started on doxycyclline for UTI/orchitis. He was discharged on 03/22/14.    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    PE (pulmonary embolism) 03/20/2014    Rectal cancer (H)     Skin cancer     Squamous cell carcinoma     Thrombosis of leg     +PE       Past Surgical History:   Procedure Laterality Date    AMPUTATE TOE(S) Right 06/04/2019    Procedure: AMPUTATION 2nd Toe;  Surgeon: Adriel Cabello DPM;  Location: [facility]    BIOPSY      COLONOSCOPY  07/29/2013    Procedure: COMBINED COLONOSCOPY, SINGLE BIOPSY/POLYPECTOMY BY BIOPSY;;  Surgeon: Bari Burnett MD;  Location: [facility]    COLONOSCOPY N/A 06/04/2015    Procedure: COMBINED COLONOSCOPY, SINGLE OR MULTIPLE BIOPSY/POLYPECTOMY BY BIOPSY;  Surgeon: Tara Mtz MD;  Location:  GI    COLONOSCOPY N/A 12/05/2016    Procedure: COLONOSCOPY;  Surgeon: Breanna Kline MD;  Location:  GI    COLONOSCOPY N/A 10/11/2022    Procedure: COLONOSCOPY, FLEXIBLE, WITH LESION REMOVAL USING SNARE;  Surgeon: Pierre Doe MD;  Location: [facility]    CV CORONARY ANGIOGRAM N/A 11/22/2021    Procedure: Coronary Angiogram;  Surgeon: Jak Noe MD;  Location:  [facility]    ENT SURGERY      EYE SURGERY  05/01/2012    Right eye procedure    HC CIRCUMCISION SURGICAL NON-DEV >28 DAYS  AGE  [birth year]01[birth year]1997    due to phimosis    HC REMOVAL GALLBLADDER  05/01/2001    HC UGI ENDOSCOPY W PLACEMENT GASTROSTOMY TUBE PERCUT  03/13/2014    Procedure: COMBINED ESOPHAGOSCOPY, GASTROSCOPY, DUODENOSCOPY (EGD), PLACE PERCUTANEOUS ENDOSCOPIC GASTROSTOMY TUBE;  Surgeon: Loco Casillas MD;  Location: [facility]    LAPAROSCOPIC ASSISTED COLECTOMY LEFT (DESCENDING)  01/07/2014    Procedure: LAPAROSCOPIC ASSISTED COLECTOMY LEFT (DESCENDING);  Laparoscopic hand assisted Low Anterior Resection With colonic J pouch and end to end colorectal anastamosis. Laparoscopic splenic flexure mobilization.  Loop Ileostomy.  Rigid proctoscopy;  Surgeon: Margaret Gamble MD;  [facility]    LASER HOLMIUM ENUCLEATION PROSTATE N/A 8/22/2024    Procedure: Holmium Laser Enucleation of the Prostate;  Surgeon: Esha Montano MD;  [facility]    PHACOEMULSIFICATION WITH STANDARD INTRAOCULAR LENS IMPLANT  04/11/2013    Procedure: PHACOEMULSIFICATION WITH STANDARD INTRAOCULAR LENS IMPLANT;  Right Kelman Phacoemulsification with Intraocular Lens Implant;  Surgeon: Caesar Turner MD;  Location: [facility]    REMOVE GASTROSTOMY TUBE ADULT  07/25/2014    Procedure: REMOVE GASTROSTOMY TUBE ADULT;  Surgeon: Margaret Gamble MD;  [facility]    SIGMOIDOSCOPY FLEXIBLE  06/23/2014    Procedure: SIGMOIDOSCOPY FLEXIBLE;  Surgeon: Margaret Gamble MD;  [facility]    SIGMOIDOSCOPY FLEXIBLE  07/22/2014    Procedure: SIGMOIDOSCOPY FLEXIBLE;  Surgeon: Margaret Gamble MD;  [facility]    SIGMOIDOSCOPY FLEXIBLE  07/25/2014    Procedure: SIGMOIDOSCOPY FLEXIBLE;  Surgeon: Margaret Gamble MD;  [facility]    SIGMOIDOSCOPY FLEXIBLE  07/28/2014    Procedure: SIGMOIDOSCOPY FLEXIBLE;  Surgeon: Margaret Gamble MD;  [facility]    SIGMOIDOSCOPY FLEXIBLE  07/31/2014    Procedure: SIGMOIDOSCOPY FLEXIBLE;  Surgeon: Margaret Gamble MD;  [facility]     SIGMOIDOSCOPY FLEXIBLE  08/03/2014    Procedure: SIGMOIDOSCOPY FLEXIBLE;  Surgeon: Margaret Gamble MD;  [facility]    SIGMOIDOSCOPY FLEXIBLE  08/05/2014    Procedure: SIGMOIDOSCOPY FLEXIBLE;  Surgeon: Margaret Gmable MD;  [facility]    SIGMOIDOSCOPY FLEXIBLE  08/08/2014    Procedure: SIGMOIDOSCOPY FLEXIBLE;  Surgeon: Margaret Gamble MD;  [facility]    SIGMOIDOSCOPY FLEXIBLE  08/18/2014    Procedure: SIGMOIDOSCOPY FLEXIBLE;  Surgeon: Jose Roberto Cervantes MD;  [facility]    SIGMOIDOSCOPY FLEXIBLE N/A 08/15/2014    Procedure: SIGMOIDOSCOPY FLEXIBLE;  Surgeon: Margaret Gamble MD;  [facility]    SIGMOIDOSCOPY FLEXIBLE N/A 08/22/2014    Procedure: SIGMOIDOSCOPY FLEXIBLE;  Surgeon: Jose Roberto Cervantes MD;  [facility]    SIGMOIDOSCOPY FLEXIBLE N/A 08/11/2014    Procedure: SIGMOIDOSCOPY FLEXIBLE;  Surgeon: Margaret Gamble MD;  [facility]    SIGMOIDOSCOPY FLEXIBLE N/A 08/26/2014    Procedure: SIGMOIDOSCOPY FLEXIBLE;  Surgeon: Margaret Gamble MD;  [facility]    SIGMOIDOSCOPY FLEXIBLE N/A 08/29/2014    Procedure: SIGMOIDOSCOPY FLEXIBLE;  Surgeon: Margaret Gamble MD;  [facility]    SIGMOIDOSCOPY FLEXIBLE N/A 09/08/2014    Procedure: SIGMOIDOSCOPY FLEXIBLE;  Surgeon: Margaret Gamble MD;  [facility]    SIGMOIDOSCOPY FLEXIBLE N/A 09/02/2014    Procedure: SIGMOIDOSCOPY FLEXIBLE;  Surgeon: Breanna Kline MD;  [facility]    SIGMOIDOSCOPY FLEXIBLE N/A 09/11/2014    Procedure: SIGMOIDOSCOPY FLEXIBLE;  Surgeon: Margaret Gamble MD;  [facility]    SIGMOIDOSCOPY FLEXIBLE N/A 09/19/2014    Procedure: SIGMOIDOSCOPY FLEXIBLE;  Surgeon: Margaret Gamble MD;  [facility]    SIGMOIDOSCOPY FLEXIBLE N/A 09/15/2014    Procedure: SIGMOIDOSCOPY FLEXIBLE;  Surgeon: Margaret Gamble MD;  [facility]    SIGMOIDOSCOPY FLEXIBLE N/A 09/05/2014    Procedure: SIGMOIDOSCOPY  FLEXIBLE;  Surgeon: Margaret Gamble MD;  [facility]    SIGMOIDOSCOPY FLEXIBLE N/A 09/23/2014    Procedure: SIGMOIDOSCOPY FLEXIBLE;  Surgeon: Margaret Gamble MD;  [facility]    SIGMOIDOSCOPY FLEXIBLE N/A 09/26/2014    Procedure: SIGMOIDOSCOPY FLEXIBLE;  Surgeon: Margaret Gamble MD;  [facility]    SIGMOIDOSCOPY FLEXIBLE N/A 09/30/2014    Procedure: SIGMOIDOSCOPY FLEXIBLE;  Surgeon: Margaret Gamble MD;  [facility]    SIGMOIDOSCOPY FLEXIBLE N/A 10/03/2014    Procedure: SIGMOIDOSCOPY FLEXIBLE;  Surgeon: Margaret Gamble MD;  [facility]    SIGMOIDOSCOPY FLEXIBLE N/A 10/30/2014    Procedure: SIGMOIDOSCOPY FLEXIBLE;  Surgeon: Margaret Gamble MD;  [facility]    SIGMOIDOSCOPY FLEXIBLE, PLACEMENT OF DRAINAGE SPONGE  09/30/2014    TAKEDOWN ILEOSTOMY N/A 01/06/2015    Procedure: TAKEDOWN ILEOSTOMY;  Surgeon: Margaret Gamble MD;  [facility]        Family History   Problem Relation Age of Onset    Diabetes Mother     Hypertension Mother     Cancer Father     Breast Cancer Sister     Cancer Maternal Grandmother     Alzheimer Disease Maternal Grandmother     Cardiovascular Paternal Grandmother     Colon Cancer No family hx of     Colon Polyps No family hx of     Crohn's Disease No family hx of     Ulcerative Colitis No family hx of     Anesthesia Reaction No family hx of     Melanoma No family hx of        Social History     Socioeconomic History    Marital status:      Spouse name: Not on file    Number of children: Not on file    Years of education: Not on file    Highest education level: Not on file   Occupational History     Employer: RETIRED     Comment: [employer]   Tobacco Use    Smoking status: Former     Types: Cigars    Smokeless tobacco: Never   Substance and Sexual Activity    Alcohol use: Not Currently    Drug use: Never    Sexual activity: Not Currently     Partners: Female      Birth control/protection: None   Other Topics Concern    Parent/sibling w/ CABG, MI or angioplasty before 65F 55M? No   Social History Narrative    Not on file     Social Drivers of Health     Financial Resource Strain: Low Risk  (3/3/2025)    Financial Resource Strain     Within the past 12 months, have you or your family members you live with been unable to get utilities (heat, electricity) when it was really needed?: No   Food Insecurity: Low Risk  (3/3/2025)    Food Insecurity     Within the past 12 months, did you worry that your food would run out before you got money to buy more?: No     Within the past 12 months, did the food you bought just not last and you didn t have money to get more?: No   Transportation Needs: Low Risk  (3/3/2025)    Transportation Needs     Within the past 12 months, has lack of transportation kept you from medical appointments, getting your medicines, non-medical meetings or appointments, work, or from getting things that you need?: No   Physical Activity: Unknown (3/3/2025)    Exercise Vital Sign     Days of Exercise per Week: 7 days     Minutes of Exercise per Session: Not on file   Stress: No Stress Concern Present (3/3/2025)    [employer] [place] [employer] - Occupational Stress Questionnaire     Feeling of Stress : Not at all   Social Connections: Unknown (3/3/2025)    Social Connection and Isolation Panel [NHANES]     Frequency of Communication with Friends and Family: Not on file     Frequency of Social Gatherings with Friends and Family: Twice a week     Attends Yazidism Services: Not on file     Active Member of Clubs or Organizations: Not on file     Attends Club or Organization Meetings: Not on file     Marital Status: Not on file   Interpersonal Safety: High Risk (8/22/2024)    Interpersonal Safety     Do you feel physically and emotionally safe where you currently live?: No     Within the past 12 months, have you been hit, slapped, kicked or otherwise  physically hurt by someone?: No     Within the past 12 months, have you been humiliated or emotionally abused in other ways by your partner or ex-partner?: No   Housing Stability: High Risk (3/3/2025)    Housing Stability     Do you have housing? : No     Are you worried about losing your housing?: No       Outpatient Encounter Medications as of 7/9/2025   Medication Sig Dispense Refill    amLODIPine (NORVASC) 10 MG tablet Take 1 tablet (10 mg) by mouth daily. 90 tablet 1    aspirin (ASA) 325 MG tablet Take 1 tablet (325 mg) by mouth daily. Do not start before August 29, 2024.      atorvastatin (LIPITOR) 20 MG tablet Take 1 tablet (20 mg) by mouth daily. 90 tablet 1    Cholecalciferol (VITAMIN D-3) 1000 units CAPS Take 1 capsule by mouth daily       cyanocobalamin (VITAMIN B-12) 1000 MCG tablet Take 1,000 mcg by mouth daily.      gabapentin (NEURONTIN) 100 MG capsule Take 1 capsule (100 mg) by mouth at bedtime. For 3 nights then increase to 2 capsules at bedtime for 3 nights then 3 capsules at bedtime 90 capsule 1    ibuprofen (ADVIL/MOTRIN) 200 MG tablet Take 400 mg by mouth every 6 hours as needed for pain.      insulin glargine (LANTUS SOLOSTAR) 100 UNIT/ML pen INJECT 16 UNITS UNDER THE SKIN AT BEDTIME 30 mL 2    insulin lispro (HUMALOG KWIKPEN) 100 UNIT/ML (1 unit dial) KWIKPEN USE 8 UNITS PLUS LOW SCALE BEFORE EACH MEAL. MAX 50 UNITS PER DAY 60 mL 1    insulin pen needle (PENTIPS) 31G X 6 MM miscellaneous Use 4 pen needles daily or as directed. 500 each 1    losartan (COZAAR) 100 MG tablet Take 1 tablet (100 mg) by mouth daily. 90 tablet 1    metoprolol succinate ER (TOPROL XL) 100 MG 24 hr tablet Take 1 tablet (100 mg) by mouth every evening. 90 tablet 3     No facility-administered encounter medications on file as of 7/9/2025.             O:   NAD, WDWN, Alert & Oriented, Mood & Affect wnl, Vitals stable   General appearance normal   Vitals stable   Alert, oriented and in no acute distress     Red scaly  plaques on feet    Stuck on papules and brown macules on trunk and ext   Red papules on trunk  pigmented macules on trunk and ext with regular borders and pigment networks   Flesh colored papules on trunk     The remainder of the full exam was normal; the following areas were examined:  conjunctiva/lids, , neck, peripheral vascular system, abdomen, lymph nodes, digits/nails, eccrine and apocrine glands, scalp/hair, face, neck, chest, abdomen, buttocks, back, RUE, LUE, RLE, LLE       Eyes: Conjunctivae/lids:Normal     ENT: Lips, mucosa: normal    MSK:Normal    Cardiovascular: peripheral edema none    Pulm: Breathing Normal    Lymph Nodes: No Head and Neck Lymphadenopathy     Neuro/Psych: Orientation:Alert and Orientedx3 ; Mood/Affect:normal       A/P:  1. Seborrheic keratosis, lentigo, angioma, dermal nevus, nevi, hx of non-melanoma skin cancer, hx of melanoma  2. Tinea  Loprox twice daily  It was a pleasure speaking to Storm Dutta today.  Previous clinic notes and pertinent laboratory tests were reviewed prior to Storm Dutta's visit.  Signs and Symptoms of skin cancer discussed with patient.  Patient encouraged to perform monthly skin exams.  UV precautions reviewed with patient.  Return to clinic 12 months

## 2025-08-26 ENCOUNTER — ALLIED HEALTH/NURSE VISIT (OUTPATIENT)
Dept: AUDIOLOGY | Facility: CLINIC | Age: 85
End: 2025-08-26
Payer: COMMERCIAL

## 2025-08-26 DIAGNOSIS — H90.3 SENSORINEURAL HEARING LOSS, BILATERAL: Primary | ICD-10-CM

## (undated) DEVICE — TAPE DURAPORE 3" SILK 1538-3

## (undated) DEVICE — NDL 25GA 1.5" 305127

## (undated) DEVICE — DECANTER VIAL 2006S

## (undated) DEVICE — TUBING SET THERMEDX UROLOGY SGL USE LL0006

## (undated) DEVICE — SU ETHILON 3-0 PS-2 18" 1669H

## (undated) DEVICE — SOL NACL 0.9% IRRIG 1000ML BOTTLE 2F7124

## (undated) DEVICE — GLOVE PROTEXIS BLUE W/NEU-THERA 8.0  2D73EB80

## (undated) DEVICE — SPECIMEN TRAP TISSUE CONTAINER PIRANHA 2208120

## (undated) DEVICE — SYR PISTON IRRIGATION 60 ML DYND20325

## (undated) DEVICE — Device

## (undated) DEVICE — DRSG GAUZE 4X8" NON21842

## (undated) DEVICE — BAG URINARY DRAIN 4000ML LF 153509

## (undated) DEVICE — DRAPE SHEET REV FOLD 3/4 9349

## (undated) DEVICE — PAD CHUX UNDERPAD 30X36" P3036C

## (undated) DEVICE — SMART CAPNOLINE H PLUS, ADULT/INTERMEDIATE O2, LONG

## (undated) DEVICE — PREP SKIN SCRUB TRAY 4461A

## (undated) DEVICE — LASER FIBER HOLMIUM MOSES 550 D/F/L AC-10030120

## (undated) DEVICE — SOL WATER IRRIG 1000ML BOTTLE 2F7114

## (undated) DEVICE — DRSG GAUZE 4X4" TRAY

## (undated) DEVICE — SUCTION MANIFOLD NEPTUNE 2 SYS 4 PORT 0702-020-000

## (undated) DEVICE — SYR ANGIO NAMIC CNTRSTINJ ROT ADPT RSVR PALM PD THB GRP FNGR

## (undated) DEVICE — DRAPE EXTREMITY W/ARMBOARD 29405

## (undated) DEVICE — DRAPE MAYO STAND 23X54 8337

## (undated) DEVICE — PAD FLOOR SURGSAFE 30X40" 83030

## (undated) DEVICE — FILTER PIRANHA DISP 2228.901

## (undated) DEVICE — PREP POVIDONE IODINE SOLUTION 10% 120ML

## (undated) DEVICE — CATH LASER URETERAL 7.1FRX40CM G17797  022403-7.1-40

## (undated) DEVICE — GOWN XLG DISP 9545

## (undated) DEVICE — PREP DYNA-HEX 4% CHG SCRUB 4OZ BOTTLE MDS098710

## (undated) DEVICE — PREP POVIDONE IODINE SCRUB 7.5% 120ML

## (undated) DEVICE — CATH FOLEY 3WAY 22FR 30ML LATEX 0167SI22

## (undated) DEVICE — MANIFOLD KIT ANGIO AUTOMATED 014613

## (undated) DEVICE — TOTE ANGIO CORP PC15AT SAN32CC83O

## (undated) DEVICE — GLOVE PROTEXIS W/NEU-THERA 8.0  2D73TE80

## (undated) DEVICE — DEVICE ENDO CLIP INSTINCT PLUS INSC-P-7-230-S  G58010

## (undated) DEVICE — LINEN GOWN X4 5410

## (undated) DEVICE — CAST PADDING 4" STERILE 9044S

## (undated) DEVICE — CATH DIAG 4FR JL 4.5 538417

## (undated) DEVICE — INTRO SHEATH 4FRX10CM PINNACLE RSS402

## (undated) DEVICE — DEVICE CATH STABILIZATION STATLOCK FOLEY 3-WAY FOL0105

## (undated) DEVICE — DRAPE STOCKINETTE IMPERVIOUS 12" 1587

## (undated) DEVICE — GLOVE BIOGEL PI MICRO SZ 7.0 48570

## (undated) DEVICE — SOL NACL 0.9% IRRIG 3000ML BAG 2B7477

## (undated) DEVICE — SOL NACL 0.9% IRRIG 1000ML BOTTLE 07138-09

## (undated) DEVICE — SYR 50ML LL W/O NDL 309653

## (undated) DEVICE — SYR ANGIOGRAPHY MULTIUSE KIT ACIST 014612

## (undated) DEVICE — DRSG ABDOMINAL 07 1/2X8" 7197D

## (undated) DEVICE — KIT HAND CONTROL ANGIOTOUCH ACIST 65CM AT-P65

## (undated) DEVICE — CATH ANGIO INFINITI 3DRC 4FRX100CM 538476

## (undated) DEVICE — BNDG ELASTIC 4"X5YDS UNSTERILE 6611-40

## (undated) DEVICE — TUBING SET PIRANHA 41702208

## (undated) DEVICE — DRSG XEROFORM 1X8"

## (undated) DEVICE — PACK EXTREMITY LATEX FREE SOP32HFFCS

## (undated) DEVICE — ENDO SNARE EXACTO COLD 9MM LOOP 2.4MMX230CM 00711115

## (undated) DEVICE — BLADE MORCELLATOR WOLF PIRANHA 4.75X385MM 49700103

## (undated) DEVICE — NDL PERC ENTRY THINWALL 18GA 9.0" G00273

## (undated) DEVICE — SYR 50ML CATH TIP W/O NDL 309620

## (undated) DEVICE — LINEN TOWEL PACK X5 5464

## (undated) DEVICE — DEFIB PRO-PADZ LVP LQD GEL ADULT 8900-2105-01

## (undated) RX ORDER — ONDANSETRON 2 MG/ML
INJECTION INTRAMUSCULAR; INTRAVENOUS
Status: DISPENSED
Start: 2019-06-04

## (undated) RX ORDER — CEFAZOLIN SODIUM 2 G/100ML
INJECTION, SOLUTION INTRAVENOUS
Status: DISPENSED
Start: 2019-06-04

## (undated) RX ORDER — FENTANYL CITRATE 50 UG/ML
INJECTION, SOLUTION INTRAMUSCULAR; INTRAVENOUS
Status: DISPENSED
Start: 2024-08-22

## (undated) RX ORDER — GLYCOPYRROLATE 0.2 MG/ML
INJECTION, SOLUTION INTRAMUSCULAR; INTRAVENOUS
Status: DISPENSED
Start: 2019-06-04

## (undated) RX ORDER — NITROGLYCERIN 5 MG/ML
VIAL (ML) INTRAVENOUS
Status: DISPENSED
Start: 2021-11-22

## (undated) RX ORDER — FENTANYL CITRATE 50 UG/ML
INJECTION, SOLUTION INTRAMUSCULAR; INTRAVENOUS
Status: DISPENSED
Start: 2019-06-04

## (undated) RX ORDER — HEPARIN SODIUM 200 [USP'U]/100ML
INJECTION, SOLUTION INTRAVENOUS
Status: DISPENSED
Start: 2021-11-22

## (undated) RX ORDER — LIDOCAINE HYDROCHLORIDE 10 MG/ML
INJECTION, SOLUTION EPIDURAL; INFILTRATION; INTRACAUDAL; PERINEURAL
Status: DISPENSED
Start: 2021-11-22

## (undated) RX ORDER — BUPIVACAINE HYDROCHLORIDE 5 MG/ML
INJECTION, SOLUTION PERINEURAL
Status: DISPENSED
Start: 2019-06-04

## (undated) RX ORDER — EPHEDRINE SULFATE 50 MG/ML
INJECTION, SOLUTION INTRAVENOUS
Status: DISPENSED
Start: 2024-08-22

## (undated) RX ORDER — DEXAMETHASONE SODIUM PHOSPHATE 4 MG/ML
INJECTION, SOLUTION INTRA-ARTICULAR; INTRALESIONAL; INTRAMUSCULAR; INTRAVENOUS; SOFT TISSUE
Status: DISPENSED
Start: 2019-06-04

## (undated) RX ORDER — PROPOFOL 10 MG/ML
INJECTION, EMULSION INTRAVENOUS
Status: DISPENSED
Start: 2024-08-22

## (undated) RX ORDER — FENTANYL CITRATE 50 UG/ML
INJECTION, SOLUTION INTRAMUSCULAR; INTRAVENOUS
Status: DISPENSED
Start: 2021-11-22

## (undated) RX ORDER — HEPARIN SODIUM 1000 [USP'U]/ML
INJECTION, SOLUTION INTRAVENOUS; SUBCUTANEOUS
Status: DISPENSED
Start: 2021-11-22

## (undated) RX ORDER — FENTANYL CITRATE-0.9 % NACL/PF 10 MCG/ML
PLASTIC BAG, INJECTION (ML) INTRAVENOUS
Status: DISPENSED
Start: 2024-08-22

## (undated) RX ORDER — PROPOFOL 10 MG/ML
INJECTION, EMULSION INTRAVENOUS
Status: DISPENSED
Start: 2019-06-04

## (undated) RX ORDER — VERAPAMIL HYDROCHLORIDE 2.5 MG/ML
INJECTION, SOLUTION INTRAVENOUS
Status: DISPENSED
Start: 2021-11-22

## (undated) RX ORDER — ACETAMINOPHEN 325 MG/1
TABLET ORAL
Status: DISPENSED
Start: 2024-08-22